# Patient Record
Sex: MALE | Race: WHITE | NOT HISPANIC OR LATINO | Employment: OTHER | URBAN - METROPOLITAN AREA
[De-identification: names, ages, dates, MRNs, and addresses within clinical notes are randomized per-mention and may not be internally consistent; named-entity substitution may affect disease eponyms.]

---

## 2017-07-19 ENCOUNTER — OFFICE VISIT (OUTPATIENT)
Dept: ORTHOPEDIC SURGERY | Facility: CLINIC | Age: 67
End: 2017-07-19

## 2017-07-19 VITALS — HEIGHT: 74 IN | WEIGHT: 287.2 LBS | BODY MASS INDEX: 36.86 KG/M2 | TEMPERATURE: 98.1 F

## 2017-07-19 DIAGNOSIS — M25.562 CHRONIC PAIN OF BOTH KNEES: Primary | ICD-10-CM

## 2017-07-19 DIAGNOSIS — M17.0 BILATERAL PRIMARY OSTEOARTHRITIS OF KNEE: ICD-10-CM

## 2017-07-19 DIAGNOSIS — M25.561 CHRONIC PAIN OF BOTH KNEES: Primary | ICD-10-CM

## 2017-07-19 DIAGNOSIS — G89.29 CHRONIC PAIN OF BOTH KNEES: Primary | ICD-10-CM

## 2017-07-19 PROCEDURE — 99204 OFFICE O/P NEW MOD 45 MIN: CPT | Performed by: ORTHOPAEDIC SURGERY

## 2017-07-19 PROCEDURE — 73562 X-RAY EXAM OF KNEE 3: CPT | Performed by: ORTHOPAEDIC SURGERY

## 2017-07-19 RX ORDER — GLIMEPIRIDE 2 MG/1
2 TABLET ORAL
COMMUNITY
End: 2019-08-08 | Stop reason: SDUPTHER

## 2017-07-19 RX ORDER — NEBIVOLOL 5 MG/1
5 TABLET ORAL DAILY
COMMUNITY
End: 2019-08-08

## 2017-07-19 RX ORDER — EZETIMIBE 10 MG/1
TABLET ORAL
COMMUNITY
Start: 2017-07-02 | End: 2018-06-01

## 2017-07-19 RX ORDER — HYDROCHLOROTHIAZIDE 25 MG/1
25 TABLET ORAL DAILY
COMMUNITY
End: 2019-11-11 | Stop reason: SDUPTHER

## 2017-07-19 RX ORDER — ESCITALOPRAM OXALATE 10 MG/1
10 TABLET ORAL DAILY
COMMUNITY
End: 2019-10-10 | Stop reason: SDUPTHER

## 2017-07-19 RX ORDER — AMLODIPINE BESYLATE 10 MG/1
10 TABLET ORAL DAILY
COMMUNITY
End: 2019-10-01 | Stop reason: SDUPTHER

## 2017-07-19 RX ORDER — SITAGLIPTIN AND METFORMIN HYDROCHLORIDE 1000; 50 MG/1; MG/1
TABLET, FILM COATED, EXTENDED RELEASE ORAL
COMMUNITY
Start: 2017-07-07 | End: 2019-11-13 | Stop reason: SDUPTHER

## 2017-07-19 RX ORDER — ASPIRIN 81 MG/1
81 TABLET ORAL DAILY
Status: ON HOLD | COMMUNITY
End: 2020-03-12

## 2017-07-19 RX ORDER — DICLOFENAC SODIUM 75 MG/1
TABLET, DELAYED RELEASE ORAL
COMMUNITY
Start: 2017-07-07 | End: 2019-08-08

## 2017-07-19 RX ORDER — BENAZEPRIL HYDROCHLORIDE 40 MG/1
40 TABLET, FILM COATED ORAL DAILY
COMMUNITY
End: 2019-10-10 | Stop reason: SDUPTHER

## 2017-07-19 RX ORDER — DOXAZOSIN MESYLATE 1 MG/1
1 TABLET ORAL NIGHTLY
COMMUNITY
End: 2019-10-31 | Stop reason: SDUPTHER

## 2017-07-19 NOTE — PROGRESS NOTES
"Patient: Jeb Olson    YOB: 1950    Medical Record Number: [id number]    Chief Complaints:  Left knee pain    History of Present Illness:     67 y.o. male patient who presents for evaluation of his left knee.  He reports a long history of problems dating back over 6 years.  He has previously been followed by another physician for this.  He had one previous arthroscopic \"cleanout\" as he describes it.  He describes his pain as moderate to severe, constant, and both aching and stabbing.  The pain is associated with swelling, clicking, and popping.  The pain is worse with walking and standing.  The pain is better with rest and anti-inflammatories.  Most of his pain is medial.  He gets occasional discomfort down along the \"shin\" as he describes it.  He does have some similar symptoms on the right although they are much more mild.    Allergies: No Known Allergies    Medications:   Home Medications    Current Outpatient Prescriptions:   •  amLODIPine (NORVASC) 10 MG tablet, Take 10 mg by mouth Daily., Disp: , Rfl:   •  aspirin 81 MG EC tablet, Take 81 mg by mouth Daily., Disp: , Rfl:   •  benazepril (LOTENSIN) 40 MG tablet, Take 40 mg by mouth Daily., Disp: , Rfl:   •  doxazosin (CARDURA) 1 MG tablet, Take 1 mg by mouth Every Night., Disp: , Rfl:   •  escitalopram (LEXAPRO) 10 MG tablet, Take 10 mg by mouth Daily., Disp: , Rfl:   •  glimepiride (AMARYL) 2 MG tablet, Take 2 mg by mouth Every Morning Before Breakfast., Disp: , Rfl:   •  hydrochlorothiazide (HYDRODIURIL) 25 MG tablet, Take 25 mg by mouth Daily., Disp: , Rfl:   •  nebivolol (BYSTOLIC) 5 MG tablet, Take 5 mg by mouth Daily., Disp: , Rfl:   •  diclofenac (VOLTAREN) 75 MG EC tablet, , Disp: , Rfl:   •  ezetimibe (ZETIA) 10 MG tablet, , Disp: , Rfl:   •  JANUMET XR  MG tablet sustained-release 24 hour, , Disp: , Rfl:     History reviewed. No pertinent past medical history.    No past surgical history on file.    Social History " "    Occupational History   • Not on file.     Social History Main Topics   • Smoking status: Never Smoker   • Smokeless tobacco: Not on file   • Alcohol use Yes   • Drug use: No   • Sexual activity: Defer      Social History     Social History Narrative   • No narrative on file       History reviewed. No pertinent family history.    Review of Systems:      Constitutional: Denies fever, shaking or chills   Eyes: Denies change in visual acuity   HEENT: Denies nasal congestion or sore throat   Respiratory: Denies cough or shortness of breath   Cardiovascular: Denies chest pain or edema  Endocrine: Denies tremors, palpitations, intolerance of heat or cold, polyuria, polydipsia.  GI: Denies abdominal pain, nausea, vomiting, bloody stools or diarrhea  : Denies frequency, urgency, incontinence, retention, or nocturia.  Musculoskeletal: Denies numbness tingling or loss of motor function except as above  Integument: Denies rash, lesion or ulceration   Neurologic: Denies headache or focal weakness, deficits  Heme: Denies epistaxis, spontaneous or excessive bleeding, epistaxis, hematuria, melena, fatigue, enlarged or tender lymph nodes.      All other pertinent positives and negatives as noted above in HPI.    Physical Exam: 67 y.o. male  Vitals:    07/19/17 1321   Temp: 98.1 °F (36.7 °C)   TempSrc: Temporal Artery    Weight: 287 lb 3.2 oz (130 kg)   Height: 74\" (188 cm)       General:  Patient is awake and alert.  Appears in no acute distress or discomfort.    Psych:  Affect and demeanor are appropriate.    Eyes:  Conjunctiva and sclera appear grossly normal.  Eyes track well and EOM seem to be intact.    Ears:  No gross abnormalities.  Hearing adequate for the exam.    Cardiovascular:  Regular rate and rhythm.    Lungs:  Good chest expansion.  Breathing unlabored.    Spine:  Back appears grossly normal.  No palpable masses or adenopathy.  Good motion.  Straight leg raise and crossed straight leg raise manuever are both " negative for lower leg and/or knee pain.    Extremities:  Skin is benign.  No obvious gross abnormalities about left knee.  No palpable masses or adenopathy.  Moderate tenderness noted over medial joint line.  Motion is to 125° of flexion, full extension.  No instability.  Strength is well preserved including hip flexion, knee extension, ankle and toe plantarflexion, ankle inversion and eversion.  Good sensory function throughout the leg and foot.  Palpable pulses.  Brisk cap refill.  Good skin turgor.         Radiology:   Bilateral standing AP views, bilateral merchants views and bilateral lateral views of the knees are ordered by myself and reviewed to evaluate the patient's complaint.  No comparison films are immediately available.  The x-rays show significant degenerative arthritis including joint space narrowing, osteophyte formation, and subchondral sclerosis.  The majority of the degenerative changes appear to involve the medial and patellofemoral compartments bilaterally.  The left is worse than the right.    Assessment/Plan:  Bilateral knee osteoarthritis, left currently much more symptomatic than the right    We discussed treatment options in detail including the risks, benefits, and alternatives of conservative treatment versus surgical options.  Regarding conservative treatment, we discussed appropriate activity modifications, anti-inflammatories, injections (including both corticosteroids and viscosupplementation), and physical therapy.  We also discussed the option of an arthroplasty and all that would entail.  I have recommended that we start with a conservative approach and the patient agrees.    The patient has acknowledged understanding of the information and elected for an injection of the left knee.  The risks, benefits, and alternatives were discussed.  He consented to proceed.  Going forward, he will follow-up as needed.    Procedures    Chepe Alicea MD    07/19/2017    CC to Martine Walsh  MD

## 2017-11-17 ENCOUNTER — CLINICAL SUPPORT (OUTPATIENT)
Dept: ORTHOPEDIC SURGERY | Facility: CLINIC | Age: 67
End: 2017-11-17

## 2017-11-17 VITALS — TEMPERATURE: 98.6 F | WEIGHT: 285 LBS | BODY MASS INDEX: 36.57 KG/M2 | HEIGHT: 74 IN

## 2017-11-17 DIAGNOSIS — M17.0 BILATERAL PRIMARY OSTEOARTHRITIS OF KNEE: Primary | ICD-10-CM

## 2017-11-17 PROCEDURE — 20610 DRAIN/INJ JOINT/BURSA W/O US: CPT | Performed by: ORTHOPAEDIC SURGERY

## 2017-11-17 RX ORDER — FLUTICASONE PROPIONATE 50 MCG
1 SPRAY, SUSPENSION (ML) NASAL 2 TIMES DAILY
COMMUNITY
Start: 2017-11-08

## 2017-11-17 RX ADMIN — METHYLPREDNISOLONE ACETATE 160 MG: 80 INJECTION, SUSPENSION INTRA-ARTICULAR; INTRALESIONAL; INTRAMUSCULAR; SOFT TISSUE at 22:17

## 2017-11-17 RX ADMIN — BUPIVACAINE HYDROCHLORIDE 2 ML: 5 INJECTION, SOLUTION PERINEURAL at 22:17

## 2017-11-17 RX ADMIN — LIDOCAINE HYDROCHLORIDE 2 ML: 10 INJECTION, SOLUTION INFILTRATION; PERINEURAL at 22:17

## 2017-11-19 RX ORDER — LIDOCAINE HYDROCHLORIDE 10 MG/ML
2 INJECTION, SOLUTION INFILTRATION; PERINEURAL
Status: COMPLETED | OUTPATIENT
Start: 2017-11-17 | End: 2017-11-17

## 2017-11-19 RX ORDER — METHYLPREDNISOLONE ACETATE 80 MG/ML
160 INJECTION, SUSPENSION INTRA-ARTICULAR; INTRALESIONAL; INTRAMUSCULAR; SOFT TISSUE
Status: COMPLETED | OUTPATIENT
Start: 2017-11-17 | End: 2017-11-17

## 2017-11-19 RX ORDER — BUPIVACAINE HYDROCHLORIDE 5 MG/ML
2 INJECTION, SOLUTION PERINEURAL
Status: COMPLETED | OUTPATIENT
Start: 2017-11-17 | End: 2017-11-17

## 2017-11-20 NOTE — PROGRESS NOTES
Mr. Olson comes in today with his wife.  The last injection worked well for him and he wants to get that repeated.  No new complaints or issues.    The risks, benefits, and alternatives to the repeat injection were discussed.  He consented to proceed.  Going forward, he will follow-up as needed.    Large Joint Arthrocentesis  Date/Time: 11/17/2017 10:17 PM  Consent given by: patient  Site marked: site marked  Timeout: Immediately prior to procedure a time out was called to verify the correct patient, procedure, equipment, support staff and site/side marked as required   Supporting Documentation  Indications: pain and joint swelling   Procedure Details  Location: knee - L knee  Preparation: Patient was prepped and draped in the usual sterile fashion  Needle size: 25 G  Approach: anterolateral  Medications administered: 2 mL lidocaine 1 %; 160 mg methylPREDNISolone acetate 80 MG/ML; 2 mL bupivacaine 0.5 %  Patient tolerance: patient tolerated the procedure well with no immediate complications

## 2018-02-23 ENCOUNTER — CLINICAL SUPPORT (OUTPATIENT)
Dept: ORTHOPEDIC SURGERY | Facility: CLINIC | Age: 68
End: 2018-02-23

## 2018-02-23 VITALS — BODY MASS INDEX: 35.94 KG/M2 | WEIGHT: 280 LBS | TEMPERATURE: 98.2 F | HEIGHT: 74 IN

## 2018-02-23 DIAGNOSIS — M17.0 BILATERAL PRIMARY OSTEOARTHRITIS OF KNEE: Primary | ICD-10-CM

## 2018-02-23 PROCEDURE — 20610 DRAIN/INJ JOINT/BURSA W/O US: CPT | Performed by: ORTHOPAEDIC SURGERY

## 2018-02-23 RX ADMIN — LIDOCAINE HYDROCHLORIDE 2 ML: 10 INJECTION, SOLUTION INFILTRATION; PERINEURAL at 12:50

## 2018-02-23 RX ADMIN — METHYLPREDNISOLONE ACETATE 160 MG: 80 INJECTION, SUSPENSION INTRA-ARTICULAR; INTRALESIONAL; INTRAMUSCULAR; SOFT TISSUE at 12:50

## 2018-02-25 RX ORDER — LIDOCAINE HYDROCHLORIDE 10 MG/ML
2 INJECTION, SOLUTION INFILTRATION; PERINEURAL
Status: COMPLETED | OUTPATIENT
Start: 2018-02-23 | End: 2018-02-23

## 2018-02-25 RX ORDER — METHYLPREDNISOLONE ACETATE 80 MG/ML
160 INJECTION, SUSPENSION INTRA-ARTICULAR; INTRALESIONAL; INTRAMUSCULAR; SOFT TISSUE
Status: COMPLETED | OUTPATIENT
Start: 2018-02-23 | End: 2018-02-23

## 2018-02-25 NOTE — PROGRESS NOTES
Mr. Olson comes in today for follow-up of the left knee.  The last injection worked tremendously well and he wants to get that repeated.    Skin about the left knee is benign.  Mild tenderness medially.    Assessment: Left knee osteoarthritis    Plan: The risks, benefits, and alternatives to repeat injection were discussed.  He consented.  Going forward, he will follow-up as needed.    Large Joint Arthrocentesis  Date/Time: 2/23/2018 12:50 PM  Consent given by: patient  Site marked: site marked  Timeout: Immediately prior to procedure a time out was called to verify the correct patient, procedure, equipment, support staff and site/side marked as required   Supporting Documentation  Indications: pain and joint swelling   Procedure Details  Location: knee - L knee  Preparation: Patient was prepped and draped in the usual sterile fashion  Needle size: 25 G  Approach: anterolateral  Medications administered: 2 mL lidocaine 1 %; 160 mg methylPREDNISolone acetate 80 MG/ML  Patient tolerance: patient tolerated the procedure well with no immediate complications

## 2018-06-01 ENCOUNTER — CLINICAL SUPPORT (OUTPATIENT)
Dept: ORTHOPEDIC SURGERY | Facility: CLINIC | Age: 68
End: 2018-06-01

## 2018-06-01 VITALS — BODY MASS INDEX: 35.94 KG/M2 | HEIGHT: 74 IN | WEIGHT: 280 LBS | TEMPERATURE: 97.8 F

## 2018-06-01 DIAGNOSIS — M17.10 ARTHRITIS OF KNEE: Primary | ICD-10-CM

## 2018-06-01 PROCEDURE — 20610 DRAIN/INJ JOINT/BURSA W/O US: CPT | Performed by: ORTHOPAEDIC SURGERY

## 2018-06-01 RX ADMIN — LIDOCAINE HYDROCHLORIDE 2 ML: 20 INJECTION, SOLUTION EPIDURAL; INFILTRATION; INTRACAUDAL; PERINEURAL at 08:42

## 2018-06-01 RX ADMIN — METHYLPREDNISOLONE ACETATE 80 MG: 80 INJECTION, SUSPENSION INTRA-ARTICULAR; INTRALESIONAL; INTRAMUSCULAR; SOFT TISSUE at 08:42

## 2018-06-03 RX ORDER — METHYLPREDNISOLONE ACETATE 80 MG/ML
80 INJECTION, SUSPENSION INTRA-ARTICULAR; INTRALESIONAL; INTRAMUSCULAR; SOFT TISSUE
Status: COMPLETED | OUTPATIENT
Start: 2018-06-01 | End: 2018-06-01

## 2018-06-03 RX ORDER — LIDOCAINE HYDROCHLORIDE 20 MG/ML
2 INJECTION, SOLUTION EPIDURAL; INFILTRATION; INTRACAUDAL; PERINEURAL
Status: COMPLETED | OUTPATIENT
Start: 2018-06-01 | End: 2018-06-01

## 2018-09-14 ENCOUNTER — CLINICAL SUPPORT (OUTPATIENT)
Dept: ORTHOPEDIC SURGERY | Facility: CLINIC | Age: 68
End: 2018-09-14

## 2018-09-14 VITALS — HEIGHT: 74 IN | BODY MASS INDEX: 35.94 KG/M2 | TEMPERATURE: 98 F | WEIGHT: 280 LBS

## 2018-09-14 DIAGNOSIS — M17.10 ARTHRITIS OF KNEE: Primary | ICD-10-CM

## 2018-09-14 PROCEDURE — 99212 OFFICE O/P EST SF 10 MIN: CPT | Performed by: ORTHOPAEDIC SURGERY

## 2018-09-14 PROCEDURE — 20610 DRAIN/INJ JOINT/BURSA W/O US: CPT | Performed by: ORTHOPAEDIC SURGERY

## 2018-09-14 RX ORDER — EZETIMIBE 10 MG/1
TABLET ORAL
COMMUNITY
Start: 2018-08-20 | End: 2019-10-31 | Stop reason: SDUPTHER

## 2018-09-14 RX ORDER — GLIMEPIRIDE 4 MG/1
TABLET ORAL
COMMUNITY
Start: 2018-07-06 | End: 2019-08-08

## 2018-09-14 RX ORDER — FENOFIBRATE 160 MG/1
TABLET ORAL
COMMUNITY
Start: 2018-06-23 | End: 2019-09-11 | Stop reason: SDUPTHER

## 2018-09-14 RX ORDER — LIDOCAINE HYDROCHLORIDE 20 MG/ML
2 INJECTION, SOLUTION EPIDURAL; INFILTRATION; INTRACAUDAL; PERINEURAL
Status: COMPLETED | OUTPATIENT
Start: 2018-09-14 | End: 2018-09-14

## 2018-09-14 RX ORDER — METHYLPREDNISOLONE ACETATE 80 MG/ML
80 INJECTION, SUSPENSION INTRA-ARTICULAR; INTRALESIONAL; INTRAMUSCULAR; SOFT TISSUE
Status: COMPLETED | OUTPATIENT
Start: 2018-09-14 | End: 2018-09-14

## 2018-09-14 RX ADMIN — LIDOCAINE HYDROCHLORIDE 2 ML: 20 INJECTION, SOLUTION EPIDURAL; INFILTRATION; INTRACAUDAL; PERINEURAL at 08:45

## 2018-09-14 RX ADMIN — METHYLPREDNISOLONE ACETATE 80 MG: 80 INJECTION, SUSPENSION INTRA-ARTICULAR; INTRALESIONAL; INTRAMUSCULAR; SOFT TISSUE at 08:45

## 2018-09-14 NOTE — PROGRESS NOTES
CC:  Worsening left knee pain    HPI:  Mr. Olson comes in today for his left knee.  His symptoms are worse.  He recently had to come off of diclofenac because of kidney problems.  Describes his pain as moderate to severe, constant and aching.    Left knee is examined.  Skin is benign.  Moderate effusion.  Mild to moderate medial joint line tenderness.  Motion is good.  Gait is normal.    Assessment:  Worsening left knee arthritis    Plan:  We discussed options for him.  Should be safe for him to take Tylenol as needed.  He is headed towards an arthroplasty but we are going to try to continue to put that off.  I recommended a repeat injection.  The risks, benefits and alternatives were discussed.  He consented.    Large Joint Arthrocentesis  Date/Time: 9/14/2018 8:45 AM  Consent given by: patient  Site marked: site marked  Timeout: Immediately prior to procedure a time out was called to verify the correct patient, procedure, equipment, support staff and site/side marked as required   Supporting Documentation  Indications: pain and joint swelling   Procedure Details  Location: knee - L knee  Preparation: Patient was prepped and draped in the usual sterile fashion  Needle gauge: 21 G.  Approach: anterolateral  Medications administered: 2 mL lidocaine PF 2% 2 %; 80 mg methylPREDNISolone acetate 80 MG/ML  Patient tolerance: patient tolerated the procedure well with no immediate complications

## 2018-12-14 ENCOUNTER — CLINICAL SUPPORT (OUTPATIENT)
Dept: ORTHOPEDIC SURGERY | Facility: CLINIC | Age: 68
End: 2018-12-14

## 2018-12-14 VITALS — WEIGHT: 273 LBS | BODY MASS INDEX: 35.04 KG/M2 | TEMPERATURE: 98 F | HEIGHT: 74 IN

## 2018-12-14 DIAGNOSIS — M17.12 PRIMARY OSTEOARTHRITIS OF LEFT KNEE: Primary | ICD-10-CM

## 2018-12-14 PROCEDURE — 73562 X-RAY EXAM OF KNEE 3: CPT | Performed by: ORTHOPAEDIC SURGERY

## 2018-12-14 PROCEDURE — 20610 DRAIN/INJ JOINT/BURSA W/O US: CPT | Performed by: ORTHOPAEDIC SURGERY

## 2018-12-14 RX ADMIN — METHYLPREDNISOLONE ACETATE 80 MG: 80 INJECTION, SUSPENSION INTRA-ARTICULAR; INTRALESIONAL; INTRAMUSCULAR; SOFT TISSUE at 08:41

## 2018-12-14 RX ADMIN — LIDOCAINE HYDROCHLORIDE 2 ML: 10 INJECTION, SOLUTION EPIDURAL; INFILTRATION; INTRACAUDAL; PERINEURAL at 08:41

## 2018-12-14 NOTE — PROGRESS NOTES
Mr. Lopez comes in today for follow-up.  Injections have worked well in the past.  The patient would like to get a repeat injection today.  The risks, benefits and alternatives were discussed and the patient consented.  Going forward, the patient will follow-up as needed.    Chepe Alicea MD    12/14/2018    Large Joint Arthrocentesis: L knee  Date/Time: 12/14/2018 8:41 AM  Consent given by: patient  Site marked: site marked  Timeout: Immediately prior to procedure a time out was called to verify the correct patient, procedure, equipment, support staff and site/side marked as required   Supporting Documentation  Indications: pain and joint swelling   Procedure Details  Location: knee - L knee  Preparation: Patient was prepped and draped in the usual sterile fashion  Needle gauge: 21 G.  Approach: anterolateral  Medications administered: 2 mL lidocaine PF 1% 1 %; 80 mg methylPREDNISolone acetate 80 MG/ML  Patient tolerance: patient tolerated the procedure well with no immediate complications

## 2018-12-19 RX ORDER — LIDOCAINE HYDROCHLORIDE 10 MG/ML
2 INJECTION, SOLUTION EPIDURAL; INFILTRATION; INTRACAUDAL; PERINEURAL
Status: COMPLETED | OUTPATIENT
Start: 2018-12-14 | End: 2018-12-14

## 2018-12-19 RX ORDER — METHYLPREDNISOLONE ACETATE 80 MG/ML
80 INJECTION, SUSPENSION INTRA-ARTICULAR; INTRALESIONAL; INTRAMUSCULAR; SOFT TISSUE
Status: COMPLETED | OUTPATIENT
Start: 2018-12-14 | End: 2018-12-14

## 2019-04-05 ENCOUNTER — CLINICAL SUPPORT (OUTPATIENT)
Dept: ORTHOPEDIC SURGERY | Facility: CLINIC | Age: 69
End: 2019-04-05

## 2019-04-05 VITALS — BODY MASS INDEX: 35.05 KG/M2 | HEIGHT: 74 IN | TEMPERATURE: 98.8 F

## 2019-04-05 DIAGNOSIS — M17.10 ARTHRITIS OF KNEE: Primary | ICD-10-CM

## 2019-04-05 PROCEDURE — 20610 DRAIN/INJ JOINT/BURSA W/O US: CPT | Performed by: ORTHOPAEDIC SURGERY

## 2019-04-05 RX ORDER — METHYLPREDNISOLONE ACETATE 80 MG/ML
80 INJECTION, SUSPENSION INTRA-ARTICULAR; INTRALESIONAL; INTRAMUSCULAR; SOFT TISSUE
Status: COMPLETED | OUTPATIENT
Start: 2019-04-05 | End: 2019-04-05

## 2019-04-05 RX ADMIN — METHYLPREDNISOLONE ACETATE 80 MG: 80 INJECTION, SUSPENSION INTRA-ARTICULAR; INTRALESIONAL; INTRAMUSCULAR; SOFT TISSUE at 08:40

## 2019-04-05 NOTE — PROGRESS NOTES
Mr. Olson comes in for a repeat injection.  The risks, benefits and alternatives were discussed.  He consented.  Going forward, he will follow-up as needed.    Large Joint Arthrocentesis: L knee  Date/Time: 4/5/2019 8:40 AM  Consent given by: patient  Site marked: site marked  Timeout: Immediately prior to procedure a time out was called to verify the correct patient, procedure, equipment, support staff and site/side marked as required   Supporting Documentation  Indications: pain and joint swelling   Procedure Details  Location: knee - L knee  Preparation: Patient was prepped and draped in the usual sterile fashion  Needle gauge: 21 G.  Approach: anterolateral  Medications administered: 2 mL lidocaine (cardiac) 20 MG/ML; 80 mg methylPREDNISolone acetate 80 MG/ML  Patient tolerance: patient tolerated the procedure well with no immediate complications

## 2019-06-08 NOTE — PROGRESS NOTES
Mr. Olson comes in today wanting to get the left knee injected again.  The risks, benefits, and alternatives were discussed and he consented.  Going forward, he will follow up as needed.    Large Joint Arthrocentesis  Date/Time: 6/1/2018 8:42 AM  Consent given by: patient  Site marked: site marked  Timeout: Immediately prior to procedure a time out was called to verify the correct patient, procedure, equipment, support staff and site/side marked as required   Supporting Documentation  Indications: pain and joint swelling   Procedure Details  Location: knee - L knee  Preparation: Patient was prepped and draped in the usual sterile fashion  Needle size: 22 G  Approach: anterolateral  Medications administered: 2 mL lidocaine PF 2% 2 %; 80 mg methylPREDNISolone acetate 80 MG/ML  Patient tolerance: patient tolerated the procedure well with no immediate complications          
No significant past surgical history

## 2019-06-24 ENCOUNTER — TELEPHONE (OUTPATIENT)
Dept: ORTHOPEDIC SURGERY | Facility: CLINIC | Age: 69
End: 2019-06-24

## 2019-06-24 DIAGNOSIS — Z84.89 FHX: ALLERGIES: Primary | ICD-10-CM

## 2019-06-24 NOTE — TELEPHONE ENCOUNTER
Regarding: Referral Request  Contact: [phone]  ----- Message from [employer], Generic sent at 6/22/2019  5:33 PM EDT -----  MY CHART MESSAGE:    Dr. Alicea,    Could you please give me referral to an Allergist.  I've been having nasal and bronchiole problems since February and would to see if they are due to allergies.     Thank you,    Jeb

## 2019-07-19 ENCOUNTER — CLINICAL SUPPORT (OUTPATIENT)
Dept: ORTHOPEDIC SURGERY | Facility: CLINIC | Age: 69
End: 2019-07-19

## 2019-07-19 VITALS — BODY MASS INDEX: 35.42 KG/M2 | WEIGHT: 276 LBS | HEIGHT: 74 IN | TEMPERATURE: 98.2 F

## 2019-07-19 DIAGNOSIS — M17.12 ARTHRITIS OF LEFT KNEE: Primary | ICD-10-CM

## 2019-07-19 PROCEDURE — 20610 DRAIN/INJ JOINT/BURSA W/O US: CPT | Performed by: NURSE PRACTITIONER

## 2019-07-19 RX ORDER — METHYLPREDNISOLONE ACETATE 80 MG/ML
80 INJECTION, SUSPENSION INTRA-ARTICULAR; INTRALESIONAL; INTRAMUSCULAR; SOFT TISSUE
Status: COMPLETED | OUTPATIENT
Start: 2019-07-19 | End: 2019-07-19

## 2019-07-19 RX ADMIN — METHYLPREDNISOLONE ACETATE 80 MG: 80 INJECTION, SUSPENSION INTRA-ARTICULAR; INTRALESIONAL; INTRAMUSCULAR; SOFT TISSUE at 13:26

## 2019-07-19 NOTE — PROGRESS NOTES
Mr. Olson comes in today for follow-up.  Injections have worked well in the past.  The patient would like to get a repeat injection today.  The risks, benefits and alternatives were discussed and the patient consented.  Going forward, the patient will follow-up as needed.    TIFFANIE Shine    07/19/2019    Large Joint Arthrocentesis: L knee  Date/Time: 7/19/2019 1:26 PM  Consent given by: patient  Site marked: site marked  Timeout: Immediately prior to procedure a time out was called to verify the correct patient, procedure, equipment, support staff and site/side marked as required   Supporting Documentation  Indications: pain and joint swelling   Procedure Details  Location: knee - L knee  Preparation: Patient was prepped and draped in the usual sterile fashion  Needle gauge: 21 G.  Approach: anterolateral  Medications administered: 2 mL lidocaine (cardiac); 80 mg methylPREDNISolone acetate 80 MG/ML  Patient tolerance: patient tolerated the procedure well with no immediate complications

## 2019-08-02 ENCOUNTER — CONSULT (OUTPATIENT)
Dept: ORTHOPEDIC SURGERY | Facility: CLINIC | Age: 69
End: 2019-08-02

## 2019-08-02 VITALS — TEMPERATURE: 98.7 F | HEIGHT: 74 IN | WEIGHT: 274 LBS | BODY MASS INDEX: 35.16 KG/M2

## 2019-08-02 DIAGNOSIS — M17.10 ARTHRITIS OF KNEE: ICD-10-CM

## 2019-08-02 DIAGNOSIS — M17.11 PRIMARY OSTEOARTHRITIS OF RIGHT KNEE: Primary | ICD-10-CM

## 2019-08-02 PROCEDURE — 99212 OFFICE O/P EST SF 10 MIN: CPT | Performed by: ORTHOPAEDIC SURGERY

## 2019-08-02 PROCEDURE — 73562 X-RAY EXAM OF KNEE 3: CPT | Performed by: ORTHOPAEDIC SURGERY

## 2019-08-04 NOTE — PROGRESS NOTES
Chief complaint: New complaint of right knee pain    Mr. Olson comes in for his right knee.  He reports that he is now having similar symptoms on the right as those that he has experienced on the left in the past.  Pain is moderate, constant and aching.  He gets occasional swelling.  He says that his symptoms are not really bad enough to justify any intervention at this point.    Right knee is examined.  Skin is benign.  No effusion.  Moderate medial tenderness.  Good motion.  No instability.    AP, merchant's and lateral views of the right knee are ordered and reviewed.  These are compared to his previous x-rays of the left knee.  He has moderate medial and patellofemoral compartment osteoarthritis.  No other significant findings noted.    Assessment: Right knee osteoarthritis    Plan: We discussed treatment options.  He had a good understanding of this information based on his previous treatment for the left knee.  He says that his symptoms at this point are really bad enough to justify any intervention.  He will follow-up as needed.

## 2019-08-08 ENCOUNTER — OFFICE VISIT (OUTPATIENT)
Dept: FAMILY MEDICINE CLINIC | Facility: CLINIC | Age: 69
End: 2019-08-08

## 2019-08-08 VITALS
DIASTOLIC BLOOD PRESSURE: 90 MMHG | HEIGHT: 74 IN | OXYGEN SATURATION: 97 % | BODY MASS INDEX: 35.16 KG/M2 | TEMPERATURE: 98.3 F | WEIGHT: 274 LBS | HEART RATE: 42 BPM | SYSTOLIC BLOOD PRESSURE: 130 MMHG

## 2019-08-08 DIAGNOSIS — D64.9 MILD CHRONIC ANEMIA: ICD-10-CM

## 2019-08-08 DIAGNOSIS — I10 ESSENTIAL HYPERTENSION: ICD-10-CM

## 2019-08-08 DIAGNOSIS — E11.41 TYPE 2 DIABETES MELLITUS WITH DIABETIC MONONEUROPATHY, WITHOUT LONG-TERM CURRENT USE OF INSULIN (HCC): Primary | ICD-10-CM

## 2019-08-08 DIAGNOSIS — R00.1 BRADYCARDIA: ICD-10-CM

## 2019-08-08 DIAGNOSIS — E78.5 HYPERLIPIDEMIA, UNSPECIFIED HYPERLIPIDEMIA TYPE: ICD-10-CM

## 2019-08-08 PROBLEM — G47.33 OBSTRUCTIVE SLEEP APNEA: Status: ACTIVE | Noted: 2019-08-08

## 2019-08-08 PROCEDURE — 99203 OFFICE O/P NEW LOW 30 MIN: CPT | Performed by: INTERNAL MEDICINE

## 2019-08-08 RX ORDER — NEBIVOLOL 10 MG/1
10 TABLET ORAL DAILY
COMMUNITY
End: 2020-01-10 | Stop reason: SDUPTHER

## 2019-08-08 RX ORDER — GABAPENTIN 100 MG/1
100 CAPSULE ORAL 2 TIMES DAILY
COMMUNITY
End: 2020-01-29

## 2019-08-08 RX ORDER — CLONIDINE HYDROCHLORIDE 0.1 MG/1
0.1 TABLET ORAL DAILY
COMMUNITY
End: 2019-12-30 | Stop reason: SDUPTHER

## 2019-08-08 RX ORDER — GLIMEPIRIDE 4 MG/1
4 TABLET ORAL 2 TIMES DAILY
COMMUNITY
End: 2019-11-04 | Stop reason: SDUPTHER

## 2019-08-08 NOTE — PROGRESS NOTES
Subjective Chief  complaint is to establish care  Jeb Olson is a 69 y.o. male.     History of Present Illness   Jeb is here today to establish care.  He was a previous patient of  and then Dr. Singh.  He does have long-standing hypertension.  He is on maximum doses of multiple medications.  His blood pressure today is on the borderline high.  He also has non-insulin-dependent diabetes mellitus.  He is on maximum doses of glimepiride as well as Janumet extended release.  Has hyperlipidemia.  He has been statin intolerant.  He is on both Zetia and fenofibrate.  He does report that he had a colonoscopy approximately 6 years ago by Dr. high knee.  I do not have these results.  Past medical history is remarkable for a chronic anemia.  He has not tested positive for occult blood in his stool.  His iron stores and vitamin B12 stores are normal.  There is no family history of anemia or thalassemia.  He also has a history of sleep apnea.  Social history is a lifelong non-smoker.  He previously was drinking a considerable amount of beer.  This seemed because his legs to swell and he has stopped this.  He does drink other alcoholic beverages.    The following portions of the patient's history were reviewed and updated as appropriate: allergies, current medications, past family history, past medical history, past social history, past surgical history and problem list.    Review of Systems   Eyes: Negative for blurred vision and visual disturbance.   Respiratory: Negative for chest tightness and shortness of breath.    Cardiovascular: Negative for chest pain, palpitations and leg swelling.   Gastrointestinal: Positive for indigestion. Negative for blood in stool.   Genitourinary: Negative for dysuria, hematuria and nocturia.   Musculoskeletal: Negative for neck pain.   Neurological: Positive for light-headedness. Negative for dizziness and facial asymmetry.       Objective   Physical Exam   Constitutional: He  appears well-developed and well-nourished.   Neck: Carotid bruit is not present.   Cardiovascular: Regular rhythm, normal heart sounds and intact distal pulses. Exam reveals no gallop and no friction rub.   No murmur heard.  Repeat heart rate remains at 42.   Pulmonary/Chest: Effort normal and breath sounds normal. No respiratory distress. He has no wheezes. He has no rales.   Abdominal: Soft. Bowel sounds are normal. He exhibits no distension and no mass. There is no tenderness. There is no guarding.   Musculoskeletal: He exhibits no edema.     Vascular Status -  His right foot exhibits normal foot vasculature  and no edema. His left foot exhibits normal foot vasculature  and no edema.  Skin Integrity  -  His right foot skin is intact.His left foot skin is intact..  Nursing note and vitals reviewed.        Assessment/Plan   Jeb was seen today for establish care.    Diagnoses and all orders for this visit:    Type 2 diabetes mellitus with diabetic mononeuropathy, without long-term current use of insulin (CMS/[facility])  -     Comprehensive Metabolic Panel  -     Hemoglobin A1c    Essential hypertension    Hyperlipidemia, unspecified hyperlipidemia type  -     Lipid Panel    Mild chronic anemia  -     CBC & Differential    Bradycardia  -     TSH+Free T4  -     T3, Free    Jeb is here today to establish care.  He does have multiple medical problems.  All these are new to me and appear to be stable.  We are going to see him back in 3 months to recheck his bradycardia.  I am going to check some thyroid test today to make sure there is not a problem there.  We will try to obtain the records from his eye doctor regarding his most recent eye exam and from Dr. Miguel regarding his most recent colonoscopy.

## 2019-08-09 LAB
ALBUMIN SERPL-MCNC: 4.7 G/DL (ref 3.5–5.2)
ALBUMIN/GLOB SERPL: 1.8 G/DL
ALP SERPL-CCNC: 30 U/L (ref 39–117)
ALT SERPL-CCNC: 27 U/L (ref 1–41)
AST SERPL-CCNC: 16 U/L (ref 1–40)
BASOPHILS # BLD AUTO: 0.06 10*3/MM3 (ref 0–0.2)
BASOPHILS NFR BLD AUTO: 0.8 % (ref 0–1.5)
BILIRUB SERPL-MCNC: 0.3 MG/DL (ref 0.2–1.2)
BUN SERPL-MCNC: 17 MG/DL (ref 8–23)
BUN/CREAT SERPL: 14 (ref 7–25)
CALCIUM SERPL-MCNC: 9.3 MG/DL (ref 8.6–10.5)
CHLORIDE SERPL-SCNC: 100 MMOL/L (ref 98–107)
CHOLEST SERPL-MCNC: 172 MG/DL (ref 0–200)
CO2 SERPL-SCNC: 25 MMOL/L (ref 22–29)
CREAT SERPL-MCNC: 1.21 MG/DL (ref 0.76–1.27)
EOSINOPHIL # BLD AUTO: 0.13 10*3/MM3 (ref 0–0.4)
EOSINOPHIL NFR BLD AUTO: 1.8 % (ref 0.3–6.2)
ERYTHROCYTE [DISTWIDTH] IN BLOOD BY AUTOMATED COUNT: 13.6 % (ref 12.3–15.4)
GLOBULIN SER CALC-MCNC: 2.6 GM/DL
GLUCOSE SERPL-MCNC: 121 MG/DL (ref 65–99)
HBA1C MFR BLD: 6.8 % (ref 4.8–5.6)
HCT VFR BLD AUTO: 40.2 % (ref 37.5–51)
HDLC SERPL-MCNC: 36 MG/DL (ref 40–60)
HGB BLD-MCNC: 12.6 G/DL (ref 13–17.7)
IMM GRANULOCYTES # BLD AUTO: 0.04 10*3/MM3 (ref 0–0.05)
IMM GRANULOCYTES NFR BLD AUTO: 0.5 % (ref 0–0.5)
LDLC SERPL CALC-MCNC: 98 MG/DL (ref 0–100)
LYMPHOCYTES # BLD AUTO: 1.91 10*3/MM3 (ref 0.7–3.1)
LYMPHOCYTES NFR BLD AUTO: 26.1 % (ref 19.6–45.3)
MCH RBC QN AUTO: 28.6 PG (ref 26.6–33)
MCHC RBC AUTO-ENTMCNC: 31.3 G/DL (ref 31.5–35.7)
MCV RBC AUTO: 91.4 FL (ref 79–97)
MONOCYTES # BLD AUTO: 0.44 10*3/MM3 (ref 0.1–0.9)
MONOCYTES NFR BLD AUTO: 6 % (ref 5–12)
NEUTROPHILS # BLD AUTO: 4.75 10*3/MM3 (ref 1.7–7)
NEUTROPHILS NFR BLD AUTO: 64.8 % (ref 42.7–76)
NRBC BLD AUTO-RTO: 0 /100 WBC (ref 0–0.2)
PLATELET # BLD AUTO: 218 10*3/MM3 (ref 140–450)
POTASSIUM SERPL-SCNC: 4 MMOL/L (ref 3.5–5.2)
PROT SERPL-MCNC: 7.3 G/DL (ref 6–8.5)
RBC # BLD AUTO: 4.4 10*6/MM3 (ref 4.14–5.8)
SODIUM SERPL-SCNC: 143 MMOL/L (ref 136–145)
T3FREE SERPL-MCNC: 2.8 PG/ML (ref 2–4.4)
T4 FREE SERPL-MCNC: 1.11 NG/DL (ref 0.93–1.7)
TRIGL SERPL-MCNC: 191 MG/DL (ref 0–150)
TSH SERPL DL<=0.005 MIU/L-ACNC: 3.16 MIU/ML (ref 0.27–4.2)
VLDLC SERPL CALC-MCNC: 38.2 MG/DL
WBC # BLD AUTO: 7.33 10*3/MM3 (ref 3.4–10.8)

## 2019-09-11 RX ORDER — FENOFIBRATE 160 MG/1
160 TABLET ORAL DAILY
Qty: 30 TABLET | Refills: 5 | Status: SHIPPED | OUTPATIENT
Start: 2019-09-11 | End: 2019-12-08 | Stop reason: SDUPTHER

## 2019-10-01 RX ORDER — AMLODIPINE BESYLATE 10 MG/1
10 TABLET ORAL DAILY
Qty: 90 TABLET | Refills: 0 | Status: SHIPPED | OUTPATIENT
Start: 2019-10-01 | End: 2019-12-29 | Stop reason: SDUPTHER

## 2019-10-10 RX ORDER — BENAZEPRIL HYDROCHLORIDE 40 MG/1
40 TABLET, FILM COATED ORAL DAILY
Qty: 90 TABLET | Refills: 0 | Status: SHIPPED | OUTPATIENT
Start: 2019-10-10 | End: 2020-01-10 | Stop reason: SDUPTHER

## 2019-10-10 RX ORDER — ESCITALOPRAM OXALATE 10 MG/1
10 TABLET ORAL DAILY
Qty: 90 TABLET | Refills: 0 | Status: SHIPPED | OUTPATIENT
Start: 2019-10-10 | End: 2020-01-10 | Stop reason: SDUPTHER

## 2019-10-30 ENCOUNTER — CLINICAL SUPPORT (OUTPATIENT)
Dept: ORTHOPEDIC SURGERY | Facility: CLINIC | Age: 69
End: 2019-10-30

## 2019-10-30 VITALS — BODY MASS INDEX: 35.68 KG/M2 | TEMPERATURE: 98.1 F | WEIGHT: 278 LBS | HEIGHT: 74 IN

## 2019-10-30 DIAGNOSIS — M17.12 ARTHRITIS OF LEFT KNEE: ICD-10-CM

## 2019-10-30 PROCEDURE — 20610 DRAIN/INJ JOINT/BURSA W/O US: CPT | Performed by: NURSE PRACTITIONER

## 2019-10-30 RX ORDER — METHYLPREDNISOLONE ACETATE 80 MG/ML
80 INJECTION, SUSPENSION INTRA-ARTICULAR; INTRALESIONAL; INTRAMUSCULAR; SOFT TISSUE
Status: COMPLETED | OUTPATIENT
Start: 2019-10-30 | End: 2019-10-30

## 2019-10-30 RX ADMIN — METHYLPREDNISOLONE ACETATE 80 MG: 80 INJECTION, SUSPENSION INTRA-ARTICULAR; INTRALESIONAL; INTRAMUSCULAR; SOFT TISSUE at 09:33

## 2019-10-30 NOTE — PROGRESS NOTES
Mr. Olson comes in today for follow-up.  Injections have worked well in the past.  The patient would like to get a repeat injection today.  The risks, benefits and alternatives were discussed and the patient consented.  Going forward, the patient will follow-up as needed.    TIFFANIE Shine    10/30/2019    Large Joint Arthrocentesis: L knee  Date/Time: 10/30/2019 9:33 AM  Consent given by: patient  Site marked: site marked  Timeout: Immediately prior to procedure a time out was called to verify the correct patient, procedure, equipment, support staff and site/side marked as required   Supporting Documentation  Indications: pain and joint swelling   Procedure Details  Location: knee - L knee  Preparation: Patient was prepped and draped in the usual sterile fashion  Needle gauge: 21g.  Approach: anterolateral  Medications administered: 80 mg methylPREDNISolone acetate 80 MG/ML; 2 mL lidocaine (cardiac)  Patient tolerance: patient tolerated the procedure well with no immediate complications

## 2019-10-31 ENCOUNTER — TELEPHONE (OUTPATIENT)
Dept: FAMILY MEDICINE CLINIC | Facility: CLINIC | Age: 69
End: 2019-10-31

## 2019-10-31 RX ORDER — DOXAZOSIN MESYLATE 1 MG/1
1 TABLET ORAL NIGHTLY
Qty: 90 TABLET | Refills: 0 | Status: SHIPPED | OUTPATIENT
Start: 2019-10-31 | End: 2020-01-31 | Stop reason: SDUPTHER

## 2019-10-31 RX ORDER — EZETIMIBE 10 MG/1
10 TABLET ORAL DAILY
Qty: 90 TABLET | Refills: 1 | Status: SHIPPED | OUTPATIENT
Start: 2019-10-31 | End: 2020-01-31 | Stop reason: SDUPTHER

## 2019-10-31 NOTE — TELEPHONE ENCOUNTER
Regarding: Prescription Question  Contact: [phone]  ----- Message from [employer], Generic sent at 10/31/2019 11:06 AM EDT -----    Alfredo Salvador,    Could you please refill the following prescriptions?    Doxazosin Mesylate 1 MG tab    Ezetimibe 10 MG tab    Thank you,   Jeb BLAKELY - 54

## 2019-11-04 RX ORDER — GLIMEPIRIDE 4 MG/1
4 TABLET ORAL 2 TIMES DAILY
Qty: 180 TABLET | Refills: 1 | Status: SHIPPED | OUTPATIENT
Start: 2019-11-04 | End: 2020-06-23

## 2019-11-08 ENCOUNTER — OFFICE VISIT (OUTPATIENT)
Dept: FAMILY MEDICINE CLINIC | Facility: CLINIC | Age: 69
End: 2019-11-08

## 2019-11-08 VITALS
TEMPERATURE: 98.3 F | HEIGHT: 74 IN | HEART RATE: 47 BPM | BODY MASS INDEX: 35.5 KG/M2 | WEIGHT: 276.6 LBS | DIASTOLIC BLOOD PRESSURE: 76 MMHG | SYSTOLIC BLOOD PRESSURE: 150 MMHG | OXYGEN SATURATION: 98 %

## 2019-11-08 DIAGNOSIS — I10 ESSENTIAL HYPERTENSION: Primary | ICD-10-CM

## 2019-11-08 PROCEDURE — G0009 ADMIN PNEUMOCOCCAL VACCINE: HCPCS | Performed by: INTERNAL MEDICINE

## 2019-11-08 PROCEDURE — 99213 OFFICE O/P EST LOW 20 MIN: CPT | Performed by: INTERNAL MEDICINE

## 2019-11-08 PROCEDURE — 90670 PCV13 VACCINE IM: CPT | Performed by: INTERNAL MEDICINE

## 2019-11-08 NOTE — PROGRESS NOTES
Subjective Chief complaint is follow-up on blood pressure  Jeb Olson is a 69 y.o. male.     History of Present Illness   Jeb is here today for follow-up on his blood pressure.  At his last visit his pressure was 130/90.  Initially today it was 150/76 but I retook it after a little bit and it was 138/76.  His repeat heart rate was 50.  He is on some maximal doses of medication out of each of the various classes of medicines.  I am not going to make any changes in his medicines today.  Is not having any headaches, dizziness, chest pain, or shortness of breath.  The following portions of the patient's history were reviewed and updated as appropriate: allergies, current medications, past family history, past medical history, past social history, past surgical history and problem list.    Review of Systems   Respiratory: Negative for chest tightness and shortness of breath.    Cardiovascular: Negative for chest pain.   Neurological: Negative for dizziness, light-headedness and headache.       Objective   Physical Exam   Constitutional: He appears well-developed and well-nourished.   Cardiovascular: Normal rate, regular rhythm, normal heart sounds and intact distal pulses.   Blood pressure is 138/76 in the left arm to my exam   Pulmonary/Chest: Effort normal and breath sounds normal.   Musculoskeletal: He exhibits no edema.   Nursing note and vitals reviewed.        Assessment/Plan   Jeb was seen today for hypertension.    Diagnoses and all orders for this visit:    Essential hypertension    Other orders  -     Pneumococcal Conjugate Vaccine 13-Valent All    Jeb is here today for follow-up on his blood pressure.  It is a little bit on the borderline.  However he is on multiple medications and I do not see anything that I could really add to this other than possibly hydralazine or increasing his clonidine or Cardura.  Neither of those seem like great options.  I am simply going to observe this for now.  We will  see him back in approximately 3 months and coordinate a surgical clearance for his knee surgery.

## 2019-11-11 RX ORDER — HYDROCHLOROTHIAZIDE 25 MG/1
25 TABLET ORAL DAILY
Qty: 90 TABLET | Refills: 1 | Status: SHIPPED | OUTPATIENT
Start: 2019-11-11 | End: 2020-02-10 | Stop reason: SDUPTHER

## 2019-11-12 ENCOUNTER — TELEPHONE (OUTPATIENT)
Dept: FAMILY MEDICINE CLINIC | Facility: CLINIC | Age: 69
End: 2019-11-12

## 2019-11-12 RX ORDER — SITAGLIPTIN AND METFORMIN HYDROCHLORIDE 1000; 50 MG/1; MG/1
TABLET, FILM COATED, EXTENDED RELEASE ORAL
Qty: 30 TABLET | Status: CANCELLED | OUTPATIENT
Start: 2019-11-12

## 2019-11-12 NOTE — TELEPHONE ENCOUNTER
Subject Delivery           Prescription Question  2019 12:07 PM Reply    To: MARISOL BRYANT [facility]      From: Jeb Olson      Created: 2019 12:07 PM        *-*-*This message was handled on 2019 12:13 PM by JIM SMART*-*-*    Alfredo Salvador,    Can you please refill my prescription for Janumet XR 50-1000MG,  I sent a request for this Friday but haven't seen anything for it on my [employer] website.     Thank you,   Jeb Olson    - 54                 Ok to refill his rx???

## 2019-11-12 NOTE — TELEPHONE ENCOUNTER
Regarding: Prescription Question  Contact: [phone]  ----- Message from [employer], Generic sent at 2019 12:07 PM EST -----    Alfredo Salvador,    Can you please refill my prescription for Janumet XR 50-1000MG,  I sent a request for this Friday but haven't seen anything for it on my [employer] website.     Thank you,   Jeb BLAKELY - 54

## 2019-11-13 RX ORDER — SITAGLIPTIN AND METFORMIN HYDROCHLORIDE 1000; 50 MG/1; MG/1
2 TABLET, FILM COATED, EXTENDED RELEASE ORAL DAILY
Qty: 180 TABLET | Refills: 1 | Status: SHIPPED | OUTPATIENT
Start: 2019-11-13 | End: 2019-12-19 | Stop reason: SDUPTHER

## 2019-12-09 RX ORDER — FENOFIBRATE 160 MG/1
160 TABLET ORAL DAILY
Qty: 30 TABLET | Refills: 5 | Status: SHIPPED | OUTPATIENT
Start: 2019-12-09 | End: 2020-08-07 | Stop reason: SDUPTHER

## 2019-12-20 RX ORDER — SITAGLIPTIN AND METFORMIN HYDROCHLORIDE 1000; 50 MG/1; MG/1
2 TABLET, FILM COATED, EXTENDED RELEASE ORAL DAILY
Qty: 180 TABLET | Refills: 1 | Status: SHIPPED | OUTPATIENT
Start: 2019-12-20 | End: 2020-08-07 | Stop reason: SDUPTHER

## 2019-12-30 RX ORDER — AMLODIPINE BESYLATE 10 MG/1
10 TABLET ORAL DAILY
Qty: 90 TABLET | Refills: 0 | Status: SHIPPED | OUTPATIENT
Start: 2019-12-30 | End: 2020-03-30

## 2019-12-30 RX ORDER — CLONIDINE HYDROCHLORIDE 0.1 MG/1
0.1 TABLET ORAL DAILY
Qty: 30 TABLET | Refills: 2 | Status: SHIPPED | OUTPATIENT
Start: 2019-12-30 | End: 2020-04-30 | Stop reason: SDUPTHER

## 2020-01-10 RX ORDER — ESCITALOPRAM OXALATE 10 MG/1
10 TABLET ORAL DAILY
Qty: 90 TABLET | Refills: 0 | Status: SHIPPED | OUTPATIENT
Start: 2020-01-10 | End: 2020-07-12 | Stop reason: SDUPTHER

## 2020-01-10 RX ORDER — NEBIVOLOL 10 MG/1
10 TABLET ORAL DAILY
Qty: 90 TABLET | Refills: 0 | Status: SHIPPED | OUTPATIENT
Start: 2020-01-10 | End: 2020-02-10 | Stop reason: SDUPTHER

## 2020-01-10 RX ORDER — BENAZEPRIL HYDROCHLORIDE 40 MG/1
40 TABLET, FILM COATED ORAL DAILY
Qty: 90 TABLET | Refills: 0 | Status: SHIPPED | OUTPATIENT
Start: 2020-01-10 | End: 2020-02-07 | Stop reason: SDUPTHER

## 2020-01-29 ENCOUNTER — CLINICAL SUPPORT (OUTPATIENT)
Dept: ORTHOPEDIC SURGERY | Facility: CLINIC | Age: 70
End: 2020-01-29

## 2020-01-29 ENCOUNTER — PREP FOR SURGERY (OUTPATIENT)
Dept: OTHER | Facility: HOSPITAL | Age: 70
End: 2020-01-29

## 2020-01-29 VITALS — BODY MASS INDEX: 36.58 KG/M2 | TEMPERATURE: 98.2 F | HEIGHT: 73 IN | WEIGHT: 276 LBS

## 2020-01-29 DIAGNOSIS — M17.12 ARTHRITIS OF LEFT KNEE: Primary | ICD-10-CM

## 2020-01-29 PROCEDURE — 73562 X-RAY EXAM OF KNEE 3: CPT | Performed by: NURSE PRACTITIONER

## 2020-01-29 PROCEDURE — 99212 OFFICE O/P EST SF 10 MIN: CPT | Performed by: NURSE PRACTITIONER

## 2020-01-29 RX ORDER — CEFAZOLIN SODIUM IN 0.9 % NACL 3 G/100 ML
3 INTRAVENOUS SOLUTION, PIGGYBACK (ML) INTRAVENOUS ONCE
Status: CANCELLED | OUTPATIENT
Start: 2020-03-12 | End: 2020-01-29

## 2020-01-29 RX ORDER — PREGABALIN 75 MG/1
150 CAPSULE ORAL ONCE
Status: CANCELLED | OUTPATIENT
Start: 2020-03-12 | End: 2020-01-29

## 2020-01-29 RX ORDER — MELOXICAM 15 MG/1
15 TABLET ORAL ONCE
Status: CANCELLED | OUTPATIENT
Start: 2020-03-12 | End: 2020-01-29

## 2020-01-29 RX ORDER — ACETAMINOPHEN 500 MG
1000 TABLET ORAL ONCE
Status: CANCELLED | OUTPATIENT
Start: 2020-03-12 | End: 2020-01-29

## 2020-01-29 RX ORDER — GABAPENTIN 300 MG/1
300 CAPSULE ORAL NIGHTLY
COMMUNITY
Start: 2020-01-09 | End: 2020-08-07

## 2020-01-29 NOTE — PROGRESS NOTES
"Chief complaint: Follow-up left knee pain    Mr. Olson comes in today for follow-up left knee.  He originally scheduled this appointment for a repeat injection, but instead wants to discuss a total knee replacement.  Reports his left knee pain as moderate to severe aching most of the time.  He says he feels pretty good today, however, he has not been doing anything to aggravate the knee.  He is ready for a knee replacement.    Vitals:    01/29/20 0942   Temp: 98.2 °F (36.8 °C)   Weight: 125 kg (276 lb)   Height: 185.4 cm (73\")     Exam:  Left knee is examined.  Skin is benign.  No atrophy, swellings, or masses.  Focal tenderness noted over the medial joint line.  There is a moderate effusion.  Knee motion is from 125° of flexion, full extension.  No instability.  Good strength with hip flexion, knee extension, ankle and great toe plantar flexion and dorsiflexion.  Sensation is intact distally.  Brisk capillary refill in the toes.  Palpable pedal pulses.  Good skin turgor.    Radiology:  Bilateral standing AP views, bilateral merchants views and a lateral view of the left knee are ordered by myself and reviewed to evaluate the patient's complaint.  These were compared to previous films.  The x-rays show significant degenerative arthritis including bone on bone degeneration, malalignment, osteophyte and subchondral sclerosis.  The majority of the degenerative changes appear to involve the medial and patellofemoral compartment.    Assessment:  Left knee osteoarthritis    Plan: We discussed treatment options in detail including the risks, benefits, and alternatives of conservative treatment versus surgical options.  He has tried and failed conservative treatments and would like to proceed with a total knee arthroplasty.  I will enter the case request today.  Our  will contact him to make the necessary arrangements and appointments.  I demonstrated some exercises to help strengthen the quadriceps.  He will need " to see Dr. Alicea for a preop visit.  Patient and his wife acknowledges understanding of all that we discussed.    Pricila Barnes, APRN    01/29/2020

## 2020-01-31 RX ORDER — EZETIMIBE 10 MG/1
10 TABLET ORAL DAILY
Qty: 90 TABLET | Refills: 1 | Status: SHIPPED | OUTPATIENT
Start: 2020-01-31 | End: 2020-04-30 | Stop reason: SDUPTHER

## 2020-01-31 RX ORDER — DOXAZOSIN MESYLATE 1 MG/1
1 TABLET ORAL NIGHTLY
Qty: 90 TABLET | Refills: 0 | Status: SHIPPED | OUTPATIENT
Start: 2020-01-31 | End: 2020-04-30 | Stop reason: SDUPTHER

## 2020-02-04 PROBLEM — M17.12 ARTHRITIS OF LEFT KNEE: Status: ACTIVE | Noted: 2020-02-04

## 2020-02-07 ENCOUNTER — OFFICE VISIT (OUTPATIENT)
Dept: FAMILY MEDICINE CLINIC | Facility: CLINIC | Age: 70
End: 2020-02-07

## 2020-02-07 VITALS
SYSTOLIC BLOOD PRESSURE: 150 MMHG | DIASTOLIC BLOOD PRESSURE: 90 MMHG | TEMPERATURE: 97.7 F | HEART RATE: 50 BPM | OXYGEN SATURATION: 97 % | WEIGHT: 280 LBS | BODY MASS INDEX: 37.11 KG/M2 | HEIGHT: 73 IN

## 2020-02-07 DIAGNOSIS — E11.41 TYPE 2 DIABETES MELLITUS WITH DIABETIC MONONEUROPATHY, WITHOUT LONG-TERM CURRENT USE OF INSULIN (HCC): ICD-10-CM

## 2020-02-07 DIAGNOSIS — I10 ESSENTIAL HYPERTENSION: Primary | ICD-10-CM

## 2020-02-07 DIAGNOSIS — G47.33 OBSTRUCTIVE SLEEP APNEA: ICD-10-CM

## 2020-02-07 DIAGNOSIS — E78.5 HYPERLIPIDEMIA, UNSPECIFIED HYPERLIPIDEMIA TYPE: ICD-10-CM

## 2020-02-07 DIAGNOSIS — Z01.818 PREOPERATIVE CLEARANCE: ICD-10-CM

## 2020-02-07 LAB
ALBUMIN SERPL-MCNC: 4.7 G/DL (ref 3.5–5.2)
ALBUMIN/GLOB SERPL: 2.5 G/DL
ALP SERPL-CCNC: 28 U/L (ref 39–117)
ALT SERPL-CCNC: 27 U/L (ref 1–41)
AST SERPL-CCNC: 18 U/L (ref 1–40)
BILIRUB SERPL-MCNC: 0.3 MG/DL (ref 0.2–1.2)
BUN SERPL-MCNC: 17 MG/DL (ref 8–23)
BUN/CREAT SERPL: 15.5 (ref 7–25)
CALCIUM SERPL-MCNC: 9.3 MG/DL (ref 8.6–10.5)
CHLORIDE SERPL-SCNC: 98 MMOL/L (ref 98–107)
CHOLEST SERPL-MCNC: 172 MG/DL (ref 0–200)
CO2 SERPL-SCNC: 27.9 MMOL/L (ref 22–29)
CREAT SERPL-MCNC: 1.1 MG/DL (ref 0.76–1.27)
GLOBULIN SER CALC-MCNC: 1.9 GM/DL
GLUCOSE SERPL-MCNC: 173 MG/DL (ref 65–99)
HBA1C MFR BLD: 7.5 % (ref 4.8–5.6)
HDLC SERPL-MCNC: 28 MG/DL (ref 40–60)
LDLC SERPL CALC-MCNC: 81 MG/DL (ref 0–100)
POTASSIUM SERPL-SCNC: 3.6 MMOL/L (ref 3.5–5.2)
PROT SERPL-MCNC: 6.6 G/DL (ref 6–8.5)
SODIUM SERPL-SCNC: 141 MMOL/L (ref 136–145)
TRIGL SERPL-MCNC: 314 MG/DL (ref 0–150)
VLDLC SERPL CALC-MCNC: 62.8 MG/DL (ref 5–40)

## 2020-02-07 PROCEDURE — 99214 OFFICE O/P EST MOD 30 MIN: CPT | Performed by: INTERNAL MEDICINE

## 2020-02-07 RX ORDER — BENAZEPRIL HYDROCHLORIDE 40 MG/1
40 TABLET, FILM COATED ORAL 2 TIMES DAILY
Qty: 180 TABLET | Refills: 1 | Status: SHIPPED | OUTPATIENT
Start: 2020-02-07 | End: 2020-07-12 | Stop reason: SDUPTHER

## 2020-02-07 NOTE — PROGRESS NOTES
Subjective Chief complaint is checkup on blood pressure but also surgical clearance  Jeb Olson is a 69 y.o. male.     History of Present Illness   Jeb is here today for checkup on his blood pressure.  He is on multiple medications and his blood pressure is still running a little bit high but I think it is at an adequate level where he can tolerate surgery.  He is going to be having a left total knee replacement on March 12.  He does have non-insulin-dependent diabetes.  His last A1c was 6.8.  His sugars basically been under 140 in the mornings.  He has hyperlipidemia.  He is on fenofibrate and Zetia because of some statin intolerance.  He is due to have that checked today as well.  He has had some anxiety.  He is on E citalopram and he is thinking he can maybe try to come off of that.  We did discuss taking a half a tablet a day for approximately 1 month and then half a tablet every other day for another month.  He has obstructive sleep apnea.  I do not think this will be a problem with his surgery.  It has been a while since he seen a sleep specialist and had any type of titration done.  He is not currently having any headaches, dizziness, chest pain, or shortness of breath.  He has had no prior problems with anesthesia and there is no family history of malignant hyperthermia.  The following portions of the patient's history were reviewed and updated as appropriate: allergies, current medications, past family history, past medical history, past social history, past surgical history and problem list.    Review of Systems   Respiratory: Negative for chest tightness and shortness of breath.    Cardiovascular: Negative for chest pain.   Neurological: Negative for dizziness, light-headedness and headache.       Objective   Physical Exam   Constitutional: He appears well-developed and well-nourished.   Neck: Carotid bruit is not present.   Cardiovascular: Normal rate, regular rhythm, normal heart sounds and intact  distal pulses. Exam reveals no gallop and no friction rub.   No murmur heard.  Pulmonary/Chest: Effort normal and breath sounds normal. No respiratory distress. He has no wheezes. He has no rales.   Abdominal: Soft. Bowel sounds are normal. He exhibits no distension and no mass. There is no tenderness. There is no guarding.   Musculoskeletal: He exhibits no edema.   Nursing note and vitals reviewed.        Assessment/Plan   Jeb was seen today for hypertension.    Diagnoses and all orders for this visit:    Essential hypertension  -     Comprehensive Metabolic Panel    Hyperlipidemia, unspecified hyperlipidemia type  -     Lipid Panel    Type 2 diabetes mellitus with diabetic mononeuropathy, without long-term current use of insulin (CMS/[facility])  -     Hemoglobin A1c    Preoperative clearance    Obstructive sleep apnea  -     Ambulatory Referral to Sleep Medicine    Other orders  -     benazepril (LOTENSIN) 40 MG tablet; Take 1 tablet by mouth 2 (Two) Times a Day.      Jeb is here today for follow-up.  His blood pressure is a little bit elevated today but I think this is as good as we can do on the multiple medications he is on.  I am going to clear him for surgery.  We are going to refer him to a sleep medicine specialist but I do not think he needs to have that done before the surgery.  We did advise him that he could taper off his E citalopram as directed above over the next 2 months.

## 2020-02-10 RX ORDER — HYDROCHLOROTHIAZIDE 25 MG/1
25 TABLET ORAL DAILY
Qty: 90 TABLET | Refills: 1 | Status: SHIPPED | OUTPATIENT
Start: 2020-02-10 | End: 2020-08-07 | Stop reason: SDUPTHER

## 2020-02-10 RX ORDER — NEBIVOLOL 10 MG/1
10 TABLET ORAL DAILY
Qty: 90 TABLET | Refills: 0 | Status: SHIPPED | OUTPATIENT
Start: 2020-02-10 | End: 2020-03-03

## 2020-02-24 ENCOUNTER — TELEPHONE (OUTPATIENT)
Dept: ORTHOPEDIC SURGERY | Facility: CLINIC | Age: 70
End: 2020-02-24

## 2020-02-24 NOTE — TELEPHONE ENCOUNTER
----- Message from Jeb Olson sent at 2020  3:39 PM EST -----  Regarding: Prescription Question  Contact: [phone]  MY CHART MESSAGE:    Michael Mcnulty,    I was on My Chart today and saw a support message from you concerning my medication Gabapentin. Am I supposed to stop taking that before surgery?    Jeb Olson   (norma) [phone]   1950

## 2020-02-24 NOTE — TELEPHONE ENCOUNTER
I spoke to  Whitney.  He may continue his gabapentin until the day prior to surgery.  He acknowledged understanding and appreciated the return call.

## 2020-03-03 ENCOUNTER — APPOINTMENT (OUTPATIENT)
Dept: PREADMISSION TESTING | Facility: HOSPITAL | Age: 70
End: 2020-03-03

## 2020-03-03 VITALS
OXYGEN SATURATION: 97 % | HEART RATE: 51 BPM | TEMPERATURE: 97.6 F | RESPIRATION RATE: 20 BRPM | HEIGHT: 74 IN | SYSTOLIC BLOOD PRESSURE: 150 MMHG | BODY MASS INDEX: 35.29 KG/M2 | WEIGHT: 275 LBS | DIASTOLIC BLOOD PRESSURE: 81 MMHG

## 2020-03-03 DIAGNOSIS — M17.12 ARTHRITIS OF LEFT KNEE: ICD-10-CM

## 2020-03-03 LAB
ANION GAP SERPL CALCULATED.3IONS-SCNC: 19.2 MMOL/L (ref 5–15)
BILIRUB UR QL STRIP: NEGATIVE
BUN BLD-MCNC: 18 MG/DL (ref 8–23)
BUN/CREAT SERPL: 14.6 (ref 7–25)
CALCIUM SPEC-SCNC: 9.1 MG/DL (ref 8.6–10.5)
CHLORIDE SERPL-SCNC: 97 MMOL/L (ref 98–107)
CLARITY UR: CLEAR
CO2 SERPL-SCNC: 23.8 MMOL/L (ref 22–29)
COLOR UR: YELLOW
CREAT BLD-MCNC: 1.23 MG/DL (ref 0.76–1.27)
DEPRECATED RDW RBC AUTO: 39.9 FL (ref 37–54)
ERYTHROCYTE [DISTWIDTH] IN BLOOD BY AUTOMATED COUNT: 12.8 % (ref 12.3–15.4)
GFR SERPL CREATININE-BSD FRML MDRD: 59 ML/MIN/1.73
GLUCOSE BLD-MCNC: 205 MG/DL (ref 65–99)
GLUCOSE UR STRIP-MCNC: ABNORMAL MG/DL
HCT VFR BLD AUTO: 38.1 % (ref 37.5–51)
HGB BLD-MCNC: 12.7 G/DL (ref 13–17.7)
HGB UR QL STRIP.AUTO: NEGATIVE
KETONES UR QL STRIP: NEGATIVE
LEUKOCYTE ESTERASE UR QL STRIP.AUTO: NEGATIVE
MCH RBC QN AUTO: 28.5 PG (ref 26.6–33)
MCHC RBC AUTO-ENTMCNC: 33.3 G/DL (ref 31.5–35.7)
MCV RBC AUTO: 85.6 FL (ref 79–97)
NITRITE UR QL STRIP: NEGATIVE
PH UR STRIP.AUTO: <=5 [PH] (ref 5–8)
PLATELET # BLD AUTO: 209 10*3/MM3 (ref 140–450)
PMV BLD AUTO: 10.7 FL (ref 6–12)
POTASSIUM BLD-SCNC: 3.4 MMOL/L (ref 3.5–5.2)
PROT UR QL STRIP: NEGATIVE
RBC # BLD AUTO: 4.45 10*6/MM3 (ref 4.14–5.8)
SODIUM BLD-SCNC: 140 MMOL/L (ref 136–145)
SP GR UR STRIP: 1.02 (ref 1–1.03)
UROBILINOGEN UR QL STRIP: ABNORMAL
WBC NRBC COR # BLD: 6.33 10*3/MM3 (ref 3.4–10.8)

## 2020-03-03 PROCEDURE — 36415 COLL VENOUS BLD VENIPUNCTURE: CPT

## 2020-03-03 PROCEDURE — 85027 COMPLETE CBC AUTOMATED: CPT | Performed by: ORTHOPAEDIC SURGERY

## 2020-03-03 PROCEDURE — 80048 BASIC METABOLIC PNL TOTAL CA: CPT | Performed by: ORTHOPAEDIC SURGERY

## 2020-03-03 PROCEDURE — 63710000001 MUPIROCIN 2 % OINTMENT: Performed by: NURSE PRACTITIONER

## 2020-03-03 PROCEDURE — 93005 ELECTROCARDIOGRAM TRACING: CPT

## 2020-03-03 PROCEDURE — A9270 NON-COVERED ITEM OR SERVICE: HCPCS | Performed by: NURSE PRACTITIONER

## 2020-03-03 PROCEDURE — 81003 URINALYSIS AUTO W/O SCOPE: CPT | Performed by: NURSE PRACTITIONER

## 2020-03-03 PROCEDURE — 93010 ELECTROCARDIOGRAM REPORT: CPT | Performed by: INTERNAL MEDICINE

## 2020-03-03 RX ORDER — NEBIVOLOL 20 MG/1
20 TABLET ORAL NIGHTLY
COMMUNITY
End: 2020-04-15 | Stop reason: SDUPTHER

## 2020-03-03 RX ORDER — HYDROCODONE BITARTRATE AND ACETAMINOPHEN 5; 325 MG/1; MG/1
1 TABLET ORAL EVERY 6 HOURS PRN
Status: ON HOLD | COMMUNITY
Start: 2020-02-18 | End: 2020-03-12

## 2020-03-03 RX ORDER — SENNOSIDES 8.6 MG
1300 CAPSULE ORAL EVERY 8 HOURS PRN
Status: ON HOLD | COMMUNITY
End: 2020-03-12

## 2020-03-03 RX ORDER — CHLORHEXIDINE GLUCONATE 500 MG/1
1 CLOTH TOPICAL
COMMUNITY
End: 2020-03-13 | Stop reason: HOSPADM

## 2020-03-03 ASSESSMENT — KOOS JR
KOOS JR SCORE: 50.012
KOOS JR SCORE: 15

## 2020-03-03 NOTE — DISCHARGE INSTRUCTIONS
Take the following medications the morning of surgery:    Amlodipine   lexapro   flonase inhaler    General Instructions:  • Do not eat solid food after midnight the night before surgery.  • You may drink clear liquids day of surgery but must stop at least one hour before your hospital arrival time.  • It is beneficial for you to have a clear drink that contains carbohydrates the day of surgery.  We suggest a 12 to 20 ounce bottle of Gatorade or Powerade for non-diabetic patients or a 12 to 20 ounce bottle of G2 or Powerade Zero for diabetic patients. (Pediatric patients, are not advised to drink a 12 to 20 ounce carbohydrate drink)    Clear liquids are liquids you can see through.  Nothing red in color.     Plain water                               Sports drinks  Sodas                                   Gelatin (Jell-O)  Fruit juices without pulp such as white grape juice and apple juice  Popsicles that contain no fruit or yogurt  Tea or coffee (no cream or milk added)  Gatorade / Powerade  G2 / Powerade Zero    • Infants may have breast milk up to four hours before surgery.  • Infants drinking formula may drink formula up to six hours before surgery.   • Patients who avoid smoking, chewing tobacco and alcohol for 4 weeks prior to surgery have a reduced risk of post-operative complications.  Quit smoking as many days before surgery as you can.  • Do not smoke, use chewing tobacco or drink alcohol the day of surgery.   • If applicable bring your C-PAP/ BI-PAP machine.  • Bring any papers given to you in the doctor’s office.  • Wear clean comfortable clothes.  • Do not wear contact lenses, false eyelashes or make-up.  Bring a case for your glasses.   • Bring crutches or walker if applicable.  • Remove all piercings.  Leave jewelry and any other valuables at home.  • Hair extensions with metal clips must be removed prior to surgery.  • The Pre-Admission Testing nurse will instruct you to bring medications if unable to  obtain an accurate list in Pre-Admission Testing.        If you were given a blood bank ID arm band remember to bring it with you the day of surgery.    Preventing a Surgical Site Infection:  • For 2 to 3 days before surgery, avoid shaving with a razor because the razor can irritate skin and make it easier to develop an infection.    • Any areas of open skin can increase the risk of a post-operative wound infection by allowing bacteria to enter and travel throughout the body.  Notify your surgeon if you have any skin wounds / rashes even if it is not near the expected surgical site.  The area will need assessed to determine if surgery should be delayed until it is healed.  • The night prior to surgery shower using a fresh bar of anti-bacterial soap (such as Dial) and clean washcloth.  Sleep in a clean bed with clean clothing.  Do not allow pets to sleep with you.  • Shower on the morning of surgery using a fresh bar of anti-bacterial soap (such as Dial) and clean washcloth.  Dry with a clean towel and dress in clean clothing.  • Ask your surgeon if you will be receiving antibiotics prior to surgery.  • Make sure you, your family, and all healthcare providers clean their hands with soap and water or an alcohol based hand  before caring for you or your wound.    Day of surgery:3/12/2020   0530  Your arrival time is approximately two hours before your scheduled surgery time.  Upon arrival, a Pre-op nurse and Anesthesiologist will review your health history, obtain vital signs, and answer questions you may have.  The only belongings needed at this time will be a list of your home medications and if applicable your C-PAP/BI-PAP machine.  If you are staying overnight your family can leave the rest of your belongings in the car and bring them to your room later.  A Pre-op nurse will start an IV and you may receive medication in preparation for surgery, including something to help you relax.  Your family will be  able to see you in the Pre-op area.  Two visitors at a time will be allowed in the Pre-op room.  While you are in surgery your family should notify the waiting room  if they leave the waiting room area and provide a contact phone number.    Please be aware that surgery does come with discomfort.  We want to make every effort to control your discomfort so please discuss any uncontrolled symptoms with your nurse.   Your doctor will most likely have prescribed pain medications.      If you are going home after surgery you will receive individualized written care instructions before being discharged.  A responsible adult must drive you to and from the hospital on the day of your surgery and stay with you for 24 hours.    If you are staying overnight following surgery, you will be transported to your hospital room following the recovery period.  [facility] has all private rooms.    If you have any questions please call Pre-Admission Testing at [phone].  Deductibles and co-payments are collected on the day of service. Please be prepared to pay the required co-pay, deductible or deposit on the day of service as defined by your plan.CHLORHEXIDINE CLOTH INSTRUCTIONS  The morning of surgery follow these instructions using the Chlorhexidine cloths you've been given.  These steps reduce bacteria on the body.  Do not use the cloths near your eyes, ears mouth, genitalia or on open wounds.  Throw the cloths away after use but do not try to flush them down a toilet.      • Open and remove one cloth at a time from the package.    • Leave the cloth unfolded and begin the bathing.  • Massage the skin with the cloths using gentle pressure to remove bacteria.  Do not scrub harshly.   • Follow the steps below with one 2% CHG cloth per area (6 total cloths).  • One cloth for neck, shoulders and chest.  • One cloth for both arms, hands, fingers and underarms (do underarms last).  • One cloth for the abdomen  followed by groin.  • One cloth for right leg and foot including between the toes.  • One cloth for left leg and foot including between the toes.  • The last cloth is to be used for the back of the neck, back and buttocks.    Allow the CHG to air dry 3 minutes on the skin which will give it time to work and decrease the chance of irritation.  The skin may feel sticky until it is dry.  Do not rinse with water or any other liquid or you will lose the beneficial effects of the CHG.  If mild skin irritation occurs, do rinse the skin to remove the CHG.  Report this to the nurse at time of admission.  Do not apply lotions, creams, ointments, deodorants or perfumes after using the clothes. Dress in clean clothes before coming to the hospital.    BACTROBAN NASAL OINTMENT  There are many germs normally in your nose. Bactroban is an ointment that will help reduce these germs. Please follow these instructions for Bactroban use:      __1__The day before surgery in the morning  Date_3/11/2020_______    __2__The day before surgery in the evening              Date 3/11/2020________    __3__The day of surgery in the morning    Date_3/12/2020_______    **Squirt ½ package of Bactroban Ointment onto a cotton applicator and apply to inside of 1st nostril.  Squirt the remaining Bactroban and apply to the inside of the other nostril.    PERIDEX- ORAL:  Use only if your surgeon has ordered  Use the night before and morning of surgery - Swish, gargle, and spit - do not swallow.

## 2020-03-04 ENCOUNTER — TELEPHONE (OUTPATIENT)
Dept: ORTHOPEDIC SURGERY | Facility: CLINIC | Age: 70
End: 2020-03-04

## 2020-03-04 NOTE — TELEPHONE ENCOUNTER
----- Message from Tera Salvador MD sent at 3/4/2020 10:47 AM EST -----  I think he can proceed   ----- Message -----  From: Leandra Frye RN  Sent: 3/4/2020  10:04 AM EST  To: Tera Salvador MD    Patient scheduled for surgery 3/12.  I know you have cleared him to proceed, but please review EKG.  Is he ok to proceed, or need further evaluation.

## 2020-03-09 ENCOUNTER — TELEPHONE (OUTPATIENT)
Dept: ORTHOPEDIC SURGERY | Facility: CLINIC | Age: 70
End: 2020-03-09

## 2020-03-09 ENCOUNTER — OFFICE VISIT (OUTPATIENT)
Dept: ORTHOPEDIC SURGERY | Facility: CLINIC | Age: 70
End: 2020-03-09

## 2020-03-09 VITALS — TEMPERATURE: 97.9 F | WEIGHT: 280 LBS | BODY MASS INDEX: 35.94 KG/M2 | HEIGHT: 74 IN

## 2020-03-09 DIAGNOSIS — Z01.818 PREOP EXAMINATION: Primary | ICD-10-CM

## 2020-03-09 PROCEDURE — S0260 H&P FOR SURGERY: HCPCS | Performed by: NURSE PRACTITIONER

## 2020-03-09 NOTE — PROGRESS NOTES
History & Physical       Patient: Jeb Olson    YOB: 1950    Medical Record Number: [id number]    Chief Complaints: Left knee endstage osteoarthritis    History of Present Illness: 69 y.o. male presents today in anticipation of upcoming knee replacement surgery.  Patient has a long history of worsening symptoms.  Describes the pain as severe, constant, and typically dull and achy.  Patient has tried and failed prolonged conservative treatment.  Patient is struggling with routine daily activities and this has become a significant issue for overall quality of life.  Patient cannot walk any prolonged distances without having to rest.     Allergies: No Known Allergies    Medications:   Home Medications:    Current Outpatient Medications:   •  acetaminophen (TYLENOL) 650 MG 8 hr tablet, Take 1,300 mg by mouth Every 8 (Eight) Hours As Needed for Mild Pain ., Disp: , Rfl:   •  amLODIPine (NORVASC) 10 MG tablet, Take 1 tablet by mouth Daily., Disp: 90 tablet, Rfl: 0  •  benazepril (LOTENSIN) 40 MG tablet, Take 1 tablet by mouth 2 (Two) Times a Day., Disp: 180 tablet, Rfl: 1  •  Chlorhexidine Gluconate Cloth 2 % pads, Apply 1 application topically. USE AS DIRECTED PREOP, Disp: , Rfl:   •  cloNIDine (CATAPRES) 0.1 MG tablet, Take 1 tablet by mouth Daily. (Patient taking differently: Take 0.1 mg by mouth Every Night.), Disp: 30 tablet, Rfl: 2  •  doxazosin (CARDURA) 1 MG tablet, Take 1 tablet by mouth Every Night., Disp: 90 tablet, Rfl: 0  •  escitalopram (LEXAPRO) 10 MG tablet, Take 1 tablet by mouth Daily., Disp: 90 tablet, Rfl: 0  •  ezetimibe (ZETIA) 10 MG tablet, Take 1 tablet by mouth Daily. (Patient taking differently: Take 10 mg by mouth Every Night.), Disp: 90 tablet, Rfl: 1  •  fenofibrate 160 MG tablet, Take 1 tablet by mouth Daily. (Patient taking differently: Take 160 mg by mouth Every Night.), Disp: 30 tablet, Rfl: 5  •  fluticasone (FLONASE) 50 MCG/ACT nasal spray, 1 spray into the  nostril(s) as directed by provider 2 (Two) Times a Day., Disp: , Rfl:   •  gabapentin (NEURONTIN) 300 MG capsule, Take 300 mg by mouth Every Night., Disp: , Rfl:   •  glimepiride (AMARYL) 4 MG tablet, Take 1 tablet by mouth 2 (Two) Times a Day., Disp: 180 tablet, Rfl: 1  •  hydroCHLOROthiazide (HYDRODIURIL) 25 MG tablet, Take 1 tablet by mouth Daily., Disp: 90 tablet, Rfl: 1  •  HYDROcodone-acetaminophen (NORCO) 5-325 MG per tablet, Take 1 tablet by mouth Every 6 (Six) Hours As Needed for Moderate Pain ., Disp: , Rfl:   •  JANUMET XR  MG tablet, Take 2 tablets by mouth Daily. (Patient taking differently: Take 1 tablet by mouth 2 (Two) Times a Day.), Disp: 180 tablet, Rfl: 1  •  nebivolol (BYSTOLIC) 20 MG tablet, Take 20 mg by mouth Every Night., Disp: , Rfl:   •  aspirin 81 MG EC tablet, Take 81 mg by mouth Daily. To stop 1 week before surgery, Disp: , Rfl:   •  mupirocin (BACTROBAN) 2 % nasal ointment, 1 application into the nostril(s) as directed by provider. USE AS DIRECTED PREOP, Disp: , Rfl:   No current facility-administered medications for this visit.     Facility-Administered Medications Ordered in Other Visits:   •  Chlorhexidine Gluconate 2 % pads 3 each, 3 pad, Apply externally, BID, [place], Pricila L, APRN    Past Medical History:   Diagnosis Date   • Allergic Have had for several years    Eyes and nasal issues   • Anxiety Since about 2014    Since I was taking of my Dad, who also passed away 3yrs ago.   • Arthritis 2002    Both knees, hips, and shoulders   • Cataract May 2019   • Colon polyp 2017   • Diabetes mellitus (CMS/[facility])    • Essential hypertension    • HL (hearing loss) 1980   • Hyperlipemia    • Knee swelling     Right and Left knee   • Sleep apnea     cpap        Past Surgical History:   Procedure Laterality Date   • ACHILLES TENDON REPAIR Left    • CARDIAC CATHETERIZATION     • COLONOSCOPY      Dr Reyes 6 years ago   • ENDOSCOPY     • TONSILLECTOMY      As a child        Social History  "    Occupational History   • Not on file   Tobacco Use   • Smoking status: Never Smoker   • Smokeless tobacco: Never Used   Substance and Sexual Activity   • Alcohol use: Yes     Alcohol/week: 22.0 - 24.0 standard drinks     Types: 8 - 10 Standard drinks or equivalent, 14 Shots of liquor per week   • Drug use: No   • Sexual activity: Never         Family History   Problem Relation Age of Onset   • Alcohol abuse Maternal Uncle         Passed away 2013   • Alcohol abuse Father         Passed away in 2016   • Hyperlipidemia Father         Started about 10 years before his death   • Arthritis Mother         Passed away 2006, from Alzheimers   • Diabetes Mother    • Hyperlipidemia Mother         Started probably at middle age   • Malig Hyperthermia Neg Hx      Review of Systems:  A 14 point review of systems is reviewed with the patient.  Pertinent positives are listed above.  All others are negative.    Physical Exam: 69 y.o. male    Vitals:    03/09/20 0927   Temp: 97.9 °F (36.6 °C)   Weight: 127 kg (280 lb)   Height: 188 cm (74\")     General:  Patient is awake and alert.  Appears in no acute distress or discomfort.    Psych:  Affect and demeanor are appropriate.    Eyes:  Conjunctiva and sclera appear grossly normal.  Eyes track well and EOM seem to be intact.    Ears:  No gross abnormalities.  Hearing adequate for the exam.    Cardiovascular:  Regular rate and rhythm.    Lungs:  Good chest expansion.  Breathing unlabored.    Lymph:  No palpable adenopathy about neck or axilla.    Left lower extremity:  Skin benign and intact without evidence for swelling, masses or atrophy.  No palpable masses. Focal tenderness noted over medial joint line.  ROM is from 120° of flexion to full extension.  Knee is stable on exam.  Good strength throughout the lower leg and foot.  Intact sensation throughout.  Palpable pedal pulses with brisk cap refill.    Diagnostic Tests:  Lab Results   Component Value Date    GLUCOSE 205 (H) " 03/03/2020    CALCIUM 9.1 03/03/2020     03/03/2020    K 3.4 (L) 03/03/2020    CO2 23.8 03/03/2020    CL 97 (L) 03/03/2020    BUN 18 03/03/2020    CREATININE 1.23 03/03/2020    EGFRIFAFRI 81 02/07/2020    EGFRIFNONA 59 (L) 03/03/2020    BCR 14.6 03/03/2020    ANIONGAP 19.2 (H) 03/03/2020     Lab Results   Component Value Date    WBC 6.33 03/03/2020    HGB 12.7 (L) 03/03/2020    HCT 38.1 03/03/2020    MCV 85.6 03/03/2020     03/03/2020     No results found for: INR, PROTIME     Imaging:  Previous x-rays of the knee are reviewed.  The x-rays show significant degenerative arthritis including bone on bone degeneration, malalignment, osteophyte and subchondral sclerosis.  The majority of the degenerative changes appear to involve the medial and patellofemoral compartment.    Assessment:  Left knee endstage osteoarthritis    Plan: We will plan on proceeding with a left total knee arthroplasty at the patient's request.  I reviewed details of procedure with patient today and discussed all the risks, benefits, alternatives, and limitations of the procedure in laymen's terms with the risks including but not limited to:  neurovascular damage resulting in permanent dysfunction or footdrop and potential need for further surgery, bleeding, infection, hematoma, chronic pain, worsening of pain, persistent symptoms potentially necessitating revision, prosthesis related problems including loosening or allergy, swelling, loss of motion and arthrofibrosis, weakness, stiffness, instability, DVT, pulmonary embolus, death, stroke, complex regional pain syndrome, and need for additional procedures.  Patient verbalized understanding, and was given the opportunity to ask and have all questions answered today.  No guarantees were given regarding results of surgery.      Date: 3/9/2020    TIFFANIE Shine

## 2020-03-09 NOTE — H&P (VIEW-ONLY)
History & Physical       Patient: Jeb Olson    YOB: 1950    Medical Record Number: [id number]    Chief Complaints: Left knee endstage osteoarthritis    History of Present Illness: 69 y.o. male presents today in anticipation of upcoming knee replacement surgery.  Patient has a long history of worsening symptoms.  Describes the pain as severe, constant, and typically dull and achy.  Patient has tried and failed prolonged conservative treatment.  Patient is struggling with routine daily activities and this has become a significant issue for overall quality of life.  Patient cannot walk any prolonged distances without having to rest.     Allergies: No Known Allergies    Medications:   Home Medications:    Current Outpatient Medications:   •  acetaminophen (TYLENOL) 650 MG 8 hr tablet, Take 1,300 mg by mouth Every 8 (Eight) Hours As Needed for Mild Pain ., Disp: , Rfl:   •  amLODIPine (NORVASC) 10 MG tablet, Take 1 tablet by mouth Daily., Disp: 90 tablet, Rfl: 0  •  benazepril (LOTENSIN) 40 MG tablet, Take 1 tablet by mouth 2 (Two) Times a Day., Disp: 180 tablet, Rfl: 1  •  Chlorhexidine Gluconate Cloth 2 % pads, Apply 1 application topically. USE AS DIRECTED PREOP, Disp: , Rfl:   •  cloNIDine (CATAPRES) 0.1 MG tablet, Take 1 tablet by mouth Daily. (Patient taking differently: Take 0.1 mg by mouth Every Night.), Disp: 30 tablet, Rfl: 2  •  doxazosin (CARDURA) 1 MG tablet, Take 1 tablet by mouth Every Night., Disp: 90 tablet, Rfl: 0  •  escitalopram (LEXAPRO) 10 MG tablet, Take 1 tablet by mouth Daily., Disp: 90 tablet, Rfl: 0  •  ezetimibe (ZETIA) 10 MG tablet, Take 1 tablet by mouth Daily. (Patient taking differently: Take 10 mg by mouth Every Night.), Disp: 90 tablet, Rfl: 1  •  fenofibrate 160 MG tablet, Take 1 tablet by mouth Daily. (Patient taking differently: Take 160 mg by mouth Every Night.), Disp: 30 tablet, Rfl: 5  •  fluticasone (FLONASE) 50 MCG/ACT nasal spray, 1 spray into the  nostril(s) as directed by provider 2 (Two) Times a Day., Disp: , Rfl:   •  gabapentin (NEURONTIN) 300 MG capsule, Take 300 mg by mouth Every Night., Disp: , Rfl:   •  glimepiride (AMARYL) 4 MG tablet, Take 1 tablet by mouth 2 (Two) Times a Day., Disp: 180 tablet, Rfl: 1  •  hydroCHLOROthiazide (HYDRODIURIL) 25 MG tablet, Take 1 tablet by mouth Daily., Disp: 90 tablet, Rfl: 1  •  HYDROcodone-acetaminophen (NORCO) 5-325 MG per tablet, Take 1 tablet by mouth Every 6 (Six) Hours As Needed for Moderate Pain ., Disp: , Rfl:   •  JANUMET XR  MG tablet, Take 2 tablets by mouth Daily. (Patient taking differently: Take 1 tablet by mouth 2 (Two) Times a Day.), Disp: 180 tablet, Rfl: 1  •  nebivolol (BYSTOLIC) 20 MG tablet, Take 20 mg by mouth Every Night., Disp: , Rfl:   •  aspirin 81 MG EC tablet, Take 81 mg by mouth Daily. To stop 1 week before surgery, Disp: , Rfl:   •  mupirocin (BACTROBAN) 2 % nasal ointment, 1 application into the nostril(s) as directed by provider. USE AS DIRECTED PREOP, Disp: , Rfl:   No current facility-administered medications for this visit.     Facility-Administered Medications Ordered in Other Visits:   •  Chlorhexidine Gluconate 2 % pads 3 each, 3 pad, Apply externally, BID, Hueston, Pricila L, APRN    Past Medical History:   Diagnosis Date   • Allergic Have had for several years    Eyes and nasal issues   • Anxiety Since about 2014    Since I was taking of my Dad, who also passed away 3yrs ago.   • Arthritis 2002    Both knees, hips, and shoulders   • Cataract May 2019   • Colon polyp 2017   • Diabetes mellitus (CMS/[facility])    • Essential hypertension    • HL (hearing loss) 1980   • Hyperlipemia    • Knee swelling     Right and Left knee   • Sleep apnea     cpap        Past Surgical History:   Procedure Laterality Date   • ACHILLES TENDON REPAIR Left    • CARDIAC CATHETERIZATION     • COLONOSCOPY      Dr Reyes 6 years ago   • ENDOSCOPY     • TONSILLECTOMY      As a child        Social History  "    Occupational History   • Not on file   Tobacco Use   • Smoking status: Never Smoker   • Smokeless tobacco: Never Used   Substance and Sexual Activity   • Alcohol use: Yes     Alcohol/week: 22.0 - 24.0 standard drinks     Types: 8 - 10 Standard drinks or equivalent, 14 Shots of liquor per week   • Drug use: No   • Sexual activity: Never         Family History   Problem Relation Age of Onset   • Alcohol abuse Maternal Uncle         Passed away 2013   • Alcohol abuse Father         Passed away in 2016   • Hyperlipidemia Father         Started about 10 years before his death   • Arthritis Mother         Passed away 2006, from Alzheimers   • Diabetes Mother    • Hyperlipidemia Mother         Started probably at middle age   • Malig Hyperthermia Neg Hx      Review of Systems:  A 14 point review of systems is reviewed with the patient.  Pertinent positives are listed above.  All others are negative.    Physical Exam: 69 y.o. male    Vitals:    03/09/20 0927   Temp: 97.9 °F (36.6 °C)   Weight: 127 kg (280 lb)   Height: 188 cm (74\")     General:  Patient is awake and alert.  Appears in no acute distress or discomfort.    Psych:  Affect and demeanor are appropriate.    Eyes:  Conjunctiva and sclera appear grossly normal.  Eyes track well and EOM seem to be intact.    Ears:  No gross abnormalities.  Hearing adequate for the exam.    Cardiovascular:  Regular rate and rhythm.    Lungs:  Good chest expansion.  Breathing unlabored.    Lymph:  No palpable adenopathy about neck or axilla.    Left lower extremity:  Skin benign and intact without evidence for swelling, masses or atrophy.  No palpable masses. Focal tenderness noted over medial joint line.  ROM is from 120° of flexion to full extension.  Knee is stable on exam.  Good strength throughout the lower leg and foot.  Intact sensation throughout.  Palpable pedal pulses with brisk cap refill.    Diagnostic Tests:  Lab Results   Component Value Date    GLUCOSE 205 (H) " 03/03/2020    CALCIUM 9.1 03/03/2020     03/03/2020    K 3.4 (L) 03/03/2020    CO2 23.8 03/03/2020    CL 97 (L) 03/03/2020    BUN 18 03/03/2020    CREATININE 1.23 03/03/2020    EGFRIFAFRI 81 02/07/2020    EGFRIFNONA 59 (L) 03/03/2020    BCR 14.6 03/03/2020    ANIONGAP 19.2 (H) 03/03/2020     Lab Results   Component Value Date    WBC 6.33 03/03/2020    HGB 12.7 (L) 03/03/2020    HCT 38.1 03/03/2020    MCV 85.6 03/03/2020     03/03/2020     No results found for: INR, PROTIME     Imaging:  Previous x-rays of the knee are reviewed.  The x-rays show significant degenerative arthritis including bone on bone degeneration, malalignment, osteophyte and subchondral sclerosis.  The majority of the degenerative changes appear to involve the medial and patellofemoral compartment.    Assessment:  Left knee endstage osteoarthritis    Plan: We will plan on proceeding with a left total knee arthroplasty at the patient's request.  I reviewed details of procedure with patient today and discussed all the risks, benefits, alternatives, and limitations of the procedure in laymen's terms with the risks including but not limited to:  neurovascular damage resulting in permanent dysfunction or footdrop and potential need for further surgery, bleeding, infection, hematoma, chronic pain, worsening of pain, persistent symptoms potentially necessitating revision, prosthesis related problems including loosening or allergy, swelling, loss of motion and arthrofibrosis, weakness, stiffness, instability, DVT, pulmonary embolus, death, stroke, complex regional pain syndrome, and need for additional procedures.  Patient verbalized understanding, and was given the opportunity to ask and have all questions answered today.  No guarantees were given regarding results of surgery.      Date: 3/9/2020    TIFFANIE Shine

## 2020-03-11 ENCOUNTER — PREP FOR SURGERY (OUTPATIENT)
Dept: OTHER | Facility: HOSPITAL | Age: 70
End: 2020-03-11

## 2020-03-11 ENCOUNTER — TELEPHONE (OUTPATIENT)
Dept: ORTHOPEDIC SURGERY | Facility: CLINIC | Age: 70
End: 2020-03-11

## 2020-03-11 NOTE — TELEPHONE ENCOUNTER
----- Message from TIFFANIE Shine sent at 3/10/2020 11:41 AM EDT -----  Okay.  Thanks!    ----- Message -----  From: Leandra Frye RN  Sent: 3/10/2020   9:59 AM EDT  To: TIFFANIE Shine    I have already sent the EKG to PCP and he has cleared him.  His Potassium is low due to HCTZ.  PCP is aware of his blood sugar.  HGA1C up from last time.  PAT's are not fasting which accounts for elevated BS.  [facility] needs to decide if he is OK with proceeding with HG A1c and can check fasting BS the morning of surgery.  We can give him a few doses of potassium or I can check with PCP, but I am not sure there is anything else that can be done before surgery.  Let me know what you want to do  ----- Message -----  From: Pricila Barnes APRN  Sent: 3/9/2020   4:53 PM EDT  To: Leandra Frye RN    Please reach out to Dr. Salvador for preadmission lab review and update surgical clearance.  He has several abnormal labs.  Also, patient has an abnormal EKG.  He does not have a cardiologist.  Please advise.  Thanks

## 2020-03-12 ENCOUNTER — APPOINTMENT (OUTPATIENT)
Dept: GENERAL RADIOLOGY | Facility: HOSPITAL | Age: 70
End: 2020-03-12

## 2020-03-12 ENCOUNTER — ANESTHESIA EVENT (OUTPATIENT)
Dept: PERIOP | Facility: HOSPITAL | Age: 70
End: 2020-03-12

## 2020-03-12 ENCOUNTER — ANESTHESIA (OUTPATIENT)
Dept: PERIOP | Facility: HOSPITAL | Age: 70
End: 2020-03-12

## 2020-03-12 ENCOUNTER — HOSPITAL ENCOUNTER (OUTPATIENT)
Facility: HOSPITAL | Age: 70
Discharge: HOME | End: 2020-03-13
Attending: ORTHOPAEDIC SURGERY | Admitting: ORTHOPAEDIC SURGERY

## 2020-03-12 DIAGNOSIS — Z96.652 STATUS POST TOTAL LEFT KNEE REPLACEMENT: Primary | ICD-10-CM

## 2020-03-12 DIAGNOSIS — M17.12 ARTHRITIS OF LEFT KNEE: ICD-10-CM

## 2020-03-12 LAB
GLUCOSE BLDC GLUCOMTR-MCNC: 132 MG/DL (ref 70–130)
GLUCOSE BLDC GLUCOMTR-MCNC: 157 MG/DL (ref 70–130)
GLUCOSE BLDC GLUCOMTR-MCNC: 232 MG/DL (ref 70–130)
GLUCOSE BLDC GLUCOMTR-MCNC: 234 MG/DL (ref 70–130)
GLUCOSE BLDC GLUCOMTR-MCNC: 267 MG/DL (ref 70–130)

## 2020-03-12 PROCEDURE — C1713 ANCHOR/SCREW BN/BN,TIS/BN: HCPCS | Performed by: ORTHOPAEDIC SURGERY

## 2020-03-12 PROCEDURE — 25010000002 ONDANSETRON PER 1 MG: Performed by: NURSE ANESTHETIST, CERTIFIED REGISTERED

## 2020-03-12 PROCEDURE — 63710000001 NEBIVOLOL 10 MG TABLET: Performed by: ORTHOPAEDIC SURGERY

## 2020-03-12 PROCEDURE — 25010000002 FENTANYL CITRATE (PF) 100 MCG/2ML SOLUTION: Performed by: NURSE ANESTHETIST, CERTIFIED REGISTERED

## 2020-03-12 PROCEDURE — A9270 NON-COVERED ITEM OR SERVICE: HCPCS | Performed by: ORTHOPAEDIC SURGERY

## 2020-03-12 PROCEDURE — 25010000003 MEPIVACAINE PER 10 ML: Performed by: ANESTHESIOLOGY

## 2020-03-12 PROCEDURE — C9290 INJ, BUPIVACAINE LIPOSOME: HCPCS | Performed by: ORTHOPAEDIC SURGERY

## 2020-03-12 PROCEDURE — 25010000002 ROPIVACAINE PER 1 MG: Performed by: ANESTHESIOLOGY

## 2020-03-12 PROCEDURE — 63710000001 HYDROCODONE-ACETAMINOPHEN 7.5-325 MG TABLET: Performed by: ORTHOPAEDIC SURGERY

## 2020-03-12 PROCEDURE — 25010000002 CEFAZOLIN PER 500 MG: Performed by: ORTHOPAEDIC SURGERY

## 2020-03-12 PROCEDURE — 25010000002 NEOSTIGMINE PER 0.5 MG: Performed by: NURSE ANESTHETIST, CERTIFIED REGISTERED

## 2020-03-12 PROCEDURE — 25010000002 VANCOMYCIN 10 G RECONSTITUTED SOLUTION: Performed by: ORTHOPAEDIC SURGERY

## 2020-03-12 PROCEDURE — 25010000003 BUPIVACAINE LIPOSOME 1.3 % SUSPENSION 20 ML VIAL: Performed by: ORTHOPAEDIC SURGERY

## 2020-03-12 PROCEDURE — 97162 PT EVAL MOD COMPLEX 30 MIN: CPT

## 2020-03-12 PROCEDURE — 25010000002 DEXAMETHASONE PER 1 MG: Performed by: NURSE ANESTHETIST, CERTIFIED REGISTERED

## 2020-03-12 PROCEDURE — 63710000001 GABAPENTIN 300 MG CAPSULE: Performed by: ORTHOPAEDIC SURGERY

## 2020-03-12 PROCEDURE — 73560 X-RAY EXAM OF KNEE 1 OR 2: CPT

## 2020-03-12 PROCEDURE — 63710000001 CLONIDINE 0.1 MG TABLET: Performed by: ORTHOPAEDIC SURGERY

## 2020-03-12 PROCEDURE — 25010000002 HYDROMORPHONE PER 4 MG: Performed by: NURSE ANESTHETIST, CERTIFIED REGISTERED

## 2020-03-12 PROCEDURE — 63710000001 ESCITALOPRAM 10 MG TABLET: Performed by: ORTHOPAEDIC SURGERY

## 2020-03-12 PROCEDURE — 82962 GLUCOSE BLOOD TEST: CPT

## 2020-03-12 PROCEDURE — C1776 JOINT DEVICE (IMPLANTABLE): HCPCS | Performed by: ORTHOPAEDIC SURGERY

## 2020-03-12 PROCEDURE — 27447 TOTAL KNEE ARTHROPLASTY: CPT | Performed by: ORTHOPAEDIC SURGERY

## 2020-03-12 PROCEDURE — 63710000001 ASPIRIN 81 MG TABLET DELAYED-RELEASE: Performed by: ORTHOPAEDIC SURGERY

## 2020-03-12 PROCEDURE — 97110 THERAPEUTIC EXERCISES: CPT

## 2020-03-12 PROCEDURE — 63710000001 INSULIN LISPRO (HUMAN) PER 5 UNITS: Performed by: ORTHOPAEDIC SURGERY

## 2020-03-12 PROCEDURE — 25010000002 FENTANYL CITRATE (PF) 100 MCG/2ML SOLUTION: Performed by: ANESTHESIOLOGY

## 2020-03-12 PROCEDURE — 63710000001 MUPIROCIN 2 % OINTMENT: Performed by: ORTHOPAEDIC SURGERY

## 2020-03-12 PROCEDURE — 25010000002 MIDAZOLAM PER 1 MG: Performed by: ANESTHESIOLOGY

## 2020-03-12 PROCEDURE — 63710000001 DOCUSATE SODIUM 100 MG CAPSULE: Performed by: ORTHOPAEDIC SURGERY

## 2020-03-12 PROCEDURE — 25010000002 VANCOMYCIN 10 G RECONSTITUTED SOLUTION: Performed by: NURSE PRACTITIONER

## 2020-03-12 PROCEDURE — 25010000002 PROPOFOL 10 MG/ML EMULSION: Performed by: NURSE ANESTHETIST, CERTIFIED REGISTERED

## 2020-03-12 PROCEDURE — 63710000001 LISINOPRIL 40 MG TABLET: Performed by: ORTHOPAEDIC SURGERY

## 2020-03-12 PROCEDURE — 63710000001 HYDROCHLOROTHIAZIDE 25 MG TABLET: Performed by: ORTHOPAEDIC SURGERY

## 2020-03-12 DEVICE — GENESIS II NON-POROUS TIBIAL                                    BASEPLATE SIZE 6 LT
Type: IMPLANTABLE DEVICE | Site: KNEE | Status: FUNCTIONAL
Brand: GENESIS II

## 2020-03-12 DEVICE — LEGION POSTERIOR STABILIZED                                    OXINIUM FEMORAL SIZE 5 LEFT
Type: IMPLANTABLE DEVICE | Site: KNEE | Status: FUNCTIONAL
Brand: LEGION

## 2020-03-12 DEVICE — CMT BONE PALACOS R HI/VISC 1X40: Type: IMPLANTABLE DEVICE | Status: FUNCTIONAL

## 2020-03-12 DEVICE — GENESIS II POSTERIOR STABILIZED                                    HIGH FLEXION INSERT SIZE 5-6 15MM
Type: IMPLANTABLE DEVICE | Status: FUNCTIONAL
Brand: GENESIS II

## 2020-03-12 DEVICE — GEN II RESURFACING PATELLA 38MM
Type: IMPLANTABLE DEVICE | Site: KNEE | Status: FUNCTIONAL
Brand: GENESIS II

## 2020-03-12 DEVICE — IMPLANTABLE DEVICE: Type: IMPLANTABLE DEVICE | Status: FUNCTIONAL

## 2020-03-12 RX ORDER — FLUMAZENIL 0.1 MG/ML
0.2 INJECTION INTRAVENOUS AS NEEDED
Status: DISCONTINUED | OUTPATIENT
Start: 2020-03-12 | End: 2020-03-12 | Stop reason: HOSPADM

## 2020-03-12 RX ORDER — SODIUM CHLORIDE 0.9 % (FLUSH) 0.9 %
3 SYRINGE (ML) INJECTION EVERY 12 HOURS SCHEDULED
Status: DISCONTINUED | OUTPATIENT
Start: 2020-03-12 | End: 2020-03-13 | Stop reason: HOSPADM

## 2020-03-12 RX ORDER — HYDROCODONE BITARTRATE AND ACETAMINOPHEN 7.5; 325 MG/1; MG/1
1 TABLET ORAL EVERY 4 HOURS PRN
Status: DISCONTINUED | OUTPATIENT
Start: 2020-03-12 | End: 2020-03-13 | Stop reason: HOSPADM

## 2020-03-12 RX ORDER — NICOTINE POLACRILEX 4 MG
15 LOZENGE BUCCAL
Status: DISCONTINUED | OUTPATIENT
Start: 2020-03-12 | End: 2020-03-13 | Stop reason: HOSPADM

## 2020-03-12 RX ORDER — DIPHENHYDRAMINE HCL 25 MG
25 CAPSULE ORAL
Status: DISCONTINUED | OUTPATIENT
Start: 2020-03-12 | End: 2020-03-12 | Stop reason: HOSPADM

## 2020-03-12 RX ORDER — NALOXONE HCL 0.4 MG/ML
0.2 VIAL (ML) INJECTION AS NEEDED
Status: DISCONTINUED | OUTPATIENT
Start: 2020-03-12 | End: 2020-03-12 | Stop reason: HOSPADM

## 2020-03-12 RX ORDER — DEXTROSE MONOHYDRATE 25 G/50ML
25 INJECTION, SOLUTION INTRAVENOUS
Status: DISCONTINUED | OUTPATIENT
Start: 2020-03-12 | End: 2020-03-13 | Stop reason: HOSPADM

## 2020-03-12 RX ORDER — SODIUM CHLORIDE 0.9 % (FLUSH) 0.9 %
3-10 SYRINGE (ML) INJECTION AS NEEDED
Status: DISCONTINUED | OUTPATIENT
Start: 2020-03-12 | End: 2020-03-12 | Stop reason: HOSPADM

## 2020-03-12 RX ORDER — HYDROMORPHONE HYDROCHLORIDE 1 MG/ML
0.5 INJECTION, SOLUTION INTRAMUSCULAR; INTRAVENOUS; SUBCUTANEOUS
Status: DISCONTINUED | OUTPATIENT
Start: 2020-03-12 | End: 2020-03-12 | Stop reason: HOSPADM

## 2020-03-12 RX ORDER — MIDAZOLAM HYDROCHLORIDE 1 MG/ML
2 INJECTION INTRAMUSCULAR; INTRAVENOUS
Status: DISCONTINUED | OUTPATIENT
Start: 2020-03-12 | End: 2020-03-12 | Stop reason: HOSPADM

## 2020-03-12 RX ORDER — PROMETHAZINE HYDROCHLORIDE 25 MG/ML
12.5 INJECTION, SOLUTION INTRAMUSCULAR; INTRAVENOUS ONCE AS NEEDED
Status: DISCONTINUED | OUTPATIENT
Start: 2020-03-12 | End: 2020-03-12 | Stop reason: HOSPADM

## 2020-03-12 RX ORDER — MELOXICAM 7.5 MG/1
7.5 TABLET ORAL DAILY
Status: DISCONTINUED | OUTPATIENT
Start: 2020-03-13 | End: 2020-03-13 | Stop reason: HOSPADM

## 2020-03-12 RX ORDER — SODIUM CHLORIDE 0.9 % (FLUSH) 0.9 %
3 SYRINGE (ML) INJECTION EVERY 12 HOURS SCHEDULED
Status: DISCONTINUED | OUTPATIENT
Start: 2020-03-12 | End: 2020-03-12 | Stop reason: HOSPADM

## 2020-03-12 RX ORDER — MELOXICAM 15 MG/1
15 TABLET ORAL ONCE
Status: COMPLETED | OUTPATIENT
Start: 2020-03-12 | End: 2020-03-12

## 2020-03-12 RX ORDER — OXYCODONE AND ACETAMINOPHEN 7.5; 325 MG/1; MG/1
1 TABLET ORAL ONCE AS NEEDED
Status: DISCONTINUED | OUTPATIENT
Start: 2020-03-12 | End: 2020-03-12 | Stop reason: HOSPADM

## 2020-03-12 RX ORDER — PREGABALIN 75 MG/1
150 CAPSULE ORAL ONCE
Status: COMPLETED | OUTPATIENT
Start: 2020-03-12 | End: 2020-03-12

## 2020-03-12 RX ORDER — PROPOFOL 10 MG/ML
VIAL (ML) INTRAVENOUS AS NEEDED
Status: DISCONTINUED | OUTPATIENT
Start: 2020-03-12 | End: 2020-03-12 | Stop reason: SURG

## 2020-03-12 RX ORDER — NEBIVOLOL 10 MG/1
20 TABLET ORAL NIGHTLY
Status: DISCONTINUED | OUTPATIENT
Start: 2020-03-12 | End: 2020-03-13 | Stop reason: HOSPADM

## 2020-03-12 RX ORDER — SODIUM CHLORIDE 0.9 % (FLUSH) 0.9 %
10 SYRINGE (ML) INJECTION AS NEEDED
Status: DISCONTINUED | OUTPATIENT
Start: 2020-03-12 | End: 2020-03-13 | Stop reason: HOSPADM

## 2020-03-12 RX ORDER — LIDOCAINE HYDROCHLORIDE 20 MG/ML
INJECTION, SOLUTION INFILTRATION; PERINEURAL AS NEEDED
Status: DISCONTINUED | OUTPATIENT
Start: 2020-03-12 | End: 2020-03-12 | Stop reason: SURG

## 2020-03-12 RX ORDER — SODIUM CHLORIDE, SODIUM LACTATE, POTASSIUM CHLORIDE, CALCIUM CHLORIDE 600; 310; 30; 20 MG/100ML; MG/100ML; MG/100ML; MG/100ML
9 INJECTION, SOLUTION INTRAVENOUS CONTINUOUS
Status: DISCONTINUED | OUTPATIENT
Start: 2020-03-12 | End: 2020-03-12 | Stop reason: HOSPADM

## 2020-03-12 RX ORDER — CEFAZOLIN SODIUM IN 0.9 % NACL 3 G/100 ML
3 INTRAVENOUS SOLUTION, PIGGYBACK (ML) INTRAVENOUS EVERY 8 HOURS
Status: COMPLETED | OUTPATIENT
Start: 2020-03-12 | End: 2020-03-12

## 2020-03-12 RX ORDER — LIDOCAINE HYDROCHLORIDE 10 MG/ML
0.5 INJECTION, SOLUTION EPIDURAL; INFILTRATION; INTRACAUDAL; PERINEURAL ONCE AS NEEDED
Status: DISCONTINUED | OUTPATIENT
Start: 2020-03-12 | End: 2020-03-12 | Stop reason: HOSPADM

## 2020-03-12 RX ORDER — ACETAMINOPHEN 500 MG
1000 TABLET ORAL ONCE
Status: COMPLETED | OUTPATIENT
Start: 2020-03-12 | End: 2020-03-12

## 2020-03-12 RX ORDER — ROPIVACAINE HYDROCHLORIDE 5 MG/ML
INJECTION, SOLUTION EPIDURAL; INFILTRATION; PERINEURAL
Status: COMPLETED | OUTPATIENT
Start: 2020-03-12 | End: 2020-03-12

## 2020-03-12 RX ORDER — DOCUSATE SODIUM 100 MG/1
100 CAPSULE, LIQUID FILLED ORAL 2 TIMES DAILY
Status: DISCONTINUED | OUTPATIENT
Start: 2020-03-12 | End: 2020-03-13 | Stop reason: HOSPADM

## 2020-03-12 RX ORDER — GABAPENTIN 300 MG/1
300 CAPSULE ORAL NIGHTLY
Status: DISCONTINUED | OUTPATIENT
Start: 2020-03-12 | End: 2020-03-13 | Stop reason: HOSPADM

## 2020-03-12 RX ORDER — HYDROCODONE BITARTRATE AND ACETAMINOPHEN 7.5; 325 MG/1; MG/1
1 TABLET ORAL ONCE AS NEEDED
Status: DISCONTINUED | OUTPATIENT
Start: 2020-03-12 | End: 2020-03-12 | Stop reason: HOSPADM

## 2020-03-12 RX ORDER — DIPHENHYDRAMINE HYDROCHLORIDE 50 MG/ML
12.5 INJECTION INTRAMUSCULAR; INTRAVENOUS
Status: DISCONTINUED | OUTPATIENT
Start: 2020-03-12 | End: 2020-03-12 | Stop reason: HOSPADM

## 2020-03-12 RX ORDER — GLIPIZIDE 5 MG/1
2.5 TABLET ORAL
Status: DISCONTINUED | OUTPATIENT
Start: 2020-03-12 | End: 2020-03-13 | Stop reason: HOSPADM

## 2020-03-12 RX ORDER — FENTANYL CITRATE 50 UG/ML
50 INJECTION, SOLUTION INTRAMUSCULAR; INTRAVENOUS
Status: DISCONTINUED | OUTPATIENT
Start: 2020-03-12 | End: 2020-03-12 | Stop reason: HOSPADM

## 2020-03-12 RX ORDER — ROCURONIUM BROMIDE 10 MG/ML
INJECTION, SOLUTION INTRAVENOUS AS NEEDED
Status: DISCONTINUED | OUTPATIENT
Start: 2020-03-12 | End: 2020-03-12 | Stop reason: SURG

## 2020-03-12 RX ORDER — SODIUM CHLORIDE, SODIUM LACTATE, POTASSIUM CHLORIDE, CALCIUM CHLORIDE 600; 310; 30; 20 MG/100ML; MG/100ML; MG/100ML; MG/100ML
100 INJECTION, SOLUTION INTRAVENOUS CONTINUOUS
Status: DISCONTINUED | OUTPATIENT
Start: 2020-03-12 | End: 2020-03-13 | Stop reason: HOSPADM

## 2020-03-12 RX ORDER — EPHEDRINE SULFATE 50 MG/ML
5 INJECTION, SOLUTION INTRAVENOUS ONCE AS NEEDED
Status: DISCONTINUED | OUTPATIENT
Start: 2020-03-12 | End: 2020-03-12 | Stop reason: HOSPADM

## 2020-03-12 RX ORDER — CLONIDINE HYDROCHLORIDE 0.1 MG/1
0.1 TABLET ORAL NIGHTLY
Status: DISCONTINUED | OUTPATIENT
Start: 2020-03-12 | End: 2020-03-13 | Stop reason: HOSPADM

## 2020-03-12 RX ORDER — ONDANSETRON 2 MG/ML
4 INJECTION INTRAMUSCULAR; INTRAVENOUS ONCE AS NEEDED
Status: DISCONTINUED | OUTPATIENT
Start: 2020-03-12 | End: 2020-03-12 | Stop reason: HOSPADM

## 2020-03-12 RX ORDER — GLYCOPYRROLATE 0.2 MG/ML
INJECTION INTRAMUSCULAR; INTRAVENOUS AS NEEDED
Status: DISCONTINUED | OUTPATIENT
Start: 2020-03-12 | End: 2020-03-12 | Stop reason: SURG

## 2020-03-12 RX ORDER — BISACODYL 10 MG
10 SUPPOSITORY, RECTAL RECTAL DAILY PRN
Status: DISCONTINUED | OUTPATIENT
Start: 2020-03-12 | End: 2020-03-13 | Stop reason: HOSPADM

## 2020-03-12 RX ORDER — CEFAZOLIN SODIUM IN 0.9 % NACL 3 G/100 ML
3 INTRAVENOUS SOLUTION, PIGGYBACK (ML) INTRAVENOUS ONCE
Status: COMPLETED | OUTPATIENT
Start: 2020-03-12 | End: 2020-03-12

## 2020-03-12 RX ORDER — MIDAZOLAM HYDROCHLORIDE 1 MG/ML
1 INJECTION INTRAMUSCULAR; INTRAVENOUS
Status: DISCONTINUED | OUTPATIENT
Start: 2020-03-12 | End: 2020-03-12 | Stop reason: HOSPADM

## 2020-03-12 RX ORDER — PROMETHAZINE HYDROCHLORIDE 25 MG/1
25 TABLET ORAL ONCE AS NEEDED
Status: DISCONTINUED | OUTPATIENT
Start: 2020-03-12 | End: 2020-03-12 | Stop reason: HOSPADM

## 2020-03-12 RX ORDER — HYDRALAZINE HYDROCHLORIDE 20 MG/ML
5 INJECTION INTRAMUSCULAR; INTRAVENOUS
Status: DISCONTINUED | OUTPATIENT
Start: 2020-03-12 | End: 2020-03-12 | Stop reason: HOSPADM

## 2020-03-12 RX ORDER — PROMETHAZINE HYDROCHLORIDE 25 MG/1
25 SUPPOSITORY RECTAL ONCE AS NEEDED
Status: DISCONTINUED | OUTPATIENT
Start: 2020-03-12 | End: 2020-03-12 | Stop reason: HOSPADM

## 2020-03-12 RX ORDER — FENTANYL CITRATE 50 UG/ML
INJECTION, SOLUTION INTRAMUSCULAR; INTRAVENOUS AS NEEDED
Status: DISCONTINUED | OUTPATIENT
Start: 2020-03-12 | End: 2020-03-12 | Stop reason: SURG

## 2020-03-12 RX ORDER — NALOXONE HCL 0.4 MG/ML
0.1 VIAL (ML) INJECTION
Status: DISCONTINUED | OUTPATIENT
Start: 2020-03-12 | End: 2020-03-13 | Stop reason: HOSPADM

## 2020-03-12 RX ORDER — FENOFIBRATE 48 MG/1
48 TABLET, COATED ORAL DAILY
Status: DISCONTINUED | OUTPATIENT
Start: 2020-03-12 | End: 2020-03-13 | Stop reason: HOSPADM

## 2020-03-12 RX ORDER — MAGNESIUM HYDROXIDE 1200 MG/15ML
LIQUID ORAL AS NEEDED
Status: DISCONTINUED | OUTPATIENT
Start: 2020-03-12 | End: 2020-03-12 | Stop reason: HOSPADM

## 2020-03-12 RX ORDER — ACETAMINOPHEN 325 MG/1
650 TABLET ORAL ONCE AS NEEDED
Status: DISCONTINUED | OUTPATIENT
Start: 2020-03-12 | End: 2020-03-12 | Stop reason: HOSPADM

## 2020-03-12 RX ORDER — LABETALOL HYDROCHLORIDE 5 MG/ML
5 INJECTION, SOLUTION INTRAVENOUS
Status: DISCONTINUED | OUTPATIENT
Start: 2020-03-12 | End: 2020-03-12 | Stop reason: HOSPADM

## 2020-03-12 RX ORDER — ONDANSETRON 2 MG/ML
INJECTION INTRAMUSCULAR; INTRAVENOUS AS NEEDED
Status: DISCONTINUED | OUTPATIENT
Start: 2020-03-12 | End: 2020-03-12 | Stop reason: SURG

## 2020-03-12 RX ORDER — FLUTICASONE PROPIONATE 50 MCG
1 SPRAY, SUSPENSION (ML) NASAL 2 TIMES DAILY
Status: DISCONTINUED | OUTPATIENT
Start: 2020-03-12 | End: 2020-03-13 | Stop reason: HOSPADM

## 2020-03-12 RX ORDER — HYDROCHLOROTHIAZIDE 25 MG/1
25 TABLET ORAL DAILY
Status: DISCONTINUED | OUTPATIENT
Start: 2020-03-12 | End: 2020-03-13 | Stop reason: HOSPADM

## 2020-03-12 RX ORDER — ONDANSETRON 4 MG/1
4 TABLET, FILM COATED ORAL EVERY 6 HOURS PRN
Status: DISCONTINUED | OUTPATIENT
Start: 2020-03-12 | End: 2020-03-13 | Stop reason: HOSPADM

## 2020-03-12 RX ORDER — DEXAMETHASONE SODIUM PHOSPHATE 10 MG/ML
INJECTION INTRAMUSCULAR; INTRAVENOUS AS NEEDED
Status: DISCONTINUED | OUTPATIENT
Start: 2020-03-12 | End: 2020-03-12 | Stop reason: SURG

## 2020-03-12 RX ORDER — ESCITALOPRAM OXALATE 10 MG/1
10 TABLET ORAL DAILY
Status: DISCONTINUED | OUTPATIENT
Start: 2020-03-12 | End: 2020-03-13 | Stop reason: HOSPADM

## 2020-03-12 RX ORDER — FAMOTIDINE 10 MG/ML
20 INJECTION, SOLUTION INTRAVENOUS ONCE
Status: COMPLETED | OUTPATIENT
Start: 2020-03-12 | End: 2020-03-12

## 2020-03-12 RX ORDER — PROMETHAZINE HYDROCHLORIDE 25 MG/ML
6.25 INJECTION, SOLUTION INTRAMUSCULAR; INTRAVENOUS
Status: DISCONTINUED | OUTPATIENT
Start: 2020-03-12 | End: 2020-03-12 | Stop reason: HOSPADM

## 2020-03-12 RX ORDER — AMLODIPINE BESYLATE 10 MG/1
10 TABLET ORAL DAILY
Status: DISCONTINUED | OUTPATIENT
Start: 2020-03-13 | End: 2020-03-13 | Stop reason: HOSPADM

## 2020-03-12 RX ORDER — HYDROCODONE BITARTRATE AND ACETAMINOPHEN 7.5; 325 MG/1; MG/1
2 TABLET ORAL EVERY 4 HOURS PRN
Status: DISCONTINUED | OUTPATIENT
Start: 2020-03-12 | End: 2020-03-13 | Stop reason: HOSPADM

## 2020-03-12 RX ORDER — LISINOPRIL 40 MG/1
40 TABLET ORAL
Status: DISCONTINUED | OUTPATIENT
Start: 2020-03-12 | End: 2020-03-13 | Stop reason: HOSPADM

## 2020-03-12 RX ORDER — HYDROMORPHONE HYDROCHLORIDE 1 MG/ML
0.5 INJECTION, SOLUTION INTRAMUSCULAR; INTRAVENOUS; SUBCUTANEOUS
Status: DISCONTINUED | OUTPATIENT
Start: 2020-03-12 | End: 2020-03-13 | Stop reason: HOSPADM

## 2020-03-12 RX ORDER — ONDANSETRON 2 MG/ML
4 INJECTION INTRAMUSCULAR; INTRAVENOUS EVERY 6 HOURS PRN
Status: DISCONTINUED | OUTPATIENT
Start: 2020-03-12 | End: 2020-03-13 | Stop reason: HOSPADM

## 2020-03-12 RX ORDER — ASPIRIN 81 MG/1
81 TABLET ORAL 2 TIMES DAILY
Status: DISCONTINUED | OUTPATIENT
Start: 2020-03-12 | End: 2020-03-13 | Stop reason: HOSPADM

## 2020-03-12 RX ORDER — TRANEXAMIC ACID 100 MG/ML
INJECTION, SOLUTION INTRAVENOUS AS NEEDED
Status: DISCONTINUED | OUTPATIENT
Start: 2020-03-12 | End: 2020-03-12 | Stop reason: SURG

## 2020-03-12 RX ADMIN — GLYCOPYRROLATE 0.6 MG: 0.2 INJECTION INTRAMUSCULAR; INTRAVENOUS at 08:50

## 2020-03-12 RX ADMIN — NEOSTIGMINE METHYLSULFATE 3 MG: 1 INJECTION INTRAMUSCULAR; INTRAVENOUS; SUBCUTANEOUS at 08:50

## 2020-03-12 RX ADMIN — VANCOMYCIN HYDROCHLORIDE 2000 MG: 10 INJECTION, POWDER, LYOPHILIZED, FOR SOLUTION INTRAVENOUS at 06:07

## 2020-03-12 RX ADMIN — SODIUM CHLORIDE, POTASSIUM CHLORIDE, SODIUM LACTATE AND CALCIUM CHLORIDE 500 ML: 600; 310; 30; 20 INJECTION, SOLUTION INTRAVENOUS at 06:04

## 2020-03-12 RX ADMIN — HYDROCHLOROTHIAZIDE 25 MG: 25 TABLET ORAL at 15:09

## 2020-03-12 RX ADMIN — MEPIVACAINE HYDROCHLORIDE 10 ML: 15 INJECTION, SOLUTION EPIDURAL; INFILTRATION at 06:23

## 2020-03-12 RX ADMIN — INSULIN LISPRO 2 UNITS: 100 INJECTION, SOLUTION INTRAVENOUS; SUBCUTANEOUS at 21:50

## 2020-03-12 RX ADMIN — ESCITALOPRAM 10 MG: 10 TABLET, FILM COATED ORAL at 15:08

## 2020-03-12 RX ADMIN — GLYCOPYRROLATE 0.2 MG: 0.2 INJECTION INTRAMUSCULAR; INTRAVENOUS at 07:16

## 2020-03-12 RX ADMIN — DOCUSATE SODIUM 100 MG: 100 CAPSULE, LIQUID FILLED ORAL at 21:49

## 2020-03-12 RX ADMIN — INSULIN LISPRO 6 UNITS: 100 INJECTION, SOLUTION INTRAVENOUS; SUBCUTANEOUS at 17:03

## 2020-03-12 RX ADMIN — ACETAMINOPHEN 1000 MG: 500 TABLET, FILM COATED ORAL at 06:04

## 2020-03-12 RX ADMIN — CEFAZOLIN 3 G: 10 INJECTION, POWDER, FOR SOLUTION INTRAVENOUS at 22:16

## 2020-03-12 RX ADMIN — SODIUM CHLORIDE, POTASSIUM CHLORIDE, SODIUM LACTATE AND CALCIUM CHLORIDE: 600; 310; 30; 20 INJECTION, SOLUTION INTRAVENOUS at 08:39

## 2020-03-12 RX ADMIN — PREGABALIN 150 MG: 75 CAPSULE ORAL at 06:05

## 2020-03-12 RX ADMIN — HYDROCODONE BITARTRATE AND ACETAMINOPHEN 2 TABLET: 7.5; 325 TABLET ORAL at 19:29

## 2020-03-12 RX ADMIN — FENTANYL CITRATE 50 MCG: 50 INJECTION INTRAMUSCULAR; INTRAVENOUS at 06:39

## 2020-03-12 RX ADMIN — LISINOPRIL 40 MG: 40 TABLET ORAL at 15:08

## 2020-03-12 RX ADMIN — CEFAZOLIN 3 G: 1 INJECTION, POWDER, FOR SOLUTION INTRAMUSCULAR; INTRAVENOUS; PARENTERAL at 06:51

## 2020-03-12 RX ADMIN — FENTANYL CITRATE 100 MCG: 50 INJECTION INTRAMUSCULAR; INTRAVENOUS at 06:57

## 2020-03-12 RX ADMIN — ROCURONIUM BROMIDE 50 MG: 10 INJECTION, SOLUTION INTRAVENOUS at 07:00

## 2020-03-12 RX ADMIN — ASPIRIN 81 MG: 81 TABLET, COATED ORAL at 15:08

## 2020-03-12 RX ADMIN — CLONIDINE HYDROCHLORIDE 0.1 MG: 0.1 TABLET ORAL at 21:49

## 2020-03-12 RX ADMIN — LIDOCAINE HYDROCHLORIDE 100 MG: 20 INJECTION, SOLUTION INFILTRATION; PERINEURAL at 07:00

## 2020-03-12 RX ADMIN — DEXAMETHASONE SODIUM PHOSPHATE 4 MG: 10 INJECTION INTRAMUSCULAR; INTRAVENOUS at 07:12

## 2020-03-12 RX ADMIN — FAMOTIDINE 20 MG: 10 INJECTION INTRAVENOUS at 06:31

## 2020-03-12 RX ADMIN — VANCOMYCIN HYDROCHLORIDE 2000 MG: 10 INJECTION, POWDER, LYOPHILIZED, FOR SOLUTION INTRAVENOUS at 18:23

## 2020-03-12 RX ADMIN — GLYCOPYRROLATE 0.2 MG: 0.2 INJECTION INTRAMUSCULAR; INTRAVENOUS at 07:12

## 2020-03-12 RX ADMIN — GABAPENTIN 300 MG: 300 CAPSULE ORAL at 21:50

## 2020-03-12 RX ADMIN — ASPIRIN 81 MG: 81 TABLET, COATED ORAL at 21:49

## 2020-03-12 RX ADMIN — INSULIN LISPRO 4 UNITS: 100 INJECTION, SOLUTION INTRAVENOUS; SUBCUTANEOUS at 12:08

## 2020-03-12 RX ADMIN — HYDROCODONE BITARTRATE AND ACETAMINOPHEN 1 TABLET: 7.5; 325 TABLET ORAL at 15:08

## 2020-03-12 RX ADMIN — CEFAZOLIN 3 G: 10 INJECTION, POWDER, FOR SOLUTION INTRAVENOUS at 15:08

## 2020-03-12 RX ADMIN — MIDAZOLAM 2 MG: 1 INJECTION INTRAMUSCULAR; INTRAVENOUS at 06:39

## 2020-03-12 RX ADMIN — SODIUM CHLORIDE, POTASSIUM CHLORIDE, SODIUM LACTATE AND CALCIUM CHLORIDE: 600; 310; 30; 20 INJECTION, SOLUTION INTRAVENOUS at 06:51

## 2020-03-12 RX ADMIN — PROPOFOL 200 MG: 10 INJECTION, EMULSION INTRAVENOUS at 07:00

## 2020-03-12 RX ADMIN — NEBIVOLOL HYDROCHLORIDE 20 MG: 10 TABLET ORAL at 21:49

## 2020-03-12 RX ADMIN — TRANEXAMIC ACID 1000 MG: 100 INJECTION, SOLUTION INTRAVENOUS at 08:33

## 2020-03-12 RX ADMIN — MELOXICAM 15 MG: 15 TABLET ORAL at 06:05

## 2020-03-12 RX ADMIN — ONDANSETRON HYDROCHLORIDE 4 MG: 2 SOLUTION INTRAMUSCULAR; INTRAVENOUS at 08:50

## 2020-03-12 RX ADMIN — MUPIROCIN 1 APPLICATION: 20 OINTMENT TOPICAL at 21:50

## 2020-03-12 RX ADMIN — FENTANYL CITRATE 50 MCG: 50 INJECTION INTRAMUSCULAR; INTRAVENOUS at 09:53

## 2020-03-12 RX ADMIN — ROPIVACAINE HYDROCHLORIDE 30 ML: 5 INJECTION, SOLUTION EPIDURAL; INFILTRATION; PERINEURAL at 06:23

## 2020-03-12 RX ADMIN — HYDROMORPHONE HYDROCHLORIDE 0.5 MG: 1 INJECTION, SOLUTION INTRAMUSCULAR; INTRAVENOUS; SUBCUTANEOUS at 10:08

## 2020-03-12 NOTE — ANESTHESIA PROCEDURE NOTES
Airway  Urgency: elective    Date/Time: 3/12/2020 7:06 AM  Airway not difficult    General Information and Staff    Patient location during procedure: OR  Anesthesiologist: Presley Moore MD  CRNA: Isai Jimenez CRNA    Indications and Patient Condition  Indications for airway management: airway protection    Preoxygenated: yes  MILS maintained throughout  Mask difficulty assessment: 2 - vent by mask + OA or adjuvant +/- NMBA    Final Airway Details  Final airway type: endotracheal airway      Successful airway: ETT  Cuffed: yes   Successful intubation technique: direct laryngoscopy  Facilitating devices/methods: intubating stylet  Endotracheal tube insertion site: oral  Blade: CMAC  Blade size: D  ETT size (mm): 7.5  Cormack-Lehane Classification: grade IIa - partial view of glottis  Placement verified by: chest auscultation and capnometry   Cuff volume (mL): 6  Measured from: lips  ETT/EBT  to lips (cm): 22  Number of attempts at approach: 2  Assessment: lips, teeth, and gum same as pre-op and atraumatic intubation    Additional Comments  First attempt with MAC 4 blade and grade 3 view. Successful with the CMAC with a grade IIa view.

## 2020-03-12 NOTE — BRIEF OP NOTE
TOTAL KNEE ARTHROPLASTY  Progress Note    Jeb Drummondham  3/12/2020    Pre-op Diagnosis:   Arthritis of left knee [M17.12]       Post-Op Diagnosis Codes:     * Arthritis of left knee [M17.12]    Procedure/CPT® Codes:      Procedure(s):  TOTAL KNEE ARTHROPLASTY    Surgeon(s):  Chepe Alicea MD    Anesthesia: General with Block    Staff:   Circulator: Barbara Villatoro RN; Ernst Mcclure RN  Scrub Person: Panfilo Rodriguez Jasai M  Assistant: Grzegorz Mckinley CSA    Estimated Blood Loss: minimal    Urine Voided: * No values recorded between 3/12/2020  6:51 AM and 3/12/2020  8:43 AM *    Specimens:                None          Drains: * No LDAs found *    Findings: see dictation    Complications: none      Chepe Alicea MD     Date: 3/12/2020  Time: 08:43

## 2020-03-12 NOTE — ANESTHESIA POSTPROCEDURE EVALUATION
Patient: Jeb Olson    Procedure Summary     Date:  03/12/20 Room / Location:  [facility] OR [address] [place] [address] MAIN OR    Anesthesia Start:  0651 Anesthesia Stop:  0913    Procedure:  TOTAL KNEE ARTHROPLASTY (Left Knee) Diagnosis:       Arthritis of left knee      (Arthritis of left knee [M17.12])    Surgeon:  Chepe Alicea MD Provider:  Presley Moore MD    Anesthesia Type:  general with block ASA Status:  3          Anesthesia Type: general with block    Vitals  Vitals Value Taken Time   /89 3/12/2020  9:45 AM   Temp 36.9 °C (98.5 °F) 3/12/2020  9:10 AM   Pulse 48 3/12/2020  9:51 AM   Resp 18 3/12/2020  9:30 AM   SpO2 91 % 3/12/2020  9:51 AM   Vitals shown include unvalidated device data.        Post Anesthesia Care and Evaluation    Patient location during evaluation: PACU  Patient participation: complete - patient participated  Level of consciousness: awake and alert  Pain management: adequate  Airway patency: patent  Anesthetic complications: No anesthetic complications    Cardiovascular status: acceptable  Respiratory status: acceptable  Hydration status: acceptable    Comments: --------------------            03/12/20               0930     --------------------   BP:       148/82     Pulse:      51       Resp:       18       Temp:                SpO2:      90%      --------------------

## 2020-03-12 NOTE — PLAN OF CARE
Problem: Patient Care Overview  Goal: Plan of Care Review  Flowsheets (Taken 3/12/2020 1861)  Progress: improving  Plan of Care Reviewed With: patient;spouse  Outcome Summary: Pt doing well, presents s/p L TKR and now with post op pain, weakness, and decreased functional mobilty. He plans to DC home tomorrow with assist of wife. Pt currently moving well. He was able to ambulate approx 25 ft into hallway with CGA. He also is tolerating all knee exercises. Pt will continue to benefit from skilled PT to maximize safety and independence with mobility.

## 2020-03-12 NOTE — PLAN OF CARE
Admit from PACU s/p LTKA. VSS. NVI. OLIVER dressing CDI. Worked with PT, up in chair, walked to doorway. Up with assist x1 and walker. BRP. Pain controlled. Plans to D/C home with OPPT. Verbalized understanding of all education. Will cont to monitor.

## 2020-03-12 NOTE — OP NOTE
Orthopaedic Operative Note    Facility: [facility]    Patient: Jeb Olson    Medical Record Number: [id number]    YOB: 1950    Dictating Surgeon: Chepe Alicea M.D.*    Primary Care Physician: Tera Salvador MD    Date of Operation: 3/12/2020    Pre-Operative Diagnosis:  Left knee end-stage osteoarthritis    Post-Operative Diagnosis:  Left knee end-stage osteoarthritis    Procedure Performed:   Left total knee arthroplasty    Surgeon: Chepe Alicea MD     Assistant: DAX Watson    Anesthesia: Regional followed by general.  Local administration of Exparel solution.    Complications: None.     Estimated Blood Loss: Less than 50 mL.     Implants:     1.  Smith & Nephew size 5 Legion Oxinium PS femoral component  2.  Smith and nephew size 6 tibial component with size 15 polyethylene liner  3.  Smith & Nephew size 38mm patellar component    Specimens: * No orders in the log *    Brief Operative Indication:  Mr. Olson has a history of worsening left knee osteoarthritis which had been refractory to prolonged conservative treatment.  The risk, benefits and alternatives to a total knee arthroplasty were discussed with the patient in detail.  He acknowledged understanding the information and consented to proceed.    Description of the procedure in detail: The patient and operative site were identified in the preoperative holding area.  The surgical site was marked.  Adequate regional anesthesia of the left lower extremity was administered.  The patient was then taken to the operating room.  Adequate general anesthesia was administered.  The patient was then repositioned on the operating table in the supine position.  A timeout was taken and preoperative antibiotics administered.    The left lower extremity was prepped and draped in the standard, sterile fashion.  I began by cleaning the extremity with an alcohol solution.  A Hibiclens scrub was performed.  The  extremity was then prepped with 2 ChloraPreps.  I allowed those to dry for 3 minutes before the draping procedure was carried out.  The leg was exsanguinated with an Esmarch bandage.  The tourniquet was inflated to 250 mmHg.  The leg was positioned at approximately 60 degrees of flexion across the knee in a DeMayo leg positioner.    I fashioned an approximately 8 cm incision anteriorly for a standard medial parapatellar approach.  Full-thickness medial and lateral skin flaps were developed.  The extensor mechanism was carefully exposed.  I performed a medial parapatellar arthrotomy, careful to maintain a cuff of tendinous tissue for later anatomic repair.  The joint was entered.  The infrapatellar fat pad was carefully removed.    Next, the anteromedial soft tissues were carefully elevated off of the anterior face of the tibia.  The MCL was kept protected at all times.  At this point, the joint was inspected.  There was marked arthrosis throughout the joint.  The periarticular osteophytes were carefully removed with a rongeur.    An opening was created in the distal femur.  The wound was irrigated out and then the distal femur alignment radha was inserted down the canal.  A 5 degree valgus distal femoral cutting guide was pinned into position and then the distal femoral cut carried out in the typical fashion.  I inspected and measured to make sure the cut was appropriate.  The cut portion of bone was removed followed by the guide.    Next, the knee was flexed up further to allow for insertion of the the posterior referencing guide.  I measured the distal femur.  I determined the appropriate size as referenced off of the posterior condyles.  The femur measured a  5.  The guide was positioned, taking care to align this properly and then the anterior, posterior and chamfer cuts were carried out.  The cut portions of bone were then removed.      I then directed my attention to the tibia.  Retractors were positioned to  keep the collateral ligaments, PCL and posterior neurovascular structures protected.  The extra medullary guide was positioned.  I took care to align the radha with the anterior face of the tibia.  I made sure that this was parallel and that the guide was centered at the knee and ankle.  I measured and carefully positioned the guide to allow for correction of the preoperative deformity.  I pinned the guide and then checked the alignment one more time with an elsie wing.  Once we had the guide in good position and secure, an oscillating saw was used to carry out the proximal tibia cut.  The cut portion of bone was removed and the PCL inspected.  The PCL demonstrated laxity and I determined that a posterior stabilized implant was necessary.  The menisci were removed and the posterior capsule was infiltrated with some of the Exparel solution.    Next, I measured the proximal tibia cut.  This measured a 6.  The trial implant was pinned into position, taking care to maintain appropriate rotation.  The proximal tibial preparations were then completed.  I then trialed with a size 5 femur and size 6 tibia.  The knee seemed to be well balanced and demonstrated excellent motion with a size 15 mm trial polyethylene liner.    I then examined the patella.  The patella demonstrated extensive arthrosis.  I determined that resurfacing was indicated.  The patellar preparations were carried out at this time and then I trialed with a size 38 resurfacing patella.  With this implant in place, the patella tracked well.  A lateral release was deemed unnecessary in this case.    The final preparations were then completed.  The distal femur was prepared and then the trial implants were removed.  The appropriate size implants were opened at this point.  My assistant mixed the bone cement on the back table using current generation cement mixing technique and a centrifuge.  Once the cement was prepared, cement was applied to the bony surfaces  and implants.  The implants were carefully impacted into position.  I made sure that these were fully seated.  The excess, extruded cement was carefully removed with a Raysal elevator.  The knee was taken out into full extension and the trial polyethylene liner inserted.  The patella was then clamped into position.  Again, the excess, extruded cement was removed.  The knee was left in extension with the patella clamped until the cement had fully cured.    While the cement was curing, the periarticular soft tissue structures were carefully infiltrated with the Exparel solution.  Once the cement had fully cured, I again checked the balancing of the knee.  Again, the knee demonstrated excellent motion and stability with the 15mm  trial liner.  The trial was removed.  The final implant was impacted into position.  I took care to make sure that the dovetails were fully interdigitated.  Again the knee was carried through range of motion.  The patella tracked well and the knee demonstrated excellent motion and stability.    The wound was irrigated with 500 cc of a Betadine containing saline solution.  This was left in place for 3 minutes.  I then irrigated with 3 L of sterile saline via pulsatile lavage.  The tourniquet was deflated.  A gram of trans-examic acid was administered.  I made sure that we had good hemostasis.  A 10 Macanese Hemovac drain was placed.  The parapatellar arthrotomy was anatomically repaired using a PDS strata fix suture and multiple #1 Vicryl sutures.  The subcutaneous tissues were repaired using 2-0 Vicryl.  A running strata fix Monocryl suture was used to close the skin followed by Dermabond.  Sterile dressings were applied.  The drapes were withdrawn.  The patient was awakened and taken to the recovery room in good condition.    Chepe Alicea MD  03/12/20

## 2020-03-12 NOTE — THERAPY EVALUATION
Patient Name: Jeb Olson  : 1950    MRN: [id number]                              Today's Date: 3/12/2020       Admit Date: 3/12/2020    Visit Dx:     ICD-10-CM ICD-9-CM   1. Arthritis of left knee M17.12 716.96     Patient Active Problem List   Diagnosis   • Allergic rhinitis   • Arthritis of knee, left   • Diabetes mellitus (CMS/HCC)   • Essential hypertension   • Hyperlipidemia   • High risk medication use   • Obesity   • Primary osteoarthritis of knee   • Mild chronic anemia   • Obstructive sleep apnea   • Arthritis of left knee     Past Medical History:   Diagnosis Date   • Allergic Have had for several years    Eyes and nasal issues   • Anxiety Since about     Since I was taking of my Dad, who also passed away 3yrs ago.   • Arthritis 2002    Both knees, hips, and shoulders   • Cataract May 2019   • Colon polyp    • Diabetes mellitus (CMS/HCC)    • Essential hypertension    • HL (hearing loss)    • Hyperlipemia    • Knee swelling     Right and Left knee   • Sleep apnea     cpap     Past Surgical History:   Procedure Laterality Date   • ACHILLES TENDON REPAIR Left    • CARDIAC CATHETERIZATION     • COLONOSCOPY      Dr Reyes 6 years ago   • ENDOSCOPY     • TONSILLECTOMY      As a child     General Information     Row Name 20 1325          PT Evaluation Time/Intention    Document Type  evaluation  -EJ     Mode of Treatment  physical therapy  -EJ     Row Name 20 1325          General Information    Patient Profile Reviewed?  yes  -EJ     Prior Level of Function  independent:;ADL's;all household mobility;community mobility  -EJ     Existing Precautions/Restrictions  no known precautions/restrictions  -EJ     Barriers to Rehab  none identified  -EJ     Row Name 20 1325          Relationship/Environment    Lives With  spouse  -EJ     Row Name 20 1325          Resource/Environmental Concerns    Current Living Arrangements  home/apartment/condo  -EJ     Row Name 20  1325          Home Main Entrance    Number of Stairs, Main Entrance  five  -EJ     Stair Railings, Main Entrance  railings safe and in good condition  -EJ     Row Name 03/12/20 1325          Stairs Within Home, Primary    Number of Stairs, Within Home, Primary  none  -     Row Name 03/12/20 1325          Cognitive Assessment/Intervention- PT/OT    Orientation Status (Cognition)  oriented x 4  -EJ     Row Name 03/12/20 1325          Safety Issues, Functional Mobility    Impairments Affecting Function (Mobility)  pain;range of motion (ROM);strength  -EJ       User Key  (r) = Recorded By, (t) = Taken By, (c) = Cosigned By    Initials Name Provider Type    EJ Delfina Maurice, PT Physical Therapist        Mobility     Row Name 03/12/20 1325          Bed Mobility Assessment/Treatment    Bed Mobility Assessment/Treatment  supine-sit  -EJ     Supine-Sit [place] (Bed Mobility)  verbal cues;supervision  -     Assistive Device (Bed Mobility)  bed rails;head of bed elevated  -     Row Name 03/12/20 1325          Sit-Stand Transfer    Sit-Stand [place] (Transfers)  verbal cues;contact guard  -EJ     Assistive Device (Sit-Stand Transfers)  walker, front-wheeled  -EJ     Row Name 03/12/20 1325          Gait/Stairs Assessment/Training    Gait/Stairs Assessment/Training  gait/ambulation independence  -     [place] Level (Gait)  verbal cues;contact guard  -EJ     Assistive Device (Gait)  walker, front-wheeled  -EJ     Distance in Feet (Gait)  25  -EJ     Deviations/Abnormal Patterns (Gait)  antalgic;noe decreased;stride length decreased  -EJ     Bilateral Gait Deviations  forward flexed posture;heel strike decreased  -     Row Name 03/12/20 1325          Mobility Assessment/Intervention    Extremity Weight-bearing Status  left lower extremity  -EJ     Left Lower Extremity (Weight-bearing Status)  weight-bearing as tolerated (WBAT)  -EJ       User Key  (r) = Recorded By, (t) = Taken By, (c) = Cosigned  By    Initials Name Provider Type    EJ Delfina Maurice, PT Physical Therapist        Obj/Interventions     Row Name 03/12/20 1326          General ROM    GENERAL ROM COMMENTS  WFL, x  L knee  -EJ     Row Name 03/12/20 1326          MMT (Manual Muscle Testing)    General MMT Comments  post op weakness  -EJ     Row Name 03/12/20 1326          Therapeutic Exercise    Comment (Therapeutic Exercise)  L TKR protocol x 10 reps  -EJ       User Key  (r) = Recorded By, (t) = Taken By, (c) = Cosigned By    Initials Name Provider Type    EJ Delfina Maurice, PT Physical Therapist        Goals/Plan     Row Name 03/12/20 1329          Transfer Goal 1 (PT)    Activity/Assistive Device (Transfer Goal 1, PT)  transfers, all;walker, rolling  -EJ     [place] Level/Cues Needed (Transfer Goal 1, PT)  standby assist  -EJ     Time Frame (Transfer Goal 1, PT)  3 days  -EJ     [facility] Name 03/12/20 1329          Gait Training Goal 1 (PT)    Activity/Assistive Device (Gait Training Goal 1, PT)  gait (walking locomotion);walker, rolling  -EJ     [place] Level (Gait Training Goal 1, PT)  standby assist  -EJ     Distance (Gait Goal 1, PT)  100  -EJ     Time Frame (Gait Training Goal 1, PT)  3 days  -EJ     Row Name 03/12/20 1329          ROM Goal 1 (PT)    ROM Goal 1 (PT)  L knee 5-90  -EJ     Time Frame (ROM Goal 1, PT)  3 days  -EJ     [facility] Name 03/12/20 1329          Stairs Goal 1 (PT)    Activity/Assistive Device (Stairs Goal 1, PT)  stairs, all skills  -EJ     [place] Level/Cues Needed (Stairs Goal 1, PT)  contact guard assist  -EJ     Number of Stairs (Stairs Goal 1, PT)  4  -EJ     Time Frame (Stairs Goal 1, PT)  3 days  -EJ       User Key  (r) = Recorded By, (t) = Taken By, (c) = Cosigned By    Initials Name Provider Type    EJ Delfina Maurice, PT Physical Therapist        Clinical Impression     Row Name 03/12/20 1326          Pain Assessment    Additional Documentation  Pain Scale: Numbers Pre/Post-Treatment (Group)   -     Row Name 03/12/20 1326          Pain Scale: Numbers Pre/Post-Treatment    Pain Scale: Numbers, Pretreatment  5/10  -EJ     Pain Scale: Numbers, Post-Treatment  5/10  -EJ     Pain Location - Side  Left  -EJ     Pain Location  knee  -EJ     Pain Intervention(s)  Repositioned;Ambulation/increased activity  -EJ     Row Name 03/12/20 1326          Plan of Care Review    Plan of Care Reviewed With  patient;spouse  -EJ     Progress  improving  -EJ     Outcome Summary  Pt doing well, presents s/p L TKR and now with post op pain, weakness, and decreased functional mobilty. He plans to DC home tomorrow with assist of wife. Pt currently moving well. He was able to ambulate approx 25 ft into hallway with CGA. He also is tolerating all knee exercises. Pt will continue to benefit from skilled PT to maximize safety and independence with mobility.  -     Row Name 03/12/20 1326          Physical Therapy Clinical Impression    Patient/Family Goals Statement (PT Clinical Impression)  plans home tomorrow.  -EJ     Criteria for Skilled Interventions Met (PT Clinical Impression)  yes  -EJ     Rehab Potential (PT Clinical Summary)  good, to achieve stated therapy goals  -     Row Name 03/12/20 1326          Positioning and Restraints    Pre-Treatment Position  in bed  -EJ     Post Treatment Position  chair  -EJ     In Chair  notified nsg;reclined;call light within reach;encouraged to call for assist;exit alarm on;with family/caregiver  -       User Key  (r) = Recorded By, (t) = Taken By, (c) = Cosigned By    Initials Name Provider Type    EJ Delfina Maurice, PT Physical Therapist        Outcome Measures     Row Name 03/12/20 1696          How much help from another person do you currently need...    Turning from your back to your side while in flat bed without using bedrails?  4  -EJ     Moving from lying on back to sitting on the side of a flat bed without bedrails?  3  -EJ     Moving to and from a bed to a chair  (including a wheelchair)?  3  -EJ     Standing up from a chair using your arms (e.g., wheelchair, bedside chair)?  3  -EJ     Climbing 3-5 steps with a railing?  3  -EJ     To walk in hospital room?  3  -EJ     AM-PAC 6 Clicks Score (PT)  19  -     Row Name 03/12/20 1329          Functional Assessment    Outcome Measure Options  AM-PAC 6 Clicks Basic Mobility (PT)  -       User Key  (r) = Recorded By, (t) = Taken By, (c) = Cosigned By    Initials Name Provider Type    Delfina Masters PT Physical Therapist        Physical Therapy Education                 Title: PT OT SLP Therapies (In Progress)     Topic: Physical Therapy (In Progress)     Point: Mobility training (Done)     Description:   Instruct learner(s) on safety and technique for assisting patient out of bed, chair or wheelchair.  Instruct in the proper use of assistive devices, such as walker, crutches, cane or brace.              Patient Friendly Description:   It's important to get you on your feet again, but we need to do so in a way that is safe for you. Falling has serious consequences, and your personal safety is the most important thing of all.        When it's time to get out of bed, one of us or a family member will sit next to you on the bed to give you support.     If your doctor or nurse tells you to use a walker, crutches, a cane, or a brace, be sure you use it every time you get out of bed, even if you think you don't need it.    Learning Progress Summary           Patient Acceptance, E,TB,D, VU,NR by ROYAL at 3/12/2020 1330                   Point: Home exercise program (Done)     Description:   Instruct learner(s) on appropriate technique for monitoring, assisting and/or progressing patient with therapeutic exercises and activities.              Learning Progress Summary           Patient Acceptance, E,TB,D, VU,NR by ROYAL at 3/12/2020 1330                               User Key     Initials Effective Dates Name Provider Type Discipline     ROYAL 04/03/18 -  Delfina Maurice, PT Physical Therapist PT              PT Recommendation and Plan  Planned Therapy Interventions (PT Eval): balance training, bed mobility training, gait training, home exercise program, patient/family education, ROM (range of motion), stair training, strengthening, stretching, transfer training  Outcome Summary/Treatment Plan (PT)  Anticipated Discharge Disposition (PT): home with assist, home with home health  Plan of Care Reviewed With: patient, spouse  Progress: improving  Outcome Summary: Pt doing well, presents s/p L TKR and now with post op pain, weakness, and decreased functional mobilty. He plans to DC home tomorrow with assist of wife. Pt currently moving well. He was able to ambulate approx 25 ft into hallway with CGA. He also is tolerating all knee exercises. Pt will continue to benefit from skilled PT to maximize safety and independence with mobility.     Time Calculation:   PT Charges     Row Name 03/12/20 1331             Time Calculation    Start Time  1300  -EJ      Stop Time  1325  -EJ      Time Calculation (min)  25 min  -EJ      PT Received On  03/12/20  -EJ      PT - Next Appointment  03/13/20  -EJ      PT Goal Re-Cert Due Date  03/15/20  -EJ         Time Calculation- PT    Total Timed Code Minutes- PT  15 minute(s)  -EJ        User Key  (r) = Recorded By, (t) = Taken By, (c) = Cosigned By    Initials Name Provider Type    EJ Delfina Maurice, PT Physical Therapist        Therapy Charges for Today     Code Description Service Date Service Provider Modifiers Qty    95161784076 HC PT EVAL MOD COMPLEXITY 2 3/12/2020 Delfina Maurice, PT GP 1    61289141907 HC PT THER PROC EA 15 MIN 3/12/2020 Delfina Maurice, PT GP 1          PT G-Codes  Outcome Measure Options: AM-PAC 6 Clicks Basic Mobility (PT)  AM-PAC 6 Clicks Score (PT): 19    Delfina Maurice PT  3/12/2020

## 2020-03-12 NOTE — ANESTHESIA PROCEDURE NOTES
Peripheral Block    Pre-sedation assessment completed: 3/12/2020 6:23 AM    Patient reassessed immediately prior to procedure    Patient location during procedure: holding area  Start time: 3/12/2020 6:23 AM  Stop time: 3/12/2020 6:33 AM  Reason for block: at surgeon's request and post-op pain management  Performed by  Anesthesiologist: Presley Moore MD  Preanesthetic Checklist  Completed: patient identified, site marked, surgical consent, pre-op evaluation, timeout performed, IV checked, risks and benefits discussed and monitors and equipment checked  Prep:  Sterile barriers:cap, gloves, gown, mask and sterile barriers  Prep: ChloraPrep  Patient monitoring: blood pressure monitoring, continuous pulse oximetry and EKG  Procedure  Sedation:yes    Guidance:ultrasound guided  ULTRASOUND INTERPRETATION.  Using ultrasound guidance a 21 G gauge needle was placed in close proximity to the femoral nerve, at which point, under ultrasound guidance anesthetic was injected in the area of the nerve and spread of the anesthesia was seen on ultrasound in close proximity thereto.  There were no abnormalities seen on ultrasound; a digital image was taken; and the patient tolerated the procedure with no complications. Images:still images obtained    Laterality:left  Block Type:adductor canal block  Injection Technique:single-shot  Needle Type:echogenic  Needle Gauge:21 G      Medications Used: ropivacaine (NAROPIN) 0.5 % injection, 30 mL  mepivacaine (CARBOCAINE) 1.5 % injection, 10 mL      Post Assessment  Injection Assessment: negative aspiration for heme, no paresthesia on injection and incremental injection  Patient Tolerance:comfortable throughout block  Complications:no

## 2020-03-13 VITALS
DIASTOLIC BLOOD PRESSURE: 84 MMHG | HEART RATE: 55 BPM | TEMPERATURE: 98.9 F | OXYGEN SATURATION: 95 % | SYSTOLIC BLOOD PRESSURE: 186 MMHG | HEIGHT: 74 IN | WEIGHT: 276.9 LBS | RESPIRATION RATE: 16 BRPM | BODY MASS INDEX: 35.54 KG/M2

## 2020-03-13 LAB
GLUCOSE BLDC GLUCOMTR-MCNC: 166 MG/DL (ref 70–130)
GLUCOSE BLDC GLUCOMTR-MCNC: 188 MG/DL (ref 70–130)
HCT VFR BLD AUTO: 31.5 % (ref 37.5–51)
HGB BLD-MCNC: 10.7 G/DL (ref 13–17.7)

## 2020-03-13 PROCEDURE — A9270 NON-COVERED ITEM OR SERVICE: HCPCS | Performed by: ORTHOPAEDIC SURGERY

## 2020-03-13 PROCEDURE — 63710000001 ASPIRIN 81 MG TABLET DELAYED-RELEASE: Performed by: ORTHOPAEDIC SURGERY

## 2020-03-13 PROCEDURE — 85014 HEMATOCRIT: CPT | Performed by: ORTHOPAEDIC SURGERY

## 2020-03-13 PROCEDURE — 63710000001 MELOXICAM 7.5 MG TABLET: Performed by: ORTHOPAEDIC SURGERY

## 2020-03-13 PROCEDURE — 97150 GROUP THERAPEUTIC PROCEDURES: CPT

## 2020-03-13 PROCEDURE — 63710000001 INSULIN LISPRO (HUMAN) PER 5 UNITS: Performed by: ORTHOPAEDIC SURGERY

## 2020-03-13 PROCEDURE — 82962 GLUCOSE BLOOD TEST: CPT

## 2020-03-13 PROCEDURE — 97110 THERAPEUTIC EXERCISES: CPT

## 2020-03-13 PROCEDURE — 63710000001 GLIPIZIDE 5 MG TABLET: Performed by: ORTHOPAEDIC SURGERY

## 2020-03-13 PROCEDURE — 85018 HEMOGLOBIN: CPT | Performed by: ORTHOPAEDIC SURGERY

## 2020-03-13 PROCEDURE — 63710000001 AMLODIPINE 10 MG TABLET: Performed by: ORTHOPAEDIC SURGERY

## 2020-03-13 PROCEDURE — 63710000001 HYDROCODONE-ACETAMINOPHEN 7.5-325 MG TABLET: Performed by: ORTHOPAEDIC SURGERY

## 2020-03-13 PROCEDURE — 63710000001 LISINOPRIL 40 MG TABLET: Performed by: ORTHOPAEDIC SURGERY

## 2020-03-13 PROCEDURE — 63710000001 HYDROCHLOROTHIAZIDE 25 MG TABLET: Performed by: ORTHOPAEDIC SURGERY

## 2020-03-13 PROCEDURE — 63710000001 ESCITALOPRAM 10 MG TABLET: Performed by: ORTHOPAEDIC SURGERY

## 2020-03-13 PROCEDURE — 63710000001 FENOFIBRATE 48 MG TABLET: Performed by: ORTHOPAEDIC SURGERY

## 2020-03-13 RX ORDER — HYDROCODONE BITARTRATE AND ACETAMINOPHEN 7.5; 325 MG/1; MG/1
TABLET ORAL
Qty: 42 TABLET | Refills: 0 | Status: CANCELLED | OUTPATIENT
Start: 2020-03-13

## 2020-03-13 RX ORDER — HYDROCODONE BITARTRATE AND ACETAMINOPHEN 10; 325 MG/1; MG/1
1-2 TABLET ORAL EVERY 6 HOURS PRN
Qty: 42 TABLET | Refills: 0 | Status: SHIPPED | OUTPATIENT
Start: 2020-03-13 | End: 2020-03-18 | Stop reason: SDUPTHER

## 2020-03-13 RX ORDER — PSEUDOEPHEDRINE HCL 30 MG
100 TABLET ORAL 2 TIMES DAILY
Qty: 60 EACH | Refills: 0 | Status: ON HOLD | OUTPATIENT
Start: 2020-03-13 | End: 2022-09-01

## 2020-03-13 RX ORDER — ONDANSETRON 4 MG/1
4 TABLET, FILM COATED ORAL EVERY 6 HOURS PRN
Qty: 12 TABLET | Refills: 0 | Status: SHIPPED | OUTPATIENT
Start: 2020-03-13 | End: 2022-04-18

## 2020-03-13 RX ORDER — ASPIRIN 81 MG/1
81 TABLET ORAL 2 TIMES DAILY
Start: 2020-03-13

## 2020-03-13 RX ADMIN — FENOFIBRATE 48 MG: 48 TABLET ORAL at 08:22

## 2020-03-13 RX ADMIN — INSULIN LISPRO 2 UNITS: 100 INJECTION, SOLUTION INTRAVENOUS; SUBCUTANEOUS at 12:04

## 2020-03-13 RX ADMIN — ASPIRIN 81 MG: 81 TABLET, COATED ORAL at 08:22

## 2020-03-13 RX ADMIN — ESCITALOPRAM 10 MG: 10 TABLET, FILM COATED ORAL at 08:22

## 2020-03-13 RX ADMIN — HYDROCODONE BITARTRATE AND ACETAMINOPHEN 2 TABLET: 7.5; 325 TABLET ORAL at 08:22

## 2020-03-13 RX ADMIN — HYDROCODONE BITARTRATE AND ACETAMINOPHEN 2 TABLET: 7.5; 325 TABLET ORAL at 00:28

## 2020-03-13 RX ADMIN — AMLODIPINE BESYLATE 10 MG: 10 TABLET ORAL at 08:22

## 2020-03-13 RX ADMIN — MELOXICAM 7.5 MG: 7.5 TABLET ORAL at 08:22

## 2020-03-13 RX ADMIN — LISINOPRIL 40 MG: 40 TABLET ORAL at 08:22

## 2020-03-13 RX ADMIN — Medication 3 ML: at 08:27

## 2020-03-13 RX ADMIN — HYDROCHLOROTHIAZIDE 25 MG: 25 TABLET ORAL at 08:22

## 2020-03-13 RX ADMIN — GLIPIZIDE 2.5 MG: 5 TABLET ORAL at 07:18

## 2020-03-13 RX ADMIN — HYDROCODONE BITARTRATE AND ACETAMINOPHEN 1 TABLET: 7.5; 325 TABLET ORAL at 04:37

## 2020-03-13 RX ADMIN — INSULIN LISPRO 2 UNITS: 100 INJECTION, SOLUTION INTRAVENOUS; SUBCUTANEOUS at 08:22

## 2020-03-13 RX ADMIN — HYDROCODONE BITARTRATE AND ACETAMINOPHEN 2 TABLET: 7.5; 325 TABLET ORAL at 12:48

## 2020-03-13 NOTE — DISCHARGE PLACEMENT REQUEST
"Blayne Olson (69 y.o. Male)     Date of Birth Social Security Number Address Home Phone MRN    1950  [id number] [id number] [phone] [id number]    [employer] Marital Status          Taoist        Admission Date Admission Type Admitting Provider Attending Provider Department, Room/Bed    3/12/20 Elective Chepe Alicea MD McClure, Scott B, MD [address], P880/1    Discharge Date Discharge Disposition Discharge Destination                       Attending Provider:  Chepe Alicea MD    Allergies:  No Known Allergies    Isolation:  None   Infection:  None   Code Status:  CPR    Ht:  188 cm (74\")   Wt:  126 kg (276 lb 14.4 oz)    Admission Cmt:  None   Principal Problem:  Arthritis of left knee [M17.12] More...                 Active Insurance as of 3/12/2020     Primary Coverage     Payor Plan Insurance Group Employer/Plan Group    HUMANA MEDICARE REPLACEMENT HU[employer]NA MEDICARE REPLACEMENT [id number]     Payor Plan Address Payor Plan Phone Number Payor Plan Fax Number Effective Dates    [address] [address] [phone]  6/1/2017 - None Entered    [facility] [id number]       Subscriber Name Subscriber Birth Date Member ID       BLAYNE OLSON 1950 [id number]           Secondary Coverage     Payor Plan Insurance Group Employer/Plan Group    [employer]P   [employer]                   Coverage Address Coverage Phone Number Coverage Fax Number Effective Dates    [address] [phone]  2/1/2020 - None Entered    [facility] [id number]       Subscriber Name Subscriber Birth Date Member ID       BLAYNE OLSON 1950 [id number]                 Emergency Contacts      (Rel.) Home Phone Work Phone Mobile Phone    Precious Olson (Spouse) [phone] -- [phone]          "

## 2020-03-13 NOTE — PLAN OF CARE
Problem: Patient Care Overview  Goal: Plan of Care Review  Outcome: Ongoing (interventions implemented as appropriate)  Flowsheets (Taken 3/13/2020 1103)  Progress: improving  Plan of Care Reviewed With: patient  Outcome Summary: Pt tolerated treatment well this date. Ambulated 30ft w/ Rw and CGA, limited d/t pain. Completed L TKR protocol, and declined practicing stairs, stating he knows how to do them. L knee ROM 10-78*. Safe to d/c home w/ HH from PT standpoint.

## 2020-03-13 NOTE — PROGRESS NOTES
Continued Stay Note  [facility]     Patient Name: Jeb Olson  MRN: [id number]  Today's Date: 3/13/2020    Admit Date: 3/12/2020    Discharge Plan     Row Name 03/13/20 1317       Plan    Plan  [facility]    Provided Post Acute Provider List?  Refused    Refused Provider List Comment  Pt chose [facility]    Provided Post Acute Provider Quality & Resource List?  Refused    Patient/Family in Agreement with Plan  yes    Plan Comments  Spoke with pt, verified correct information on facesheet and explained the role of CCP. Pt would like to d/c home with [facility], referral given to Angelica with [facility] who states they are able to accept. Plan will be to d/c home with [facility] and family support. No other needs identified.     Final Discharge Disposition Code  06 - home with home health care    Final Note  Pt to d/c home with [facility] and family support.        Discharge Codes    No documentation.       Expected Discharge Date and Time     Expected Discharge Date Expected Discharge Time    Mar 13, 2020             Yeimi Raya RN

## 2020-03-13 NOTE — THERAPY TREATMENT NOTE
Patient Name: Jeb Olson  : 1950    MRN: [id number]                              Today's Date: 3/13/2020       Admit Date: 3/12/2020    Visit Dx:     ICD-10-CM ICD-9-CM   1. Arthritis of left knee M17.12 716.96     Patient Active Problem List   Diagnosis   • Allergic rhinitis   • Arthritis of knee, left   • Diabetes mellitus (CMS/HCC)   • Essential hypertension   • Hyperlipidemia   • High risk medication use   • Obesity   • Primary osteoarthritis of knee   • Mild chronic anemia   • Obstructive sleep apnea   • Arthritis of left knee     Past Medical History:   Diagnosis Date   • Allergic Have had for several years    Eyes and nasal issues   • Anxiety Since about     Since I was taking of my Dad, who also passed away 3yrs ago.   • Arthritis 2002    Both knees, hips, and shoulders   • Cataract May 2019   • Colon polyp    • Diabetes mellitus (CMS/HCC)    • Essential hypertension    • HL (hearing loss)    • Hyperlipemia    • Knee swelling     Right and Left knee   • Sleep apnea     cpap     Past Surgical History:   Procedure Laterality Date   • ACHILLES TENDON REPAIR Left    • CARDIAC CATHETERIZATION     • COLONOSCOPY      Dr Reyes 6 years ago   • ENDOSCOPY     • TONSILLECTOMY      As a child   • TOTAL KNEE ARTHROPLASTY Left 3/12/2020    Procedure: TOTAL KNEE ARTHROPLASTY;  Surgeon: Chepe Alicea MD;  Location: [facility];  Service: Orthopedics;  Laterality: Left;     General Information     Row Name 20 1059          PT Evaluation Time/Intention    Document Type  therapy note (daily note)  -     Mode of Treatment  physical therapy;group therapy  -     Row Name 20 1059          General Information    Existing Precautions/Restrictions  fall  -     Row Name 20 1059          Cognitive Assessment/Intervention- PT/OT    Orientation Status (Cognition)  oriented x 4  -SM       User Key  (r) = Recorded By, (t) = Taken By, (c) = Cosigned By    Initials Name Provider Type     Judy Beckre PTA Physical Therapy Assistant        Mobility     Row Name 03/13/20 1059          Bed Mobility Assessment/Treatment    Comment (Bed Mobility)  up in chair  -     Row Name 03/13/20 1059          Sit-Stand Transfer    Sit-Stand [place] (Transfers)  contact guard  -     Assistive Device (Sit-Stand Transfers)  walker, front-wheeled  -     Row Name 03/13/20 1059          Gait/Stairs Assessment/Training    [place] Level (Gait)  contact guard  -     Assistive Device (Gait)  walker, front-wheeled  -     Distance in Feet (Gait)  30  -SM     Pattern (Gait)  step-to  -SM     Deviations/Abnormal Patterns (Gait)  noe decreased;stride length decreased;antalgic  -     Bilateral Gait Deviations  forward flexed posture  -     Comment (Gait/Stairs)  pt states he knows how to do stairs, and declined practicing  -       User Key  (r) = Recorded By, (t) = Taken By, (c) = Cosigned By    Initials Name Provider Type    Judy Becker PTA Physical Therapy Assistant        Obj/Interventions     [facility] Name 03/13/20 1101          General ROM    GENERAL ROM COMMENTS  L knee 10-78  -[facility] Name 03/13/20 1101          Therapeutic Exercise    Comment (Therapeutic Exercise)  L TKR protocol x15 reps  -       User Key  (r) = Recorded By, (t) = Taken By, (c) = Cosigned By    Initials Name Provider Type    Judy Becker PTA Physical Therapy Assistant        Goals/Plan    No documentation.       Clinical Impression     [facility] Name 03/13/20 1102          Pain Assessment    Additional Documentation  Pain Scale: Numbers Pre/Post-Treatment (Group)  -SM     Row Name 03/13/20 1102          Pain Scale: Numbers Pre/Post-Treatment    Pain Scale: Numbers, Pretreatment  7/10  -SM     Pain Scale: Numbers, Post-Treatment  8/10  -SM     Pain Location - Side  Left  -     Pain Location  knee  -     Pain Intervention(s)  Repositioned;Ambulation/increased activity;Rest  -[facility] Name  03/13/20 1102          Positioning and Restraints    Pre-Treatment Position  sitting in chair/recliner  -     Post Treatment Position  chair  -SM     In Chair  reclined;call light within reach;encouraged to call for assist;exit alarm on;with family/caregiver  -       User Key  (r) = Recorded By, (t) = Taken By, (c) = Cosigned By    Initials Name Provider Type    Judy Becker PTA Physical Therapy Assistant        Outcome Measures     Row Name 03/13/20 1102          How much help from another person do you currently need...    Turning from your back to your side while in flat bed without using bedrails?  4  -SM     Moving from lying on back to sitting on the side of a flat bed without bedrails?  3  -SM     Moving to and from a bed to a chair (including a wheelchair)?  3  -SM     Standing up from a chair using your arms (e.g., wheelchair, bedside chair)?  4  -SM     Climbing 3-5 steps with a railing?  3  -SM     To walk in hospital room?  3  -SM     AM-PAC 6 Clicks Score (PT)  20  -     Row Name 03/13/20 1102          Functional Assessment    Outcome Measure Options  AM-PAC 6 Clicks Basic Mobility (PT)  -       User Key  (r) = Recorded By, (t) = Taken By, (c) = Cosigned By    Initials Name Provider Type    Judy Becker PTA Physical Therapy Assistant        Physical Therapy Education                 Title: PT OT SLP Therapies (Done)     Topic: Physical Therapy (Done)     Point: Mobility training (Done)     Description:   Instruct learner(s) on safety and technique for assisting patient out of bed, chair or wheelchair.  Instruct in the proper use of assistive devices, such as walker, crutches, cane or brace.              Patient Friendly Description:   It's important to get you on your feet again, but we need to do so in a way that is safe for you. Falling has serious consequences, and your personal safety is the most important thing of all.        When it's time to get out of bed, one of us  or a family member will sit next to you on the bed to give you support.     If your doctor or nurse tells you to use a walker, crutches, a cane, or a brace, be sure you use it every time you get out of bed, even if you think you don't need it.    Learning Progress Summary           Patient Acceptance, E,TB,D, VU by  at 3/13/2020 1103    Acceptance, E,TB,D, VU,NR by  at 3/12/2020 1330                   Point: Home exercise program (Done)     Description:   Instruct learner(s) on appropriate technique for monitoring, assisting and/or progressing patient with therapeutic exercises and activities.              Learning Progress Summary           Patient Acceptance, E,TB,D, VU by  at 3/13/2020 1103    Acceptance, E,TB,D, VU,NR by  at 3/12/2020 1330                   Point: Body mechanics (Done)     Description:   Instruct learner(s) on proper positioning and spine alignment for patient and/or caregiver during mobility tasks and/or exercises.              Learning Progress Summary           Patient Acceptance, E,TB,D, VU by  at 3/13/2020 1103                   Point: Precautions (Done)     Description:   Instruct learner(s) on prescribed precautions during mobility and gait tasks              Learning Progress Summary           Patient Acceptance, E,TB,D, VU by  at 3/13/2020 1103                               User Key     Initials Effective Dates Name Provider Type Discipline     04/03/18 -  Delfina Maurice, PT Physical Therapist PT     03/07/18 -  Judy Larios, THO Physical Therapy Assistant PT              PT Recommendation and Plan     Outcome Summary/Treatment Plan (PT)  Anticipated Discharge Disposition (PT): home with home health  Plan of Care Reviewed With: patient  Progress: improving  Outcome Summary: Pt tolerated treatment well this date. Ambulated 30ft w/ Rw and CGA, limited d/t pain. Completed L TKR protocol, and declined practicing stairs, stating he knows how to do them. L knee ROM  10-78*. Safe to d/c home w/ HH from PT standpoint.     Time Calculation:   PT Charges     Row Name 03/13/20 1105             Time Calculation    Start Time  0935  -      Stop Time  1018  -SM      Time Calculation (min)  43 min  -SM      PT Received On  03/13/20  -        User Key  (r) = Recorded By, (t) = Taken By, (c) = Cosigned By    Initials Name Provider Type     Judy Larios PTA Physical Therapy Assistant        Therapy Charges for Today     Code Description Service Date Service Provider Modifiers Qty    61512577419 HC PT THER PROC EA 15 MIN 3/13/2020 Judy Larios, THO GP 1    55030069954 HC PT THER PROC GROUP 3/13/2020 Judy Larios, THO GP 1          PT G-Codes  Outcome Measure Options: AM-PAC 6 Clicks Basic Mobility (PT)  AM-PAC 6 Clicks Score (PT): 20    Judy Larios PTA  3/13/2020

## 2020-03-13 NOTE — PLAN OF CARE
Problem: Patient Care Overview  Goal: Plan of Care Review  Outcome: Ongoing (interventions implemented as appropriate)  Flowsheets (Taken 3/13/2020 0121)  Progress: improving  Plan of Care Reviewed With: patient  Outcome Summary: hugo bolton dsg c/d/i, neurovascular status wnl, voiding well, reports adequate pain control, ambulating well, educated on monitoring BG due to diabetes hx, possible discharge today, will continue to monitor

## 2020-03-18 DIAGNOSIS — Z96.652 STATUS POST TOTAL LEFT KNEE REPLACEMENT: ICD-10-CM

## 2020-03-18 RX ORDER — HYDROCODONE BITARTRATE AND ACETAMINOPHEN 10; 325 MG/1; MG/1
1-2 TABLET ORAL EVERY 6 HOURS PRN
Qty: 42 TABLET | Refills: 0 | Status: SHIPPED | OUTPATIENT
Start: 2020-03-18 | End: 2020-03-25 | Stop reason: SDUPTHER

## 2020-03-25 DIAGNOSIS — Z96.652 STATUS POST TOTAL LEFT KNEE REPLACEMENT: ICD-10-CM

## 2020-03-25 RX ORDER — HYDROCODONE BITARTRATE AND ACETAMINOPHEN 10; 325 MG/1; MG/1
1-2 TABLET ORAL EVERY 6 HOURS PRN
Qty: 42 TABLET | Refills: 0 | Status: SHIPPED | OUTPATIENT
Start: 2020-03-25 | End: 2020-04-03 | Stop reason: DRUGHIGH

## 2020-03-25 NOTE — TELEPHONE ENCOUNTER
Left message for him.  Explained that we would prefer to try to move his visit as long as he is doing well.  I will have someone else from the office reach out to him.

## 2020-03-26 ENCOUNTER — TELEPHONE (OUTPATIENT)
Dept: ORTHOPEDIC SURGERY | Facility: CLINIC | Age: 70
End: 2020-03-26

## 2020-03-26 NOTE — TELEPHONE ENCOUNTER
----- Message from Jeb Olson sent at 3/26/2020 10:26 AM EDT -----  Regarding: Non-Urgent Medical Question  Contact: [phone]  MY CHART MESSAGE:    Dr. Alicea,    Please find attached the Physical Therapist Update form.  If you have any questions please let me know. Physical therapy is going good, started practicing with a cane yesterday.  Walked about a half block from the house and back yesterday also. Looking forward to getting the bandages removed tomorrow it really inhibits range of motion a lot and is quite painful when bending the knee.     Thank you,    MARY ANN Olson    I HAVE SCANNED THIS IN HIS CHART UNDER MEDIA.

## 2020-03-27 NOTE — TELEPHONE ENCOUNTER
I spoke to Mr. Olson.  He is 2 weeks status post left TKA.  Reports he is doing well and will continue to have home health for therapy for at least 2 more weeks.  The dressing has been removed and he reports the incision looks good.  I reminded him to continue the aspirin regimen 81 mg twice daily.  I encouraged him to continue to progress his range of motion, use ice, and elevate his leg often.  I will have our office call him to reschedule his follow-up appointment for 6 weeks with Dr. Alicea.  I encouraged him to call us with any questions or concerns prior to that appointment.  He acknowledged understanding and appreciated the call.

## 2020-03-30 RX ORDER — AMLODIPINE BESYLATE 10 MG/1
TABLET ORAL
Qty: 90 TABLET | Refills: 0 | Status: CANCELLED | OUTPATIENT
Start: 2020-03-30

## 2020-03-30 RX ORDER — AMLODIPINE BESYLATE 10 MG/1
TABLET ORAL
Qty: 90 TABLET | Refills: 0 | Status: SHIPPED | OUTPATIENT
Start: 2020-03-30 | End: 2020-08-07 | Stop reason: SDUPTHER

## 2020-03-31 ENCOUNTER — PATIENT MESSAGE (OUTPATIENT)
Dept: ORTHOPEDIC SURGERY | Facility: CLINIC | Age: 70
End: 2020-03-31

## 2020-04-02 ENCOUNTER — TELEPHONE (OUTPATIENT)
Dept: ORTHOPEDIC SURGERY | Facility: CLINIC | Age: 70
End: 2020-04-02

## 2020-04-02 DIAGNOSIS — Z96.652 STATUS POST TOTAL LEFT KNEE REPLACEMENT: ICD-10-CM

## 2020-04-02 NOTE — TELEPHONE ENCOUNTER
----- Message from Jeb Olson sent at 3/9/2020  3:38 PM EDT -----  Regarding: Test Results Question  Contact: [phone]  MY CHART MESSAGE:    Michael Mcnulty,    I've been racking my brain about the glucose reading. I probably did eat that morning of pre-admission.  I was surprised that they were doing blood work again after my doctor had performed them 2 weeks before. I've checked my glucose twice so far today. It's always high in the mornings 135-150. My last check was @1:30 was 129.     Hope this helps    MARY ANN Olson   54  (m) [phone]    
Pricila Barnes APRN   to Jeb Olson            3/9/20 5:00 PM   Mr. Olson,     Sounds great.  Thanks for the update.       TIFFANIE Santana      
Leg swelling    No pertinent past medical history

## 2020-04-02 NOTE — TELEPHONE ENCOUNTER
----- Message from Jeb Olson sent at 2020  8:19 AM EDT -----  Regarding: Prescription Question  Contact: [phone]  MY CHART MESSAGE:    Pricila,     Could I please get another prescription of pain medicine. Could you also drop it's strength down. I would like this to be my last request for pain medicine. I want to try a combination pain meds for a few hours and Tylenol for a few hours.     Thank you,  Jeb Olson   1950  (m) [phone]

## 2020-04-03 ENCOUNTER — TELEPHONE (OUTPATIENT)
Dept: ORTHOPEDIC SURGERY | Facility: CLINIC | Age: 70
End: 2020-04-03

## 2020-04-03 DIAGNOSIS — Z96.652 STATUS POST TOTAL LEFT KNEE REPLACEMENT: Primary | ICD-10-CM

## 2020-04-03 RX ORDER — HYDROCODONE BITARTRATE AND ACETAMINOPHEN 10; 325 MG/1; MG/1
1-2 TABLET ORAL EVERY 6 HOURS PRN
Qty: 42 TABLET | Refills: 0 | OUTPATIENT
Start: 2020-04-03

## 2020-04-03 RX ORDER — HYDROCODONE BITARTRATE AND ACETAMINOPHEN 5; 325 MG/1; MG/1
TABLET ORAL
Qty: 42 TABLET | Refills: 0 | Status: SHIPPED | OUTPATIENT
Start: 2020-04-03 | End: 2021-05-12

## 2020-04-03 NOTE — TELEPHONE ENCOUNTER
----- Message from eJb Olson sent at 4/3/2020 10:06 AM EDT -----  Regarding: RE: Pain Medication Refill  Contact: [phone]  Pricila,    Thank you,  we are doing fine. I haven't left the house since returning home from surgery and my wife has had limited time outside the house. We've asked our kids to do most of our running for us.     As for me the in home Physical Therapist Geoff Patterson has sent me, Cynthia Raya.  She is really great, she is aggressive in my therapy and I like it.  She had me walking with a cane 1-1/2 weeks after surgery.  We do my exercises faithfully every day.  My knee is looking good, I'm trying to ween down my use of prescription pain meds, taking Tylenol periodically.     Thank you,  Jeb BLAKELY 1950          ----- Message -----  From: TIFFANIE Shine  Sent: 4/3/20, 8:38 AM  To: Jeb SUMMERS Whitney  Subject: Pain Medication Refill    Mr. Olson,     I have sent a refill request for [place], to your pharmacy, with the dose decreased to 5/325mg.  I hope you and your family are well.  If you have any questions or concerns, please give me a call.      Thanks,    TIFFANIE Santana

## 2020-04-03 NOTE — TELEPHONE ENCOUNTER
MrRylee Olson,     I have sent a refill request for [place], to your pharmacy, with the dose decreased to 5/325mg.  I hope you and your family are well.  If you have any questions or concerns, please give me a call.       Thanks,     TIFFANIE Santana      This [email or web address] message has not been read.

## 2020-04-15 DIAGNOSIS — G47.33 OBSTRUCTIVE SLEEP APNEA: Primary | ICD-10-CM

## 2020-04-15 RX ORDER — NEBIVOLOL 20 MG/1
20 TABLET ORAL NIGHTLY
Qty: 30 TABLET | Refills: 2 | Status: SHIPPED | OUTPATIENT
Start: 2020-04-15 | End: 2020-07-22 | Stop reason: SDUPTHER

## 2020-04-15 NOTE — TELEPHONE ENCOUNTER
----- Message from Jeb Olson sent at 2020  5:16 PM EDT -----  Regarding: Prescription Question  Contact: [phone]  Dr Salvador,     Could you please refill my prescription for Bystolic 20MG  - 1 tablet once a day at.     Thank you,  Jeb Olson     54

## 2020-04-30 RX ORDER — EZETIMIBE 10 MG/1
10 TABLET ORAL DAILY
Qty: 90 TABLET | Refills: 1 | Status: SHIPPED | OUTPATIENT
Start: 2020-04-30 | End: 2021-02-03

## 2020-04-30 RX ORDER — DOXAZOSIN MESYLATE 1 MG/1
1 TABLET ORAL NIGHTLY
Qty: 90 TABLET | Refills: 1 | Status: SHIPPED | OUTPATIENT
Start: 2020-04-30 | End: 2020-11-04

## 2020-04-30 RX ORDER — CLONIDINE HYDROCHLORIDE 0.1 MG/1
0.1 TABLET ORAL DAILY
Qty: 90 TABLET | Refills: 1 | Status: SHIPPED | OUTPATIENT
Start: 2020-04-30 | End: 2020-11-26 | Stop reason: SDUPTHER

## 2020-05-15 ENCOUNTER — OFFICE VISIT (OUTPATIENT)
Dept: ORTHOPEDIC SURGERY | Facility: CLINIC | Age: 70
End: 2020-05-15

## 2020-05-15 VITALS — HEIGHT: 74 IN | BODY MASS INDEX: 34.52 KG/M2 | WEIGHT: 269 LBS | TEMPERATURE: 98.6 F

## 2020-05-15 DIAGNOSIS — Z96.652 STATUS POST TOTAL LEFT KNEE REPLACEMENT: Primary | ICD-10-CM

## 2020-05-15 PROCEDURE — 99024 POSTOP FOLLOW-UP VISIT: CPT | Performed by: ORTHOPAEDIC SURGERY

## 2020-05-15 PROCEDURE — 73562 X-RAY EXAM OF KNEE 3: CPT | Performed by: ORTHOPAEDIC SURGERY

## 2020-05-15 RX ORDER — GABAPENTIN 600 MG/1
600 TABLET ORAL DAILY
COMMUNITY
Start: 2020-04-14

## 2020-05-15 NOTE — PROGRESS NOTES
Jeb Olson : 1950 MRN: [id number] DATE: 5/15/2020    DIAGNOSIS: 6 week follow up left TKA      SUBJECTIVE:  Patient returns today for 6 week follow up of left total knee replacement. Patient reports doing well with no unusual complaints.     Vitals:    05/15/20 1618   Temp: 98.6 °F (37 °C)       OBJECTIVE:     Exam:  The incision is well healed. No sign of infection. Range of motion is measured at 0 to 120. The calf is soft and nontender with a negative Homans sign.  Gait is reciprocal heel-to-toe and only mildly antalgic.    DIAGNOSTIC STUDIES    Xrays: 3 views of the left knee (AP, lateral, and sunrise) were ordered and reviewed for evaluation of recent knee replacement. They demonstrate a well positioned, well aligned knee replacement without complicating factors noted. In comparison with previous films there has been no change.    ASSESSMENT:  6 week status post left knee replacement.    PLAN:   1) Continue with PT exercises as prescribed     2) Follow up in 6 months    Chepe Alicea MD  5/15/2020

## 2020-06-23 RX ORDER — GLIMEPIRIDE 4 MG/1
TABLET ORAL
Qty: 180 TABLET | Refills: 0 | Status: SHIPPED | OUTPATIENT
Start: 2020-06-23 | End: 2020-10-12

## 2020-07-13 RX ORDER — ESCITALOPRAM OXALATE 10 MG/1
10 TABLET ORAL DAILY
Qty: 90 TABLET | Refills: 0 | Status: SHIPPED | OUTPATIENT
Start: 2020-07-13 | End: 2020-10-12

## 2020-07-13 RX ORDER — BENAZEPRIL HYDROCHLORIDE 40 MG/1
40 TABLET, FILM COATED ORAL 2 TIMES DAILY
Qty: 180 TABLET | Refills: 0 | Status: SHIPPED | OUTPATIENT
Start: 2020-07-13 | End: 2021-02-10 | Stop reason: SDUPTHER

## 2020-07-22 DIAGNOSIS — G47.33 OBSTRUCTIVE SLEEP APNEA: ICD-10-CM

## 2020-07-22 RX ORDER — NEBIVOLOL 20 MG/1
20 TABLET ORAL NIGHTLY
Qty: 30 TABLET | Refills: 2 | Status: SHIPPED | OUTPATIENT
Start: 2020-07-22 | End: 2020-10-16 | Stop reason: SDUPTHER

## 2020-08-07 ENCOUNTER — OFFICE VISIT (OUTPATIENT)
Dept: FAMILY MEDICINE CLINIC | Facility: CLINIC | Age: 70
End: 2020-08-07

## 2020-08-07 VITALS
OXYGEN SATURATION: 98 % | SYSTOLIC BLOOD PRESSURE: 130 MMHG | TEMPERATURE: 97.1 F | WEIGHT: 277.2 LBS | HEART RATE: 78 BPM | DIASTOLIC BLOOD PRESSURE: 70 MMHG | BODY MASS INDEX: 35.58 KG/M2 | HEIGHT: 74 IN

## 2020-08-07 DIAGNOSIS — E11.41 TYPE 2 DIABETES MELLITUS WITH DIABETIC MONONEUROPATHY, WITHOUT LONG-TERM CURRENT USE OF INSULIN (HCC): ICD-10-CM

## 2020-08-07 DIAGNOSIS — Z00.00 MEDICARE ANNUAL WELLNESS VISIT, SUBSEQUENT: Primary | ICD-10-CM

## 2020-08-07 DIAGNOSIS — Z11.59 ENCOUNTER FOR HEPATITIS C SCREENING TEST FOR LOW RISK PATIENT: ICD-10-CM

## 2020-08-07 DIAGNOSIS — E78.5 HYPERLIPIDEMIA, UNSPECIFIED HYPERLIPIDEMIA TYPE: ICD-10-CM

## 2020-08-07 PROCEDURE — 90732 PPSV23 VACC 2 YRS+ SUBQ/IM: CPT | Performed by: INTERNAL MEDICINE

## 2020-08-07 PROCEDURE — G0009 ADMIN PNEUMOCOCCAL VACCINE: HCPCS | Performed by: INTERNAL MEDICINE

## 2020-08-07 PROCEDURE — 96160 PT-FOCUSED HLTH RISK ASSMT: CPT | Performed by: INTERNAL MEDICINE

## 2020-08-07 PROCEDURE — G0439 PPPS, SUBSEQ VISIT: HCPCS | Performed by: INTERNAL MEDICINE

## 2020-08-07 RX ORDER — HYDROCHLOROTHIAZIDE 25 MG/1
25 TABLET ORAL DAILY
Qty: 90 TABLET | Refills: 1 | Status: SHIPPED | OUTPATIENT
Start: 2020-08-07 | End: 2021-02-08

## 2020-08-07 RX ORDER — FENOFIBRATE 160 MG/1
160 TABLET ORAL NIGHTLY
Qty: 90 TABLET | Refills: 1 | Status: SHIPPED | OUTPATIENT
Start: 2020-08-07 | End: 2021-02-10 | Stop reason: SDUPTHER

## 2020-08-07 RX ORDER — ERYTHROMYCIN 5 MG/G
OINTMENT OPHTHALMIC NIGHTLY
Status: ON HOLD | COMMUNITY
End: 2022-09-01

## 2020-08-07 RX ORDER — SITAGLIPTIN AND METFORMIN HYDROCHLORIDE 1000; 50 MG/1; MG/1
1 TABLET, FILM COATED, EXTENDED RELEASE ORAL 2 TIMES DAILY
Qty: 180 TABLET | Refills: 1 | Status: SHIPPED | OUTPATIENT
Start: 2020-08-07 | End: 2020-10-01 | Stop reason: SDUPTHER

## 2020-08-07 RX ORDER — AMLODIPINE BESYLATE 10 MG/1
10 TABLET ORAL DAILY
Qty: 90 TABLET | Refills: 1 | Status: SHIPPED | OUTPATIENT
Start: 2020-08-07 | End: 2021-02-10 | Stop reason: SDUPTHER

## 2020-08-07 NOTE — PROGRESS NOTES
The ABCs of the Annual Wellness Visit  Subsequent Medicare Wellness Visit    Chief Complaint   Patient presents with   • Medicare Wellness-Initial Visit       Subjective   History of Present Illness:  Jeb Olson is a 70 y.o. male who presents for a Subsequent Medicare Wellness Visit.  Chief complaint is a Medicare annual wellness visit.  Jeb is here today for his Medicare annual wellness visit.  He basically has been doing fairly well since his knee surgery several months ago.  His blood pressure here today looks okay.  At the time of surgery it was a little bit elevated.  We did look under his health maintenance tab.  We are going to getting caught up to speed on most items.  He did have a recent diabetic eye exam.  He is currently up-to-date on his colonoscopy.    HEALTH RISK ASSESSMENT    Recent Hospitalizations:  Recently treated at the following:  [facility]    Current Medical Providers:  Patient Care Team:  Tera Salvador MD as PCP - General (Internal Medicine)    Smoking Status:  Social History     Tobacco Use   Smoking Status Never Smoker   Smokeless Tobacco Never Used   Tobacco Comment    Naila only every been around people who smoked       Alcohol Consumption:  Social History     Substance and Sexual Activity   Alcohol Use Yes   • Alcohol/week: 22.0 - 24.0 standard drinks   • Types: 14 Shots of liquor, 8 - 10 Standard drinks or equivalent per week    Comment: Maybe 8-10 per week. Stopped drinking beer August 2018       Depression Screen:   PHQ-2/PHQ-9 Depression Screening 8/7/2020   Little interest or pleasure in doing things 0   Feeling down, depressed, or hopeless 0   Total Score 0       Fall Risk Screen:  TERRI Fall Risk Assessment was completed, and patient is at LOW risk for falls.Assessment completed on:8/7/2020    Health Habits and Functional and Cognitive Screening:  Functional & Cognitive Status 8/7/2020   Do you have difficulty preparing food and eating? No   Do you  have difficulty bathing yourself, getting dressed or grooming yourself? No   Do you have difficulty using the toilet? No   Do you have difficulty moving around from place to place? No   Do you have trouble with steps or getting out of a bed or a chair? No   Current Diet Well Balanced Diet   Dental Exam Up to date   Eye Exam Up to date   Exercise (times per week) 0 times per week   Current Exercise Activities Include None   Do you need help using the phone?  No   Are you deaf or do you have serious difficulty hearing?  No   Do you need help with transportation? No   Do you need help shopping? No   Do you need help preparing meals?  No   Do you need help with housework?  No   Do you need help with laundry? No   Do you need help taking your medications? No   Do you need help managing money? No   Do you ever drive or ride in a car without wearing a seat belt? No   Have you felt unusual stress, anger or loneliness in the last month? No   Who do you live with? Spouse   If you need help, do you have trouble finding someone available to you? No   Have you been bothered in the last four weeks by sexual problems? No   Do you have difficulty concentrating, remembering or making decisions? No         Does the patient have evidence of cognitive impairment? No    Asprin use counseling:Taking ASA appropriately as indicated    Age-appropriate Screening Schedule:  Refer to the list below for future screening recommendations based on patient's age, sex and/or medical conditions. Orders for these recommended tests are listed in the plan section. The patient has been provided with a written plan.    Health Maintenance   Topic Date Due   • URINE MICROALBUMIN  1950   • TDAP/TD VACCINES (1 - Tdap) 06/13/1963   • ZOSTER VACCINE (1 of 2) 06/13/2002   • DIABETIC FOOT EXAM  04/11/2020   • HEMOGLOBIN A1C  08/07/2020   • INFLUENZA VACCINE  08/01/2020   • LIPID PANEL  02/07/2021   • DIABETIC EYE EXAM  07/31/2021   • COLONOSCOPY  10/28/2024           The following portions of the patient's history were reviewed and updated as appropriate:   He  has a past medical history of Allergic (Have had for several years), Anxiety (Since about 2014), Arthritis (2002), Arthritis of knee, left, Arthritis of left knee, Cataract (May 2019), Colon polyp (2017), Diabetes mellitus (CMS/[facility]), Essential hypertension, HL (hearing loss) (1980), Hyperlipemia, Knee swelling, Mild chronic anemia, Primary osteoarthritis of knee, and Sleep apnea.  He does not have any pertinent problems on file.  He  has a past surgical history that includes Colonoscopy; Achilles tendon repair (Left); Tonsillectomy; Esophagogastroduodenoscopy; Cardiac catheterization; Total knee arthroplasty (Left, 3/12/2020); Foot surgery (04/16/2016); and Joint replacement (03/12/20).  His family history includes Alcohol abuse in his father and maternal uncle; Arthritis in his mother; Diabetes in his mother; Hyperlipidemia in his father and mother; Osteoporosis in his mother.  He  reports that he has never smoked. He has never used smokeless tobacco. He reports that he drinks about 8.0 - 10.0 standard drinks of alcohol per week. He reports that he does not use drugs.  Current Outpatient Medications   Medication Sig Dispense Refill   • Acetaminophen (TYLENOL PO) Take  by mouth.     • amLODIPine (NORVASC) 10 MG tablet TAKE ONE TABLET BY MOUTH DAILY 90 tablet 0   • aspirin 81 MG EC tablet Take 1 tablet by mouth 2 (Two) Times a Day. Indications: DVT Prophylaxis     • benazepril (LOTENSIN) 40 MG tablet Take 1 tablet by mouth 2 (Two) Times a Day. 180 tablet 0   • cloNIDine (CATAPRES) 0.1 MG tablet Take 1 tablet by mouth Daily. 90 tablet 1   • doxazosin (CARDURA) 1 MG tablet Take 1 tablet by mouth Every Night. 90 tablet 1   • erythromycin (ROMYCIN) 5 MG/GM ophthalmic ointment Administer  to both eyes Every Night.     • escitalopram (LEXAPRO) 10 MG tablet Take 1 tablet by mouth Daily. 90 tablet 0   • ezetimibe (ZETIA)  10 MG tablet Take 1 tablet by mouth Daily. 90 tablet 1   • fenofibrate 160 MG tablet Take 1 tablet by mouth Daily. (Patient taking differently: Take 160 mg by mouth Every Night.) 30 tablet 5   • fluticasone (FLONASE) 50 MCG/ACT nasal spray 1 spray into the nostril(s) as directed by provider 2 (Two) Times a Day.     • gabapentin (NEURONTIN) 600 MG tablet      • glimepiride (AMARYL) 4 MG tablet TAKE ONE TABLET BY MOUTH TWICE A  tablet 0   • hydroCHLOROthiazide (HYDRODIURIL) 25 MG tablet Take 1 tablet by mouth Daily. 90 tablet 1   • JANUMET XR  MG tablet Take 2 tablets by mouth Daily. (Patient taking differently: Take 1 tablet by mouth 2 (Two) Times a Day.) 180 tablet 1   • Lutein-Zeaxanthin-Selenium (VITEYES ESSENTIALS PO) Take  by mouth.     • nebivolol (BYSTOLIC) 20 MG tablet Take 1 tablet by mouth Every Night for 90 days. 30 tablet 2   • Olopatadine HCl (PATADAY OP) Apply  to eye(s) as directed by provider.     • docusate sodium 100 MG capsule Take 1 capsule by mouth 2 (Two) times a day. 60 each 0   • gabapentin (NEURONTIN) 300 MG capsule Take 300 mg by mouth Every Night.     • HYDROcodone-acetaminophen (Norco) 5-325 MG per tablet Take 1-2 tabs po Q 4-6 hours prn pain.  Not to exceed 6 tabs per day. 42 tablet 0   • ondansetron (ZOFRAN) 4 MG tablet Take 1 tablet by mouth every 6 (Six) hours as needed for nausea or vomiting. 12 tablet 0     No current facility-administered medications for this visit.      Facility-Administered Medications Ordered in Other Visits   Medication Dose Route Frequency Provider Last Rate Last Dose   • Chlorhexidine Gluconate 2 % pads 3 each  3 pad Apply externally BID Pricila Barnes APRN         Current Outpatient Medications on File Prior to Visit   Medication Sig   • Acetaminophen (TYLENOL PO) Take  by mouth.   • amLODIPine (NORVASC) 10 MG tablet TAKE ONE TABLET BY MOUTH DAILY   • aspirin 81 MG EC tablet Take 1 tablet by mouth 2 (Two) Times a Day. Indications: DVT  Prophylaxis   • benazepril (LOTENSIN) 40 MG tablet Take 1 tablet by mouth 2 (Two) Times a Day.   • cloNIDine (CATAPRES) 0.1 MG tablet Take 1 tablet by mouth Daily.   • doxazosin (CARDURA) 1 MG tablet Take 1 tablet by mouth Every Night.   • erythromycin (ROMYCIN) 5 MG/GM ophthalmic ointment Administer  to both eyes Every Night.   • escitalopram (LEXAPRO) 10 MG tablet Take 1 tablet by mouth Daily.   • ezetimibe (ZETIA) 10 MG tablet Take 1 tablet by mouth Daily.   • fenofibrate 160 MG tablet Take 1 tablet by mouth Daily. (Patient taking differently: Take 160 mg by mouth Every Night.)   • fluticasone (FLONASE) 50 MCG/ACT nasal spray 1 spray into the nostril(s) as directed by provider 2 (Two) Times a Day.   • gabapentin (NEURONTIN) 600 MG tablet    • glimepiride (AMARYL) 4 MG tablet TAKE ONE TABLET BY MOUTH TWICE A DAY   • hydroCHLOROthiazide (HYDRODIURIL) 25 MG tablet Take 1 tablet by mouth Daily.   • JANUMET XR  MG tablet Take 2 tablets by mouth Daily. (Patient taking differently: Take 1 tablet by mouth 2 (Two) Times a Day.)   • Lutein-Zeaxanthin-Selenium (VITEYES ESSENTIALS PO) Take  by mouth.   • nebivolol (BYSTOLIC) 20 MG tablet Take 1 tablet by mouth Every Night for 90 days.   • Olopatadine HCl (PATADAY OP) Apply  to eye(s) as directed by provider.   • docusate sodium 100 MG capsule Take 1 capsule by mouth 2 (Two) times a day.   • gabapentin (NEURONTIN) 300 MG capsule Take 300 mg by mouth Every Night.   • HYDROcodone-acetaminophen (Norco) 5-325 MG per tablet Take 1-2 tabs po Q 4-6 hours prn pain.  Not to exceed 6 tabs per day.   • ondansetron (ZOFRAN) 4 MG tablet Take 1 tablet by mouth every 6 (Six) hours as needed for nausea or vomiting.     Current Facility-Administered Medications on File Prior to Visit   Medication   • Chlorhexidine Gluconate 2 % pads 3 each     He has No Known Allergies..    Outpatient Medications Prior to Visit   Medication Sig Dispense Refill   • Acetaminophen (TYLENOL PO) Take  by  mouth.     • amLODIPine (NORVASC) 10 MG tablet TAKE ONE TABLET BY MOUTH DAILY 90 tablet 0   • aspirin 81 MG EC tablet Take 1 tablet by mouth 2 (Two) Times a Day. Indications: DVT Prophylaxis     • benazepril (LOTENSIN) 40 MG tablet Take 1 tablet by mouth 2 (Two) Times a Day. 180 tablet 0   • cloNIDine (CATAPRES) 0.1 MG tablet Take 1 tablet by mouth Daily. 90 tablet 1   • doxazosin (CARDURA) 1 MG tablet Take 1 tablet by mouth Every Night. 90 tablet 1   • erythromycin (ROMYCIN) 5 MG/GM ophthalmic ointment Administer  to both eyes Every Night.     • escitalopram (LEXAPRO) 10 MG tablet Take 1 tablet by mouth Daily. 90 tablet 0   • ezetimibe (ZETIA) 10 MG tablet Take 1 tablet by mouth Daily. 90 tablet 1   • fenofibrate 160 MG tablet Take 1 tablet by mouth Daily. (Patient taking differently: Take 160 mg by mouth Every Night.) 30 tablet 5   • fluticasone (FLONASE) 50 MCG/ACT nasal spray 1 spray into the nostril(s) as directed by provider 2 (Two) Times a Day.     • gabapentin (NEURONTIN) 600 MG tablet      • glimepiride (AMARYL) 4 MG tablet TAKE ONE TABLET BY MOUTH TWICE A  tablet 0   • hydroCHLOROthiazide (HYDRODIURIL) 25 MG tablet Take 1 tablet by mouth Daily. 90 tablet 1   • JANUMET XR  MG tablet Take 2 tablets by mouth Daily. (Patient taking differently: Take 1 tablet by mouth 2 (Two) Times a Day.) 180 tablet 1   • Lutein-Zeaxanthin-Selenium (VITEYES ESSENTIALS PO) Take  by mouth.     • nebivolol (BYSTOLIC) 20 MG tablet Take 1 tablet by mouth Every Night for 90 days. 30 tablet 2   • Olopatadine HCl (PATADAY OP) Apply  to eye(s) as directed by provider.     • docusate sodium 100 MG capsule Take 1 capsule by mouth 2 (Two) times a day. 60 each 0   • gabapentin (NEURONTIN) 300 MG capsule Take 300 mg by mouth Every Night.     • HYDROcodone-acetaminophen (Norco) 5-325 MG per tablet Take 1-2 tabs po Q 4-6 hours prn pain.  Not to exceed 6 tabs per day. 42 tablet 0   • ondansetron (ZOFRAN) 4 MG tablet Take 1 tablet  "by mouth every 6 (Six) hours as needed for nausea or vomiting. 12 tablet 0     Facility-Administered Medications Prior to Visit   Medication Dose Route Frequency Provider Last Rate Last Dose   • Chlorhexidine Gluconate 2 % pads 3 each  3 pad Apply externally BID Pricila Barnes APRN           Patient Active Problem List   Diagnosis   • Allergic rhinitis   • Arthritis of knee, left   • Diabetes mellitus (CMS/HCC)   • Essential hypertension   • Hyperlipidemia   • High risk medication use   • Obesity   • Primary osteoarthritis of knee   • Mild chronic anemia   • Obstructive sleep apnea   • Arthritis of left knee       Advanced Care Planning:  ACP discussion was held with the patient during this visit. Patient does not have an advance directive, information provided.    Review of Systems   Constitutional: Negative for chills and fever.   Respiratory: Negative for cough, chest tightness and shortness of breath.        Compared to one year ago, the patient feels his physical health is better.  Compared to one year ago, the patient feels his mental health is the same.    Reviewed chart for potential of high risk medication in the elderly: yes  Reviewed chart for potential of harmful drug interactions in the elderly:yes    Objective         Vitals:    08/07/20 0904   BP: 130/70   Pulse: 78   Temp: 97.1 °F (36.2 °C)   SpO2: 98%   Weight: 126 kg (277 lb 3.2 oz)   Height: 188 cm (74\")       Body mass index is 35.59 kg/m².  Discussed the patient's BMI with him. The BMI is above average; BMI management plan is completed.    Physical Exam   Constitutional: He appears well-developed and well-nourished.   Neck: Carotid bruit is not present. No thyromegaly present.   Cardiovascular: Normal rate, regular rhythm, normal heart sounds and intact distal pulses. Exam reveals no gallop and no friction rub.   No murmur heard.  Pulmonary/Chest: Effort normal and breath sounds normal. No respiratory distress. He has no wheezes. He has no " rales.   Abdominal: Soft. Bowel sounds are normal. He exhibits no distension. There is no tenderness. There is no rebound and no guarding.   Musculoskeletal: He exhibits no edema.    Jeb had a diabetic foot exam performed today.   During the foot exam he had a monofilament test not performed.  Vascular Status -  His right foot exhibits normal foot vasculature  and no edema. His left foot exhibits normal foot vasculature  and no edema.  Skin Integrity  -  His right foot skin is intact.His left foot skin is intact..  Nursing note and vitals reviewed.            Assessment/Plan   Medicare Risks and Personalized Health Plan  CMS Preventative Services Quick Reference  Advance Directive Discussion  Immunizations Discussed/Encouraged (specific immunizations; Influenza, Pneumococcal 23 and Shingrix )  Obesity/Overweight     The above risks/problems have been discussed with the patient.  Pertinent information has been shared with the patient in the After Visit Summary.  Follow up plans and orders are seen below in the Assessment/Plan Section.    Diagnoses and all orders for this visit:    1. Medicare annual wellness visit, subsequent (Primary)    2. Hyperlipidemia, unspecified hyperlipidemia type  -     Lipid Panel    3. Type 2 diabetes mellitus with diabetic mononeuropathy, without long-term current use of insulin (CMS/[facility])  -     Comprehensive Metabolic Panel  -     Hemoglobin A1c  -     Microalbumin / Creatinine Urine Ratio - Urine, Clean Catch    4. Encounter for hepatitis C screening test for low risk patient  -     Hepatitis C Antibody    Other orders  -     amLODIPine (NORVASC) 10 MG tablet; Take 1 tablet by mouth Daily.  Dispense: 90 tablet; Refill: 1  -     fenofibrate 160 MG tablet; Take 1 tablet by mouth Every Night.  Dispense: 90 tablet; Refill: 1  -     JANUMET XR  MG tablet; Take 1 tablet by mouth 2 (Two) Times a Day.  Dispense: 180 tablet; Refill: 1  -     hydroCHLOROthiazide (HYDRODIURIL) 25 MG  tablet; Take 1 tablet by mouth Daily.  Dispense: 90 tablet; Refill: 1  -     Pneumococcal Polysaccharide Vaccine 23-Valent Greater Than or Equal To 1yo Subcutaneous / IM      Follow Up:  No follow-ups on file.     An After Visit Summary and PPPS were given to the patient.     Jeb is here today for his annual wellness visit.  All in all things check out well.  We will try to get him up to speed on most things.  We did discuss getting a shingles vaccine elsewhere.

## 2020-08-07 NOTE — PATIENT INSTRUCTIONS
Advance Care Planning and Advance Directives     You make decisions on a daily basis - decisions about where you want to live, your career, your home, your life. Perhaps one of the most important decisions you face is your choice for future medical care. Take time to talk with your family and your healthcare team and start planning today.  Advance Care Planning is a process that can help you:  · Understand possible future healthcare decisions in light of your own experiences  · Reflect on those decision in light of your goals and values  · Discuss your decisions with those closest to you and the healthcare professionals that care for you  · Make a plan by creating a document that reflects your wishes    Surrogate Decision Maker  In the event of a medical emergency, which has left you unable to communicate or to make your own decisions, you would need someone to make decisions for you.  It is important to discuss your preferences for medical treatment with this person while you are in good health.     Qualities of a surrogate decision maker:  • Willing to take on this role and responsibility  • Knows what you want for future medical care  • Willing to follow your wishes even if they don't agree with them  • Able to make difficult medical decisions under stressful circumstances    Advance Directives  These are legal documents you can create that will guide your healthcare team and decision maker(s) when needed. These documents can be stored in the electronic medical record.    · Living Will - a legal document to guide your care if you have a terminal condition or a serious illness and are unable to communicate. States vary by statute in document names/types, but most forms may include one or more of the following:        -  Directions regarding life-prolonging treatments        -  Directions regarding artificially provided nutrition/hydration        -  Choosing a healthcare decision maker        -  Direction  regarding organ/tissue donation    · Durable Power of  for Healthcare - this document names an -in-fact to make medical decisions for you, but it may also allow this person to make personal and financial decisions for you. Please seek the advice of an  if you need this type of document.    **Advance Directives are not required and no one may discriminate against you if you do not sign one.    Medical Orders  Many states allow specific forms/orders signed by your physician to record your wishes for medical treatment in your current state of health. This form, signed in personal communication with your physician, addresses resuscitation and other medical interventions that you may or may not want.      For more information or to schedule a time with a [facility] Advance Care Planning Facilitator contact: [facility] or call [phone] and someone will contact you directly.    Medicare Wellness  Personal Prevention Plan of Service     Date of Office Visit:  2020  Encounter Provider:  Tera Salvador MD  Place of Service:  [facility]  Patient Name: Jeb Olson  :  1950    As part of the Medicare Wellness portion of your visit today, we are providing you with this personalized preventive plan of services (PPPS). This plan is based upon recommendations of the [place] Preventive Services Task Force (USPSTF) and the Advisory Committee on Immunization Practices (ACIP).    This lists the preventive care services that should be considered, and provides dates of when you are due. Items listed as completed are up-to-date and do not require any further intervention.    Health Maintenance   Topic Date Due   • URINE MICROALBUMIN  1950   • TDAP/TD VACCINES (1 - Tdap) 1963   • ZOSTER VACCINE (1 of 2) 2002   • Pneumococcal Vaccine Once at 65 Years Old  2017   • HEPATITIS C SCREENING  2017   •  MEDICARE ANNUAL WELLNESS  07/19/2017   • DIABETIC FOOT EXAM  04/11/2020   • HEMOGLOBIN A1C  08/07/2020   • INFLUENZA VACCINE  08/01/2020   • LIPID PANEL  02/07/2021   • DIABETIC EYE EXAM  07/31/2021   • COLONOSCOPY  10/28/2024       Orders Placed This Encounter   Procedures   • Pneumococcal Polysaccharide Vaccine 23-Valent Greater Than or Equal To 1yo Subcutaneous / IM   • Comprehensive Metabolic Panel   • Hemoglobin A1c   • Lipid Panel   • Hepatitis C Antibody   • Microalbumin / Creatinine Urine Ratio - Urine, Clean Catch       No follow-ups on file.

## 2020-08-08 LAB
ALBUMIN SERPL-MCNC: 4.8 G/DL (ref 3.5–5.2)
ALBUMIN/CREAT UR: 18 MG/G CREAT (ref 0–29)
ALBUMIN/GLOB SERPL: 2.4 G/DL
ALP SERPL-CCNC: 32 U/L (ref 39–117)
ALT SERPL-CCNC: 23 U/L (ref 1–41)
AST SERPL-CCNC: 17 U/L (ref 1–40)
BILIRUB SERPL-MCNC: 0.3 MG/DL (ref 0–1.2)
BUN SERPL-MCNC: 25 MG/DL (ref 8–23)
BUN/CREAT SERPL: 20.2 (ref 7–25)
CALCIUM SERPL-MCNC: 9.7 MG/DL (ref 8.6–10.5)
CHLORIDE SERPL-SCNC: 100 MMOL/L (ref 98–107)
CHOLEST SERPL-MCNC: 158 MG/DL (ref 0–200)
CO2 SERPL-SCNC: 30.5 MMOL/L (ref 22–29)
CREAT SERPL-MCNC: 1.24 MG/DL (ref 0.76–1.27)
CREAT UR-MCNC: 119.4 MG/DL
GLOBULIN SER CALC-MCNC: 2 GM/DL
GLUCOSE SERPL-MCNC: 112 MG/DL (ref 65–99)
HBA1C MFR BLD: 7.7 % (ref 4.8–5.6)
HCV AB S/CO SERPL IA: 0.1 S/CO RATIO (ref 0–0.9)
HDLC SERPL-MCNC: 32 MG/DL (ref 40–60)
LDLC SERPL CALC-MCNC: 75 MG/DL (ref 0–100)
MICROALBUMIN UR-MCNC: 21.2 UG/ML
POTASSIUM SERPL-SCNC: 3.9 MMOL/L (ref 3.5–5.2)
PROT SERPL-MCNC: 6.8 G/DL (ref 6–8.5)
SODIUM SERPL-SCNC: 141 MMOL/L (ref 136–145)
TRIGL SERPL-MCNC: 254 MG/DL (ref 0–150)
VLDLC SERPL CALC-MCNC: 50.8 MG/DL

## 2020-09-08 DIAGNOSIS — E11.41 TYPE 2 DIABETES MELLITUS WITH DIABETIC MONONEUROPATHY, WITHOUT LONG-TERM CURRENT USE OF INSULIN (HCC): Primary | ICD-10-CM

## 2020-09-26 ENCOUNTER — PREP FOR SURGERY (OUTPATIENT)
Dept: OTHER | Facility: HOSPITAL | Age: 70
End: 2020-09-26

## 2020-10-01 RX ORDER — SITAGLIPTIN AND METFORMIN HYDROCHLORIDE 1000; 50 MG/1; MG/1
1 TABLET, FILM COATED, EXTENDED RELEASE ORAL 2 TIMES DAILY
Qty: 180 TABLET | Refills: 1 | Status: SHIPPED | OUTPATIENT
Start: 2020-10-01 | End: 2021-04-28

## 2020-10-12 RX ORDER — ESCITALOPRAM OXALATE 10 MG/1
TABLET ORAL
Qty: 90 TABLET | Refills: 0 | Status: SHIPPED | OUTPATIENT
Start: 2020-10-12 | End: 2021-01-07

## 2020-10-12 RX ORDER — GLIMEPIRIDE 4 MG/1
TABLET ORAL
Qty: 180 TABLET | Refills: 0 | Status: SHIPPED | OUTPATIENT
Start: 2020-10-12 | End: 2021-01-07

## 2020-10-13 ENCOUNTER — TELEPHONE (OUTPATIENT)
Dept: FAMILY MEDICINE CLINIC | Facility: CLINIC | Age: 70
End: 2020-10-13

## 2020-10-13 NOTE — TELEPHONE ENCOUNTER
Patsy  From [facility] stated a recommendation for Statin therapy is going to be faxed to the office fro the patient since he is a diabetic.      [facility] call back  [phone]

## 2020-10-16 DIAGNOSIS — G47.33 OBSTRUCTIVE SLEEP APNEA: ICD-10-CM

## 2020-10-20 RX ORDER — NEBIVOLOL 20 MG/1
20 TABLET ORAL NIGHTLY
Qty: 30 TABLET | Refills: 3 | Status: SHIPPED | OUTPATIENT
Start: 2020-10-20 | End: 2021-02-10 | Stop reason: SDUPTHER

## 2020-10-21 DIAGNOSIS — E11.41 TYPE 2 DIABETES MELLITUS WITH DIABETIC MONONEUROPATHY, WITHOUT LONG-TERM CURRENT USE OF INSULIN (HCC): Primary | ICD-10-CM

## 2020-11-04 RX ORDER — DOXAZOSIN MESYLATE 1 MG/1
TABLET ORAL
Qty: 90 TABLET | Refills: 1 | Status: SHIPPED | OUTPATIENT
Start: 2020-11-04 | End: 2021-05-03

## 2020-11-05 ENCOUNTER — HOSPITAL ENCOUNTER (OUTPATIENT)
Dept: DIABETES SERVICES | Facility: HOSPITAL | Age: 70
Discharge: HOME | End: 2020-11-05
Admitting: INTERNAL MEDICINE

## 2020-11-05 PROCEDURE — 97802 MEDICAL NUTRITION INDIV IN: CPT | Performed by: DIETITIAN, REGISTERED

## 2020-11-13 ENCOUNTER — OFFICE VISIT (OUTPATIENT)
Dept: ORTHOPEDIC SURGERY | Facility: CLINIC | Age: 70
End: 2020-11-13

## 2020-11-13 VITALS — WEIGHT: 277 LBS | BODY MASS INDEX: 35.55 KG/M2 | HEIGHT: 74 IN | TEMPERATURE: 98 F

## 2020-11-13 DIAGNOSIS — R52 PAIN: Primary | ICD-10-CM

## 2020-11-13 DIAGNOSIS — Z09 SURGERY FOLLOW-UP: ICD-10-CM

## 2020-11-13 PROCEDURE — 73562 X-RAY EXAM OF KNEE 3: CPT | Performed by: ORTHOPAEDIC SURGERY

## 2020-11-13 PROCEDURE — 99212 OFFICE O/P EST SF 10 MIN: CPT | Performed by: ORTHOPAEDIC SURGERY

## 2020-11-13 NOTE — PROGRESS NOTES
Jeb Olson : 1950 MRN: [id number] DATE: 2020     DIAGNOSIS: 6 month follow up left TKA      SUBJECTIVE:  Patient returns today for follow up of left total knee replacement.  Patient reports doing well with no unusual complaints.     Vitals:    20 0918   Temp: 98 °F (36.7 °C)       OBJECTIVE:     Exam:  The incision is well healed. No sign of infection. Range of motion is measured at 0 to 120. The calf is soft and nontender with a negative Homans sign.  Gait is reciprocal heel-to-toe and nonantalgic.    DIAGNOSTIC STUDIES    Xrays: AP, lateral and merchant's views of the left knee are ordered and reviewed for evaluation of recent knee replacement. They demonstrate a well positioned, well aligned knee replacement without complicating factors noted.  In comparison with previous films there has been no change.    ASSESSMENT: 6 months status post left knee replacement.    PLAN: 1) Continue with PT exercises as prescribed   2) Follow up in 6 months   3)  Antibiotic prophylaxis discussed    Chepe Alicea MD    2020

## 2020-11-30 RX ORDER — CLONIDINE HYDROCHLORIDE 0.1 MG/1
0.1 TABLET ORAL DAILY
Qty: 90 TABLET | Refills: 0 | Status: SHIPPED | OUTPATIENT
Start: 2020-11-30 | End: 2021-02-24

## 2021-01-04 ENCOUNTER — OFFICE VISIT (OUTPATIENT)
Dept: ORTHOPEDIC SURGERY | Facility: CLINIC | Age: 71
End: 2021-01-04

## 2021-01-04 VITALS — TEMPERATURE: 98 F | WEIGHT: 267 LBS | HEIGHT: 74 IN | BODY MASS INDEX: 34.27 KG/M2

## 2021-01-04 DIAGNOSIS — M17.11 PRIMARY OSTEOARTHRITIS OF RIGHT KNEE: Primary | ICD-10-CM

## 2021-01-04 DIAGNOSIS — M17.10 ARTHRITIS OF KNEE: ICD-10-CM

## 2021-01-04 PROCEDURE — 20610 DRAIN/INJ JOINT/BURSA W/O US: CPT | Performed by: ORTHOPAEDIC SURGERY

## 2021-01-04 PROCEDURE — 73562 X-RAY EXAM OF KNEE 3: CPT | Performed by: ORTHOPAEDIC SURGERY

## 2021-01-04 PROCEDURE — 99214 OFFICE O/P EST MOD 30 MIN: CPT | Performed by: ORTHOPAEDIC SURGERY

## 2021-01-04 RX ORDER — METHYLPREDNISOLONE ACETATE 80 MG/ML
80 INJECTION, SUSPENSION INTRA-ARTICULAR; INTRALESIONAL; INTRAMUSCULAR; SOFT TISSUE
Status: COMPLETED | OUTPATIENT
Start: 2021-01-04 | End: 2021-01-04

## 2021-01-04 RX ADMIN — METHYLPREDNISOLONE ACETATE 80 MG: 80 INJECTION, SUSPENSION INTRA-ARTICULAR; INTRALESIONAL; INTRAMUSCULAR; SOFT TISSUE at 10:38

## 2021-01-04 NOTE — PROGRESS NOTES
Patient: Jeb Olsno    YOB: 1950    Medical Record Number: [id number]    Chief Complaints:  Right knee pain    History of Present Illness:     70 y.o. male patient who presents for evaluation of right knee pain.  He reports that the symptoms first began just before Thanksgiving.  He was loading a turkey fryer and a lot of cut wood onto a truck when the knee for started to cause him discomfort.  Prior to that, he had experienced occasional discomfort.  Since this incident the pain is more consistent.  Current pain is described as moderate, constant and aching.  The pain is predominantly along the medial side of the knee.  He says the pain is virtually identical to that which he was having on his left knee prior to the replacement.  Of note, he says the left knee is now doing great.  Denies any clicking, popping or catching.  Symptoms are worse with activity.  Symptoms are somewhat better with rest and anti-inflammatories.  Denies any shooting pain down the legs, weakness, numbness or paresthesias.    Allergies: No Known Allergies    Home Medications    Current Outpatient Medications:   •  Acetaminophen (TYLENOL PO), Take  by mouth., Disp: , Rfl:   •  amLODIPine (NORVASC) 10 MG tablet, Take 1 tablet by mouth Daily., Disp: 90 tablet, Rfl: 1  •  aspirin 81 MG EC tablet, Take 1 tablet by mouth 2 (Two) Times a Day. Indications: DVT Prophylaxis, Disp: , Rfl:   •  benazepril (LOTENSIN) 40 MG tablet, Take 1 tablet by mouth 2 (Two) Times a Day., Disp: 180 tablet, Rfl: 0  •  cloNIDine (CATAPRES) 0.1 MG tablet, Take 1 tablet by mouth Daily., Disp: 90 tablet, Rfl: 0  •  docusate sodium 100 MG capsule, Take 1 capsule by mouth 2 (Two) times a day., Disp: 60 each, Rfl: 0  •  doxazosin (CARDURA) 1 MG tablet, TAKE ONE TABLET BY MOUTH ONCE NIGHTLY, Disp: 90 tablet, Rfl: 1  •  erythromycin (ROMYCIN) 5 MG/GM ophthalmic ointment, Administer  to both eyes Every Night., Disp: , Rfl:   •  escitalopram (LEXAPRO) 10 MG  tablet, TAKE ONE TABLET BY MOUTH DAILY, Disp: 90 tablet, Rfl: 0  •  ezetimibe (ZETIA) 10 MG tablet, Take 1 tablet by mouth Daily., Disp: 90 tablet, Rfl: 1  •  fenofibrate 160 MG tablet, Take 1 tablet by mouth Every Night., Disp: 90 tablet, Rfl: 1  •  fluticasone (FLONASE) 50 MCG/ACT nasal spray, 1 spray into the nostril(s) as directed by provider 2 (Two) Times a Day., Disp: , Rfl:   •  gabapentin (NEURONTIN) 600 MG tablet, , Disp: , Rfl:   •  glimepiride (AMARYL) 4 MG tablet, TAKE ONE TABLET BY MOUTH TWICE A DAY, Disp: 180 tablet, Rfl: 0  •  glucose blood (Accu-Chek Anabela Plus) test strip, TEST TWICE DAILY Use as instructed, Disp: 100 each, Rfl: 3  •  hydroCHLOROthiazide (HYDRODIURIL) 25 MG tablet, Take 1 tablet by mouth Daily., Disp: 90 tablet, Rfl: 1  •  HYDROcodone-acetaminophen (Norco) 5-325 MG per tablet, Take 1-2 tabs po Q 4-6 hours prn pain.  Not to exceed 6 tabs per day., Disp: 42 tablet, Rfl: 0  •  Janumet XR  MG tablet, Take 1 tablet by mouth 2 (Two) Times a Day., Disp: 180 tablet, Rfl: 1  •  Lutein-Zeaxanthin-Selenium (VITEYES ESSENTIALS PO), Take  by mouth., Disp: , Rfl:   •  nebivolol (BYSTOLIC) 20 MG tablet, Take 1 tablet by mouth Every Night for 90 days., Disp: 30 tablet, Rfl: 3  •  Olopatadine HCl (PATADAY OP), Apply  to eye(s) as directed by provider., Disp: , Rfl:   •  ondansetron (ZOFRAN) 4 MG tablet, Take 1 tablet by mouth every 6 (Six) hours as needed for nausea or vomiting., Disp: 12 tablet, Rfl: 0  No current facility-administered medications for this visit.     Facility-Administered Medications Ordered in Other Visits:   •  Chlorhexidine Gluconate 2 % pads 3 each, 3 pad, Apply externally, BID, Beatrizon, Pricila L, APRN    Past Medical History:   Diagnosis Date   • Allergic Have had for several years    Eyes and nasal issues   • Anxiety Since about 2014    Since I was taking of my Dad, who also passed away 3yrs ago.   • Arthritis 2002    Both knees, hips, and shoulders   • Arthritis of  knee, left    • Arthritis of left knee    • Cataract May 2019   • Colon polyp 2017   • Diabetes mellitus (CMS/HCC)    • Essential hypertension    • HL (hearing loss) 1980   • Hyperlipemia    • Knee swelling     Right and Left knee   • Mild chronic anemia    • Primary osteoarthritis of knee    • Sleep apnea     cpap       Past Surgical History:   Procedure Laterality Date   • ACHILLES TENDON REPAIR Left    • CARDIAC CATHETERIZATION     • COLONOSCOPY      Dr Reyes 6 years ago   • ENDOSCOPY     • FOOT SURGERY  04/16/2016    Repair torn achilles tendon.   • JOINT REPLACEMENT  03/12/20   • TONSILLECTOMY      As a child   • TOTAL KNEE ARTHROPLASTY Left 3/12/2020    Procedure: TOTAL KNEE ARTHROPLASTY;  Surgeon: Chepe Alicea MD;  Location: [facility];  Service: Orthopedics;  Laterality: Left;       Social History     Occupational History   • Not on file   Tobacco Use   • Smoking status: Never Smoker   • Smokeless tobacco: Never Used   • Tobacco comment: Naila only every been around people who smoked   Substance and Sexual Activity   • Alcohol use: Yes     Alcohol/week: 8.0 - 10.0 standard drinks     Types: 8 - 10 Standard drinks or equivalent per week     Frequency: 4 or more times a week     Drinks per session: 3 or 4     Comment: Maybe 8-10 per week. Stopped drinking beer August 2018   • Drug use: No   • Sexual activity: Never      Social History     Social History Narrative   • Not on file       Family History   Problem Relation Age of Onset   • Alcohol abuse Maternal Uncle         Passed away 2013   • Alcohol abuse Father         Passed away in 2016   • Hyperlipidemia Father         Started about 10 years before his death   • Arthritis Mother         Passed away 2006, from Alzheimers   • Diabetes Mother    • Hyperlipidemia Mother         Started probably at middle age   • Osteoporosis Mother    • Malig Hyperthermia Neg Hx        Review of Systems:      Constitutional: Denies fever, shaking or chills   Eyes:  "Denies change in visual acuity   HEENT: Denies nasal congestion or sore throat   Respiratory: Denies cough or shortness of breath   Cardiovascular: Denies chest pain or edema  Endocrine: Denies tremors, palpitations, intolerance of heat or cold, polyuria, polydipsia.  GI: Denies abdominal pain, nausea, vomiting, bloody stools or diarrhea  : Denies frequency, urgency, incontinence, retention, or nocturia.  Musculoskeletal: Denies numbness, tingling or loss of motor function except as above  Integument: Denies rash, lesion or ulceration   Neurologic: Denies headache or focal weakness, deficits  Heme: Denies spontaneous or excessive bleeding, epistaxis, hematuria, melena, fatigue, enlarged or tender lymph nodes.      All other pertinent positives and negatives as noted above in HPI.    Physical Exam:   70 y.o. male  Vitals:    01/04/21 1014   Temp: 98 °F (36.7 °C)   TempSrc: Temporal   Weight: 121 kg (267 lb)   Height: 188 cm (74\")     General:  Patient is awake and alert.  Appears in no acute distress or discomfort.    Psych:  Affect and demeanor are appropriate.    Eyes:  Conjunctiva and sclera appear grossly normal.  Eyes track well and EOM seem to be intact.    Ears:  No gross abnormalities.  Hearing adequate for the exam.    Cardiovascular:  Regular rate and rhythm.    Lungs:  Good chest expansion.  Breathing unlabored.    Spine:  Back appears grossly normal.  No palpable masses or adenopathy.  Good motion.  Straight leg raise and crossed straight leg raise manuever are both negative for lower leg and/or knee pain.    Extremities:  Right knee is examined.  Skin is benign.  No obvious gross abnormalities.  No palpable masses or adenopathy.  Moderate tenderness noted over medial joint line.  Motion is to 125 degrees of flexion, full extension.  Medial Cady's is uncomfortable but not positive.  Lateral Cady's is negative.  No instability.  Strength is well preserved including hip flexion, knee extension, " ankle and toe plantarflexion, ankle inversion and eversion.  Good sensory function throughout the leg and foot.  Palpable pulses.  Brisk capillary refill.  Good skin turgor.         Radiology:   Bilateral standing AP views, bilateral merchants views and a lateral view of the right knee are ordered by myself and reviewed to evaluate the patient's complaint.  No comparison films are immediately available.  The x-rays show significant medial compartment degenerative arthritis including joint space narrowing, osteophyte formation, and subchondral sclerosis.     Assessment/Plan:  Right knee osteoarthritis    We discussed treatment options in detail including the risks, benefits, and alternatives of conservative treatment versus surgical options.  Regarding conservative treatment, we discussed appropriate activity modifications, anti-inflammatories, injections (including both corticosteroids and viscosupplementation), and physical therapy.  We also discussed the option of an arthroplasty and all that would entail.  I have recommended that we start with a conservative approach and the patient agrees.    The patient has acknowledged understanding of the information and elected for an injection.  The risk, benefits and alternatives were discussed.  The patient consented and the injection was performed as described below.  Going forward, he we will follow-up as needed.    Chepe Alicea MD    01/04/2021    CC to Tera Salvador MD    Large Joint Arthrocentesis: R knee  Date/Time: 1/4/2021 10:38 AM  Consent given by: patient  Site marked: site marked  Timeout: Immediately prior to procedure a time out was called to verify the correct patient, procedure, equipment, support staff and site/side marked as required   Supporting Documentation  Indications: pain and joint swelling   Procedure Details  Location: knee - R knee  Preparation: Patient was prepped and draped in the usual sterile fashion  Needle gauge: 21  G.  Approach: anterolateral  Medications administered: 2 mL lidocaine (cardiac); 80 mg methylPREDNISolone acetate 80 MG/ML  Patient tolerance: patient tolerated the procedure well with no immediate complications          Answers for HPI/ROS submitted by the patient on 1/2/2021   What is the primary reason for your visit?: Other  Please describe your symptoms.: My right knee is becoming very pain full. Especially early in the day.  Have you had these symptoms before?: Yes  How long have you been having these symptoms?: Greater than 2 weeks  Please list any medications you are currently taking for this condition.: Tylenol extra strength.  Please describe any probable cause for these symptoms. : Probably arthritis

## 2021-01-07 RX ORDER — ESCITALOPRAM OXALATE 10 MG/1
TABLET ORAL
Qty: 90 TABLET | Refills: 0 | Status: SHIPPED | OUTPATIENT
Start: 2021-01-07 | End: 2021-04-01

## 2021-01-07 RX ORDER — GLIMEPIRIDE 4 MG/1
TABLET ORAL
Qty: 180 TABLET | Refills: 0 | Status: SHIPPED | OUTPATIENT
Start: 2021-01-07 | End: 2021-01-13

## 2021-01-13 RX ORDER — GLIMEPIRIDE 4 MG/1
TABLET ORAL
Qty: 180 TABLET | Refills: 0 | Status: SHIPPED | OUTPATIENT
Start: 2021-01-13 | End: 2021-08-02

## 2021-02-03 RX ORDER — EZETIMIBE 10 MG/1
TABLET ORAL
Qty: 90 TABLET | Refills: 0 | Status: SHIPPED | OUTPATIENT
Start: 2021-02-03 | End: 2021-05-02 | Stop reason: SDUPTHER

## 2021-02-08 RX ORDER — HYDROCHLOROTHIAZIDE 25 MG/1
TABLET ORAL
Qty: 90 TABLET | Refills: 0 | OUTPATIENT
Start: 2021-02-08

## 2021-02-08 RX ORDER — HYDROCHLOROTHIAZIDE 25 MG/1
TABLET ORAL
Qty: 90 TABLET | Refills: 0 | Status: SHIPPED | OUTPATIENT
Start: 2021-02-08 | End: 2021-04-28

## 2021-02-10 ENCOUNTER — OFFICE VISIT (OUTPATIENT)
Dept: FAMILY MEDICINE CLINIC | Facility: CLINIC | Age: 71
End: 2021-02-10

## 2021-02-10 VITALS
HEART RATE: 40 BPM | SYSTOLIC BLOOD PRESSURE: 120 MMHG | OXYGEN SATURATION: 98 % | HEIGHT: 74 IN | TEMPERATURE: 97.8 F | WEIGHT: 267 LBS | DIASTOLIC BLOOD PRESSURE: 80 MMHG | BODY MASS INDEX: 34.27 KG/M2

## 2021-02-10 DIAGNOSIS — R90.89 ABNORMAL FINDING ON MRI OF BRAIN: ICD-10-CM

## 2021-02-10 DIAGNOSIS — R51.9 NONINTRACTABLE EPISODIC HEADACHE, UNSPECIFIED HEADACHE TYPE: ICD-10-CM

## 2021-02-10 DIAGNOSIS — E11.41 TYPE 2 DIABETES MELLITUS WITH DIABETIC MONONEUROPATHY, WITHOUT LONG-TERM CURRENT USE OF INSULIN (HCC): Primary | ICD-10-CM

## 2021-02-10 DIAGNOSIS — I10 ESSENTIAL HYPERTENSION: ICD-10-CM

## 2021-02-10 DIAGNOSIS — G47.33 OBSTRUCTIVE SLEEP APNEA: ICD-10-CM

## 2021-02-10 LAB
ALBUMIN SERPL-MCNC: 4.6 G/DL (ref 3.5–5.2)
ALBUMIN/GLOB SERPL: 1.9 G/DL
ALP SERPL-CCNC: 28 U/L (ref 39–117)
ALT SERPL-CCNC: 19 U/L (ref 1–41)
AST SERPL-CCNC: 18 U/L (ref 1–40)
BASOPHILS # BLD AUTO: 0.05 10*3/MM3 (ref 0–0.2)
BASOPHILS NFR BLD AUTO: 0.6 % (ref 0–1.5)
BILIRUB SERPL-MCNC: 0.4 MG/DL (ref 0–1.2)
BUN SERPL-MCNC: 20 MG/DL (ref 8–23)
BUN/CREAT SERPL: 18.5 (ref 7–25)
CALCIUM SERPL-MCNC: 9.7 MG/DL (ref 8.6–10.5)
CHLORIDE SERPL-SCNC: 101 MMOL/L (ref 98–107)
CHOLEST SERPL-MCNC: 156 MG/DL (ref 0–200)
CO2 SERPL-SCNC: 31.9 MMOL/L (ref 22–29)
CREAT SERPL-MCNC: 1.08 MG/DL (ref 0.76–1.27)
EOSINOPHIL # BLD AUTO: 0.13 10*3/MM3 (ref 0–0.4)
EOSINOPHIL NFR BLD AUTO: 1.6 % (ref 0.3–6.2)
ERYTHROCYTE [DISTWIDTH] IN BLOOD BY AUTOMATED COUNT: 13.5 % (ref 12.3–15.4)
ERYTHROCYTE [SEDIMENTATION RATE] IN BLOOD BY WESTERGREN METHOD: 6 MM/HR (ref 0–20)
GLOBULIN SER CALC-MCNC: 2.4 GM/DL
GLUCOSE SERPL-MCNC: 77 MG/DL (ref 65–99)
HBA1C MFR BLD: 5.8 % (ref 4.8–5.6)
HCT VFR BLD AUTO: 38.3 % (ref 37.5–51)
HDLC SERPL-MCNC: 41 MG/DL (ref 40–60)
HGB BLD-MCNC: 12.9 G/DL (ref 13–17.7)
IMM GRANULOCYTES # BLD AUTO: 0.03 10*3/MM3 (ref 0–0.05)
IMM GRANULOCYTES NFR BLD AUTO: 0.4 % (ref 0–0.5)
LDLC SERPL CALC-MCNC: 92 MG/DL (ref 0–100)
LYMPHOCYTES # BLD AUTO: 1.73 10*3/MM3 (ref 0.7–3.1)
LYMPHOCYTES NFR BLD AUTO: 21.7 % (ref 19.6–45.3)
MCH RBC QN AUTO: 28.4 PG (ref 26.6–33)
MCHC RBC AUTO-ENTMCNC: 33.7 G/DL (ref 31.5–35.7)
MCV RBC AUTO: 84.4 FL (ref 79–97)
MONOCYTES # BLD AUTO: 0.53 10*3/MM3 (ref 0.1–0.9)
MONOCYTES NFR BLD AUTO: 6.6 % (ref 5–12)
NEUTROPHILS # BLD AUTO: 5.5 10*3/MM3 (ref 1.7–7)
NEUTROPHILS NFR BLD AUTO: 69.1 % (ref 42.7–76)
NRBC BLD AUTO-RTO: 0 /100 WBC (ref 0–0.2)
PLATELET # BLD AUTO: 203 10*3/MM3 (ref 140–450)
POTASSIUM SERPL-SCNC: 4.2 MMOL/L (ref 3.5–5.2)
PROT SERPL-MCNC: 7 G/DL (ref 6–8.5)
RBC # BLD AUTO: 4.54 10*6/MM3 (ref 4.14–5.8)
SODIUM SERPL-SCNC: 142 MMOL/L (ref 136–145)
TRIGL SERPL-MCNC: 128 MG/DL (ref 0–150)
VLDLC SERPL CALC-MCNC: 23 MG/DL (ref 5–40)
WBC # BLD AUTO: 7.97 10*3/MM3 (ref 3.4–10.8)

## 2021-02-10 PROCEDURE — 99214 OFFICE O/P EST MOD 30 MIN: CPT | Performed by: INTERNAL MEDICINE

## 2021-02-10 RX ORDER — AMLODIPINE BESYLATE 10 MG/1
10 TABLET ORAL DAILY
Qty: 90 TABLET | Refills: 1 | Status: SHIPPED | OUTPATIENT
Start: 2021-02-10 | End: 2021-08-26 | Stop reason: SDUPTHER

## 2021-02-10 RX ORDER — NEBIVOLOL 10 MG/1
10 TABLET ORAL NIGHTLY
Qty: 90 TABLET | Refills: 1 | Status: SHIPPED | OUTPATIENT
Start: 2021-02-10 | End: 2021-08-13

## 2021-02-10 RX ORDER — BENAZEPRIL HYDROCHLORIDE 40 MG/1
40 TABLET, FILM COATED ORAL 2 TIMES DAILY
Qty: 180 TABLET | Refills: 1 | Status: SHIPPED | OUTPATIENT
Start: 2021-02-10 | End: 2021-08-26 | Stop reason: SDUPTHER

## 2021-02-10 RX ORDER — FENOFIBRATE 160 MG/1
160 TABLET ORAL NIGHTLY
Qty: 90 TABLET | Refills: 1 | Status: SHIPPED | OUTPATIENT
Start: 2021-02-10 | End: 2021-08-26 | Stop reason: SDUPTHER

## 2021-02-10 NOTE — PROGRESS NOTES
Subjective  Answers for HPI/ROS submitted by the patient on 2/3/2021   Diabetes problem  What is the primary reason for your visit?: Diabetes    Jeb Olson is a 70 y.o. male.  Chief complaint is checkup on diabetes    History of Present Illness Rosas is here today for checkup on his diabetes.  His sugars seem to have been doing okay here lately.  His last A1c however was 7.7.  He does have a new complaint of headache.  This is been occurring for 3 to 4 weeks.  It is an occipital headache.  It comes on at the end of the day.  It will last approximately 20 minutes and go away.  It does however return at times.  The longest it is lasted has been a little over an hour.  He does have a prior history of having some sort of abnormality on an MRI scan of the brain.  I do not have these results in the chart.  Apparently at that time he had a numbness on the left side of his body.    The following portions of the patient's history were reviewed and updated as appropriate: allergies, current medications, past family history, past medical history, past social history, past surgical history and problem list.    Review of Systems   Constitutional: Negative for fatigue and unexpected weight loss.   Eyes: Positive for blurred vision.   Cardiovascular: Negative for chest pain.   Endocrine: Negative for polydipsia, polyphagia and polyuria.   Skin: Negative for pallor.   Neurological: Positive for dizziness. Negative for tremors, seizures, speech difficulty, weakness and confusion.   Psychiatric/Behavioral: The patient is nervous/anxious.        Objective   Physical Exam  Vitals signs and nursing note reviewed.   Constitutional:       Appearance: Normal appearance.   HENT:      Head: Normocephalic and atraumatic.   Eyes:      General: No scleral icterus.     Extraocular Movements: Extraocular movements intact.      Conjunctiva/sclera: Conjunctivae normal.      Pupils: Pupils are equal, round, and reactive to light.    Cardiovascular:      Rate and Rhythm: Bradycardia present.      Pulses: Normal pulses.   Pulmonary:      Effort: Pulmonary effort is normal.      Breath sounds: No wheezing, rhonchi or rales.   Abdominal:      General: Bowel sounds are normal.      Palpations: Abdomen is soft.   Neurological:      General: No focal deficit present.      Mental Status: He is alert.      Cranial Nerves: No cranial nerve deficit.      Coordination: Coordination normal.           Assessment/Plan   Diagnoses and all orders for this visit:    1. Type 2 diabetes mellitus with diabetic mononeuropathy, without long-term current use of insulin (CMS/[facility]) (Primary)  -     Comprehensive Metabolic Panel  -     Hemoglobin A1c  -     Lipid Panel    2. Essential hypertension    3. Obstructive sleep apnea  -     nebivolol (BYSTOLIC) 10 MG tablet; Take 1 tablet by mouth Every Night for 90 days.  Dispense: 90 tablet; Refill: 1    4. Nonintractable episodic headache, unspecified headache type  -     CBC & Differential  -     Sedimentation Rate  -     MRI Brain With & Without Contrast; Future    5. Abnormal finding on MRI of brain  -     MRI Brain With & Without Contrast; Future    Other orders  -     amLODIPine (NORVASC) 10 MG tablet; Take 1 tablet by mouth Daily.  Dispense: 90 tablet; Refill: 1  -     benazepril (LOTENSIN) 40 MG tablet; Take 1 tablet by mouth 2 (Two) Times a Day.  Dispense: 180 tablet; Refill: 1  -     fenofibrate 160 MG tablet; Take 1 tablet by mouth Every Night.  Dispense: 90 tablet; Refill: 1      Rosas is here today for follow-up.  His headache may be coming from his neck but he does have a history of a abnormal MRI of the brain and I am going to follow-up on that.  If that does not show a specific reason we may consider a scan of his neck.  His heart rate is quite slow today.  I did retake it at 44.  I am going to back off on his Bystolic.

## 2021-02-10 NOTE — PROGRESS NOTES
Answers for HPI/ROS submitted by the patient on 2/3/2021   Diabetes problem  What is the primary reason for your visit?: Diabetes  Diabetes type: type 2  MedicAlert ID: No  Disease duration: 1998 years  blurred vision: Yes  chest pain: No  fatigue: No  foot paresthesias: No  foot ulcerations: No  polydipsia: No  polyphagia: No  polyuria: No  visual change: Yes  weakness: No  weight loss: No  Symptom course: worsening  confusion: No  dizziness: Yes  headaches: Yes  hunger: No  mood changes: No  nervous/anxious: Yes  pallor: No  seizures: No  sleepiness: Yes  speech difficulty: No  sweats: No  tremors: No  blackouts: No  hospitalization: No  nocturnal hypoglycemia: No  required assistance: No  required glucagon: No  CVA: No  heart disease: No  impotence: Yes  nephropathy: No  peripheral neuropathy: No  PVD: No  retinopathy: No  CAD risks: hypertension, obesity, sedentary lifestyle, stress  Current treatments: diet, oral agent (dual therapy)  Treatment compliance: all of the time  Home blood tests: 3-4 x per day  Monitoring compliance: good  Blood glucose trend: decreasing steadily  breakfast time: 8-9 am  breakfast glucose level: 130-140  lunch time: 12-1 pm  lunch glucose level: 130-140  dinner time: 7-8 pm  dinner glucose level: 130-140  High score: 140-180  Overall: 110-130  Weight trend: fluctuating minimally  Current diet: generally healthy, low salt  Meal planning: carbohydrate counting  Exercise: rarely  Dietitian visit: Yes  Eye exam current: Yes  Sees podiatrist: Yes

## 2021-02-24 RX ORDER — CLONIDINE HYDROCHLORIDE 0.1 MG/1
TABLET ORAL
Qty: 90 TABLET | Refills: 1 | Status: SHIPPED | OUTPATIENT
Start: 2021-02-24 | End: 2021-08-23

## 2021-03-12 ENCOUNTER — HOSPITAL ENCOUNTER (OUTPATIENT)
Dept: MRI IMAGING | Facility: HOSPITAL | Age: 71
Discharge: HOME | End: 2021-03-12
Admitting: INTERNAL MEDICINE

## 2021-03-12 DIAGNOSIS — R90.89 ABNORMAL FINDING ON MRI OF BRAIN: ICD-10-CM

## 2021-03-12 DIAGNOSIS — R51.9 NONINTRACTABLE EPISODIC HEADACHE, UNSPECIFIED HEADACHE TYPE: ICD-10-CM

## 2021-03-12 PROCEDURE — 70553 MRI BRAIN STEM W/O & W/DYE: CPT

## 2021-03-12 PROCEDURE — 0 GADOBENATE DIMEGLUMINE 529 MG/ML SOLUTION: Performed by: INTERNAL MEDICINE

## 2021-03-12 PROCEDURE — A9577 INJ MULTIHANCE: HCPCS | Performed by: INTERNAL MEDICINE

## 2021-03-12 RX ADMIN — GADOBENATE DIMEGLUMINE 20 ML: 529 INJECTION, SOLUTION INTRAVENOUS at 10:03

## 2021-04-01 RX ORDER — ESCITALOPRAM OXALATE 10 MG/1
TABLET ORAL
Qty: 90 TABLET | Refills: 0 | Status: SHIPPED | OUTPATIENT
Start: 2021-04-01 | End: 2021-06-30

## 2021-04-19 ENCOUNTER — CLINICAL SUPPORT (OUTPATIENT)
Dept: ORTHOPEDIC SURGERY | Facility: CLINIC | Age: 71
End: 2021-04-19

## 2021-04-19 VITALS — WEIGHT: 267 LBS | BODY MASS INDEX: 34.27 KG/M2 | HEIGHT: 74 IN | TEMPERATURE: 98 F

## 2021-04-19 DIAGNOSIS — M17.10 ARTHRITIS OF KNEE: Primary | ICD-10-CM

## 2021-04-19 PROCEDURE — 20610 DRAIN/INJ JOINT/BURSA W/O US: CPT | Performed by: ORTHOPAEDIC SURGERY

## 2021-04-19 RX ORDER — LIDOCAINE HYDROCHLORIDE 20 MG/ML
2 INJECTION, SOLUTION EPIDURAL; INFILTRATION; INTRACAUDAL; PERINEURAL
Status: COMPLETED | OUTPATIENT
Start: 2021-04-19 | End: 2021-04-19

## 2021-04-19 RX ORDER — METHYLPREDNISOLONE ACETATE 80 MG/ML
80 INJECTION, SUSPENSION INTRA-ARTICULAR; INTRALESIONAL; INTRAMUSCULAR; SOFT TISSUE
Status: COMPLETED | OUTPATIENT
Start: 2021-04-19 | End: 2021-04-19

## 2021-04-19 RX ADMIN — METHYLPREDNISOLONE ACETATE 80 MG: 80 INJECTION, SUSPENSION INTRA-ARTICULAR; INTRALESIONAL; INTRAMUSCULAR; SOFT TISSUE at 15:00

## 2021-04-19 RX ADMIN — LIDOCAINE HYDROCHLORIDE 2 ML: 20 INJECTION, SOLUTION EPIDURAL; INFILTRATION; INTRACAUDAL; PERINEURAL at 15:00

## 2021-04-19 NOTE — PROGRESS NOTES
Patient follows up today for his right knee.  No significant changes relative to last visit.  He would like to get the injection repeated.  The risk, benefits and alternatives were discussed but he consented and the injection was performed as described below.  He will follow-up as needed.    Large Joint Arthrocentesis: R knee  Date/Time: 4/19/2021 3:00 PM  Consent given by: patient  Site marked: site marked  Timeout: Immediately prior to procedure a time out was called to verify the correct patient, procedure, equipment, support staff and site/side marked as required   Supporting Documentation  Indications: pain and joint swelling   Procedure Details  Location: knee - R knee  Preparation: Patient was prepped and draped in the usual sterile fashion  Needle gauge: 21g.  Approach: anterolateral  Medications administered: 80 mg methylPREDNISolone acetate 80 MG/ML; 2 mL lidocaine PF 2% 2 %  Patient tolerance: patient tolerated the procedure well with no immediate complications

## 2021-04-28 RX ORDER — HYDROCHLOROTHIAZIDE 25 MG/1
TABLET ORAL
Qty: 90 TABLET | Refills: 0 | Status: SHIPPED | OUTPATIENT
Start: 2021-04-28 | End: 2021-08-02

## 2021-04-28 RX ORDER — SITAGLIPTIN AND METFORMIN HYDROCHLORIDE 1000; 50 MG/1; MG/1
TABLET, FILM COATED, EXTENDED RELEASE ORAL
Qty: 60 TABLET | Refills: 0 | Status: SHIPPED | OUTPATIENT
Start: 2021-04-28 | End: 2021-07-21

## 2021-05-03 RX ORDER — DOXAZOSIN MESYLATE 1 MG/1
TABLET ORAL
Qty: 90 TABLET | Refills: 1 | Status: SHIPPED | OUTPATIENT
Start: 2021-05-03 | End: 2021-05-12 | Stop reason: SDUPTHER

## 2021-05-03 RX ORDER — EZETIMIBE 10 MG/1
10 TABLET ORAL DAILY
Qty: 90 TABLET | Refills: 1 | Status: SHIPPED | OUTPATIENT
Start: 2021-05-03 | End: 2021-10-21

## 2021-05-12 ENCOUNTER — OFFICE VISIT (OUTPATIENT)
Dept: FAMILY MEDICINE CLINIC | Facility: CLINIC | Age: 71
End: 2021-05-12

## 2021-05-12 VITALS
HEART RATE: 40 BPM | DIASTOLIC BLOOD PRESSURE: 86 MMHG | OXYGEN SATURATION: 96 % | HEIGHT: 74 IN | BODY MASS INDEX: 34.73 KG/M2 | SYSTOLIC BLOOD PRESSURE: 148 MMHG | TEMPERATURE: 98.6 F | WEIGHT: 270.6 LBS | RESPIRATION RATE: 20 BRPM

## 2021-05-12 DIAGNOSIS — I10 ESSENTIAL HYPERTENSION: Primary | ICD-10-CM

## 2021-05-12 DIAGNOSIS — E78.5 HYPERLIPIDEMIA, UNSPECIFIED HYPERLIPIDEMIA TYPE: ICD-10-CM

## 2021-05-12 DIAGNOSIS — E11.41 TYPE 2 DIABETES MELLITUS WITH DIABETIC MONONEUROPATHY, WITHOUT LONG-TERM CURRENT USE OF INSULIN (HCC): ICD-10-CM

## 2021-05-12 DIAGNOSIS — D64.9 ANEMIA, UNSPECIFIED TYPE: ICD-10-CM

## 2021-05-12 PROCEDURE — 99214 OFFICE O/P EST MOD 30 MIN: CPT | Performed by: INTERNAL MEDICINE

## 2021-05-12 RX ORDER — DOXAZOSIN 2 MG/1
2 TABLET ORAL NIGHTLY
Qty: 90 TABLET | Refills: 1 | Status: SHIPPED | OUTPATIENT
Start: 2021-05-12 | End: 2021-12-14

## 2021-05-12 NOTE — PROGRESS NOTES
Answers for HPI/ROS submitted by the patient on 5/10/2021  What is the primary reason for your visit?: High Blood Pressure  Chronicity: recurrent  Onset: more than 1 year ago  Progression since onset: waxing and waning  Condition status: uncontrolled  anxiety: No  blurred vision: Yes  chest pain: No  headaches: No  malaise/fatigue: No  neck pain: No  orthopnea: No  palpitations: No  peripheral edema: No  PND: No  shortness of breath: No  sweats: No  Agents associated with hypertension: NSAIDs  CAD risks: dyslipidemia, obesity, sedentary lifestyle  Compliance problems: exercise    Subjective Chief complaint is follow-up on blood pressure  Jeb Olson is a 70 y.o. male.     History of Present Illness Jeb is here today for follow-up.  His last visit his heart rate was in the 40s.  We cut his Bystolic dose in half.  His blood pressure today is 148/86 to the medical assistant's exam and 156/78 to my exam.  His heart rate remains at 40.  He is feeling sluggish and a little bit fatigued.  I am going to try increasing his Cardura.  Hopefully we get his blood pressure controlled with that and then back off on his Bystolic.  His blood sugars have been doing well.  He does have a new corina on his phone.  He occasionally has some in the one  range.  His last A1c was 5.8  His cholesterol looked excellent on his current dose of medications.  The following portions of the patient's history were reviewed and updated as appropriate: allergies, current medications, past family history, past medical history, past social history, past surgical history and problem list.    Review of Systems   Eyes: Positive for blurred vision.   Respiratory: Negative for shortness of breath.    Cardiovascular: Negative for chest pain and palpitations.   Musculoskeletal: Negative for neck pain.       Objective   Physical Exam  Vitals and nursing note reviewed.   Constitutional:       Appearance: Normal appearance.   Cardiovascular:      Rate and  Rhythm: Normal rate and regular rhythm.   Pulmonary:      Effort: Pulmonary effort is normal.      Breath sounds: No wheezing or rales.   Musculoskeletal:      Right lower leg: No edema.      Left lower leg: No edema.   Neurological:      Mental Status: He is alert.           Assessment/Plan   Diagnoses and all orders for this visit:    1. Essential hypertension (Primary)    2. Type 2 diabetes mellitus with diabetic mononeuropathy, without long-term current use of insulin (CMS/[facility])    3. Hyperlipidemia, unspecified hyperlipidemia type    4. Anemia, unspecified type    Other orders  -     doxazosin (CARDURA) 2 MG tablet; Take 1 tablet by mouth Every Night.  Dispense: 90 tablet; Refill: 1    Rosas is here today for follow-up.  His blood pressure still little bit high and his heart rate low.  I am going to increase his Cardura and see him back in 3 months.  We may try to back off on his Bystolic little more at that time.

## 2021-05-14 ENCOUNTER — OFFICE VISIT (OUTPATIENT)
Dept: ORTHOPEDIC SURGERY | Facility: CLINIC | Age: 71
End: 2021-05-14

## 2021-05-14 VITALS — HEIGHT: 74 IN | TEMPERATURE: 96.8 F | BODY MASS INDEX: 34.65 KG/M2 | WEIGHT: 270 LBS

## 2021-05-14 DIAGNOSIS — Z09 SURGERY FOLLOW-UP: ICD-10-CM

## 2021-05-14 DIAGNOSIS — M17.12 ARTHRITIS OF LEFT KNEE: Primary | ICD-10-CM

## 2021-05-14 PROCEDURE — 99212 OFFICE O/P EST SF 10 MIN: CPT | Performed by: ORTHOPAEDIC SURGERY

## 2021-05-14 PROCEDURE — 73562 X-RAY EXAM OF KNEE 3: CPT | Performed by: ORTHOPAEDIC SURGERY

## 2021-05-14 NOTE — PROGRESS NOTES
"Jeb Olson     : 1950     MRN: [id number]    DATE: 2021    DIAGNOSIS:  Annual follow up left total knee arthroplasty    SUBJECTIVE:  Patient returns today for annual follow up of a left total knee replacement. Patient reports doing well with no unusual complaints. Denies any limitations due to the knee.    OBJECTIVE:  Temp 96.8 °F (36 °C)   Ht 188 cm (74\")   Wt 122 kg (270 lb)   BMI 34.67 kg/m²   Family History   Problem Relation Age of Onset   • Alcohol abuse Maternal Uncle         Passed away    • Alcohol abuse Father         Passed away in    • Hyperlipidemia Father         Started about 10 years before his death   • Arthritis Mother         Passed away , from Alzheimers   • Diabetes Mother    • Hyperlipidemia Mother         Started probably at middle age   • Osteoporosis Mother    • Malig Hyperthermia Neg Hx      Past Medical History:   Diagnosis Date   • Allergic Have had for several years    Eyes and nasal issues   • Anxiety Since about     Since I was taking of my Dad, who also passed away 3yrs ago.   • Arthritis 2002    Both knees, hips, and shoulders   • Arthritis of knee, left    • Arthritis of left knee    • Cataract May 2019   • Colon polyp    • Diabetes mellitus (CMS/HCC)    • Essential hypertension    • HL (hearing loss)    • Hyperlipemia    • Knee swelling     Right and Left knee   • Mild chronic anemia    • Primary osteoarthritis of knee    • Sleep apnea     cpap     Past Surgical History:   Procedure Laterality Date   • ACHILLES TENDON REPAIR Left    • CARDIAC CATHETERIZATION     • COLONOSCOPY      Dr Reyes 6 years ago   • ENDOSCOPY     • FOOT SURGERY  2016    Repair torn achilles tendon.   • JOINT REPLACEMENT  20   • TONSILLECTOMY      As a child   • TOTAL KNEE ARTHROPLASTY Left 3/12/2020    Procedure: TOTAL KNEE ARTHROPLASTY;  Surgeon: Chepe Alicea MD;  Location: [facility];  Service: Orthopedics;  Laterality: Left;     Social " History     Socioeconomic History   • Marital status:      Spouse name: Not on file   • Number of children: Not on file   • Years of education: Not on file   • Highest education level: Not on file   Tobacco Use   • Smoking status: Never Smoker   • Smokeless tobacco: Never Used   • Tobacco comment: Naila only every been around people who smoked   Substance and Sexual Activity   • Alcohol use: Yes     Alcohol/week: 8.0 - 10.0 standard drinks     Types: 8 - 10 Standard drinks or equivalent per week     Comment: Maybe 8-10 per week. Stopped drinking beer August 2018   • Drug use: No   • Sexual activity: Never       Review of Systems:   A 14 point review of systems is reviewed with the patient.  Pertinent positives are listed above.  All others negative.    Exam:  The incision is well healed.  Range of motion is measured at 0 to 120.  The calf is soft and nontender with a negative Homans sign.  Alignment is neutral.  Good quad strength. There is no evidence of varus/valgus or flexion instability. No effusion.  Intact sensation to light touch.  Palpable pedal pulses    DIAGNOSTIC STUDIES    Xrays: AP, merchant and lateral views of the left knee were ordered and reviewed for evaluation of recent knee replacement.  The x-rays demonstrate a well positioned, well aligned knee replacement without complicating factors noted.  In comparison with previous films there has been no change.    ASSESSMENT:  Annual follow up left knee replacement.    PLAN: Appropriate activity modifications and restrictions discussed.  Antibiotic prophylaxis recommendations discussed.  Follow-up annually.    Chepe Alicea MD

## 2021-06-30 RX ORDER — ESCITALOPRAM OXALATE 10 MG/1
TABLET ORAL
Qty: 90 TABLET | Refills: 0 | Status: SHIPPED | OUTPATIENT
Start: 2021-06-30 | End: 2021-08-26 | Stop reason: SDUPTHER

## 2021-07-21 RX ORDER — SITAGLIPTIN AND METFORMIN HYDROCHLORIDE 1000; 50 MG/1; MG/1
TABLET, FILM COATED, EXTENDED RELEASE ORAL
Qty: 60 TABLET | Refills: 5 | Status: SHIPPED | OUTPATIENT
Start: 2021-07-21 | End: 2022-01-26

## 2021-07-26 ENCOUNTER — CLINICAL SUPPORT (OUTPATIENT)
Dept: ORTHOPEDIC SURGERY | Facility: CLINIC | Age: 71
End: 2021-07-26

## 2021-07-26 VITALS — TEMPERATURE: 98.3 F | BODY MASS INDEX: 34.65 KG/M2 | HEIGHT: 74 IN | WEIGHT: 270 LBS

## 2021-07-26 DIAGNOSIS — M17.10 ARTHRITIS OF KNEE: Primary | ICD-10-CM

## 2021-07-26 PROCEDURE — 20610 DRAIN/INJ JOINT/BURSA W/O US: CPT | Performed by: ORTHOPAEDIC SURGERY

## 2021-07-26 RX ORDER — LIDOCAINE HYDROCHLORIDE 20 MG/ML
2 INJECTION, SOLUTION EPIDURAL; INFILTRATION; INTRACAUDAL; PERINEURAL
Status: COMPLETED | OUTPATIENT
Start: 2021-07-26 | End: 2021-07-26

## 2021-07-26 RX ORDER — METHYLPREDNISOLONE ACETATE 80 MG/ML
80 INJECTION, SUSPENSION INTRA-ARTICULAR; INTRALESIONAL; INTRAMUSCULAR; SOFT TISSUE
Status: COMPLETED | OUTPATIENT
Start: 2021-07-26 | End: 2021-07-26

## 2021-07-26 RX ADMIN — METHYLPREDNISOLONE ACETATE 80 MG: 80 INJECTION, SUSPENSION INTRA-ARTICULAR; INTRALESIONAL; INTRAMUSCULAR; SOFT TISSUE at 11:26

## 2021-07-26 RX ADMIN — LIDOCAINE HYDROCHLORIDE 2 ML: 20 INJECTION, SOLUTION EPIDURAL; INFILTRATION; INTRACAUDAL; PERINEURAL at 11:26

## 2021-07-26 NOTE — PROGRESS NOTES
Mr. Olson comes in today for follow-up.  Injections have worked well in the past.  The patient would like to get a repeat injection today.  The risks, benefits and alternatives were discussed and the patient consented.  Going forward, the patient will follow-up as needed.    Chepe Alicea MD    07/26/2021      Large Joint Arthrocentesis: R knee  Date/Time: 7/26/2021 11:26 AM  Consent given by: patient  Site marked: site marked  Timeout: Immediately prior to procedure a time out was called to verify the correct patient, procedure, equipment, support staff and site/side marked as required   Supporting Documentation  Indications: pain and joint swelling   Procedure Details  Location: knee - R knee  Preparation: Patient was prepped and draped in the usual sterile fashion  Needle gauge: 21g.  Approach: anterolateral  Medications administered: 80 mg methylPREDNISolone acetate 80 MG/ML; 2 mL lidocaine PF 2% 2 %  Patient tolerance: patient tolerated the procedure well with no immediate complications

## 2021-08-02 RX ORDER — HYDROCHLOROTHIAZIDE 25 MG/1
TABLET ORAL
Qty: 90 TABLET | Refills: 0 | Status: SHIPPED | OUTPATIENT
Start: 2021-08-02 | End: 2021-10-29

## 2021-08-02 RX ORDER — GLIMEPIRIDE 4 MG/1
TABLET ORAL
Qty: 180 TABLET | Refills: 0 | Status: SHIPPED | OUTPATIENT
Start: 2021-08-02 | End: 2021-11-01

## 2021-08-13 DIAGNOSIS — G47.33 OBSTRUCTIVE SLEEP APNEA: ICD-10-CM

## 2021-08-13 RX ORDER — NEBIVOLOL HYDROCHLORIDE 10 MG/1
TABLET ORAL
Qty: 30 TABLET | Refills: 4 | Status: SHIPPED | OUTPATIENT
Start: 2021-08-13 | End: 2021-08-26 | Stop reason: SDUPTHER

## 2021-08-13 NOTE — TELEPHONE ENCOUNTER
Rx Refill Note  Requested Prescriptions     Pending Prescriptions Disp Refills   • Bystolic 10 MG tablet [Pharmacy Med Name: BYSTOLIC 10 MG TABLET] 30 tablet      Sig: TAKE ONE TABLET BY MOUTH ONCE NIGHTLY      Last office visit with prescribing clinician: 5/12/2021      Next office visit with prescribing clinician: 8/26/2021            Semaj Patel MA  08/13/21, 12:04 EDT

## 2021-08-23 RX ORDER — CLONIDINE HYDROCHLORIDE 0.1 MG/1
TABLET ORAL
Qty: 90 TABLET | Refills: 1 | Status: SHIPPED | OUTPATIENT
Start: 2021-08-23 | End: 2022-02-25

## 2021-08-23 NOTE — TELEPHONE ENCOUNTER
Rx Refill Note  Requested Prescriptions     Pending Prescriptions Disp Refills   • cloNIDine (CATAPRES) 0.1 MG tablet [Pharmacy Med Name: cloNIDine HCL 0.1MG TABLET] 90 tablet 1     Sig: TAKE ONE TABLET BY MOUTH DAILY      Last office visit with prescribing clinician: 5/12/2021      Next office visit with prescribing clinician: 8/26/2021            Semaj Patel MA  08/23/21, 07:40 EDT

## 2021-08-26 ENCOUNTER — OFFICE VISIT (OUTPATIENT)
Dept: FAMILY MEDICINE CLINIC | Facility: CLINIC | Age: 71
End: 2021-08-26

## 2021-08-26 VITALS
HEART RATE: 45 BPM | SYSTOLIC BLOOD PRESSURE: 128 MMHG | BODY MASS INDEX: 34.57 KG/M2 | TEMPERATURE: 98 F | DIASTOLIC BLOOD PRESSURE: 70 MMHG | HEIGHT: 74 IN | WEIGHT: 269.4 LBS | OXYGEN SATURATION: 97 %

## 2021-08-26 DIAGNOSIS — E78.5 HYPERLIPIDEMIA, UNSPECIFIED HYPERLIPIDEMIA TYPE: ICD-10-CM

## 2021-08-26 DIAGNOSIS — E11.41 TYPE 2 DIABETES MELLITUS WITH DIABETIC MONONEUROPATHY, WITHOUT LONG-TERM CURRENT USE OF INSULIN (HCC): ICD-10-CM

## 2021-08-26 DIAGNOSIS — R00.1 BRADYCARDIA: ICD-10-CM

## 2021-08-26 DIAGNOSIS — I10 ESSENTIAL HYPERTENSION: Primary | ICD-10-CM

## 2021-08-26 LAB
ALBUMIN SERPL-MCNC: 4.5 G/DL (ref 3.5–5.2)
ALBUMIN/GLOB SERPL: 1.7 G/DL
ALP SERPL-CCNC: 29 U/L (ref 39–117)
ALT SERPL-CCNC: 17 U/L (ref 1–41)
AST SERPL-CCNC: 15 U/L (ref 1–40)
BASOPHILS # BLD AUTO: 0.05 10*3/MM3 (ref 0–0.2)
BASOPHILS NFR BLD AUTO: 0.6 % (ref 0–1.5)
BILIRUB SERPL-MCNC: 0.4 MG/DL (ref 0–1.2)
BUN SERPL-MCNC: 32 MG/DL (ref 8–23)
BUN/CREAT SERPL: 24.8 (ref 7–25)
CALCIUM SERPL-MCNC: 9.9 MG/DL (ref 8.6–10.5)
CHLORIDE SERPL-SCNC: 101 MMOL/L (ref 98–107)
CHOLEST SERPL-MCNC: 164 MG/DL (ref 0–200)
CO2 SERPL-SCNC: 29.6 MMOL/L (ref 22–29)
CREAT SERPL-MCNC: 1.29 MG/DL (ref 0.76–1.27)
EOSINOPHIL # BLD AUTO: 0.16 10*3/MM3 (ref 0–0.4)
EOSINOPHIL NFR BLD AUTO: 2.1 % (ref 0.3–6.2)
ERYTHROCYTE [DISTWIDTH] IN BLOOD BY AUTOMATED COUNT: 13.5 % (ref 12.3–15.4)
GLOBULIN SER CALC-MCNC: 2.6 GM/DL
GLUCOSE SERPL-MCNC: 106 MG/DL (ref 65–99)
HBA1C MFR BLD: 5.9 % (ref 4.8–5.6)
HCT VFR BLD AUTO: 40.4 % (ref 37.5–51)
HDLC SERPL-MCNC: 38 MG/DL (ref 40–60)
HGB BLD-MCNC: 12.9 G/DL (ref 13–17.7)
IMM GRANULOCYTES # BLD AUTO: 0.04 10*3/MM3 (ref 0–0.05)
IMM GRANULOCYTES NFR BLD AUTO: 0.5 % (ref 0–0.5)
LDLC SERPL CALC-MCNC: 96 MG/DL (ref 0–100)
LYMPHOCYTES # BLD AUTO: 1.77 10*3/MM3 (ref 0.7–3.1)
LYMPHOCYTES NFR BLD AUTO: 22.8 % (ref 19.6–45.3)
MCH RBC QN AUTO: 28.6 PG (ref 26.6–33)
MCHC RBC AUTO-ENTMCNC: 31.9 G/DL (ref 31.5–35.7)
MCV RBC AUTO: 89.6 FL (ref 79–97)
MONOCYTES # BLD AUTO: 0.5 10*3/MM3 (ref 0.1–0.9)
MONOCYTES NFR BLD AUTO: 6.5 % (ref 5–12)
NEUTROPHILS # BLD AUTO: 5.23 10*3/MM3 (ref 1.7–7)
NEUTROPHILS NFR BLD AUTO: 67.5 % (ref 42.7–76)
NRBC BLD AUTO-RTO: 0 /100 WBC (ref 0–0.2)
PLATELET # BLD AUTO: 184 10*3/MM3 (ref 140–450)
POTASSIUM SERPL-SCNC: 4.2 MMOL/L (ref 3.5–5.2)
PROT SERPL-MCNC: 7.1 G/DL (ref 6–8.5)
RBC # BLD AUTO: 4.51 10*6/MM3 (ref 4.14–5.8)
SODIUM SERPL-SCNC: 145 MMOL/L (ref 136–145)
TRIGL SERPL-MCNC: 172 MG/DL (ref 0–150)
VLDLC SERPL CALC-MCNC: 30 MG/DL (ref 5–40)
WBC # BLD AUTO: 7.75 10*3/MM3 (ref 3.4–10.8)

## 2021-08-26 PROCEDURE — 99214 OFFICE O/P EST MOD 30 MIN: CPT | Performed by: INTERNAL MEDICINE

## 2021-08-26 RX ORDER — NEBIVOLOL 5 MG/1
5 TABLET ORAL NIGHTLY
Qty: 90 TABLET | Refills: 1 | Status: SHIPPED | OUTPATIENT
Start: 2021-08-26 | End: 2022-01-07 | Stop reason: SDUPTHER

## 2021-08-26 RX ORDER — BENAZEPRIL HYDROCHLORIDE 40 MG/1
40 TABLET, FILM COATED ORAL 2 TIMES DAILY
Qty: 180 TABLET | Refills: 1 | Status: SHIPPED | OUTPATIENT
Start: 2021-08-26 | End: 2022-03-28

## 2021-08-26 RX ORDER — AMLODIPINE BESYLATE 10 MG/1
10 TABLET ORAL DAILY
Qty: 90 TABLET | Refills: 1 | Status: SHIPPED | OUTPATIENT
Start: 2021-08-26 | End: 2022-03-08

## 2021-08-26 RX ORDER — FENOFIBRATE 160 MG/1
160 TABLET ORAL NIGHTLY
Qty: 90 TABLET | Refills: 1 | Status: SHIPPED | OUTPATIENT
Start: 2021-08-26 | End: 2022-03-08

## 2021-08-26 RX ORDER — ESCITALOPRAM OXALATE 10 MG/1
10 TABLET ORAL DAILY
Qty: 90 TABLET | Refills: 1 | Status: SHIPPED | OUTPATIENT
Start: 2021-08-26 | End: 2022-03-14

## 2021-08-26 NOTE — PROGRESS NOTES
Subjective Chief complaint is follow-up on blood pressure  Jeb Olson is a 71 y.o. male.     History of Present Illness Rosas is here today for follow-up.  At his last visit we did increase his doxazosin up because his blood pressure was little bit elevated.  His heart rate was in the 40s and he reports that is where it is most of the time.  He is getting dizzy or lightheaded.  Sometimes this is when he first gets up but sometimes it can just be with walking or exertion.  He is not having chest pain with this.  I suspect that he needs his heart rate to be up a little bit more.  We did discuss last time about possibly cutting back on his Bystolic.  He does have non-insulin-dependent diabetes.  Is due for check on his A1c.  His cholesterol has been controlled with the combination of fenofibrate and Zetia.  He has been statin intolerant.    The following portions of the patient's history were reviewed and updated as appropriate: allergies, current medications, past family history, past medical history, past social history, past surgical history and problem list.    Review of Systems   Constitutional: Negative for chills and fever.   Respiratory: Negative for chest tightness and shortness of breath.    Cardiovascular: Negative for chest pain.   Neurological: Positive for light-headedness.       Objective   Physical Exam  Constitutional:       Appearance: Normal appearance.   Cardiovascular:      Rate and Rhythm: Regular rhythm. Bradycardia present.   Pulmonary:      Breath sounds: No wheezing, rhonchi or rales.   Neurological:      Mental Status: He is alert.           Assessment/Plan   Diagnoses and all orders for this visit:    1. Essential hypertension (Primary)  -     CBC & Differential    2. Bradycardia    3. Type 2 diabetes mellitus with diabetic mononeuropathy, without long-term current use of insulin (CMS/[facility])  -     Hemoglobin A1c  -     Comprehensive Metabolic Panel    4. Hyperlipidemia, unspecified  hyperlipidemia type  -     Lipid Panel    Other orders  -     nebivolol (Bystolic) 5 MG tablet; Take 1 tablet by mouth Every Night.  Dispense: 90 tablet; Refill: 1  -     amLODIPine (NORVASC) 10 MG tablet; Take 1 tablet by mouth Daily.  Dispense: 90 tablet; Refill: 1  -     benazepril (LOTENSIN) 40 MG tablet; Take 1 tablet by mouth 2 (Two) Times a Day.  Dispense: 180 tablet; Refill: 1  -     escitalopram (LEXAPRO) 10 MG tablet; Take 1 tablet by mouth Daily.  Dispense: 90 tablet; Refill: 1  -     fenofibrate 160 MG tablet; Take 1 tablet by mouth Every Night.  Dispense: 90 tablet; Refill: 1      Rosas is here today for follow-up.  I am going to let him back off on his Bystolic.  We have sent a new prescription for 5 mg daily instead of 10.  I am going to see him back in 1 month.

## 2021-10-18 ENCOUNTER — OFFICE VISIT (OUTPATIENT)
Dept: FAMILY MEDICINE CLINIC | Facility: CLINIC | Age: 71
End: 2021-10-18

## 2021-10-18 VITALS
OXYGEN SATURATION: 97 % | HEIGHT: 74 IN | DIASTOLIC BLOOD PRESSURE: 64 MMHG | WEIGHT: 273 LBS | HEART RATE: 47 BPM | SYSTOLIC BLOOD PRESSURE: 140 MMHG | BODY MASS INDEX: 35.04 KG/M2

## 2021-10-18 DIAGNOSIS — R00.1 SINUS BRADYCARDIA: Primary | ICD-10-CM

## 2021-10-18 DIAGNOSIS — I10 ESSENTIAL HYPERTENSION: ICD-10-CM

## 2021-10-18 PROCEDURE — 99213 OFFICE O/P EST LOW 20 MIN: CPT | Performed by: INTERNAL MEDICINE

## 2021-10-18 RX ORDER — DOXAZOSIN MESYLATE 1 MG/1
TABLET ORAL
Status: ON HOLD | COMMUNITY
Start: 2021-10-05 | End: 2022-09-01

## 2021-10-18 RX ORDER — NEBIVOLOL HYDROCHLORIDE 5 MG/1
TABLET ORAL
COMMUNITY
Start: 2021-09-02 | End: 2022-01-07 | Stop reason: SDUPTHER

## 2021-10-18 NOTE — PROGRESS NOTES
Answers for HPI/ROS submitted by the patient on 10/11/2021  What is the primary reason for your visit?: High Blood Pressure  Onset: more than 1 year ago  Progression since onset: gradually improving  Condition status: controlled  blurred vision: Yes  chest pain: No  headaches: No  malaise/fatigue: Yes  neck pain: No  orthopnea: No  palpitations: No  peripheral edema: No  PND: No  shortness of breath: No  sweats: Yes  CAD risks: diabetes mellitus, dyslipidemia, obesity, sedentary lifestyle  Compliance problems: exercise    Subjective Chief complaint is follow-up on blood pressure and heart rate  Jeb Olson is a 71 y.o. male.     History of Present Illness Rosas is here today for follow-up.  At his last visit his heart rate was in the 40s.  He is on a number of different medications for his blood pressure.  He was on 10 mg of Bystolic.  We did backing off to 5 mg daily.  His heart rate still is in the 40s.  He is not dizzy or lightheaded with this.  His blood pressure to my exam today was 140/64 so it is really not much different on the lower dose of beta-blocker.    The following portions of the patient's history were reviewed and updated as appropriate: allergies, current medications, past family history, past medical history, past social history, past surgical history and problem list.    Review of Systems   Eyes: Positive for blurred vision.   Respiratory: Negative for shortness of breath.    Cardiovascular: Negative for chest pain and palpitations.   Musculoskeletal: Negative for neck pain.       Objective   Physical Exam  Vitals and nursing note reviewed.   Cardiovascular:      Rate and Rhythm: Regular rhythm. Bradycardia present.      Heart sounds: No murmur heard.  No friction rub. No gallop.            Assessment/Plan   Diagnoses and all orders for this visit:    1. Sinus bradycardia (Primary)    2. Essential hypertension    Rosas is here today for follow-up.  We did back off on his bisoprolol at his  last visit.  His heart rate is still in the 40s.  His blood pressure really is no different on the lower dose and therefore we are going to keep it at 5 mg.  I am going to see him back in 6 months.  We may consider backing off on the beta-blocker little further at that time

## 2021-10-21 RX ORDER — EZETIMIBE 10 MG/1
TABLET ORAL
Qty: 90 TABLET | Refills: 1 | Status: SHIPPED | OUTPATIENT
Start: 2021-10-21 | End: 2022-04-18 | Stop reason: SDUPTHER

## 2021-10-27 ENCOUNTER — CLINICAL SUPPORT (OUTPATIENT)
Dept: ORTHOPEDIC SURGERY | Facility: CLINIC | Age: 71
End: 2021-10-27

## 2021-10-27 VITALS — TEMPERATURE: 98.2 F | HEIGHT: 74 IN | WEIGHT: 273 LBS | BODY MASS INDEX: 35.04 KG/M2

## 2021-10-27 DIAGNOSIS — M17.10 ARTHRITIS OF KNEE: Primary | ICD-10-CM

## 2021-10-27 PROCEDURE — 20610 DRAIN/INJ JOINT/BURSA W/O US: CPT | Performed by: ORTHOPAEDIC SURGERY

## 2021-10-27 RX ORDER — METHYLPREDNISOLONE ACETATE 80 MG/ML
80 INJECTION, SUSPENSION INTRA-ARTICULAR; INTRALESIONAL; INTRAMUSCULAR; SOFT TISSUE
Status: COMPLETED | OUTPATIENT
Start: 2021-10-27 | End: 2021-10-27

## 2021-10-27 RX ADMIN — METHYLPREDNISOLONE ACETATE 80 MG: 80 INJECTION, SUSPENSION INTRA-ARTICULAR; INTRALESIONAL; INTRAMUSCULAR; SOFT TISSUE at 09:27

## 2021-10-27 NOTE — PROGRESS NOTES
Mr. Olson follows up for his right knee.  No new complaints.  The risk, benefits and alternatives to a repeat injection were discussed.  He consented and the injection was performed as described below.  Okay to follow-up as needed.    Large Joint Arthrocentesis: R knee  Date/Time: 10/27/2021 9:27 AM  Consent given by: patient  Site marked: site marked  Timeout: Immediately prior to procedure a time out was called to verify the correct patient, procedure, equipment, support staff and site/side marked as required   Supporting Documentation  Indications: pain and joint swelling   Procedure Details  Location: knee - R knee  Preparation: Patient was prepped and draped in the usual sterile fashion  Needle gauge: 21g.  Approach: anterolateral  Medications administered: 80 mg methylPREDNISolone acetate 80 MG/ML; 2 mL lidocaine (cardiac)  Patient tolerance: patient tolerated the procedure well with no immediate complications

## 2021-10-29 RX ORDER — HYDROCHLOROTHIAZIDE 25 MG/1
TABLET ORAL
Qty: 90 TABLET | Refills: 0 | Status: SHIPPED | OUTPATIENT
Start: 2021-10-29 | End: 2022-01-26

## 2021-11-01 RX ORDER — GLIMEPIRIDE 4 MG/1
TABLET ORAL
Qty: 180 TABLET | Refills: 0 | Status: SHIPPED | OUTPATIENT
Start: 2021-11-01 | End: 2022-02-21

## 2021-12-14 RX ORDER — DOXAZOSIN 2 MG/1
TABLET ORAL
Qty: 90 TABLET | Refills: 1 | Status: SHIPPED | OUTPATIENT
Start: 2021-12-14 | End: 2022-04-18

## 2021-12-14 NOTE — TELEPHONE ENCOUNTER
Rx Refill Note  Requested Prescriptions     Pending Prescriptions Disp Refills   • doxazosin (CARDURA) 2 MG tablet [Pharmacy Med Name: DOXAZOSIN MESYLATE 2 MG TAB] 90 tablet 1     Sig: TAKE ONE TABLET BY MOUTH ONCE NIGHTLY      Last office visit with prescribing clinician: 10/18/2021      Next office visit with prescribing clinician: 4/18/2022            Semaj Patel MA  12/14/21, 08:46 EST

## 2022-01-07 RX ORDER — NEBIVOLOL 5 MG/1
5 TABLET ORAL DAILY
Qty: 90 TABLET | Refills: 1 | Status: SHIPPED | OUTPATIENT
Start: 2022-01-07 | End: 2022-04-18

## 2022-01-26 RX ORDER — SITAGLIPTIN AND METFORMIN HYDROCHLORIDE 1000; 50 MG/1; MG/1
TABLET, FILM COATED, EXTENDED RELEASE ORAL
Qty: 60 TABLET | Refills: 5 | Status: SHIPPED | OUTPATIENT
Start: 2022-01-26 | End: 2022-07-26

## 2022-01-26 RX ORDER — HYDROCHLOROTHIAZIDE 25 MG/1
TABLET ORAL
Qty: 90 TABLET | Refills: 0 | Status: SHIPPED | OUTPATIENT
Start: 2022-01-26 | End: 2022-04-18 | Stop reason: SDUPTHER

## 2022-02-02 ENCOUNTER — CLINICAL SUPPORT (OUTPATIENT)
Dept: ORTHOPEDIC SURGERY | Facility: CLINIC | Age: 72
End: 2022-02-02

## 2022-02-02 VITALS — HEIGHT: 74 IN | WEIGHT: 273 LBS | BODY MASS INDEX: 35.04 KG/M2 | TEMPERATURE: 98.4 F

## 2022-02-02 DIAGNOSIS — M17.10 ARTHRITIS OF KNEE: Primary | ICD-10-CM

## 2022-02-02 PROCEDURE — 73562 X-RAY EXAM OF KNEE 3: CPT | Performed by: ORTHOPAEDIC SURGERY

## 2022-02-02 PROCEDURE — 20610 DRAIN/INJ JOINT/BURSA W/O US: CPT | Performed by: ORTHOPAEDIC SURGERY

## 2022-02-02 RX ORDER — TRIAMCINOLONE ACETONIDE 40 MG/ML
80 INJECTION, SUSPENSION INTRA-ARTICULAR; INTRAMUSCULAR
Status: COMPLETED | OUTPATIENT
Start: 2022-02-02 | End: 2022-02-02

## 2022-02-02 RX ADMIN — TRIAMCINOLONE ACETONIDE 80 MG: 40 INJECTION, SUSPENSION INTRA-ARTICULAR; INTRAMUSCULAR at 15:29

## 2022-02-02 NOTE — PROGRESS NOTES
Mr. Olson comes in today for follow-up.  Injections have worked well in the past.  Hewould like to get a repeat injection today.  Bilateral standing AP views, bilateral merchants views and a lateral view of the right knee are ordered by myself and reviewed to evaluate the patient's complaint.  These are compared to previous xray.  The x-rays show severe degenerative arthritis including bone on bone degeneration,, osteophyte and subchondral sclerosis.  The majority of the degenerative changes appear to involve the medial and patellofemoral compartment.  The risks, benefits and alternatives were discussed and the patient consented.  Going forward, the patient will follow-up as needed.      Large Joint Arthrocentesis: R knee  Date/Time: 2/2/2022 3:29 PM  Consent given by: patient  Site marked: site marked  Timeout: Immediately prior to procedure a time out was called to verify the correct patient, procedure, equipment, support staff and site/side marked as required   Supporting Documentation  Indications: pain   Procedure Details  Location: knee - R knee  Preparation: Patient was prepped and draped in the usual sterile fashion  Needle gauge: 21.  Approach: anterolateral  Medications administered: 1 mL lidocaine (cardiac); 80 mg triamcinolone acetonide 40 MG/ML  Patient tolerance: patient tolerated the procedure well with no immediate complications        Chepe Alicea MD    02/02/2022

## 2022-02-21 RX ORDER — GLIMEPIRIDE 4 MG/1
TABLET ORAL
Qty: 180 TABLET | Refills: 0 | Status: SHIPPED | OUTPATIENT
Start: 2022-02-21 | End: 2022-04-18 | Stop reason: SDUPTHER

## 2022-02-25 RX ORDER — CLONIDINE HYDROCHLORIDE 0.1 MG/1
TABLET ORAL
Qty: 90 TABLET | Refills: 1 | Status: SHIPPED | OUTPATIENT
Start: 2022-02-25 | End: 2022-08-24

## 2022-02-25 NOTE — TELEPHONE ENCOUNTER
Rx Refill Note  Requested Prescriptions     Pending Prescriptions Disp Refills   • cloNIDine (CATAPRES) 0.1 MG tablet [Pharmacy Med Name: cloNIDine HCL 0.1MG TABLET] 90 tablet 1     Sig: TAKE ONE TABLET BY MOUTH DAILY      Last office visit with prescribing clinician: 10/18/2021      Next office visit with prescribing clinician: 4/18/2022            Semaj Patel MA  02/25/22, 12:35 EST

## 2022-03-05 NOTE — TELEPHONE ENCOUNTER
3/4/2022  55 year old male being seen for his chronic medical problems and also complains of:     generalized abdominal discomfort that comes and goes, frequently after having caffeine or alcohol.  He states he has cut back on the caffeine and alcohol an things are somewhat better.  Denies dysphagia or odynophagia.  No diarrhea or bloody or black stools.    Type 2 diabetes:  Sounds as if he could do a little better with lifestyle changes and checking his sugars.  2 hours after meal he states blood sugar was 173, in the morning they probably are usually in the 140s.  Denies sores on his feet but he feels little numbness and tingling to his feet, which is not new.    Hypertension:  Denies chest pains or shortness of breath.  His blood pressures are not to goal so we will adjust.  Adding amlodipine.    Fast heartbeat:  Seems irregular so EKG was obtained reveals sinus rhythm rate around 100.  He is not sure if it is just because he is nervous.  We will be checking lab work including CBC and TSH.    Thick toenails/onychomycosis:  We discussed treatment he is unsure if he would want to proceed.  Not dramatically symptomatic, but with diabetes it does put him at risk for infections.      Patient Active Problem List    Diagnosis Date Noted   • Type 2 diabetes mellitus with diabetic neuropathy, without long-term current use of insulin (CMS/[facility]) 07/10/2019     Priority: Low   • Gastroesophageal reflux disease 06/24/2019     Priority: Low   • Numbness of toes 06/24/2019     Priority: Low   • Type 2 diabetes mellitus (CMS/[facility]) 06/01/2019     Priority: Low   • Essential hypertension 06/01/2019     Priority: Low   • Pure hypercholesterolemia 11/19/2001     Priority: Low     Past Medical History:   Diagnosis Date   • Essential hypertension 06/2019   • Pure hypercholesterolemia    • Type 2 diabetes mellitus (CMS/[facility]) 06/2019      Past Surgical History:   Procedure Laterality Date   • Excise varicocele       Current Outpatient  Rx Refill Note  Requested Prescriptions     Pending Prescriptions Disp Refills   • ezetimibe (ZETIA) 10 MG tablet [Pharmacy Med Name: EZETIMIBE 10 MG TABLET] 90 tablet 1     Sig: TAKE ONE TABLET BY MOUTH DAILY      Last office visit with prescribing clinician: 10/18/2021      Next office visit with prescribing clinician: 4/18/2022            Semaj Patel MA  10/21/21, 14:40 EDT   Medications   Medication Sig Dispense Refill   • metFORMIN (GLUCOPHAGE-XR) 500 MG 24 hr tablet Take 1 tablet by mouth every evening. 30 tablet 0   • lisinopril-hydroCHLOROthiazide (ZESTORETIC) 20-12.5 MG per tablet Take 2 tablets by mouth daily. 60 tablet 0   • rosuvastatin (CRESTOR) 20 MG tablet Take 1 tablet by mouth daily. 90 tablet 3   • pantoprazole (PROTONIX) 40 MG tablet Take 1 tablet by mouth daily (before breakfast). 90 tablet 0   • amLODIPine (NORVASC) 5 MG tablet Take 1 tablet by mouth every evening. 90 tablet 0     No current facility-administered medications for this visit.     ALLERGIES:  No Known Allergies  Social History     Tobacco Use   • Smoking status: Never Smoker   • Smokeless tobacco: Never Used   Substance Use Topics   • Alcohol use: Yes     Alcohol/week: 15.0 - 20.0 standard drinks     Types: 15 - 20 Cans of beer per week   • Drug use: No     Family History   Problem Relation Age of Onset   • Leukemia Mother    • Hypertension Mother    • Other Father         vertigo   • Depression Brother      Review of Systems:   Weight: fluctuates  ENT: negative, vision is okay and hearing adequate  Respiratory:  negative, no cough, shortness of breath or pleuritic symptoms  Cardiac:  Denies chest pain or symptomatic palpitations.  No lightheadedness.  Gastrointestinal:  Abdominal complaints as above, stools are normal, no bloody or black stools.  Genitourinary: negative  Musculoskeletal:  negative  Neurologic:  Complains of some numb feeling on the bottom of his feet got testing was normal    Physical Exam:  Vitals:    Visit Vitals  BP (!) 130/100 (BP Location: LUE - Left upper extremity, Patient Position: Sitting, Cuff Size: Regular)   Pulse (!) 119   Ht 5' 9\" (1.753 m)   Wt 108.8 kg (239 lb 15.5 oz)   SpO2 95%   BMI 35.44 kg/m²         General: no apparent distress.  Skin:  Try slightly irritated soles of feet, no open sores.  ENT:  Head:  normocephalic/atraumatic  Eyes:  PERRL, EOMI, sclera  white.  Neck: normal, supple and no lymphadenopathy  Lungs: CTA, no wheezing or rales  Cardiovascular:  Rapid, rate around 100.  Regular without significant murmur  Abdomen:  Obese, soft, nontender.  No hepatosplenomegaly.  No inguinal adenopathy.  Musculoskeletal: Symmetric extremities, normal feet, no pedal lesions, no edema and good distal pulses  Neurologic:  alert, oriented, conversant and exam is grossly nonfocal.  Diabetic Foot Exam Documentation   Normal Bilateral Foot Exam:  Skin integrity is normal. Dorsalis pedis and posterior tibial pulses are present.  Pressure sensation using the [place] monofilament is present.    EKG:  Sinus rhythm rate 98, no ischemic changes.  Will await official interpretation.    Assessment and plan:  Type 2 diabetes: Need to check A1c today and make recommendations after these labs are back.  May need to consider metformin dose increase.  Hypertension:  Needs improved control.  Add amlodipine.  Rapid heart rate:  Check CBC and other labs.  Hyperlipidemia:  Continue statin, asymptomatic.  Abdominal discomfort:  Made be excessive acid symptoms.  Differential diagnosis considered so We will check lab work.  In the meantime start PPI and update me if he is not improved.  Onychomycosis:  If he would like treatment he will let us know.  Make sure he tolerates the 2 new medications today.  Follow-up in 6 months with preclinic lab work.

## 2022-03-08 RX ORDER — AMLODIPINE BESYLATE 10 MG/1
TABLET ORAL
Qty: 90 TABLET | Refills: 1 | Status: SHIPPED | OUTPATIENT
Start: 2022-03-08 | End: 2022-09-02

## 2022-03-08 RX ORDER — FENOFIBRATE 160 MG/1
TABLET ORAL
Qty: 90 TABLET | Refills: 1 | Status: SHIPPED | OUTPATIENT
Start: 2022-03-08 | End: 2022-09-02

## 2022-03-14 RX ORDER — ESCITALOPRAM OXALATE 10 MG/1
TABLET ORAL
Qty: 90 TABLET | Refills: 1 | Status: SHIPPED | OUTPATIENT
Start: 2022-03-14 | End: 2022-09-26

## 2022-03-28 RX ORDER — BENAZEPRIL HYDROCHLORIDE 40 MG/1
TABLET, FILM COATED ORAL
Qty: 180 TABLET | Refills: 1 | Status: SHIPPED | OUTPATIENT
Start: 2022-03-28 | End: 2022-10-05

## 2022-04-18 ENCOUNTER — OFFICE VISIT (OUTPATIENT)
Dept: FAMILY MEDICINE CLINIC | Facility: CLINIC | Age: 72
End: 2022-04-18

## 2022-04-18 VITALS
HEIGHT: 74 IN | SYSTOLIC BLOOD PRESSURE: 144 MMHG | DIASTOLIC BLOOD PRESSURE: 72 MMHG | WEIGHT: 277.8 LBS | BODY MASS INDEX: 35.65 KG/M2 | HEART RATE: 44 BPM | OXYGEN SATURATION: 96 %

## 2022-04-18 DIAGNOSIS — R00.1 SINUS BRADYCARDIA: ICD-10-CM

## 2022-04-18 DIAGNOSIS — I10 ESSENTIAL HYPERTENSION: ICD-10-CM

## 2022-04-18 DIAGNOSIS — E11.41 TYPE 2 DIABETES MELLITUS WITH DIABETIC MONONEUROPATHY, WITHOUT LONG-TERM CURRENT USE OF INSULIN: ICD-10-CM

## 2022-04-18 DIAGNOSIS — Z00.00 ENCOUNTER FOR SUBSEQUENT ANNUAL WELLNESS VISIT (AWV) IN MEDICARE PATIENT: Primary | ICD-10-CM

## 2022-04-18 DIAGNOSIS — R14.1 GAS PAIN: ICD-10-CM

## 2022-04-18 PROCEDURE — 1159F MED LIST DOCD IN RCRD: CPT | Performed by: INTERNAL MEDICINE

## 2022-04-18 PROCEDURE — 90715 TDAP VACCINE 7 YRS/> IM: CPT | Performed by: INTERNAL MEDICINE

## 2022-04-18 PROCEDURE — 1170F FXNL STATUS ASSESSED: CPT | Performed by: INTERNAL MEDICINE

## 2022-04-18 PROCEDURE — 96160 PT-FOCUSED HLTH RISK ASSMT: CPT | Performed by: INTERNAL MEDICINE

## 2022-04-18 PROCEDURE — 90471 IMMUNIZATION ADMIN: CPT | Performed by: INTERNAL MEDICINE

## 2022-04-18 PROCEDURE — G0439 PPPS, SUBSEQ VISIT: HCPCS | Performed by: INTERNAL MEDICINE

## 2022-04-18 RX ORDER — DOXAZOSIN 2 MG/1
2 TABLET ORAL NIGHTLY
Qty: 90 TABLET | Refills: 1 | Status: SHIPPED | OUTPATIENT
Start: 2022-04-18 | End: 2022-11-21 | Stop reason: SDUPTHER

## 2022-04-18 RX ORDER — BRIMONIDINE TARTRATE AND TIMOLOL MALEATE 2; 5 MG/ML; MG/ML
1 SOLUTION OPHTHALMIC EVERY 12 HOURS
Status: ON HOLD | COMMUNITY
End: 2022-09-01

## 2022-04-18 RX ORDER — GLIMEPIRIDE 4 MG/1
4 TABLET ORAL 2 TIMES DAILY
Qty: 180 TABLET | Refills: 1 | Status: SHIPPED | OUTPATIENT
Start: 2022-04-18 | End: 2022-12-17 | Stop reason: SDUPTHER

## 2022-04-18 RX ORDER — EZETIMIBE 10 MG/1
10 TABLET ORAL DAILY
Qty: 90 TABLET | Refills: 1 | Status: SHIPPED | OUTPATIENT
Start: 2022-04-18 | End: 2022-10-27 | Stop reason: SDUPTHER

## 2022-04-18 RX ORDER — HYDROCHLOROTHIAZIDE 25 MG/1
25 TABLET ORAL DAILY
Qty: 90 TABLET | Refills: 1 | Status: SHIPPED | OUTPATIENT
Start: 2022-04-18 | End: 2022-11-03 | Stop reason: SDUPTHER

## 2022-04-18 NOTE — PROGRESS NOTES
The ABCs of the Annual Wellness Visit  Subsequent Medicare Wellness Visit    Chief Complaint   Patient presents with   • Medicare Wellness-subsequent      Subjective    History of Present Illness:  Jeb Olson is a 71 y.o. male who presents for a Subsequent Medicare Wellness Visit.  Rosas is here today for his wellness visit.  He has been doing okay for the most part.  His diet has been a little bit worse here lately.  He did recently have some cataract surgery and will have the other eye done here shortly.  He is complaining of some intestinal gas pressure.  He feels this in his back.  He does report that taking antiacids does seem to help.  This is not an exertional issue.  We did discuss living [place].  We did advise of naming a healthcare surrogate and outlining his wishes.  We will have some information provided in the after visit summary.    The following portions of the patient's history were reviewed and   updated as appropriate: allergies, current medications, past family history, past medical history, past social history, past surgical history and problem list.    Compared to one year ago, the patient feels his physical   health is the same.    Compared to one year ago, the patient feels his mental   health is the same.    Recent Hospitalizations:  He was not admitted to the hospital during the last year.       Current Medical Providers:  Patient Care Team:  Tera Salvador MD as PCP - General (Internal Medicine)    Outpatient Medications Prior to Visit   Medication Sig Dispense Refill   • Acetaminophen (TYLENOL PO) Take  by mouth.     • amLODIPine (NORVASC) 10 MG tablet TAKE ONE TABLET BY MOUTH DAILY 90 tablet 1   • aspirin 81 MG EC tablet Take 1 tablet by mouth 2 (Two) Times a Day. Indications: DVT Prophylaxis     • benazepril (LOTENSIN) 40 MG tablet TAKE ONE TABLET BY MOUTH TWICE A  tablet 1   • brimonidine-timolol (COMBIGAN) 0.2-0.5 % ophthalmic solution 1 drop Every 12 (Twelve)  Hours. Both eyes,  twice a day     • cloNIDine (CATAPRES) 0.1 MG tablet TAKE ONE TABLET BY MOUTH DAILY 90 tablet 1   • docusate sodium 100 MG capsule Take 1 capsule by mouth 2 (Two) times a day. 60 each 0   • doxazosin (CARDURA) 2 MG tablet TAKE ONE TABLET BY MOUTH ONCE NIGHTLY 90 tablet 1   • escitalopram (LEXAPRO) 10 MG tablet TAKE ONE TABLET BY MOUTH DAILY 90 tablet 1   • ezetimibe (ZETIA) 10 MG tablet TAKE ONE TABLET BY MOUTH DAILY 90 tablet 1   • fenofibrate 160 MG tablet TAKE ONE TABLET BY MOUTH ONCE NIGHTLY 90 tablet 1   • fluticasone (FLONASE) 50 MCG/ACT nasal spray 1 spray into the nostril(s) as directed by provider 2 (Two) Times a Day.     • gabapentin (NEURONTIN) 600 MG tablet      • glimepiride (AMARYL) 4 MG tablet TAKE ONE TABLET BY MOUTH TWICE A  tablet 0   • glucose blood (Accu-Chek Anabela Plus) test strip TEST TWICE DAILY Use as instructed 100 each 3   • hydroCHLOROthiazide (HYDRODIURIL) 25 MG tablet TAKE ONE TABLET BY MOUTH DAILY 90 tablet 0   • Janumet XR  MG tablet TAKE ONE TABLET BY MOUTH TWICE A DAY 60 tablet 5   • Lutein-Zeaxanthin-Selenium (VITEYES ESSENTIALS PO) Take  by mouth.     • Olopatadine HCl (PATADAY OP) Apply  to eye(s) as directed by provider.     • doxazosin (CARDURA) 1 MG tablet      • erythromycin (ROMYCIN) 5 MG/GM ophthalmic ointment Administer  to both eyes Every Night.     • nebivolol (BYSTOLIC) 5 MG tablet Take 1 tablet by mouth Daily. 90 tablet 1   • ondansetron (ZOFRAN) 4 MG tablet Take 1 tablet by mouth every 6 (Six) hours as needed for nausea or vomiting. 12 tablet 0     Facility-Administered Medications Prior to Visit   Medication Dose Route Frequency Provider Last Rate Last Admin   • Chlorhexidine Gluconate 2 % pads 3 each  3 pad Apply externally BID Pricila Barnes, APRN           No opioid medication identified on active medication list. I have reviewed chart for other potential  high risk medication/s and harmful drug interactions in the  "elderly.          Aspirin is on active medication list. Aspirin use is indicated based on review of current medical condition/s. Pros and cons of this therapy have been discussed today. Benefits of this medication outweigh potential harm.  Patient has been encouraged to continue taking this medication.  .      Patient Active Problem List   Diagnosis   • Allergic rhinitis   • Arthritis of knee, left   • Diabetes mellitus (HCC)   • Essential hypertension   • Hyperlipidemia   • High risk medication use   • Obesity   • Primary osteoarthritis of knee   • Mild chronic anemia   • Obstructive sleep apnea   • Arthritis of left knee   • Sinus bradycardia     Advance Care Planning  Advance Directive is not on file.  ACP discussion was held with the patient during this visit. Patient does not have an advance directive, information provided.    Review of Systems   Constitutional: Negative for chills and fever.   Respiratory: Negative for cough, chest tightness and shortness of breath.    Neurological: Negative for dizziness and light-headedness.        Objective    Vitals:    04/18/22 0820   BP: 144/72   Pulse: (!) 44   SpO2: 96%   Weight: 126 kg (277 lb 12.8 oz)   Height: 188 cm (74.02\")     BMI Readings from Last 1 Encounters:   04/18/22 35.65 kg/m²   BMI is above normal parameters. Recommendations include: educational material    Does the patient have evidence of cognitive impairment? No    Physical Exam  Vitals and nursing note reviewed.   Constitutional:       Appearance: Normal appearance.   Cardiovascular:      Rate and Rhythm: Regular rhythm. Bradycardia present.      Pulses: Normal pulses.   Pulmonary:      Effort: Pulmonary effort is normal.      Breath sounds: No wheezing, rhonchi or rales.   Abdominal:      General: Bowel sounds are normal.      Palpations: Abdomen is soft.      Tenderness: There is no abdominal tenderness. There is no guarding or rebound.   Musculoskeletal:      Right lower leg: No edema.      Left " lower leg: No edema.   Neurological:      Mental Status: He is alert.                 HEALTH RISK ASSESSMENT    Smoking Status:  Social History     Tobacco Use   Smoking Status Never Smoker   Smokeless Tobacco Never Used   Tobacco Comment    Naila only every been around people who smoked     Alcohol Consumption:  Social History     Substance and Sexual Activity   Alcohol Use Yes   • Alcohol/week: 0.0 standard drinks    Comment: Maybe 8-10 per week. Stopped drinking beer August 2018     Fall Risk Screen:    TERRI Fall Risk Assessment has not been completed.    Depression Screening:  PHQ-2/PHQ-9 Depression Screening 4/18/2022   Retired PHQ-9 Total Score -   Retired Total Score -   Little Interest or Pleasure in Doing Things 0-->not at all   Feeling Down, Depressed or Hopeless 0-->not at all   PHQ-9: Brief Depression Severity Measure Score 0       Health Habits and Functional and Cognitive Screening:  Functional & Cognitive Status 4/18/2022   Do you have difficulty preparing food and eating? No   Do you have difficulty bathing yourself, getting dressed or grooming yourself? No   Do you have difficulty using the toilet? No   Do you have difficulty moving around from place to place? No   Do you have trouble with steps or getting out of a bed or a chair? No   Current Diet Unhealthy Diet   Dental Exam Up to date   Eye Exam Up to date   Exercise (times per week) -   Current Exercise Activities Include -   Do you need help using the phone?  No   Are you deaf or do you have serious difficulty hearing?  No   Do you need help with transportation? No   Do you need help shopping? No   Do you need help preparing meals?  No   Do you need help with housework?  No   Do you need help with laundry? No   Do you need help taking your medications? No   Do you need help managing money? No   Do you ever drive or ride in a car without wearing a seat belt? No   Have you felt unusual stress, anger or loneliness in the last month? -   Who do you  live with? -   If you need help, do you have trouble finding someone available to you? -   Have you been bothered in the last four weeks by sexual problems? -   Do you have difficulty concentrating, remembering or making decisions? No       Age-appropriate Screening Schedule:  Refer to the list below for future screening recommendations based on patient's age, sex and/or medical conditions. Orders for these recommended tests are listed in the plan section. The patient has been provided with a written plan.    Health Maintenance   Topic Date Due   • ZOSTER VACCINE (1 of 2) Never done   • TDAP/TD VACCINES (1 - Tdap) 04/19/2014   • DIABETIC FOOT EXAM  08/07/2021   • URINE MICROALBUMIN  08/07/2021   • HEMOGLOBIN A1C  02/26/2022   • INFLUENZA VACCINE  08/01/2022   • LIPID PANEL  08/26/2022   • DIABETIC EYE EXAM  02/21/2023              Assessment/Plan   CMS Preventative Services Quick Reference  Risk Factors Identified During Encounter  Immunizations Discussed/Encouraged (specific Immunizations; Tdap and Shingrix  Obesity/Overweight   The above risks/problems have been discussed with the patient.  Follow up actions/plans if indicated are seen below in the Assessment/Plan Section.  Pertinent information has been shared with the patient in the After Visit Summary.    Diagnoses and all orders for this visit:    1. Encounter for subsequent annual wellness visit (AWV) in Medicare patient (Primary)    2. Essential hypertension  -     Comprehensive Metabolic Panel  -     CBC & Differential    3. Sinus bradycardia    4. Type 2 diabetes mellitus with diabetic mononeuropathy, without long-term current use of insulin (HCC)  -     Comprehensive Metabolic Panel  -     Hemoglobin A1c  -     Microalbumin / Creatinine Urine Ratio - Urine, Clean Catch  -     Lipid Panel    5. Gas pain  -     Amylase  -     Lipase    Other orders  -     Tdap Vaccine Greater Than or Equal To 6yo IM  -     doxazosin (CARDURA) 2 MG tablet; Take 1 tablet by  mouth Every Night.  Dispense: 90 tablet; Refill: 1  -     ezetimibe (ZETIA) 10 MG tablet; Take 1 tablet by mouth Daily.  Dispense: 90 tablet; Refill: 1  -     hydroCHLOROthiazide (HYDRODIURIL) 25 MG tablet; Take 1 tablet by mouth Daily.  Dispense: 90 tablet; Refill: 1  -     glimepiride (AMARYL) 4 MG tablet; Take 1 tablet by mouth 2 (Two) Times a Day.  Dispense: 180 tablet; Refill: 1    Rosas is here today for his wellness visit.  After today will be up to speed on most things other than the Shingrix vaccine.  We did advise him that he can pick that up at his pharmacy.    Follow Up:   No follow-ups on file.     An After Visit Summary and PPPS were made available to the patient.

## 2022-04-18 NOTE — PATIENT INSTRUCTIONS
Medicare Wellness  Personal Prevention Plan of Service     Date of Office Visit:    Encounter Provider:  Tera Salvador MD  Place of Service:  [facility]  Patient Name: Jeb Olson  :  1950    As part of the Medicare Wellness portion of your visit today, we are providing you with this personalized preventive plan of services (PPPS). This plan is based upon recommendations of the [place] Preventive Services Task Force (USPSTF) and the Advisory Committee on Immunization Practices (ACIP).    This lists the preventive care services that should be considered, and provides dates of when you are due. Items listed as completed are up-to-date and do not require any further intervention.    Health Maintenance   Topic Date Due    ZOSTER VACCINE (1 of 2) Never done    TDAP/TD VACCINES (1 - Tdap) 2014    ANNUAL WELLNESS VISIT  2021    DIABETIC FOOT EXAM  2021    URINE MICROALBUMIN  2021    HEMOGLOBIN A1C  2022    INFLUENZA VACCINE  2022    LIPID PANEL  2022    DIABETIC EYE EXAM  2023    COLORECTAL CANCER SCREENING  10/28/2024    HEPATITIS C SCREENING  Completed    COVID-19 Vaccine  Completed    Pneumococcal Vaccine 65+  Completed       No orders of the defined types were placed in this encounter.      No follow-ups on file.          Advance Care Planning and Advance Directives     You make decisions on a daily basis - decisions about where you want to live, your career, your home, your life. Perhaps one of the most important decisions you face is your choice for future medical care. Take time to talk with your family and your healthcare team and start planning today.  Advance Care Planning is a process that can help you:  Understand possible future healthcare decisions in light of your own experiences  Reflect on those decision in light of your goals and values  Discuss your decisions with those closest to you and the  healthcare professionals that care for you  Make a plan by creating a document that reflects your wishes    Surrogate Decision Maker  In the event of a medical emergency, which has left you unable to communicate or to make your own decisions, you would need someone to make decisions for you.  It is important to discuss your preferences for medical treatment with this person while you are in good health.     Qualities of a surrogate decision maker:  Willing to take on this role and responsibility  Knows what you want for future medical care  Willing to follow your wishes even if they don't agree with them  Able to make difficult medical decisions under stressful circumstances    Advance Directives  These are legal documents you can create that will guide your healthcare team and decision maker(s) when needed. These documents can be stored in the electronic medical record.    Living Will - a legal document to guide your care if you have a terminal condition or a serious illness and are unable to communicate. States vary by statute in document names/types, but most forms may include one or more of the following:        -  Directions regarding life-prolonging treatments        -  Directions regarding artificially provided nutrition/hydration        -  Choosing a healthcare decision maker        -  Direction regarding organ/tissue donation    Durable Power of  for Healthcare - this document names an -in-fact to make medical decisions for you, but it may also allow this person to make personal and financial decisions for you. Please seek the advice of an  if you need this type of document.    **Advance Directives are not required and no one may discriminate against you if you do not sign one.    Medical Orders  Many states allow specific forms/orders signed by your physician to record your wishes for medical treatment in your current state of health. This form, signed in personal communication with  your physician, addresses resuscitation and other medical interventions that you may or may not want.      For more information or to schedule a time with a [facility] Advance Care Planning Facilitator contact: [facility]/ACP or call [phone] and someone will contact you directly.

## 2022-04-19 LAB
ALBUMIN SERPL-MCNC: 4.6 G/DL (ref 3.8–4.8)
ALBUMIN/CREAT UR: 36 MG/G CREAT (ref 0–29)
ALBUMIN/GLOB SERPL: 2.1 {RATIO} (ref 1.2–2.2)
ALP SERPL-CCNC: 29 IU/L (ref 44–121)
ALT SERPL-CCNC: 14 IU/L (ref 0–44)
AMYLASE SERPL-CCNC: 24 U/L (ref 31–110)
AST SERPL-CCNC: 15 IU/L (ref 0–40)
BASOPHILS # BLD AUTO: 0.1 X10E3/UL (ref 0–0.2)
BASOPHILS NFR BLD AUTO: 1 %
BILIRUB SERPL-MCNC: 0.4 MG/DL (ref 0–1.2)
BUN SERPL-MCNC: 26 MG/DL (ref 8–27)
BUN/CREAT SERPL: 23 (ref 10–24)
CALCIUM SERPL-MCNC: 9.7 MG/DL (ref 8.6–10.2)
CHLORIDE SERPL-SCNC: 100 MMOL/L (ref 96–106)
CHOLEST SERPL-MCNC: 180 MG/DL (ref 100–199)
CO2 SERPL-SCNC: 27 MMOL/L (ref 20–29)
CREAT SERPL-MCNC: 1.13 MG/DL (ref 0.76–1.27)
CREAT UR-MCNC: 31.8 MG/DL
EGFRCR SERPLBLD CKD-EPI 2021: 70 ML/MIN/1.73
EOSINOPHIL # BLD AUTO: 0.1 X10E3/UL (ref 0–0.4)
EOSINOPHIL NFR BLD AUTO: 2 %
ERYTHROCYTE [DISTWIDTH] IN BLOOD BY AUTOMATED COUNT: 13.7 % (ref 11.6–15.4)
GLOBULIN SER CALC-MCNC: 2.2 G/DL (ref 1.5–4.5)
GLUCOSE SERPL-MCNC: 106 MG/DL (ref 65–99)
HBA1C MFR BLD: 6.7 % (ref 4.8–5.6)
HCT VFR BLD AUTO: 39.6 % (ref 37.5–51)
HDLC SERPL-MCNC: 41 MG/DL
HGB BLD-MCNC: 13.1 G/DL (ref 13–17.7)
IMM GRANULOCYTES # BLD AUTO: 0 X10E3/UL (ref 0–0.1)
IMM GRANULOCYTES NFR BLD AUTO: 1 %
LDLC SERPL CALC-MCNC: 111 MG/DL (ref 0–99)
LIPASE SERPL-CCNC: 24 U/L (ref 13–78)
LYMPHOCYTES # BLD AUTO: 1.7 X10E3/UL (ref 0.7–3.1)
LYMPHOCYTES NFR BLD AUTO: 22 %
MCH RBC QN AUTO: 28.5 PG (ref 26.6–33)
MCHC RBC AUTO-ENTMCNC: 33.1 G/DL (ref 31.5–35.7)
MCV RBC AUTO: 86 FL (ref 79–97)
MICROALBUMIN UR-MCNC: 11.5 UG/ML
MONOCYTES # BLD AUTO: 0.6 X10E3/UL (ref 0.1–0.9)
MONOCYTES NFR BLD AUTO: 7 %
NEUTROPHILS # BLD AUTO: 5.4 X10E3/UL (ref 1.4–7)
NEUTROPHILS NFR BLD AUTO: 67 %
PLATELET # BLD AUTO: 197 X10E3/UL (ref 150–450)
POTASSIUM SERPL-SCNC: 3.9 MMOL/L (ref 3.5–5.2)
PROT SERPL-MCNC: 6.8 G/DL (ref 6–8.5)
RBC # BLD AUTO: 4.59 X10E6/UL (ref 4.14–5.8)
SODIUM SERPL-SCNC: 142 MMOL/L (ref 134–144)
TRIGL SERPL-MCNC: 159 MG/DL (ref 0–149)
VLDLC SERPL CALC-MCNC: 28 MG/DL (ref 5–40)
WBC # BLD AUTO: 7.9 X10E3/UL (ref 3.4–10.8)

## 2022-04-20 RX ORDER — NEBIVOLOL HYDROCHLORIDE 5 MG/1
5 TABLET ORAL DAILY
Qty: 90 TABLET | Refills: 1
Start: 2022-04-20 | End: 2022-09-02

## 2022-04-29 ENCOUNTER — OFFICE VISIT (OUTPATIENT)
Dept: FAMILY MEDICINE CLINIC | Facility: CLINIC | Age: 72
End: 2022-04-29

## 2022-04-29 VITALS
HEIGHT: 74 IN | HEART RATE: 48 BPM | DIASTOLIC BLOOD PRESSURE: 78 MMHG | WEIGHT: 277 LBS | BODY MASS INDEX: 35.55 KG/M2 | OXYGEN SATURATION: 96 % | SYSTOLIC BLOOD PRESSURE: 144 MMHG

## 2022-04-29 DIAGNOSIS — R94.31 ABNORMAL ELECTROCARDIOGRAM (ECG) (EKG): ICD-10-CM

## 2022-04-29 DIAGNOSIS — I49.3 ASYMPTOMATIC PVCS: Primary | ICD-10-CM

## 2022-04-29 PROCEDURE — 99214 OFFICE O/P EST MOD 30 MIN: CPT | Performed by: INTERNAL MEDICINE

## 2022-04-29 RX ORDER — SILICONE ADHESIVE 1.5" X 3"
1 SHEET (EA) TOPICAL 3 TIMES DAILY
Status: ON HOLD | COMMUNITY
End: 2022-09-01

## 2022-05-02 ENCOUNTER — CLINICAL SUPPORT (OUTPATIENT)
Dept: ORTHOPEDIC SURGERY | Facility: CLINIC | Age: 72
End: 2022-05-02

## 2022-05-02 VITALS — HEIGHT: 74 IN | WEIGHT: 271 LBS | BODY MASS INDEX: 34.78 KG/M2 | TEMPERATURE: 97.5 F

## 2022-05-02 DIAGNOSIS — M17.12 ARTHRITIS OF LEFT KNEE: Primary | ICD-10-CM

## 2022-05-02 PROCEDURE — 20610 DRAIN/INJ JOINT/BURSA W/O US: CPT | Performed by: ORTHOPAEDIC SURGERY

## 2022-05-02 RX ORDER — LIDOCAINE HYDROCHLORIDE 20 MG/ML
4 INJECTION, SOLUTION EPIDURAL; INFILTRATION; INTRACAUDAL; PERINEURAL
Status: COMPLETED | OUTPATIENT
Start: 2022-05-02 | End: 2022-05-02

## 2022-05-02 RX ORDER — METHYLPREDNISOLONE ACETATE 80 MG/ML
80 INJECTION, SUSPENSION INTRA-ARTICULAR; INTRALESIONAL; INTRAMUSCULAR; SOFT TISSUE
Status: COMPLETED | OUTPATIENT
Start: 2022-05-02 | End: 2022-05-02

## 2022-05-02 RX ADMIN — METHYLPREDNISOLONE ACETATE 80 MG: 80 INJECTION, SUSPENSION INTRA-ARTICULAR; INTRALESIONAL; INTRAMUSCULAR; SOFT TISSUE at 10:33

## 2022-05-02 RX ADMIN — LIDOCAINE HYDROCHLORIDE 4 ML: 20 INJECTION, SOLUTION EPIDURAL; INFILTRATION; INTRACAUDAL; PERINEURAL at 10:33

## 2022-05-02 NOTE — PROGRESS NOTES
Mr. Olson comes in today for follow-up.  Injections have worked well in the past.  He would like to get a repeat injection today.  The risks, benefits and alternatives were discussed and he consented.  Going forward, he will follow-up as needed.    Chepe Alicea MD    05/02/2022    Large Joint Arthrocentesis: R knee  Date/Time: 5/2/2022 10:33 AM  Consent given by: patient  Site marked: site marked  Timeout: Immediately prior to procedure a time out was called to verify the correct patient, procedure, equipment, support staff and site/side marked as required   Supporting Documentation  Indications: pain, joint swelling and diagnostic evaluation   Procedure Details  Location: knee - R knee  Preparation: Patient was prepped and draped in the usual sterile fashion  Needle gauge: 21G.  Approach: anterolateral  Medications administered: 80 mg methylPREDNISolone acetate 80 MG/ML; 4 mL lidocaine PF 2% 2 %  Patient tolerance: patient tolerated the procedure well with no immediate complications

## 2022-05-06 ENCOUNTER — PRE-PROCEDURE SCREENING (OUTPATIENT)
Dept: GASTROENTEROLOGY | Facility: CLINIC | Age: 72
End: 2022-05-06

## 2022-05-06 NOTE — TELEPHONE ENCOUNTER
Colonoscopy screen has been sent to  for review         Last scope 10/28/14 in epic--Personal history of polyps--Family history of polyps--No family history of polyps--Aspirin--Medications:        Acetaminophen (TYLENOL PO)  amLODIPine (NORVASC) 10 MG tablet  aspirin 81 MG EC tablet  benazepril (LOTENSIN) 40 MG tablet  brimonidine-timolol (COMBIGAN) 0.2-0.5 % ophthalmic solution  Bystolic 5 MG tablet  cloNIDine (CATAPRES) 0.1 MG tablet  docusate sodium 100 MG capsule  doxazosin (CARDURA) 1 MG tablet  doxazosin (CARDURA) 2 MG tablet  erythromycin (ROMYCIN) 5 MG/GM ophthalmic ointment  escitalopram (LEXAPRO) 10 MG tablet  ezetimibe (ZETIA) 10 MG tablet  fenofibrate 160 MG tablet  fluticasone (FLONASE) 50 MCG/ACT nasal spray  gabapentin (NEURONTIN) 600 MG tablet  glimepiride (AMARYL) 4 MG tablet  glucose blood (Accu-Chek Anabela Plus) test strip  hydroCHLOROthiazide (HYDRODIURIL) 25 MG tablet  Janumet XR  MG tablet  Lutein-Zeaxanthin-Selenium (VITEYES ESSENTIALS PO)  Olopatadine HCl (PATADAY OP)  sodium chloride (DOLORES 128) 5 % ophthalmic solution       Pt verbalized and understood that it would be few weeks before been schedule

## 2022-05-11 ENCOUNTER — HOSPITAL ENCOUNTER (OUTPATIENT)
Dept: CARDIOLOGY | Facility: HOSPITAL | Age: 72
Discharge: HOME | End: 2022-05-11
Admitting: INTERNAL MEDICINE

## 2022-05-11 DIAGNOSIS — I49.3 ASYMPTOMATIC PVCS: ICD-10-CM

## 2022-05-11 PROCEDURE — 93225 XTRNL ECG REC<48 HRS REC: CPT

## 2022-05-11 PROCEDURE — 93226 XTRNL ECG REC<48 HR SCAN A/R: CPT

## 2022-05-16 LAB
MAXIMAL PREDICTED HEART RATE: 151 BPM
STRESS TARGET HR: 128 BPM

## 2022-05-16 PROCEDURE — 93227 XTRNL ECG REC<48 HR R&I: CPT | Performed by: INTERNAL MEDICINE

## 2022-06-14 ENCOUNTER — APPOINTMENT (OUTPATIENT)
Dept: CARDIOLOGY | Facility: HOSPITAL | Age: 72
End: 2022-06-14

## 2022-06-15 DIAGNOSIS — I49.3 ASYMPTOMATIC PVCS: ICD-10-CM

## 2022-06-15 DIAGNOSIS — R94.31 ABNORMAL ELECTROCARDIOGRAM (ECG) (EKG): Primary | ICD-10-CM

## 2022-06-16 ENCOUNTER — APPOINTMENT (OUTPATIENT)
Dept: NUCLEAR MEDICINE | Facility: HOSPITAL | Age: 72
End: 2022-06-16

## 2022-06-16 ENCOUNTER — TELEPHONE (OUTPATIENT)
Dept: FAMILY MEDICINE CLINIC | Facility: CLINIC | Age: 72
End: 2022-06-16

## 2022-06-16 NOTE — TELEPHONE ENCOUNTER
PT'S STRESS TEST IS BEING DENIED BECAUSE PT DOES NOT HAVE ANT ANGINA NOR CAD.    THEY WOULD LIKE FOR YOU TO CALL AND DO A PEER TO PEER  PHONE#[phone]

## 2022-06-28 ENCOUNTER — PREP FOR SURGERY (OUTPATIENT)
Dept: OTHER | Facility: HOSPITAL | Age: 72
End: 2022-06-28

## 2022-06-28 DIAGNOSIS — Z83.71 FAMILY HISTORY OF COLONIC POLYPS: ICD-10-CM

## 2022-06-28 DIAGNOSIS — K63.5 COLON POLYP: Primary | ICD-10-CM

## 2022-07-19 ENCOUNTER — HOSPITAL ENCOUNTER (OUTPATIENT)
Dept: CARDIOLOGY | Facility: HOSPITAL | Age: 72
Discharge: HOME | End: 2022-07-19
Admitting: INTERNAL MEDICINE

## 2022-07-19 VITALS
WEIGHT: 271 LBS | HEART RATE: 69 BPM | BODY MASS INDEX: 34.78 KG/M2 | HEIGHT: 74 IN | DIASTOLIC BLOOD PRESSURE: 80 MMHG | SYSTOLIC BLOOD PRESSURE: 154 MMHG

## 2022-07-19 DIAGNOSIS — I49.3 ASYMPTOMATIC PVCS: ICD-10-CM

## 2022-07-19 LAB
AORTIC DIMENSIONLESS INDEX: 0.5 (DI)
BH CV ECHO MEAS - AO MAX PG: 21.5 MMHG
BH CV ECHO MEAS - AO MEAN PG: 10.6 MMHG
BH CV ECHO MEAS - AO ROOT DIAM: 3.9 CM
BH CV ECHO MEAS - AO V2 MAX: 231.7 CM/SEC
BH CV ECHO MEAS - AO V2 VTI: 53.2 CM
BH CV ECHO MEAS - AVA(I,D): 1.68 CM2
BH CV ECHO MEAS - EDV(CUBED): 230.1 ML
BH CV ECHO MEAS - EDV(MOD-SP2): 290 ML
BH CV ECHO MEAS - EDV(MOD-SP4): 306 ML
BH CV ECHO MEAS - EF(MOD-BP): 55.1 %
BH CV ECHO MEAS - EF(MOD-SP2): 54.1 %
BH CV ECHO MEAS - EF(MOD-SP4): 57.5 %
BH CV ECHO MEAS - ESV(CUBED): 84.5 ML
BH CV ECHO MEAS - ESV(MOD-SP2): 133 ML
BH CV ECHO MEAS - ESV(MOD-SP4): 130 ML
BH CV ECHO MEAS - FS: 28.4 %
BH CV ECHO MEAS - IVS/LVPW: 1 CM
BH CV ECHO MEAS - IVSD: 1.51 CM
BH CV ECHO MEAS - LV DIASTOLIC VOL/BSA (35-75): 123.8 CM2
BH CV ECHO MEAS - LV MASS(C)D: 447.7 GRAMS
BH CV ECHO MEAS - LV MAX PG: 5.2 MMHG
BH CV ECHO MEAS - LV MEAN PG: 2.2 MMHG
BH CV ECHO MEAS - LV SYSTOLIC VOL/BSA (12-30): 52.6 CM2
BH CV ECHO MEAS - LV V1 MAX: 113.8 CM/SEC
BH CV ECHO MEAS - LV V1 VTI: 24.7 CM
BH CV ECHO MEAS - LVIDD: 6.1 CM
BH CV ECHO MEAS - LVIDS: 4.4 CM
BH CV ECHO MEAS - LVOT AREA: 3.6 CM2
BH CV ECHO MEAS - LVOT DIAM: 2.15 CM
BH CV ECHO MEAS - LVPWD: 1.51 CM
BH CV ECHO MEAS - MED PEAK E' VEL: 4.6 CM/SEC
BH CV ECHO MEAS - MR MAX PG: 103.5 MMHG
BH CV ECHO MEAS - MR MAX VEL: 508.7 CM/SEC
BH CV ECHO MEAS - MV A DUR: 0.09 SEC
BH CV ECHO MEAS - MV A MAX VEL: 77.1 CM/SEC
BH CV ECHO MEAS - MV DEC SLOPE: 612.5 CM/SEC2
BH CV ECHO MEAS - MV DEC TIME: 0.17 MSEC
BH CV ECHO MEAS - MV E MAX VEL: 129 CM/SEC
BH CV ECHO MEAS - MV E/A: 1.67
BH CV ECHO MEAS - MV MAX PG: 6.9 MMHG
BH CV ECHO MEAS - MV MEAN PG: 2.3 MMHG
BH CV ECHO MEAS - MV P1/2T: 61.6 MSEC
BH CV ECHO MEAS - MV V2 VTI: 34.4 CM
BH CV ECHO MEAS - MVA(P1/2T): 3.6 CM2
BH CV ECHO MEAS - MVA(VTI): 2.6 CM2
BH CV ECHO MEAS - PA ACC TIME: 0.13 SEC
BH CV ECHO MEAS - PA PR(ACCEL): 20.8 MMHG
BH CV ECHO MEAS - PA V2 MAX: 111.5 CM/SEC
BH CV ECHO MEAS - QP/QS: 0.61
BH CV ECHO MEAS - RAP SYSTOLE: 8 MMHG
BH CV ECHO MEAS - RV MAX PG: 1.86 MMHG
BH CV ECHO MEAS - RV V1 MAX: 68.1 CM/SEC
BH CV ECHO MEAS - RV V1 VTI: 16.4 CM
BH CV ECHO MEAS - RVOT DIAM: 2.05 CM
BH CV ECHO MEAS - RVSP: 28.9 MMHG
BH CV ECHO MEAS - SI(MOD-SP2): 63.5 ML/M2
BH CV ECHO MEAS - SI(MOD-SP4): 71.2 ML/M2
BH CV ECHO MEAS - SV(LVOT): 89.4 ML
BH CV ECHO MEAS - SV(MOD-SP2): 157 ML
BH CV ECHO MEAS - SV(MOD-SP4): 176 ML
BH CV ECHO MEAS - SV(RVOT): 54.2 ML
BH CV ECHO MEAS - TAPSE (>1.6): 3.7 CM
BH CV ECHO MEAS - TR MAX PG: 20.9 MMHG
BH CV ECHO MEAS - TR MAX VEL: 228.6 CM/SEC
BH CV XLRA - RV BASE: 3.8 CM
BH CV XLRA - RV LENGTH: 9.5 CM
BH CV XLRA - RV MID: 2.7 CM
LEFT ATRIUM VOLUME INDEX: 42.9 ML/M2
MAXIMAL PREDICTED HEART RATE: 150 BPM
STRESS TARGET HR: 128 BPM

## 2022-07-19 PROCEDURE — 25010000002 PERFLUTREN (DEFINITY) 8.476 MG IN SODIUM CHLORIDE (PF) 0.9 % 10 ML INJECTION: Performed by: INTERNAL MEDICINE

## 2022-07-19 PROCEDURE — 93306 TTE W/DOPPLER COMPLETE: CPT

## 2022-07-19 PROCEDURE — 93306 TTE W/DOPPLER COMPLETE: CPT | Performed by: INTERNAL MEDICINE

## 2022-07-19 RX ADMIN — SODIUM CHLORIDE 3 ML: 9 INJECTION INTRAMUSCULAR; INTRAVENOUS; SUBCUTANEOUS at 10:29

## 2022-07-26 RX ORDER — SITAGLIPTIN AND METFORMIN HYDROCHLORIDE 1000; 50 MG/1; MG/1
TABLET, FILM COATED, EXTENDED RELEASE ORAL
Qty: 60 TABLET | Refills: 5 | Status: SHIPPED | OUTPATIENT
Start: 2022-07-26 | End: 2023-01-27 | Stop reason: SDUPTHER

## 2022-08-03 ENCOUNTER — CLINICAL SUPPORT (OUTPATIENT)
Dept: ORTHOPEDIC SURGERY | Facility: CLINIC | Age: 72
End: 2022-08-03

## 2022-08-03 VITALS — HEIGHT: 74 IN | BODY MASS INDEX: 34.67 KG/M2 | WEIGHT: 270.1 LBS | TEMPERATURE: 97.5 F

## 2022-08-03 DIAGNOSIS — M17.10 ARTHRITIS OF KNEE: Primary | ICD-10-CM

## 2022-08-03 PROCEDURE — 20610 DRAIN/INJ JOINT/BURSA W/O US: CPT | Performed by: ORTHOPAEDIC SURGERY

## 2022-08-03 RX ORDER — METHYLPREDNISOLONE ACETATE 80 MG/ML
80 INJECTION, SUSPENSION INTRA-ARTICULAR; INTRALESIONAL; INTRAMUSCULAR; SOFT TISSUE
Status: COMPLETED | OUTPATIENT
Start: 2022-08-03 | End: 2022-08-03

## 2022-08-03 RX ORDER — LIDOCAINE HYDROCHLORIDE 10 MG/ML
2 INJECTION, SOLUTION EPIDURAL; INFILTRATION; INTRACAUDAL; PERINEURAL
Status: COMPLETED | OUTPATIENT
Start: 2022-08-03 | End: 2022-08-03

## 2022-08-03 RX ADMIN — LIDOCAINE HYDROCHLORIDE 2 ML: 10 INJECTION, SOLUTION EPIDURAL; INFILTRATION; INTRACAUDAL; PERINEURAL at 10:24

## 2022-08-03 RX ADMIN — METHYLPREDNISOLONE ACETATE 80 MG: 80 INJECTION, SUSPENSION INTRA-ARTICULAR; INTRALESIONAL; INTRAMUSCULAR; SOFT TISSUE at 10:24

## 2022-08-03 NOTE — PROGRESS NOTES
Mr. Olson would like to get a repeat injection today.  The risks, benefits and alternatives were discussed and the patient consented.  Going forward, the patient will follow-up as needed.    Chepe Alicea MD    08/03/2022        Large Joint Arthrocentesis: R knee  Date/Time: 8/3/2022 10:24 AM  Consent given by: patient  Site marked: site marked  Timeout: Immediately prior to procedure a time out was called to verify the correct patient, procedure, equipment, support staff and site/side marked as required   Supporting Documentation  Indications: pain, joint swelling and diagnostic evaluation   Procedure Details  Location: knee - R knee  Preparation: Patient was prepped and draped in the usual sterile fashion  Needle gauge: 21G.  Medications administered: 80 mg methylPREDNISolone acetate 80 MG/ML; 2 mL lidocaine PF 1% 1 %  Patient tolerance: patient tolerated the procedure well with no immediate complications

## 2022-08-08 ENCOUNTER — HOSPITAL ENCOUNTER (OUTPATIENT)
Dept: NUCLEAR MEDICINE | Facility: HOSPITAL | Age: 72
Discharge: HOME | End: 2022-08-08

## 2022-08-08 DIAGNOSIS — I49.3 ASYMPTOMATIC PVCS: ICD-10-CM

## 2022-08-08 DIAGNOSIS — R94.31 ABNORMAL ELECTROCARDIOGRAM (ECG) (EKG): Primary | ICD-10-CM

## 2022-08-08 DIAGNOSIS — R94.31 ABNORMAL ELECTROCARDIOGRAM (ECG) (EKG): ICD-10-CM

## 2022-08-08 DIAGNOSIS — R94.39 EQUIVOCAL STRESS TEST: ICD-10-CM

## 2022-08-08 LAB
BH CV REST NUCLEAR ISOTOPE DOSE: 10.2 MCI
BH CV STRESS BP STAGE 1: NORMAL
BH CV STRESS BP STAGE 2: NORMAL
BH CV STRESS DURATION MIN STAGE 1: 3
BH CV STRESS DURATION MIN STAGE 2: 1
BH CV STRESS DURATION SEC STAGE 1: 0
BH CV STRESS DURATION SEC STAGE 2: 46
BH CV STRESS GRADE STAGE 1: 10
BH CV STRESS GRADE STAGE 2: 12
BH CV STRESS HR STAGE 1: 112
BH CV STRESS HR STAGE 2: 130
BH CV STRESS METS STAGE 1: 5
BH CV STRESS METS STAGE 2: 7.5
BH CV STRESS NUCLEAR ISOTOPE DOSE: 28.8 MCI
BH CV STRESS PROTOCOL 1: NORMAL
BH CV STRESS RECOVERY BP: NORMAL MMHG
BH CV STRESS RECOVERY HR: 74 BPM
BH CV STRESS SPEED STAGE 1: 1.7
BH CV STRESS SPEED STAGE 2: 2.5
BH CV STRESS STAGE 1: 1
BH CV STRESS STAGE 2: 2
LV EF NUC BP: 44 %
MAXIMAL PREDICTED HEART RATE: 150 BPM
PERCENT MAX PREDICTED HR: 96.67 %
STRESS BASELINE BP: NORMAL MMHG
STRESS BASELINE HR: 47 BPM
STRESS PERCENT HR: 114 %
STRESS POST ESTIMATED WORKLOAD: 6.4 METS
STRESS POST EXERCISE DUR MIN: 4 MIN
STRESS POST EXERCISE DUR SEC: 46 SEC
STRESS POST PEAK BP: NORMAL MMHG
STRESS POST PEAK HR: 145 BPM
STRESS TARGET HR: 128 BPM

## 2022-08-08 PROCEDURE — A9500 TC99M SESTAMIBI: HCPCS | Performed by: INTERNAL MEDICINE

## 2022-08-08 PROCEDURE — 78452 HT MUSCLE IMAGE SPECT MULT: CPT | Performed by: INTERNAL MEDICINE

## 2022-08-08 PROCEDURE — 78452 HT MUSCLE IMAGE SPECT MULT: CPT

## 2022-08-08 PROCEDURE — 0 TECHNETIUM SESTAMIBI: Performed by: INTERNAL MEDICINE

## 2022-08-08 PROCEDURE — 93018 CV STRESS TEST I&R ONLY: CPT | Performed by: INTERNAL MEDICINE

## 2022-08-08 PROCEDURE — 93016 CV STRESS TEST SUPVJ ONLY: CPT | Performed by: INTERNAL MEDICINE

## 2022-08-08 PROCEDURE — 93017 CV STRESS TEST TRACING ONLY: CPT

## 2022-08-08 RX ADMIN — TECHNETIUM TC 99M SESTAMIBI 1 DOSE: 1 INJECTION INTRAVENOUS at 11:50

## 2022-08-08 RX ADMIN — TECHNETIUM TC 99M SESTAMIBI 1 DOSE: 1 INJECTION INTRAVENOUS at 07:40

## 2022-08-16 ENCOUNTER — TRANSCRIBE ORDERS (OUTPATIENT)
Dept: CARDIOLOGY | Facility: CLINIC | Age: 72
End: 2022-08-16

## 2022-08-16 ENCOUNTER — OFFICE VISIT (OUTPATIENT)
Dept: CARDIOLOGY | Facility: CLINIC | Age: 72
End: 2022-08-16

## 2022-08-16 VITALS
SYSTOLIC BLOOD PRESSURE: 148 MMHG | WEIGHT: 275 LBS | HEIGHT: 74 IN | BODY MASS INDEX: 35.29 KG/M2 | HEART RATE: 49 BPM | DIASTOLIC BLOOD PRESSURE: 84 MMHG

## 2022-08-16 DIAGNOSIS — Z13.6 SCREENING FOR ISCHEMIC HEART DISEASE: ICD-10-CM

## 2022-08-16 DIAGNOSIS — Z01.818 OTHER SPECIFIED PRE-OPERATIVE EXAMINATION: Primary | ICD-10-CM

## 2022-08-16 DIAGNOSIS — R94.39 ABNORMAL STRESS TEST: Primary | ICD-10-CM

## 2022-08-16 DIAGNOSIS — Z01.810 PRE-OPERATIVE CARDIOVASCULAR EXAMINATION: ICD-10-CM

## 2022-08-16 PROCEDURE — 93000 ELECTROCARDIOGRAM COMPLETE: CPT | Performed by: INTERNAL MEDICINE

## 2022-08-16 PROCEDURE — 99204 OFFICE O/P NEW MOD 45 MIN: CPT | Performed by: INTERNAL MEDICINE

## 2022-08-16 NOTE — PROGRESS NOTES
Subjective:     Encounter Date:08/16/2022      Patient ID: Jeb Olson is a 72 y.o. male.    Chief Complaint: Abnormal stress test  HPI:   This is a 72-year-old man who presents for evaluation.  He recently had an eye surgery and was noted to have frequent PVCs.  This prompted further testing by his primary care doctor.  He had a Holter monitor which showed frequent PVCs, which were asymptomatic, the PVC burden was 22% in couplets, triplets bigeminy and trigeminy.  Thus, he therefore had a transthoracic echocardiogram which showed normal systolic ejection fraction with grade 1 diastolic dysfunction appropriate for age and mild MR.  The left atrial size was also moderately increased.  Subsequent to that he had a walking nuclear stress test.  He was able to exercise to 96% of his predicted heart rate.  He had no angina.  He did have a hypertensive response to stress with a small, mild apical ischemic defect.  Overall he feels well.  He has been walking about 40 minutes a day up until few weeks ago.  A few weeks ago when he was walking at the mall he had some back pain and may be some mild dyspnea.  As a result he has not walked since then.  He has no pressure tightness or angina.  No lower extremity edema.  Blood pressures are overall well controlled.    The following portions of the patient's history were reviewed and updated as appropriate: allergies, current medications, past family history, past medical history, past social history, past surgical history and problem list.     REVIEW OF SYSTEMS:   All systems reviewed.  Pertinent positives identified in HPI.  All other systems are negative.    Past Medical History:   Diagnosis Date   • Allergic Have had for several years    Eyes and nasal issues   • Anxiety Since about 2014    Since I was taking of my Dad, who also passed away 3yrs ago.   • Arthritis 2002    Both knees, hips, and shoulders   • Arthritis of knee, left    • Arthritis of left knee    • Cataract  May 2019   • Colon polyp 2017   • Diabetes mellitus (HCC)    • Essential hypertension    • HL (hearing loss) 1980   • Hyperlipemia    • Knee swelling     Right and Left knee   • Mild chronic anemia    • Primary osteoarthritis of knee    • Sleep apnea     cpap       Family History   Problem Relation Age of Onset   • Alcohol abuse Maternal Uncle         Passed away 2013   • Alcohol abuse Father         Passed away in 2016   • Hyperlipidemia Father         Started about 10 years before his death   • Arthritis Mother         Passed away 2006, from Alzheimers   • Diabetes Mother    • Hyperlipidemia Mother         Started probably at middle age   • Osteoporosis Mother    • Malig Hyperthermia Neg Hx        Social History     Socioeconomic History   • Marital status:    Tobacco Use   • Smoking status: Never Smoker   • Smokeless tobacco: Never Used   • Tobacco comment: Naila only every been around people who smoked   Vaping Use   • Vaping Use: Never used   Substance and Sexual Activity   • Alcohol use: Yes     Alcohol/week: 0.0 standard drinks     Comment: Maybe 8-10 per week. Stopped drinking beer August 2018   • Drug use: No   • Sexual activity: Never       No Known Allergies    Past Surgical History:   Procedure Laterality Date   • ACHILLES TENDON REPAIR Left    • CARDIAC CATHETERIZATION     • COLONOSCOPY      Dr Reyes 6 years ago   • ENDOSCOPY     • FOOT SURGERY  04/16/2016    Repair torn achilles tendon.   • JOINT REPLACEMENT  03/12/20   • TONSILLECTOMY      As a child   • TOTAL KNEE ARTHROPLASTY Left 3/12/2020    Procedure: TOTAL KNEE ARTHROPLASTY;  Surgeon: Chepe Alicea MD;  Location: [facility];  Service: Orthopedics;  Laterality: Left;         ECG 12 Lead    Date/Time: 8/16/2022 1:28 PM  Performed by: Bruna Chávez MD  Authorized by: Bruna Chávez MD   Comparison: compared with previous ECG from 3/20/2022  Similar to previous ECG  Rhythm: sinus rhythm  Rate: normal  Conduction: left bundle branch  block  QRS axis: normal  Other findings: non-specific ST-T wave changes    Clinical impression: abnormal EKG               Objective:         PHYSICAL EXAM:  GEN: VSS, no distress,   Eyes: normal sclera, normal lids and lashes  HENT: moist mucus membranes,   Respiratory: CTAB, no rales or wheezes  CV: RRR, blowing 3 out of 6 apical murmur, +2 DP and 2+ carotid pulses b/l  GI: NABS, soft,  Nontender, nondistended  MSK: no edema, no scoliosis or kyphosis  Skin: no rash, warm, dry  Heme/Lymph: no bruising or bleeding  Psych: organized thought, normal behavior and affect  Neuro: Cranial nerves grossly intact, Alert and Oriented x 3.         Assessment:          Diagnosis Plan   1. Abnormal stress test  Case Request Cath Lab: Coronary angiography, Left heart catheterization, Left ventricular angiography          Plan:       1.  This is a 72-year-old man with diabetes, hypertension, hyperlipidemia.  He has an high burden of PVCs, 22% noted on outpatient monitoring.  Nuclear stress test shows a apical defect.  He recently had some shortness of breath and exertional back and neck pain while walking.  Current antianginal therapy includes amlodipine 10 and Bystolic 5 mg daily.  We will plan cardiac catheterization  2.  Mitral regurgitation: On echocardiogram this appears to be mild.  He has a prominent murmur.  This is a new finding.    Dr. Salvador, Thank you very much for referring this kind patient to me. Please call me with any questions or concerns. I will see the patient again in the office in 6 months or sooner based on his cardiac cath.          Bruna Chávez MD  08/16/22  [facility]    Outpatient Encounter Medications as of 8/16/2022   Medication Sig Dispense Refill   • Acetaminophen (TYLENOL PO) Take  by mouth.     • amLODIPine (NORVASC) 10 MG tablet TAKE ONE TABLET BY MOUTH DAILY 90 tablet 1   • aspirin 81 MG EC tablet Take 1 tablet by mouth 2 (Two) Times a Day. Indications: DVT Prophylaxis     •  benazepril (LOTENSIN) 40 MG tablet TAKE ONE TABLET BY MOUTH TWICE A  tablet 1   • brimonidine-timolol (COMBIGAN) 0.2-0.5 % ophthalmic solution 1 drop Every 12 (Twelve) Hours. Both eyes,  twice a day     • Bystolic 5 MG tablet Take 1 tablet by mouth Daily. 90 tablet 1   • cloNIDine (CATAPRES) 0.1 MG tablet TAKE ONE TABLET BY MOUTH DAILY 90 tablet 1   • docusate sodium 100 MG capsule Take 1 capsule by mouth 2 (Two) times a day. 60 each 0   • doxazosin (CARDURA) 1 MG tablet      • doxazosin (CARDURA) 2 MG tablet Take 1 tablet by mouth Every Night. 90 tablet 1   • erythromycin (ROMYCIN) 5 MG/GM ophthalmic ointment Administer  to both eyes Every Night.     • escitalopram (LEXAPRO) 10 MG tablet TAKE ONE TABLET BY MOUTH DAILY 90 tablet 1   • ezetimibe (ZETIA) 10 MG tablet Take 1 tablet by mouth Daily. 90 tablet 1   • fenofibrate 160 MG tablet TAKE ONE TABLET BY MOUTH ONCE NIGHTLY 90 tablet 1   • fluticasone (FLONASE) 50 MCG/ACT nasal spray 1 spray into the nostril(s) as directed by provider 2 (Two) Times a Day.     • gabapentin (NEURONTIN) 600 MG tablet      • glimepiride (AMARYL) 4 MG tablet Take 1 tablet by mouth 2 (Two) Times a Day. 180 tablet 1   • glucose blood (Accu-Chek Anabela Plus) test strip TEST TWICE DAILY Use as instructed 100 each 3   • hydroCHLOROthiazide (HYDRODIURIL) 25 MG tablet Take 1 tablet by mouth Daily. 90 tablet 1   • Janumet XR  MG tablet TAKE ONE TABLET BY MOUTH TWICE A DAY 60 tablet 5   • Lutein-Zeaxanthin-Selenium (VITEYES ESSENTIALS PO) Take  by mouth.     • Olopatadine HCl (PATADAY OP) Apply  to eye(s) as directed by provider.     • sodium chloride (DOLORES 128) 5 % ophthalmic solution 1 drop 3 (Three) Times a Day.       Facility-Administered Encounter Medications as of 8/16/2022   Medication Dose Route Frequency Provider Last Rate Last Admin   • Chlorhexidine Gluconate 2 % pads 3 each  3 pad Apply externally BID Pricila Barnes, TIFFANIE

## 2022-08-24 RX ORDER — CLONIDINE HYDROCHLORIDE 0.1 MG/1
TABLET ORAL
Qty: 90 TABLET | Refills: 1 | Status: SHIPPED | OUTPATIENT
Start: 2022-08-24 | End: 2022-10-27 | Stop reason: SDUPTHER

## 2022-08-29 ENCOUNTER — LAB (OUTPATIENT)
Dept: LAB | Facility: HOSPITAL | Age: 72
End: 2022-08-29

## 2022-08-29 DIAGNOSIS — Z01.818 OTHER SPECIFIED PRE-OPERATIVE EXAMINATION: ICD-10-CM

## 2022-08-29 DIAGNOSIS — Z01.810 PRE-OPERATIVE CARDIOVASCULAR EXAMINATION: ICD-10-CM

## 2022-08-29 DIAGNOSIS — Z13.6 SCREENING FOR ISCHEMIC HEART DISEASE: ICD-10-CM

## 2022-08-29 LAB
ANION GAP SERPL CALCULATED.3IONS-SCNC: 12.8 MMOL/L (ref 5–15)
BASOPHILS # BLD AUTO: 0.05 10*3/MM3 (ref 0–0.2)
BASOPHILS NFR BLD AUTO: 0.6 % (ref 0–1.5)
BUN SERPL-MCNC: 24 MG/DL (ref 8–23)
BUN/CREAT SERPL: 15.6 (ref 7–25)
CALCIUM SPEC-SCNC: 9.3 MG/DL (ref 8.6–10.5)
CHLORIDE SERPL-SCNC: 102 MMOL/L (ref 98–107)
CO2 SERPL-SCNC: 28.2 MMOL/L (ref 22–29)
CREAT SERPL-MCNC: 1.54 MG/DL (ref 0.76–1.27)
DEPRECATED RDW RBC AUTO: 42.8 FL (ref 37–54)
EGFRCR SERPLBLD CKD-EPI 2021: 48.2 ML/MIN/1.73
EOSINOPHIL # BLD AUTO: 0.16 10*3/MM3 (ref 0–0.4)
EOSINOPHIL NFR BLD AUTO: 2.1 % (ref 0.3–6.2)
ERYTHROCYTE [DISTWIDTH] IN BLOOD BY AUTOMATED COUNT: 13.5 % (ref 12.3–15.4)
GLUCOSE SERPL-MCNC: 104 MG/DL (ref 65–99)
HCT VFR BLD AUTO: 38.2 % (ref 37.5–51)
HGB BLD-MCNC: 12.7 G/DL (ref 13–17.7)
IMM GRANULOCYTES # BLD AUTO: 0.05 10*3/MM3 (ref 0–0.05)
IMM GRANULOCYTES NFR BLD AUTO: 0.6 % (ref 0–0.5)
LYMPHOCYTES # BLD AUTO: 1.76 10*3/MM3 (ref 0.7–3.1)
LYMPHOCYTES NFR BLD AUTO: 22.6 % (ref 19.6–45.3)
MCH RBC QN AUTO: 29.1 PG (ref 26.6–33)
MCHC RBC AUTO-ENTMCNC: 33.2 G/DL (ref 31.5–35.7)
MCV RBC AUTO: 87.4 FL (ref 79–97)
MONOCYTES # BLD AUTO: 0.53 10*3/MM3 (ref 0.1–0.9)
MONOCYTES NFR BLD AUTO: 6.8 % (ref 5–12)
NEUTROPHILS NFR BLD AUTO: 5.23 10*3/MM3 (ref 1.7–7)
NEUTROPHILS NFR BLD AUTO: 67.3 % (ref 42.7–76)
NRBC BLD AUTO-RTO: 0 /100 WBC (ref 0–0.2)
PLATELET # BLD AUTO: 220 10*3/MM3 (ref 140–450)
PMV BLD AUTO: 10.6 FL (ref 6–12)
POTASSIUM SERPL-SCNC: 3.7 MMOL/L (ref 3.5–5.2)
RBC # BLD AUTO: 4.37 10*6/MM3 (ref 4.14–5.8)
SODIUM SERPL-SCNC: 143 MMOL/L (ref 136–145)
WBC NRBC COR # BLD: 7.78 10*3/MM3 (ref 3.4–10.8)

## 2022-08-29 PROCEDURE — 36415 COLL VENOUS BLD VENIPUNCTURE: CPT

## 2022-08-29 PROCEDURE — 80048 BASIC METABOLIC PNL TOTAL CA: CPT

## 2022-08-29 PROCEDURE — 85025 COMPLETE CBC W/AUTO DIFF WBC: CPT

## 2022-08-30 ENCOUNTER — APPOINTMENT (OUTPATIENT)
Dept: LAB | Facility: HOSPITAL | Age: 72
End: 2022-08-30

## 2022-09-01 ENCOUNTER — HOSPITAL ENCOUNTER (OUTPATIENT)
Facility: HOSPITAL | Age: 72
Setting detail: HOSPITAL OUTPATIENT SURGERY
Discharge: HOME | End: 2022-09-01
Attending: INTERNAL MEDICINE | Admitting: INTERNAL MEDICINE

## 2022-09-01 VITALS
DIASTOLIC BLOOD PRESSURE: 75 MMHG | SYSTOLIC BLOOD PRESSURE: 147 MMHG | RESPIRATION RATE: 16 BRPM | BODY MASS INDEX: 34.78 KG/M2 | TEMPERATURE: 98.2 F | HEART RATE: 52 BPM | OXYGEN SATURATION: 94 % | HEIGHT: 74 IN | WEIGHT: 271 LBS

## 2022-09-01 DIAGNOSIS — R94.39 ABNORMAL STRESS TEST: ICD-10-CM

## 2022-09-01 DIAGNOSIS — Z95.5 S/P DRUG ELUTING CORONARY STENT PLACEMENT: Primary | ICD-10-CM

## 2022-09-01 LAB
GLUCOSE BLDC GLUCOMTR-MCNC: 118 MG/DL (ref 70–130)
GLUCOSE BLDC GLUCOMTR-MCNC: 121 MG/DL (ref 70–130)
QT INTERVAL: 528 MS

## 2022-09-01 PROCEDURE — 85347 COAGULATION TIME ACTIVATED: CPT

## 2022-09-01 PROCEDURE — 25010000002 MIDAZOLAM PER 1 MG: Performed by: INTERNAL MEDICINE

## 2022-09-01 PROCEDURE — 25010000002 HEPARIN (PORCINE) PER 1000 UNITS: Performed by: INTERNAL MEDICINE

## 2022-09-01 PROCEDURE — C1874 STENT, COATED/COV W/DEL SYS: HCPCS | Performed by: INTERNAL MEDICINE

## 2022-09-01 PROCEDURE — C1760 CLOSURE DEV, VASC: HCPCS | Performed by: INTERNAL MEDICINE

## 2022-09-01 PROCEDURE — 25010000002 DIPHENHYDRAMINE PER 50 MG: Performed by: INTERNAL MEDICINE

## 2022-09-01 PROCEDURE — 93005 ELECTROCARDIOGRAM TRACING: CPT | Performed by: INTERNAL MEDICINE

## 2022-09-01 PROCEDURE — C1769 GUIDE WIRE: HCPCS | Performed by: INTERNAL MEDICINE

## 2022-09-01 PROCEDURE — 25010000002 FENTANYL CITRATE (PF) 50 MCG/ML SOLUTION: Performed by: INTERNAL MEDICINE

## 2022-09-01 PROCEDURE — C1887 CATHETER, GUIDING: HCPCS | Performed by: INTERNAL MEDICINE

## 2022-09-01 PROCEDURE — C1894 INTRO/SHEATH, NON-LASER: HCPCS | Performed by: INTERNAL MEDICINE

## 2022-09-01 PROCEDURE — 93458 L HRT ARTERY/VENTRICLE ANGIO: CPT | Performed by: INTERNAL MEDICINE

## 2022-09-01 PROCEDURE — 92928 PRQ TCAT PLMT NTRAC ST 1 LES: CPT | Performed by: INTERNAL MEDICINE

## 2022-09-01 PROCEDURE — 82962 GLUCOSE BLOOD TEST: CPT

## 2022-09-01 PROCEDURE — C9600 PERC DRUG-EL COR STENT SING: HCPCS | Performed by: INTERNAL MEDICINE

## 2022-09-01 PROCEDURE — C1725 CATH, TRANSLUMIN NON-LASER: HCPCS | Performed by: INTERNAL MEDICINE

## 2022-09-01 PROCEDURE — 0 IOPAMIDOL PER 1 ML: Performed by: INTERNAL MEDICINE

## 2022-09-01 DEVICE — XIENCE SKYPOINT™ EVEROLIMUS ELUTING CORONARY STENT SYSTEM 2.50 MM X 23 MM / RAPID-EXCHANGE
Type: IMPLANTABLE DEVICE | Site: HEART | Status: FUNCTIONAL
Brand: XIENCE SKYPOINT™

## 2022-09-01 RX ORDER — LIDOCAINE HYDROCHLORIDE 20 MG/ML
INJECTION, SOLUTION INFILTRATION; PERINEURAL AS NEEDED
Status: DISCONTINUED | OUTPATIENT
Start: 2022-09-01 | End: 2022-09-01 | Stop reason: HOSPADM

## 2022-09-01 RX ORDER — SODIUM CHLORIDE 0.9 % (FLUSH) 0.9 %
10 SYRINGE (ML) INJECTION AS NEEDED
Status: DISCONTINUED | OUTPATIENT
Start: 2022-09-01 | End: 2022-09-01 | Stop reason: HOSPADM

## 2022-09-01 RX ORDER — HEPARIN SODIUM 1000 [USP'U]/ML
INJECTION, SOLUTION INTRAVENOUS; SUBCUTANEOUS AS NEEDED
Status: DISCONTINUED | OUTPATIENT
Start: 2022-09-01 | End: 2022-09-01 | Stop reason: HOSPADM

## 2022-09-01 RX ORDER — GABAPENTIN 300 MG/1
300 CAPSULE ORAL ONCE
Status: COMPLETED | OUTPATIENT
Start: 2022-09-01 | End: 2022-09-01

## 2022-09-01 RX ORDER — FENTANYL CITRATE 50 UG/ML
INJECTION, SOLUTION INTRAMUSCULAR; INTRAVENOUS AS NEEDED
Status: DISCONTINUED | OUTPATIENT
Start: 2022-09-01 | End: 2022-09-01 | Stop reason: HOSPADM

## 2022-09-01 RX ORDER — SODIUM CHLORIDE 0.9 % (FLUSH) 0.9 %
10 SYRINGE (ML) INJECTION EVERY 12 HOURS SCHEDULED
Status: DISCONTINUED | OUTPATIENT
Start: 2022-09-01 | End: 2022-09-01 | Stop reason: HOSPADM

## 2022-09-01 RX ORDER — DIPHENHYDRAMINE HYDROCHLORIDE 50 MG/ML
INJECTION INTRAMUSCULAR; INTRAVENOUS AS NEEDED
Status: DISCONTINUED | OUTPATIENT
Start: 2022-09-01 | End: 2022-09-01 | Stop reason: HOSPADM

## 2022-09-01 RX ORDER — CLOPIDOGREL BISULFATE 75 MG/1
TABLET ORAL AS NEEDED
Status: DISCONTINUED | OUTPATIENT
Start: 2022-09-01 | End: 2022-09-01 | Stop reason: HOSPADM

## 2022-09-01 RX ORDER — MIDAZOLAM HYDROCHLORIDE 1 MG/ML
INJECTION INTRAMUSCULAR; INTRAVENOUS AS NEEDED
Status: DISCONTINUED | OUTPATIENT
Start: 2022-09-01 | End: 2022-09-01 | Stop reason: HOSPADM

## 2022-09-01 RX ORDER — CLOPIDOGREL BISULFATE 75 MG/1
75 TABLET ORAL DAILY
Qty: 90 TABLET | Refills: 3 | Status: SHIPPED | OUTPATIENT
Start: 2022-09-01

## 2022-09-01 RX ORDER — ACETAMINOPHEN 325 MG/1
650 TABLET ORAL EVERY 4 HOURS PRN
Status: DISCONTINUED | OUTPATIENT
Start: 2022-09-01 | End: 2022-09-01 | Stop reason: HOSPADM

## 2022-09-01 RX ORDER — SODIUM CHLORIDE 9 MG/ML
75 INJECTION, SOLUTION INTRAVENOUS CONTINUOUS
Status: DISCONTINUED | OUTPATIENT
Start: 2022-09-01 | End: 2022-09-01 | Stop reason: HOSPADM

## 2022-09-01 RX ORDER — ASPIRIN 325 MG
TABLET ORAL AS NEEDED
Status: DISCONTINUED | OUTPATIENT
Start: 2022-09-01 | End: 2022-09-01 | Stop reason: HOSPADM

## 2022-09-01 RX ADMIN — GABAPENTIN 300 MG: 300 CAPSULE ORAL at 10:39

## 2022-09-01 RX ADMIN — SODIUM CHLORIDE 75 ML/HR: 9 INJECTION, SOLUTION INTRAVENOUS at 07:40

## 2022-09-01 NOTE — CONSULTS
Provided phase II information along with the contact information for cardiac rehab here at [facility]. Will call patient after discharge to schedule.

## 2022-09-01 NOTE — DISCHARGE INSTRUCTIONS
[facility]  [address] [place] [address]    Coronary Angiogram with Stent (Femoral Approach) After Care    Refer to this sheet in the next few weeks. These instructions provide you with information on caring for yourself after your procedure. Your health care provider may also give you more specific instructions. Your treatment has been planned according to current medical practices, but problems sometimes occur. Call your health care provider if you have any problems or questions after your procedure.    WHAT TO EXPECT AFTER THE PROCEDURE    The insertion site may be tender for a few days after your procedure.  Minor discomfort or tenderness and a small bump at the catheter insertion site.  The bump should decrease in size and tenderness within 1 to 2 weeks.     HOME CARE INSTRUCTIONS      Take medicines only as directed by your health care provider. Blood thinners may be prescribed after your procedure to improve blood flow through the stent.  Metformin or any medications containing Metformin should not be taken for 48 hours after your procedure.    Cardiac Rehab may or may not be ordered.  Please consult with your physician.   Do not apply powder or lotion to the site.    Check your insertion site every day for redness, swelling, or fluid leaking from the insertion site.    Increase your fluid intake for the next 2 days.    Hold direct pressure over the site when you cough, sneeze, laugh or change positions.  Do this for the next 2 days.    Avoid strenuous activity as directed by your physician.  Follow instructions about when you can drive and return to work as directed by your physician.     Do not take baths, swim, or use a hot tub until your health care provider approves.   You may shower 24 hours after the procedure. Remove the bandage (dressing) and gently wash the site with plain soap and water.  Gently pat the site dry.  Do not rub the insertion area with a washcloth or towel.  You  may apply a band aid daily for 2 days if desired.   Limit your activity for the first 48 hours.  Do not bend, squat, or lift anything over 20lb. (9 kg) or as directed by your health care provider.  However, we recommend lifting nothing heavier than a gallon of milk.   Do not operate machinery or power tools for 24 hours.  A responsible adult should be with you for the first 24 hours after you arrive home. Do not make any important legal decisions or sign legal papers for 24 hours.  Do not drink alcohol for 24 hours.    Eat a heart-healthy diet. This should include plenty of fresh fruits and vegetables. Meat should be lean cuts. Avoid the following types of food:    Food that is high in salt.    Canned or highly processed food.    Food that is high in saturated fat or sugar.    Fried food.    Make any other lifestyle changes recommended by your health care provider. This may include:    Not using any tobacco products including cigarettes, chewing tobacco, or electronic cigarettes.   Managing your weight.    Getting regular exercise.    Managing your blood pressure.    Limiting your alcohol intake.    Managing other health problems, such as diabetes.    If you need an MRI after your heart stent was placed, be sure to tell the health care provider who orders the MRI that you have a heart stent.    Keep all follow-up visits as directed by your health care provider.      Call your doctor if:    You have heavy bleeding from the site.  Hold pressure on the site for 20 minutes.  If the bleeding stops, apply a fresh bandage and call your cardiologist.  However,  if you continue to have bleeding, call 911.      You develop chest pain, shortness of breath, feel faint, or pass out.  You have bleeding, swelling larger than a walnut, or drainage from the catheter insertion site.  You develop pain, discoloration, coldness, numbness, tingling, or severe bruising in the leg that held the catheter.  You develop bleeding from any  other place such as from the bowels.   You have a fever > 101 or chills.    Call Your Doctor If:     You have any symptoms of a stroke.  Remember BE FAST  B-balance. Sudden trouble walking or loss of balance.  E-eyes.  Sudden changes in how you see or a sudden onset of a very bad headache.   F-face. Sudden weakness or loss of feeling of the face or facial droop on one side.   A-arms Sudden weakness or numbness in one arm.  One arm drifts down if they are both held out in front of you. This happens suddenly and usually on one side of the body.  S-speech.  Sudden trouble speaking, slurred speech or trouble understanding what people are saying.   T-time  Time to call emergency services.  Write down the symptoms and the time they started.    MAKE SURE YOU:  Understand these instructions.  Will watch your condition.  Will get help right away if you are not doing well or get worse.   [facility]  [address] [place] [address]    Coronary Angioplasty with or without  Stent (Radial Approach) After Care    Refer to this sheet in the next few weeks. These instructions provide you with information on caring for yourself after your procedure. Your health care provider may also give you more specific instructions. Your treatment has been planned according to current medical practices, but problems sometimes occur. Call your health care provider if you have any problems or questions after your procedure.       Home Care Instructions:  You may shower the day after the procedure. Remove the bandage (dressing) and gently wash the site with plain soap and water. Gently pat the site dry. You may apply a band aid daily for 2 days if desired.    Do not apply powder or lotion to the site.  Do not submerge the affected site in water for 3 to 5 days or until the site is completely healed.   Do not flex or bend at the wrist with affected arm for 24 hours.  Do not lift, push or pull anything over 5 pounds for 5 days  after your procedure or as directed by your physician.  For a reference, a gallon of milk weighs 8 pounds.    Do not operate machinery or power tools for 24 hours.  Inspect the site at least twice daily. You may notice some bruising at the site and it may be tender for 1 to 2 weeks.     Increase your fluid intake for the next 2 days.    Keep arm elevated for 24 hours.  For the remainder of the day, keep your arm in the “Pledge of Allegiance” position when up and about.    Limit your activity for the next 48 hours and avoid strenuous activity as directed by your physician.   Cardiac Rehab may or may not be ordered.  Please consult with your physician  You may drive 24 hours after the procedure unless otherwise instructed by your caregiver.  A responsible adult should be with you for the first 24 hours after you arrive home.   Do not make any important legal decisions or sign legal papers for 24 hours. Do not drink alcohol for 24 hours.    Take medicines only as directed by your health care provider.  Blood thinners may be prescribed after your procedure to improve blood flow through the stent.    Metformin or any medications containing Metformin should not be taken for 48 hours after your procedure.    Eat a heart-healthy diet. This should include plenty of fresh fruits and vegetables. Meat should be lean cuts. Avoid the following types of food:    Food that is high in salt.    Canned or highly processed food.    Food that is high in saturated fat or sugar.    Fried food.    Make any other lifestyle changes recommended by your health care provider. This may include:    Not using any tobacco products including cigarettes, chewing tobacco, or electronic cigarettes.   Managing your weight.    Getting regular exercise.    Managing your blood pressure.    Limiting your alcohol intake.    Managing other health problems, such as diabetes.    If you need an MRI after your heart stent was placed, be sure to tell the health  care provider who orders the MRI that you have a heart stent.    Keep all follow-up visits as directed by your health care provider.      Call Your Doctor If:    You have unusual pain at the radial/ulnar (wrist) site.  You have redness, warmth, swelling, or pain at the radial/ulnar (wrist) site.  You have drainage (other than a small amount of blood on the dressing).  `You have chills or a fever > 101.  Your arm becomes pale or dark, cool, tingly, or numb.  You develop chest pain, shortness of breath, feel faint or pass out.    You have heavy bleeding from the site.  If you do, hold pressure on the site for 20 minutes.  If the bleeding stops, apply a fresh bandage and call your cardiologist.  However, if you continue to have bleeding, maintain pressure and call 911.    You have any symptoms of a stroke.  Remember BE FAST  B-balance. Sudden trouble walking or loss of balance.  E-eyes.  Sudden changes in how you see or a sudden onset of a very bad headache.   F-face. Sudden weakness or loss of feeling of the face or facial droop on one side.   A-arms Sudden weakness or numbness in one arm. One arm drifts down if they are both held out in front of you. This happens suddenly and usually on one side of the body.   S-speech.  Sudden trouble speaking, slurred speech or trouble understanding what people are saying.   T-time  Time to call emergency services.  Write down the symptoms and the time they started.

## 2022-09-02 RX ORDER — FENOFIBRATE 160 MG/1
TABLET ORAL
Qty: 90 TABLET | Refills: 1 | Status: SHIPPED | OUTPATIENT
Start: 2022-09-02 | End: 2023-03-13

## 2022-09-02 RX ORDER — NEBIVOLOL 5 MG/1
TABLET ORAL
Qty: 90 TABLET | Refills: 1 | Status: SHIPPED | OUTPATIENT
Start: 2022-09-02 | End: 2023-03-13 | Stop reason: SDUPTHER

## 2022-09-02 RX ORDER — AMLODIPINE BESYLATE 10 MG/1
TABLET ORAL
Qty: 90 TABLET | Refills: 1 | Status: SHIPPED | OUTPATIENT
Start: 2022-09-02 | End: 2023-03-28 | Stop reason: SDUPTHER

## 2022-09-02 NOTE — TELEPHONE ENCOUNTER
Rx Refill Note  Requested Prescriptions     Pending Prescriptions Disp Refills   • fenofibrate 160 MG tablet [Pharmacy Med Name: FENOFIBRATE 160 MG TABLET] 90 tablet 1     Sig: TAKE ONE TABLET BY MOUTH ONCE NIGHTLY   • nebivolol (BYSTOLIC) 5 MG tablet [Pharmacy Med Name: NEBIVOLOL 5 MG TABLET] 90 tablet 1     Sig: TAKE ONE TABLET BY MOUTH DAILY   • amLODIPine (NORVASC) 10 MG tablet [Pharmacy Med Name: amLODIPine BESYLATE 10 MG TAB] 90 tablet 1     Sig: TAKE ONE TABLET BY MOUTH DAILY      Last office visit with prescribing clinician: 4/29/2022      Next office visit with prescribing clinician: 10/17/2022            Semaj Patel MA  09/02/22, 09:50 EDT

## 2022-09-03 LAB
ACT BLD: 225 SECONDS (ref 82–152)
ACT BLD: 231 SECONDS (ref 82–152)

## 2022-09-07 ENCOUNTER — PRE-PROCEDURE SCREENING (OUTPATIENT)
Dept: GASTROENTEROLOGY | Facility: CLINIC | Age: 72
End: 2022-09-07

## 2022-09-07 PROBLEM — K63.5 COLON POLYP: Status: ACTIVE | Noted: 2022-09-07

## 2022-09-07 PROBLEM — Z83.71 FAMILY HISTORY OF COLONIC POLYPS: Status: ACTIVE | Noted: 2022-09-07

## 2022-09-07 PROBLEM — Z83.719 FAMILY HISTORY OF COLONIC POLYPS: Status: ACTIVE | Noted: 2022-09-07

## 2022-09-07 NOTE — TELEPHONE ENCOUNTER
[facility] patient via telephone for. Scheduled 12/9/22  with arrival time of 12/9/22  . Prep paperwork mailed to verified address on file. Patient advised arrival time may change based on [facility] guidelines. [facility] ISSA

## 2022-09-26 RX ORDER — ESCITALOPRAM OXALATE 10 MG/1
TABLET ORAL
Qty: 90 TABLET | Refills: 1 | Status: SHIPPED | OUTPATIENT
Start: 2022-09-26

## 2022-09-27 ENCOUNTER — OFFICE VISIT (OUTPATIENT)
Dept: CARDIAC REHAB | Facility: HOSPITAL | Age: 72
End: 2022-09-27

## 2022-09-27 DIAGNOSIS — Z95.5 STATUS POST INSERTION OF DRUG-ELUTING STENT INTO LEFT ANTERIOR DESCENDING (LAD) ARTERY: Primary | ICD-10-CM

## 2022-09-27 PROCEDURE — 93798 PHYS/QHP OP CAR RHAB W/ECG: CPT

## 2022-09-28 ENCOUNTER — TREATMENT (OUTPATIENT)
Dept: CARDIAC REHAB | Facility: HOSPITAL | Age: 72
End: 2022-09-28

## 2022-09-28 DIAGNOSIS — Z95.5 STATUS POST INSERTION OF DRUG-ELUTING STENT INTO LEFT ANTERIOR DESCENDING (LAD) ARTERY: Primary | ICD-10-CM

## 2022-09-28 PROCEDURE — 93798 PHYS/QHP OP CAR RHAB W/ECG: CPT

## 2022-09-30 ENCOUNTER — TREATMENT (OUTPATIENT)
Dept: CARDIAC REHAB | Facility: HOSPITAL | Age: 72
End: 2022-09-30

## 2022-09-30 DIAGNOSIS — Z95.5 STATUS POST INSERTION OF DRUG-ELUTING STENT INTO LEFT ANTERIOR DESCENDING (LAD) ARTERY: Primary | ICD-10-CM

## 2022-09-30 PROCEDURE — 93798 PHYS/QHP OP CAR RHAB W/ECG: CPT

## 2022-10-03 ENCOUNTER — TREATMENT (OUTPATIENT)
Dept: CARDIAC REHAB | Facility: HOSPITAL | Age: 72
End: 2022-10-03

## 2022-10-03 DIAGNOSIS — Z95.5 STATUS POST INSERTION OF DRUG-ELUTING STENT INTO LEFT ANTERIOR DESCENDING (LAD) ARTERY: Primary | ICD-10-CM

## 2022-10-03 PROCEDURE — 93798 PHYS/QHP OP CAR RHAB W/ECG: CPT

## 2022-10-05 ENCOUNTER — TREATMENT (OUTPATIENT)
Dept: CARDIAC REHAB | Facility: HOSPITAL | Age: 72
End: 2022-10-05

## 2022-10-05 DIAGNOSIS — Z95.5 STATUS POST INSERTION OF DRUG-ELUTING STENT INTO LEFT ANTERIOR DESCENDING (LAD) ARTERY: Primary | ICD-10-CM

## 2022-10-05 PROCEDURE — 93798 PHYS/QHP OP CAR RHAB W/ECG: CPT

## 2022-10-05 RX ORDER — BENAZEPRIL HYDROCHLORIDE 40 MG/1
TABLET, FILM COATED ORAL
Qty: 180 TABLET | Refills: 1 | Status: SHIPPED | OUTPATIENT
Start: 2022-10-05 | End: 2023-01-24

## 2022-10-07 ENCOUNTER — TREATMENT (OUTPATIENT)
Dept: CARDIAC REHAB | Facility: HOSPITAL | Age: 72
End: 2022-10-07

## 2022-10-07 DIAGNOSIS — Z95.5 STATUS POST INSERTION OF DRUG-ELUTING STENT INTO LEFT ANTERIOR DESCENDING (LAD) ARTERY: Primary | ICD-10-CM

## 2022-10-07 PROCEDURE — 93798 PHYS/QHP OP CAR RHAB W/ECG: CPT

## 2022-10-10 ENCOUNTER — TREATMENT (OUTPATIENT)
Dept: CARDIAC REHAB | Facility: HOSPITAL | Age: 72
End: 2022-10-10

## 2022-10-10 DIAGNOSIS — Z95.5 STATUS POST INSERTION OF DRUG-ELUTING STENT INTO LEFT ANTERIOR DESCENDING (LAD) ARTERY: Primary | ICD-10-CM

## 2022-10-10 PROCEDURE — 93798 PHYS/QHP OP CAR RHAB W/ECG: CPT

## 2022-10-12 ENCOUNTER — TREATMENT (OUTPATIENT)
Dept: CARDIAC REHAB | Facility: HOSPITAL | Age: 72
End: 2022-10-12

## 2022-10-12 DIAGNOSIS — Z95.5 STATUS POST INSERTION OF DRUG-ELUTING STENT INTO LEFT ANTERIOR DESCENDING (LAD) ARTERY: Primary | ICD-10-CM

## 2022-10-12 PROCEDURE — 93798 PHYS/QHP OP CAR RHAB W/ECG: CPT

## 2022-10-14 ENCOUNTER — APPOINTMENT (OUTPATIENT)
Dept: CARDIAC REHAB | Facility: HOSPITAL | Age: 72
End: 2022-10-14

## 2022-10-17 ENCOUNTER — OFFICE VISIT (OUTPATIENT)
Dept: FAMILY MEDICINE CLINIC | Facility: CLINIC | Age: 72
End: 2022-10-17

## 2022-10-17 ENCOUNTER — TREATMENT (OUTPATIENT)
Dept: CARDIAC REHAB | Facility: HOSPITAL | Age: 72
End: 2022-10-17

## 2022-10-17 VITALS
BODY MASS INDEX: 35.16 KG/M2 | HEART RATE: 47 BPM | DIASTOLIC BLOOD PRESSURE: 68 MMHG | OXYGEN SATURATION: 98 % | HEIGHT: 74 IN | SYSTOLIC BLOOD PRESSURE: 158 MMHG | WEIGHT: 274 LBS

## 2022-10-17 DIAGNOSIS — E11.41 TYPE 2 DIABETES MELLITUS WITH DIABETIC MONONEUROPATHY, WITHOUT LONG-TERM CURRENT USE OF INSULIN: Primary | ICD-10-CM

## 2022-10-17 DIAGNOSIS — Z95.5 STATUS POST INSERTION OF DRUG-ELUTING STENT INTO LEFT ANTERIOR DESCENDING (LAD) ARTERY: Primary | ICD-10-CM

## 2022-10-17 DIAGNOSIS — I10 ESSENTIAL HYPERTENSION: ICD-10-CM

## 2022-10-17 DIAGNOSIS — F51.01 PRIMARY INSOMNIA: ICD-10-CM

## 2022-10-17 PROCEDURE — 93798 PHYS/QHP OP CAR RHAB W/ECG: CPT

## 2022-10-17 PROCEDURE — 99214 OFFICE O/P EST MOD 30 MIN: CPT | Performed by: INTERNAL MEDICINE

## 2022-10-17 RX ORDER — TRAZODONE HYDROCHLORIDE 50 MG/1
50 TABLET ORAL NIGHTLY
Qty: 30 TABLET | Refills: 1 | Status: SHIPPED | OUTPATIENT
Start: 2022-10-17 | End: 2022-11-15 | Stop reason: SDUPTHER

## 2022-10-17 NOTE — PROGRESS NOTES
Answers for HPI/ROS submitted by the patient on 10/15/2022  What is the primary reason for your visit?: High Blood Pressure  Chronicity: recurrent  Onset: more than 1 year ago  Progression since onset: waxing and waning  Condition status: resistant  anxiety: Yes  sweats: Yes  Agents associated with hypertension: no associated agents  CAD risks: diabetes mellitus, dyslipidemia, obesity, stress  Compliance problems: no compliance problems    Subjective Chief complaint is follow-up on diabetes and blood pressure  Jeb Olson is a 72 y.o. male.     History of Present Illness Rosas is here today for follow-up.  His blood pressure retaken to my exam was 150/64.  His heart rate is still in the 40s.  He is on just 5 mg of bisoprolol.  The cardiologist been trying to wean that off.  He is experiencing some increased stress.  He is not sleeping well 2 or 3 nights a week.  He has trouble both falling asleep and staying asleep.  He reports that he has worries and concerns about things coming up the next day and his thoughts just will not shut down.    The following portions of the patient's history were reviewed and updated as appropriate: allergies, current medications, past family history, past medical history, past social history, past surgical history and problem list.    Review of Systems   Constitutional: Negative for chills and fever.   Respiratory: Negative for choking, chest tightness and shortness of breath.    Psychiatric/Behavioral: Positive for sleep disturbance and stress.       Objective   Physical Exam  Vitals and nursing note reviewed.   Constitutional:       Appearance: Normal appearance.   Cardiovascular:      Rate and Rhythm: Normal rate and regular rhythm.      Pulses: Normal pulses.      Heart sounds: No murmur heard.    No friction rub. No gallop.   Pulmonary:      Effort: Pulmonary effort is normal.      Breath sounds: No wheezing, rhonchi or rales.   Musculoskeletal:      Right lower leg: No edema.       Left lower leg: No edema.   Neurological:      Mental Status: He is alert.           Assessment & Plan   Diagnoses and all orders for this visit:    1. Type 2 diabetes mellitus with diabetic mononeuropathy, without long-term current use of insulin (HCC) (Primary)  -     Comprehensive Metabolic Panel  -     Hemoglobin A1c  -     Lipid Panel  -     TSH+Free T4    2. Essential hypertension  -     TSH+Free T4    3. Primary insomnia  -     TSH+Free T4    Other orders  -     traZODone (DESYREL) 50 MG tablet; Take 1 tablet by mouth Every Night.  Dispense: 30 tablet; Refill: 1    Rosas is here today for follow-up.  We are going to check on the status of his diabetes.  He is experiencing some stress and some insomnia.  Initially I was going to increase his Escitalopram dose up but his QTC corrected was 462 on a recent EKG.  Therefore I am going to try 50 mg of trazodone at night and see if we can get him sleeping a little bit better.  I did warn her about the possibility of priapism with the trazodone.

## 2022-10-18 LAB
ALBUMIN SERPL-MCNC: 4.7 G/DL (ref 3.5–5.2)
ALBUMIN/GLOB SERPL: 2.1 G/DL
ALP SERPL-CCNC: 31 U/L (ref 39–117)
ALT SERPL-CCNC: 17 U/L (ref 1–41)
AST SERPL-CCNC: 16 U/L (ref 1–40)
BILIRUB SERPL-MCNC: 0.5 MG/DL (ref 0–1.2)
BUN SERPL-MCNC: 22 MG/DL (ref 8–23)
BUN/CREAT SERPL: 17.7 (ref 7–25)
CALCIUM SERPL-MCNC: 9.5 MG/DL (ref 8.6–10.5)
CHLORIDE SERPL-SCNC: 101 MMOL/L (ref 98–107)
CHOLEST SERPL-MCNC: 170 MG/DL (ref 0–200)
CO2 SERPL-SCNC: 31.2 MMOL/L (ref 22–29)
CREAT SERPL-MCNC: 1.24 MG/DL (ref 0.76–1.27)
EGFRCR SERPLBLD CKD-EPI 2021: 62.5 ML/MIN/1.73
GLOBULIN SER CALC-MCNC: 2.2 GM/DL
GLUCOSE SERPL-MCNC: 112 MG/DL (ref 65–99)
HBA1C MFR BLD: 6 % (ref 4.8–5.6)
HDLC SERPL-MCNC: 42 MG/DL (ref 40–60)
LDLC SERPL CALC-MCNC: 100 MG/DL (ref 0–100)
POTASSIUM SERPL-SCNC: 3.9 MMOL/L (ref 3.5–5.2)
PROT SERPL-MCNC: 6.9 G/DL (ref 6–8.5)
SODIUM SERPL-SCNC: 141 MMOL/L (ref 136–145)
T4 FREE SERPL-MCNC: 1.12 NG/DL (ref 0.93–1.7)
TRIGL SERPL-MCNC: 159 MG/DL (ref 0–150)
TSH SERPL DL<=0.005 MIU/L-ACNC: 3.08 UIU/ML (ref 0.27–4.2)
VLDLC SERPL CALC-MCNC: 28 MG/DL (ref 5–40)

## 2022-10-19 ENCOUNTER — TREATMENT (OUTPATIENT)
Dept: CARDIAC REHAB | Facility: HOSPITAL | Age: 72
End: 2022-10-19

## 2022-10-19 DIAGNOSIS — Z95.5 STATUS POST INSERTION OF DRUG-ELUTING STENT INTO LEFT ANTERIOR DESCENDING (LAD) ARTERY: Primary | ICD-10-CM

## 2022-10-19 PROCEDURE — 93798 PHYS/QHP OP CAR RHAB W/ECG: CPT

## 2022-10-21 ENCOUNTER — TREATMENT (OUTPATIENT)
Dept: CARDIAC REHAB | Facility: HOSPITAL | Age: 72
End: 2022-10-21

## 2022-10-21 DIAGNOSIS — Z95.5 STATUS POST INSERTION OF DRUG-ELUTING STENT INTO LEFT ANTERIOR DESCENDING (LAD) ARTERY: Primary | ICD-10-CM

## 2022-10-21 PROCEDURE — 93798 PHYS/QHP OP CAR RHAB W/ECG: CPT

## 2022-10-24 ENCOUNTER — TREATMENT (OUTPATIENT)
Dept: CARDIAC REHAB | Facility: HOSPITAL | Age: 72
End: 2022-10-24

## 2022-10-24 DIAGNOSIS — Z95.5 STATUS POST INSERTION OF DRUG-ELUTING STENT INTO LEFT ANTERIOR DESCENDING (LAD) ARTERY: Primary | ICD-10-CM

## 2022-10-24 PROCEDURE — 93798 PHYS/QHP OP CAR RHAB W/ECG: CPT

## 2022-10-26 ENCOUNTER — TREATMENT (OUTPATIENT)
Dept: CARDIAC REHAB | Facility: HOSPITAL | Age: 72
End: 2022-10-26

## 2022-10-26 DIAGNOSIS — Z95.5 STATUS POST INSERTION OF DRUG-ELUTING STENT INTO LEFT ANTERIOR DESCENDING (LAD) ARTERY: Primary | ICD-10-CM

## 2022-10-26 PROCEDURE — 93798 PHYS/QHP OP CAR RHAB W/ECG: CPT

## 2022-10-27 ENCOUNTER — OFFICE VISIT (OUTPATIENT)
Dept: CARDIOLOGY | Facility: CLINIC | Age: 72
End: 2022-10-27

## 2022-10-27 VITALS
DIASTOLIC BLOOD PRESSURE: 80 MMHG | SYSTOLIC BLOOD PRESSURE: 158 MMHG | HEART RATE: 67 BPM | HEIGHT: 74 IN | WEIGHT: 280 LBS | BODY MASS INDEX: 35.94 KG/M2

## 2022-10-27 DIAGNOSIS — I25.10 CORONARY ARTERY DISEASE INVOLVING NATIVE CORONARY ARTERY OF NATIVE HEART WITHOUT ANGINA PECTORIS: Primary | ICD-10-CM

## 2022-10-27 PROCEDURE — 99214 OFFICE O/P EST MOD 30 MIN: CPT | Performed by: INTERNAL MEDICINE

## 2022-10-27 RX ORDER — CLONIDINE HYDROCHLORIDE 0.1 MG/1
0.1 TABLET ORAL 2 TIMES DAILY
Qty: 90 TABLET | Refills: 1 | Status: SHIPPED | OUTPATIENT
Start: 2022-10-27 | End: 2023-02-08 | Stop reason: SDUPTHER

## 2022-10-27 NOTE — PROGRESS NOTES
Subjective:     Encounter Date:08/16/2022      Patient ID: Jeb Olson is a 72 y.o. male.    Chief Complaint: Abnormal stress test  HPI:   This is a 72-year-old man with a history of CAD, PVCs.  In 2022 he an eye surgery and was noted to have frequent PVCs.  This prompted further testing by his primary care doctor.  He had a Holter monitor which showed frequent PVCs, PVC burden was 22% in couplets, triplets bigeminy and trigeminy.  Thus, he therefore had a transthoracic echocardiogram which showed normal systolic ejection fraction with grade 1 diastolic dysfunction appropriate for age and mild MR.   Subsequent to that he had a walking nuclear stress test with a small, mild apical ischemic defect.  Cardiac catheterization September 22 by myself showed a distal LAD lesion, status post PCI with 1 drug-eluting stent.   Today he returns for follow-up.  He has started cardiac rehab.  He is pushing himself.  He has no angina or dyspnea.  Infrequent PACs and PVCs noted on telemetry.  Overall feeling well and tolerating his medications.  He does notice persistently elevated blood pressures, 150s to 160s    The following portions of the patient's history were reviewed and updated as appropriate: allergies, current medications, past family history, past medical history, past social history, past surgical history and problem list.     REVIEW OF SYSTEMS:   All systems reviewed.  Pertinent positives identified in HPI.  All other systems are negative.    Past Medical History:   Diagnosis Date   • Allergic Have had for several years    Eyes and nasal issues   • Anxiety Since about 2014    Since I was taking of my Dad, who also passed away 3yrs ago.   • Arthritis 2002    Both knees, hips, and shoulders   • Arthritis of knee, left    • Cataract 05/2019   • Colon polyp 2017   • Diabetes mellitus (HCC)    • Essential hypertension    • HL (hearing loss) 1980   • Hyperlipemia    • Mild chronic anemia    • Obesity    • Sleep apnea      cpap       Family History   Problem Relation Age of Onset   • Alcohol abuse Maternal Uncle         Passed away 2013   • Alcohol abuse Father         Passed away in 2016   • Hyperlipidemia Father         Started about 10 years before his death   • Arthritis Mother         Passed away 2006, from Alzheimers   • Diabetes Mother    • Hyperlipidemia Mother         Started probably at middle age   • Osteoporosis Mother    • Malig Hyperthermia Neg Hx        Social History     Socioeconomic History   • Marital status:    Tobacco Use   • Smoking status: Never   • Smokeless tobacco: Never   • Tobacco comments:     Naila only every been around people who smoked   Vaping Use   • Vaping Use: Never used   Substance and Sexual Activity   • Alcohol use: Yes     Alcohol/week: 10.0 standard drinks     Types: 10 Drinks containing 0.5 oz of alcohol per week     Comment: Maybe 8-10 per week. Stopped drinking beer August 2018   • Drug use: No   • Sexual activity: Not Currently     Partners: Female     Birth control/protection: None       No Known Allergies    Past Surgical History:   Procedure Laterality Date   • ACHILLES TENDON REPAIR Left    • CARDIAC CATHETERIZATION     • CARDIAC CATHETERIZATION N/A 09/01/2022    Procedure: Coronary angiography, Left heart catheterization,;  Surgeon: Bruna Chávez MD;  Location: [facility] CATH INVASIVE LOCATION;  Service: Cardiology;  Laterality: N/A;   • CARDIAC CATHETERIZATION N/A 09/01/2022    Procedure: Stent PRAVIN coronary;  Surgeon: Bruna Chávez MD;  Location: [facility] CATH INVASIVE LOCATION;  Service: Cardiology;  Laterality: N/A;   • COLONOSCOPY      Dr Reyes 6 years ago   • ENDOSCOPY     • JOINT REPLACEMENT  03/12/20    Left knee   • TONSILLECTOMY      As a child   • TOTAL KNEE ARTHROPLASTY Left 03/12/2020    Procedure: TOTAL KNEE ARTHROPLASTY;  Surgeon: Chepe Alicea MD;  Location: [facility] OR;  Service: Orthopedics;  Laterality: Left;       Procedures       Objective:          PHYSICAL EXAM:  GEN: VSS, no distress,   Eyes: normal sclera, normal lids and lashes  HENT: moist mucus membranes,   Respiratory: CTAB, no rales or wheezes  CV: RRR, blowing 3 out of 6 apical murmur, +2 DP and 2+ carotid pulses b/l  GI: NABS, soft,  Nontender, nondistended  MSK: no edema, no scoliosis or kyphosis  Skin: no rash, warm, dry  Heme/Lymph: no bruising or bleeding  Psych: organized thought, normal behavior and affect  Neuro: Cranial nerves grossly intact, Alert and Oriented x 3.         Assessment:          Diagnosis Plan   1. Coronary artery disease involving native coronary artery of native heart without angina pectoris             Plan:       1.  CAD: Status post distal LAD PCI September 22.  Continue aspirin and Plavix.  No angina.  2.  Mitral regurgitation: On echocardiogram this appears to be mild.  He has a prominent murmur.  This is a new finding.  3.  Hypertension: Increase clonidine 2.1 twice daily.  Continue amlodipine 10, benazepril 40, Cardura 2, bisoprolol 5.  Did not tolerate higher doses of bisoprolol due to bradycardia    Dr. Salvador, Thank you very much for referring this kind patient to me. Please call me with any questions or concerns. I will see the patient again in the office in 6 months or sooner.        Bruna Chávez MD  10/27/22  [facility]    Outpatient Encounter Medications as of 10/27/2022   Medication Sig Dispense Refill   • Acetaminophen (TYLENOL PO) Take  by mouth.     • amLODIPine (NORVASC) 10 MG tablet TAKE ONE TABLET BY MOUTH DAILY 90 tablet 1   • aspirin 81 MG EC tablet Take 1 tablet by mouth 2 (Two) Times a Day. Indications: DVT Prophylaxis     • benazepril (LOTENSIN) 40 MG tablet TAKE ONE TABLET BY MOUTH TWICE A  tablet 1   • cloNIDine (CATAPRES) 0.1 MG tablet Take 1 tablet by mouth 2 (Two) Times a Day. 90 tablet 1   • clopidogrel (PLAVIX) 75 MG tablet Take 1 tablet by mouth Daily. 90 tablet 3   • doxazosin (CARDURA) 2 MG tablet Take 1  tablet by mouth Every Night. 90 tablet 1   • escitalopram (LEXAPRO) 10 MG tablet TAKE ONE TABLET BY MOUTH DAILY 90 tablet 1   • ezetimibe (ZETIA) 10 MG tablet Take 1 tablet by mouth Daily. 90 tablet 1   • fenofibrate 160 MG tablet TAKE ONE TABLET BY MOUTH ONCE NIGHTLY 90 tablet 1   • fluticasone (FLONASE) 50 MCG/ACT nasal spray 1 spray into the nostril(s) as directed by provider 2 (Two) Times a Day.     • gabapentin (NEURONTIN) 600 MG tablet Take 600 mg by mouth Daily.     • glimepiride (AMARYL) 4 MG tablet Take 1 tablet by mouth 2 (Two) Times a Day. 180 tablet 1   • glucose blood (Accu-Chek Anabela Plus) test strip TEST TWICE DAILY Use as instructed 100 each 3   • hydroCHLOROthiazide (HYDRODIURIL) 25 MG tablet Take 1 tablet by mouth Daily. 90 tablet 1   • Janumet XR  MG tablet TAKE ONE TABLET BY MOUTH TWICE A DAY 60 tablet 5   • Multiple Vitamins-Minerals (PRESERVISION AREDS 2 PO) Take 1 tablet by mouth 2 (Two) Times a Day.     • nebivolol (BYSTOLIC) 5 MG tablet TAKE ONE TABLET BY MOUTH DAILY 90 tablet 1   • Olopatadine HCl (PATADAY OP) Apply  to eye(s) as directed by provider Daily.     • traZODone (DESYREL) 50 MG tablet Take 1 tablet by mouth Every Night. 30 tablet 1   • [DISCONTINUED] cloNIDine (CATAPRES) 0.1 MG tablet TAKE ONE TABLET BY MOUTH DAILY 90 tablet 1   • polyethyl glycol-propyl glycol (SYSTANE) 0.4-0.3 % solution ophthalmic solution (artificial tears) Administer 1 drop to both eyes Every 1 (One) Hour As Needed.       Facility-Administered Encounter Medications as of 10/27/2022   Medication Dose Route Frequency Provider Last Rate Last Admin   • Chlorhexidine Gluconate 2 % pads 3 each  3 pad Apply externally BID Pricila Barnes, TIFFANIE

## 2022-10-28 ENCOUNTER — TREATMENT (OUTPATIENT)
Dept: CARDIAC REHAB | Facility: HOSPITAL | Age: 72
End: 2022-10-28

## 2022-10-28 DIAGNOSIS — Z95.5 STATUS POST INSERTION OF DRUG-ELUTING STENT INTO LEFT ANTERIOR DESCENDING (LAD) ARTERY: Primary | ICD-10-CM

## 2022-10-28 PROCEDURE — 93798 PHYS/QHP OP CAR RHAB W/ECG: CPT

## 2022-10-28 RX ORDER — EZETIMIBE 10 MG/1
10 TABLET ORAL DAILY
Qty: 90 TABLET | Refills: 1 | Status: SHIPPED | OUTPATIENT
Start: 2022-10-28 | End: 2022-11-03 | Stop reason: SDUPTHER

## 2022-10-31 ENCOUNTER — TREATMENT (OUTPATIENT)
Dept: CARDIAC REHAB | Facility: HOSPITAL | Age: 72
End: 2022-10-31

## 2022-10-31 DIAGNOSIS — Z95.5 STATUS POST INSERTION OF DRUG-ELUTING STENT INTO LEFT ANTERIOR DESCENDING (LAD) ARTERY: Primary | ICD-10-CM

## 2022-10-31 PROCEDURE — 93798 PHYS/QHP OP CAR RHAB W/ECG: CPT

## 2022-11-02 ENCOUNTER — TREATMENT (OUTPATIENT)
Dept: CARDIAC REHAB | Facility: HOSPITAL | Age: 72
End: 2022-11-02

## 2022-11-02 DIAGNOSIS — Z95.5 STATUS POST INSERTION OF DRUG-ELUTING STENT INTO LEFT ANTERIOR DESCENDING (LAD) ARTERY: Primary | ICD-10-CM

## 2022-11-02 PROCEDURE — 93798 PHYS/QHP OP CAR RHAB W/ECG: CPT

## 2022-11-03 ENCOUNTER — TELEPHONE (OUTPATIENT)
Dept: FAMILY MEDICINE CLINIC | Facility: CLINIC | Age: 72
End: 2022-11-03

## 2022-11-03 RX ORDER — HYDROCHLOROTHIAZIDE 25 MG/1
25 TABLET ORAL DAILY
Qty: 90 TABLET | Refills: 1 | Status: SHIPPED | OUTPATIENT
Start: 2022-11-03

## 2022-11-03 RX ORDER — EZETIMIBE 10 MG/1
10 TABLET ORAL DAILY
Qty: 90 TABLET | Refills: 1 | Status: SHIPPED | OUTPATIENT
Start: 2022-11-03 | End: 2023-03-11 | Stop reason: SDUPTHER

## 2022-11-03 NOTE — TELEPHONE ENCOUNTER
Caller: Jeb Olson    Relationship: Self    Best call back number: [phone]    Requested Prescriptions:   Requested Prescriptions     Pending Prescriptions Disp Refills   • hydroCHLOROthiazide (HYDRODIURIL) 25 MG tablet 90 tablet 1     Sig: Take 1 tablet by mouth Daily.   • ezetimibe (ZETIA) 10 MG tablet 90 tablet 1     Sig: Take 1 tablet by mouth Daily.        Pharmacy where request should be sent: [address] [phone] [facility] [phone] [facility]     Additional details provided by patient:     Does the patient have less than a 3 day supply:  [x] Yes  [] No    Samuel Ellis Rep   11/03/22 14:52 EDT

## 2022-11-04 ENCOUNTER — TREATMENT (OUTPATIENT)
Dept: CARDIAC REHAB | Facility: HOSPITAL | Age: 72
End: 2022-11-04

## 2022-11-04 DIAGNOSIS — Z95.5 STATUS POST INSERTION OF DRUG-ELUTING STENT INTO LEFT ANTERIOR DESCENDING (LAD) ARTERY: Primary | ICD-10-CM

## 2022-11-04 PROCEDURE — 93798 PHYS/QHP OP CAR RHAB W/ECG: CPT

## 2022-11-07 ENCOUNTER — CLINICAL SUPPORT (OUTPATIENT)
Dept: ORTHOPEDIC SURGERY | Facility: CLINIC | Age: 72
End: 2022-11-07

## 2022-11-07 VITALS — HEIGHT: 74 IN | WEIGHT: 274 LBS | BODY MASS INDEX: 35.16 KG/M2 | TEMPERATURE: 98 F

## 2022-11-07 DIAGNOSIS — M17.10 ARTHRITIS OF KNEE: Primary | ICD-10-CM

## 2022-11-07 PROCEDURE — 20610 DRAIN/INJ JOINT/BURSA W/O US: CPT | Performed by: ORTHOPAEDIC SURGERY

## 2022-11-07 RX ORDER — METHYLPREDNISOLONE ACETATE 80 MG/ML
80 INJECTION, SUSPENSION INTRA-ARTICULAR; INTRALESIONAL; INTRAMUSCULAR; SOFT TISSUE
Status: COMPLETED | OUTPATIENT
Start: 2022-11-07 | End: 2022-11-07

## 2022-11-07 RX ADMIN — METHYLPREDNISOLONE ACETATE 80 MG: 80 INJECTION, SUSPENSION INTRA-ARTICULAR; INTRALESIONAL; INTRAMUSCULAR; SOFT TISSUE at 09:12

## 2022-11-07 NOTE — PROGRESS NOTES
Mr. Olson follows up today for his right knee.  No new complaints.  He would like the injection repeated.  The risk, benefits and alternatives were discussed but he consented and injection was performed as described below.      Large Joint Arthrocentesis: R knee  Date/Time: 11/7/2022 9:12 AM  Consent given by: patient  Site marked: site marked  Timeout: Immediately prior to procedure a time out was called to verify the correct patient, procedure, equipment, support staff and site/side marked as required   Supporting Documentation  Indications: pain   Procedure Details  Location: knee - R knee  Preparation: Patient was prepped and draped in the usual sterile fashion  Needle gauge: 21.  Approach: anterolateral  Medications administered: 80 mg methylPREDNISolone acetate 80 MG/ML; 2 mL lidocaine (cardiac)  Patient tolerance: patient tolerated the procedure well with no immediate complications

## 2022-11-09 ENCOUNTER — TREATMENT (OUTPATIENT)
Dept: CARDIAC REHAB | Facility: HOSPITAL | Age: 72
End: 2022-11-09

## 2022-11-09 DIAGNOSIS — Z95.5 STATUS POST INSERTION OF DRUG-ELUTING STENT INTO LEFT ANTERIOR DESCENDING (LAD) ARTERY: Primary | ICD-10-CM

## 2022-11-09 PROCEDURE — 93798 PHYS/QHP OP CAR RHAB W/ECG: CPT

## 2022-11-11 ENCOUNTER — TREATMENT (OUTPATIENT)
Dept: CARDIAC REHAB | Facility: HOSPITAL | Age: 72
End: 2022-11-11

## 2022-11-11 DIAGNOSIS — Z95.5 STATUS POST INSERTION OF DRUG-ELUTING STENT INTO LEFT ANTERIOR DESCENDING (LAD) ARTERY: Primary | ICD-10-CM

## 2022-11-11 PROCEDURE — 93798 PHYS/QHP OP CAR RHAB W/ECG: CPT

## 2022-11-14 ENCOUNTER — TREATMENT (OUTPATIENT)
Dept: CARDIAC REHAB | Facility: HOSPITAL | Age: 72
End: 2022-11-14

## 2022-11-14 DIAGNOSIS — Z95.5 STATUS POST INSERTION OF DRUG-ELUTING STENT INTO LEFT ANTERIOR DESCENDING (LAD) ARTERY: Primary | ICD-10-CM

## 2022-11-14 PROCEDURE — 93798 PHYS/QHP OP CAR RHAB W/ECG: CPT

## 2022-11-15 RX ORDER — TRAZODONE HYDROCHLORIDE 50 MG/1
50 TABLET ORAL NIGHTLY
Qty: 30 TABLET | Refills: 1 | Status: SHIPPED | OUTPATIENT
Start: 2022-11-15 | End: 2023-01-17 | Stop reason: SDUPTHER

## 2022-11-15 NOTE — TELEPHONE ENCOUNTER
Rx Refill Note  Requested Prescriptions     Pending Prescriptions Disp Refills   • traZODone (DESYREL) 50 MG tablet 30 tablet 1     Sig: Take 1 tablet by mouth Every Night.      Last office visit with prescribing clinician: 10/17/2022      Next office visit with prescribing clinician: Visit date not found            Semaj Patel MA  11/15/22, 09:05 EST

## 2022-11-16 ENCOUNTER — TREATMENT (OUTPATIENT)
Dept: CARDIAC REHAB | Facility: HOSPITAL | Age: 72
End: 2022-11-16

## 2022-11-16 DIAGNOSIS — Z95.5 STATUS POST INSERTION OF DRUG-ELUTING STENT INTO LEFT ANTERIOR DESCENDING (LAD) ARTERY: Primary | ICD-10-CM

## 2022-11-16 PROCEDURE — 93798 PHYS/QHP OP CAR RHAB W/ECG: CPT

## 2022-11-18 ENCOUNTER — TREATMENT (OUTPATIENT)
Dept: CARDIAC REHAB | Facility: HOSPITAL | Age: 72
End: 2022-11-18

## 2022-11-18 DIAGNOSIS — Z95.5 STATUS POST INSERTION OF DRUG-ELUTING STENT INTO LEFT ANTERIOR DESCENDING (LAD) ARTERY: Primary | ICD-10-CM

## 2022-11-18 PROCEDURE — 93798 PHYS/QHP OP CAR RHAB W/ECG: CPT

## 2022-11-21 ENCOUNTER — APPOINTMENT (OUTPATIENT)
Dept: CARDIAC REHAB | Facility: HOSPITAL | Age: 72
End: 2022-11-21

## 2022-11-21 RX ORDER — DOXAZOSIN MESYLATE 4 MG/1
4 TABLET ORAL NIGHTLY
Qty: 30 TABLET | Refills: 11 | Status: SHIPPED | OUTPATIENT
Start: 2022-11-21

## 2022-11-23 ENCOUNTER — TREATMENT (OUTPATIENT)
Dept: CARDIAC REHAB | Facility: HOSPITAL | Age: 72
End: 2022-11-23

## 2022-11-23 DIAGNOSIS — Z95.5 STATUS POST INSERTION OF DRUG-ELUTING STENT INTO LEFT ANTERIOR DESCENDING (LAD) ARTERY: Primary | ICD-10-CM

## 2022-11-23 PROCEDURE — 93798 PHYS/QHP OP CAR RHAB W/ECG: CPT

## 2022-11-28 ENCOUNTER — TREATMENT (OUTPATIENT)
Dept: CARDIAC REHAB | Facility: HOSPITAL | Age: 72
End: 2022-11-28

## 2022-11-28 DIAGNOSIS — Z95.5 STATUS POST INSERTION OF DRUG-ELUTING STENT INTO LEFT ANTERIOR DESCENDING (LAD) ARTERY: Primary | ICD-10-CM

## 2022-11-28 PROCEDURE — 93798 PHYS/QHP OP CAR RHAB W/ECG: CPT

## 2022-11-30 ENCOUNTER — TREATMENT (OUTPATIENT)
Dept: CARDIAC REHAB | Facility: HOSPITAL | Age: 72
End: 2022-11-30

## 2022-11-30 DIAGNOSIS — Z95.5 STATUS POST INSERTION OF DRUG-ELUTING STENT INTO LEFT ANTERIOR DESCENDING (LAD) ARTERY: Primary | ICD-10-CM

## 2022-11-30 PROCEDURE — 93798 PHYS/QHP OP CAR RHAB W/ECG: CPT

## 2022-12-02 ENCOUNTER — APPOINTMENT (OUTPATIENT)
Dept: CARDIAC REHAB | Facility: HOSPITAL | Age: 72
End: 2022-12-02
Payer: MEDICARE

## 2022-12-05 ENCOUNTER — TREATMENT (OUTPATIENT)
Dept: CARDIAC REHAB | Facility: HOSPITAL | Age: 72
End: 2022-12-05
Payer: MEDICARE

## 2022-12-05 DIAGNOSIS — Z95.5 STATUS POST INSERTION OF DRUG-ELUTING STENT INTO LEFT ANTERIOR DESCENDING (LAD) ARTERY: Primary | ICD-10-CM

## 2022-12-05 PROCEDURE — 93798 PHYS/QHP OP CAR RHAB W/ECG: CPT

## 2022-12-06 ENCOUNTER — TELEPHONE (OUTPATIENT)
Dept: GASTROENTEROLOGY | Facility: CLINIC | Age: 72
End: 2022-12-06

## 2022-12-06 NOTE — TELEPHONE ENCOUNTER
Caller: Jeb Olson    Relationship: Self    Best call back number: [phone]    What is the best time to reach you: ANYTIME     Who are you requesting to speak with (clinical staff, provider,  specific staff member): CLINICAL STAFF    What was the call regarding: PATIENT HAS NOT RECEIVED PREP INSTRUCTION FOR SCHEDULED PROCEDURE ON 12/9/22. REQUESTING THAT BE SENT THROUGH [facility]. WOULD LIKE TO PROVIDE INFORMATION REGARDING STENT PLACEMENT WOULD LIKE FOR SOMEONE IN OFFICE TO GIVE HIM CALL.    Do you require a callback: YES

## 2022-12-06 NOTE — TELEPHONE ENCOUNTER
Dr Reyes,  Pt had Stent placement with Dr Chávez on 9/1/22.  Usually cant interrupt Plavix for a new stent for 1 year.  I will message Dr Chávez to see when this will be safe for the pt to proceed.  Thanks      Dr Chávez,  This pt is scheduled for a colonoscopy on 12/9 with Dr Reyes.  Since pt had stent placed in September, will he be able to hold for plavix for 7 days prior or should we put this off?  Please advise

## 2022-12-07 ENCOUNTER — TREATMENT (OUTPATIENT)
Dept: CARDIAC REHAB | Facility: HOSPITAL | Age: 72
End: 2022-12-07
Payer: MEDICARE

## 2022-12-07 DIAGNOSIS — Z95.5 STATUS POST INSERTION OF DRUG-ELUTING STENT INTO LEFT ANTERIOR DESCENDING (LAD) ARTERY: Primary | ICD-10-CM

## 2022-12-07 PROCEDURE — 93798 PHYS/QHP OP CAR RHAB W/ECG: CPT

## 2022-12-09 ENCOUNTER — TREATMENT (OUTPATIENT)
Dept: CARDIAC REHAB | Facility: HOSPITAL | Age: 72
End: 2022-12-09
Payer: MEDICARE

## 2022-12-09 DIAGNOSIS — Z95.5 STATUS POST INSERTION OF DRUG-ELUTING STENT INTO LEFT ANTERIOR DESCENDING (LAD) ARTERY: Primary | ICD-10-CM

## 2022-12-09 PROCEDURE — 93798 PHYS/QHP OP CAR RHAB W/ECG: CPT

## 2022-12-12 ENCOUNTER — TREATMENT (OUTPATIENT)
Dept: CARDIAC REHAB | Facility: HOSPITAL | Age: 72
End: 2022-12-12
Payer: MEDICARE

## 2022-12-12 DIAGNOSIS — Z95.5 STATUS POST INSERTION OF DRUG-ELUTING STENT INTO LEFT ANTERIOR DESCENDING (LAD) ARTERY: Primary | ICD-10-CM

## 2022-12-12 PROCEDURE — 93798 PHYS/QHP OP CAR RHAB W/ECG: CPT

## 2022-12-14 ENCOUNTER — TREATMENT (OUTPATIENT)
Dept: CARDIAC REHAB | Facility: HOSPITAL | Age: 72
End: 2022-12-14
Payer: MEDICARE

## 2022-12-14 DIAGNOSIS — Z95.5 STATUS POST INSERTION OF DRUG-ELUTING STENT INTO LEFT ANTERIOR DESCENDING (LAD) ARTERY: Primary | ICD-10-CM

## 2022-12-14 PROCEDURE — 93798 PHYS/QHP OP CAR RHAB W/ECG: CPT

## 2022-12-16 ENCOUNTER — TREATMENT (OUTPATIENT)
Dept: CARDIAC REHAB | Facility: HOSPITAL | Age: 72
End: 2022-12-16
Payer: MEDICARE

## 2022-12-16 DIAGNOSIS — Z95.5 STATUS POST INSERTION OF DRUG-ELUTING STENT INTO LEFT ANTERIOR DESCENDING (LAD) ARTERY: Primary | ICD-10-CM

## 2022-12-16 PROCEDURE — 93798 PHYS/QHP OP CAR RHAB W/ECG: CPT

## 2022-12-19 ENCOUNTER — TREATMENT (OUTPATIENT)
Dept: CARDIAC REHAB | Facility: HOSPITAL | Age: 72
End: 2022-12-19
Payer: MEDICARE

## 2022-12-19 DIAGNOSIS — Z95.5 STATUS POST INSERTION OF DRUG-ELUTING STENT INTO LEFT ANTERIOR DESCENDING (LAD) ARTERY: Primary | ICD-10-CM

## 2022-12-19 PROCEDURE — 93798 PHYS/QHP OP CAR RHAB W/ECG: CPT

## 2022-12-19 RX ORDER — GLIMEPIRIDE 4 MG/1
4 TABLET ORAL 2 TIMES DAILY
Qty: 180 TABLET | Refills: 1 | Status: SHIPPED | OUTPATIENT
Start: 2022-12-19

## 2022-12-21 ENCOUNTER — TREATMENT (OUTPATIENT)
Dept: CARDIAC REHAB | Facility: HOSPITAL | Age: 72
End: 2022-12-21
Payer: MEDICARE

## 2022-12-21 DIAGNOSIS — Z95.5 STATUS POST INSERTION OF DRUG-ELUTING STENT INTO LEFT ANTERIOR DESCENDING (LAD) ARTERY: Primary | ICD-10-CM

## 2022-12-21 PROCEDURE — 93798 PHYS/QHP OP CAR RHAB W/ECG: CPT

## 2022-12-28 ENCOUNTER — TREATMENT (OUTPATIENT)
Dept: CARDIAC REHAB | Facility: HOSPITAL | Age: 72
End: 2022-12-28
Payer: MEDICARE

## 2022-12-28 DIAGNOSIS — Z95.5 STATUS POST INSERTION OF DRUG-ELUTING STENT INTO LEFT ANTERIOR DESCENDING (LAD) ARTERY: Primary | ICD-10-CM

## 2022-12-28 PROCEDURE — 93798 PHYS/QHP OP CAR RHAB W/ECG: CPT

## 2022-12-30 ENCOUNTER — TREATMENT (OUTPATIENT)
Dept: CARDIAC REHAB | Facility: HOSPITAL | Age: 72
End: 2022-12-30
Payer: MEDICARE

## 2022-12-30 DIAGNOSIS — Z95.5 STATUS POST INSERTION OF DRUG-ELUTING STENT INTO LEFT ANTERIOR DESCENDING (LAD) ARTERY: Primary | ICD-10-CM

## 2022-12-30 PROCEDURE — 93798 PHYS/QHP OP CAR RHAB W/ECG: CPT

## 2023-01-18 RX ORDER — TRAZODONE HYDROCHLORIDE 50 MG/1
50 TABLET ORAL NIGHTLY
Qty: 30 TABLET | Refills: 1 | Status: SHIPPED | OUTPATIENT
Start: 2023-01-18

## 2023-01-24 RX ORDER — BENAZEPRIL HYDROCHLORIDE 40 MG/1
TABLET, FILM COATED ORAL
Qty: 180 TABLET | Refills: 0 | Status: SHIPPED | OUTPATIENT
Start: 2023-01-24

## 2023-01-27 RX ORDER — SITAGLIPTIN AND METFORMIN HYDROCHLORIDE 1000; 50 MG/1; MG/1
1 TABLET, FILM COATED, EXTENDED RELEASE ORAL 2 TIMES DAILY
Qty: 60 TABLET | Refills: 5 | Status: SHIPPED | OUTPATIENT
Start: 2023-01-27 | End: 2023-01-30

## 2023-01-30 ENCOUNTER — TELEPHONE (OUTPATIENT)
Dept: ORTHOPEDIC SURGERY | Facility: CLINIC | Age: 73
End: 2023-01-30

## 2023-01-30 RX ORDER — SITAGLIPTIN AND METFORMIN HYDROCHLORIDE 1000; 50 MG/1; MG/1
TABLET, FILM COATED, EXTENDED RELEASE ORAL
Qty: 180 TABLET | Refills: 0 | Status: SHIPPED | OUTPATIENT
Start: 2023-01-30

## 2023-01-30 RX ORDER — SITAGLIPTIN AND METFORMIN HYDROCHLORIDE 1000; 50 MG/1; MG/1
1 TABLET, FILM COATED, EXTENDED RELEASE ORAL 2 TIMES DAILY
Qty: 180 TABLET | Refills: 0 | OUTPATIENT
Start: 2023-01-30

## 2023-01-30 NOTE — TELEPHONE ENCOUNTER
Caller: BLAYNE     Relationship to patient: SELF     Best call back number: [phone]     Type of visit: INJECTION     If rescheduling, when is the original appointment 02/08/23

## 2023-02-08 RX ORDER — CLONIDINE HYDROCHLORIDE 0.1 MG/1
0.1 TABLET ORAL 2 TIMES DAILY
Qty: 180 TABLET | Refills: 1 | Status: SHIPPED | OUTPATIENT
Start: 2023-02-08

## 2023-02-08 RX ORDER — CLONIDINE HYDROCHLORIDE 0.1 MG/1
0.1 TABLET ORAL 2 TIMES DAILY
Qty: 90 TABLET | Refills: 1 | OUTPATIENT
Start: 2023-02-08

## 2023-03-01 ENCOUNTER — CLINICAL SUPPORT (OUTPATIENT)
Dept: ORTHOPEDIC SURGERY | Facility: CLINIC | Age: 73
End: 2023-03-01
Payer: MEDICARE

## 2023-03-01 VITALS — BODY MASS INDEX: 33.66 KG/M2 | TEMPERATURE: 96.9 F | WEIGHT: 262.3 LBS | HEIGHT: 74 IN

## 2023-03-01 DIAGNOSIS — M17.10 ARTHRITIS OF KNEE: Primary | ICD-10-CM

## 2023-03-01 PROCEDURE — 20610 DRAIN/INJ JOINT/BURSA W/O US: CPT | Performed by: ORTHOPAEDIC SURGERY

## 2023-03-01 PROCEDURE — 73562 X-RAY EXAM OF KNEE 3: CPT | Performed by: ORTHOPAEDIC SURGERY

## 2023-03-01 RX ORDER — METHYLPREDNISOLONE ACETATE 80 MG/ML
80 INJECTION, SUSPENSION INTRA-ARTICULAR; INTRALESIONAL; INTRAMUSCULAR; SOFT TISSUE
Status: COMPLETED | OUTPATIENT
Start: 2023-03-01 | End: 2023-03-01

## 2023-03-01 RX ORDER — LIDOCAINE HYDROCHLORIDE 10 MG/ML
2 INJECTION, SOLUTION EPIDURAL; INFILTRATION; INTRACAUDAL; PERINEURAL
Status: COMPLETED | OUTPATIENT
Start: 2023-03-01 | End: 2023-03-01

## 2023-03-01 RX ADMIN — LIDOCAINE HYDROCHLORIDE 2 ML: 10 INJECTION, SOLUTION EPIDURAL; INFILTRATION; INTRACAUDAL; PERINEURAL at 15:59

## 2023-03-01 RX ADMIN — METHYLPREDNISOLONE ACETATE 80 MG: 80 INJECTION, SUSPENSION INTRA-ARTICULAR; INTRALESIONAL; INTRAMUSCULAR; SOFT TISSUE at 15:59

## 2023-03-01 NOTE — PROGRESS NOTES
Mr. Olson follows up today for his right knee.  No new complaints.  I repeated AP, merchant's and lateral views today to evaluate progression of his arthritis.  These are compared to previous x-rays.  He has what appears to be advanced medial and patellofemoral compartment osteoarthritis with near bone-on-bone, osteophyte formation and subchondral sclerosis.    I showed him the x-rays and we discussed the significance of the findings.  The injections continue to work well for him and he would like to continue those.  The risk, benefits and alternatives were discussed.  He consented and the injection was performed as described below.  He will follow-up as needed.    Large Joint Arthrocentesis: R knee  Date/Time: 3/1/2023 3:59 PM  Consent given by: patient  Site marked: site marked  Timeout: Immediately prior to procedure a time out was called to verify the correct patient, procedure, equipment, support staff and site/side marked as required   Supporting Documentation  Indications: pain   Procedure Details  Location: knee - R knee  Preparation: Patient was prepped and draped in the usual sterile fashion  Needle gauge: 21 G.  Medications administered: 80 mg methylPREDNISolone acetate 80 MG/ML; 2 mL lidocaine PF 1% 1 %

## 2023-03-13 RX ORDER — EZETIMIBE 10 MG/1
10 TABLET ORAL DAILY
Qty: 90 TABLET | Refills: 1 | OUTPATIENT
Start: 2023-03-13

## 2023-03-13 RX ORDER — FENOFIBRATE 160 MG/1
TABLET ORAL
Qty: 90 TABLET | Refills: 0 | Status: SHIPPED | OUTPATIENT
Start: 2023-03-13

## 2023-03-13 RX ORDER — EZETIMIBE 10 MG/1
10 TABLET ORAL DAILY
Qty: 90 TABLET | Refills: 1 | Status: SHIPPED | OUTPATIENT
Start: 2023-03-13

## 2023-03-13 RX ORDER — NEBIVOLOL 5 MG/1
5 TABLET ORAL DAILY
Qty: 90 TABLET | Refills: 1 | Status: SHIPPED | OUTPATIENT
Start: 2023-03-13

## 2023-03-29 RX ORDER — AMLODIPINE BESYLATE 10 MG/1
10 TABLET ORAL DAILY
Qty: 90 TABLET | Refills: 1 | Status: SHIPPED | OUTPATIENT
Start: 2023-03-29

## 2023-04-17 ENCOUNTER — OFFICE VISIT (OUTPATIENT)
Dept: FAMILY MEDICINE CLINIC | Facility: CLINIC | Age: 73
End: 2023-04-17
Payer: MEDICARE

## 2023-04-17 VITALS
SYSTOLIC BLOOD PRESSURE: 142 MMHG | HEIGHT: 74 IN | WEIGHT: 276.8 LBS | OXYGEN SATURATION: 95 % | HEART RATE: 45 BPM | BODY MASS INDEX: 35.52 KG/M2 | DIASTOLIC BLOOD PRESSURE: 60 MMHG

## 2023-04-17 DIAGNOSIS — E11.41 TYPE 2 DIABETES MELLITUS WITH DIABETIC MONONEUROPATHY, WITHOUT LONG-TERM CURRENT USE OF INSULIN: Primary | ICD-10-CM

## 2023-04-17 DIAGNOSIS — M54.6 MIDLINE THORACIC BACK PAIN, UNSPECIFIED CHRONICITY: ICD-10-CM

## 2023-04-17 DIAGNOSIS — G47.33 OBSTRUCTIVE SLEEP APNEA SYNDROME: ICD-10-CM

## 2023-04-17 PROCEDURE — 3078F DIAST BP <80 MM HG: CPT | Performed by: INTERNAL MEDICINE

## 2023-04-17 PROCEDURE — 3077F SYST BP >= 140 MM HG: CPT | Performed by: INTERNAL MEDICINE

## 2023-04-17 PROCEDURE — 99214 OFFICE O/P EST MOD 30 MIN: CPT | Performed by: INTERNAL MEDICINE

## 2023-04-17 RX ORDER — GLIMEPIRIDE 4 MG/1
4 TABLET ORAL NIGHTLY
Qty: 180 TABLET | Refills: 1 | Status: SHIPPED
Start: 2023-04-17

## 2023-04-17 NOTE — PROGRESS NOTES
Answers for HPI/ROS submitted by the patient on 4/17/2023  What is the primary reason for your visit?: Other  Please describe your symptoms.: What’s best for nasal allergies , , Each morning i have a painful back ache, once up and moving around i start belching and the pain goes away. I think i would like to see a sleep doctor, my Cpap machine might becausing. , , My Diabetes is really wacko. I can have a glucose reading of 180 by my. First 30 minute exercise it has dropped to the 120s my next 30 minute exercise it has dropped below 80.  Have you had these symptoms before?: Yes  How long have you been having these symptoms?: Greater than 2 weeks  Please list any medications you are currently taking for this condition.: Diabetes- Janumet & Glimepiride, , Over the counter generic allergy medicine  Please describe any probable cause for these symptoms. : Medication for diabetes , , Pollens    Subjective Rosas is here today for follow-up on diabetes  Jeb Olson is a 72 y.o. male.     History of Present Illness Rosas is here today for follow-up.  He is having some issues with his back aching in the morning.  He seems to think this may be from his CPAP.  After he moves a little bit and belches few times the back pain is done.  He has never had any x-rays of his back and I did going to order those.  He is not having chest pain.  He does exercise routinely at [employer].  He is having some problems with his blood sugar dropping.  He is on a fairly maximum dose of glimepiride and we are going to back off on that a little bit.    The following portions of the patient's history were reviewed and updated as appropriate: allergies, current medications, past family history, past medical history, past social history, past surgical history and problem list.    Review of Systems   Respiratory: Negative for chest tightness and shortness of breath.    Cardiovascular: Negative for chest pain.   Gastrointestinal: Negative for  GERD and indigestion.   Musculoskeletal: Positive for back pain.       Objective   Physical Exam  Vitals and nursing note reviewed.   Constitutional:       Appearance: Normal appearance.   Cardiovascular:      Rate and Rhythm: Regular rhythm. Bradycardia present.      Pulses: Normal pulses.      Heart sounds: No murmur heard.    No friction rub. No gallop.   Pulmonary:      Effort: Pulmonary effort is normal.      Breath sounds: No rhonchi or rales.   Abdominal:      General: Bowel sounds are normal.      Palpations: Abdomen is soft.      Tenderness: There is no abdominal tenderness. There is no guarding or rebound.   Musculoskeletal:      Comments: He has some mild forward curvature or kyphosis of the thoracic spine but no tenderness   Neurological:      Mental Status: He is alert.           Assessment & Plan   Diagnoses and all orders for this visit:    1. Type 2 diabetes mellitus with diabetic mononeuropathy, without long-term current use of insulin (Primary)  -     Comprehensive Metabolic Panel  -     Hemoglobin A1c  -     Lipid Panel    2. Midline thoracic back pain, unspecified chronicity  -     XR spine thoracic 3 vw; Future    3. Obstructive sleep apnea syndrome  -     Ambulatory Referral to Sleep Medicine    Other orders  -     glimepiride (AMARYL) 4 MG tablet; Take 1 tablet by mouth Every Night.  Dispense: 180 tablet; Refill: 1    Rosas is here today for follow-up.  I am going to back off on his glimepiride.  We are going to just have him use 4 mg at night and leave the morning dose off.  We will see him for just lab work only in 3 months and see how his A1c does without it.  I will call him with the back x-rays.  We will refer him to sleep medicine for possible adjustment of his CPAP pressures.

## 2023-04-18 ENCOUNTER — HOSPITAL ENCOUNTER (OUTPATIENT)
Dept: GENERAL RADIOLOGY | Facility: HOSPITAL | Age: 73
Discharge: HOME | End: 2023-04-18
Admitting: INTERNAL MEDICINE
Payer: MEDICARE

## 2023-04-18 DIAGNOSIS — M54.6 MIDLINE THORACIC BACK PAIN, UNSPECIFIED CHRONICITY: ICD-10-CM

## 2023-04-18 LAB
ALBUMIN SERPL-MCNC: 4.5 G/DL (ref 3.5–5.2)
ALBUMIN/GLOB SERPL: 2.1 G/DL
ALP SERPL-CCNC: 29 U/L (ref 39–117)
ALT SERPL-CCNC: 11 U/L (ref 1–41)
AST SERPL-CCNC: 13 U/L (ref 1–40)
BILIRUB SERPL-MCNC: 0.4 MG/DL (ref 0–1.2)
BUN SERPL-MCNC: 21 MG/DL (ref 8–23)
BUN/CREAT SERPL: 17.5 (ref 7–25)
CALCIUM SERPL-MCNC: 9.5 MG/DL (ref 8.6–10.5)
CHLORIDE SERPL-SCNC: 101 MMOL/L (ref 98–107)
CHOLEST SERPL-MCNC: 163 MG/DL (ref 0–200)
CO2 SERPL-SCNC: 29.8 MMOL/L (ref 22–29)
CREAT SERPL-MCNC: 1.2 MG/DL (ref 0.76–1.27)
EGFRCR SERPLBLD CKD-EPI 2021: 65.1 ML/MIN/1.73
GLOBULIN SER CALC-MCNC: 2.1 GM/DL
GLUCOSE SERPL-MCNC: 118 MG/DL (ref 65–99)
HBA1C MFR BLD: 6.3 % (ref 4.8–5.6)
HDLC SERPL-MCNC: 35 MG/DL (ref 40–60)
LDLC SERPL CALC-MCNC: 105 MG/DL (ref 0–100)
POTASSIUM SERPL-SCNC: 3.7 MMOL/L (ref 3.5–5.2)
PROT SERPL-MCNC: 6.6 G/DL (ref 6–8.5)
SODIUM SERPL-SCNC: 142 MMOL/L (ref 136–145)
TRIGL SERPL-MCNC: 127 MG/DL (ref 0–150)
VLDLC SERPL CALC-MCNC: 23 MG/DL (ref 5–40)

## 2023-04-18 PROCEDURE — 72072 X-RAY EXAM THORAC SPINE 3VWS: CPT

## 2023-04-21 RX ORDER — TRAZODONE HYDROCHLORIDE 50 MG/1
50 TABLET ORAL NIGHTLY
Qty: 90 TABLET | Refills: 1 | Status: SHIPPED | OUTPATIENT
Start: 2023-04-21

## 2023-04-21 NOTE — TELEPHONE ENCOUNTER
Rx Refill Note  Requested Prescriptions     Pending Prescriptions Disp Refills   • traZODone (DESYREL) 50 MG tablet 30 tablet 1     Sig: Take 1 tablet by mouth Every Night.      Last office visit with prescribing clinician: 4/17/2023   Last telemedicine visit with prescribing clinician: 4/20/2023   Next office visit with prescribing clinician: 4/20/2023                         Would you like a call back once the refill request has been completed: [] Yes [] No    If the office needs to give you a call back, can they leave a voicemail: [] Yes [] No    Semaj Patel MA  04/21/23, 07:55 EDT

## 2023-04-24 RX ORDER — EZETIMIBE 10 MG/1
10 TABLET ORAL DAILY
Qty: 90 TABLET | Refills: 1 | Status: SHIPPED | OUTPATIENT
Start: 2023-04-24

## 2023-04-25 RX ORDER — HYDROCHLOROTHIAZIDE 25 MG/1
TABLET ORAL
Qty: 90 TABLET | Refills: 0 | Status: SHIPPED | OUTPATIENT
Start: 2023-04-25

## 2023-04-27 ENCOUNTER — OFFICE VISIT (OUTPATIENT)
Dept: CARDIOLOGY | Facility: CLINIC | Age: 73
End: 2023-04-27
Payer: MEDICARE

## 2023-04-27 VITALS
DIASTOLIC BLOOD PRESSURE: 84 MMHG | BODY MASS INDEX: 35.49 KG/M2 | HEIGHT: 72 IN | SYSTOLIC BLOOD PRESSURE: 142 MMHG | WEIGHT: 262 LBS | HEART RATE: 54 BPM

## 2023-04-27 DIAGNOSIS — I25.10 CORONARY ARTERY DISEASE INVOLVING NATIVE CORONARY ARTERY OF NATIVE HEART WITHOUT ANGINA PECTORIS: Primary | ICD-10-CM

## 2023-04-27 PROCEDURE — 3079F DIAST BP 80-89 MM HG: CPT | Performed by: INTERNAL MEDICINE

## 2023-04-27 PROCEDURE — 1159F MED LIST DOCD IN RCRD: CPT | Performed by: INTERNAL MEDICINE

## 2023-04-27 PROCEDURE — 99214 OFFICE O/P EST MOD 30 MIN: CPT | Performed by: INTERNAL MEDICINE

## 2023-04-27 PROCEDURE — 3077F SYST BP >= 140 MM HG: CPT | Performed by: INTERNAL MEDICINE

## 2023-04-27 PROCEDURE — 1160F RVW MEDS BY RX/DR IN RCRD: CPT | Performed by: INTERNAL MEDICINE

## 2023-04-27 RX ORDER — CLONIDINE HYDROCHLORIDE 0.2 MG/1
0.2 TABLET ORAL 2 TIMES DAILY
Qty: 180 TABLET | Refills: 3 | Status: SHIPPED | OUTPATIENT
Start: 2023-04-27

## 2023-04-27 NOTE — PROGRESS NOTES
Subjective:     Encounter Date: 04/27/23      Patient ID: Jeb Olson is a 72 y.o. male.    Chief Complaint: Abnormal stress test  HPI:   This is a 72-year-old man with a history of CAD, PVCs.   In 2022 he an eye surgery and was noted to have frequent PVCs.  This prompted further testing by his primary care doctor.  He had a Holter monitor which showed frequent PVCs, PVC burden was 22% in couplets, triplets bigeminy and trigeminy.  Thus, he therefore had a transthoracic echocardiogram which showed normal systolic ejection fraction with grade 1 diastolic dysfunction appropriate for age and mild MR.   Subsequent to that he had a walking nuclear stress test with a small, mild apical ischemic defect.  Cardiac catheterization September 22 by myself showed a distal LAD lesion, status post PCI with 1 drug-eluting stent.   Today he returns for follow-up.  Overall he feels well.  He is exercising by walking on the treadmill and using the stationary bike without any angina or dyspnea.  He is mostly limited by knee pain.  His blood pressure remains elevated with systolics mostly in the 140s to 160s.    The following portions of the patient's history were reviewed and updated as appropriate: allergies, current medications, past family history, past medical history, past social history, past surgical history and problem list.     REVIEW OF SYSTEMS:   All systems reviewed.  Pertinent positives identified in HPI.  All other systems are negative.    Past Medical History:   Diagnosis Date   • Allergic Have had for several years    Eyes and nasal issues   • Anxiety Since about 2014    Since I was taking of my Dad, who also passed away 3yrs ago.   • Arthritis 2002    Both knees, hips, and shoulders   • Arthritis of knee, left    • Cataract 05/2019   • Colon polyp 2017   • Diabetes mellitus    • Essential hypertension    • HL (hearing loss) 1980   • Hyperlipemia    • Mild chronic anemia    • Obesity    • Sleep apnea     cpap        Family History   Problem Relation Age of Onset   • Alcohol abuse Maternal Uncle         Passed away 2013   • Alcohol abuse Father         Passed away in 2016   • Hyperlipidemia Father         Started about 10 years before his death   • Arthritis Mother         Passed away 2006, from Alzheimers   • Diabetes Mother    • Hyperlipidemia Mother         Started probably at middle age   • Osteoporosis Mother    • Malig Hyperthermia Neg Hx        Social History     Socioeconomic History   • Marital status:    Tobacco Use   • Smoking status: Never   • Smokeless tobacco: Never   • Tobacco comments:     Naila only every been around people who smoked   Vaping Use   • Vaping Use: Never used   Substance and Sexual Activity   • Alcohol use: Yes     Alcohol/week: 10.0 standard drinks     Types: 10 Drinks containing 0.5 oz of alcohol per week     Comment: Maybe 8-10 per week. Stopped drinking beer August 2018   • Drug use: No   • Sexual activity: Not Currently     Partners: Female     Birth control/protection: None       No Known Allergies    Past Surgical History:   Procedure Laterality Date   • ACHILLES TENDON REPAIR Left    • CARDIAC CATHETERIZATION     • CARDIAC CATHETERIZATION N/A 09/01/2022    Procedure: Coronary angiography, Left heart catheterization,;  Surgeon: Bruna Chávez MD;  Location: [facility] CATH INVASIVE LOCATION;  Service: Cardiology;  Laterality: N/A;   • CARDIAC CATHETERIZATION N/A 09/01/2022    Procedure: Stent PRAVIN coronary;  Surgeon: Bruna Chávez MD;  Location: [facility] CATH INVASIVE LOCATION;  Service: Cardiology;  Laterality: N/A;   • COLONOSCOPY      Dr Reyes 6 years ago   • ENDOSCOPY     • JOINT REPLACEMENT  03/12/20    Left knee   • TONSILLECTOMY      As a child   • TOTAL KNEE ARTHROPLASTY Left 03/12/2020    Procedure: TOTAL KNEE ARTHROPLASTY;  Surgeon: Chepe Alicea MD;  Location: [facility] OR;  Service: Orthopedics;  Laterality: Left;       Procedures       Objective:         PHYSICAL  EXAM:  GEN: VSS, no distress,   Eyes: normal sclera, normal lids and lashes  HENT: moist mucus membranes,   Respiratory: CTAB, no rales or wheezes  CV: RRR, blowing 3 out of 6 apical murmur, +2 DP and 2+ carotid pulses b/l  GI: NABS, soft,  Nontender, nondistended  MSK: no edema, no scoliosis or kyphosis  Skin: no rash, warm, dry  Heme/Lymph: no bruising or bleeding  Psych: organized thought, normal behavior and affect  Neuro: Cranial nerves grossly intact, Alert and Oriented x 3.         Assessment:          Diagnosis Plan   1. Coronary artery disease involving native coronary artery of native heart without angina pectoris             Plan:       1.  CAD: Status post distal LAD PCI September 22.  Continue aspirin and Plavix for 1 year, then will downgrade to aspirin alone.  No angina.  2.  Mitral regurgitation: On echocardiogram this appears to be mild.    3.  Hypertension: Increase clonidine to 0.2 mg twice daily.  Continue amlodipine 10, benazepril 40, Cardura 2, bisoprolol 5-has not tolerated higher doses of beta-blocker due to bradycardia.    Dr. Salvador, Thank you very much for referring this kind patient to me. Please call me with any questions or concerns. I will see the patient again in the office in 6 months or sooner.      Bruna Chávez MD  04/27/23  [facility]    Outpatient Encounter Medications as of 4/27/2023   Medication Sig Dispense Refill   • Acetaminophen (TYLENOL PO) Take  by mouth.     • amLODIPine (NORVASC) 10 MG tablet Take 1 tablet by mouth Daily. 90 tablet 1   • aspirin 81 MG EC tablet Take 1 tablet by mouth 2 (Two) Times a Day. Indications: DVT Prophylaxis     • azithromycin (ZITHROMAX) 250 MG tablet Take 2 tablets the first day, then 1 tablet daily for 4 days. 6 tablet 0   • benazepril (LOTENSIN) 40 MG tablet Take 1 tablet by mouth twice daily 180 tablet 0   • cloNIDine (CATAPRES) 0.1 MG tablet Take 1 tablet by mouth 2 (Two) Times a Day. 180 tablet 1   • clopidogrel  (PLAVIX) 75 MG tablet Take 1 tablet by mouth Daily. 90 tablet 3   • doxazosin (CARDURA) 4 MG tablet Take 1 tablet by mouth Every Night. 30 tablet 11   • escitalopram (LEXAPRO) 10 MG tablet TAKE ONE TABLET BY MOUTH DAILY 90 tablet 1   • ezetimibe (ZETIA) 10 MG tablet Take 1 tablet by mouth Daily. 90 tablet 1   • fenofibrate 160 MG tablet TAKE 1 TABLET BY MOUTH AT NIGHT 90 tablet 0   • Fexofenadine HCl (ALLERGY 24-HR PO) Take  by mouth.     • fluticasone (FLONASE) 50 MCG/ACT nasal spray 1 spray into the nostril(s) as directed by provider 2 (Two) Times a Day.     • gabapentin (NEURONTIN) 600 MG tablet Take 1 tablet by mouth Daily.     • glimepiride (AMARYL) 4 MG tablet Take 1 tablet by mouth Every Night. 180 tablet 1   • glucose blood (Accu-Chek Anabela Plus) test strip TEST TWICE DAILY Use as instructed 100 each 3   • hydroCHLOROthiazide (HYDRODIURIL) 25 MG tablet Take 1 tablet by mouth once daily 90 tablet 0   • Janumet XR  MG tablet Take 1 tablet by mouth twice daily 180 tablet 0   • Multiple Vitamins-Minerals (PRESERVISION AREDS 2 PO) Take 1 tablet by mouth 2 (Two) Times a Day.     • nebivolol (BYSTOLIC) 5 MG tablet Take 1 tablet by mouth Daily. 90 tablet 1   • Olopatadine HCl (PATADAY OP) Apply  to eye(s) as directed by provider Daily.     • polyethyl glycol-propyl glycol (SYSTANE) 0.4-0.3 % solution ophthalmic solution (artificial tears) Administer 1 drop to both eyes Every 1 (One) Hour As Needed.     • traZODone (DESYREL) 50 MG tablet Take 1 tablet by mouth Every Night. 90 tablet 1     Facility-Administered Encounter Medications as of 4/27/2023   Medication Dose Route Frequency Provider Last Rate Last Admin   • Chlorhexidine Gluconate 2 % pads 3 each  3 pad Apply externally BID Pricila Barnes, TIFFANIE

## 2023-05-22 RX ORDER — SITAGLIPTIN AND METFORMIN HYDROCHLORIDE 1000; 50 MG/1; MG/1
TABLET, FILM COATED, EXTENDED RELEASE ORAL
Qty: 180 TABLET | Refills: 0 | Status: SHIPPED | OUTPATIENT
Start: 2023-05-22

## 2023-06-05 ENCOUNTER — CLINICAL SUPPORT (OUTPATIENT)
Dept: ORTHOPEDIC SURGERY | Facility: CLINIC | Age: 73
End: 2023-06-05
Payer: MEDICARE

## 2023-06-05 VITALS — WEIGHT: 268.3 LBS | BODY MASS INDEX: 34.43 KG/M2 | TEMPERATURE: 97.8 F | HEIGHT: 74 IN

## 2023-06-05 DIAGNOSIS — M17.11 PRIMARY OSTEOARTHRITIS OF RIGHT KNEE: ICD-10-CM

## 2023-06-05 DIAGNOSIS — Z96.652 TOTAL KNEE REPLACEMENT STATUS, LEFT: ICD-10-CM

## 2023-06-05 DIAGNOSIS — E11.65 TYPE 2 DIABETES MELLITUS WITH HYPERGLYCEMIA, UNSPECIFIED WHETHER LONG TERM INSULIN USE: Primary | ICD-10-CM

## 2023-06-05 DIAGNOSIS — M70.52 PES ANSERINUS BURSITIS OF LEFT KNEE: ICD-10-CM

## 2023-06-05 RX ORDER — LIDOCAINE HYDROCHLORIDE 10 MG/ML
1 INJECTION, SOLUTION EPIDURAL; INFILTRATION; INTRACAUDAL; PERINEURAL
Status: COMPLETED | OUTPATIENT
Start: 2023-06-05 | End: 2023-06-05

## 2023-06-05 RX ORDER — METHYLPREDNISOLONE ACETATE 80 MG/ML
80 INJECTION, SUSPENSION INTRA-ARTICULAR; INTRALESIONAL; INTRAMUSCULAR; SOFT TISSUE
Status: COMPLETED | OUTPATIENT
Start: 2023-06-05 | End: 2023-06-05

## 2023-06-05 RX ORDER — LIDOCAINE HYDROCHLORIDE 10 MG/ML
2 INJECTION, SOLUTION EPIDURAL; INFILTRATION; INTRACAUDAL; PERINEURAL
Status: COMPLETED | OUTPATIENT
Start: 2023-06-05 | End: 2023-06-05

## 2023-06-05 RX ADMIN — LIDOCAINE HYDROCHLORIDE 1 ML: 10 INJECTION, SOLUTION EPIDURAL; INFILTRATION; INTRACAUDAL; PERINEURAL at 10:07

## 2023-06-05 RX ADMIN — METHYLPREDNISOLONE ACETATE 80 MG: 80 INJECTION, SUSPENSION INTRA-ARTICULAR; INTRALESIONAL; INTRAMUSCULAR; SOFT TISSUE at 10:07

## 2023-06-05 RX ADMIN — LIDOCAINE HYDROCHLORIDE 2 ML: 10 INJECTION, SOLUTION EPIDURAL; INFILTRATION; INTRACAUDAL; PERINEURAL at 10:37

## 2023-06-05 RX ADMIN — METHYLPREDNISOLONE ACETATE 80 MG: 80 INJECTION, SUSPENSION INTRA-ARTICULAR; INTRALESIONAL; INTRAMUSCULAR; SOFT TISSUE at 10:37

## 2023-06-05 NOTE — PROGRESS NOTES
Chief complaint: Follow-up regarding right knee osteoarthritis, new complaint of left knee pain    Mr. Olson is seen today in follow-up for his right knee.  The injections continue to work well for him.  He would like to try another injection for the right knee today.  He mentions a new issue of left knee pain.  It started bothering him about a week to 10 days ago.  He does not recall any specific inciting event or factor.  Localizes the pain to the medial aspect of the knee.  It is worse when standing from a seated position.  Denies shooting pain down the leg.  Denies weakness, numbness or paresthesias.    Right knee is briefly examined.  Skin is benign.  Moderate medial joint line tenderness with a trace effusion.    Left knee is examined.  Skin is benign.  Surgical incision is healed.  No effusion.  He has focal tenderness over the pes bursa.  Good knee motion.  No instability.    Bilateral AP and merchants views of the knees as well as a left knee lateral x-ray are ordered and reviewed.  These are compared to previous x-rays.  His left knee implants appear well fixed and well-positioned.  No complicating process noted with respect to the implants.      Assessment: 1.  Right knee osteoarthritis 2.  Pes bursitis status post left total knee arthroplasty    Plan: I agreed to repeat the injection for his right knee.  The risk, benefits and alternatives were discussed but he consented and the injection was formed as described below.    I explained the natural history of pes bursitis.  We thoroughly discussed his options.  He wanted to get that injected as well.  I explained the increased risk with dual injections today.  He acknowledged understanding and consented.  The pes bursal injection was performed as described below.  Going forward he will follow-up with me as needed.    Of note, he mentioned that his wife, Abi, is in the hospital currently.  We briefly talked about her and I wished both of them well.    Large  Joint Arthrocentesis: R knee  Date/Time: 6/5/2023 10:07 AM  Consent given by: patient  Site marked: site marked  Timeout: Immediately prior to procedure a time out was called to verify the correct patient, procedure, equipment, support staff and site/side marked as required   Supporting Documentation  Indications: pain   Procedure Details  Location: knee - R knee  Preparation: Patient was prepped and draped in the usual sterile fashion  Needle gauge: 21 G.  Approach: anterolateral  Medications administered: 80 mg methylPREDNISolone acetate 80 MG/ML; 1 mL lidocaine PF 1% 1 %  Patient tolerance: patient tolerated the procedure well with no immediate complications      Large Joint Arthrocentesis: L knee  Date/Time: 6/5/2023 10:37 AM  Consent given by: patient  Site marked: site marked  Timeout: Immediately prior to procedure a time out was called to verify the correct patient, procedure, equipment, support staff and site/side marked as required   Supporting Documentation  Indications: pain and joint swelling   Procedure Details  Location: knee - L knee (Pes Bursa)  Preparation: Patient was prepped and draped in the usual sterile fashion  Needle size: 25 G  Approach: anterolateral  Medications administered: 80 mg methylPREDNISolone acetate 80 MG/ML; 2 mL lidocaine PF 1% 1 %  Patient tolerance: patient tolerated the procedure well with no immediate complications

## 2023-06-06 RX ORDER — FENOFIBRATE 160 MG/1
TABLET ORAL
Qty: 90 TABLET | Refills: 0 | Status: SHIPPED | OUTPATIENT
Start: 2023-06-06

## 2023-06-12 RX ORDER — ESCITALOPRAM OXALATE 10 MG/1
TABLET ORAL
Qty: 90 TABLET | Refills: 0 | Status: SHIPPED | OUTPATIENT
Start: 2023-06-12

## 2023-07-09 NOTE — TELEPHONE ENCOUNTER
Rx Refill Note  Requested Prescriptions     Pending Prescriptions Disp Refills   • benazepril (LOTENSIN) 40 MG tablet [Pharmacy Med Name: BENAZEPRIL HCL 40 MG TABLET] 180 tablet 1     Sig: TAKE ONE TABLET BY MOUTH TWICE A DAY      Last office visit with prescribing clinician: 4/29/2022      Next office visit with prescribing clinician: 10/17/2022            Anne Souza MA  10/04/22, 14:28 EDT  
no

## 2023-07-13 DIAGNOSIS — E11.41 TYPE 2 DIABETES MELLITUS WITH DIABETIC MONONEUROPATHY, WITHOUT LONG-TERM CURRENT USE OF INSULIN: Primary | ICD-10-CM

## 2023-07-13 DIAGNOSIS — I10 ESSENTIAL HYPERTENSION: ICD-10-CM

## 2023-07-13 RX ORDER — NEBIVOLOL 5 MG/1
5 TABLET ORAL DAILY
Qty: 90 TABLET | Refills: 1 | Status: SHIPPED | OUTPATIENT
Start: 2023-07-13 | End: 2023-07-14

## 2023-07-13 RX ORDER — NEBIVOLOL 5 MG/1
5 TABLET ORAL DAILY
Qty: 90 TABLET | Refills: 1 | Status: CANCELLED | OUTPATIENT
Start: 2023-07-13

## 2023-08-16 RX ORDER — SITAGLIPTIN AND METFORMIN HYDROCHLORIDE 1000; 50 MG/1; MG/1
TABLET, FILM COATED, EXTENDED RELEASE ORAL
Qty: 180 TABLET | Refills: 0 | Status: SHIPPED | OUTPATIENT
Start: 2023-08-16

## 2023-08-23 RX ORDER — NEBIVOLOL 10 MG/1
TABLET ORAL
Qty: 20 TABLET | Refills: 0 | OUTPATIENT
Start: 2023-08-23

## 2023-08-24 RX ORDER — CLOPIDOGREL BISULFATE 75 MG/1
75 TABLET ORAL DAILY
Qty: 90 TABLET | Refills: 3 | Status: SHIPPED | OUTPATIENT
Start: 2023-08-24

## 2023-08-24 RX ORDER — NEBIVOLOL 10 MG/1
TABLET ORAL
Qty: 20 TABLET | Refills: 0 | OUTPATIENT
Start: 2023-08-24

## 2023-09-06 ENCOUNTER — CLINICAL SUPPORT (OUTPATIENT)
Dept: ORTHOPEDIC SURGERY | Facility: CLINIC | Age: 73
End: 2023-09-06
Payer: MEDICARE

## 2023-09-06 VITALS — WEIGHT: 262.8 LBS | HEIGHT: 74 IN | BODY MASS INDEX: 33.73 KG/M2 | TEMPERATURE: 98.6 F

## 2023-09-06 DIAGNOSIS — M17.11 PRIMARY OSTEOARTHRITIS OF RIGHT KNEE: Primary | ICD-10-CM

## 2023-09-06 RX ORDER — LIDOCAINE HYDROCHLORIDE 10 MG/ML
2 INJECTION, SOLUTION EPIDURAL; INFILTRATION; INTRACAUDAL; PERINEURAL
Status: COMPLETED | OUTPATIENT
Start: 2023-09-06 | End: 2023-09-06

## 2023-09-06 RX ORDER — METHYLPREDNISOLONE ACETATE 80 MG/ML
1 INJECTION, SUSPENSION INTRA-ARTICULAR; INTRALESIONAL; INTRAMUSCULAR; SOFT TISSUE
Status: COMPLETED | OUTPATIENT
Start: 2023-09-06 | End: 2023-09-06

## 2023-09-06 RX ADMIN — LIDOCAINE HYDROCHLORIDE 2 ML: 10 INJECTION, SOLUTION EPIDURAL; INFILTRATION; INTRACAUDAL; PERINEURAL at 09:32

## 2023-09-06 RX ADMIN — METHYLPREDNISOLONE ACETATE 1 ML: 80 INJECTION, SUSPENSION INTRA-ARTICULAR; INTRALESIONAL; INTRAMUSCULAR; SOFT TISSUE at 09:32

## 2023-09-06 NOTE — PROGRESS NOTES
Mr. Olson comes in today for follow-up.  Injections have worked well in the past.  He would like to get a repeat injection today.  The risks, benefits and alternatives were discussed and the patient consented.  Going forward, the patient will follow-up as needed.    Chepe Alicea MD    09/06/2023     Large Joint Arthrocentesis: R knee  Date/Time: 9/6/2023 9:32 AM  Consent given by: patient  Site marked: site marked  Timeout: Immediately prior to procedure a time out was called to verify the correct patient, procedure, equipment, support staff and site/side marked as required   Supporting Documentation  Indications: pain, joint swelling and diagnostic evaluation   Procedure Details  Location: knee - R knee  Preparation: Patient was prepped and draped in the usual sterile fashion  Needle gauge: 21G.  Medications administered: 1 mL methylPREDNISolone acetate 80 MG/ML; 2 mL lidocaine PF 1% 1 %  Patient tolerance: patient tolerated the procedure well with no immediate complications

## 2023-09-07 DIAGNOSIS — I10 ESSENTIAL HYPERTENSION: ICD-10-CM

## 2023-09-07 RX ORDER — NEBIVOLOL 5 MG/1
TABLET ORAL
Qty: 90 TABLET | Refills: 0 | OUTPATIENT
Start: 2023-09-07

## 2023-09-07 RX ORDER — EZETIMIBE 10 MG/1
TABLET ORAL
Qty: 90 TABLET | Refills: 0 | OUTPATIENT
Start: 2023-09-07

## 2023-09-07 RX ORDER — EZETIMIBE 10 MG/1
10 TABLET ORAL DAILY
Qty: 90 TABLET | Refills: 1 | Status: SHIPPED | OUTPATIENT
Start: 2023-09-07

## 2023-09-11 RX ORDER — FENOFIBRATE 160 MG/1
TABLET ORAL
Qty: 90 TABLET | Refills: 0 | Status: SHIPPED | OUTPATIENT
Start: 2023-09-11

## 2023-09-13 RX ORDER — ESCITALOPRAM OXALATE 10 MG/1
TABLET ORAL
Qty: 90 TABLET | Refills: 0 | Status: SHIPPED | OUTPATIENT
Start: 2023-09-13

## 2023-09-20 RX ORDER — AMLODIPINE BESYLATE 10 MG/1
TABLET ORAL
Qty: 90 TABLET | Refills: 0 | Status: SHIPPED | OUTPATIENT
Start: 2023-09-20

## 2023-09-26 RX ORDER — BENAZEPRIL HYDROCHLORIDE 40 MG/1
TABLET, FILM COATED ORAL
Qty: 180 TABLET | Refills: 0 | Status: SHIPPED | OUTPATIENT
Start: 2023-09-26

## 2023-10-03 RX ORDER — NIFEDIPINE 60 MG/1
60 TABLET, EXTENDED RELEASE ORAL DAILY
Qty: 30 TABLET | Refills: 3 | Status: SHIPPED | OUTPATIENT
Start: 2023-10-03

## 2023-10-11 RX ORDER — TRAZODONE HYDROCHLORIDE 50 MG/1
50 TABLET ORAL NIGHTLY
Qty: 90 TABLET | Refills: 0 | Status: SHIPPED | OUTPATIENT
Start: 2023-10-11

## 2023-10-11 RX ORDER — HYDROCHLOROTHIAZIDE 25 MG/1
TABLET ORAL
Qty: 90 TABLET | Refills: 0 | Status: SHIPPED | OUTPATIENT
Start: 2023-10-11

## 2023-10-18 ENCOUNTER — OFFICE VISIT (OUTPATIENT)
Dept: FAMILY MEDICINE CLINIC | Facility: CLINIC | Age: 73
End: 2023-10-18
Payer: MEDICARE

## 2023-10-18 VITALS
SYSTOLIC BLOOD PRESSURE: 126 MMHG | WEIGHT: 272 LBS | HEART RATE: 44 BPM | HEIGHT: 74 IN | DIASTOLIC BLOOD PRESSURE: 68 MMHG | RESPIRATION RATE: 16 BRPM | BODY MASS INDEX: 34.91 KG/M2 | OXYGEN SATURATION: 95 %

## 2023-10-18 DIAGNOSIS — E78.5 HYPERLIPIDEMIA, UNSPECIFIED HYPERLIPIDEMIA TYPE: ICD-10-CM

## 2023-10-18 DIAGNOSIS — E11.41 TYPE 2 DIABETES MELLITUS WITH DIABETIC MONONEUROPATHY, WITHOUT LONG-TERM CURRENT USE OF INSULIN: ICD-10-CM

## 2023-10-18 DIAGNOSIS — F32.9 REACTIVE DEPRESSION: ICD-10-CM

## 2023-10-18 DIAGNOSIS — I10 ESSENTIAL HYPERTENSION: Primary | ICD-10-CM

## 2023-10-18 PROCEDURE — 3074F SYST BP LT 130 MM HG: CPT | Performed by: INTERNAL MEDICINE

## 2023-10-18 PROCEDURE — 3044F HG A1C LEVEL LT 7.0%: CPT | Performed by: INTERNAL MEDICINE

## 2023-10-18 PROCEDURE — 99213 OFFICE O/P EST LOW 20 MIN: CPT | Performed by: INTERNAL MEDICINE

## 2023-10-18 PROCEDURE — 3078F DIAST BP <80 MM HG: CPT | Performed by: INTERNAL MEDICINE

## 2023-10-18 RX ORDER — NEBIVOLOL 5 MG/1
5 TABLET ORAL DAILY
Status: SHIPPED
Start: 2023-10-18

## 2023-10-18 NOTE — PROGRESS NOTES
Subjective chief complaint is follow-up on diabetes  Jeb Olson is a 73 y.o. male.     History of Present Illness Rosas is here today for follow-up on his diabetes.  We have been trying to back off a little bit on his glimepiride.  His A1c last time was 6.6.  It was a little bit up from 6.3.  He basically has been doing well otherwise.  His blood pressure today to my exam was 128/68.  His heart rate however is in the 40s.  He is on fairly low-dose Bystolic at 5 mg daily from the cardiologist.    The following portions of the patient's history were reviewed and updated as appropriate: allergies, current medications, and problem list.    Review of Systems   Constitutional:  Positive for fatigue. Negative for unexpected weight loss.   Eyes:  Negative for blurred vision.   Cardiovascular:  Negative for chest pain.   Endocrine: Positive for polyphagia. Negative for polydipsia and polyuria.   Skin:  Negative for pallor.   Neurological:  Positive for weakness. Negative for dizziness, tremors, seizures, speech difficulty and confusion.   Psychiatric/Behavioral:  Positive for depressed mood. The patient is nervous/anxious.         We did discuss his depressive symptoms.  They seem to be more of a lack of motivation.  We did discuss possibility of increasing medications.  For now he wants to wait and see how things go.       Objective   Physical Exam  Constitutional:       Appearance: Normal appearance.   Cardiovascular:      Rate and Rhythm: Normal rate and regular rhythm.   Pulmonary:      Effort: Pulmonary effort is normal.      Breath sounds: No wheezing, rhonchi or rales.   Abdominal:      General: Bowel sounds are normal.      Palpations: Abdomen is soft.      Tenderness: There is no abdominal tenderness. There is no guarding or rebound.   Musculoskeletal:      Right lower leg: No edema.      Left lower leg: No edema.   Neurological:      Mental Status: He is alert.   Psychiatric:         Mood and Affect: Mood  normal.         Behavior: Behavior normal.           Assessment & Plan   Diagnoses and all orders for this visit:    1. Essential hypertension (Primary)    2. Hyperlipidemia, unspecified hyperlipidemia type    3. Type 2 diabetes mellitus with diabetic mononeuropathy, without long-term current use of insulin    4. Reactive depression    Other orders  -     nebivolol (Bystolic) 5 MG tablet; Take 1 tablet by mouth Daily.    Rosas is here today for follow-up.  His blood pressure is doing okay.  His heart rate is a little slower than usual.  The cardiologist had reduced his Bystolic dose.  We are going to check on the status of his diabetes today with an A1c test.  His other labs do not need to be checked.  We did discuss his depression.  He is already on 10 mg of Lexapro.  He takes trazodone at night for helping him sleep.  For now we are not going to increase any medications.  I would like to maybe get him off clonidine and see if some of his depressive symptoms got better however his blood pressure seems to rebound considerably at times.             Answers submitted by the patient for this visit:  Primary Reason for Visit (Submitted on 10/17/2023)  What is the primary reason for your visit?: Diabetes

## 2023-10-30 ENCOUNTER — OFFICE VISIT (OUTPATIENT)
Age: 73
End: 2023-10-30
Payer: MEDICARE

## 2023-10-30 VITALS
WEIGHT: 261 LBS | HEART RATE: 44 BPM | DIASTOLIC BLOOD PRESSURE: 80 MMHG | SYSTOLIC BLOOD PRESSURE: 124 MMHG | BODY MASS INDEX: 33.5 KG/M2 | HEIGHT: 74 IN

## 2023-10-30 DIAGNOSIS — I25.10 CORONARY ARTERY DISEASE INVOLVING NATIVE CORONARY ARTERY OF NATIVE HEART WITHOUT ANGINA PECTORIS: Primary | ICD-10-CM

## 2023-10-30 PROCEDURE — 99214 OFFICE O/P EST MOD 30 MIN: CPT | Performed by: INTERNAL MEDICINE

## 2023-10-30 PROCEDURE — 93000 ELECTROCARDIOGRAM COMPLETE: CPT | Performed by: INTERNAL MEDICINE

## 2023-10-30 PROCEDURE — 3079F DIAST BP 80-89 MM HG: CPT | Performed by: INTERNAL MEDICINE

## 2023-10-30 PROCEDURE — 3074F SYST BP LT 130 MM HG: CPT | Performed by: INTERNAL MEDICINE

## 2023-10-30 NOTE — PROGRESS NOTES
Subjective:     Encounter Date: 10/30/23      Patient ID: Jeb Olson is a 73 y.o. male.    Chief Complaint: Abnormal stress test  HPI:   This is a 73-year-old man with a history of CAD, PVCs.     In 2022 he an eye surgery and was noted to have frequent PVCs.  This prompted further testing by his primary care doctor.  He had a Holter monitor which showed frequent PVCs, PVC burden was 22% in couplets, triplets bigeminy and trigeminy.  Thus, he therefore had a transthoracic echocardiogram which showed normal systolic ejection fraction with grade 1 diastolic dysfunction appropriate for age and mild MR.   Subsequent to that he had a walking nuclear stress test with a small, mild apical ischemic defect.  Cardiac catheterization September 22 by myself showed a distal LAD lesion, status post PCI with 1 drug-eluting stent.   Today he returns for follow-up.  He feels well.  Due to ongoing knee pain he has not been able to exercise.  He is put on a little bit of weight and his LDL has as a result risen slowly up to 105.  He believes he needs a knee replacement which has had on the other side before.  He will be seeing his orthopedist soon.  He has no angina or dyspnea during his daily activities.  He is breathing is good.  His blood pressure overall is well controlled on maximal therapy.    The following portions of the patient's history were reviewed and updated as appropriate: allergies, current medications, past family history, past medical history, past social history, past surgical history and problem list.     REVIEW OF SYSTEMS:   All systems reviewed.  Pertinent positives identified in HPI.  All other systems are negative.    Past Medical History:   Diagnosis Date    Allergic Have had for several years    Eyes and nasal issues    Anxiety Since about 2014    Since I was taking of my Dad, who also passed away 3yrs ago.    Arthritis 2002    Both knees, hips, and shoulders    Arthritis of knee, left     Cataract  05/2019    Colon polyp 2017    Diabetes mellitus     Essential hypertension     HL (hearing loss) 1980    Hyperlipemia     Knee swelling 01/2020    It began bothering me a few months before my left knee replacement.    Mild chronic anemia     Obesity     Sleep apnea     cpap       Family History   Problem Relation Age of Onset    Alcohol abuse Maternal Uncle         Passed away 2013    Alcohol abuse Father         Passed away in 2016    Hyperlipidemia Father         Started about 10 years before his death    Arthritis Mother         Passed away 2006, from Alzheimers    Diabetes Mother     Hyperlipidemia Mother         Started probably at middle age    Osteoporosis Mother     Malig Hyperthermia Neg Hx        Social History     Socioeconomic History    Marital status:    Tobacco Use    Smoking status: Never    Smokeless tobacco: Never    Tobacco comments:     Naila only every been around people who smoked   Vaping Use    Vaping Use: Never used   Substance and Sexual Activity    Alcohol use: Yes     Alcohol/week: 10.0 standard drinks of alcohol     Types: 10 Drinks containing 0.5 oz of alcohol per week     Comment: Maybe 8-10 dribks mainly on weekends.    Drug use: No    Sexual activity: Not Currently     Partners: Female     Birth control/protection: None       No Known Allergies    Past Surgical History:   Procedure Laterality Date    ACHILLES TENDON REPAIR Left     CARDIAC CATHETERIZATION      CARDIAC CATHETERIZATION N/A 09/01/2022    Procedure: Coronary angiography, Left heart catheterization,;  Surgeon: Bruna Chávez MD;  Location:  [facility];  Service: Cardiology;  Laterality: N/A;    CARDIAC CATHETERIZATION N/A 09/01/2022    Procedure: Stent PRAVIN coronary;  Surgeon: Bruna Chávez MD;  Location:  [facility];  Service: Cardiology;  Laterality: N/A;    COLONOSCOPY      Dr Reyes 6 years ago    ENDOSCOPY      JOINT REPLACEMENT  03/12/20    Left knee    TONSILLECTOMY      As  a child    TOTAL KNEE ARTHROPLASTY Left 03/12/2020    Procedure: TOTAL KNEE ARTHROPLASTY;  Surgeon: Chepe Alicea MD;  Location: [facility] OR;  Service: Orthopedics;  Laterality: Left;         ECG 12 Lead    Date/Time: 10/30/2023 12:36 PM  Performed by: Bruna Chávez MD    Authorized by: Bruna Chávez MD  Comparison: compared with previous ECG from 9/1/2022  Similar to previous ECG  Rhythm: sinus rhythm  Rate: normal  Conduction: left bundle branch block  ST Segments: ST segments normal  T Waves: T waves normal  QRS axis: normal  Other: no other findings    Clinical impression: abnormal EKG             Objective:         PHYSICAL EXAM:  GEN: VSS, no distress,   Eyes: normal sclera, normal lids and lashes  HENT: moist mucus membranes,   Respiratory: CTAB, no rales or wheezes  CV: RRR, blowing 3 out of 6 apical murmur, +2 DP and 2+ carotid pulses b/l  GI: NABS, soft,  Nontender, nondistended  MSK: no edema, no scoliosis or kyphosis  Skin: no rash, warm, dry  Heme/Lymph: no bruising or bleeding  Psych: organized thought, normal behavior and affect  Neuro: Cranial nerves grossly intact, Alert and Oriented x 3.         Assessment:          Diagnosis Plan   1. Coronary artery disease involving native coronary artery of native heart without angina pectoris               Plan:       1.  CAD: Status post distal LAD PCI September 22.    CCS class I symptoms.  Downgrade to aspirin 81 daily.  2.  Mitral regurgitation: On echocardiogram this appears to be mild.  Murmur is unchanged from last year.  3.  Hypertension: Well-controlled on maximal therapy.  Has not tolerated higher doses of bisoprolol due to bradycardia in the past.  4.  Preoperative evaluation: He has no current angina.  EKG is unchanged from prior.  Does not require any further testing prior to knee replacement surgery.    Dr. Salvador, Thank you very much for referring this kind patient to me. Please call me with any questions or concerns. I will see the  patient again in the office in 12 months or sooner.      Bruna Chávez MD  10/30/23  [facility]    Outpatient Encounter Medications as of 10/30/2023   Medication Sig Dispense Refill    Acetaminophen (TYLENOL PO) Take  by mouth.      aspirin 81 MG EC tablet Take 1 tablet by mouth 2 (Two) Times a Day. Indications: DVT Prophylaxis      benazepril (LOTENSIN) 40 MG tablet Take 1 tablet by mouth twice daily 180 tablet 0    cloNIDine (CATAPRES) 0.2 MG tablet Take 1 tablet by mouth 2 (Two) Times a Day. 180 tablet 3    clopidogrel (PLAVIX) 75 MG tablet Take 1 tablet by mouth Daily. 90 tablet 3    doxazosin (CARDURA) 4 MG tablet Take 1 tablet by mouth Every Night. 30 tablet 11    escitalopram (LEXAPRO) 10 MG tablet Take 1 tablet by mouth once daily 90 tablet 0    ezetimibe (ZETIA) 10 MG tablet Take 1 tablet by mouth Daily. 90 tablet 1    fenofibrate 160 MG tablet TAKE 1 TABLET BY MOUTH AT NIGHT 90 tablet 0    fluticasone (FLONASE) 50 MCG/ACT nasal spray 1 spray into the nostril(s) as directed by provider 2 (Two) Times a Day.      gabapentin (NEURONTIN) 600 MG tablet Take 1 tablet by mouth Daily.      glimepiride (AMARYL) 4 MG tablet Take 1 tablet by mouth Every Night. 180 tablet 1    glucose blood (Accu-Chek Anabela Plus) test strip TEST TWICE DAILY Use as instructed 100 each 3    hydroCHLOROthiazide (HYDRODIURIL) 25 MG tablet Take 1 tablet by mouth once daily 90 tablet 0    Janumet XR  MG tablet Take 1 tablet by mouth twice daily 180 tablet 0    Multiple Vitamins-Minerals (b complex-C-E-zinc) tablet Take 1 tablet by mouth Daily.      Multiple Vitamins-Minerals (PRESERVISION AREDS 2 PO) Take 1 tablet by mouth 2 (Two) Times a Day.      nebivolol (BYSTOLIC) 5 MG tablet Take 1 tablet by mouth once daily 90 tablet 0    nebivolol (Bystolic) 5 MG tablet Take 1 tablet by mouth Daily.      NIFEdipine XL (Procardia XL) 60 MG 24 hr tablet Take 1 tablet by mouth Daily. 30 tablet 3    Olopatadine HCl (PATADAY OP)  Apply  to eye(s) as directed by provider Daily.      polyethyl glycol-propyl glycol (SYSTANE) 0.4-0.3 % solution ophthalmic solution (artificial tears) Administer 1 drop to both eyes Every 1 (One) Hour As Needed.      traZODone (DESYREL) 50 MG tablet TAKE 1 TABLET BY MOUTH ONCE DAILY AT NIGHT 90 tablet 0    vitamin D3 125 MCG (5000 UT) capsule capsule Take 1 capsule by mouth Daily.      [DISCONTINUED] amLODIPine (NORVASC) 10 MG tablet Take 1 tablet by mouth Daily. 90 tablet 1    [DISCONTINUED] benazepril (LOTENSIN) 40 MG tablet Take 1 tablet by mouth twice daily 180 tablet 0    [DISCONTINUED] clopidogrel (PLAVIX) 75 MG tablet Take 1 tablet by mouth Daily. 90 tablet 3    [DISCONTINUED] escitalopram (LEXAPRO) 10 MG tablet Take 1 tablet by mouth once daily 90 tablet 0    [DISCONTINUED] ezetimibe (ZETIA) 10 MG tablet Take 1 tablet by mouth Daily. 90 tablet 1    [DISCONTINUED] fenofibrate 160 MG tablet TAKE 1 TABLET BY MOUTH AT NIGHT 90 tablet 0    [DISCONTINUED] Fexofenadine HCl (ALLERGY 24-HR PO) Take  by mouth.      [DISCONTINUED] hydroCHLOROthiazide (HYDRODIURIL) 25 MG tablet Take 1 tablet by mouth once daily 90 tablet 0    [DISCONTINUED] Janumet XR  MG tablet Take 1 tablet by mouth twice daily 180 tablet 0    [DISCONTINUED] nebivolol (Bystolic) 10 MG tablet Take 1 tablet by mouth Daily. (Patient not taking: Reported on 10/18/2023) 20 tablet 0    [DISCONTINUED] traZODone (DESYREL) 50 MG tablet Take 1 tablet by mouth Every Night. 90 tablet 1     Facility-Administered Encounter Medications as of 10/30/2023   Medication Dose Route Frequency Provider Last Rate Last Admin    Chlorhexidine Gluconate 2 % pads 3 each  3 pad  Apply externally BID Pricila Barnes, APRN

## 2023-11-02 RX ORDER — GLIMEPIRIDE 4 MG/1
4 TABLET ORAL 2 TIMES DAILY
Qty: 180 TABLET | Refills: 0 | Status: SHIPPED | OUTPATIENT
Start: 2023-11-02

## 2023-11-02 RX ORDER — SITAGLIPTIN AND METFORMIN HYDROCHLORIDE 1000; 50 MG/1; MG/1
TABLET, FILM COATED, EXTENDED RELEASE ORAL
Qty: 180 TABLET | Refills: 0 | Status: SHIPPED | OUTPATIENT
Start: 2023-11-02

## 2023-11-17 RX ORDER — DOXAZOSIN MESYLATE 4 MG/1
4 TABLET ORAL NIGHTLY
Qty: 90 TABLET | Refills: 0 | Status: SHIPPED | OUTPATIENT
Start: 2023-11-17

## 2023-11-28 RX ORDER — NEBIVOLOL 5 MG/1
5 TABLET ORAL DAILY
Qty: 30 TABLET
Start: 2023-11-28 | End: 2023-11-30

## 2023-11-30 RX ORDER — NEBIVOLOL 5 MG/1
5 TABLET ORAL DAILY
Qty: 90 TABLET | Refills: 0 | Status: SHIPPED | OUTPATIENT
Start: 2023-11-30

## 2023-12-04 RX ORDER — FENOFIBRATE 160 MG/1
TABLET ORAL
Qty: 90 TABLET | Refills: 0 | Status: SHIPPED | OUTPATIENT
Start: 2023-12-04

## 2023-12-05 RX ORDER — ESCITALOPRAM OXALATE 10 MG/1
TABLET ORAL
Qty: 90 TABLET | Refills: 0 | Status: SHIPPED | OUTPATIENT
Start: 2023-12-05

## 2023-12-06 ENCOUNTER — CLINICAL SUPPORT (OUTPATIENT)
Dept: ORTHOPEDIC SURGERY | Facility: CLINIC | Age: 73
End: 2023-12-06
Payer: MEDICARE

## 2023-12-06 VITALS — BODY MASS INDEX: 33.86 KG/M2 | WEIGHT: 263.8 LBS | HEIGHT: 74 IN | TEMPERATURE: 98.2 F

## 2023-12-06 DIAGNOSIS — M17.10 ARTHRITIS OF KNEE: Primary | ICD-10-CM

## 2023-12-06 NOTE — PROGRESS NOTES
Mr. Olson would like to get repeat right knee injection today.  Unfortunately, he just recently got a COVID and flu shot.  I told him I would recommend waiting at least a week from vaccinations to do the knee injection.  He will reschedule.

## 2023-12-07 RX ORDER — FENOFIBRATE 160 MG/1
TABLET ORAL
Qty: 90 TABLET | Refills: 0 | OUTPATIENT
Start: 2023-12-07

## 2023-12-13 ENCOUNTER — PREP FOR SURGERY (OUTPATIENT)
Dept: OTHER | Facility: HOSPITAL | Age: 73
End: 2023-12-13
Payer: MEDICARE

## 2023-12-13 ENCOUNTER — OFFICE VISIT (OUTPATIENT)
Dept: ORTHOPEDIC SURGERY | Facility: CLINIC | Age: 73
End: 2023-12-13
Payer: MEDICARE

## 2023-12-13 VITALS — WEIGHT: 263 LBS | BODY MASS INDEX: 33.75 KG/M2 | TEMPERATURE: 97.7 F | HEIGHT: 74 IN

## 2023-12-13 DIAGNOSIS — M17.10 ARTHRITIS OF KNEE: Primary | ICD-10-CM

## 2023-12-13 PROBLEM — M17.11 ARTHRITIS OF KNEE, RIGHT: Status: ACTIVE | Noted: 2023-12-13

## 2023-12-13 RX ORDER — METHYLPREDNISOLONE ACETATE 80 MG/ML
1 INJECTION, SUSPENSION INTRA-ARTICULAR; INTRALESIONAL; INTRAMUSCULAR; SOFT TISSUE
Status: COMPLETED | OUTPATIENT
Start: 2023-12-13 | End: 2023-12-13

## 2023-12-13 RX ORDER — PREGABALIN 75 MG/1
150 CAPSULE ORAL ONCE
OUTPATIENT
Start: 2023-12-13 | End: 2023-12-13

## 2023-12-13 RX ORDER — LIDOCAINE HYDROCHLORIDE 10 MG/ML
2 INJECTION, SOLUTION EPIDURAL; INFILTRATION; INTRACAUDAL; PERINEURAL
Status: COMPLETED | OUTPATIENT
Start: 2023-12-13 | End: 2023-12-13

## 2023-12-13 RX ORDER — MELOXICAM 15 MG/1
15 TABLET ORAL ONCE
OUTPATIENT
Start: 2023-12-13 | End: 2023-12-13

## 2023-12-13 RX ORDER — CHLORHEXIDINE GLUCONATE 500 MG/1
CLOTH TOPICAL TAKE AS DIRECTED
OUTPATIENT
Start: 2023-12-13

## 2023-12-13 RX ORDER — CEFAZOLIN SODIUM 2 G/100ML
2 INJECTION, SOLUTION INTRAVENOUS ONCE
OUTPATIENT
Start: 2023-12-13 | End: 2023-12-13

## 2023-12-13 RX ORDER — ACETAMINOPHEN 500 MG
1000 TABLET ORAL ONCE
OUTPATIENT
Start: 2023-12-13 | End: 2023-12-13

## 2023-12-13 RX ADMIN — LIDOCAINE HYDROCHLORIDE 2 ML: 10 INJECTION, SOLUTION EPIDURAL; INFILTRATION; INTRACAUDAL; PERINEURAL at 11:25

## 2023-12-13 RX ADMIN — METHYLPREDNISOLONE ACETATE 1 ML: 80 INJECTION, SUSPENSION INTRA-ARTICULAR; INTRALESIONAL; INTRAMUSCULAR; SOFT TISSUE at 11:25

## 2023-12-13 NOTE — PROGRESS NOTES
Patient: Jeb Olson    YOB: 1950    Medical Record Number: [id number]    Chief Complaints: Follow-up regarding right knee arthritis    History of Present Illness:     73 y.o. male patient who presents for follow-up of the right knee. He reports worsening pain and dysfunction.  Describes his current pain as severe.  He is having to use a cane for ambulation.  He wants to pursue a replacement at this point.  Allergies: No Known Allergies    Home Medications:    Current Outpatient Medications:     Acetaminophen (TYLENOL PO), Take  by mouth., Disp: , Rfl:     aspirin 81 MG EC tablet, Take 1 tablet by mouth 2 (Two) Times a Day. Indications: DVT Prophylaxis, Disp: , Rfl:     benazepril (LOTENSIN) 40 MG tablet, Take 1 tablet by mouth twice daily, Disp: 180 tablet, Rfl: 0    cloNIDine (CATAPRES) 0.2 MG tablet, Take 1 tablet by mouth 2 (Two) Times a Day., Disp: 180 tablet, Rfl: 3    doxazosin (CARDURA) 4 MG tablet, TAKE 1 TABLET BY MOUTH ONCE DAILY AT NIGHT, Disp: 90 tablet, Rfl: 0    escitalopram (LEXAPRO) 10 MG tablet, Take 1 tablet by mouth once daily, Disp: 90 tablet, Rfl: 0    ezetimibe (ZETIA) 10 MG tablet, Take 1 tablet by mouth Daily., Disp: 90 tablet, Rfl: 1    fenofibrate 160 MG tablet, TAKE 1 TABLET BY MOUTH AT NIGHT, Disp: 90 tablet, Rfl: 0    fluticasone (FLONASE) 50 MCG/ACT nasal spray, 1 spray into the nostril(s) as directed by provider 2 (Two) Times a Day., Disp: , Rfl:     gabapentin (NEURONTIN) 600 MG tablet, Take 1 tablet by mouth Daily., Disp: , Rfl:     glimepiride (AMARYL) 4 MG tablet, Take 1 tablet by mouth twice daily, Disp: 180 tablet, Rfl: 0    glucose blood (Accu-Chek Anabela Plus) test strip, TEST TWICE DAILY Use as instructed, Disp: 100 each, Rfl: 3    hydroCHLOROthiazide (HYDRODIURIL) 25 MG tablet, Take 1 tablet by mouth once daily, Disp: 90 tablet, Rfl: 0    Janumet XR  MG tablet, Take 1 tablet by mouth twice daily, Disp: 180 tablet, Rfl: 0    Multiple  Vitamins-Minerals (b complex-C-E-zinc) tablet, Take 1 tablet by mouth Daily., Disp: , Rfl:     Multiple Vitamins-Minerals (PRESERVISION AREDS 2 PO), Take 1 tablet by mouth 2 (Two) Times a Day., Disp: , Rfl:     nebivolol (BYSTOLIC) 5 MG tablet, Take 1 tablet by mouth once daily, Disp: 90 tablet, Rfl: 0    NIFEdipine XL (Procardia XL) 60 MG 24 hr tablet, Take 1 tablet by mouth Daily., Disp: 30 tablet, Rfl: 3    Olopatadine HCl (PATADAY OP), Apply  to eye(s) as directed by provider Daily., Disp: , Rfl:     polyethyl glycol-propyl glycol (SYSTANE) 0.4-0.3 % solution ophthalmic solution (artificial tears), Administer 1 drop to both eyes Every 1 (One) Hour As Needed., Disp: , Rfl:     traZODone (DESYREL) 50 MG tablet, TAKE 1 TABLET BY MOUTH ONCE DAILY AT NIGHT, Disp: 90 tablet, Rfl: 0    vitamin D3 125 MCG (5000 UT) capsule capsule, Take 1 capsule by mouth Daily., Disp: , Rfl:   No current facility-administered medications for this visit.    Facility-Administered Medications Ordered in Other Visits:     Chlorhexidine Gluconate 2 % pads 3 each, 3 pad , Apply externally, BID, Pricila Barnes, APRN    Past Medical History:   Diagnosis Date    Allergic Have had for several years    Eyes and nasal issues    Anxiety Since about 2014    Since I was taking of my Dad, who also passed away 3yrs ago.    Arthritis 2002    Both knees, hips, and shoulders    Arthritis of knee, left     Cataract 05/2019    Colon polyp 2017    Diabetes mellitus     Essential hypertension     HL (hearing loss) 1980    Hyperlipemia     Knee swelling 01/2020    It began bothering me a few months before my left knee replacement.    Mild chronic anemia     Obesity     Sleep apnea     cpap       Past Surgical History:   Procedure Laterality Date    ACHILLES TENDON REPAIR Left     CARDIAC CATHETERIZATION      CARDIAC CATHETERIZATION N/A 09/01/2022    Procedure: Coronary angiography, Left heart catheterization,;  Surgeon: Bruna Chávez MD;  Location: [facility]  CATH INVASIVE LOCATION;  Service: Cardiology;  Laterality: N/A;    CARDIAC CATHETERIZATION N/A 09/01/2022    Procedure: Stent PRAVIN coronary;  Surgeon: Bruna Chávez MD;  Location: [facility] CATH INVASIVE LOCATION;  Service: Cardiology;  Laterality: N/A;    COLONOSCOPY      Dr Reyes 6 years ago    ENDOSCOPY      JOINT REPLACEMENT  03/12/20    Left knee    TONSILLECTOMY      As a child    TOTAL KNEE ARTHROPLASTY Left 03/12/2020    Procedure: TOTAL KNEE ARTHROPLASTY;  Surgeon: Chepe Alicea MD;  Location: [facility] MAIN OR;  Service: Orthopedics;  Laterality: Left;       Social History     Occupational History    Occupation: retired   Tobacco Use    Smoking status: Never    Smokeless tobacco: Never    Tobacco comments:     Naila only every been around people who smoked   Vaping Use    Vaping Use: Never used   Substance and Sexual Activity    Alcohol use: Yes     Alcohol/week: 10.0 standard drinks of alcohol     Types: 10 Drinks containing 0.5 oz of alcohol per week     Comment: Maybe 8-10 drinks mainly on weekends.    Drug use: No    Sexual activity: Not Currently     Partners: Female     Birth control/protection: None      Social History     Social History Narrative    Not on file       Family History   Problem Relation Age of Onset    Alcohol abuse Maternal Uncle         Passed away 2013    Alcohol abuse Father         Passed away in 2016    Hyperlipidemia Father         Started about 10 years before his death    Arthritis Mother         Passed away 2006, from Alzheimers    Diabetes Mother     Hyperlipidemia Mother         Started probably at middle age    Osteoporosis Mother     Malig Hyperthermia Neg Hx        Review of Systems:      Constitutional: Denies fever, shaking or chills   Eyes: Denies change in visual acuity   HEENT: Denies nasal congestion or sore throat   Respiratory: Denies cough or shortness of breath   Cardiovascular: Denies chest pain or edema  Endocrine: Denies tremors, palpitations, intolerance of  "heat or cold, polyuria, polydipsia.  GI: Denies abdominal pain, nausea, vomiting, bloody stools or diarrhea  : Denies frequency, urgency, incontinence, retention, or nocturia.  Musculoskeletal: Denies numbness, tingling or loss of motor function except as above  Integument: Denies rash, lesion or ulceration   Neurologic: Denies headache or focal weakness, deficits  Heme: Denies spontaneous or excessive bleeding, epistaxis, hematuria, melena, fatigue, enlarged or tender lymph nodes.      All other pertinent positives and negatives as noted above in HPI.    Physical Exam:   73 y.o. male  Vitals:    12/13/23 1108   Temp: 97.7 °F (36.5 °C)   TempSrc: Temporal   Weight: 119 kg (263 lb)   Height: 188 cm (74\")     General:  Patient is awake and alert.  Appears in no acute distress or discomfort.    Psych:  Affect and demeanor are appropriate.    Eyes:  Conjunctiva and sclera appear grossly normal.  Eyes track well and EOM seem to be intact.    Ears:  No gross abnormalities.  Hearing adequate for the exam.    Cardiovascular:  Regular rate and rhythm.    Lungs:  Good chest expansion.  Breathing unlabored.    Extremities:  Right knee is examined.  Skin is benign.  Trace effusion.  Moderate medial compartment tenderness.  Motion:  5-120.  Normal motor and sensory function in the lower leg and foot.  Palpable pedal pulses.  Brisk capillary refill.    Imaging: Previous x-rays of the right knee are reviewed and show end-stage medial and patellofemoral compartment osteoarthritis with bone-on-bone, osteophyte formation and subchondral sclerosis.    Assessment/Plan:  Right knee end-stage osteoarthritis    We discussed his options at this point.  He had a good understanding of this information having already been through the surgery on the other side.  He wants to move forward with the replacement but he would like to wait until March.  He would like to get an injection today to get him through the holidays.  The risk, benefits and " alternatives were discussed.  He consented and the injection was performed as described below.  I will have Henrietta contact him about setting up the surgery.  He will follow-up with myself or Pricila for a preoperative visit.    Chepe Alicea MD    12/13/2023     Large Joint Arthrocentesis: R knee  Date/Time: 12/13/2023 11:25 AM  Consent given by: patient  Site marked: site marked  Timeout: Immediately prior to procedure a time out was called to verify the correct patient, procedure, equipment, support staff and site/side marked as required   Supporting Documentation  Indications: pain   Procedure Details  Location: knee - R knee  Preparation: Patient was prepped and draped in the usual sterile fashion  Needle gauge: 21G.  Approach: anterolateral  Medications administered: 1 mL methylPREDNISolone acetate 80 MG/ML; 2 mL lidocaine PF 1% 1 %  Patient tolerance: patient tolerated the procedure well with no immediate complications         Chepe Alicea MD    12/13/2023

## 2023-12-22 PROBLEM — M17.10 ARTHRITIS OF KNEE: Status: ACTIVE | Noted: 2023-12-13

## 2023-12-26 RX ORDER — BENAZEPRIL HYDROCHLORIDE 40 MG/1
TABLET ORAL
Qty: 180 TABLET | Refills: 0 | Status: SHIPPED | OUTPATIENT
Start: 2023-12-26

## 2024-01-05 ENCOUNTER — OFFICE VISIT (OUTPATIENT)
Dept: FAMILY MEDICINE CLINIC | Facility: CLINIC | Age: 74
End: 2024-01-05
Payer: MEDICARE

## 2024-01-05 VITALS
HEART RATE: 53 BPM | OXYGEN SATURATION: 95 % | BODY MASS INDEX: 33.88 KG/M2 | WEIGHT: 264 LBS | RESPIRATION RATE: 18 BRPM | HEIGHT: 74 IN | DIASTOLIC BLOOD PRESSURE: 82 MMHG | SYSTOLIC BLOOD PRESSURE: 136 MMHG

## 2024-01-05 DIAGNOSIS — H91.92 HEARING LOSS OF LEFT EAR, UNSPECIFIED HEARING LOSS TYPE: ICD-10-CM

## 2024-01-05 DIAGNOSIS — H93.8X2 EAR FULLNESS, LEFT: Primary | ICD-10-CM

## 2024-01-05 PROCEDURE — 99213 OFFICE O/P EST LOW 20 MIN: CPT | Performed by: INTERNAL MEDICINE

## 2024-01-05 PROCEDURE — 3075F SYST BP GE 130 - 139MM HG: CPT | Performed by: INTERNAL MEDICINE

## 2024-01-05 PROCEDURE — 3079F DIAST BP 80-89 MM HG: CPT | Performed by: INTERNAL MEDICINE

## 2024-01-05 RX ORDER — HYDROCHLOROTHIAZIDE 25 MG/1
TABLET ORAL
Qty: 90 TABLET | Refills: 0 | Status: SHIPPED | OUTPATIENT
Start: 2024-01-05

## 2024-01-05 NOTE — PROGRESS NOTES
Answers submitted by the patient for this visit:  Primary Reason for Visit (Submitted on 1/3/2024)  What is the primary reason for your visit?: Other  Other (Submitted on 1/3/2024)  Please describe your symptoms.: Ears felt full. Went to [facility], [place], operated by [facility] urgent care center on [address] last week. The person I saw said that my ears had fluid in them. He prescriped, Montelukast 10mg tab and said to follow up with my primary doctor if the didn’t improve, and they haven’t.  Have you had these symptoms before?: No  How long have you been having these symptoms?: 5-7 days  Please list any medications you are currently taking for this condition.: Montelukast 10mg tab  Subjective chief complaint is ear problems  Jeb Olson is a 73 y.o. male.     Ear Fullness   Associated symptoms include hearing loss.  Rosas is here today for some ear problems.  He was in the process of trying to get hearing aids.  Apparently initially the test showed that he had wax in his left ear.  He used Debrox to get the wax out.  Then apparently there was still a problem and he went to the immediate care center.  They said he had fluid behind his ears.  The note says that his tympanic membranes were bulging.  They gave him Singulair and he was continuing to use Flonase but does not seem to have gotten much better.    The following portions of the patient's history were reviewed and updated as appropriate: allergies, current medications, and problem list.    Review of Systems   HENT:  Positive for hearing loss and tinnitus. Negative for ear pain.      Objective   Physical Exam  Vitals and nursing note reviewed.   Constitutional:       Appearance: Normal appearance.   HENT:      Ears:      Comments: The left tympanic membrane is slightly dull compared to the right but certainly neither tympanic membrane is bulging     Nose: Congestion present.   Neurological:      Mental Status: He is alert.       Assessment & Plan   Diagnoses and all orders for  this visit:    1. Ear fullness, left (Primary)  -     Ambulatory Referral to ENT (Otolaryngology)    2. Hearing loss of left ear, unspecified hearing loss type  -     Ambulatory Referral to ENT (Otolaryngology)    Rosas is here today for some ear fullness.  I do not really see any bulging of the tympanic membrane.  The left tympanic membrane is slightly dull compared to the right.  I am going to have him continue his current treatment but also demonstrated how to open his eustachian tubes.  He is going to do that several times a day.  We are going to refer to an ear nose and throat doctor.

## 2024-01-08 RX ORDER — TRAZODONE HYDROCHLORIDE 50 MG/1
50 TABLET ORAL NIGHTLY
Qty: 90 TABLET | Refills: 0 | Status: SHIPPED | OUTPATIENT
Start: 2024-01-08

## 2024-01-10 ENCOUNTER — TELEPHONE (OUTPATIENT)
Dept: FAMILY MEDICINE CLINIC | Facility: CLINIC | Age: 74
End: 2024-01-10

## 2024-01-10 NOTE — TELEPHONE ENCOUNTER
"  Caller: Jeb Olson \"Rosas\"    Relationship: Self    Best call back number: [phone]     What was the call regarding: PATIENT WANTED TO LET DR BRENNAN KNOW THAT HE HAS NOT HEARD FROM ANYONE REGARDING HIS ENT REFERRAL YET. PLEASE CALL TO GIVE HIM A STATUS UPDATE.  "

## 2024-01-11 NOTE — TELEPHONE ENCOUNTER
Hub to relay     Called pt in regards of referral to ENT, the pt was given the referring provider name and contact information to schedule his appointment.

## 2024-01-22 RX ORDER — NIFEDIPINE 60 MG/1
TABLET, EXTENDED RELEASE ORAL DAILY
Qty: 30 TABLET | Refills: 0 | Status: SHIPPED | OUTPATIENT
Start: 2024-01-22

## 2024-02-16 RX ORDER — SITAGLIPTIN AND METFORMIN HYDROCHLORIDE 1000; 50 MG/1; MG/1
TABLET, FILM COATED, EXTENDED RELEASE ORAL
Qty: 180 TABLET | Refills: 0 | Status: SHIPPED | OUTPATIENT
Start: 2024-02-16

## 2024-02-21 RX ORDER — NIFEDIPINE 60 MG/1
60 TABLET, EXTENDED RELEASE ORAL DAILY
Qty: 90 TABLET | Refills: 1 | Status: SHIPPED | OUTPATIENT
Start: 2024-02-21

## 2024-02-21 RX ORDER — NEBIVOLOL 5 MG/1
5 TABLET ORAL DAILY
Qty: 90 TABLET | Refills: 0 | Status: SHIPPED | OUTPATIENT
Start: 2024-02-21

## 2024-02-22 RX ORDER — DOXAZOSIN MESYLATE 4 MG/1
4 TABLET ORAL NIGHTLY
Qty: 90 TABLET | Refills: 0 | Status: SHIPPED | OUTPATIENT
Start: 2024-02-22

## 2024-02-28 RX ORDER — FENOFIBRATE 160 MG/1
TABLET ORAL
Qty: 90 TABLET | Refills: 0 | Status: SHIPPED | OUTPATIENT
Start: 2024-02-28

## 2024-03-04 RX ORDER — ESCITALOPRAM OXALATE 10 MG/1
TABLET ORAL
Qty: 90 TABLET | Refills: 0 | Status: SHIPPED | OUTPATIENT
Start: 2024-03-04

## 2024-03-05 ENCOUNTER — PRE-ADMISSION TESTING (OUTPATIENT)
Dept: PREADMISSION TESTING | Facility: HOSPITAL | Age: 74
End: 2024-03-05
Payer: MEDICARE

## 2024-03-05 VITALS
OXYGEN SATURATION: 97 % | HEART RATE: 49 BPM | BODY MASS INDEX: 34.59 KG/M2 | SYSTOLIC BLOOD PRESSURE: 152 MMHG | RESPIRATION RATE: 20 BRPM | WEIGHT: 261 LBS | TEMPERATURE: 98.3 F | HEIGHT: 73 IN | DIASTOLIC BLOOD PRESSURE: 75 MMHG

## 2024-03-05 LAB
ANION GAP SERPL CALCULATED.3IONS-SCNC: 13.4 MMOL/L (ref 5–15)
BUN SERPL-MCNC: 24 MG/DL (ref 8–23)
BUN/CREAT SERPL: 21.8 (ref 7–25)
CALCIUM SPEC-SCNC: 9.2 MG/DL (ref 8.6–10.5)
CHLORIDE SERPL-SCNC: 102 MMOL/L (ref 98–107)
CO2 SERPL-SCNC: 25.6 MMOL/L (ref 22–29)
CREAT SERPL-MCNC: 1.1 MG/DL (ref 0.76–1.27)
DEPRECATED RDW RBC AUTO: 40.6 FL (ref 37–54)
EGFRCR SERPLBLD CKD-EPI 2021: 71.8 ML/MIN/1.73
ERYTHROCYTE [DISTWIDTH] IN BLOOD BY AUTOMATED COUNT: 13 % (ref 12.3–15.4)
GLUCOSE SERPL-MCNC: 155 MG/DL (ref 65–99)
HCT VFR BLD AUTO: 35 % (ref 37.5–51)
HGB BLD-MCNC: 11.5 G/DL (ref 13–17.7)
MCH RBC QN AUTO: 28.4 PG (ref 26.6–33)
MCHC RBC AUTO-ENTMCNC: 32.9 G/DL (ref 31.5–35.7)
MCV RBC AUTO: 86.4 FL (ref 79–97)
PLATELET # BLD AUTO: 171 10*3/MM3 (ref 140–450)
PMV BLD AUTO: 10.9 FL (ref 6–12)
POTASSIUM SERPL-SCNC: 3.6 MMOL/L (ref 3.5–5.2)
RBC # BLD AUTO: 4.05 10*6/MM3 (ref 4.14–5.8)
SODIUM SERPL-SCNC: 141 MMOL/L (ref 136–145)
WBC NRBC COR # BLD AUTO: 5.96 10*3/MM3 (ref 3.4–10.8)

## 2024-03-05 PROCEDURE — 85027 COMPLETE CBC AUTOMATED: CPT

## 2024-03-05 PROCEDURE — 80048 BASIC METABOLIC PNL TOTAL CA: CPT

## 2024-03-05 PROCEDURE — 36415 COLL VENOUS BLD VENIPUNCTURE: CPT

## 2024-03-05 NOTE — DISCHARGE INSTRUCTIONS
Take the following medications the morning of surgery:    Clonidine   procardia   use nasal spray    If you are on prescription narcotic pain medication to control your pain you may also take that medication the morning of surgery.    General Instructions:  Do not eat solid food after midnight the night before surgery.  You may drink clear liquids day of surgery but must stop at least one hour before your hospital arrival time.  It is beneficial for you to have a clear drink that contains carbohydrates the day of surgery.  We suggest a 12 to 20 ounce bottle of Gatorade or Powerade for non-diabetic patients or a 12 to 20 ounce bottle of G2 or Powerade Zero for diabetic patients. (Pediatric patients, are not advised to drink a 12 to 20 ounce carbohydrate drink)    Clear liquids are liquids you can see through.  Nothing red in color.     Plain water                               Sports drinks  Sodas                                   Gelatin (Jell-O)  Fruit juices without pulp such as white grape juice and apple juice  Popsicles that contain no fruit or yogurt  Tea or coffee (no cream or milk added)  Gatorade / Powerade  G2 / Powerade Zero    Infants may have breast milk up to four hours before surgery.  Infants drinking formula may drink formula up to six hours before surgery.   Patients who avoid smoking, chewing tobacco and alcohol for 4 weeks prior to surgery have a reduced risk of post-operative complications.  Quit smoking as many days before surgery as you can.  Do not smoke, use chewing tobacco or drink alcohol the day of surgery.   If applicable bring your C-PAP/ BI-PAP machine in with you to preop day of surgery.  Bring any papers given to you in the doctor’s office.  Wear clean comfortable clothes.  Do not wear contact lenses, false eyelashes or make-up.  Bring a case for your glasses.   Bring crutches or walker if applicable.  Remove all piercings.  Leave jewelry and any other valuables at home.  Hair  extensions with metal clips must be removed prior to surgery.  The Pre-Admission Testing nurse will instruct you to bring medications if unable to obtain an accurate list in Pre-Admission Testing.        If you were given a blood bank ID arm band remember to bring it with you the day of surgery.    Preventing a Surgical Site Infection:  For 2 to 3 days before surgery, avoid shaving with a razor because the razor can irritate skin and make it easier to develop an infection.    Any areas of open skin can increase the risk of a post-operative wound infection by allowing bacteria to enter and travel throughout the body.  Notify your surgeon if you have any skin wounds / rashes even if it is not near the expected surgical site.  The area will need assessed to determine if surgery should be delayed until it is healed.  The night prior to surgery shower using a fresh bar of anti-bacterial soap (such as Dial) and clean washcloth.  Sleep in a clean bed with clean clothing.  Do not allow pets to sleep with you.  Shower on the morning of surgery using a fresh bar of anti-bacterial soap (such as Dial) and clean washcloth.  Dry with a clean towel and dress in clean clothing.  Ask your surgeon if you will be receiving antibiotics prior to surgery.  Make sure you, your family, and all healthcare providers clean their hands with soap and water or an alcohol based hand  before caring for you or your wound.    Day of surgery:3/21/2024   [facility] to call with time of arrival  Your arrival time is approximately two hours before your scheduled surgery time.  Upon arrival, a Pre-op nurse and Anesthesiologist will review your health history, obtain vital signs, and answer questions you may have.  The only belongings needed at this time will be a list of your home medications and if applicable your C-PAP/BI-PAP machine.  A Pre-op nurse will start an IV and you may receive medication in preparation for surgery, including something  to help you relax.     Please be aware that surgery does come with discomfort.  We want to make every effort to control your discomfort so please discuss any uncontrolled symptoms with your nurse.   Your doctor will most likely have prescribed pain medications.      If you are going home after surgery you will receive individualized written care instructions before being discharged.  A responsible adult must drive you to and from the hospital on the day of your surgery and ideally stay with you through the night.   .  Discharge prescriptions can be filled by the hospital pharmacy during regular pharmacy hours.  If you are having surgery late in the day/evening your prescription may be e-prescribed to your pharmacy.  Please verify your pharmacy hours or chose a 24 hour pharmacy to avoid not having access to your prescription because your pharmacy has closed for the day.    If you are staying overnight following surgery, you will be transported to your hospital room following the recovery period.   has all private rooms.    If you have any questions please call Pre-Admission Testing at [phone].  Deductibles and co-payments are collected on the day of service. Please be prepared to pay the required co-pay, deductible or deposit on the day of service as defined by your plan.    Call your surgeon immediately if you experience any of the following symptoms:  Sore Throat  Shortness of Breath or difficulty breathing  Cough  Chills  Body soreness or muscle pain  Headache  Fever  New loss of taste or smell  Do not arrive for your surgery ill.  Your procedure will need to be rescheduled to another time.  You will need to call your physician before the day of surgery to avoid any unnecessary exposure to hospital staff as well as other patients.  CHLORHEXIDINE CLOTH INSTRUCTIONS  The morning of surgery follow these instructions using the Chlorhexidine cloths you've been given.  These steps reduce  bacteria on the body.  Do not use the cloths near your eyes, ears mouth, genitalia or on open wounds.  Throw the cloths away after use but do not try to flush them down a toilet.      Open and remove one cloth at a time from the package.    Leave the cloth unfolded and begin the bathing.  Massage the skin with the cloths using gentle pressure to remove bacteria.  Do not scrub harshly.   Follow the steps below with one 2% CHG cloth per area (6 total cloths).  One cloth for neck, shoulders and chest.  One cloth for both arms, hands, fingers and underarms (do underarms last).  One cloth for the abdomen followed by groin.  One cloth for right leg and foot including between the toes.  One cloth for left leg and foot including between the toes.  The last cloth is to be used for the back of the neck, back and buttocks.    Allow the CHG to air dry 3 minutes on the skin which will give it time to work and decrease the chance of irritation.  The skin may feel sticky until it is dry.  Do not rinse with water or any other liquid or you will lose the beneficial effects of the CHG.  If mild skin irritation occurs, do rinse the skin to remove the CHG.  Report this to the nurse at time of admission.  Do not apply lotions, creams, ointments, deodorants or perfumes after using the clothes. Dress in clean clothes before coming to the hospital.

## 2024-03-07 NOTE — ANESTHESIA PREPROCEDURE EVALUATION
Anesthesia Evaluation     Patient summary reviewed and Nursing notes reviewed                Airway   Mallampati: II  Dental      Pulmonary    (+) sleep apnea on CPAP,   Cardiovascular     ECG reviewed  Rhythm: irregular  Rate: normal    (+) hypertension, dysrhythmias PVC, hyperlipidemia,       Neuro/Psych  (+) psychiatric history Anxiety and Depression,     GI/Hepatic/Renal/Endo    (+) morbid obesity,  diabetes mellitus type 2,     Musculoskeletal     Abdominal    Substance History - negative use     OB/GYN negative ob/gyn ROS         Other   arthritis,                      Anesthesia Plan    ASA 3     general with block   (Left ACBx)  intravenous induction     Anesthetic plan, all risks, benefits, and alternatives have been provided, discussed and informed consent has been obtained with: patient.      
Quality 226: Preventive Care And Screening: Tobacco Use: Screening And Cessation Intervention: Patient screened for tobacco use and is an ex/non-smoker
Detail Level: Detailed
Quality 431: Preventive Care And Screening: Unhealthy Alcohol Use - Screening: Patient not identified as an unhealthy alcohol user when screened for unhealthy alcohol use using a systematic screening method

## 2024-03-11 ENCOUNTER — TELEPHONE (OUTPATIENT)
Dept: FAMILY MEDICINE CLINIC | Facility: CLINIC | Age: 74
End: 2024-03-11
Payer: MEDICARE

## 2024-03-11 NOTE — TELEPHONE ENCOUNTER
Hub to relay and schedule.      Attempted to call pt in regards of appointment that was for 4/4 the provider will be out of office.

## 2024-03-14 ENCOUNTER — TELEPHONE (OUTPATIENT)
Dept: ORTHOPEDIC SURGERY | Facility: HOSPITAL | Age: 74
End: 2024-03-14
Payer: MEDICARE

## 2024-03-14 NOTE — TELEPHONE ENCOUNTER
Called and spoke with Mr. Olson at this time to see if he would be interested in going home after his upcoming procedure 3/21. He does have his H&P appointment with the office tomorrow afternoon, but he said he would feel better staying overnight. We will plan for an overnight stay at this time. Mr. Olson doesn't have any questions for me at this time. He was given my contact information should he need anything.

## 2024-03-15 ENCOUNTER — OFFICE VISIT (OUTPATIENT)
Dept: ORTHOPEDIC SURGERY | Facility: CLINIC | Age: 74
End: 2024-03-15
Payer: MEDICARE

## 2024-03-15 VITALS — HEIGHT: 74 IN | TEMPERATURE: 98 F | WEIGHT: 264 LBS | BODY MASS INDEX: 33.88 KG/M2

## 2024-03-15 DIAGNOSIS — Z01.818 PREOP EXAMINATION: Primary | ICD-10-CM

## 2024-03-15 NOTE — PROGRESS NOTES
History & Physical       Patient: Jeb Olson    YOB: 1950    Medical Record Number: [id number]    Chief Complaints: Right knee endstage osteoarthritis    History of Present Illness: 73 y.o. male presents today in anticipation of upcoming knee replacement surgery.  Patient has a long history of worsening symptoms.  Describes the pain as severe, constant, and typically dull and achy. He has tried and failed prolonged conservative treatment. He is struggling with routine daily activities.  This has become a significant issue for overall quality of life. He cannot walk any prolonged distances without having to rest.     Allergies: No Known Allergies    Medications:   Home Medications:    Current Outpatient Medications:     Acetaminophen (TYLENOL PO), Take 1,000-1,300 mg by mouth As Needed., Disp: , Rfl:     aspirin 81 MG EC tablet, Take 1 tablet by mouth 2 (Two) Times a Day. Indications: DVT Prophylaxis (Patient taking differently: Take 1 tablet by mouth Daily. Indications: DVT Prophylaxis), Disp: , Rfl:     benazepril (LOTENSIN) 40 MG tablet, Take 1 tablet by mouth twice daily (Patient taking differently: Take 1 tablet by mouth 2 (Two) Times a Day.), Disp: 180 tablet, Rfl: 0    cloNIDine (CATAPRES) 0.2 MG tablet, Take 1 tablet by mouth 2 (Two) Times a Day., Disp: 180 tablet, Rfl: 3    doxazosin (CARDURA) 4 MG tablet, TAKE 1 TABLET BY MOUTH ONCE DAILY AT NIGHT, Disp: 90 tablet, Rfl: 0    escitalopram (LEXAPRO) 10 MG tablet, Take 1 tablet by mouth once daily (Patient taking differently: Take 1 tablet by mouth Every Evening.), Disp: 90 tablet, Rfl: 0    ezetimibe (ZETIA) 10 MG tablet, Take 1 tablet by mouth Daily. (Patient taking differently: Take 1 tablet by mouth Every Evening.), Disp: 90 tablet, Rfl: 1    fenofibrate 160 MG tablet, TAKE 1 TABLET BY MOUTH AT NIGHT (Patient taking differently: Take 1 tablet by mouth Every Night.), Disp: 90 tablet, Rfl: 0    fluticasone (FLONASE) 50 MCG/ACT nasal  spray, 2 sprays into the nostril(s) as directed by provider Every Morning. 2 sprays in each nostril, Disp: , Rfl:     gabapentin (NEURONTIN) 600 MG tablet, Take 1 tablet by mouth Every Evening., Disp: , Rfl:     glimepiride (AMARYL) 4 MG tablet, Take 1 tablet by mouth twice daily, Disp: 180 tablet, Rfl: 0    glucose blood (Accu-Chek Anabela Plus) test strip, TEST TWICE DAILY Use as instructed, Disp: 100 each, Rfl: 3    hydroCHLOROthiazide (HYDRODIURIL) 25 MG tablet, Take 1 tablet by mouth once daily (Patient taking differently: Take 1 tablet by mouth Every Morning.), Disp: 90 tablet, Rfl: 0    Janumet XR  MG tablet, Take 1 tablet by mouth twice daily (Patient taking differently: Take 1 tablet by mouth 2 (Two) Times a Day.), Disp: 180 tablet, Rfl: 0    Multiple Vitamins-Minerals (b complex-C-E-zinc) tablet, Take 1 tablet by mouth Every Other Day., Disp: , Rfl:     Multiple Vitamins-Minerals (PRESERVISION AREDS 2 PO), Take 1 tablet by mouth 2 (Two) Times a Day., Disp: , Rfl:     nebivolol (BYSTOLIC) 5 MG tablet, Take 1 tablet by mouth once daily (Patient taking differently: Take 1 tablet by mouth Every Evening.), Disp: 90 tablet, Rfl: 0    NIFEdipine XL (PROCARDIA XL) 60 MG 24 hr tablet, Take 1 tablet by mouth Daily. (Patient taking differently: Take 1 tablet by mouth Every Morning.), Disp: 90 tablet, Rfl: 1    Olopatadine HCl (PATADAY OP), Apply 1 drop to eye(s) as directed by provider 2 (Two) Times a Day As Needed., Disp: , Rfl:     polyethyl glycol-propyl glycol (SYSTANE) 0.4-0.3 % solution ophthalmic solution (artificial tears), Administer 1 drop to both eyes As Needed., Disp: , Rfl:     traZODone (DESYREL) 50 MG tablet, TAKE 1 TABLET BY MOUTH ONCE DAILY AT NIGHT, Disp: 90 tablet, Rfl: 0    vitamin D3 125 MCG (5000 UT) capsule capsule, Take 1 capsule by mouth Daily., Disp: , Rfl:   No current facility-administered medications for this visit.    Facility-Administered Medications Ordered in Other Visits:      Chlorhexidine Gluconate 2 % pads 3 each, 3 pad , Apply externally, BID, Javiereston, Pricila L, APRN    Past Medical History:   Diagnosis Date    Allergic Have had for several years    Eyes and nasal issues    Anemia     Anxiety Since about 2014    Since I was taking of my Dad, who also passed away 3yrs ago.    Arthritis 2002    Both knees, hips, and shoulders    Arthritis of knee, left     Cataract 05/2019    Colon polyp 2017    Coronary artery disease     stent    Diabetes mellitus     Dry eyes     Essential hypertension     HL (hearing loss) 1980    no hearing aides    Hyperlipemia     Mild chronic anemia     Obesity     Sleep apnea     cpap          Past Surgical History:   Procedure Laterality Date    ACHILLES TENDON REPAIR Left     CARDIAC CATHETERIZATION      CARDIAC CATHETERIZATION N/A 09/01/2022    Procedure: Coronary angiography, Left heart catheterization,;  Surgeon: Bruna Chávez MD;  Location:  [facility];  Service: Cardiology;  Laterality: N/A;    CARDIAC CATHETERIZATION N/A 09/01/2022    Procedure: Stent PRAVIN coronary;  Surgeon: Bruna Chávez MD;  Location:  [facility];  Service: Cardiology;  Laterality: N/A;    CATARACT EXTRACTION EXTRACAPSULAR W/ INTRAOCULAR LENS IMPLANTATION Bilateral     COLONOSCOPY      Dr Reyes 6 years ago    ENDOSCOPY      TONSILLECTOMY      As a child    TOTAL KNEE ARTHROPLASTY Left 03/12/2020    Procedure: TOTAL KNEE ARTHROPLASTY;  Surgeon: Chepe Alicea MD;  Location: [facility] MAIN OR;  Service: Orthopedics;  Laterality: Left;          Social History     Occupational History    Occupation: retired   Tobacco Use    Smoking status: Never     Passive exposure: Never    Smokeless tobacco: Never    Tobacco comments:     Naila only every been around people who smoked   Vaping Use    Vaping status: Never Used   Substance and Sexual Activity    Alcohol use: Not Currently     Comment: Maybe 8-10 drinks liquor mainly on weekends. 3/5/2024 pt states no  "alcohol since caring for wife after her surgery in 2/2024    Drug use: Never    Sexual activity: Not Currently     Partners: Female     Birth control/protection: None      Social History     Social History Narrative    Not on file          Family History   Problem Relation Age of Onset    Alcohol abuse Maternal Uncle         Passed away 2013    Alcohol abuse Father         Passed away in 2016    Hyperlipidemia Father         Started about 10 years before his death    Arthritis Mother         Passed away 2006, from Alzheimers    Diabetes Mother     Hyperlipidemia Mother         Started probably at middle age    Osteoporosis Mother     Malig Hyperthermia Neg Hx        Review of Systems:  A 14-point review of systems is reviewed with the patient.  Pertinent positives are listed above.  All others are negative.    Physical Exam: 73 y.o. male    Vitals:    03/15/24 1346   Temp: 98 °F (36.7 °C)   Weight: 120 kg (264 lb)   Height: 188 cm (74\")       General:  Patient is awake and alert.  Appears in no acute distress or discomfort.    Psych:  Affect and demeanor are appropriate.    Eyes:  Conjunctivae and sclerae; appear grossly normal.  Eyes track well and EOM seems to be intact.    Ears:  No gross abnormalities.  Hearing adequate for the exam.    Cardiovascular:  Regular rate and rhythm.    Lungs:  Good chest expansion.  Breathing unlabored.    Lymph:  No palpable adenopathy about neck or axillae.    Right lower extremity:  Skin benign and intact without evidence for swelling, masses or atrophy.  No palpable masses.  Focal tenderness noted over medial joint line.  ROM is from 5-120°.   Knee is stable on exam.  Good strength throughout the lower leg and foot.  Intact sensation throughout.  Palpable pedal pulses with brisk cap refill.    Diagnostic Tests:  Lab Results   Component Value Date    GLUCOSE 155 (H) 03/05/2024    CALCIUM 9.2 03/05/2024     03/05/2024    K 3.6 03/05/2024    CO2 25.6 03/05/2024     " "03/05/2024    BUN 24 (H) 03/05/2024    CREATININE 1.10 03/05/2024    EGFRIFAFRI 67 08/26/2021    EGFRIFNONA 55 (L) 08/26/2021    BCR 21.8 03/05/2024    ANIONGAP 13.4 03/05/2024     Lab Results   Component Value Date    WBC 5.96 03/05/2024    HGB 11.5 (L) 03/05/2024    HCT 35.0 (L) 03/05/2024    MCV 86.4 03/05/2024     03/05/2024     No results found for: \"INR\", \"PROTIME\"    Imaging:  AP, merchant and lateral views of the right knee are ordered and reviewed along with a full length alignment x-ray.  These x-rays were taken to evaluate his complaint and presurgical planning.  These compared to previous x-rays.  The x-rays show end-stage medial and patellofemoral compartment osteoarthritis with bone-on-bone, osteophyte formation, and subchondral sclerosis.  The leg length alignment x-ray shows varus malalignment with the weightbearing axis passing through the medial compartment.     Assessment:  Right knee endstage osteoarthritis    Plan: We will plan on proceeding with a right total knee arthroplasty at the patient's request.  I reviewed details of procedure with patient today and discussed all the risks, benefits, alternatives, and limitations of the procedure in laymen's terms with the risks including but not limited to:  neurovascular damage resulting in permanent dysfunction or footdrop and potential need for further surgery, bleeding, infection, hematoma, chronic pain, worsening of pain, persistent symptoms potentially necessitating revision, prosthesis-related problems including loosening or allergy, swelling, loss of motion and arthrofibrosis, weakness, stiffness, instability, DVT, pulmonary embolus, death, stroke, complex regional pain syndrome, and need for additional procedures.  Patient verbalized understanding, and was given the opportunity to ask and have all questions answered today.  No guarantees were given regarding results of surgery.      Patient is planning for hospital dismissal following " surgery.  Denies history of DVT or metal allergy.      Pricila Barnes, APRN    03/15/24

## 2024-03-15 NOTE — H&P (VIEW-ONLY)
History & Physical       Patient: Jeb Olson    YOB: 1950    Medical Record Number: [id number]    Chief Complaints: Right knee endstage osteoarthritis    History of Present Illness: 73 y.o. male presents today in anticipation of upcoming knee replacement surgery.  Patient has a long history of worsening symptoms.  Describes the pain as severe, constant, and typically dull and achy. He has tried and failed prolonged conservative treatment. He is struggling with routine daily activities.  This has become a significant issue for overall quality of life. He cannot walk any prolonged distances without having to rest.     Allergies: No Known Allergies    Medications:   Home Medications:    Current Outpatient Medications:     Acetaminophen (TYLENOL PO), Take 1,000-1,300 mg by mouth As Needed., Disp: , Rfl:     aspirin 81 MG EC tablet, Take 1 tablet by mouth 2 (Two) Times a Day. Indications: DVT Prophylaxis (Patient taking differently: Take 1 tablet by mouth Daily. Indications: DVT Prophylaxis), Disp: , Rfl:     benazepril (LOTENSIN) 40 MG tablet, Take 1 tablet by mouth twice daily (Patient taking differently: Take 1 tablet by mouth 2 (Two) Times a Day.), Disp: 180 tablet, Rfl: 0    cloNIDine (CATAPRES) 0.2 MG tablet, Take 1 tablet by mouth 2 (Two) Times a Day., Disp: 180 tablet, Rfl: 3    doxazosin (CARDURA) 4 MG tablet, TAKE 1 TABLET BY MOUTH ONCE DAILY AT NIGHT, Disp: 90 tablet, Rfl: 0    escitalopram (LEXAPRO) 10 MG tablet, Take 1 tablet by mouth once daily (Patient taking differently: Take 1 tablet by mouth Every Evening.), Disp: 90 tablet, Rfl: 0    ezetimibe (ZETIA) 10 MG tablet, Take 1 tablet by mouth Daily. (Patient taking differently: Take 1 tablet by mouth Every Evening.), Disp: 90 tablet, Rfl: 1    fenofibrate 160 MG tablet, TAKE 1 TABLET BY MOUTH AT NIGHT (Patient taking differently: Take 1 tablet by mouth Every Night.), Disp: 90 tablet, Rfl: 0    fluticasone (FLONASE) 50 MCG/ACT nasal  spray, 2 sprays into the nostril(s) as directed by provider Every Morning. 2 sprays in each nostril, Disp: , Rfl:     gabapentin (NEURONTIN) 600 MG tablet, Take 1 tablet by mouth Every Evening., Disp: , Rfl:     glimepiride (AMARYL) 4 MG tablet, Take 1 tablet by mouth twice daily, Disp: 180 tablet, Rfl: 0    glucose blood (Accu-Chek Anabela Plus) test strip, TEST TWICE DAILY Use as instructed, Disp: 100 each, Rfl: 3    hydroCHLOROthiazide (HYDRODIURIL) 25 MG tablet, Take 1 tablet by mouth once daily (Patient taking differently: Take 1 tablet by mouth Every Morning.), Disp: 90 tablet, Rfl: 0    Janumet XR  MG tablet, Take 1 tablet by mouth twice daily (Patient taking differently: Take 1 tablet by mouth 2 (Two) Times a Day.), Disp: 180 tablet, Rfl: 0    Multiple Vitamins-Minerals (b complex-C-E-zinc) tablet, Take 1 tablet by mouth Every Other Day., Disp: , Rfl:     Multiple Vitamins-Minerals (PRESERVISION AREDS 2 PO), Take 1 tablet by mouth 2 (Two) Times a Day., Disp: , Rfl:     nebivolol (BYSTOLIC) 5 MG tablet, Take 1 tablet by mouth once daily (Patient taking differently: Take 1 tablet by mouth Every Evening.), Disp: 90 tablet, Rfl: 0    NIFEdipine XL (PROCARDIA XL) 60 MG 24 hr tablet, Take 1 tablet by mouth Daily. (Patient taking differently: Take 1 tablet by mouth Every Morning.), Disp: 90 tablet, Rfl: 1    Olopatadine HCl (PATADAY OP), Apply 1 drop to eye(s) as directed by provider 2 (Two) Times a Day As Needed., Disp: , Rfl:     polyethyl glycol-propyl glycol (SYSTANE) 0.4-0.3 % solution ophthalmic solution (artificial tears), Administer 1 drop to both eyes As Needed., Disp: , Rfl:     traZODone (DESYREL) 50 MG tablet, TAKE 1 TABLET BY MOUTH ONCE DAILY AT NIGHT, Disp: 90 tablet, Rfl: 0    vitamin D3 125 MCG (5000 UT) capsule capsule, Take 1 capsule by mouth Daily., Disp: , Rfl:   No current facility-administered medications for this visit.    Facility-Administered Medications Ordered in Other Visits:      Chlorhexidine Gluconate 2 % pads 3 each, 3 pad , Apply externally, BID, Javiereston, Pricila L, APRN    Past Medical History:   Diagnosis Date    Allergic Have had for several years    Eyes and nasal issues    Anemia     Anxiety Since about 2014    Since I was taking of my Dad, who also passed away 3yrs ago.    Arthritis 2002    Both knees, hips, and shoulders    Arthritis of knee, left     Cataract 05/2019    Colon polyp 2017    Coronary artery disease     stent    Diabetes mellitus     Dry eyes     Essential hypertension     HL (hearing loss) 1980    no hearing aides    Hyperlipemia     Mild chronic anemia     Obesity     Sleep apnea     cpap          Past Surgical History:   Procedure Laterality Date    ACHILLES TENDON REPAIR Left     CARDIAC CATHETERIZATION      CARDIAC CATHETERIZATION N/A 09/01/2022    Procedure: Coronary angiography, Left heart catheterization,;  Surgeon: Bruna Chávez MD;  Location:  [facility];  Service: Cardiology;  Laterality: N/A;    CARDIAC CATHETERIZATION N/A 09/01/2022    Procedure: Stent PRAVIN coronary;  Surgeon: Bruna Chávez MD;  Location:  [facility];  Service: Cardiology;  Laterality: N/A;    CATARACT EXTRACTION EXTRACAPSULAR W/ INTRAOCULAR LENS IMPLANTATION Bilateral     COLONOSCOPY      Dr Reyes 6 years ago    ENDOSCOPY      TONSILLECTOMY      As a child    TOTAL KNEE ARTHROPLASTY Left 03/12/2020    Procedure: TOTAL KNEE ARTHROPLASTY;  Surgeon: Chepe Alicea MD;  [facility] MA[facility];  Service: Orthopedics;  Laterality: Left;          Social History     Occupational History    Occupation: retired   Tobacco Use    Smoking status: Never     Passive exposure: Never    Smokeless tobacco: Never    Tobacco comments:     Naila only every been around people who smoked   Vaping Use    Vaping status: Never Used   Substance and Sexual Activity    Alcohol use: Not Currently     Comment: Maybe 8-10 drinks liquor mainly on weekends. 3/5/2024 pt states no  "alcohol since caring for wife after her surgery in 2/2024    Drug use: Never    Sexual activity: Not Currently     Partners: Female     Birth control/protection: None      Social History     Social History Narrative    Not on file          Family History   Problem Relation Age of Onset    Alcohol abuse Maternal Uncle         Passed away 2013    Alcohol abuse Father         Passed away in 2016    Hyperlipidemia Father         Started about 10 years before his death    Arthritis Mother         Passed away 2006, from Alzheimers    Diabetes Mother     Hyperlipidemia Mother         Started probably at middle age    Osteoporosis Mother     Malig Hyperthermia Neg Hx        Review of Systems:  A 14-point review of systems is reviewed with the patient.  Pertinent positives are listed above.  All others are negative.    Physical Exam: 73 y.o. male    Vitals:    03/15/24 1346   Temp: 98 °F (36.7 °C)   Weight: 120 kg (264 lb)   Height: 188 cm (74\")       General:  Patient is awake and alert.  Appears in no acute distress or discomfort.    Psych:  Affect and demeanor are appropriate.    Eyes:  Conjunctivae and sclerae; appear grossly normal.  Eyes track well and EOM seems to be intact.    Ears:  No gross abnormalities.  Hearing adequate for the exam.    Cardiovascular:  Regular rate and rhythm.    Lungs:  Good chest expansion.  Breathing unlabored.    Lymph:  No palpable adenopathy about neck or axillae.    Right lower extremity:  Skin benign and intact without evidence for swelling, masses or atrophy.  No palpable masses.  Focal tenderness noted over medial joint line.  ROM is from 5-120°.   Knee is stable on exam.  Good strength throughout the lower leg and foot.  Intact sensation throughout.  Palpable pedal pulses with brisk cap refill.    Diagnostic Tests:  Lab Results   Component Value Date    GLUCOSE 155 (H) 03/05/2024    CALCIUM 9.2 03/05/2024     03/05/2024    K 3.6 03/05/2024    CO2 25.6 03/05/2024     " "03/05/2024    BUN 24 (H) 03/05/2024    CREATININE 1.10 03/05/2024    EGFRIFAFRI 67 08/26/2021    EGFRIFNONA 55 (L) 08/26/2021    BCR 21.8 03/05/2024    ANIONGAP 13.4 03/05/2024     Lab Results   Component Value Date    WBC 5.96 03/05/2024    HGB 11.5 (L) 03/05/2024    HCT 35.0 (L) 03/05/2024    MCV 86.4 03/05/2024     03/05/2024     No results found for: \"INR\", \"PROTIME\"    Imaging:  AP, merchant and lateral views of the right knee are ordered and reviewed along with a full length alignment x-ray.  These x-rays were taken to evaluate his complaint and presurgical planning.  These compared to previous x-rays.  The x-rays show end-stage medial and patellofemoral compartment osteoarthritis with bone-on-bone, osteophyte formation, and subchondral sclerosis.  The leg length alignment x-ray shows varus malalignment with the weightbearing axis passing through the medial compartment.     Assessment:  Right knee endstage osteoarthritis    Plan: We will plan on proceeding with a right total knee arthroplasty at the patient's request.  I reviewed details of procedure with patient today and discussed all the risks, benefits, alternatives, and limitations of the procedure in laymen's terms with the risks including but not limited to:  neurovascular damage resulting in permanent dysfunction or footdrop and potential need for further surgery, bleeding, infection, hematoma, chronic pain, worsening of pain, persistent symptoms potentially necessitating revision, prosthesis-related problems including loosening or allergy, swelling, loss of motion and arthrofibrosis, weakness, stiffness, instability, DVT, pulmonary embolus, death, stroke, complex regional pain syndrome, and need for additional procedures.  Patient verbalized understanding, and was given the opportunity to ask and have all questions answered today.  No guarantees were given regarding results of surgery.      Patient is planning for hospital dismissal following " surgery.  Denies history of DVT or metal allergy.      Pricila Barnes, APRN    03/15/24

## 2024-03-21 ENCOUNTER — APPOINTMENT (OUTPATIENT)
Dept: GENERAL RADIOLOGY | Facility: HOSPITAL | Age: 74
End: 2024-03-21
Payer: MEDICARE

## 2024-03-21 ENCOUNTER — DOCUMENTATION (OUTPATIENT)
Dept: HOME HEALTH SERVICES | Facility: HOME HEALTHCARE | Age: 74
End: 2024-03-21
Payer: COMMERCIAL

## 2024-03-21 ENCOUNTER — HOSPITAL ENCOUNTER (OUTPATIENT)
Facility: HOSPITAL | Age: 74
Discharge: HOME | End: 2024-03-21
Attending: ORTHOPAEDIC SURGERY | Admitting: ORTHOPAEDIC SURGERY
Payer: MEDICARE

## 2024-03-21 ENCOUNTER — HOME HEALTH ADMISSION (OUTPATIENT)
Dept: HOME HEALTH SERVICES | Facility: HOME HEALTHCARE | Age: 74
End: 2024-03-21
Payer: COMMERCIAL

## 2024-03-21 ENCOUNTER — ANESTHESIA EVENT (OUTPATIENT)
Dept: PERIOP | Facility: HOSPITAL | Age: 74
End: 2024-03-21
Payer: MEDICARE

## 2024-03-21 ENCOUNTER — ANESTHESIA (OUTPATIENT)
Dept: PERIOP | Facility: HOSPITAL | Age: 74
End: 2024-03-21
Payer: MEDICARE

## 2024-03-21 VITALS
TEMPERATURE: 98.3 F | SYSTOLIC BLOOD PRESSURE: 135 MMHG | HEART RATE: 63 BPM | DIASTOLIC BLOOD PRESSURE: 78 MMHG | RESPIRATION RATE: 20 BRPM | OXYGEN SATURATION: 96 %

## 2024-03-21 DIAGNOSIS — Z96.651 H/O TOTAL KNEE REPLACEMENT, RIGHT: Primary | ICD-10-CM

## 2024-03-21 DIAGNOSIS — M17.10 ARTHRITIS OF KNEE: ICD-10-CM

## 2024-03-21 LAB
GLUCOSE BLDC GLUCOMTR-MCNC: 141 MG/DL (ref 70–130)
GLUCOSE BLDC GLUCOMTR-MCNC: 166 MG/DL (ref 70–130)

## 2024-03-21 PROCEDURE — 25010000002 KETOROLAC TROMETHAMINE PER 15 MG: Performed by: ORTHOPAEDIC SURGERY

## 2024-03-21 PROCEDURE — 63710000001 ACETAMINOPHEN EXTRA STRENGTH 500 MG TABLET: Performed by: ORTHOPAEDIC SURGERY

## 2024-03-21 PROCEDURE — C1776 JOINT DEVICE (IMPLANTABLE): HCPCS | Performed by: ORTHOPAEDIC SURGERY

## 2024-03-21 PROCEDURE — 25010000002 EPINEPHRINE 1 MG/ML SOLUTION 30 ML VIAL: Performed by: ORTHOPAEDIC SURGERY

## 2024-03-21 PROCEDURE — 97161 PT EVAL LOW COMPLEX 20 MIN: CPT

## 2024-03-21 PROCEDURE — 25010000002 ROPIVACAINE PER 1 MG: Performed by: ORTHOPAEDIC SURGERY

## 2024-03-21 PROCEDURE — 25010000002 HYDROMORPHONE PER 4 MG: Performed by: NURSE ANESTHETIST, CERTIFIED REGISTERED

## 2024-03-21 PROCEDURE — A9270 NON-COVERED ITEM OR SERVICE: HCPCS | Performed by: NURSE ANESTHETIST, CERTIFIED REGISTERED

## 2024-03-21 PROCEDURE — 25010000002 VANCOMYCIN 10 G RECONSTITUTED SOLUTION: Performed by: ORTHOPAEDIC SURGERY

## 2024-03-21 PROCEDURE — 25010000002 PROPOFOL 200 MG/20ML EMULSION: Performed by: NURSE ANESTHETIST, CERTIFIED REGISTERED

## 2024-03-21 PROCEDURE — A9270 NON-COVERED ITEM OR SERVICE: HCPCS | Performed by: ORTHOPAEDIC SURGERY

## 2024-03-21 PROCEDURE — 97110 THERAPEUTIC EXERCISES: CPT

## 2024-03-21 PROCEDURE — 25810000003 LACTATED RINGERS PER 1000 ML: Performed by: ANESTHESIOLOGY

## 2024-03-21 PROCEDURE — 25010000002 FENTANYL CITRATE (PF) 100 MCG/2ML SOLUTION: Performed by: NURSE ANESTHETIST, CERTIFIED REGISTERED

## 2024-03-21 PROCEDURE — 25010000002 ROPIVACAINE PER 1 MG: Performed by: ANESTHESIOLOGY

## 2024-03-21 PROCEDURE — 25010000002 MORPHINE PER 10 MG: Performed by: ORTHOPAEDIC SURGERY

## 2024-03-21 PROCEDURE — 25810000003 SODIUM CHLORIDE 0.9 % SOLUTION: Performed by: ORTHOPAEDIC SURGERY

## 2024-03-21 PROCEDURE — 25010000002 CEFAZOLIN PER 500 MG: Performed by: NURSE ANESTHETIST, CERTIFIED REGISTERED

## 2024-03-21 PROCEDURE — 25810000003 LACTATED RINGERS SOLUTION: Performed by: ORTHOPAEDIC SURGERY

## 2024-03-21 PROCEDURE — 25010000002 DEXAMETHASONE PER 1 MG: Performed by: ANESTHESIOLOGY

## 2024-03-21 PROCEDURE — 63710000001 MELOXICAM 15 MG TABLET: Performed by: ORTHOPAEDIC SURGERY

## 2024-03-21 PROCEDURE — 25010000002 GLYCOPYRROLATE 1 MG/5ML SOLUTION: Performed by: NURSE ANESTHETIST, CERTIFIED REGISTERED

## 2024-03-21 PROCEDURE — 82948 REAGENT STRIP/BLOOD GLUCOSE: CPT

## 2024-03-21 PROCEDURE — 63710000001 HYDROCODONE-ACETAMINOPHEN 5-325 MG TABLET: Performed by: NURSE ANESTHETIST, CERTIFIED REGISTERED

## 2024-03-21 PROCEDURE — 25010000002 SUGAMMADEX 200 MG/2ML SOLUTION: Performed by: NURSE ANESTHETIST, CERTIFIED REGISTERED

## 2024-03-21 PROCEDURE — 25010000002 HYDROMORPHONE 1 MG/ML SOLUTION: Performed by: NURSE ANESTHETIST, CERTIFIED REGISTERED

## 2024-03-21 PROCEDURE — 73560 X-RAY EXAM OF KNEE 1 OR 2: CPT

## 2024-03-21 PROCEDURE — 27447 TOTAL KNEE ARTHROPLASTY: CPT | Performed by: ORTHOPAEDIC SURGERY

## 2024-03-21 PROCEDURE — 25010000002 DEXAMETHASONE SODIUM PHOSPHATE 20 MG/5ML SOLUTION: Performed by: NURSE ANESTHETIST, CERTIFIED REGISTERED

## 2024-03-21 PROCEDURE — 63710000001 PREGABALIN 75 MG CAPSULE: Performed by: ORTHOPAEDIC SURGERY

## 2024-03-21 PROCEDURE — 25010000002 ONDANSETRON PER 1 MG: Performed by: NURSE ANESTHETIST, CERTIFIED REGISTERED

## 2024-03-21 PROCEDURE — 25010000002 CEFAZOLIN PER 500 MG: Performed by: ORTHOPAEDIC SURGERY

## 2024-03-21 PROCEDURE — C1713 ANCHOR/SCREW BN/BN,TIS/BN: HCPCS | Performed by: ORTHOPAEDIC SURGERY

## 2024-03-21 PROCEDURE — 27447 TOTAL KNEE ARTHROPLASTY: CPT | Performed by: TECHNICIAN, OTHER

## 2024-03-21 DEVICE — LEGION CRUCIATE RETAINING OXINIUM                                    FEMORAL SIZE 6 RIGHT
Type: IMPLANTABLE DEVICE | Site: KNEE | Status: FUNCTIONAL
Brand: LEGION

## 2024-03-21 DEVICE — CMT BONE PALACOS R HI/VISC 1X40: Type: IMPLANTABLE DEVICE | Site: KNEE | Status: FUNCTIONAL

## 2024-03-21 DEVICE — IMPLANTABLE DEVICE: Type: IMPLANTABLE DEVICE | Status: FUNCTIONAL

## 2024-03-21 DEVICE — GEN II RESURFACING PATELLA 38MM
Type: IMPLANTABLE DEVICE | Site: KNEE | Status: FUNCTIONAL
Brand: GENESIS II

## 2024-03-21 DEVICE — DEV CONTRL TISS STRATAFIX SPIRAL MNCRYL UD 3/0 PLS 30CM: Type: IMPLANTABLE DEVICE | Site: KNEE | Status: FUNCTIONAL

## 2024-03-21 DEVICE — LEGION HIGHLY CROSS LINKED                                    POLYETHYLENE DISHED INSERT SIZE 5-6 11MM
Type: IMPLANTABLE DEVICE | Site: KNEE | Status: FUNCTIONAL
Brand: LEGION

## 2024-03-21 DEVICE — DEV CONTRL TISS STRATAFIX SYMM PDS PLUS VIL CT-1 60CM: Type: IMPLANTABLE DEVICE | Site: KNEE | Status: FUNCTIONAL

## 2024-03-21 DEVICE — GENESIS II NON-POROUS TIBIAL                                    BASEPLATE SIZE 6 RIGHT
Type: IMPLANTABLE DEVICE | Site: KNEE | Status: FUNCTIONAL
Brand: GENESIS II

## 2024-03-21 RX ORDER — HYDROMORPHONE HYDROCHLORIDE 1 MG/ML
0.5 INJECTION, SOLUTION INTRAMUSCULAR; INTRAVENOUS; SUBCUTANEOUS
Status: DISCONTINUED | OUTPATIENT
Start: 2024-03-21 | End: 2024-03-21 | Stop reason: HOSPADM

## 2024-03-21 RX ORDER — DROPERIDOL 2.5 MG/ML
0.62 INJECTION, SOLUTION INTRAMUSCULAR; INTRAVENOUS
Status: DISCONTINUED | OUTPATIENT
Start: 2024-03-21 | End: 2024-03-21 | Stop reason: HOSPADM

## 2024-03-21 RX ORDER — SODIUM CHLORIDE 0.9 % (FLUSH) 0.9 %
3-10 SYRINGE (ML) INJECTION AS NEEDED
Status: DISCONTINUED | OUTPATIENT
Start: 2024-03-21 | End: 2024-03-21 | Stop reason: HOSPADM

## 2024-03-21 RX ORDER — SODIUM CHLORIDE, SODIUM LACTATE, POTASSIUM CHLORIDE, CALCIUM CHLORIDE 600; 310; 30; 20 MG/100ML; MG/100ML; MG/100ML; MG/100ML
9 INJECTION, SOLUTION INTRAVENOUS CONTINUOUS
Status: DISCONTINUED | OUTPATIENT
Start: 2024-03-21 | End: 2024-03-21 | Stop reason: HOSPADM

## 2024-03-21 RX ORDER — EPHEDRINE SULFATE 50 MG/ML
5 INJECTION, SOLUTION INTRAVENOUS ONCE AS NEEDED
Status: DISCONTINUED | OUTPATIENT
Start: 2024-03-21 | End: 2024-03-21 | Stop reason: HOSPADM

## 2024-03-21 RX ORDER — ONDANSETRON 2 MG/ML
4 INJECTION INTRAMUSCULAR; INTRAVENOUS ONCE AS NEEDED
Status: DISCONTINUED | OUTPATIENT
Start: 2024-03-21 | End: 2024-03-21 | Stop reason: HOSPADM

## 2024-03-21 RX ORDER — BENAZEPRIL HYDROCHLORIDE 40 MG/1
TABLET ORAL
Qty: 180 TABLET | Refills: 0 | Status: SHIPPED | OUTPATIENT
Start: 2024-03-21

## 2024-03-21 RX ORDER — FENTANYL CITRATE 50 UG/ML
50 INJECTION, SOLUTION INTRAMUSCULAR; INTRAVENOUS
Status: DISCONTINUED | OUTPATIENT
Start: 2024-03-21 | End: 2024-03-21 | Stop reason: HOSPADM

## 2024-03-21 RX ORDER — PROMETHAZINE HYDROCHLORIDE 25 MG/1
25 SUPPOSITORY RECTAL ONCE AS NEEDED
Status: DISCONTINUED | OUTPATIENT
Start: 2024-03-21 | End: 2024-03-21 | Stop reason: HOSPADM

## 2024-03-21 RX ORDER — ACETAMINOPHEN 500 MG
1000 TABLET ORAL ONCE
Status: COMPLETED | OUTPATIENT
Start: 2024-03-21 | End: 2024-03-21

## 2024-03-21 RX ORDER — DEXAMETHASONE SODIUM PHOSPHATE 4 MG/ML
INJECTION, SOLUTION INTRA-ARTICULAR; INTRALESIONAL; INTRAMUSCULAR; INTRAVENOUS; SOFT TISSUE
Status: COMPLETED | OUTPATIENT
Start: 2024-03-21 | End: 2024-03-21

## 2024-03-21 RX ORDER — PREGABALIN 75 MG/1
150 CAPSULE ORAL ONCE
Status: COMPLETED | OUTPATIENT
Start: 2024-03-21 | End: 2024-03-21

## 2024-03-21 RX ORDER — DIPHENHYDRAMINE HYDROCHLORIDE 50 MG/ML
12.5 INJECTION INTRAMUSCULAR; INTRAVENOUS
Status: DISCONTINUED | OUTPATIENT
Start: 2024-03-21 | End: 2024-03-21 | Stop reason: HOSPADM

## 2024-03-21 RX ORDER — ONDANSETRON 2 MG/ML
INJECTION INTRAMUSCULAR; INTRAVENOUS AS NEEDED
Status: DISCONTINUED | OUTPATIENT
Start: 2024-03-21 | End: 2024-03-21 | Stop reason: SURG

## 2024-03-21 RX ORDER — LABETALOL HYDROCHLORIDE 5 MG/ML
5 INJECTION, SOLUTION INTRAVENOUS
Status: DISCONTINUED | OUTPATIENT
Start: 2024-03-21 | End: 2024-03-21 | Stop reason: HOSPADM

## 2024-03-21 RX ORDER — ROCURONIUM BROMIDE 10 MG/ML
INJECTION, SOLUTION INTRAVENOUS AS NEEDED
Status: DISCONTINUED | OUTPATIENT
Start: 2024-03-21 | End: 2024-03-21 | Stop reason: SURG

## 2024-03-21 RX ORDER — MELOXICAM 15 MG/1
15 TABLET ORAL ONCE
Status: COMPLETED | OUTPATIENT
Start: 2024-03-21 | End: 2024-03-21

## 2024-03-21 RX ORDER — OXYCODONE AND ACETAMINOPHEN 7.5; 325 MG/1; MG/1
1 TABLET ORAL EVERY 4 HOURS PRN
Status: DISCONTINUED | OUTPATIENT
Start: 2024-03-21 | End: 2024-03-21 | Stop reason: HOSPADM

## 2024-03-21 RX ORDER — ASPIRIN 81 MG/1
81 TABLET ORAL 2 TIMES DAILY
Qty: 60 TABLET | Refills: 0 | Status: SHIPPED | OUTPATIENT
Start: 2024-03-21

## 2024-03-21 RX ORDER — LIDOCAINE HYDROCHLORIDE 20 MG/ML
INJECTION, SOLUTION EPIDURAL; INFILTRATION; INTRACAUDAL; PERINEURAL AS NEEDED
Status: DISCONTINUED | OUTPATIENT
Start: 2024-03-21 | End: 2024-03-21 | Stop reason: SURG

## 2024-03-21 RX ORDER — MAGNESIUM HYDROXIDE 1200 MG/15ML
LIQUID ORAL AS NEEDED
Status: DISCONTINUED | OUTPATIENT
Start: 2024-03-21 | End: 2024-03-21 | Stop reason: HOSPADM

## 2024-03-21 RX ORDER — PROPOFOL 10 MG/ML
INJECTION, EMULSION INTRAVENOUS AS NEEDED
Status: DISCONTINUED | OUTPATIENT
Start: 2024-03-21 | End: 2024-03-21 | Stop reason: SURG

## 2024-03-21 RX ORDER — HYDROCODONE BITARTRATE AND ACETAMINOPHEN 5; 325 MG/1; MG/1
1 TABLET ORAL ONCE AS NEEDED
Status: COMPLETED | OUTPATIENT
Start: 2024-03-21 | End: 2024-03-21

## 2024-03-21 RX ORDER — ASPIRIN 81 MG/1
81 TABLET ORAL 2 TIMES DAILY
Status: CANCELLED | OUTPATIENT
Start: 2024-03-22

## 2024-03-21 RX ORDER — ASPIRIN 81 MG/1
81 TABLET ORAL ONCE
Status: CANCELLED | OUTPATIENT
Start: 2024-03-21 | End: 2024-03-21

## 2024-03-21 RX ORDER — HYDROCODONE BITARTRATE AND ACETAMINOPHEN 10; 325 MG/1; MG/1
1 TABLET ORAL EVERY 4 HOURS PRN
Qty: 42 TABLET | Refills: 0 | Status: SHIPPED | OUTPATIENT
Start: 2024-03-21 | End: 2024-03-25 | Stop reason: SDUPTHER

## 2024-03-21 RX ORDER — CHLORHEXIDINE GLUCONATE 500 MG/1
CLOTH TOPICAL TAKE AS DIRECTED
Status: DISCONTINUED | OUTPATIENT
Start: 2024-03-21 | End: 2024-03-21 | Stop reason: HOSPADM

## 2024-03-21 RX ORDER — IPRATROPIUM BROMIDE AND ALBUTEROL SULFATE 2.5; .5 MG/3ML; MG/3ML
3 SOLUTION RESPIRATORY (INHALATION) ONCE AS NEEDED
Status: DISCONTINUED | OUTPATIENT
Start: 2024-03-21 | End: 2024-03-21 | Stop reason: HOSPADM

## 2024-03-21 RX ORDER — PROMETHAZINE HYDROCHLORIDE 25 MG/1
25 TABLET ORAL ONCE AS NEEDED
Status: DISCONTINUED | OUTPATIENT
Start: 2024-03-21 | End: 2024-03-21 | Stop reason: HOSPADM

## 2024-03-21 RX ORDER — SUCCINYLCHOLINE/SOD CL,ISO/PF 200MG/10ML
SYRINGE (ML) INTRAVENOUS AS NEEDED
Status: DISCONTINUED | OUTPATIENT
Start: 2024-03-21 | End: 2024-03-21 | Stop reason: SURG

## 2024-03-21 RX ORDER — TRANEXAMIC ACID 100 MG/ML
INJECTION, SOLUTION INTRAVENOUS AS NEEDED
Status: DISCONTINUED | OUTPATIENT
Start: 2024-03-21 | End: 2024-03-21 | Stop reason: SURG

## 2024-03-21 RX ORDER — GLYCOPYRROLATE 0.2 MG/ML
INJECTION INTRAMUSCULAR; INTRAVENOUS AS NEEDED
Status: DISCONTINUED | OUTPATIENT
Start: 2024-03-21 | End: 2024-03-21 | Stop reason: SURG

## 2024-03-21 RX ORDER — ONDANSETRON 2 MG/ML
4 INJECTION INTRAMUSCULAR; INTRAVENOUS ONCE AS NEEDED
Status: CANCELLED | OUTPATIENT
Start: 2024-03-21

## 2024-03-21 RX ORDER — ROPIVACAINE HYDROCHLORIDE 5 MG/ML
INJECTION, SOLUTION EPIDURAL; INFILTRATION; PERINEURAL
Status: COMPLETED | OUTPATIENT
Start: 2024-03-21 | End: 2024-03-21

## 2024-03-21 RX ORDER — ONDANSETRON 4 MG/1
4 TABLET, ORALLY DISINTEGRATING ORAL ONCE AS NEEDED
Status: CANCELLED | OUTPATIENT
Start: 2024-03-21

## 2024-03-21 RX ORDER — CEFAZOLIN SODIUM 500 MG/2.2ML
INJECTION, POWDER, FOR SOLUTION INTRAMUSCULAR; INTRAVENOUS AS NEEDED
Status: DISCONTINUED | OUTPATIENT
Start: 2024-03-21 | End: 2024-03-21 | Stop reason: SURG

## 2024-03-21 RX ORDER — ONDANSETRON 4 MG/1
4 TABLET, FILM COATED ORAL EVERY 8 HOURS PRN
Qty: 12 TABLET | Refills: 0 | Status: SHIPPED | OUTPATIENT
Start: 2024-03-21

## 2024-03-21 RX ORDER — ACETAMINOPHEN 325 MG/1
650 TABLET ORAL 2 TIMES DAILY PRN
Qty: 60 TABLET | Refills: 0 | Status: SHIPPED | OUTPATIENT
Start: 2024-03-21

## 2024-03-21 RX ORDER — DEXAMETHASONE SODIUM PHOSPHATE 4 MG/ML
INJECTION, SOLUTION INTRA-ARTICULAR; INTRALESIONAL; INTRAMUSCULAR; INTRAVENOUS; SOFT TISSUE AS NEEDED
Status: DISCONTINUED | OUTPATIENT
Start: 2024-03-21 | End: 2024-03-21 | Stop reason: SURG

## 2024-03-21 RX ORDER — KETOROLAC TROMETHAMINE 30 MG/ML
15 INJECTION, SOLUTION INTRAMUSCULAR; INTRAVENOUS ONCE AS NEEDED
Status: CANCELLED | OUTPATIENT
Start: 2024-03-21 | End: 2024-03-22

## 2024-03-21 RX ORDER — FLUMAZENIL 0.1 MG/ML
0.2 INJECTION INTRAVENOUS AS NEEDED
Status: DISCONTINUED | OUTPATIENT
Start: 2024-03-21 | End: 2024-03-21 | Stop reason: HOSPADM

## 2024-03-21 RX ORDER — DOCUSATE SODIUM 100 MG/1
100 CAPSULE, LIQUID FILLED ORAL 2 TIMES DAILY
Qty: 60 CAPSULE | Refills: 0 | Status: SHIPPED | OUTPATIENT
Start: 2024-03-21

## 2024-03-21 RX ORDER — NALOXONE HCL 0.4 MG/ML
0.2 VIAL (ML) INJECTION AS NEEDED
Status: DISCONTINUED | OUTPATIENT
Start: 2024-03-21 | End: 2024-03-21 | Stop reason: HOSPADM

## 2024-03-21 RX ORDER — FENTANYL CITRATE 50 UG/ML
INJECTION, SOLUTION INTRAMUSCULAR; INTRAVENOUS AS NEEDED
Status: DISCONTINUED | OUTPATIENT
Start: 2024-03-21 | End: 2024-03-21 | Stop reason: SURG

## 2024-03-21 RX ORDER — LIDOCAINE HYDROCHLORIDE 10 MG/ML
0.5 INJECTION, SOLUTION INFILTRATION; PERINEURAL ONCE AS NEEDED
Status: DISCONTINUED | OUTPATIENT
Start: 2024-03-21 | End: 2024-03-21 | Stop reason: HOSPADM

## 2024-03-21 RX ORDER — FAMOTIDINE 10 MG/ML
20 INJECTION, SOLUTION INTRAVENOUS ONCE
Status: COMPLETED | OUTPATIENT
Start: 2024-03-21 | End: 2024-03-21

## 2024-03-21 RX ORDER — SODIUM CHLORIDE 0.9 % (FLUSH) 0.9 %
3 SYRINGE (ML) INJECTION EVERY 12 HOURS SCHEDULED
Status: DISCONTINUED | OUTPATIENT
Start: 2024-03-21 | End: 2024-03-21 | Stop reason: HOSPADM

## 2024-03-21 RX ORDER — HYDRALAZINE HYDROCHLORIDE 20 MG/ML
5 INJECTION INTRAMUSCULAR; INTRAVENOUS
Status: DISCONTINUED | OUTPATIENT
Start: 2024-03-21 | End: 2024-03-21 | Stop reason: HOSPADM

## 2024-03-21 RX ADMIN — DEXAMETHASONE SODIUM PHOSPHATE 4 MG: 4 INJECTION, SOLUTION INTRA-ARTICULAR; INTRALESIONAL; INTRAMUSCULAR; INTRAVENOUS; SOFT TISSUE at 11:20

## 2024-03-21 RX ADMIN — PREGABALIN 150 MG: 75 CAPSULE ORAL at 11:03

## 2024-03-21 RX ADMIN — SODIUM CHLORIDE, POTASSIUM CHLORIDE, SODIUM LACTATE AND CALCIUM CHLORIDE 500 ML: 600; 310; 30; 20 INJECTION, SOLUTION INTRAVENOUS at 11:02

## 2024-03-21 RX ADMIN — HYDROMORPHONE HYDROCHLORIDE 0.25 MG: 1 INJECTION, SOLUTION INTRAMUSCULAR; INTRAVENOUS; SUBCUTANEOUS at 13:51

## 2024-03-21 RX ADMIN — ONDANSETRON 4 MG: 2 INJECTION INTRAMUSCULAR; INTRAVENOUS at 13:27

## 2024-03-21 RX ADMIN — PROPOFOL 30 MG: 10 INJECTION, EMULSION INTRAVENOUS at 12:52

## 2024-03-21 RX ADMIN — SODIUM CHLORIDE, POTASSIUM CHLORIDE, SODIUM LACTATE AND CALCIUM CHLORIDE: 600; 310; 30; 20 INJECTION, SOLUTION INTRAVENOUS at 11:54

## 2024-03-21 RX ADMIN — SODIUM CHLORIDE 2 G: 900 INJECTION INTRAVENOUS at 11:54

## 2024-03-21 RX ADMIN — FENTANYL CITRATE 50 MCG: 50 INJECTION, SOLUTION INTRAMUSCULAR; INTRAVENOUS at 12:00

## 2024-03-21 RX ADMIN — ROCURONIUM BROMIDE 45 MG: 10 INJECTION, SOLUTION INTRAVENOUS at 12:05

## 2024-03-21 RX ADMIN — LIDOCAINE HYDROCHLORIDE 100 MG: 20 INJECTION, SOLUTION EPIDURAL; INFILTRATION; INTRACAUDAL; PERINEURAL at 12:01

## 2024-03-21 RX ADMIN — Medication 200 MG: at 12:01

## 2024-03-21 RX ADMIN — FENTANYL CITRATE 25 MCG: 50 INJECTION, SOLUTION INTRAMUSCULAR; INTRAVENOUS at 12:52

## 2024-03-21 RX ADMIN — DEXAMETHASONE SODIUM PHOSPHATE 8 MG: 4 INJECTION, SOLUTION INTRAMUSCULAR; INTRAVENOUS at 12:01

## 2024-03-21 RX ADMIN — HYDROMORPHONE HYDROCHLORIDE 0.25 MG: 1 INJECTION, SOLUTION INTRAMUSCULAR; INTRAVENOUS; SUBCUTANEOUS at 13:57

## 2024-03-21 RX ADMIN — ROCURONIUM BROMIDE 5 MG: 10 INJECTION, SOLUTION INTRAVENOUS at 12:01

## 2024-03-21 RX ADMIN — MELOXICAM 15 MG: 15 TABLET ORAL at 11:03

## 2024-03-21 RX ADMIN — HYDROMORPHONE HYDROCHLORIDE 0.5 MG: 1 INJECTION, SOLUTION INTRAMUSCULAR; INTRAVENOUS; SUBCUTANEOUS at 14:48

## 2024-03-21 RX ADMIN — PROPOFOL 30 MG: 10 INJECTION, EMULSION INTRAVENOUS at 12:40

## 2024-03-21 RX ADMIN — GLYCOPYRROLATE 0.2 MCG: 0.2 INJECTION, SOLUTION INTRAMUSCULAR; INTRAVENOUS at 12:05

## 2024-03-21 RX ADMIN — SUGAMMADEX 300 MG: 100 INJECTION, SOLUTION INTRAVENOUS at 13:30

## 2024-03-21 RX ADMIN — ROPIVACAINE HYDROCHLORIDE 15 ML: 5 INJECTION EPIDURAL; INFILTRATION; PERINEURAL at 11:20

## 2024-03-21 RX ADMIN — FAMOTIDINE 20 MG: 10 INJECTION INTRAVENOUS at 11:03

## 2024-03-21 RX ADMIN — SODIUM CHLORIDE 1750 MG: 9 INJECTION, SOLUTION INTRAVENOUS at 11:02

## 2024-03-21 RX ADMIN — GLYCOPYRROLATE 0.2 MCG: 0.2 INJECTION, SOLUTION INTRAMUSCULAR; INTRAVENOUS at 12:17

## 2024-03-21 RX ADMIN — ACETAMINOPHEN 1000 MG: 500 TABLET ORAL at 11:03

## 2024-03-21 RX ADMIN — PROPOFOL 170 MG: 10 INJECTION, EMULSION INTRAVENOUS at 12:01

## 2024-03-21 RX ADMIN — TRANEXAMIC ACID 1000 MG: 100 INJECTION INTRAVENOUS at 13:26

## 2024-03-21 RX ADMIN — HYDROCODONE BITARTRATE AND ACETAMINOPHEN 1 TABLET: 5; 325 TABLET ORAL at 14:46

## 2024-03-21 RX ADMIN — FENTANYL CITRATE 25 MCG: 50 INJECTION, SOLUTION INTRAMUSCULAR; INTRAVENOUS at 12:40

## 2024-03-21 RX ADMIN — PROPOFOL 30 MG: 10 INJECTION, EMULSION INTRAVENOUS at 12:32

## 2024-03-21 RX ADMIN — CEFAZOLIN 1 G: 225 INJECTION, POWDER, FOR SOLUTION INTRAMUSCULAR; INTRAVENOUS at 12:07

## 2024-03-21 NOTE — PLAN OF CARE
Goal Outcome Evaluation:  Plan of Care Reviewed With: patient, spouse           Outcome Evaluation: Patient is a 73 y.o male POD0 R TKA. Patient lives at home with his wife with 4 EL. Patient reports need for rwx for home. Rwx delivered to room. Patient educated in and performed TKA post op protocol exercises. Patient performed STS from chair with CGA and VCs for hand placement. Patient ambulated 120ft with rwx and CGA. Gait slow and mildly antalgic but steady with no overt LOB noted. Patient planning to d/c home today with assist and HHPT follow up. Acute PT will sign off.      Anticipated Discharge Disposition (PT): home with assist, home with home health

## 2024-03-21 NOTE — DISCHARGE INSTRUCTIONS
What to expect after a Nerve Block    Nerve blocks administered to block pain affect many types of nerves, including those nerves that control movement, pain, and normal sensation. Following a nerve block, you may notice some bruising at the site where the block was given. You may experience sensations such as: numbness of the affected area or limb, tingling, heaviness (that is the limb feels heavy to you), weakness or inability to move the affected arm or leg, or a feeling as if your arm or leg has “fallen asleep.”     A nerve block can last from 2 to 36 hours depending on the medications used.  Usually the weakness wears off first followed by the tingling and heaviness. As the block wears off, you may begin to notice pain; however, this sequence of events may occur in any order. Typically, you will be able to move your limb before you will feel it. Once a nerve block begins to wear off, the effects are usually completely gone within 60 minutes.  If you experience continued side effects that you believe are block related for longer than 48 hours, please call your healthcare provider. Please see block-specific instructions below.    Instructions for any Block involving the leg/foot:   If you have had a leg /foot block, you should not bear weight on the affected leg until the block has worn off. After the block has worn off, weight bearing should be as directed by your surgeon. You may be sent home with crutches. You are at high risk for falling because of the anesthetic effects on your leg. Please use caution when standing or trying to move or walk. Have someone assist you until your leg and foot function have returned to normal.     Protection of a “blocked” limb  After a nerve block, you cannot feel pain, pressure, or extremes of temperature in the affected limb. And because of this, your blocked limb is at more risk for injury. For example, it is possible to burn your limb on an extremely hot surface without  feeling it.     When resting, it is important to reposition your limb periodically to avoid prolonged pressure on it. This may require the use of pillows and padding.    While sleeping, you should avoid rolling onto the affected limb or putting too much pressure on it.     If you have a cast or tight dressing, check the color of your fingers or toes of the affected limb. Call your surgeon if they look discolored (that is, dusky, dark colored).    Use caution in cold weather. Cover your limb appropriately to protect it from the cold.    Pain Management:  Your surgeon will give you a prescription for pain medication. Begin taking this before the nerve block wears off. Bear in mind that sometimes the block can wear off in the middle of the night.      ****NEXT DOSE OF PAIN MEDICATION CAN BE GIVEN AT 7:00 PM

## 2024-03-21 NOTE — OP NOTE
Orthopaedic Operative Note    Facility: [facility]    Patient: Jeb Olson    Medical Record Number: [id number]    YOB: 1950    Dictating Surgeon: Chepe Alicea M.D.*    Primary Care Physician: Tera Salvador MD    Date of Operation: 3/21/2024    Pre-Operative Diagnosis:  Right knee end-stage osteoarthritis    Post-Operative Diagnosis:  Right knee end-stage osteoarthritis    Procedure Performed:   Right total knee arthroplasty    Surgeon: Chepe Alicea MD     Assistant: Melodie Lutz whose assistance was critical for help with patient positioning, suctioning and irrigation, retraction, manipulation of the extremity for insertion of the implants, wound closure and application of the bandages.  Her assistance was critical to the success of this case.      Anesthesia: Regional followed by general.  Local administration of Ortho cocktail solution.    Complications: None.     Estimated Blood Loss: Less than 50 mL.     Implants:     1.  Smith [employer] Nephew size 6 Legion Oxinium femoral component  2.  Smith and Nephew size 6 tibial component with size 11 deep dish polyethylene liner  3.  Smith [employer] Nephew size 38 mm patellar component    Specimens: * No orders in the log *    Brief Operative Indication:  Mr. Olson has a history of worsening knee right osteoarthritis which had been refractory to prolonged conservative treatment.  The risk, benefits and alternatives to a right total knee arthroplasty were discussed in detail.  He had already been through a left total knee arthroplasty and had a good understanding of this information.  He had no questions and consented to proceed.    Description of the procedure in detail: The patient and operative site were identified in the preoperative holding area.  The surgical site was marked.  Adequate regional anesthesia of the right lower extremity was administered. He was then taken to the operating room.  Adequate general anesthesia  was administered. He was then repositioned on the operating table in the supine position.  A timeout was taken and preoperative antibiotics administered.    The right lower extremity was prepped and draped in the standard, sterile fashion.  I began by cleaning the extremity with an alcohol solution.  A Hibiclens scrub was performed.  The extremity was then prepped with 2 ChloraPreps.  I allowed those to dry for 3 minutes before the draping procedure was carried out.  The leg was exsanguinated with an Esmarch bandage.  The tourniquet was inflated to 250 mmHg.  The leg was positioned at approximately 60 degrees of flexion across the knee in a DeMayo leg positioner.    I fashioned an approximately 8 cm incision anteriorly for a standard medial parapatellar approach.  Full-thickness medial and lateral skin flaps were developed.  The extensor mechanism was carefully exposed.  I performed a medial parapatellar arthrotomy, careful to maintain a cuff of tendinous tissue for later anatomic repair.  The joint was entered.  The infrapatellar fat pad was carefully removed.    Next, the anteromedial soft tissues were carefully elevated off of the anterior face of the tibia.  The MCL was kept protected at all times.  At this point, the joint was inspected.  There was marked arthrosis throughout the joint.  The periarticular osteophytes were carefully removed with a rongeur.    An opening was created in the distal femur.  The wound was irrigated out and then the distal femur alignment radha was inserted down the canal.  A 5 degree valgus distal femoral cutting guide was pinned into position and then the distal femoral cut carried out in the typical fashion.  I inspected and measured to make sure the cut was appropriate.  The cut portion of bone was removed followed by the guide.    Next, the knee was flexed up further to allow for insertion of the posterior referencing guide.  I measured the distal femur.  I determined the  appropriate size as referenced off of the posterior condyles.  The femur measured a size 6.  His other side was a 5 but the 6 fit best on the right.  The guide was positioned, taking care to align this properly and then the anterior, posterior and chamfer cuts were carried out.  The cut portions of bone were then removed.      I then directed my attention to the tibia.  Retractors were positioned to keep the collateral ligaments, PCL and posterior neurovascular structures protected.  The extramedullary guide was positioned.  I took care to align the radha with the anterior face of the tibia.  I made sure that this was parallel and that the guide was centered at the knee and ankle.  I measured and carefully positioned the guide to allow for correction of the preoperative deformity.  I pinned the guide and then checked the alignment one more time with an elsie wing.  Once we had the guide in good position and secure, an oscillating saw was used to carry out the proximal tibia cut.  The cut portion of bone was removed and the PCL inspected.  The PCL was intact and stable.  I did not consider that a cruciate substituting implant was necessary in this case.  The menisci were removed and the posterior capsule was infiltrated with some of the Ortho cocktail solution.    Next, I measured the proximal tibia cut.  This measured a size 6 which matched his other side.  The trial implant was pinned into position, taking care to maintain appropriate rotation.  The proximal tibial preparations were then completed.  I then trialed with a size 6 femur and size 6 tibia.  The knee seemed to be well balanced and demonstrated excellent motion with a size 11 deep dish trial polyethylene liner.    I then examined the patella.  The patella demonstrated extensive arthrosis.  I determined that resurfacing was indicated.  The patellar preparations were carried out at this time and then I trialed with a size 38 patella.  With this implant in  place, the patella tracked well.  A lateral release was deemed unnecessary in this case.    The final preparations were then completed.  The distal femur was prepared and then the trial implants were removed.  The appropriate size implants were opened at this point.  My assistant mixed the bone cement on the back table using current generation cement mixing technique and a centrifuge.  Once the cement was prepared, cement was applied to the bony surfaces and implants.  The implants were carefully impacted into position.  I made sure that these were fully seated.  The excess, extruded cement was carefully removed with a [place] elevator.  The knee was taken out into full extension and the trial polyethylene liner inserted.  The patella was then clamped into position.  Again, the excess, extruded cement was removed.  The knee was left in extension with the patella clamped until the cement had fully cured.    While the cement was curing, the periarticular soft tissue structures were carefully infiltrated with the Ortho cocktail solution.  Once the cement had fully cured, I again checked the balancing of the knee.  Again, the knee demonstrated excellent motion and stability with the 11 deep dish trial liner.  The trial was removed.  The final implant was impacted into position.  I took care to make sure that the dovetails were fully interdigitated.  Again the knee was carried through range of motion.  The patella tracked well and the knee demonstrated excellent motion and stability.    The wound was irrigated with 500 cc of a Betadine containing saline solution.  This was left in place for 3 minutes.  I then irrigated with 3 L of sterile saline via pulsatile lavage.  The tourniquet was deflated.  A gram of transexamic acid was administered.  I made sure that we had good hemostasis.  The parapatellar arthrotomy was anatomically repaired using a PDS Stratafix suture and multiple #1 Vicryl sutures.  The subcutaneous tissues  were repaired using 2-0 Vicryl.  A running Stratafix Monocryl suture was used to close the skin followed by a Zipline adhesive.  Sterile dressings were applied.  The drapes were withdrawn. He was awakened and taken to the recovery room in good condition.    Chepe Alicea MD  03/21/24

## 2024-03-21 NOTE — BRIEF OP NOTE
TOTAL KNEE ARTHROPLASTY  Progress Note    Jeb Olson  3/21/2024    Pre-op Diagnosis:   Arthritis of knee [M17.10]       Post-Op Diagnosis Codes:     * Arthritis of knee [M17.10]    Procedure/CPT® Codes:        Procedure(s):  TOTAL KNEE ARTHROPLASTY    Surgical Approach: Knee Medial Parapatellar            Surgeon(s):  Chepe Alicea MD    Anesthesia: General with Block    Staff:   Circulator: Unique Michelle RN; Viktor Marie RN; Guido Stone RN  Scrub Person: Marika Perez  Vendor Representative: Gary Larios  Assistant: Melodie Lutz CSA  Assistant: Melodie Lutz CSA      Estimated Blood Loss: 100 mL    Urine Voided: * No values recorded between 3/21/2024 11:54 AM and 3/21/2024  1:31 PM *    Specimens:                None          Drains: * No LDAs found *    Findings: see dictation        Complications: none    Assistant: Melodie Lutz CSA  was responsible for performing the following activities: Retraction and their skilled assistance was necessary for the success of this case.    Chepe Alicea MD     Date: 3/21/2024  Time: 13:48 EDT

## 2024-03-21 NOTE — ANESTHESIA PROCEDURE NOTES
Airway  Urgency: elective    Date/Time: 3/21/2024 12:04 PM  Difficult airway (prior known difficult airway)    General Information and Staff    Patient location during procedure: OR  Anesthesiologist: Rosa M Chao MD  CRNA/CAA: Taylor Figueroa CRNA    Indications and Patient Condition  Indications for airway management: airway protection    Preoxygenated: yes  MILS not maintained throughout  Mask difficulty assessment: 0 - not attempted    Final Airway Details  Final airway type: endotracheal airway      Successful airway: ETT  Cuffed: yes   Successful intubation technique: video laryngoscopy  Facilitating devices/methods: intubating stylet  Endotracheal tube insertion site: oral  Blade: CMAC  Blade size: D  ETT size (mm): 8.0  Cormack-Lehane Classification: grade I - full view of glottis (with CMAC)  Placement verified by: chest auscultation and capnometry   Measured from: lips  ETT/EBT  to lips (cm): 23  Number of attempts at approach: 1  Assessment: small pinch on mid lower lip

## 2024-03-21 NOTE — ANESTHESIA PROCEDURE NOTES
Adductor canal block      Patient reassessed immediately prior to procedure    Patient location during procedure: pre-op  Start time: 3/21/2024 11:17 AM  Stop time: 3/21/2024 11:19 AM  Reason for block: at surgeon's request and post-op pain management  Performed by  Anesthesiologist: Rosa M Chao MD  Preanesthetic Checklist  Completed: patient identified, IV checked, site marked, risks and benefits discussed, surgical consent, monitors and equipment checked, pre-op evaluation and timeout performed  Prep:  Pt Position: sitting  Sterile barriers:cap, gloves and mask  Prep: ChloraPrep  Patient monitoring: blood pressure monitoring, continuous pulse oximetry and EKG  Procedure    Guidance:ultrasound guided    ULTRASOUND INTERPRETATION.  Using ultrasound guidance a 21 G gauge needle was placed in close proximity to the nerve, at which point, under ultrasound guidance anesthetic was injected in the area of the nerve and spread of the anesthesia was seen on ultrasound in close proximity thereto.  There were no abnormalities seen on ultrasound; a digital image was taken; and the patient tolerated the procedure with no complications. Images:still images obtained, printed/placed on chart    Laterality:right  Block Type:adductor canal block  Injection Technique:single-shot  Needle Type:short-bevel and echogenic  Needle Gauge:21 G  Resistance on Injection: none    Medications Used: ropivacaine (NAROPIN) 0.5 % injection - Injection   15 mL - 3/21/2024 11:20:00 AM  dexamethasone (DECADRON) injection - Injection   4 mg - 3/21/2024 11:20:00 AM      Medications  Comment:Ultrasound Interpretation:  Using ultrasound guidance the needle was placed in close proximity to the target nerve and anesthetic was injected in the area of the target nerve and/or bundles, and spread of the anesthetic was seen on ultrasound in close proximity thereto.  There were no abnormalities seen on ultrasound; a digital image was taken; and the  patient tolerated the procedure with no complications.    Block placed for postoperative pain control per surgeon request.       Post Assessment  Injection Assessment: negative aspiration for heme, no paresthesia on injection and incremental injection  Patient Tolerance:comfortable throughout block  Complications:no

## 2024-03-21 NOTE — PROGRESS NOTES
[facility] given referral for home PT needs.  Spoke with wife, verified all info.  States had HH in 2020 for the other knee.  Is agreeable for our services and is hopeful for same day D/C which is the tentative plan.  All info correct and best to call patient's mobile listed to schedule visits.  Call wife if any issues reaching patient.

## 2024-03-21 NOTE — ANESTHESIA PREPROCEDURE EVALUATION
Anesthesia Evaluation     Patient summary reviewed and Nursing notes reviewed   history of anesthetic complications:  difficult airway  NPO Solid Status: > 8 hours  NPO Liquid Status: > 2 hours           Airway   Mallampati: III  TM distance: >3 FB  Neck ROM: full  Difficult intubation highly probable  Dental - normal exam     Pulmonary - normal exam   (+) ,sleep apnea on CPAP  Cardiovascular - normal exam    ECG reviewed  Patient on routine beta blocker and Beta blocker given within 24 hours of surgery    (+) hypertension, valvular problems/murmurs (mild x 2) MR and TI, CAD (s/p stent), cardiac stents within the past 12 months , dysrhythmias Bradycardia, hyperlipidemia      Neuro/Psych  (+) psychiatric history Depression and Anxiety  GI/Hepatic/Renal/Endo    (+) obesity, diabetes mellitus type 2    Musculoskeletal     Abdominal    Substance History      OB/GYN          Other   arthritis, blood dyscrasia (11.5) anemia,     ROS/Med Hx Other: First attempt with MAC 4 blade and grade 3 view. Successful with the CMAC with a grade IIa view.     Status post distal LAD PCI September 22.                 Anesthesia Plan    ASA 3     general     (With PNB for POPC PSR    I have reviewed the patient's history and chart with the patient, including all pertinent laboratory results and imaging. I have explained the risks of anesthesia including but not limited to dental damage, corneal abrasion, nerve injury, MI, stroke, aspiration, and death. Patient has agreed to proceed.      Risks of peripheral nerve block were discussed with patient including but not limited to: inadequate block, bleeding, infection, persistent numbness or weakness, nerve damage, painful dysasthesia and need to protect limb while numb.        /67 (BP Location: Left arm, Patient Position: Sitting)   Pulse 87   Temp 36.3 °C (97.3 °F) (Oral)   Resp 18   SpO2 94%   )  intravenous induction     Anesthetic plan, risks, benefits, and alternatives have  been provided, discussed and informed consent has been obtained with: patient.      CODE STATUS:

## 2024-03-21 NOTE — ANESTHESIA POSTPROCEDURE EVALUATION
Patient: Jeb Olson    Procedure Summary       Date: 03/21/24 Room / Location:  [facility]  [facility]  [facility]    Anesthesia Start: 1154 Anesthesia Stop: 1417    Procedure: TOTAL KNEE ARTHROPLASTY (Right: Knee) Diagnosis:       Arthritis of knee      (Arthritis of knee [M17.10])    Surgeons: Chepe Alicea MD Provider: Rosa M Chao MD    Anesthesia Type: general ASA Status: 3            Anesthesia Type: general    Vitals  Vitals Value Taken Time   /68 03/21/24 1502   Temp 36.8 °C (98.3 °F) 03/21/24 1502   Pulse 64 03/21/24 1512   Resp 16 03/21/24 1502   SpO2 99 % 03/21/24 1512   Vitals shown include unfiled device data.        Post Anesthesia Care and Evaluation    Patient location during evaluation: bedside  Patient participation: complete - patient participated  Level of consciousness: awake and alert  Pain management: adequate    Airway patency: patent  Anesthetic complications: No anesthetic complications  PONV Status: controlled  Cardiovascular status: acceptable  Respiratory status: acceptable  Hydration status: acceptable

## 2024-03-21 NOTE — CASE MANAGEMENT/SOCIAL WORK
Continued Stay Note  [facility]     Patient Name: Jeb Olson  MRN: [id number]  Today's Date: 3/21/2024    Admit Date: 3/21/2024    Plan: Home with [facility]    Discharge Plan       Row Name 03/21/24 1215       Plan    Plan Home with [facility]     Patient/Family in Agreement with Plan yes    Provided Post Acute Provider List? Yes    Post Acute Provider List Home Health    Delivered To Patient                   Discharge Codes    No documentation.                       Shannon Epley, RN

## 2024-03-21 NOTE — THERAPY EVALUATION
Patient Name: Jeb Olson  : 1950    MRN: [id number]                              Today's Date: 3/21/2024       Admit Date: 3/21/2024    Visit Dx:     ICD-10-CM ICD-9-CM   1. H/O total knee replacement, right  Z96.651 V43.65   2. Arthritis of knee  M17.10 716.96     Patient Active Problem List   Diagnosis    Allergic rhinitis    Arthritis of knee, left    Diabetes mellitus    Essential hypertension    Hyperlipidemia    High risk medication use    Obesity    Primary osteoarthritis of knee    Mild chronic anemia    Obstructive sleep apnea    Arthritis of left knee    Sinus bradycardia    Abnormal stress test    Colon polyp    Family history of colonic polyps    Reactive depression    Arthritis of knee, right    Arthritis of knee     Past Medical History:   Diagnosis Date    Allergic Have had for several years    Eyes and nasal issues    Anemia     Anxiety Since about     Since I was taking of my Dad, who also passed away 3yrs ago.    Arthritis 2002    Both knees, hips, and shoulders    Arthritis of knee, left     Cataract 2019    Colon polyp 2017    Coronary artery disease     stent    Diabetes mellitus     Dry eyes     Essential hypertension     HL (hearing loss)     no hearing aides    Hyperlipemia     Mild chronic anemia     Obesity     Sleep apnea     cpap     Past Surgical History:   Procedure Laterality Date    ACHILLES TENDON REPAIR Left     CARDIAC CATHETERIZATION      CARDIAC CATHETERIZATION N/A 2022    Procedure: Coronary angiography, Left heart catheterization,;  Surgeon: Bruna Chávez MD;  Location:  [facility];  Service: Cardiology;  Laterality: N/A;    CARDIAC CATHETERIZATION N/A 2022    Procedure: Stent PRAVIN coronary;  Surgeon: Bruna Chávez MD;  Location:  [facility];  Service: Cardiology;  Laterality: N/A;    CATARACT EXTRACTION EXTRACAPSULAR W/ INTRAOCULAR LENS IMPLANTATION Bilateral     COLONOSCOPY      Dr Reyes 6 years ago     Patient reports falling down her steps today because she had to more steps, but thought she was at the bottom. Patient reporting pain in left knee with some swelling starting tonight. Patient reports falling at about 7PM, but the pain and swelling got worse. Patient has no other complaints at this time. Patient updated on plan of care and call bell within reach. ENDOSCOPY      TONSILLECTOMY      As a child    TOTAL KNEE ARTHROPLASTY Left 03/12/2020    Procedure: TOTAL KNEE ARTHROPLASTY;  Surgeon: Chepe Alicea MD;  Location: [facility] MAIN OR;  Service: Orthopedics;  Laterality: Left;      General Information       Row Name 03/21/24 1557          Physical Therapy Time and Intention    Document Type evaluation;discharge evaluation/summary  -     Mode of Treatment individual therapy;physical therapy  -       Row Name 03/21/24 1557          General Information    Patient Profile Reviewed yes  -     Prior Level of Function independent:  -       Row Name 03/21/24 1557          Living Environment    People in Home spouse  -       Row Name 03/21/24 1557          Home Main Entrance    Number of Stairs, Main Entrance four  -       Row Name 03/21/24 1557          Cognition    Orientation Status (Cognition) oriented x 4  -       Row Name 03/21/24 1557          Safety Issues, Functional Mobility    Impairments Affecting Function (Mobility) range of motion (ROM);endurance/activity tolerance;strength;pain  -               User Key  (r) = Recorded By, (t) = Taken By, (c) = Cosigned By      Initials Name Provider Type     Destiney Simpson, PT Physical Therapist                   Mobility       Row Name 03/21/24 1557          Bed Mobility    Comment, (Bed Mobility) Patient UIC  -       Row Name 03/21/24 1557          Sit-Stand Transfer    Sit-Stand Treece (Transfers) contact guard;verbal cues  -     Assistive Device (Sit-Stand Transfers) walker, front-wheeled  -       Row Name 03/21/24 1557          Gait/Stairs (Locomotion)    Treece Level (Gait) contact guard  -     Assistive Device (Gait) walker, front-wheeled  -     Distance in Feet (Gait) 120  -     Pattern (Gait) step-through  -     Deviations/Abnormal Patterns (Gait) antalgic;gait speed decreased;noe decreased  -     Gait Assessment/Intervention Gait slow and mildly antalgic but  steady with no overt LOB noted.  -     Stairs Assessment/Intervention Stair mechanics reviewed  -               User Key  (r) = Recorded By, (t) = Taken By, (c) = Cosigned By      Initials Name Provider Type     Destiney Simpson PT Physical Therapist                   Obj/Interventions       Row Name 03/21/24 1558          Range of Motion Comprehensive    Comment, General Range of Motion R knee flexion 5-90 degrees  -[facility] Name 03/21/24 1558          Strength Comprehensive (MMT)    General Manual Muscle Testing (MMT) Assessment lower extremity strength deficits identified  -     Comment, General Manual Muscle Testing (MMT) Assessment Generalized post op weakness  -       Row Name 03/21/24 1558          Motor Skills    Therapeutic Exercise other (see comments)  TKA post op protocol x10  -[facility] Name 03/21/24 1558          Balance    Balance Assessment sitting static balance;sitting dynamic balance;sit to stand dynamic balance;standing static balance;standing dynamic balance  -     Static Sitting Balance independent  -     Dynamic Sitting Balance modified independence  -     Position, Sitting Balance sitting in chair  -     Sit to Stand Dynamic Balance contact guard;verbal cues  -     Static Standing Balance standby assist  -     Dynamic Standing Balance standby assist;contact guard  -     Position/Device Used, Standing Balance supported;walker, front-wheeled  -     Balance Interventions sitting;standing;sit to stand;supported;static;dynamic  -               User Key  (r) = Recorded By, (t) = Taken By, (c) = Cosigned By      Initials Name Provider Type     Destiney Simpson PT Physical Therapist                   Goals/Plan    No documentation.                  Clinical Impression       [facility] Name 03/21/24 1559          Pain    Pretreatment Pain Rating 3/10  -     Posttreatment Pain Rating 4/10  -     Pain Location - Side/Orientation Right  -     Pain Location - knee   -     Pain Intervention(s) Rest;Repositioned;Ambulation/increased activity  -       Row Name 03/21/24 1556          Plan of Care Review    Plan of Care Reviewed With patient;spouse  -     Outcome Evaluation Patient is a 73 y.o male POD0 R TKA. Patient lives at home with his wife with 4 EL. Patient reports need for rwx for home. Rwx delivered to room. Patient educated in and performed TKA post op protocol exercises. Patient performed STS from chair with CGA and VCs for hand placement. Patient ambulated 120ft with rwx and CGA. Gait slow and mildly antalgic but steady with no overt LOB noted. Patient planning to d/c home today with assist and HHPT follow up. Acute PT will sign off.  -       Row Name 03/21/24 3470          Therapy Assessment/Plan (PT)    Therapy Frequency (PT) evaluation only  -       Row Name 03/21/24 1551          Vital Signs    O2 Delivery Pre Treatment room air  -     O2 Delivery Intra Treatment room air  -     O2 Delivery Post Treatment room air  -     Pre Patient Position Sitting  -     Intra Patient Position Standing  -     Post Patient Position Sitting  -       Row Name 03/21/24 0671          Positioning and Restraints    Pre-Treatment Position sitting in chair/recliner  -     Post Treatment Position chair  -SM     In Chair notified nsg;reclined;call light within reach;encouraged to call for assist;with family/caregiver  -               User Key  (r) = Recorded By, (t) = Taken By, (c) = Cosigned By      Initials Name Provider Type     Destiney Simpson, PT Physical Therapist                   Outcome Measures       Row Name 03/21/24 4466          How much help from another person do you currently need...    Turning from your back to your side while in flat bed without using bedrails? 4  -SM     Moving from lying on back to sitting on the side of a flat bed without bedrails? 4  -SM     Moving to and from a bed to a chair (including a wheelchair)? 4  -SM     Standing  up from a chair using your arms (e.g., wheelchair, bedside chair)? 4  -SM     Climbing 3-5 steps with a railing? 3  -SM     To walk in hospital room? 4  -SM     AM-PAC 6 Clicks Score (PT) 23  -     Highest Level of Mobility Goal 7 --> Walk 25 feet or more  -       Row Name 03/21/24 1601          Functional Assessment    Outcome Measure Options AM-PAC 6 Clicks Basic Mobility (PT)  -               User Key  (r) = Recorded By, (t) = Taken By, (c) = Cosigned By      Initials Name Provider Type     Destiney Simpson PT Physical Therapist                                 Physical Therapy Education       Title: PT OT SLP Therapies (Done)       Topic: Physical Therapy (Done)       Point: Mobility training (Done)       Learning Progress Summary             Patient Acceptance, E, VU by  at 3/21/2024 1602                         Point: Home exercise program (Done)       Learning Progress Summary             Patient Acceptance, E, VU by  at 3/21/2024 1602                         Point: Body mechanics (Done)       Learning Progress Summary             Patient Acceptance, E, VU by  at 3/21/2024 1602                         Point: Precautions (Done)       Learning Progress Summary             Patient Acceptance, E, VU by  at 3/21/2024 1602                                         User Key       Initials Effective Dates Name Provider Type Discipline     05/02/22 -  Destiney Simpson PT Physical Therapist PT                  PT Recommendation and Plan     Plan of Care Reviewed With: patient, spouse  Outcome Evaluation: Patient is a 73 y.o male POD0 R TKA. Patient lives at home with his wife with 4 EL. Patient reports need for rwx for home. Rwx delivered to room. Patient educated in and performed TKA post op protocol exercises. Patient performed STS from chair with CGA and VCs for hand placement. Patient ambulated 120ft with rwx and CGA. Gait slow and mildly antalgic but steady with no overt LOB noted. Patient  planning to d/c home today with assist and HHPT follow up. Acute PT will sign off.     Time Calculation:         PT Charges       Row Name 03/21/24 1602             Time Calculation    Start Time 1544  -SM      Stop Time 1555  -SM      Time Calculation (min) 11 min  -SM      PT Received On 03/21/24  -         Time Calculation- PT    Total Timed Code Minutes- PT 8 minute(s)  -SM         Timed Charges    56612 - PT Therapeutic Exercise Minutes 8  -SM         Total Minutes    Timed Charges Total Minutes 8  -SM       Total Minutes 8  -SM                User Key  (r) = Recorded By, (t) = Taken By, (c) = Cosigned By      Initials Name Provider Type     Destiney Simpson, PATTIE Physical Therapist                  Therapy Charges for Today       Code Description Service Date Service Provider Modifiers Qty    00231459583 HC PT THER PROC EA 15 MIN 3/21/2024 Destiney Simpson, PT GP 1    19813288697 HC PT EVAL LOW COMPLEXITY 2 3/21/2024 Destiney Simpson, PT GP 1            PT G-Codes  Outcome Measure Options: AM-PAC 6 Clicks Basic Mobility (PT)  AM-PAC 6 Clicks Score (PT): 23  PT Discharge Summary  Anticipated Discharge Disposition (PT): home with assist, home with home health    Destiney Simpson PT  3/21/2024

## 2024-03-21 NOTE — DISCHARGE PLACEMENT REQUEST
"Blayne Olson \"Rosas\" (73 y.o. Male)       Date of Birth   1950    Social Security Number       Address   [address] [id number]    Home Phone       MRN   [id number]       [employer]    Marital Status                               Admission Date   3/21/24    Admission Type   Elective    Admitting Provider   Chepe Alicea MD    Attending Provider   Chepe Alicea MD    Department, Room/Bed   [facility], [facility]       Discharge Date       Discharge Disposition       Discharge Destination                                 Attending Provider: Chepe Alicea MD    Allergies: No Known Allergies    Isolation: None   Infection: None   Code Status: Prior    Ht: 188 cm (74\")   Wt: 120 kg (264 lb)    Admission Cmt: None   Principal Problem: Arthritis of knee [M17.10]                   Active Insurance as of 3/21/2024       Primary Coverage       Payor Plan Insurance Group Employer/Plan Group    ANTHEM MEDICARE REPLACEMENT ANTHEM MEDICARE ADVANTAGE [id number]       Payor Plan Address Payor Plan Phone Number Payor Plan Fax Number Effective Dates     [address] [phone]  1/1/2024 - None Entered    [facility] [id number]         Subscriber Name Subscriber Birth Date Member ID       BLAYNE OLSON 1950 [id number]                     Emergency Contacts        (Rel.) Home Phone Work Phone Mobile Phone    Precious Olson (Spouse) [phone] -- [phone]                "

## 2024-03-22 ENCOUNTER — HOME CARE VISIT (OUTPATIENT)
Dept: HOME HEALTH SERVICES | Facility: HOME HEALTHCARE | Age: 74
End: 2024-03-22
Payer: COMMERCIAL

## 2024-03-22 VITALS
HEART RATE: 61 BPM | OXYGEN SATURATION: 94 % | SYSTOLIC BLOOD PRESSURE: 150 MMHG | DIASTOLIC BLOOD PRESSURE: 72 MMHG | RESPIRATION RATE: 16 BRPM | TEMPERATURE: 98.6 F

## 2024-03-22 PROCEDURE — G0151 HHCP-SERV OF PT,EA 15 MIN: HCPCS

## 2024-03-22 NOTE — HOME HEALTH
REASON FOR REFERRAL:  decreased ability to ambulate in and out of the home following recent hospital stay for R TKA by Dr. Alicea on 3/21/24 resulting in functional decline and LE weakness. FOC: RTKA    MEDICAL HISTORY: Anemia, Anxiety, Arthritis, Coronary artery disease, Diabetes mellitus, HTN, Hyperlipemia, Mild chronic anemia, Obesity, Sleep apnea.    SKILLED PHYSICAL THERAPY IS MEDICALLY NECESSARY FOR: Instruction/education in lower extremity strengthening HEP, bed mobility/transfers training, gait training, balance training, fall prevention, and activity tolerance training to enable patient to safely exit home for medical appointments due to R TKA.    WBAT R LE    OLIVER dressing over knee surgical incision to remain on and intact until follow up visit with surgeon, but if dressing becomes soiled or dislodged it should be changed with non-adherent dressing.    Patient lives at home with spouse, there are 4 steps with 2 rails to enter home from back door.  Patient demonstrated the ability to ambulate for 60 feet with rolling walker and SBA with antalgic gait pattern due to R knee pain.  Patient demonstrated the ability to perform bed mobility with supervision and transfers with SBA.  Sitting AROM R knee extension lag of 17 degrees and knee flexion to 74 degrees.  Patient was strated on sitting and supine HEP with handout provided.  Frequency: 1w1, 3w2.    Plan for next visit:  -transfer training  -gait training  -education on HEP with progressions as appropriate  -standing balance exercises

## 2024-03-22 NOTE — Clinical Note
PT eval/SOC completed on 3/22/24, skilled physical therapy services 1w1, 3w2 for R TKA.    Thanks,  Dennis Jj PT

## 2024-03-22 NOTE — Clinical Note
According to the medication interactions in our system it shows that there is a major medication interaction between escitalopram and traZODone; and wanted to make sure you are aware.    Thanks,  Dennis Jj PT

## 2024-03-25 ENCOUNTER — TELEPHONE (OUTPATIENT)
Dept: ORTHOPEDIC SURGERY | Facility: HOSPITAL | Age: 74
End: 2024-03-25
Payer: MEDICARE

## 2024-03-25 ENCOUNTER — HOME CARE VISIT (OUTPATIENT)
Dept: HOME HEALTH SERVICES | Facility: HOME HEALTHCARE | Age: 74
End: 2024-03-25
Payer: COMMERCIAL

## 2024-03-25 VITALS
DIASTOLIC BLOOD PRESSURE: 80 MMHG | SYSTOLIC BLOOD PRESSURE: 140 MMHG | OXYGEN SATURATION: 92 % | HEART RATE: 69 BPM | TEMPERATURE: 98.1 F

## 2024-03-25 DIAGNOSIS — Z96.651 H/O TOTAL KNEE REPLACEMENT, RIGHT: ICD-10-CM

## 2024-03-25 PROCEDURE — G0157 HHC PT ASSISTANT EA 15: HCPCS

## 2024-03-25 RX ORDER — HYDROCODONE BITARTRATE AND ACETAMINOPHEN 10; 325 MG/1; MG/1
1 TABLET ORAL EVERY 4 HOURS PRN
Qty: 42 TABLET | Refills: 0 | Status: SHIPPED | OUTPATIENT
Start: 2024-03-25

## 2024-03-25 NOTE — TELEPHONE ENCOUNTER
Post op day 3  Discharge Instructions:  Ask patient about his or her discharge instructions  ?  Patient confirmed understanding   []  Further instruction needed   What, if any, recommendations, teaching, or interventions did you provide? Click or tap here to enter text.  Health status:  Pain controlled Yes   Does have some increased pain, taking the medication and it does help.   Recommended interventions:  Yes  incision/dressing status   ?  Clean without redness, drainage, odor  []  Redness    []  Drainage - color Click or tap here to enter text.  []  Odor  OLIVER - Green light blinking Choose an item.  Difficulties urination No  Last BM 3/22/2024 (if no BM by day 3-recommend OTC suppository or fleets enema)  No BM at this time. Taking medications other options discussed.   Medications:  ?Medications reviewed with patient/family/caregiver  Patient taking medications as prescribed?   Yes  If not taking medications as prescribed, note specific medicine(s) and reason for each:  Click or tap here to enter text.  Hospital Follow Up Plan:  Follow up Appointment with Orthopedic surgeon:  ?Has f/u appointment                []Scheduled f/u appointment  Home Care ordered at discharge?    Yes        Home Care started, or contact made?    Yes   If no, action taken:   DME obtained/used in home?         Yes   Using IS  Choose an item.   Other information: Mr. Olson said he is doing fine. PT has been out to see him and it went well. He is doing the exercises and getting up to walk. He is using the ice and elevating. He was able to get in the shower without issue. He does have some increased pain but he is taking the medication and it seems to help. Dressing remains in place, CDI. He said it's more of a hinderance than anything else, it's always pulling when trying to do exercises. It remains in place. He hasn't had a BM. Taking the stool softeners. We discussed other options at this time. Mr. Olson doesn't have any questions for  me at this time. He has my contact information should he need anything.

## 2024-03-25 NOTE — HOME HEALTH
Subjective: Patient is in bathroom upon arrival, he had a bowel movement    Wound-Right knee covered with OLIVER dressing- has small scab on distal tibia from hospital stay. Pictures loaded in EPIC  Edema-Right knee moderate amount  Dr Alicea 4-3-24  Outpatient- TBD  Falls- none  Medication Changes- none    Assessment:Patient is manuevering safely around the home with walker. He was able to get self in and out of the recliner, kitchen chair, elevated toilet and bed with proper hand placement. He was able to review and  progress HEP and ROM. Patient re-education on medication regimen, hydration and proper nutrition and used teach back for carryover and accuracy. Patient will benefit with continued PT for progression and reduce risk of decline.    Plan for next visit:  Gait training  Review and progress HEP  Increase ROM  Balance/transfers/safety

## 2024-03-27 ENCOUNTER — HOME CARE VISIT (OUTPATIENT)
Dept: HOME HEALTH SERVICES | Facility: HOME HEALTHCARE | Age: 74
End: 2024-03-27
Payer: COMMERCIAL

## 2024-03-27 VITALS
TEMPERATURE: 98.2 F | HEART RATE: 72 BPM | OXYGEN SATURATION: 95 % | SYSTOLIC BLOOD PRESSURE: 158 MMHG | DIASTOLIC BLOOD PRESSURE: 72 MMHG

## 2024-03-27 PROCEDURE — G0157 HHC PT ASSISTANT EA 15: HCPCS

## 2024-03-27 NOTE — HOME HEALTH
Subjective: They said I need an order for Outpatient    Wound-Right knee covered with OLIVER dressing- has small scab on distal tibia from hospital stay. Increased bruising and small dry spot on distal posteriorly tibia. Pictures loaded in EPIC last visit  Edema-Right knee moderate /max amount down to ankle  Dr Alicea 4-3-24   Outpatient- Geoff Martell 4-8-24  Falls- none   Medication Changes- none     Assessment:Patient is manuevering safely around the home with walker and improved on noe. He was able to get self in and out of the recliner, kitchen chair, elevated toilet and bed with proper hand placement. He was able to review and progress HEP and ROM slight decrease secondary to edema Patient re-education on medication regimen, hydration and proper nutrition and used teach back for carryover and accuracy. Patient will benefit with continued PT for progression and reduce risk of decline.     Plan for next visit:   Gait training   Review and progress HEP   Increase ROM   Balance/transfers/safety

## 2024-03-29 ENCOUNTER — HOME CARE VISIT (OUTPATIENT)
Dept: HOME HEALTH SERVICES | Facility: HOME HEALTHCARE | Age: 74
End: 2024-03-29
Payer: COMMERCIAL

## 2024-03-29 VITALS
DIASTOLIC BLOOD PRESSURE: 78 MMHG | SYSTOLIC BLOOD PRESSURE: 148 MMHG | HEART RATE: 82 BPM | OXYGEN SATURATION: 94 % | TEMPERATURE: 98.3 F

## 2024-03-29 PROCEDURE — G0157 HHC PT ASSISTANT EA 15: HCPCS

## 2024-03-29 NOTE — CASE COMMUNICATION
. New pictures loaded today. He has some slight redness that looks like Cellulitis and the second picture I took was a dry spot on posterior tibia area that has been there for years. He does have a bandaid covering the spot that he says is a skin tear that appeared when he was in recovery after surgery

## 2024-04-01 ENCOUNTER — HOME CARE VISIT (OUTPATIENT)
Dept: HOME HEALTH SERVICES | Facility: HOME HEALTHCARE | Age: 74
End: 2024-04-01
Payer: COMMERCIAL

## 2024-04-01 VITALS
DIASTOLIC BLOOD PRESSURE: 82 MMHG | HEART RATE: 53 BPM | OXYGEN SATURATION: 95 % | TEMPERATURE: 98.4 F | SYSTOLIC BLOOD PRESSURE: 172 MMHG

## 2024-04-01 PROCEDURE — G0157 HHC PT ASSISTANT EA 15: HCPCS

## 2024-04-01 NOTE — HOME HEALTH
Subjective: My knee got worse over the weekend but It looks better today    Wound-Right knee covered with OLIVER dressing- has small scab on distal tibia from hospital stay. Increased bruising and  redness improved on anterior aspect and small dry spot on distal posteriorly tibia which he states has been there for years  Edema-Right knee moderate amount down to ankle   Dr Alicea 4-3-24   Outpatient- Geoff Martell 4-8-24   Falls- none   Medication Changes- none     Assessment:Patient is manuevering with cane and reviewed outside steps and driveway surfaces with S. He was able to review and progress HEP to standing and improved on Flexion after PROM. Patient and spouse re-education on medication regimen, hydration and proper nutrition and used teach back for carryover and accuracy. Patient will benefit with continued PT for progression and reduce risk of decline. update to PT [place] as needed     Plan for next visit:   Gait training   Review and progress HEP   Increase ROM   Balance/transfers/safety

## 2024-04-03 ENCOUNTER — HOME CARE VISIT (OUTPATIENT)
Dept: HOME HEALTH SERVICES | Facility: HOME HEALTHCARE | Age: 74
End: 2024-04-03
Payer: COMMERCIAL

## 2024-04-03 VITALS
HEART RATE: 67 BPM | TEMPERATURE: 98 F | SYSTOLIC BLOOD PRESSURE: 170 MMHG | DIASTOLIC BLOOD PRESSURE: 76 MMHG | OXYGEN SATURATION: 94 %

## 2024-04-03 DIAGNOSIS — Z96.651 H/O TOTAL KNEE REPLACEMENT, RIGHT: ICD-10-CM

## 2024-04-03 PROCEDURE — G0157 HHC PT ASSISTANT EA 15: HCPCS

## 2024-04-03 RX ORDER — GLIMEPIRIDE 4 MG/1
4 TABLET ORAL 2 TIMES DAILY
Qty: 180 TABLET | Refills: 0 | Status: SHIPPED | OUTPATIENT
Start: 2024-04-03

## 2024-04-03 RX ORDER — HYDROCODONE BITARTRATE AND ACETAMINOPHEN 10; 325 MG/1; MG/1
1 TABLET ORAL EVERY 4 HOURS PRN
Qty: 42 TABLET | Refills: 0 | Status: SHIPPED | OUTPATIENT
Start: 2024-04-03

## 2024-04-03 RX ORDER — TRAZODONE HYDROCHLORIDE 50 MG/1
50 TABLET ORAL NIGHTLY
Qty: 90 TABLET | Refills: 0 | Status: SHIPPED | OUTPATIENT
Start: 2024-04-03

## 2024-04-03 NOTE — HOME HEALTH
Subjective: I am doing ok    Wound-Right knee covered with OLIVER dressing- has small scab on distal tibia from hospital stay. Increased bruising and redness improved on anterior aspect and small dry spot on distal posteriorly tibia which he states has been there for years - new picture loaded today in EPIC    Edema-Right knee moderate amount down to ankle   Dr Alicea 4-3-24   Outpatient- Geoff Martell 4-8-24   Falls- none   Medication Changes- none     Assessment:Patient is manuevering with cane and reviewed outside steps and driveway surfaces with S. He takes a few steps in the home with no AD and uses furniture as needed. He was able to review and progress HEP in  standing and improved on Flexion after PROM. Patient and spouse re-education on medication regimen, hydration and proper nutrition and used teach back for carryover and accuracy. Patient will benefit with continued PT for progression and reduce risk of decline and will progress to OP after discharge. update to PT [place] for next visit.    Plan for next visit:   Gait training   Review and  HEP   Increase ROM   Balance

## 2024-04-04 RX ORDER — HYDROCHLOROTHIAZIDE 25 MG/1
TABLET ORAL
Qty: 90 TABLET | Refills: 0 | Status: SHIPPED | OUTPATIENT
Start: 2024-04-04

## 2024-04-05 ENCOUNTER — HOME CARE VISIT (OUTPATIENT)
Dept: HOME HEALTH SERVICES | Facility: HOME HEALTHCARE | Age: 74
End: 2024-04-05
Payer: COMMERCIAL

## 2024-04-05 ENCOUNTER — OFFICE VISIT (OUTPATIENT)
Dept: ORTHOPEDIC SURGERY | Facility: CLINIC | Age: 74
End: 2024-04-05
Payer: MEDICARE

## 2024-04-05 VITALS — DIASTOLIC BLOOD PRESSURE: 92 MMHG | SYSTOLIC BLOOD PRESSURE: 177 MMHG | OXYGEN SATURATION: 98 % | HEART RATE: 67 BPM

## 2024-04-05 VITALS — HEIGHT: 74 IN | BODY MASS INDEX: 33.01 KG/M2 | WEIGHT: 257.2 LBS | TEMPERATURE: 98.7 F

## 2024-04-05 DIAGNOSIS — Z09 SURGERY FOLLOW-UP: Primary | ICD-10-CM

## 2024-04-05 PROCEDURE — G0151 HHCP-SERV OF PT,EA 15 MIN: HCPCS

## 2024-04-05 NOTE — Clinical Note
Patient discharged from physical therapy and the agency on 4/5/2024 to outpatient physical therapy, in good condition, with most goals met.

## 2024-04-05 NOTE — HOME HEALTH
"Subjective: \"My knee is doing okay.\" Patient reports waking up with night sweats last night.    Wound- Right knee covered with OLIVER dressing    Edema- Right knee moderate amount down to ankle     Outpatient- Geoff Martell 4-8-24     Falls- none     Medication Changes- none     Assessment: Patient has progressed well with home health physical therapy. He is independent with ambulation using a cane, independent with transfers and independent with his HEP.    Communication: Report to Dr. Alicea; case communication to Kathy Zimmerman RN clinical manager and Darlene Hung, "

## 2024-04-05 NOTE — Clinical Note
Jeb Olson has progressed well with home health physical therapy. He is independent with ambulation using a cane, independent with transfers and independent with his HEP. His right knee AROM is -4 to 93 degrees, 95 degrees AAROM. His is discharged today on 4/5/2024 and starts outpatient PT on 4/8/2024.

## 2024-04-05 NOTE — PROGRESS NOTES
Jeb Olson     : 1950     MRN: [id number]     DATE: 2024    DIAGNOSIS: Follow-up 2 weeks status post total knee arthroplasty    SUBJECTIVE:  Patient returns today for 2 week follow up of recent knee replacement. Patient reports doing well with no unusual complaints.  Patient reports compliance with the anticoagulation.    OBJECTIVE:     Exam: The incision is healing appropriately.  No sign of infection.  Range of motion is progressing as expected.  The calf is soft and nontender with a negative Homans sign.    DIAGNOSTIC STUDIES     Xrays: AP and lateral views of the right knee were ordered and reviewed for evaluation of recent knee replacement. They demonstrate a well positioned, well aligned knee replacement without complicating factors noted. In comparison with previous films there has been interval implant placement.    ASSESSMENT: 2 week status post right knee replacement    PLAN:   1) Continue PT   2) Continue to ice as needed.   3) Continue anticoagulation as discussed   4) Follow-up in 4 weeks with Dr. Alicea.     Pricila Barnes, TIFFANIE    2024    Answers submitted by the patient for this visit:  Primary Reason for Visit (Submitted on 2024)  What is the primary reason for your visit?: Other  Other (Submitted on 2024)  Please describe your symptoms.: Post-Op with Pricila  Have you had these symptoms before?: Yes  How long have you been having these symptoms?: 5-7 days  Please list any medications you are currently taking for this condition.: Hydrocodone   Please describe any probable cause for these symptoms. : Knee replacement

## 2024-04-08 ENCOUNTER — TREATMENT (OUTPATIENT)
Dept: PHYSICAL THERAPY | Facility: CLINIC | Age: 74
End: 2024-04-08
Payer: MEDICARE

## 2024-04-08 DIAGNOSIS — G89.18 ACUTE POSTOPERATIVE PAIN OF RIGHT KNEE: Primary | ICD-10-CM

## 2024-04-08 DIAGNOSIS — Z96.651 S/P TKR (TOTAL KNEE REPLACEMENT), RIGHT: Primary | ICD-10-CM

## 2024-04-08 DIAGNOSIS — R26.2 DIFFICULTY WALKING: ICD-10-CM

## 2024-04-08 DIAGNOSIS — M25.561 ACUTE POSTOPERATIVE PAIN OF RIGHT KNEE: Primary | ICD-10-CM

## 2024-04-08 DIAGNOSIS — Z96.651 STATUS POST TOTAL RIGHT KNEE REPLACEMENT: ICD-10-CM

## 2024-04-08 PROCEDURE — 97530 THERAPEUTIC ACTIVITIES: CPT | Performed by: PHYSICAL THERAPIST

## 2024-04-08 PROCEDURE — 97110 THERAPEUTIC EXERCISES: CPT | Performed by: PHYSICAL THERAPIST

## 2024-04-08 PROCEDURE — 97161 PT EVAL LOW COMPLEX 20 MIN: CPT | Performed by: PHYSICAL THERAPIST

## 2024-04-08 NOTE — PROGRESS NOTES
Physical Therapy Initial Evaluation and Plan of Care  [address] [address], [address], [place] [id number]    Patient: Jeb Olson   : 1950  Diagnosis/ICD-10 Code:  Acute postoperative pain of right knee [G89.18, M25.561]  Referring practitioner: TIFFANIE Shine    Subjective Evaluation    History of Present Illness  Date of surgery: 3/21/2024  Mechanism of injury: S/p (R) TKA by Dr. Alicea on 2024. Patient received 2 weeks of HHPT.  PMH of (L) TKA 4 years ago with good outcome. Patient reports his (R) knee symptoms have varied between 4-8/10.  Patient had a f/u appt with Pricila MORENO on 2024.  Steristrips were placed at that time.  Patient used RWx x 1 week and then transitioned to STC. He has some intermittent sleep interrupting (R) knee pain, and prolonged sitting becomes uncomfortable.  He takes pain medication on a regular basis and is applying ice after exercise. Patient has goal of returning to working out at [employer]. PMH notable for DM 2.       Patient Occupation: Retired Quality of life: good    Pain  Current pain ratin  At worst pain ratin  Location: (R) anterior knee, medial and lateral aspects  Quality: dull ache  Aggravating factors: movement, stairs, standing, prolonged positioning, sleeping, squatting and ambulation  Progression: improved    Social Support  Lives in: multiple-level home (basement has chair lift, 4 steps to enter home with 2 handrails)  Lives with: spouse    Diagnostic Tests  X-ray: normal    Patient Goals  Patient goals for therapy: decreased pain, increased motion, increased strength and independence with ADLs/IADLs           Subjective Questionnaire: LEFS: 26/80    Objective          Observations     Right Knee   Positive for edema and incision.     Additional Knee Observation Details  (R) knee incision covered by steristrips along length of incision  Mild surrounding redness into anterior proximal tibia area but nothing excessive or  concerning    Active Range of Motion   Left Knee   Flexion: 127 degrees   Extension: 0 degrees   Extensor la degrees     Right Knee   Flexion: 65 degrees   Extension: 8 (heel prop on ground) degrees   Extensor la degrees     Additional Active Range of Motion Details  (R) knee AROM 83 deg after stretching    Passive Range of Motion     Right Knee   Flexion: 77 (AAROM) degrees     Additional Passive Range of Motion Details  (R) knee AAROM flexion 100 deg after stretching    Strength/Myotome Testing     Left Hip   Planes of Motion   Flexion: 4    Right Hip   Planes of Motion   Flexion: 4-    Left Knee   Flexion: 4+  Extension: 4+  Quadriceps contraction: good    Right Knee   Quadriceps contraction: fair    Left Ankle/Foot   Dorsiflexion: 4+    Right Ankle/Foot   Dorsiflexion: 4-    Swelling     Left Knee Girth Measurement (cm)   Joint line: 44.3.    Right Knee Girth Measurement (cm)   Joint line: 48.7.    Ambulation     Observational Gait   Decreased walking speed, stride length, right stance time and left step length.   Base of support: increased    Additional Observational Gait Details  Patient ambulates with (L) STC with decreased (R) stance time and decreased (L) step length; mild lack of (R) TKE and minimal to moderate active (R) knee flexion AROM during swing          Assessment & Plan       Assessment  Impairments: abnormal coordination, abnormal gait, abnormal muscle firing, abnormal or restricted ROM, activity intolerance, impaired balance, impaired physical strength, lacks appropriate home exercise program and pain with function   Functional limitations: sleeping, walking, uncomfortable because of pain, moving in bed, sitting, standing and stooping (Negotiating steps)  Assessment details: Patient is a 73 y.o male s/p (R) TKA by Dr. Alicea on 2024.  He received 2 weeks of home health PT services, has successfully transitioned from RWx to STC, and continues to utilize pain medication and ice  application regularly to manage symptoms.  He reports (R) knee symptoms 4-8/10, worst with prolonged sitting, standing/walking, stair negotiation, ROM activities/exercise, and sleeping.  He demonstrates well-healing (R) TKA incision with steristrips intact, moderate (R) knee edema, decreased (R) knee flexion > extension AROM, decreased (R) LE strength, and compensated gait with dependence on STC.  His LEFS score is 26/80 indicating a significant perceived level of functional limitation.  He will benefit from skilled PT services to address these deficits and assist patient in painfree return to all functional activities and mobility.  Prognosis: good    Goals  Plan Goals: STGs: to be met in 6 weeks  1. Patient will be independent with initial HEP  2. Patient will report improved (R) knee symptoms 0-4/10 for improved activity and positional tolerance  3. Patient will have improved (R) knee joint line girth by >/= 2.5 cm for evidence of appropriate healing  4. Patient will report consistently sleeping without pain interruption for improved restorative healing  5. Patient will demonstrate improved (R) knee AROM 5-110 deg for improved joint mobility    LTGs: to be met in 12 weeks  1. Patient will be independent with progressed HEP  2. Patient will report improved (R) knee symptoms 0-2/10 for improved ADL and activity tolerance  3. Patient will demonstrate improved (R) knee AROM 0-120 deg for improved joint mobility  4. Patient will successfully transition away from use of STC for community distance ambulation on level and unlevel surfaces  5. Patient will negotiate 1 flight of steps reciprocally with single handrail  6. Patient will have improved LEFS score >/= 40/80 to demonstrate readiness to return to gym activities    Plan  Therapy options: will be seen for skilled therapy services  Planned modality interventions: cryotherapy  Planned therapy interventions: ADL retraining, balance/weight-bearing training, fine motor  coordination training, flexibility, functional ROM exercises, gait training, home exercise program, joint mobilization, manual therapy, neuromuscular re-education, soft tissue mobilization, strengthening, stretching and therapeutic activities  Frequency: 2x week  Duration in weeks: 12  Treatment plan discussed with: patient        Manual Therapy:         mins  97977;  Therapeutic Exercise:    19     mins  80363;     Neuromuscular Melyssa:        mins  16033;    Therapeutic Activity:     12     mins  70390;     Gait Training:           mins  86912;     Ultrasound:          mins  25232;    Electrical Stimulation:         mins  30442 ( );  Dry Needling          mins self-pay    Timed Treatment:   31   mins   Total Treatment:     54   mins    PT SIGNATURE: Katie Talavera PT, DPT, OCS  Electronically signed by: Katie Talavera PT, 04/08/24, 10:03 AM EDT  [place] License #[id number]     DATE TREATMENT INITIATED: 4/8/2024    Medicare Initial Certification  Certification Period: 4/8/2024 thru 7/6/2024  I certify that the therapy services are furnished while this patient is under my care.  The services outlined above are required by this patient, and will be reviewed every 90 days.     PHYSICIAN: Pricila Barnes, TIFFANIE  [phone]                                          DATE:     Please sign and return via fax to [phone]. Thank you, [facility].

## 2024-04-10 ENCOUNTER — TREATMENT (OUTPATIENT)
Dept: PHYSICAL THERAPY | Facility: CLINIC | Age: 74
End: 2024-04-10
Payer: MEDICARE

## 2024-04-10 DIAGNOSIS — R26.2 DIFFICULTY WALKING: ICD-10-CM

## 2024-04-10 DIAGNOSIS — Z96.651 H/O TOTAL KNEE REPLACEMENT, RIGHT: ICD-10-CM

## 2024-04-10 DIAGNOSIS — M25.561 ACUTE POSTOPERATIVE PAIN OF RIGHT KNEE: Primary | ICD-10-CM

## 2024-04-10 DIAGNOSIS — Z96.651 STATUS POST TOTAL RIGHT KNEE REPLACEMENT: ICD-10-CM

## 2024-04-10 DIAGNOSIS — G89.18 ACUTE POSTOPERATIVE PAIN OF RIGHT KNEE: Primary | ICD-10-CM

## 2024-04-10 PROCEDURE — 97110 THERAPEUTIC EXERCISES: CPT | Performed by: PHYSICAL THERAPIST

## 2024-04-10 PROCEDURE — 97530 THERAPEUTIC ACTIVITIES: CPT | Performed by: PHYSICAL THERAPIST

## 2024-04-10 NOTE — PROGRESS NOTES
"Physical Therapy Daily Treatment Note               [address] [facility] [address] [facility]                                                                                                                                             [place] [address]    Patient: Jeb Olson   : 1950  Referring practitioner: TIFFANIE Shine  Date of Initial Visit: Type: THERAPY  Noted: 2024  Today's Date: 4/10/2024  Patient seen for 2 sessions       Visit Diagnoses:    ICD-10-CM ICD-9-CM   1. Acute postoperative pain of right knee  G89.18 719.46    M25.561 338.18   2. Status post total right knee replacement  Z96.651 V43.65   3. Difficulty walking  R26.2 719.7       Subjective Evaluation    History of Present Illness    Subjective comment: Pt reports that his (R) knee is feeling \"pretty good\".  Rates pain at 5/10Pain  Current pain ratin           Objective   See Exercise, Manual, and Modality Logs for complete treatment.   Added supine SLR, and Heel slide    Assessment & Plan       Assessment  Assessment details: Pt completed treatment with minimal c/o pain in (R) knee.  Pt was able to progress reps on prior exercises with no issues. He tolerated the addition of SLR and Heel slides.  Pt's HEP was updated and given to him.  Continue to progress per POC.          Timed:         Manual Therapy:    0     mins  57092;     Therapeutic Exercise:    20     mins  18500;     Neuromuscular Melyssa:    0    mins  43709;    Therapeutic Activity:     11     mins  56661;     Gait Trainin     mins  78483;     Ultrasound:     0     mins  61379;    E Stim                            0    mins   58589( [id number])  Work Carrera/Cond      0    mins   59296        Timed Treatment:   31   mins   Total Treatment:     31   mins    Abhilash Roman PTA  [place] License: [id number]  "

## 2024-04-11 RX ORDER — HYDROCODONE BITARTRATE AND ACETAMINOPHEN 10; 325 MG/1; MG/1
1 TABLET ORAL EVERY 4 HOURS PRN
Qty: 42 TABLET | Refills: 0 | Status: SHIPPED | OUTPATIENT
Start: 2024-04-11

## 2024-04-12 ENCOUNTER — TELEPHONE (OUTPATIENT)
Dept: FAMILY MEDICINE CLINIC | Facility: CLINIC | Age: 74
End: 2024-04-12

## 2024-04-12 NOTE — TELEPHONE ENCOUNTER
RELAY    LM--  Notify that patient is statin intolerant. That is why he is on ezetemibe and fenofibrate

## 2024-04-12 NOTE — TELEPHONE ENCOUNTER
Caller: RENITA A PHARMACIST WITH [facility]    Best call back number: [phone]     What was the call regarding: STATIN MEDICATION TO REDUCE RISK OF HEART ATTACK AND STROKE DUE TO THE MEMBER BEING DIABETIC.

## 2024-04-15 ENCOUNTER — TREATMENT (OUTPATIENT)
Dept: PHYSICAL THERAPY | Facility: CLINIC | Age: 74
End: 2024-04-15
Payer: MEDICARE

## 2024-04-15 DIAGNOSIS — G89.18 ACUTE POSTOPERATIVE PAIN OF RIGHT KNEE: Primary | ICD-10-CM

## 2024-04-15 DIAGNOSIS — R26.2 DIFFICULTY WALKING: ICD-10-CM

## 2024-04-15 DIAGNOSIS — Z96.651 STATUS POST TOTAL RIGHT KNEE REPLACEMENT: ICD-10-CM

## 2024-04-15 DIAGNOSIS — M25.561 ACUTE POSTOPERATIVE PAIN OF RIGHT KNEE: Primary | ICD-10-CM

## 2024-04-15 PROCEDURE — 97110 THERAPEUTIC EXERCISES: CPT | Performed by: PHYSICAL THERAPIST

## 2024-04-15 PROCEDURE — 97530 THERAPEUTIC ACTIVITIES: CPT | Performed by: PHYSICAL THERAPIST

## 2024-04-15 NOTE — PROGRESS NOTES
"Physical Therapy Daily Treatment Note               [address] [facility] [address]                                                                                                                                             [place] [address]    Patient: Jeb Olson   : 1950  Referring practitioner: TIFFANIE Shine  Date of Initial Visit: Type: THERAPY  Noted: 2024  Today's Date: 4/15/2024  Patient seen for 3 sessions       Visit Diagnoses:    ICD-10-CM ICD-9-CM   1. Acute postoperative pain of right knee  G89.18 719.46    M25.561 338.18   2. Status post total right knee replacement  Z96.651 V43.65   3. Difficulty walking  R26.2 719.7       Subjective Evaluation    History of Present Illness    Subjective comment: Pt reports that he had a \"sharp pain\" in (R) knee that woke him up this moring.  Current pain level is 4/10       Objective          Passive Range of Motion     Right Knee   Flexion: 100 degrees with pain    Additional Passive Range of Motion Details  AAROM      See Exercise, Manual, and Modality Logs for complete treatment.   Added Hip add iso, and SAQ    Assessment & Plan       Assessment  Assessment details: Pt completed treatment with no c/o increased pain in (R) knee. Pt was able to progress reps on his strengthening exercises with no issues.  Pt tolerated the addition of hip add iso, and SAQ.  Pt's HEP was updated and given to him.  Plan to progress per POC.          Timed:         Manual Therapy:    0     mins  53218;     Therapeutic Exercise:    25     mins  42722;     Neuromuscular Melyssa:    0    mins  86461;    Therapeutic Activity:     13     mins  39955;     Gait Trainin     mins  31551;     Ultrasound:     0     mins  37095;    E Stim                            00    mins   09042( g0283)  Work Carrera/Cond      0    mins   24774        Timed Treatment:   38   mins   Total Treatment:     38   mins    Abhilash Roman PTA  [place] License: [id number]  "

## 2024-04-17 ENCOUNTER — TREATMENT (OUTPATIENT)
Dept: PHYSICAL THERAPY | Facility: CLINIC | Age: 74
End: 2024-04-17
Payer: MEDICARE

## 2024-04-17 DIAGNOSIS — M25.561 ACUTE POSTOPERATIVE PAIN OF RIGHT KNEE: Primary | ICD-10-CM

## 2024-04-17 DIAGNOSIS — G89.18 ACUTE POSTOPERATIVE PAIN OF RIGHT KNEE: Primary | ICD-10-CM

## 2024-04-17 DIAGNOSIS — Z96.651 H/O TOTAL KNEE REPLACEMENT, RIGHT: ICD-10-CM

## 2024-04-17 DIAGNOSIS — Z96.651 STATUS POST TOTAL RIGHT KNEE REPLACEMENT: ICD-10-CM

## 2024-04-17 DIAGNOSIS — R26.2 DIFFICULTY WALKING: ICD-10-CM

## 2024-04-17 PROCEDURE — 97110 THERAPEUTIC EXERCISES: CPT | Performed by: PHYSICAL THERAPIST

## 2024-04-17 PROCEDURE — 97530 THERAPEUTIC ACTIVITIES: CPT | Performed by: PHYSICAL THERAPIST

## 2024-04-17 RX ORDER — HYDROCODONE BITARTRATE AND ACETAMINOPHEN 10; 325 MG/1; MG/1
1 TABLET ORAL EVERY 4 HOURS PRN
Qty: 42 TABLET | Refills: 0 | Status: SHIPPED | OUTPATIENT
Start: 2024-04-17

## 2024-04-17 NOTE — PROGRESS NOTES
Physical Therapy Daily Treatment Note      [address] [facility], [address], [place] [id number]    Patient: Jeb Olson   : 1950  Referring practitioner: TIFFANIE Shine  Date of Initial Visit: Type: THERAPY  Noted: 2024  Today's Date: 2024  Patient seen for 4 sessions         Visit Diagnoses:     ICD-10-CM ICD-9-CM   1. Acute postoperative pain of right knee  G89.18 719.46    M25.561 338.18   2. Status post total right knee replacement  Z96.651 V43.65   3. Difficulty walking  R26.2 719.7         Subjective Evaluation    History of Present Illness    Subjective comment: I just wish I could get this swelling down and stiffness better in my knee.  It's interfering with me doing some of the exercises.       Objective          Active Range of Motion     Right Knee   Flexion: 98 degrees   Extension: 0 (foot propped on floor) degrees     Passive Range of Motion     Right Knee   Flexion: 108 degrees       See Exercise, Manual, and Modality Logs for complete treatment.       Assessment & Plan       Assessment  Assessment details: Encouraged patient to apply ice to posterior knee region to more effectively address swelling. Patient is also instructed in retrograde edema massage for the (R) knee region and demonstrates satisfactory technique with this.  Advised caution on and immediately surrounding incision.  He demonstrates good, consistent improvements in (R) knee AROM both in flexion and extension (full extension achieved today).  Mild extensor lag persists during open chain activities.          Progress per Plan of Care         Timed:  Manual Therapy:         mins  73858;  Therapeutic Exercise:    31     mins  75849;     Neuromuscular Melyssa:        mins  99718;    Therapeutic Activity:     14     mins  56932;     Gait Training:           mins  88017;     Ultrasound:          mins  53833;    Untimed:  Electrical Stimulation:         mins  61863 ( );  Mechanical Traction:         mins   81735;   Dry Needling              ___  mins   13725    Timed Treatment:   45   mins   Total Treatment:     45   mins    Katie Talavera PT, DPT, OCS  Physical Therapist  [place] License #[id number]  Electronically signed by: Katie Talavera PT, 04/17/24, 10:33 AM EDT

## 2024-04-18 ENCOUNTER — TELEPHONE (OUTPATIENT)
Dept: ORTHOPEDIC SURGERY | Facility: HOSPITAL | Age: 74
End: 2024-04-18
Payer: MEDICARE

## 2024-04-18 NOTE — TELEPHONE ENCOUNTER
Called and spoke with Mr. Olson to see how he has been doing since his RTK 3/21. He said he is doing fine. He is working with PT and they said he's ahead of the game. He still has some swelling so he continues to ice and elevate, it's hard to bend and do exercises when your leg is swollen. He is still taking the pain medication but working on weaning it down. Things seem to be going well. Mr. Olson doesn't have any questions for me at this time. He has my contact information should he need anything.

## 2024-04-22 ENCOUNTER — TREATMENT (OUTPATIENT)
Dept: PHYSICAL THERAPY | Facility: CLINIC | Age: 74
End: 2024-04-22
Payer: MEDICARE

## 2024-04-22 DIAGNOSIS — M25.561 ACUTE POSTOPERATIVE PAIN OF RIGHT KNEE: Primary | ICD-10-CM

## 2024-04-22 DIAGNOSIS — G89.18 ACUTE POSTOPERATIVE PAIN OF RIGHT KNEE: Primary | ICD-10-CM

## 2024-04-22 DIAGNOSIS — Z96.651 STATUS POST TOTAL RIGHT KNEE REPLACEMENT: ICD-10-CM

## 2024-04-22 DIAGNOSIS — R26.2 DIFFICULTY WALKING: ICD-10-CM

## 2024-04-22 PROCEDURE — 97530 THERAPEUTIC ACTIVITIES: CPT | Performed by: PHYSICAL THERAPIST

## 2024-04-22 PROCEDURE — 97110 THERAPEUTIC EXERCISES: CPT | Performed by: PHYSICAL THERAPIST

## 2024-04-22 NOTE — PROGRESS NOTES
"Physical Therapy Daily Treatment Note               [address] [facility] [address]                                                                                                                                             [place] [address]    Patient: Jeb Olson   : 1950  Referring practitioner: TIFFANIE Shine  Date of Initial Visit: Type: THERAPY  Noted: 2024  Today's Date: 2024  Patient seen for 5 sessions       Visit Diagnoses:    ICD-10-CM ICD-9-CM   1. Acute postoperative pain of right knee  G89.18 719.46    M25.561 338.18   2. Status post total right knee replacement  Z96.651 V43.65   3. Difficulty walking  R26.2 719.7       Subjective Evaluation    History of Present Illness    Subjective comment: Pt reports still having \" alot of pain\" in his Tibia.       Objective   See Exercise, Manual, and Modality Logs for complete treatment.       Assessment & Plan       Assessment  Assessment details: Pt completed treatment with no c/o increased pain in (R) knee.  Pt tolerated addition of LAQ and resisted seated hamstring curl with no issue.  Pt's HEP was updated and given to him.  Continue to progress per POC.          Timed:         Manual Therapy:    0     mins  47971;     Therapeutic Exercise:    30     mins  93433;     Neuromuscular Melyssa:    0    mins  98632;    Therapeutic Activity:     10     mins  91104;     Gait Trainin     mins  83030;     Ultrasound:     0     mins  93848;    E Stim                            0    mins   17318( g0283)  Work Carrera/Cond      0    mins   31956        Timed Treatment:   40   mins   Total Treatment:     40   mins    Abhilash Roman PTA  [place] License: [id number]  "

## 2024-04-24 ENCOUNTER — TREATMENT (OUTPATIENT)
Dept: PHYSICAL THERAPY | Facility: CLINIC | Age: 74
End: 2024-04-24
Payer: MEDICARE

## 2024-04-24 DIAGNOSIS — G89.18 ACUTE POSTOPERATIVE PAIN OF RIGHT KNEE: Primary | ICD-10-CM

## 2024-04-24 DIAGNOSIS — M25.561 ACUTE POSTOPERATIVE PAIN OF RIGHT KNEE: Primary | ICD-10-CM

## 2024-04-24 DIAGNOSIS — Z96.651 STATUS POST TOTAL RIGHT KNEE REPLACEMENT: ICD-10-CM

## 2024-04-24 DIAGNOSIS — R26.2 DIFFICULTY WALKING: ICD-10-CM

## 2024-04-24 PROCEDURE — 97110 THERAPEUTIC EXERCISES: CPT | Performed by: PHYSICAL THERAPIST

## 2024-04-24 PROCEDURE — 97530 THERAPEUTIC ACTIVITIES: CPT | Performed by: PHYSICAL THERAPIST

## 2024-04-24 NOTE — PROGRESS NOTES
Physical Therapy Daily Treatment Note      [address] [facility], [address], [place] [id number]    Patient: Jeb Olson   : 1950  Referring practitioner: TIFFANIE Shine  Date of Initial Visit: Type: THERAPY  Noted: 2024  Today's Date: 2024  Patient seen for 6 sessions         Visit Diagnoses:     ICD-10-CM ICD-9-CM   1. Acute postoperative pain of right knee  G89.18 719.46    M25.561 338.18   2. Status post total right knee replacement  Z96.651 V43.65   3. Difficulty walking  R26.2 719.7         Subjective Evaluation    History of Present Illness    Subjective comment: My knee is coming along but I still have that pain right below where they did the surgery that feels like it wants to pop out of place. But my pain is getting better and I stopped taking the pain medication last Thursday; now I just take [OTC meds] as needed.       Objective          Active Range of Motion     Right Knee   Flexion: 107 degrees   Extension: 0 degrees     Passive Range of Motion     Right Knee   Flexion: 115 degrees       See Exercise, Manual, and Modality Logs for complete treatment.       Assessment & Plan       Assessment  Assessment details: Patient demonstrates good improvements in (R) knee flexion AROM and AAROM/PROM as measured and documented this date.  His quality of ambulation is improving such that his weightshift, step length, active knee flexion during swing and knee extension during stance are normalizing and he is less dependent on STC. In fact, he notes he is no longer using it within his home. He will benefit from further progression of activities emphasizing restoring end ROM for (R) knee and functional strength of (R) LE.           Progress per Plan of Care         Timed:  Manual Therapy:         mins  38209;  Therapeutic Exercise:    29     mins  85078;     Neuromuscular Melyssa:        mins  05505;    Therapeutic Activity:     24     mins  47806;     Gait Training:           mins  63608;      Ultrasound:          mins  37979;    Untimed:  Electrical Stimulation:         mins  02803 ( );  Mechanical Traction:         mins  87601;   Dry Needling              ___  mins   20561    Timed Treatment:   53   mins   Total Treatment:     53   mins    Katie Talavera PT, DPT, \[facility]\""  Physical Therapist  [place] License #[id number]  Electronically signed by: Katie Talavera PT, 04/24/24, 10:01 AM EDT

## 2024-04-25 RX ORDER — EZETIMIBE 10 MG/1
10 TABLET ORAL DAILY
Qty: 90 TABLET | Refills: 0 | Status: SHIPPED | OUTPATIENT
Start: 2024-04-25

## 2024-04-29 ENCOUNTER — TREATMENT (OUTPATIENT)
Dept: PHYSICAL THERAPY | Facility: CLINIC | Age: 74
End: 2024-04-29
Payer: MEDICARE

## 2024-04-29 DIAGNOSIS — Z96.651 STATUS POST TOTAL RIGHT KNEE REPLACEMENT: ICD-10-CM

## 2024-04-29 DIAGNOSIS — R26.2 DIFFICULTY WALKING: ICD-10-CM

## 2024-04-29 DIAGNOSIS — M25.561 ACUTE POSTOPERATIVE PAIN OF RIGHT KNEE: Primary | ICD-10-CM

## 2024-04-29 DIAGNOSIS — G89.18 ACUTE POSTOPERATIVE PAIN OF RIGHT KNEE: Primary | ICD-10-CM

## 2024-04-29 PROCEDURE — 97530 THERAPEUTIC ACTIVITIES: CPT | Performed by: PHYSICAL THERAPIST

## 2024-04-29 PROCEDURE — 97110 THERAPEUTIC EXERCISES: CPT | Performed by: PHYSICAL THERAPIST

## 2024-04-29 NOTE — PROGRESS NOTES
"Physical Therapy Daily Treatment Note               [address] [facility] [address]                                                                                                                                             [place] [address]    Patient: Jeb Olson   : 1950  Referring practitioner: TIFFANIE Shine  Date of Initial Visit: Type: THERAPY  Noted: 2024  Today's Date: 2024  Patient seen for 7 sessions       Visit Diagnoses:    ICD-10-CM ICD-9-CM   1. Acute postoperative pain of right knee  G89.18 719.46    M25.561 338.18   2. Status post total right knee replacement  Z96.651 V43.65   3. Difficulty walking  R26.2 719.7       Subjective Evaluation    History of Present Illness    Subjective comment: Pt reporst that he is only having \"a little pain\" in (R) knee.       Objective   See Exercise, Manual, and Modality Logs for complete treatment.       Assessment & Plan       Assessment  Assessment details: Pt completed treatment with no c/o increased pain in (R) knee.  He was amador to progress volume of most strengthening exercises with no issues. Initiated total knee extension (TKE) for improved gait.  Pt performed 1st set with a small rd ball, but felt that it was not challenging enough. 2nd set was performed with a larger ball.  Pt's HEP was updated and given to him.  Continue to progress per POC.          Timed:         Manual Therapy:    0     mins  39334;     Therapeutic Exercise:    24     mins  13997;     Neuromuscular Melyssa:    0    mins  49324;    Therapeutic Activity:     20     mins  96487;     Gait Trainin     mins  68391;     Ultrasound:     0     mins  32807;    E Stim                            0    mins   65252( g0283)  Work Carrera/Cond      0    mins   75285        Timed Treatment:   44   mins   Total Treatment:     44   mins    Abhilash Roman PTA  [place] License: [id number]  "

## 2024-05-01 ENCOUNTER — TREATMENT (OUTPATIENT)
Dept: PHYSICAL THERAPY | Facility: CLINIC | Age: 74
End: 2024-05-01
Payer: MEDICARE

## 2024-05-01 DIAGNOSIS — Z96.651 STATUS POST TOTAL RIGHT KNEE REPLACEMENT: ICD-10-CM

## 2024-05-01 DIAGNOSIS — M25.561 ACUTE POSTOPERATIVE PAIN OF RIGHT KNEE: Primary | ICD-10-CM

## 2024-05-01 DIAGNOSIS — R26.2 DIFFICULTY WALKING: ICD-10-CM

## 2024-05-01 DIAGNOSIS — G89.18 ACUTE POSTOPERATIVE PAIN OF RIGHT KNEE: Primary | ICD-10-CM

## 2024-05-01 PROCEDURE — 97530 THERAPEUTIC ACTIVITIES: CPT | Performed by: PHYSICAL THERAPIST

## 2024-05-01 PROCEDURE — 97110 THERAPEUTIC EXERCISES: CPT | Performed by: PHYSICAL THERAPIST

## 2024-05-01 NOTE — PROGRESS NOTES
Physical Therapy Daily Treatment Note               [address] [facility] [address]                                                                                                                                             [place] [address]    Patient: Jeb Olson   : 1950  Referring practitioner: TIFFANIE Shine  Date of Initial Visit: Type: THERAPY  Noted: 2024  Today's Date: 2024  Patient seen for 8 sessions       Visit Diagnoses:    ICD-10-CM ICD-9-CM   1. Acute postoperative pain of right knee  G89.18 719.46    M25.561 338.18   2. Status post total right knee replacement  Z96.651 V43.65   3. Difficulty walking  R26.2 719.7       Subjective Evaluation    History of Present Illness    Subjective comment: Pt reports that the back of his knee is feeling better since adding TKE's to his exercises.       Objective          Active Range of Motion     Right Knee   Flexion: 108 degrees       See Exercise, Manual, and Modality Logs for complete treatment.       Assessment & Plan       Assessment  Assessment details: Pt completed treatment with no c/o increased pain in (R) knee.  Pt has been compliant with with his HEP.  He remains motivated to increase his (R) knee's ROM LE strength for improved ADL's.  Continue to progress functional strength exercises per POC.          Timed:         Manual Therapy:    0     mins  26087;     Therapeutic Exercise:    25     mins  78287;     Neuromuscular Melyssa:    0    mins  00898;    Therapeutic Activity:     18     mins  96517;     Gait Trainin     mins  14527;     Ultrasound:     0     mins  29285;    E Stim                            0    mins   77423( g0283)  Work Carrera/Cond      0    mins   53922        Timed Treatment:   43   mins   Total Treatment:     43   mins    Abhilash Roman PTA  [place] License: [id number]

## 2024-05-03 ENCOUNTER — OFFICE VISIT (OUTPATIENT)
Dept: ORTHOPEDIC SURGERY | Facility: CLINIC | Age: 74
End: 2024-05-03
Payer: MEDICARE

## 2024-05-03 VITALS — TEMPERATURE: 98.2 F | WEIGHT: 256.8 LBS | BODY MASS INDEX: 32.96 KG/M2 | HEIGHT: 74 IN

## 2024-05-03 DIAGNOSIS — Z96.651 S/P TKR (TOTAL KNEE REPLACEMENT), RIGHT: Primary | ICD-10-CM

## 2024-05-03 NOTE — PROGRESS NOTES
Jeb Olson : 1950 MRN: [id number] DATE: 5/3/2024    DIAGNOSIS: 6 week follow up right TKA      SUBJECTIVE:  Patient returns today for 6 week follow up of right total knee replacement. Patient reports doing well with no unusual complaints.     Vitals:    24 1311   Temp: 98.2 °F (36.8 °C)     OBJECTIVE:     Exam:  The incision is well healed. No sign of infection. Range of motion is measured at 0 to 105. The calf is soft and nontender with a negative Homans sign.  Gait is reciprocal heel-to-toe and only mildly antalgic.    DIAGNOSTIC STUDIES    Xrays: 3 views of the right knee (AP, lateral, and sunrise) were ordered and reviewed for evaluation of recent knee replacement. They demonstrate a well positioned, well aligned knee replacement without complicating factors noted. In comparison with previous films there has been no change.    ASSESSMENT:  6 week status post right knee replacement.    PLAN:   1) Continue with PT exercises as prescribed     2) Follow up in 3 months    Chepe Alicea MD  5/3/2024     Answers submitted by the patient for this visit:  Primary Reason for Visit (Submitted on 2024)  What is the primary reason for your visit?: Other  Other (Submitted on 2024)  Please describe your symptoms.: Post-Op after  right knee replacement  Have you had these symptoms before?: Yes  How long have you been having these symptoms?: Greater than 2 weeks  Please list any medications you are currently taking for this condition.: Acetaminophen 325 MG, Hydrocod/Acetam 10-325mg - when needed

## 2024-05-06 ENCOUNTER — TREATMENT (OUTPATIENT)
Dept: PHYSICAL THERAPY | Facility: CLINIC | Age: 74
End: 2024-05-06
Payer: MEDICARE

## 2024-05-06 DIAGNOSIS — Z96.651 STATUS POST TOTAL RIGHT KNEE REPLACEMENT: ICD-10-CM

## 2024-05-06 DIAGNOSIS — R26.2 DIFFICULTY WALKING: ICD-10-CM

## 2024-05-06 DIAGNOSIS — G89.18 ACUTE POSTOPERATIVE PAIN OF RIGHT KNEE: Primary | ICD-10-CM

## 2024-05-06 DIAGNOSIS — M25.561 ACUTE POSTOPERATIVE PAIN OF RIGHT KNEE: Primary | ICD-10-CM

## 2024-05-06 PROCEDURE — 97110 THERAPEUTIC EXERCISES: CPT | Performed by: PHYSICAL THERAPIST

## 2024-05-06 PROCEDURE — 97530 THERAPEUTIC ACTIVITIES: CPT | Performed by: PHYSICAL THERAPIST

## 2024-05-06 RX ORDER — CLONIDINE HYDROCHLORIDE 0.2 MG/1
0.2 TABLET ORAL 2 TIMES DAILY
Qty: 180 TABLET | Refills: 0 | Status: SHIPPED | OUTPATIENT
Start: 2024-05-06

## 2024-05-08 ENCOUNTER — TREATMENT (OUTPATIENT)
Dept: PHYSICAL THERAPY | Facility: CLINIC | Age: 74
End: 2024-05-08
Payer: MEDICARE

## 2024-05-08 DIAGNOSIS — R26.2 DIFFICULTY WALKING: ICD-10-CM

## 2024-05-08 DIAGNOSIS — M25.561 ACUTE POSTOPERATIVE PAIN OF RIGHT KNEE: Primary | ICD-10-CM

## 2024-05-08 DIAGNOSIS — G89.18 ACUTE POSTOPERATIVE PAIN OF RIGHT KNEE: Primary | ICD-10-CM

## 2024-05-08 DIAGNOSIS — Z96.651 STATUS POST TOTAL RIGHT KNEE REPLACEMENT: ICD-10-CM

## 2024-05-08 PROCEDURE — 97530 THERAPEUTIC ACTIVITIES: CPT | Performed by: PHYSICAL THERAPIST

## 2024-05-08 PROCEDURE — 97110 THERAPEUTIC EXERCISES: CPT | Performed by: PHYSICAL THERAPIST

## 2024-05-13 ENCOUNTER — TREATMENT (OUTPATIENT)
Dept: PHYSICAL THERAPY | Facility: CLINIC | Age: 74
End: 2024-05-13
Payer: MEDICARE

## 2024-05-13 DIAGNOSIS — M25.561 ACUTE POSTOPERATIVE PAIN OF RIGHT KNEE: Primary | ICD-10-CM

## 2024-05-13 DIAGNOSIS — R26.2 DIFFICULTY WALKING: ICD-10-CM

## 2024-05-13 DIAGNOSIS — G89.18 ACUTE POSTOPERATIVE PAIN OF RIGHT KNEE: Primary | ICD-10-CM

## 2024-05-13 DIAGNOSIS — Z96.651 STATUS POST TOTAL RIGHT KNEE REPLACEMENT: ICD-10-CM

## 2024-05-13 PROCEDURE — 97530 THERAPEUTIC ACTIVITIES: CPT | Performed by: PHYSICAL THERAPIST

## 2024-05-13 PROCEDURE — 97110 THERAPEUTIC EXERCISES: CPT | Performed by: PHYSICAL THERAPIST

## 2024-05-13 NOTE — PROGRESS NOTES
"Physical Therapy Daily Treatment Note               [address] [facility] [address]                                                                                                                                             [place] [address]    Patient: Jeb Olson   : 1950  Referring practitioner: TIFFANIE Shine  Date of Initial Visit: Type: THERAPY  Noted: 2024  Today's Date: 2024  Patient seen for 11 sessions       Visit Diagnoses:    ICD-10-CM ICD-9-CM   1. Acute postoperative pain of right knee  G89.18 719.46    M25.561 338.18   2. Status post total right knee replacement  Z96.651 V43.65   3. Difficulty walking  R26.2 719.7       Subjective Evaluation    History of Present Illness    Subjective comment: Pt reports that his (R) knee has been aching and waking him up in the middles of the night.       Objective   See Exercise, Manual, and Modality Logs for complete treatment.       Assessment & Plan       Assessment  Assessment details: Pt completed open chain exercise with no c/o pain.  Attempted to progress to 6\" step for step ups, but pt had c/o pain in (L) knee cap.  Pt completed step with 4\" step with no issues.  Discussed pt returning to working out at [employer].    Continue to progress per POC.          Timed:         Manual Therapy:    0     mins  40674;     Therapeutic Exercise:    30     mins  46741;     Neuromuscular Melyssa:    0    mins  94609;    Therapeutic Activity:     25     mins  59290;     Gait Trainin     mins  81358;     Ultrasound:     0     mins  39376;    E Stim                            0    mins   84050( g0283)  Work Carrera/Cond      0    mins   22592        Timed Treatment:   55   mins   Total Treatment:     55   mins    Abhilash Roman PTA  [place] License: [id number]  "

## 2024-05-14 RX ORDER — SITAGLIPTIN AND METFORMIN HYDROCHLORIDE 1000; 50 MG/1; MG/1
TABLET, FILM COATED, EXTENDED RELEASE ORAL
Qty: 180 TABLET | Refills: 0 | Status: SHIPPED | OUTPATIENT
Start: 2024-05-14

## 2024-05-15 ENCOUNTER — TREATMENT (OUTPATIENT)
Dept: PHYSICAL THERAPY | Facility: CLINIC | Age: 74
End: 2024-05-15
Payer: MEDICARE

## 2024-05-15 DIAGNOSIS — G89.18 ACUTE POSTOPERATIVE PAIN OF RIGHT KNEE: Primary | ICD-10-CM

## 2024-05-15 DIAGNOSIS — R26.2 DIFFICULTY WALKING: ICD-10-CM

## 2024-05-15 DIAGNOSIS — Z96.651 STATUS POST TOTAL RIGHT KNEE REPLACEMENT: ICD-10-CM

## 2024-05-15 DIAGNOSIS — M25.561 ACUTE POSTOPERATIVE PAIN OF RIGHT KNEE: Primary | ICD-10-CM

## 2024-05-15 PROCEDURE — 97110 THERAPEUTIC EXERCISES: CPT | Performed by: PHYSICAL THERAPIST

## 2024-05-15 PROCEDURE — 97530 THERAPEUTIC ACTIVITIES: CPT | Performed by: PHYSICAL THERAPIST

## 2024-05-15 NOTE — PROGRESS NOTES
Physical Therapy Daily Treatment Note      [address] [facility], [address], [place] [id number]    Patient: Jeb Olson   : 1950  Referring practitioner: TIFFANIE Shine  Date of Initial Visit: Type: THERAPY  Noted: 2024  Today's Date: 5/15/2024  Patient seen for 12 sessions         Visit Diagnoses:     ICD-10-CM ICD-9-CM   1. Acute postoperative pain of right knee  G89.18 719.46    M25.561 338.18   2. Status post total right knee replacement  Z96.651 V43.65   3. Difficulty walking  R26.2 719.7         Subjective Evaluation    History of Present Illness    Subjective comment: Yesterday I had to do a lot of walking so my knee wasn't feeling too good.       Objective          Active Range of Motion     Right Knee   Flexion: 117 degrees   Extension: 0 degrees   Extensor la degrees       See Exercise, Manual, and Modality Logs for complete treatment.   *Progressed HS curls to green band resistance    Assessment & Plan       Assessment  Assessment details: Patient demonstrates good improvement in (R) knee AROM flexion this date, measured at 117 deg as compared to 109 deg as measured 9 days ago.  There remains a mild knee extensor lag for which patient will benefit from continued knee extension strengthening through full and terminal ranges of motion.  He ambulates short to moderate community distances without AD with minimal compensation including insufficient AROM knee flexion during swing phase of gait.          Progress strengthening /stabilization /functional activity         Timed:  Manual Therapy:         mins  67043;  Therapeutic Exercise:    28     mins  46257;     Neuromuscular Melyssa:        mins  45762;    Therapeutic Activity:     28     mins  25886;     Gait Training:           mins  67250;     Ultrasound:          mins  74820;    Untimed:  Electrical Stimulation:         mins  74379 ( );  Mechanical Traction:         mins  13855;   Dry Needling              ___  mins    40186    Timed Treatment:   56   mins   Total Treatment:     64   mins    Katie Talavera PT, DPT, OCS  Physical Therapist  [place] License #[id number]  Electronically signed by: Katie Talavera PT, 05/15/24, 9:48 AM EDT

## 2024-05-16 RX ORDER — NEBIVOLOL 5 MG/1
5 TABLET ORAL DAILY
Qty: 90 TABLET | Refills: 0 | Status: SHIPPED | OUTPATIENT
Start: 2024-05-16

## 2024-05-20 ENCOUNTER — TREATMENT (OUTPATIENT)
Dept: PHYSICAL THERAPY | Facility: CLINIC | Age: 74
End: 2024-05-20
Payer: MEDICARE

## 2024-05-20 DIAGNOSIS — R26.2 DIFFICULTY WALKING: ICD-10-CM

## 2024-05-20 DIAGNOSIS — Z96.651 STATUS POST TOTAL RIGHT KNEE REPLACEMENT: ICD-10-CM

## 2024-05-20 DIAGNOSIS — G89.18 ACUTE POSTOPERATIVE PAIN OF RIGHT KNEE: Primary | ICD-10-CM

## 2024-05-20 DIAGNOSIS — M25.561 ACUTE POSTOPERATIVE PAIN OF RIGHT KNEE: Primary | ICD-10-CM

## 2024-05-20 PROCEDURE — 97110 THERAPEUTIC EXERCISES: CPT | Performed by: PHYSICAL THERAPIST

## 2024-05-20 PROCEDURE — 97530 THERAPEUTIC ACTIVITIES: CPT | Performed by: PHYSICAL THERAPIST

## 2024-05-20 NOTE — PROGRESS NOTES
Physical Therapy Daily Treatment Note      [address] [facility], [address], [place] [id number]    Patient: Jeb Olson   : 1950  Referring practitioner: TIFFANIE Shine  Date of Initial Visit: Type: THERAPY  Noted: 2024  Today's Date: 2024  Patient seen for 13 sessions         Visit Diagnoses:     ICD-10-CM ICD-9-CM   1. Acute postoperative pain of right knee  G89.18 719.46    M25.561 338.18   2. Status post total right knee replacement  Z96.651 V43.65   3. Difficulty walking  R26.2 719.7         Subjective Evaluation    History of Present Illness    Subjective comment: My knee feels pretty good or [has some discomfort] depending on which stretch I'm doing.  I still have trouble bending my knee enough to ride my stairlift.       Objective   See Exercise, Manual, and Modality Logs for complete treatment.   *Added green band resistance to seated marching    Assessment & Plan       Assessment  Assessment details: Patient reports improving (R) knee symptoms and function overall but persistent difficulty with activities which require him to actively flex (R) knee such as to fit his LE in a predetermined space.  This includes use of his stairlift as he is accustomed to at home.  He demonstrates stead improvements in quality of gait with mild compensation only at this time.  He is nearing independence with progressed HEP as he continues to work toward meeting final PT goals.           Anticipate DC next Visit. Final ROM and strength measurements.          Timed:  Manual Therapy:         mins  97064;  Therapeutic Exercise:    29     mins  90806;     Neuromuscular Melyssa:        mins  35987;    Therapeutic Activity:     26     mins  88655;     Gait Training:           mins  57288;     Ultrasound:          mins  35705;    Untimed:  Electrical Stimulation:         mins  93549 ( );  Mechanical Traction:         mins  09573;   Dry Needling              ___  mins   77664    Timed Treatment:   55    mins   Total Treatment:     64   mins    Katie Talavera PT, DPT, OCS  Physical Therapist  [place] License #[id number]  Electronically signed by: Katie Talavera PT, 05/20/24, 10:01 AM EDT

## 2024-05-22 ENCOUNTER — TREATMENT (OUTPATIENT)
Dept: PHYSICAL THERAPY | Facility: CLINIC | Age: 74
End: 2024-05-22
Payer: MEDICARE

## 2024-05-22 DIAGNOSIS — M25.561 ACUTE POSTOPERATIVE PAIN OF RIGHT KNEE: Primary | ICD-10-CM

## 2024-05-22 DIAGNOSIS — G89.18 ACUTE POSTOPERATIVE PAIN OF RIGHT KNEE: Primary | ICD-10-CM

## 2024-05-22 DIAGNOSIS — Z96.651 STATUS POST TOTAL RIGHT KNEE REPLACEMENT: ICD-10-CM

## 2024-05-22 DIAGNOSIS — R26.2 DIFFICULTY WALKING: ICD-10-CM

## 2024-05-22 PROCEDURE — 97530 THERAPEUTIC ACTIVITIES: CPT | Performed by: PHYSICAL THERAPIST

## 2024-05-22 PROCEDURE — 97110 THERAPEUTIC EXERCISES: CPT | Performed by: PHYSICAL THERAPIST

## 2024-05-22 RX ORDER — DOXAZOSIN MESYLATE 4 MG/1
4 TABLET ORAL NIGHTLY
Qty: 90 TABLET | Refills: 0 | Status: SHIPPED | OUTPATIENT
Start: 2024-05-22

## 2024-05-22 NOTE — PROGRESS NOTES
Physical Therapy Daily Treatment Note               [address] [facility] [address]                                                                                                                                             [place] [address]    Patient: Jeb Olson   : 1950  Referring practitioner: TIFFANIE Shine  Date of Initial Visit: Type: THERAPY  Noted: 2024  Today's Date: 2024  Patient seen for 14 sessions       Visit Diagnoses:    ICD-10-CM ICD-9-CM   1. Acute postoperative pain of right knee  G89.18 719.46    M25.561 338.18   2. Status post total right knee replacement  Z96.651 V43.65   3. Difficulty walking  R26.2 719.7       Subjective Evaluation    History of Present Illness    Subjective comment: Pt denies any current pain in (R) knee today.  C/o pain in (L) knee from sit to stands.       Objective          Active Range of Motion     Right Knee   Flexion: 115 degrees   Extension: 0 degrees     Strength/Myotome Testing     Right Hip   Planes of Motion   Flexion: 4    Right Knee   Flexion: 4+  Extension: 5      See Exercise, Manual, and Modality Logs for complete treatment.       Assessment & Plan       Assessment  Assessment details: Pt completed treatment with mild c/o pain in (R) knee.  Pt had c/o pain in (R) at end range of knee flexion, and when starting step ups.  Pain with the step up resolved after three reps.    Pt has been compliant with his HEP and remains very motivated to improve his ROM for ADL's.  He has met 4/5 STG, and 4/6 LTG.  AROM knee flexion remains at 115-117 degrees.  Pt is aware of the limitations, but wishes to transition to a HEP.  He will continue on his own.    Goals  Plan Goals: STGs: to be met in 2 weeks  1. Patient will be independent with initial HEP - MET  2. Patient will report improved (R) knee symptoms 0-4/10 for improved activity and positional tolerance - MET  3. Patient will have improved (R) knee joint line girth by >/= 2.5 cm for evidence  of appropriate healing - MET  4. Patient will report consistently sleeping without pain interruption for improved restorative healing - PARTIALLY MET  5. Patient will demonstrate improved (R) knee AROM 5-110 deg for improved joint mobility -MET     LTGs: to be met in 6 weeks  1. Patient will be independent with progressed HEP -  MET  2. Patient will report improved (R) knee symptoms 0-2/10 for improved ADL and activity tolerance -  MET  3. Patient will demonstrate improved (R) knee AROM 0-120 deg for improved joint mobility - PARTIALLY MET  4. Patient will successfully transition away from use of STC for community distance ambulation on level and unlevel surfaces - MET  5. Patient will negotiate 1 flight of steps reciprocally with single handrail - NOT MET  6. Patient will have improved LEFS score >/= 40/80 to demonstrate readiness to return to gym activities - MET                  Timed:         Manual Therapy:    0     mins  13956;     Therapeutic Exercise:    30     mins  63747;     Neuromuscular Melyssa:    0    mins  55737;    Therapeutic Activity:     28     mins  78619;     Gait Trainin     mins  77480;     Ultrasound:     0     mins  98090;    E Stim                            0    mins   95472( g0283)  Work Carrera/Cond      0    mins   54252        Timed Treatment:   58   mins   Total Treatment:     58   mins    Abhilash Roman PTA  [place] License: [id number]

## 2024-05-28 RX ORDER — FENOFIBRATE 160 MG/1
TABLET ORAL
Qty: 90 TABLET | Refills: 0 | Status: SHIPPED | OUTPATIENT
Start: 2024-05-28

## 2024-05-29 ENCOUNTER — OFFICE VISIT (OUTPATIENT)
Dept: FAMILY MEDICINE CLINIC | Facility: CLINIC | Age: 74
End: 2024-05-29
Payer: MEDICARE

## 2024-05-29 VITALS
DIASTOLIC BLOOD PRESSURE: 76 MMHG | WEIGHT: 262 LBS | BODY MASS INDEX: 33.62 KG/M2 | HEIGHT: 74 IN | RESPIRATION RATE: 18 BRPM | HEART RATE: 58 BPM | OXYGEN SATURATION: 98 % | SYSTOLIC BLOOD PRESSURE: 128 MMHG

## 2024-05-29 DIAGNOSIS — Z00.00 ENCOUNTER FOR SUBSEQUENT ANNUAL WELLNESS VISIT (AWV) IN MEDICARE PATIENT: Primary | ICD-10-CM

## 2024-05-29 DIAGNOSIS — E11.41 TYPE 2 DIABETES MELLITUS WITH DIABETIC MONONEUROPATHY, WITHOUT LONG-TERM CURRENT USE OF INSULIN: ICD-10-CM

## 2024-05-29 PROCEDURE — 1126F AMNT PAIN NOTED NONE PRSNT: CPT | Performed by: INTERNAL MEDICINE

## 2024-05-29 PROCEDURE — G0439 PPPS, SUBSEQ VISIT: HCPCS | Performed by: INTERNAL MEDICINE

## 2024-05-29 PROCEDURE — 1160F RVW MEDS BY RX/DR IN RCRD: CPT | Performed by: INTERNAL MEDICINE

## 2024-05-29 PROCEDURE — 3074F SYST BP LT 130 MM HG: CPT | Performed by: INTERNAL MEDICINE

## 2024-05-29 PROCEDURE — 3078F DIAST BP <80 MM HG: CPT | Performed by: INTERNAL MEDICINE

## 2024-05-29 PROCEDURE — 1159F MED LIST DOCD IN RCRD: CPT | Performed by: INTERNAL MEDICINE

## 2024-05-29 RX ORDER — ESCITALOPRAM OXALATE 10 MG/1
10 TABLET ORAL EVERY EVENING
Qty: 90 TABLET | Refills: 1 | Status: SHIPPED | OUTPATIENT
Start: 2024-05-29

## 2024-05-29 NOTE — PROGRESS NOTES
The ABCs of the Annual Wellness Visit  Subsequent Medicare Wellness Visit    Subjective      Jeb Olson is a 73 y.o. male who presents for a Subsequent Medicare Wellness Visit.  Rosas is here today for his wellness visit.  He is basically doing okay other than still some knee stiffness from his recent knee surgery.  We did go over his health maintenance issues.  He did have a foot exam at the podiatrist but we did get a look at his feet again here today.  He is going to set up his eye exam.  He will also arrange for his colonoscopy that we reminded him is due in October of this year.  We did discuss other vaccines that may be necessary.    The following portions of the patient's history were reviewed and   updated as appropriate: He  has a past medical history of Allergic (Have had for several years), Anemia, Anxiety (Since about 2014), Arthritis (2002), Arthritis of knee, left, Cataract (05/2019), Colon polyp (2017), Coronary artery disease, Diabetes mellitus, Dry eyes, Essential hypertension, HL (hearing loss) (1980), Hyperlipemia, Knee swelling (7/7/2020), Mild chronic anemia, Obesity, and Sleep apnea.  He does not have any pertinent problems on file.  He  has a past surgical history that includes Colonoscopy; Achilles tendon repair (Left); Tonsillectomy; Esophagogastroduodenoscopy; Cardiac catheterization; Total knee arthroplasty (Left, 03/12/2020); Cardiac catheterization (N/A, 09/01/2022); Cardiac catheterization (N/A, 09/01/2022); Cataract extraction, extracapsular w/ intraocular lens implant (Bilateral); Total knee arthroplasty (Right, 03/21/2024); Joint replacement (03/12/20); and Foot surgery.  His family history includes Alcohol abuse in his father and maternal uncle; Arthritis in his mother; Diabetes in his mother; Hyperlipidemia in his father and mother; Osteoporosis in his mother.  He  reports that he has never smoked. He has never been exposed to tobacco smoke. He has never used smokeless  tobacco. He reports that he does not currently use alcohol after a past usage of about 10.0 standard drinks of alcohol per week. He reports that he does not use drugs.  Current Outpatient Medications   Medication Sig Dispense Refill    acetaminophen (TYLENOL) 325 MG tablet Take 2 tablets by mouth 2 (Two) Times a Day As Needed for Mild Pain. 60 tablet 0    aspirin 81 MG EC tablet Take 1 tablet by mouth 2 (Two) Times a Day. 60 tablet 0    benazepril (LOTENSIN) 40 MG tablet Take 1 tablet by mouth twice daily 180 tablet 0    cloNIDine (CATAPRES) 0.2 MG tablet Take 1 tablet by mouth twice daily 180 tablet 0    doxazosin (CARDURA) 4 MG tablet TAKE 1 TABLET BY MOUTH ONCE DAILY AT NIGHT 90 tablet 0    escitalopram (LEXAPRO) 10 MG tablet Take 1 tablet by mouth once daily (Patient taking differently: Take 1 tablet by mouth Every Evening.) 90 tablet 0    ezetimibe (ZETIA) 10 MG tablet Take 1 tablet by mouth once daily 90 tablet 0    fenofibrate 160 MG tablet TAKE 1 TABLET BY MOUTH AT NIGHT 90 tablet 0    fluticasone (FLONASE) 50 MCG/ACT nasal spray 2 sprays into the nostril(s) as directed by provider Every Morning. 2 sprays in each nostril      gabapentin (NEURONTIN) 600 MG tablet Take 1 tablet by mouth Every Evening.      glimepiride (AMARYL) 4 MG tablet Take 1 tablet by mouth twice daily 180 tablet 0    glucose blood (Accu-Chek Anabela Plus) test strip TEST TWICE DAILY Use as instructed 100 each 3    hydroCHLOROthiazide 25 MG tablet Take 1 tablet by mouth once daily 90 tablet 0    Janumet XR  MG tablet Take 1 tablet by mouth twice daily 180 tablet 0    Multiple Vitamins-Minerals (b complex-C-E-zinc) tablet Take 1 tablet by mouth Every Other Day.      Multiple Vitamins-Minerals (PRESERVISION AREDS 2 PO) Take 1 tablet by mouth 2 (Two) Times a Day.      nebivolol (BYSTOLIC) 5 MG tablet Take 1 tablet by mouth once daily 90 tablet 0    NIFEdipine XL (PROCARDIA XL) 60 MG 24 hr tablet Take 1 tablet by mouth Daily. (Patient  taking differently: Take 1 tablet by mouth Every Morning.) 90 tablet 1    Olopatadine HCl (PATADAY OP) Apply 1 drop to eye(s) as directed by provider 2 (Two) Times a Day As Needed (allergies).      polyethyl glycol-propyl glycol (SYSTANE) 0.4-0.3 % solution ophthalmic solution (artificial tears) Administer 1 drop to both eyes As Needed (dry eyes).      traZODone (DESYREL) 50 MG tablet TAKE 1 TABLET BY MOUTH ONCE DAILY AT NIGHT 90 tablet 0    vitamin D3 125 MCG (5000 UT) capsule capsule Take 1 capsule by mouth Daily.       No current facility-administered medications for this visit.     Facility-Administered Medications Ordered in Other Visits   Medication Dose Route Frequency Provider Last Rate Last Admin    Chlorhexidine Gluconate 2 % pads 3 each  3 pad  Apply externally BID Pricila Barnes APRN         Current Outpatient Medications on File Prior to Visit   Medication Sig    acetaminophen (TYLENOL) 325 MG tablet Take 2 tablets by mouth 2 (Two) Times a Day As Needed for Mild Pain.    aspirin 81 MG EC tablet Take 1 tablet by mouth 2 (Two) Times a Day.    benazepril (LOTENSIN) 40 MG tablet Take 1 tablet by mouth twice daily    cloNIDine (CATAPRES) 0.2 MG tablet Take 1 tablet by mouth twice daily    doxazosin (CARDURA) 4 MG tablet TAKE 1 TABLET BY MOUTH ONCE DAILY AT NIGHT    escitalopram (LEXAPRO) 10 MG tablet Take 1 tablet by mouth once daily (Patient taking differently: Take 1 tablet by mouth Every Evening.)    ezetimibe (ZETIA) 10 MG tablet Take 1 tablet by mouth once daily    fenofibrate 160 MG tablet TAKE 1 TABLET BY MOUTH AT NIGHT    fluticasone (FLONASE) 50 MCG/ACT nasal spray 2 sprays into the nostril(s) as directed by provider Every Morning. 2 sprays in each nostril    gabapentin (NEURONTIN) 600 MG tablet Take 1 tablet by mouth Every Evening.    glimepiride (AMARYL) 4 MG tablet Take 1 tablet by mouth twice daily    glucose blood (Accu-Chek Anabela Plus) test strip TEST TWICE DAILY Use as instructed     hydroCHLOROthiazide 25 MG tablet Take 1 tablet by mouth once daily    Janumet XR  MG tablet Take 1 tablet by mouth twice daily    Multiple Vitamins-Minerals (b complex-C-E-zinc) tablet Take 1 tablet by mouth Every Other Day.    Multiple Vitamins-Minerals (PRESERVISION AREDS 2 PO) Take 1 tablet by mouth 2 (Two) Times a Day.    nebivolol (BYSTOLIC) 5 MG tablet Take 1 tablet by mouth once daily    NIFEdipine XL (PROCARDIA XL) 60 MG 24 hr tablet Take 1 tablet by mouth Daily. (Patient taking differently: Take 1 tablet by mouth Every Morning.)    Olopatadine HCl (PATADAY OP) Apply 1 drop to eye(s) as directed by provider 2 (Two) Times a Day As Needed (allergies).    polyethyl glycol-propyl glycol (SYSTANE) 0.4-0.3 % solution ophthalmic solution (artificial tears) Administer 1 drop to both eyes As Needed (dry eyes).    traZODone (DESYREL) 50 MG tablet TAKE 1 TABLET BY MOUTH ONCE DAILY AT NIGHT    vitamin D3 125 MCG (5000 UT) capsule capsule Take 1 capsule by mouth Daily.    [DISCONTINUED] docusate sodium (COLACE) 100 MG capsule Take 1 capsule by mouth 2 (Two) Times a Day. (Patient not taking: Reported on 5/3/2024)    [DISCONTINUED] HYDROcodone-acetaminophen (NORCO)  MG per tablet Take 1 tablet by mouth Every 4 (Four) Hours As Needed for Moderate Pain. Indications: Pain (Patient not taking: Reported on 5/3/2024)    [DISCONTINUED] ondansetron (Zofran) 4 MG tablet Take 1 tablet by mouth Every 8 (Eight) Hours As Needed for Nausea or Vomiting. (Patient not taking: Reported on 4/5/2024)     Current Facility-Administered Medications on File Prior to Visit   Medication    Chlorhexidine Gluconate 2 % pads 3 each     He has No Known Allergies..    Compared to one year ago, the patient feels his physical   health is the same.    Compared to one year ago, the patient feels his mental   health is worse.    Recent Hospitalizations:  He was not admitted to the hospital during the last year.       Current Medical  Providers:  Patient Care Team:  Tera Salvador MD as PCP - General (Internal Medicine)  Micah Reyes MD as Consulting Physician (Gastroenterology)    Outpatient Medications Prior to Visit   Medication Sig Dispense Refill    acetaminophen (TYLENOL) 325 MG tablet Take 2 tablets by mouth 2 (Two) Times a Day As Needed for Mild Pain. 60 tablet 0    aspirin 81 MG EC tablet Take 1 tablet by mouth 2 (Two) Times a Day. 60 tablet 0    benazepril (LOTENSIN) 40 MG tablet Take 1 tablet by mouth twice daily 180 tablet 0    cloNIDine (CATAPRES) 0.2 MG tablet Take 1 tablet by mouth twice daily 180 tablet 0    doxazosin (CARDURA) 4 MG tablet TAKE 1 TABLET BY MOUTH ONCE DAILY AT NIGHT 90 tablet 0    escitalopram (LEXAPRO) 10 MG tablet Take 1 tablet by mouth once daily (Patient taking differently: Take 1 tablet by mouth Every Evening.) 90 tablet 0    ezetimibe (ZETIA) 10 MG tablet Take 1 tablet by mouth once daily 90 tablet 0    fenofibrate 160 MG tablet TAKE 1 TABLET BY MOUTH AT NIGHT 90 tablet 0    fluticasone (FLONASE) 50 MCG/ACT nasal spray 2 sprays into the nostril(s) as directed by provider Every Morning. 2 sprays in each nostril      gabapentin (NEURONTIN) 600 MG tablet Take 1 tablet by mouth Every Evening.      glimepiride (AMARYL) 4 MG tablet Take 1 tablet by mouth twice daily 180 tablet 0    glucose blood (Accu-Chek Anabela Plus) test strip TEST TWICE DAILY Use as instructed 100 each 3    hydroCHLOROthiazide 25 MG tablet Take 1 tablet by mouth once daily 90 tablet 0    Janumet XR  MG tablet Take 1 tablet by mouth twice daily 180 tablet 0    Multiple Vitamins-Minerals (b complex-C-E-zinc) tablet Take 1 tablet by mouth Every Other Day.      Multiple Vitamins-Minerals (PRESERVISION AREDS 2 PO) Take 1 tablet by mouth 2 (Two) Times a Day.      nebivolol (BYSTOLIC) 5 MG tablet Take 1 tablet by mouth once daily 90 tablet 0    NIFEdipine XL (PROCARDIA XL) 60 MG 24 hr tablet Take 1 tablet by mouth Daily. (Patient  taking differently: Take 1 tablet by mouth Every Morning.) 90 tablet 1    Olopatadine HCl (PATADAY OP) Apply 1 drop to eye(s) as directed by provider 2 (Two) Times a Day As Needed (allergies).      polyethyl glycol-propyl glycol (SYSTANE) 0.4-0.3 % solution ophthalmic solution (artificial tears) Administer 1 drop to both eyes As Needed (dry eyes).      traZODone (DESYREL) 50 MG tablet TAKE 1 TABLET BY MOUTH ONCE DAILY AT NIGHT 90 tablet 0    vitamin D3 125 MCG (5000 UT) capsule capsule Take 1 capsule by mouth Daily.      docusate sodium (COLACE) 100 MG capsule Take 1 capsule by mouth 2 (Two) Times a Day. (Patient not taking: Reported on 5/3/2024) 60 capsule 0    HYDROcodone-acetaminophen (NORCO)  MG per tablet Take 1 tablet by mouth Every 4 (Four) Hours As Needed for Moderate Pain. Indications: Pain (Patient not taking: Reported on 5/3/2024) 42 tablet 0    ondansetron (Zofran) 4 MG tablet Take 1 tablet by mouth Every 8 (Eight) Hours As Needed for Nausea or Vomiting. (Patient not taking: Reported on 4/5/2024) 12 tablet 0     Facility-Administered Medications Prior to Visit   Medication Dose Route Frequency Provider Last Rate Last Admin    Chlorhexidine Gluconate 2 % pads 3 each  3 pad  Apply externally BID Pricila Barnes, APRN           No opioid medication identified on active medication list. I have reviewed chart for other potential  high risk medication/s and harmful drug interactions in the elderly.        Aspirin is on active medication list. Aspirin use is indicated based on review of current medical condition/s. Pros and cons of this therapy have been discussed today. Benefits of this medication outweigh potential harm.  Patient has been encouraged to continue taking this medication.  .      Patient Active Problem List   Diagnosis    Allergic rhinitis    Arthritis of knee, left    Diabetes mellitus    Essential hypertension    Hyperlipidemia    High risk medication use    Obesity    Primary  "osteoarthritis of knee    Mild chronic anemia    Obstructive sleep apnea    Arthritis of left knee    Sinus bradycardia    Abnormal stress test    Colon polyp    Family history of colonic polyps    Reactive depression    Arthritis of knee, right    Arthritis of knee     Advance Care Planning   Advance Care Planning     Advance Directive is not on file.  ACP discussion was held with the patient during this visit. Patient does not have an advance directive, information provided.     Objective    Vitals:    05/29/24 0958   BP: 142/78   Pulse: 58   Resp: 18   SpO2: 98%   Weight: 119 kg (262 lb)   Height: 188 cm (74.02\")     Estimated body mass index is 33.62 kg/m² as calculated from the following:    Height as of this encounter: 188 cm (74.02\").    Weight as of this encounter: 119 kg (262 lb).  Physical Exam  Vitals and nursing note reviewed.   Constitutional:       Appearance: Normal appearance.   Cardiovascular:      Rate and Rhythm: Normal rate and regular rhythm.      Pulses: Normal pulses.      Heart sounds: No murmur heard.     No friction rub. No gallop.   Pulmonary:      Effort: Pulmonary effort is normal.      Breath sounds: No wheezing, rhonchi or rales.   Abdominal:      General: Bowel sounds are normal.      Palpations: Abdomen is soft.      Tenderness: There is no abdominal tenderness. There is no guarding or rebound.   Skin:     Comments: There is a irritated spot on the back of his left leg that is bleeding.  I do not see any evidence of chronic basal cell.   Neurological:      Mental Status: He is alert.            Does the patient have evidence of cognitive impairment?   No            HEALTH RISK ASSESSMENT    Smoking Status:  Social History     Tobacco Use   Smoking Status Never    Passive exposure: Never   Smokeless Tobacco Never   Tobacco Comments    Naila only every been around people who smoked     Alcohol Consumption:  Social History     Substance and Sexual Activity   Alcohol Use Not Currently    " Alcohol/week: 10.0 standard drinks of alcohol    Comment: Maybe 8-10 drinks mainly on weekends.     Fall Risk Screen:    TERRI Fall Risk Assessment was completed, and patient is at LOW risk for falls.Assessment completed on:2024    Depression Screenin/29/2024    10:00 AM   PHQ-2/PHQ-9 Depression Screening   Little Interest or Pleasure in Doing Things 0-->not at all   Feeling Down, Depressed or Hopeless 0-->not at all   PHQ-9: Brief Depression Severity Measure Score 0       Health Habits and Functional and Cognitive Screenin/22/2024     8:09 AM   Functional & Cognitive Status   Do you have difficulty preparing food and eating? No   Do you have difficulty bathing yourself, getting dressed or grooming yourself? No   Do you have difficulty using the toilet? No   Do you have difficulty moving around from place to place? No   Do you have trouble with steps or getting out of a bed or a chair? No   Current Diet Limited Junk Food   Dental Exam Up to date   Eye Exam Up to date   Exercise (times per week) 7 times per week   Current Exercises Include Other   Do you need help using the phone?  No   Are you deaf or do you have serious difficulty hearing?  No   Do you need help to go to places out of walking distance? No   Do you need help shopping? No   Do you need help preparing meals?  No   Do you need help with housework?  No   Do you need help with laundry? No   Do you need help taking your medications? No   Do you need help managing money? No   Do you ever drive or ride in a car without wearing a seat belt? No   Have you felt unusual stress, anger or loneliness in the last month? No   Who do you live with? Spouse   If you need help, do you have trouble finding someone available to you? No   Have you been bothered in the last four weeks by sexual problems? No   Do you have difficulty concentrating, remembering or making decisions? No       Age-appropriate Screening Schedule:  Refer to the list below  for future screening recommendations based on patient's age, sex and/or medical conditions. Orders for these recommended tests are listed in the plan section. The patient has been provided with a written plan.    Health Maintenance   Topic Date Due    ZOSTER VACCINE (1 of 2) Never done    RSV Vaccine - Adults (1 - 1-dose 60+ series) Never done    DIABETIC FOOT EXAM  08/07/2021    ANNUAL WELLNESS VISIT  04/18/2023    URINE MICROALBUMIN  04/18/2023    COVID-19 Vaccine (7 - 2023-24 season) 04/01/2024    LIPID PANEL  04/17/2024    HEMOGLOBIN A1C  04/18/2024    DIABETIC EYE EXAM  05/11/2024    COLORECTAL CANCER SCREENING  10/28/2024    INFLUENZA VACCINE  08/01/2024    BMI FOLLOWUP  10/18/2024    TDAP/TD VACCINES (2 - Td or Tdap) 04/18/2032    HEPATITIS C SCREENING  Completed    Pneumococcal Vaccine 65+  Completed                  CMS Preventative Services Quick Reference  Risk Factors Identified During Encounter:    Immunizations Discussed/Encouraged: Shingrix and RSV (Respiratory Syncytial Virus)    The above risks/problems have been discussed with the patient.  Pertinent information has been shared with the patient in the After Visit Summary.    Diagnoses and all orders for this visit:    1. Encounter for subsequent annual wellness visit (AWV) in Medicare patient (Primary)    2. Type 2 diabetes mellitus with diabetic mononeuropathy, without long-term current use of insulin  -     Comprehensive Metabolic Panel  -     Lipid Panel  -     Microalbumin / Creatinine Urine Ratio - Urine, Clean Catch  -     Hemoglobin A1c    Other orders  -     escitalopram (LEXAPRO) 10 MG tablet; Take 1 tablet by mouth Every Evening.  Dispense: 90 tablet; Refill: 1    Rosas is here today for his wellness visit.  He is due to have some lab work drawn for his diabetes and we will accommodate him on that today.    Follow Up:   Next Medicare Wellness visit to be scheduled in 1 year.      An After Visit Summary and PPPS were made available to the  patient.

## 2024-05-29 NOTE — PATIENT INSTRUCTIONS
Medicare Wellness  Personal Prevention Plan of Service     Date of Office Visit:    Encounter Provider:  Tera Salvador MD  Place of Service:  [facility]  Patient Name: Jeb Olson  :  1950    As part of the Medicare Wellness portion of your visit today, we are providing you with this personalized preventive plan of services (PPPS). This plan is based upon recommendations of the [place] Preventive Services Task Force (USPSTF) and the Advisory Committee on Immunization Practices (ACIP).    This lists the preventive care services that should be considered, and provides dates of when you are due. Items listed as completed are up-to-date and do not require any further intervention.    Health Maintenance   Topic Date Due    ZOSTER VACCINE (1 of 2) Never done    RSV Vaccine - Adults (1 - 1-dose 60+ series) Never done    DIABETIC FOOT EXAM  2021    ANNUAL WELLNESS VISIT  2023    URINE MICROALBUMIN  2023    COVID-19 Vaccine (2023-24 season) 2024    LIPID PANEL  2024    HEMOGLOBIN A1C  2024    DIABETIC EYE EXAM  2024    COLORECTAL CANCER SCREENING  10/28/2024    INFLUENZA VACCINE  2024    BMI FOLLOWUP  10/18/2024    TDAP/TD VACCINES (2 - Td or Tdap) 2032    HEPATITIS C SCREENING  Completed    Pneumococcal Vaccine 65+  Completed       No orders of the defined types were placed in this encounter.      No follow-ups on file.

## 2024-05-30 LAB
ALBUMIN SERPL-MCNC: 4.3 G/DL (ref 3.5–5.2)
ALBUMIN/CREAT UR: 12 MG/G CREAT (ref 0–29)
ALBUMIN/GLOB SERPL: 2 G/DL
ALP SERPL-CCNC: 33 U/L (ref 39–117)
ALT SERPL-CCNC: 13 U/L (ref 1–41)
AST SERPL-CCNC: 18 U/L (ref 1–40)
BILIRUB SERPL-MCNC: 0.2 MG/DL (ref 0–1.2)
BUN SERPL-MCNC: 25 MG/DL (ref 8–23)
BUN/CREAT SERPL: 22.7 (ref 7–25)
CALCIUM SERPL-MCNC: 9.6 MG/DL (ref 8.6–10.5)
CHLORIDE SERPL-SCNC: 101 MMOL/L (ref 98–107)
CHOLEST SERPL-MCNC: 154 MG/DL (ref 0–200)
CO2 SERPL-SCNC: 30.2 MMOL/L (ref 22–29)
CREAT SERPL-MCNC: 1.1 MG/DL (ref 0.76–1.27)
CREAT UR-MCNC: 95.2 MG/DL
EGFRCR SERPLBLD CKD-EPI 2021: 71.8 ML/MIN/1.73
GLOBULIN SER CALC-MCNC: 2.2 GM/DL
GLUCOSE SERPL-MCNC: 161 MG/DL (ref 65–99)
HBA1C MFR BLD: 6.1 % (ref 4.8–5.6)
HDLC SERPL-MCNC: 36 MG/DL (ref 40–60)
LDLC SERPL CALC-MCNC: 85 MG/DL (ref 0–100)
MICROALBUMIN UR-MCNC: 11.2 UG/ML
POTASSIUM SERPL-SCNC: 4.1 MMOL/L (ref 3.5–5.2)
PROT SERPL-MCNC: 6.5 G/DL (ref 6–8.5)
SODIUM SERPL-SCNC: 140 MMOL/L (ref 136–145)
TRIGL SERPL-MCNC: 193 MG/DL (ref 0–150)
VLDLC SERPL CALC-MCNC: 33 MG/DL (ref 5–40)

## 2024-06-17 ENCOUNTER — OFFICE VISIT (OUTPATIENT)
Dept: ORTHOPEDIC SURGERY | Facility: CLINIC | Age: 74
End: 2024-06-17
Payer: MEDICARE

## 2024-06-17 DIAGNOSIS — Z09 SURGERY FOLLOW-UP: ICD-10-CM

## 2024-06-17 DIAGNOSIS — Z96.651 S/P TKR (TOTAL KNEE REPLACEMENT), RIGHT: Primary | ICD-10-CM

## 2024-06-17 PROCEDURE — 1159F MED LIST DOCD IN RCRD: CPT | Performed by: ORTHOPAEDIC SURGERY

## 2024-06-17 PROCEDURE — 1160F RVW MEDS BY RX/DR IN RCRD: CPT | Performed by: ORTHOPAEDIC SURGERY

## 2024-06-17 PROCEDURE — 73562 X-RAY EXAM OF KNEE 3: CPT | Performed by: ORTHOPAEDIC SURGERY

## 2024-06-17 PROCEDURE — 99212 OFFICE O/P EST SF 10 MIN: CPT | Performed by: ORTHOPAEDIC SURGERY

## 2024-06-17 RX ORDER — BENAZEPRIL HYDROCHLORIDE 40 MG/1
TABLET ORAL
Qty: 180 TABLET | Refills: 0 | Status: SHIPPED | OUTPATIENT
Start: 2024-06-17

## 2024-06-17 NOTE — PROGRESS NOTES
Jeb Olson : 1950 MRN: [id number] DATE: 2024     DIAGNOSIS: 3 month follow up right TKA      SUBJECTIVE:  Patient returns today for 3 month follow up of right total knee replacement.  Patient reports doing well with no unusual complaints.     OBJECTIVE:     Exam:  The incision is well healed. No sign of infection. Range of motion: 0 to 105. The calf is soft and nontender with a negative Homans sign.  Both ankles have 1+ pitting edema. Gait is reciprocal heel-to-toe and nonantalgic.    DIAGNOSTIC STUDIES    Xrays: AP, lateral and merchant's views of the right knee are ordered and reviewed for evaluation of recent knee replacement. They demonstrate a well positioned, well aligned knee replacement without complicating factors noted.  In comparison with previous films there has been no change.    ASSESSMENT: 3 months status post right knee replacement    PLAN: Continue with PT exercises as prescribed.  His motion is not quite what I would want but I think he still has plenty of time as long as he is diligent about the exercises.  I recommend he continue a home therapy program.  I offered to refer him back to formal PT but he was not interested.  We discussed appropriate activity modifications and restrictions.  We discussed antibiotic prophylaxis recommendations.  Does appear to me that he is retaining fluid in both legs.  He has 1+ pitting edema.  I asked him if he is ever taken diuretics in the past.  He thinks that he has.  I suggested that he might want to talk to his PCP to see if they think that is a reasonable option.  I would defer to his PCP about that.  I want him to follow-up with me in 3 months for a recheck.    Chepe Alicea MD    2024    Answers submitted by the patient for this visit:  Primary Reason for Visit (Submitted on 2024)  What is the primary reason for your visit?: Other  Other (Submitted on 2024)  Please describe your symptoms.: Follow up after right knee  replacement.  Have you had these symptoms before?: No  How long have you been having these symptoms?: Greater than 2 weeks  Please list any medications you are currently taking for this condition.: Tylenol 500mg when needed.

## 2024-06-28 RX ORDER — HYDROCHLOROTHIAZIDE 25 MG/1
TABLET ORAL
Qty: 90 TABLET | Refills: 0 | Status: SHIPPED | OUTPATIENT
Start: 2024-06-28

## 2024-07-03 ENCOUNTER — TELEPHONE (OUTPATIENT)
Dept: GASTROENTEROLOGY | Facility: CLINIC | Age: 74
End: 2024-07-03
Payer: MEDICARE

## 2024-07-03 NOTE — TELEPHONE ENCOUNTER
"HUB STAFF ATTEMPTED TO FOLLOW WARM TRANSFER PROCESS BUT WAS UNSUCCESSFUL.     Caller: Jeb Olson \"Rosas\"    Relationship to patient: Self    Best call back number: [phone]  BEST TO REACH BEFORE 1PM TODAY. FIRST THING FRIDAY MORNING.     Chief complaint: SCHEDULE COLONOSCOPY     Type of visit: GASTRO PROCEDURE     Additional notes:PATIENT WAS SCHEDULED IN 2022 FOR COLONOSCOPY BUT HAD TO CANCEL AND WOULD LIKE TO SCHEDULE NOW. PLEASE REVIEW AND CONTACT PATIENT TO SCHEDULE.       "

## 2024-07-22 RX ORDER — EZETIMIBE 10 MG/1
10 TABLET ORAL DAILY
Qty: 90 TABLET | Refills: 0 | Status: SHIPPED | OUTPATIENT
Start: 2024-07-22

## 2024-07-22 RX ORDER — TRAZODONE HYDROCHLORIDE 50 MG/1
50 TABLET ORAL NIGHTLY
Qty: 90 TABLET | Refills: 0 | Status: SHIPPED | OUTPATIENT
Start: 2024-07-22

## 2024-08-01 RX ORDER — CLONIDINE HYDROCHLORIDE 0.2 MG/1
0.2 TABLET ORAL 2 TIMES DAILY
Qty: 180 TABLET | Refills: 0 | Status: SHIPPED | OUTPATIENT
Start: 2024-08-01

## 2024-08-12 RX ORDER — SITAGLIPTIN AND METFORMIN HYDROCHLORIDE 1000; 50 MG/1; MG/1
TABLET, FILM COATED, EXTENDED RELEASE ORAL
Qty: 180 TABLET | Refills: 0 | Status: SHIPPED | OUTPATIENT
Start: 2024-08-12

## 2024-08-16 RX ORDER — DOXAZOSIN MESYLATE 4 MG/1
4 TABLET ORAL NIGHTLY
Qty: 90 TABLET | Refills: 1 | Status: SHIPPED | OUTPATIENT
Start: 2024-08-16

## 2024-08-19 RX ORDER — NIFEDIPINE 60 MG/1
60 TABLET, EXTENDED RELEASE ORAL EVERY MORNING
Qty: 90 TABLET | Refills: 0 | Status: SHIPPED | OUTPATIENT
Start: 2024-08-19

## 2024-08-19 NOTE — TELEPHONE ENCOUNTER
Refill Protocol Failed, Requesting Nifedipine 60mg QD    LOV w/ you 10/30/23  FU w/ you 11/1/24  Last Labs- 5/29/24    Please review and sign.    ALVINA Hurtado

## 2024-08-20 RX ORDER — NIFEDIPINE 60 MG/1
TABLET, EXTENDED RELEASE ORAL DAILY
Qty: 90 TABLET | Refills: 0 | OUTPATIENT
Start: 2024-08-20

## 2024-08-21 RX ORDER — NEBIVOLOL 5 MG/1
5 TABLET ORAL DAILY
Qty: 90 TABLET | Refills: 0 | Status: SHIPPED | OUTPATIENT
Start: 2024-08-21

## 2024-08-27 RX ORDER — FENOFIBRATE 160 MG/1
TABLET ORAL
Qty: 90 TABLET | Refills: 0 | Status: SHIPPED | OUTPATIENT
Start: 2024-08-27

## 2024-09-13 RX ORDER — BENAZEPRIL HYDROCHLORIDE 40 MG/1
TABLET ORAL
Qty: 180 TABLET | Refills: 0 | Status: SHIPPED | OUTPATIENT
Start: 2024-09-13

## 2024-09-16 ENCOUNTER — OFFICE VISIT (OUTPATIENT)
Dept: ORTHOPEDIC SURGERY | Facility: CLINIC | Age: 74
End: 2024-09-16
Payer: MEDICARE

## 2024-09-16 VITALS — HEIGHT: 74 IN | BODY MASS INDEX: 33.47 KG/M2 | TEMPERATURE: 98.6 F | WEIGHT: 260.8 LBS

## 2024-09-16 DIAGNOSIS — Z09 SURGERY FOLLOW-UP: ICD-10-CM

## 2024-09-16 DIAGNOSIS — Z96.651 S/P TKR (TOTAL KNEE REPLACEMENT), RIGHT: Primary | ICD-10-CM

## 2024-09-16 PROCEDURE — 99212 OFFICE O/P EST SF 10 MIN: CPT | Performed by: ORTHOPAEDIC SURGERY

## 2024-09-16 PROCEDURE — 73562 X-RAY EXAM OF KNEE 3: CPT | Performed by: ORTHOPAEDIC SURGERY

## 2024-09-16 PROCEDURE — 1160F RVW MEDS BY RX/DR IN RCRD: CPT | Performed by: ORTHOPAEDIC SURGERY

## 2024-09-16 PROCEDURE — 1159F MED LIST DOCD IN RCRD: CPT | Performed by: ORTHOPAEDIC SURGERY

## 2024-09-16 RX ORDER — CEPHALEXIN 500 MG/1
CAPSULE ORAL
Qty: 4 CAPSULE | Refills: 2 | Status: SHIPPED | OUTPATIENT
Start: 2024-09-16

## 2024-09-17 ENCOUNTER — TELEPHONE (OUTPATIENT)
Dept: CARDIOLOGY | Facility: CLINIC | Age: 74
End: 2024-09-17
Payer: MEDICARE

## 2024-09-20 ENCOUNTER — PATIENT MESSAGE (OUTPATIENT)
Age: 74
End: 2024-09-20

## 2024-09-20 ENCOUNTER — OFFICE VISIT (OUTPATIENT)
Age: 74
End: 2024-09-20
Payer: MEDICARE

## 2024-09-20 VITALS
WEIGHT: 267 LBS | BODY MASS INDEX: 34.27 KG/M2 | OXYGEN SATURATION: 98 % | DIASTOLIC BLOOD PRESSURE: 80 MMHG | HEART RATE: 59 BPM | SYSTOLIC BLOOD PRESSURE: 140 MMHG | HEIGHT: 74 IN

## 2024-09-20 DIAGNOSIS — I48.0 AF (PAROXYSMAL ATRIAL FIBRILLATION): Primary | ICD-10-CM

## 2024-09-20 PROCEDURE — 3077F SYST BP >= 140 MM HG: CPT | Performed by: INTERNAL MEDICINE

## 2024-09-20 PROCEDURE — 3079F DIAST BP 80-89 MM HG: CPT | Performed by: INTERNAL MEDICINE

## 2024-09-20 PROCEDURE — 99214 OFFICE O/P EST MOD 30 MIN: CPT | Performed by: INTERNAL MEDICINE

## 2024-09-20 PROCEDURE — 1159F MED LIST DOCD IN RCRD: CPT | Performed by: INTERNAL MEDICINE

## 2024-09-20 PROCEDURE — 1160F RVW MEDS BY RX/DR IN RCRD: CPT | Performed by: INTERNAL MEDICINE

## 2024-09-20 PROCEDURE — 93000 ELECTROCARDIOGRAM COMPLETE: CPT | Performed by: INTERNAL MEDICINE

## 2024-09-23 ENCOUNTER — ANTICOAGULATION VISIT (OUTPATIENT)
Dept: PHARMACY | Facility: HOSPITAL | Age: 74
End: 2024-09-23
Payer: MEDICARE

## 2024-09-23 DIAGNOSIS — I48.0 AF (PAROXYSMAL ATRIAL FIBRILLATION): Primary | ICD-10-CM

## 2024-09-23 RX ORDER — WARFARIN SODIUM 5 MG/1
5 TABLET ORAL
Qty: 180 TABLET | Refills: 3 | Status: SHIPPED | OUTPATIENT
Start: 2024-09-23

## 2024-09-24 RX ORDER — HYDROCHLOROTHIAZIDE 25 MG/1
TABLET ORAL
Qty: 90 TABLET | Refills: 0 | Status: SHIPPED | OUTPATIENT
Start: 2024-09-24

## 2024-09-27 ENCOUNTER — ANTICOAGULATION VISIT (OUTPATIENT)
Dept: PHARMACY | Facility: HOSPITAL | Age: 74
End: 2024-09-27
Payer: MEDICARE

## 2024-09-27 DIAGNOSIS — I48.0 AF (PAROXYSMAL ATRIAL FIBRILLATION): Primary | ICD-10-CM

## 2024-09-27 LAB
INR PPP: 2.1 (ref 0.91–1.09)
PROTHROMBIN TIME: 25 SECONDS (ref 10–13.8)

## 2024-09-27 PROCEDURE — 36416 COLLJ CAPILLARY BLOOD SPEC: CPT

## 2024-09-27 PROCEDURE — 85610 PROTHROMBIN TIME: CPT

## 2024-09-27 PROCEDURE — G0463 HOSPITAL OUTPT CLINIC VISIT: HCPCS

## 2024-09-30 ENCOUNTER — ANTICOAGULATION VISIT (OUTPATIENT)
Dept: PHARMACY | Facility: HOSPITAL | Age: 74
End: 2024-09-30
Payer: MEDICARE

## 2024-09-30 DIAGNOSIS — I48.0 AF (PAROXYSMAL ATRIAL FIBRILLATION): Primary | ICD-10-CM

## 2024-09-30 LAB
INR PPP: 1.4 (ref 0.91–1.09)
PROTHROMBIN TIME: 17 SECONDS (ref 10–13.8)

## 2024-09-30 PROCEDURE — 85610 PROTHROMBIN TIME: CPT

## 2024-09-30 PROCEDURE — G0463 HOSPITAL OUTPT CLINIC VISIT: HCPCS

## 2024-09-30 PROCEDURE — 36416 COLLJ CAPILLARY BLOOD SPEC: CPT

## 2024-09-30 NOTE — PROGRESS NOTES
I have supervised and reviewed the notes, assessments, and/or procedures performed. I personally performed the assessment and implemented the plan. I concur with the documentation of this patient encounter.    Dorota Mckeon, [facility]

## 2024-09-30 NOTE — PROGRESS NOTES
Anticoagulation Clinic Progress Note    Anticoagulation Summary  As of 2024      INR goal:  2.0-3.0   TTR:  --   INR used for dosin.4 (2024)   Warfarin maintenance plan:  5 mg every day   Weekly warfarin total:  35 mg   Plan last modified:  Dorota Mckeon RPH (2024)   Next INR check:  10/4/2024   Target end date:      Indications    AF (paroxysmal atrial fibrillation) [I48.0]                 Anticoagulation Episode Summary       INR check location:      Preferred lab:      Send INR reminders to:  [facility] [place]    Comments:            Anticoagulation Care Providers       Provider Role Specialty Phone number    Bruna Chávez MD Referring Cardiology [phone]            Clinic Interview:  Patient Findings     Negatives:  Signs/symptoms of thrombosis, Signs/symptoms of bleeding,   Laboratory test error suspected, Change in health, Change in alcohol use,   Change in activity, Upcoming invasive procedure, Emergency department   visit, Upcoming dental procedure, Missed doses, Extra doses, Change in   medications, Change in diet/appetite, Hospital admission, Bruising, Other   complaints      Clinical Outcomes     Negatives:  Major bleeding event, Thromboembolic event,   Anticoagulation-related hospital admission, Anticoagulation-related ED   visit, Anticoagulation-related fatality        INR History:      2024     3:04 PM 2024    10:15 AM 2024     1:30 PM   Anticoagulation Monitoring   INR  2.1 1.4   INR Date  2024   INR Goal 2.0-3.0 2.0-3.0 2.0-3.0   Last Week Total  30 mg 35 mg   Next Week Total  5 mg 35 mg   Sun  Hold () -   Mon  - 5 mg   Tue  - 5 mg   Wed  - 5 mg   Thu  - 5 mg   Fri  5 mg () -   Sat  Hold () -       Plan:  1. INR is out of range- see above in Anticoagulation Summary.   Will instruct Jeb Olson to take 5mg daily.- see above in Anticoagulation Summary.  2. Follow up on Friday.   3. Patient declines warfarin  refills.  4. Verbal and written information provided. Patient expresses understanding and has no further questions at this time.    Anju Díaz, Pharmacy Technician

## 2024-10-04 ENCOUNTER — ANTICOAGULATION VISIT (OUTPATIENT)
Dept: PHARMACY | Facility: HOSPITAL | Age: 74
End: 2024-10-04
Payer: MEDICARE

## 2024-10-04 DIAGNOSIS — I48.0 AF (PAROXYSMAL ATRIAL FIBRILLATION): Primary | ICD-10-CM

## 2024-10-04 LAB
INR PPP: 1.6 (ref 0.91–1.09)
PROTHROMBIN TIME: 18.7 SECONDS (ref 10–13.8)

## 2024-10-04 PROCEDURE — 36416 COLLJ CAPILLARY BLOOD SPEC: CPT

## 2024-10-04 PROCEDURE — G0463 HOSPITAL OUTPT CLINIC VISIT: HCPCS

## 2024-10-04 PROCEDURE — 85610 PROTHROMBIN TIME: CPT

## 2024-10-04 NOTE — PROGRESS NOTES
Anticoagulation Clinic Progress Note    Anticoagulation Summary  As of 10/4/2024      INR goal:  2.0-3.0   TTR:  0.0% (1 d)   INR used for dosin.6 (10/4/2024)   Warfarin maintenance plan:  5 mg every day   Weekly warfarin total:  35 mg   No change documented:  Anju Díaz, Pharmacy Technician   Plan last modified:  Dorota Mckeon RPH (2024)   Next INR check:  10/9/2024   Target end date:      Indications    AF (paroxysmal atrial fibrillation) [I48.0]                 Anticoagulation Episode Summary       INR check location:      Preferred lab:      Send INR reminders to:   [facility] [place]    Comments:            Anticoagulation Care Providers       Provider Role Specialty Phone number    Bruna Chávez MD Referring Cardiology [phone]            Clinic Interview:  Patient Findings     Negatives:  Signs/symptoms of thrombosis, Signs/symptoms of bleeding,   Laboratory test error suspected, Change in health, Change in alcohol use,   Change in activity, Upcoming invasive procedure, Emergency department   visit, Upcoming dental procedure, Missed doses, Extra doses, Change in   medications, Change in diet/appetite, Hospital admission, Bruising, Other   complaints      Clinical Outcomes     Negatives:  Major bleeding event, Thromboembolic event,   Anticoagulation-related hospital admission, Anticoagulation-related ED   visit, Anticoagulation-related fatality        INR History:      2024     3:04 PM 2024    10:15 AM 2024     1:30 PM 10/4/2024     1:00 PM   Anticoagulation Monitoring   INR  2.1 1.4 1.6   INR Date  2024 2024 10/4/2024   INR Goal 2.0-3.0 2.0-3.0 2.0-3.0 2.0-3.0   Trend    Same   Last Week Total  30 mg 35 mg 25 mg   Next Week Total  5 mg 35 mg 35 mg   Sun  Hold () - 5 mg   Mon  - 5 mg 5 mg   Tue  - 5 mg 5 mg   Wed  - 5 mg -   Thu  - 5 mg -   Fri  5 mg () - 5 mg   Sat  Hold () - 5 mg       Plan:  1. INR is out of range- see above in  Anticoagulation Summary.   Will instruct Jeb Olson to Continue  their warfarin regimen of 5mg daily- see above in Anticoagulation Summary.  2. Follow up in 5 days.   3. Patient declines warfarin refills.  4. Verbal and written information provided. Patient expresses understanding and has no further questions at this time.    Anju Díaz, Pharmacy Technician

## 2024-10-04 NOTE — PROGRESS NOTES
INR still expected to rise as patient held warfarin last Saturday and Sunday. Recheck in 5 days     I have supervised and reviewed the notes, assessments, and/or procedures performed. I personally performed the assessment and implemented the plan. I concur with the documentation of this patient encounter.    Dorota Mckeon, [facility]

## 2024-10-09 ENCOUNTER — OFFICE VISIT (OUTPATIENT)
Dept: GASTROENTEROLOGY | Facility: CLINIC | Age: 74
End: 2024-10-09
Payer: MEDICARE

## 2024-10-09 ENCOUNTER — ANTICOAGULATION VISIT (OUTPATIENT)
Dept: PHARMACY | Facility: HOSPITAL | Age: 74
End: 2024-10-09
Payer: MEDICARE

## 2024-10-09 VITALS
SYSTOLIC BLOOD PRESSURE: 134 MMHG | WEIGHT: 270.2 LBS | BODY MASS INDEX: 34.68 KG/M2 | TEMPERATURE: 97.7 F | HEIGHT: 74 IN | DIASTOLIC BLOOD PRESSURE: 69 MMHG | HEART RATE: 65 BPM

## 2024-10-09 DIAGNOSIS — I48.0 AF (PAROXYSMAL ATRIAL FIBRILLATION): Primary | ICD-10-CM

## 2024-10-09 DIAGNOSIS — D12.6 ADENOMATOUS POLYP OF COLON, UNSPECIFIED PART OF COLON: Primary | ICD-10-CM

## 2024-10-09 LAB
INR PPP: 1.8 (ref 0.91–1.09)
PROTHROMBIN TIME: 21.8 SECONDS (ref 10–13.8)

## 2024-10-09 PROCEDURE — 36416 COLLJ CAPILLARY BLOOD SPEC: CPT

## 2024-10-09 PROCEDURE — G0463 HOSPITAL OUTPT CLINIC VISIT: HCPCS

## 2024-10-09 PROCEDURE — 85610 PROTHROMBIN TIME: CPT

## 2024-10-09 NOTE — PROGRESS NOTES
Anticoagulation Clinic Progress Note    Anticoagulation Summary  As of 10/9/2024      INR goal:  2.0-3.0   TTR:  0.0% (6 d)   INR used for dosin.8 (10/9/2024)   Warfarin maintenance plan:  7.5 mg every Wed; 5 mg all other days   Weekly warfarin total:  37.5 mg   Plan last modified:  Gurjit Velásquez, PharmD (10/9/2024)   Next INR check:  10/16/2024   Target end date:      Indications    AF (paroxysmal atrial fibrillation) [I48.0]                 Anticoagulation Episode Summary       INR check location:      Preferred lab:      Send INR reminders to:   [facility] [place]    Comments:            Anticoagulation Care Providers       Provider Role Specialty Phone number    Bruna Chávez MD Referring Cardiology [phone]            Clinic Interview:  Patient Findings     Negatives:  Signs/symptoms of thrombosis, Signs/symptoms of bleeding,   Laboratory test error suspected, Change in health, Change in alcohol use,   Change in activity, Upcoming invasive procedure, Emergency department   visit, Upcoming dental procedure, Missed doses, Extra doses, Change in   medications, Change in diet/appetite, Hospital admission, Bruising, Other   complaints      Clinical Outcomes     Negatives:  Major bleeding event, Thromboembolic event,   Anticoagulation-related hospital admission, Anticoagulation-related ED   visit, Anticoagulation-related fatality        INR History:      2024     3:04 PM 2024    10:15 AM 2024     1:30 PM 10/4/2024     1:00 PM 10/9/2024    10:00 AM   Anticoagulation Monitoring   INR  2.1 1.4 1.6 1.8   INR Date  2024 2024 10/4/2024 10/9/2024   INR Goal 2.0-3.0 2.0-3.0 2.0-3.0 2.0-3.0 2.0-3.0   Trend    Same Up   Last Week Total  30 mg 35 mg 25 mg 35 mg   Next Week Total  5 mg 35 mg 35 mg 37.5 mg   Sun  Hold () - 5 mg 5 mg   Mon  - 5 mg 5 mg 5 mg   Tue  - 5 mg 5 mg 5 mg   Wed  - 5 mg - 7.5 mg   Thu  - 5 mg - 5 mg   Fri  5 mg () - 5 mg 5 mg   Sat  Hold () - 5 mg 5  mg   Visit Report     Report       Plan:  1. INR is Subtherapeutic today- see above in Anticoagulation Summary.  Will instruct Jeb Olson to Increase their warfarin regimen to 7.5 mg Wed, 5 mg all other days - see above in Anticoagulation Summary.  2. Follow up in 1 week  3. Patient declines warfarin refills.  4. Verbal and written information provided. Patient expresses understanding and has no further questions at this time.    Gurjit Velásquez, PharmD

## 2024-10-09 NOTE — PROGRESS NOTES
No chief complaint on file.      History of Present Illness:   74 y.o. male who needs a colonoscopy. His last was in 2014. He had one colon polyp and it was recommended that he have another c/s  in 5 yrs. He Is on warfarin (Dr. Chávez) for a fib. No h/o CVA. No recctal bleeding or melena. No diarrhea or constiaption. No abd or chest pain. No nausea or vomiting. His weight is going up.     Past Medical History:   Diagnosis Date    Allergic Have had for several years    Eyes and nasal issues    Anemia     Anxiety Since about 2014    Since I was taking of my Dad, who also passed away 3yrs ago.    Arthritis 2002    Both knees, hips, and shoulders    Arthritis of knee, left     Cataract 05/2019    Colon polyp 2017    Coronary artery disease     stent    Diabetes mellitus     Dry eyes     Essential hypertension     HL (hearing loss) 1980    no hearing aides    Hyperlipemia     Knee swelling 7/7/2020    Shortly after my left knee replacement    Mild chronic anemia     Obesity     Sleep apnea     cpap       Past Surgical History:   Procedure Laterality Date    ACHILLES TENDON REPAIR Left     CARDIAC CATHETERIZATION      CARDIAC CATHETERIZATION N/A 09/01/2022    Procedure: Coronary angiography, Left heart catheterization,;  Surgeon: Bruna Chávez MD;  Location: [facility] CATH INVASIVE LOCATION;  Service: Cardiology;  Laterality: N/A;    CARDIAC CATHETERIZATION N/A 09/01/2022    Procedure: Stent PRAVIN coronary;  Surgeon: Bruna Chávez MD;  Location: [facility] CATH INVASIVE LOCATION;  Service: Cardiology;  Laterality: N/A;    CATARACT EXTRACTION EXTRACAPSULAR W/ INTRAOCULAR LENS IMPLANTATION Bilateral     COLONOSCOPY      Dr Reyes 6 years ago    ENDOSCOPY      FOOT SURGERY      Achilles repair    JOINT REPLACEMENT  03/12/20    Left knee    TONSILLECTOMY      As a child    TOTAL KNEE ARTHROPLASTY Left 03/12/2020    Procedure: TOTAL KNEE ARTHROPLASTY;  Surgeon: Chepe Alicea MD;  Location: [facility] OR;  Service: Orthopedics;   Laterality: Left;    TOTAL KNEE ARTHROPLASTY Right 03/21/2024    Procedure: TOTAL KNEE ARTHROPLASTY;  Surgeon: Chepe Alicea MD;  Location: [facility] OR [facility];  Service: Orthopedics;  Laterality: Right;         Current Outpatient Medications:     acetaminophen (TYLENOL) 325 MG tablet, Take 2 tablets by mouth 2 (Two) Times a Day As Needed for Mild Pain., Disp: 60 tablet, Rfl: 0    aspirin 81 MG EC tablet, Take 1 tablet by mouth 2 (Two) Times a Day., Disp: 60 tablet, Rfl: 0    benazepril (LOTENSIN) 40 MG tablet, Take 1 tablet by mouth twice daily, Disp: 180 tablet, Rfl: 0    cephalexin (KEFLEX) 500 MG capsule, 4 pills one hour prior to procedure, Disp: 4 capsule, Rfl: 2    cloNIDine (CATAPRES) 0.2 MG tablet, Take 1 tablet by mouth twice daily, Disp: 180 tablet, Rfl: 0    doxazosin (CARDURA) 4 MG tablet, TAKE 1 TABLET BY MOUTH ONCE DAILY AT NIGHT, Disp: 90 tablet, Rfl: 1    escitalopram (LEXAPRO) 10 MG tablet, Take 1 tablet by mouth Every Evening., Disp: 90 tablet, Rfl: 1    ezetimibe (ZETIA) 10 MG tablet, Take 1 tablet by mouth once daily, Disp: 90 tablet, Rfl: 0    fenofibrate 160 MG tablet, TAKE 1 TABLET BY MOUTH AT NIGHT, Disp: 90 tablet, Rfl: 0    fluticasone (FLONASE) 50 MCG/ACT nasal spray, Administer 2 sprays into the nostril(s) as directed by provider Every Morning. 2 sprays in each nostril, Disp: , Rfl:     gabapentin (NEURONTIN) 600 MG tablet, Take 1 tablet by mouth Every Evening., Disp: , Rfl:     glimepiride (AMARYL) 4 MG tablet, Take 1 tablet by mouth twice daily, Disp: 180 tablet, Rfl: 0    glucose blood (Accu-Chek Anabela Plus) test strip, TEST TWICE DAILY Use as instructed, Disp: 100 each, Rfl: 3    hydroCHLOROthiazide 25 MG tablet, Take 1 tablet by mouth once daily, Disp: 90 tablet, Rfl: 0    Janumet XR  MG tablet, Take 1 tablet by mouth twice daily, Disp: 180 tablet, Rfl: 0    Multiple Vitamins-Minerals (b complex-C-E-zinc) tablet, Take 1 tablet by mouth Every Other Day., Disp: , Rfl:      Multiple Vitamins-Minerals (PRESERVISION AREDS 2 PO), Take 1 tablet by mouth 2 (Two) Times a Day., Disp: , Rfl:     nebivolol (BYSTOLIC) 5 MG tablet, Take 1 tablet by mouth once daily, Disp: 90 tablet, Rfl: 0    NIFEdipine XL (PROCARDIA XL) 60 MG 24 hr tablet, Take 1 tablet by mouth Every Morning., Disp: 90 tablet, Rfl: 0    Olopatadine HCl (PATADAY OP), Apply 1 drop to eye(s) as directed by provider 2 (Two) Times a Day As Needed (allergies)., Disp: , Rfl:     polyethyl glycol-propyl glycol (SYSTANE) 0.4-0.3 % solution ophthalmic solution (artificial tears), Administer 1 drop to both eyes As Needed (dry eyes)., Disp: , Rfl:     traZODone (DESYREL) 50 MG tablet, TAKE 1 TABLET BY MOUTH ONCE DAILY AT NIGHT, Disp: 90 tablet, Rfl: 0    vitamin D3 125 MCG (5000 UT) capsule capsule, Take 1 capsule by mouth Daily., Disp: , Rfl:     warfarin (COUMADIN) 5 MG tablet, Take 1 tablet by mouth Daily., Disp: 180 tablet, Rfl: 3  No current facility-administered medications for this visit.    Facility-Administered Medications Ordered in Other Visits:     Chlorhexidine Gluconate 2 % pads 3 each, 3 pad , Apply externally, BID, JavierestonPricila L, APRN    No Known Allergies    Family History   Problem Relation Age of Onset    Alcohol abuse Maternal Uncle         Passed away 2013    Alcohol abuse Father         Passed away in 2016    Hyperlipidemia Father         Started about 10 years before his death    Arthritis Mother         Passed away 2006, from Alzheimers    Diabetes Mother     Hyperlipidemia Mother         Started probably at middle age    Osteoporosis Mother     Malig Hyperthermia Neg Hx        Social History     Socioeconomic History    Marital status:    Tobacco Use    Smoking status: Never     Passive exposure: Never    Smokeless tobacco: Never    Tobacco comments:     Naila only every been around people who smoked   Vaping Use    Vaping status: Never Used   Substance and Sexual Activity    Alcohol use: Yes      Alcohol/week: 10.0 standard drinks of alcohol     Types: 10 Drinks containing 0.5 oz of alcohol per week     Comment: Maybe 8-10 drinks mainly on weekends.    Drug use: No    Sexual activity: Not Currently     Partners: Female     Birth control/protection: None       Review of Systems   Gastrointestinal:  Negative for abdominal distention.   All other systems reviewed and are negative.    Pertinent positives and negatives documented in the HPI and all other systems reviewed and were found to be negative.  There were no vitals filed for this visit.    Physical Exam  Vitals reviewed.   Constitutional:       General: He is not in acute distress.     Appearance: Normal appearance. He is well-developed. He is obese. He is not diaphoretic.   HENT:      Head: Normocephalic and atraumatic. Hair is normal.      Right Ear: Hearing, tympanic membrane, ear canal and external ear normal.      Left Ear: Hearing, tympanic membrane, ear canal and external ear normal.      Nose: Nose normal. No nasal deformity.      Mouth/Throat:      Mouth: Mucous membranes are moist. No oral lesions.      Pharynx: Uvula midline. No uvula swelling.   Eyes:      General: Lids are normal. No scleral icterus.        Right eye: No discharge.         Left eye: No discharge.      Extraocular Movements: Extraocular movements intact.      Right eye: Normal extraocular motion and no nystagmus.      Left eye: Normal extraocular motion and no nystagmus.      Conjunctiva/sclera: Conjunctivae normal.      Pupils: Pupils are equal, round, and reactive to light.   Neck:      Thyroid: No thyromegaly.      Vascular: No JVD.   Cardiovascular:      Rate and Rhythm: Normal rate and regular rhythm.      Pulses: Normal pulses.      Heart sounds: Normal heart sounds. No murmur heard.     No gallop.   Pulmonary:      Effort: Pulmonary effort is normal. No respiratory distress.      Breath sounds: Normal breath sounds. No wheezing or rales.   Chest:      Chest wall: No  tenderness.   Abdominal:      General: Bowel sounds are normal. There is no distension.      Palpations: Abdomen is soft. There is no mass.      Tenderness: There is no abdominal tenderness. There is no guarding.      Hernia: No hernia is present.   Musculoskeletal:         General: No tenderness or deformity. Normal range of motion.      Cervical back: Normal range of motion and neck supple.   Lymphadenopathy:      Cervical: No cervical adenopathy.   Skin:     General: Skin is warm and dry.      Findings: No rash.   Neurological:      Mental Status: He is alert and oriented to person, place, and time.      Cranial Nerves: No cranial nerve deficit.      Motor: No abnormal muscle tone.      Coordination: Coordination normal.      Deep Tendon Reflexes: Reflexes are normal and symmetric. Reflexes normal.   Psychiatric:         Mood and Affect: Mood normal.         Behavior: Behavior normal.         Thought Content: Thought content normal.         Judgment: Judgment normal.         There are no diagnoses linked to this encounter.  Assessment:  H/o colon polyps      Recommendations:  He should have a colonoscopy. I will ask Dr. Chávez if he could stop the warfarin for 4 days prior? Dr. FINNEY said that it was OK with stop the warfarin for 4 days prior to doing his colonoscopy.       No follow-ups on file.    Micah Reyes MD  10/9/2024

## 2024-10-09 NOTE — Clinical Note
Please schedule him to have a colonoscopy to be done on 12/19/24 at 1 pm if OK with the patient and with [facility] endoscopy?  Tell him that Dr. Wakefield (his Cardiologist) said that it was okay for him to stop the warfarin for 4 days prior to having the colonoscopy done.  On the day of the colonoscopy have [facility] endoscopy draw a stat PT/INR. Thx.kj

## 2024-10-11 ENCOUNTER — TELEPHONE (OUTPATIENT)
Dept: GASTROENTEROLOGY | Facility: CLINIC | Age: 74
End: 2024-10-11
Payer: MEDICARE

## 2024-10-11 NOTE — TELEPHONE ENCOUNTER
SIERRA VINSON IN SCHEDULING PT SCHEDULED 12/19/2024 ARRIVING AT 12 NOON, PT VERBALIZES UNDERSTANDING HIS CARDIOLOGIST CLEARED HIM TO HOLD WARFARIN MIRALAX PREP INFO UPLOADED TO PT MY CHART,OK FOR HUB TO RELA

## 2024-10-16 ENCOUNTER — ANTICOAGULATION VISIT (OUTPATIENT)
Dept: PHARMACY | Facility: HOSPITAL | Age: 74
End: 2024-10-16
Payer: MEDICARE

## 2024-10-16 DIAGNOSIS — I48.0 AF (PAROXYSMAL ATRIAL FIBRILLATION): Primary | ICD-10-CM

## 2024-10-16 LAB
INR PPP: 2.3 (ref 0.91–1.09)
PROTHROMBIN TIME: 27.2 SECONDS (ref 10–13.8)

## 2024-10-16 PROCEDURE — G0463 HOSPITAL OUTPT CLINIC VISIT: HCPCS

## 2024-10-16 PROCEDURE — 36416 COLLJ CAPILLARY BLOOD SPEC: CPT

## 2024-10-16 PROCEDURE — 85610 PROTHROMBIN TIME: CPT

## 2024-10-16 NOTE — PROGRESS NOTES
I have supervised and reviewed the notes, assessments, and/or procedures performed. I personally performed the assessment and implemented the plan. I concur with the documentation of this patient encounter.    Dorota Mckeon, [facility]

## 2024-10-16 NOTE — PROGRESS NOTES
Anticoagulation Clinic Progress Note    Anticoagulation Summary  As of 10/16/2024      INR goal:  2.0-3.0   TTR:  31.3% (1.9 wk)   INR used for dosin.3 (10/16/2024)   Warfarin maintenance plan:  7.5 mg every Wed; 5 mg all other days   Weekly warfarin total:  37.5 mg   No change documented:  Anju Díaz, Pharmacy Technician   Plan last modified:  Gurjit Velásquez, PharmD (10/9/2024)   Next INR check:  10/30/2024   Target end date:  --    Indications    AF (paroxysmal atrial fibrillation) [I48.0]                 Anticoagulation Episode Summary       INR check location:  --    Preferred lab:  --    Send INR reminders to:   [facility]    Comments:  --          Anticoagulation Care Providers       Provider Role Specialty Phone number    Bruna Chávez MD Referring Cardiology [phone]            Clinic Interview:  Patient Findings     Negatives:  Signs/symptoms of thrombosis, Signs/symptoms of bleeding,   Laboratory test error suspected, Change in health, Change in alcohol use,   Change in activity, Upcoming invasive procedure, Emergency department   visit, Upcoming dental procedure, Missed doses, Extra doses, Change in   medications, Change in diet/appetite, Hospital admission, Bruising, Other   complaints      Clinical Outcomes     Negatives:  Major bleeding event, Thromboembolic event,   Anticoagulation-related hospital admission, Anticoagulation-related ED   visit, Anticoagulation-related fatality        INR History:      2024     3:04 PM 2024    10:15 AM 2024     1:30 PM 10/4/2024     1:00 PM 10/9/2024    10:00 AM 10/16/2024    10:00 AM   Anticoagulation Monitoring   INR  2.1 1.4 1.6 1.8 2.3   INR Date  2024 2024 10/4/2024 10/9/2024 10/16/2024   INR Goal 2.0-3.0 2.0-3.0 2.0-3.0 2.0-3.0 2.0-3.0 2.0-3.0   Trend    Same Up Same   Last Week Total  30 mg 35 mg 25 mg 35 mg 37.5 mg   Next Week Total  5 mg 35 mg 35 mg 37.5 mg 37.5 mg   Sun  Hold () - 5 mg 5 mg 5 mg   Mon   - 5 mg 5 mg 5 mg 5 mg   Tue  - 5 mg 5 mg 5 mg 5 mg   Wed  - 5 mg - 7.5 mg 7.5 mg   Thu  - 5 mg - 5 mg 5 mg   Fri  5 mg (9/27) - 5 mg 5 mg 5 mg   Sat  Hold (9/28) - 5 mg 5 mg 5 mg   Visit Report     Report        Plan:  1. INR is therapeutic today- see above in Anticoagulation Summary.   Will instruct Jeb Olson to continue their warfarin regimen- see above in Anticoagulation Summary.  2. Follow up in 2 weeks.  3. Patient declines warfarin refills.  4. Verbal and written information provided. Patient expresses understanding and has no further questions at this time.    Anju Díaz, Pharmacy Technician

## 2024-10-21 RX ORDER — EZETIMIBE 10 MG/1
10 TABLET ORAL DAILY
Qty: 90 TABLET | Refills: 0 | Status: SHIPPED | OUTPATIENT
Start: 2024-10-21

## 2024-10-21 RX ORDER — TRAZODONE HYDROCHLORIDE 50 MG/1
50 TABLET, FILM COATED ORAL NIGHTLY
Qty: 90 TABLET | Refills: 0 | Status: SHIPPED | OUTPATIENT
Start: 2024-10-21

## 2024-10-25 ENCOUNTER — HOSPITAL ENCOUNTER (OUTPATIENT)
Dept: CARDIOLOGY | Facility: HOSPITAL | Age: 74
Discharge: HOME | End: 2024-10-25
Payer: MEDICARE

## 2024-10-25 VITALS
BODY MASS INDEX: 34.8 KG/M2 | SYSTOLIC BLOOD PRESSURE: 136 MMHG | DIASTOLIC BLOOD PRESSURE: 72 MMHG | HEIGHT: 74 IN | HEART RATE: 60 BPM | WEIGHT: 271.17 LBS

## 2024-10-25 DIAGNOSIS — I48.0 AF (PAROXYSMAL ATRIAL FIBRILLATION): ICD-10-CM

## 2024-10-25 LAB
AORTIC DIMENSIONLESS INDEX: 0.5 (DI)
ASCENDING AORTA: 3.6 CM
BH CV ECHO MEAS - ACS: 1.46 CM
BH CV ECHO MEAS - AO MAX PG: 19.6 MMHG
BH CV ECHO MEAS - AO MEAN PG: 9.7 MMHG
BH CV ECHO MEAS - AO ROOT DIAM: 3.8 CM
BH CV ECHO MEAS - AO V2 MAX: 221.3 CM/SEC
BH CV ECHO MEAS - AO V2 VTI: 50.2 CM
BH CV ECHO MEAS - AVA(I,D): 1.48 CM2
BH CV ECHO MEAS - EDV(CUBED): 321 ML
BH CV ECHO MEAS - EDV(MOD-SP2): 180 ML
BH CV ECHO MEAS - EDV(MOD-SP4): 255 ML
BH CV ECHO MEAS - EF(MOD-BP): 52.7 %
BH CV ECHO MEAS - EF(MOD-SP2): 38.3 %
BH CV ECHO MEAS - EF(MOD-SP4): 60.4 %
BH CV ECHO MEAS - ESV(CUBED): 104.4 ML
BH CV ECHO MEAS - ESV(MOD-SP2): 111 ML
BH CV ECHO MEAS - ESV(MOD-SP4): 101 ML
BH CV ECHO MEAS - FS: 31.2 %
BH CV ECHO MEAS - IVS/LVPW: 1.26 CM
BH CV ECHO MEAS - IVSD: 1.66 CM
BH CV ECHO MEAS - LAT PEAK E' VEL: 13.4 CM/SEC
BH CV ECHO MEAS - LV DIASTOLIC VOL/BSA (35-75): 102.7 CM2
BH CV ECHO MEAS - LV MASS(C)D: 523 GRAMS
BH CV ECHO MEAS - LV MAX PG: 4.6 MMHG
BH CV ECHO MEAS - LV MEAN PG: 2.24 MMHG
BH CV ECHO MEAS - LV SYSTOLIC VOL/BSA (12-30): 40.7 CM2
BH CV ECHO MEAS - LV V1 MAX: 107.2 CM/SEC
BH CV ECHO MEAS - LV V1 VTI: 20.8 CM
BH CV ECHO MEAS - LVIDD: 6.8 CM
BH CV ECHO MEAS - LVIDS: 4.7 CM
BH CV ECHO MEAS - LVOT AREA: 3.6 CM2
BH CV ECHO MEAS - LVOT DIAM: 2.13 CM
BH CV ECHO MEAS - LVPWD: 1.32 CM
BH CV ECHO MEAS - MED PEAK E' VEL: 7.6 CM/SEC
BH CV ECHO MEAS - MR MAX PG: 59 MMHG
BH CV ECHO MEAS - MR MAX VEL: 384.1 CM/SEC
BH CV ECHO MEAS - MV DEC SLOPE: 550.7 CM/SEC2
BH CV ECHO MEAS - MV DEC TIME: 0.24 SEC
BH CV ECHO MEAS - MV E MAX VEL: 130 CM/SEC
BH CV ECHO MEAS - MV MAX PG: 6.5 MMHG
BH CV ECHO MEAS - MV MEAN PG: 1.54 MMHG
BH CV ECHO MEAS - MV P1/2T: 67.9 MSEC
BH CV ECHO MEAS - MV V2 VTI: 41.9 CM
BH CV ECHO MEAS - MVA(P1/2T): 3.2 CM2
BH CV ECHO MEAS - MVA(VTI): 1.77 CM2
BH CV ECHO MEAS - PA ACC TIME: 0.08 SEC
BH CV ECHO MEAS - PA V2 MAX: 92.3 CM/SEC
BH CV ECHO MEAS - PI END-D VEL: 135.7 CM/SEC
BH CV ECHO MEAS - PULM DIAS VEL: 29.1 CM/SEC
BH CV ECHO MEAS - PULM S/D: 0.93
BH CV ECHO MEAS - PULM SYS VEL: 27 CM/SEC
BH CV ECHO MEAS - QP/QS: 0.91
BH CV ECHO MEAS - RAP SYSTOLE: 8 MMHG
BH CV ECHO MEAS - RV MAX PG: 1.75 MMHG
BH CV ECHO MEAS - RV V1 MAX: 66.2 CM/SEC
BH CV ECHO MEAS - RV V1 VTI: 15.2 CM
BH CV ECHO MEAS - RVOT DIAM: 2.37 CM
BH CV ECHO MEAS - RVSP: 37 MMHG
BH CV ECHO MEAS - SV(LVOT): 74.1 ML
BH CV ECHO MEAS - SV(MOD-SP2): 69 ML
BH CV ECHO MEAS - SV(MOD-SP4): 154 ML
BH CV ECHO MEAS - SV(RVOT): 67.1 ML
BH CV ECHO MEAS - SVI(LVOT): 29.8 ML/M2
BH CV ECHO MEAS - SVI(MOD-SP2): 27.8 ML/M2
BH CV ECHO MEAS - SVI(MOD-SP4): 62 ML/M2
BH CV ECHO MEAS - TAPSE (>1.6): 2.44 CM
BH CV ECHO MEAS - TR MAX PG: 28.9 MMHG
BH CV ECHO MEAS - TR MAX VEL: 268.8 CM/SEC
BH CV ECHO MEASUREMENTS AVERAGE E/E' RATIO: 12.38
BH CV XLRA - RV BASE: 4.5 CM
BH CV XLRA - RV LENGTH: 8.8 CM
BH CV XLRA - RV MID: 5.9 CM
BH CV XLRA - TDI S': 17.7 CM/SEC
LEFT ATRIUM VOLUME INDEX: 46.8 ML/M2
SINUS: 3.6 CM
STJ: 2.7 CM

## 2024-10-25 PROCEDURE — 93306 TTE W/DOPPLER COMPLETE: CPT

## 2024-10-25 PROCEDURE — 25510000001 PERFLUTREN 6.52 MG/ML SUSPENSION 2 ML VIAL: Performed by: INTERNAL MEDICINE

## 2024-10-25 RX ADMIN — PERFLUTREN 2 ML: 6.52 INJECTION, SUSPENSION INTRAVENOUS at 09:58

## 2024-10-28 RX ORDER — CLONIDINE HYDROCHLORIDE 0.2 MG/1
0.2 TABLET ORAL 2 TIMES DAILY
Qty: 180 TABLET | Refills: 1 | Status: SHIPPED | OUTPATIENT
Start: 2024-10-28

## 2024-10-29 ENCOUNTER — TELEPHONE (OUTPATIENT)
Dept: CARDIOLOGY | Facility: CLINIC | Age: 74
End: 2024-10-29
Payer: MEDICARE

## 2024-10-30 ENCOUNTER — ANTICOAGULATION VISIT (OUTPATIENT)
Dept: PHARMACY | Facility: HOSPITAL | Age: 74
End: 2024-10-30
Payer: MEDICARE

## 2024-10-30 DIAGNOSIS — I48.0 AF (PAROXYSMAL ATRIAL FIBRILLATION): Primary | ICD-10-CM

## 2024-10-30 LAB
INR PPP: 2 (ref 0.91–1.09)
PROTHROMBIN TIME: 23.8 SECONDS (ref 10–13.8)

## 2024-10-30 PROCEDURE — 85610 PROTHROMBIN TIME: CPT

## 2024-10-30 PROCEDURE — 36416 COLLJ CAPILLARY BLOOD SPEC: CPT

## 2024-10-30 PROCEDURE — G0463 HOSPITAL OUTPT CLINIC VISIT: HCPCS

## 2024-10-30 NOTE — PROGRESS NOTES
Anticoagulation Clinic Progress Note    Anticoagulation Summary  As of 10/30/2024      INR goal:  2.0-3.0   TTR:  66.4% (3.9 wk)   INR used for dosin.0 (10/30/2024)   Warfarin maintenance plan:  7.5 mg every Wed, Sat; 5 mg all other days   Weekly warfarin total:  40 mg   Plan last modified:  Gurjit Velásquez, PharmD (10/30/2024)   Next INR check:  2024   Target end date:  --    Indications    AF (paroxysmal atrial fibrillation) [I48.0]                 Anticoagulation Episode Summary       INR check location:  --    Preferred lab:  --    Send INR reminders to:   [facility] [place]    Comments:  --          Anticoagulation Care Providers       Provider Role Specialty Phone number    Bruna Chávez MD Referring Cardiology [phone]            Clinic Interview:  Patient Findings     Negatives:  Signs/symptoms of thrombosis, Signs/symptoms of bleeding,   Laboratory test error suspected, Change in health, Change in alcohol use,   Change in activity, Upcoming invasive procedure, Emergency department   visit, Upcoming dental procedure, Missed doses, Extra doses, Change in   medications, Change in diet/appetite, Hospital admission, Bruising, Other   complaints      Clinical Outcomes     Negatives:  Major bleeding event, Thromboembolic event,   Anticoagulation-related hospital admission, Anticoagulation-related ED   visit, Anticoagulation-related fatality        INR History:      2024     3:04 PM 2024    10:15 AM 2024     1:30 PM 10/4/2024     1:00 PM 10/9/2024    10:00 AM 10/16/2024    10:00 AM 10/30/2024    10:00 AM   Anticoagulation Monitoring   INR  2.1 1.4 1.6 1.8 2.3 2.0   INR Date  2024 2024 10/4/2024 10/9/2024 10/16/2024 10/30/2024   INR Goal 2.0-3.0 2.0-3.0 2.0-3.0 2.0-3.0 2.0-3.0 2.0-3.0 2.0-3.0   Trend    Same Up Same Up   Last Week Total  30 mg 35 mg 25 mg 35 mg 37.5 mg 37.5 mg   Next Week Total  5 mg 35 mg 35 mg 37.5 mg 37.5 mg 40 mg   Sun  Hold () - 5 mg 5 mg 5 mg  5 mg   Mon  - 5 mg 5 mg 5 mg 5 mg 5 mg   Tue  - 5 mg 5 mg 5 mg 5 mg 5 mg   Wed  - 5 mg - 7.5 mg 7.5 mg 7.5 mg   Thu  - 5 mg - 5 mg 5 mg 5 mg   Fri  5 mg (9/27) - 5 mg 5 mg 5 mg 5 mg   Sat  Hold (9/28) - 5 mg 5 mg 5 mg 7.5 mg   Visit Report     Report         Plan:  1. INR is Therapeutic today- see above in Anticoagulation Summary.  Will instruct Jeb KRISTOPHER DrummondWhitney to increase their warfarin regimen to 7.5 mg Wed/Sat, 5 mg all other days - see above in Anticoagulation Summary.  2. Follow up in 2 weeks.  3. Patient declines warfarin refills.  4. Verbal and written information provided. Patient expresses understanding and has no further questions at this time.    Gurjit Velásquez, PharmD

## 2024-11-01 ENCOUNTER — OFFICE VISIT (OUTPATIENT)
Age: 74
End: 2024-11-01
Payer: MEDICARE

## 2024-11-01 ENCOUNTER — LAB (OUTPATIENT)
Dept: LAB | Facility: HOSPITAL | Age: 74
End: 2024-11-01
Payer: MEDICARE

## 2024-11-01 VITALS
SYSTOLIC BLOOD PRESSURE: 130 MMHG | WEIGHT: 270 LBS | HEIGHT: 74 IN | DIASTOLIC BLOOD PRESSURE: 80 MMHG | HEART RATE: 64 BPM | BODY MASS INDEX: 34.65 KG/M2

## 2024-11-01 DIAGNOSIS — I43 AMYLOID HEART MUSCLE DISEASE: Primary | ICD-10-CM

## 2024-11-01 DIAGNOSIS — I43 AMYLOID HEART MUSCLE DISEASE: ICD-10-CM

## 2024-11-01 DIAGNOSIS — E85.4 AMYLOID HEART MUSCLE DISEASE: ICD-10-CM

## 2024-11-01 DIAGNOSIS — E85.4 AMYLOID HEART MUSCLE DISEASE: Primary | ICD-10-CM

## 2024-11-01 PROCEDURE — 3075F SYST BP GE 130 - 139MM HG: CPT | Performed by: INTERNAL MEDICINE

## 2024-11-01 PROCEDURE — 86334 IMMUNOFIX E-PHORESIS SERUM: CPT

## 2024-11-01 PROCEDURE — 82784 ASSAY IGA/IGD/IGG/IGM EACH: CPT

## 2024-11-01 PROCEDURE — 1159F MED LIST DOCD IN RCRD: CPT | Performed by: INTERNAL MEDICINE

## 2024-11-01 PROCEDURE — 84165 PROTEIN E-PHORESIS SERUM: CPT

## 2024-11-01 PROCEDURE — 84155 ASSAY OF PROTEIN SERUM: CPT

## 2024-11-01 PROCEDURE — 83521 IG LIGHT CHAINS FREE EACH: CPT

## 2024-11-01 PROCEDURE — 1160F RVW MEDS BY RX/DR IN RCRD: CPT | Performed by: INTERNAL MEDICINE

## 2024-11-01 PROCEDURE — 3079F DIAST BP 80-89 MM HG: CPT | Performed by: INTERNAL MEDICINE

## 2024-11-01 PROCEDURE — 36415 COLL VENOUS BLD VENIPUNCTURE: CPT

## 2024-11-01 PROCEDURE — 99214 OFFICE O/P EST MOD 30 MIN: CPT | Performed by: INTERNAL MEDICINE

## 2024-11-01 RX ORDER — FUROSEMIDE 40 MG/1
40 TABLET ORAL DAILY
Qty: 90 TABLET | Refills: 3 | Status: SHIPPED | OUTPATIENT
Start: 2024-11-01

## 2024-11-01 NOTE — PROGRESS NOTES
Subjective:     Encounter Date: 11/01/24      Patient ID: Jeb Olson is a 74 y.o. male.    Chief Complaint: Abnormal stress test  HPI:   This is a 74 year-old man with a history of CAD, PVCs.     In 2022 he an eye surgery and was noted to have frequent PVCs.  This prompted further testing by his primary care doctor.  He had a Holter monitor which showed frequent PVCs, PVC burden was 22% in couplets, triplets bigeminy and trigeminy.  Thus, he therefore had a transthoracic echocardiogram which showed normal systolic ejection fraction with grade 1 diastolic dysfunction appropriate for age and mild MR.   Subsequent to that he had a walking nuclear stress test with a small, mild apical ischemic defect.  Cardiac catheterization September 22 by myself showed a distal LAD lesion, status post PCI with 1 drug-eluting stent.     6 weeks ago he was in the office and found to have asymptomatic atrial fibrillation on EKG.  His exam revealed a prominent MR murmur.  Holter showed 100% A-fib burden with average heart rate of 60 on beta-blockade.  Follow-up echocardiogram was performed last week.  There has been enlargement of both ventricles and atria, thickening of the LV, progression of his mitral disease from mild MR to moderate to severe MR.  He also has mild AS, pulmonary hypertension.    Today he returns with his wife who notes that he has been quite tired.  He has not been as physically active as before.  Continues to deny palpitations angina.  Notes some mild dyspnea and lower extremity edema.  He has a history of remote carpal tunnel as well as diabetic peripheral neuropathy of the feet.      The following portions of the patient's history were reviewed and updated as appropriate: allergies, current medications, past family history, past medical history, past social history, past surgical history and problem list.     REVIEW OF SYSTEMS:   All systems reviewed.  Pertinent positives identified in HPI.  All other  systems are negative.    Past Medical History:   Diagnosis Date    Allergic Have had for several years    Eyes and nasal issues    Anemia     Anxiety Since about 2014    Since I was taking of my Dad, who also passed away 3yrs ago.    Arthritis 2002    Both knees, hips, and shoulders    Arthritis of knee, left     Cataract 05/2019    Colon polyp 2017    Coronary artery disease     stent    Diabetes mellitus     Dry eyes     Essential hypertension     HL (hearing loss) 1980    no hearing aides    Hyperlipemia     Knee swelling 7/7/2020    Shortly after my left knee replacement    Mild chronic anemia     Obesity     Sleep apnea     cpap       Family History   Problem Relation Age of Onset    Alcohol abuse Maternal Uncle         Passed away 2013    Alcohol abuse Father         Passed away in 2016    Hyperlipidemia Father         Started about 10 years before his death    Arthritis Mother         Passed away 2006, from Alzheimers    Diabetes Mother     Hyperlipidemia Mother         Started probably at middle age    Osteoporosis Mother     Malig Hyperthermia Neg Hx        Social History     Socioeconomic History    Marital status:    Tobacco Use    Smoking status: Never     Passive exposure: Never    Smokeless tobacco: Never    Tobacco comments:     Naila only every been around people who smoked   Vaping Use    Vaping status: Never Used   Substance and Sexual Activity    Alcohol use: Yes     Alcohol/week: 10.0 standard drinks of alcohol     Types: 10 Drinks containing 0.5 oz of alcohol per week     Comment: Maybe 8-10 drinks mainly on weekends.    Drug use: Never    Sexual activity: Not Currently     Partners: Female     Birth control/protection: None       No Known Allergies    Past Surgical History:   Procedure Laterality Date    ACHILLES TENDON REPAIR Left     CARDIAC CATHETERIZATION      CARDIAC CATHETERIZATION N/A 09/01/2022    Procedure: Coronary angiography, Left heart catheterization,;  Surgeon: Saira  MD Bruna;  Location:  [facility];  Service: Cardiology;  Laterality: N/A;    CARDIAC CATHETERIZATION N/A 09/01/2022    Procedure: Stent PRAVIN coronary;  Surgeon: Bruna Chávez MD;  Location:  [facility];  Service: Cardiology;  Laterality: N/A;    CATARACT EXTRACTION EXTRACAPSULAR W/ INTRAOCULAR LENS IMPLANTATION Bilateral     COLONOSCOPY      Dr Reyes 6 years ago    ENDOSCOPY      FOOT SURGERY      Achilles repair    JOINT REPLACEMENT  03/12/20    Left knee    TONSILLECTOMY      As a child    TOTAL KNEE ARTHROPLASTY Left 03/12/2020    Procedure: TOTAL KNEE ARTHROPLASTY;  Surgeon: Chepe Alicea MD;  Location: [facility] MA[facility] OR;  Service: Orthopedics;  Laterality: Left;    TOTAL KNEE ARTHROPLASTY Right 03/21/2024    Procedure: TOTAL KNEE ARTHROPLASTY;  Surgeon: Chepe lAicea MD;  Location:  [facility];  Service: Orthopedics;  Laterality: Right;       Procedures       Objective:         PHYSICAL EXAM:  GEN: VSS, no distress,   Eyes: normal sclera, normal lids and lashes  HENT: moist mucus membranes,   Respiratory: CTAB, no rales or wheezes  CV: irregRR, blowing 3 out of 6 apical murmur, +2 DP and 2+ carotid pulses b/l  GI: NABS, soft,  Nontender, nondistended  MSK: no edema, no scoliosis or kyphosis  Skin: no rash, warm, dry  Heme/Lymph: no bruising or bleeding  Psych: organized thought, normal behavior and affect  Neuro: Cranial nerves grossly intact, Alert and Oriented x 3.         Assessment:          Diagnosis Plan   1. Amyloid heart muscle disease  Immunofixation (MURRAY), Protein Electrophoresis (PE), and Quantitative Free Kappa and Lambda Light Chains (FLC), Serum    Protein Electrophoresis, 24 Hr Urine - Urine, Clean Catch           Plan:       1.  CAD: Status post distal LAD PCI September 22.    CCS class I symptoms.  Downgrade to aspirin 81 daily.  2.  Mitral regurgitation: Has now worsened in the last 2 years from mild to moderate to severe.  Start Lasix 40 daily.  3.   AF: Warfarin.  Continue Bystolic 5.  4.  LVH, MR, AAS, dilated atria: The all of this raises disposition for amyloid.  Will start with SPEP/UPEP.  Will get PYP scan as indicated.  If this is unrevealing would plan to further evaluate the mitral regurgitation with YAZMIN.    Dr. Salvador, Thank you very much for referring this kind patient to me. Please call me with any questions or concerns. I will see the patient again in the office in 1 month.      Bruna Chávez MD  11/01/24  [facility]    Outpatient Encounter Medications as of 11/1/2024   Medication Sig Dispense Refill    acetaminophen (TYLENOL) 325 MG tablet Take 2 tablets by mouth 2 (Two) Times a Day As Needed for Mild Pain. 60 tablet 0    aspirin 81 MG EC tablet Take 1 tablet by mouth 2 (Two) Times a Day. 60 tablet 0    benazepril (LOTENSIN) 40 MG tablet Take 1 tablet by mouth twice daily 180 tablet 0    cephalexin (KEFLEX) 500 MG capsule 4 pills one hour prior to procedure 4 capsule 2    cloNIDine (CATAPRES) 0.2 MG tablet Take 1 tablet by mouth twice daily 180 tablet 1    doxazosin (CARDURA) 4 MG tablet TAKE 1 TABLET BY MOUTH ONCE DAILY AT NIGHT 90 tablet 1    escitalopram (LEXAPRO) 10 MG tablet Take 1 tablet by mouth Every Evening. 90 tablet 1    ezetimibe (ZETIA) 10 MG tablet Take 1 tablet by mouth once daily 90 tablet 0    fenofibrate 160 MG tablet TAKE 1 TABLET BY MOUTH AT NIGHT 90 tablet 0    fluticasone (FLONASE) 50 MCG/ACT nasal spray Administer 2 sprays into the nostril(s) as directed by provider Every Morning. 2 sprays in each nostril      gabapentin (NEURONTIN) 600 MG tablet Take 1 tablet by mouth Every Evening.      glimepiride (AMARYL) 4 MG tablet Take 1 tablet by mouth twice daily 180 tablet 0    glucose blood (Accu-Chek Anabela Plus) test strip TEST TWICE DAILY Use as instructed 100 each 3    hydroCHLOROthiazide 25 MG tablet Take 1 tablet by mouth once daily 90 tablet 0    Janumet XR  MG tablet Take 1 tablet by mouth twice  daily 180 tablet 0    Multiple Vitamins-Minerals (b complex-C-E-zinc) tablet Take 1 tablet by mouth Every Other Day.      Multiple Vitamins-Minerals (PRESERVISION AREDS 2 PO) Take 1 tablet by mouth 2 (Two) Times a Day.      nebivolol (BYSTOLIC) 5 MG tablet Take 1 tablet by mouth once daily 90 tablet 0    NIFEdipine XL (PROCARDIA XL) 60 MG 24 hr tablet Take 1 tablet by mouth Every Morning. 90 tablet 0    Olopatadine HCl (PATADAY OP) Apply 1 drop to eye(s) as directed by provider 2 (Two) Times a Day As Needed (allergies).      polyethyl glycol-propyl glycol (SYSTANE) 0.4-0.3 % solution ophthalmic solution (artificial tears) Administer 1 drop to both eyes As Needed (dry eyes).      traZODone (DESYREL) 50 MG tablet TAKE 1 TABLET BY MOUTH ONCE DAILY AT NIGHT 90 tablet 0    vitamin D3 125 MCG (5000 UT) capsule capsule Take 1 capsule by mouth Daily.      warfarin (COUMADIN) 5 MG tablet Take 1 tablet by mouth Daily. 180 tablet 3     Facility-Administered Encounter Medications as of 11/1/2024   Medication Dose Route Frequency Provider Last Rate Last Admin    Chlorhexidine Gluconate 2 % pads 3 each  3 pad  Apply externally BID Pricila Barnes APRN

## 2024-11-04 ENCOUNTER — LAB (OUTPATIENT)
Dept: LAB | Facility: HOSPITAL | Age: 74
End: 2024-11-04
Payer: MEDICARE

## 2024-11-04 PROCEDURE — 84156 ASSAY OF PROTEIN URINE: CPT

## 2024-11-04 PROCEDURE — 84166 PROTEIN E-PHORESIS/URINE/CSF: CPT

## 2024-11-05 LAB
ALBUMIN SERPL ELPH-MCNC: 4 G/DL (ref 2.9–4.4)
ALBUMIN/GLOB SERPL: 1.5 {RATIO} (ref 0.7–1.7)
ALPHA1 GLOB SERPL ELPH-MCNC: 0.2 G/DL (ref 0–0.4)
ALPHA2 GLOB SERPL ELPH-MCNC: 0.6 G/DL (ref 0.4–1)
B-GLOBULIN SERPL ELPH-MCNC: 1.2 G/DL (ref 0.7–1.3)
GAMMA GLOB SERPL ELPH-MCNC: 0.7 G/DL (ref 0.4–1.8)
GLOBULIN SER-MCNC: 2.7 G/DL (ref 2.2–3.9)
IGA SERPL-MCNC: 192 MG/DL (ref 61–437)
IGG SERPL-MCNC: 915 MG/DL (ref 603–1613)
IGM SERPL-MCNC: 60 MG/DL (ref 15–143)
INTERPRETATION SERPL IEP-IMP: ABNORMAL
KAPPA LC FREE SER-MCNC: 34 MG/L (ref 3.3–19.4)
KAPPA LC FREE/LAMBDA FREE SER: 1.95 {RATIO} (ref 0.26–1.65)
LABORATORY COMMENT REPORT: ABNORMAL
LAMBDA LC FREE SERPL-MCNC: 17.4 MG/L (ref 5.7–26.3)
M PROTEIN SERPL ELPH-MCNC: ABNORMAL G/DL
PROT SERPL-MCNC: 6.7 G/DL (ref 6–8.5)

## 2024-11-06 LAB
ALBUMIN 24H MFR UR ELPH: 49.6 %
ALPHA1 GLOB 24H MFR UR ELPH: 4.7 %
ALPHA2 GLOB 24H MFR UR ELPH: 9.9 %
B-GLOBULIN 24H MFR UR ELPH: 21.7 %
GAMMA GLOB 24H MFR UR ELPH: 14.1 %
LABORATORY COMMENT REPORT: ABNORMAL
M PROTEIN 24H MFR UR ELPH: ABNORMAL %
PROT 24H UR-MRATE: 201 MG/24 HR (ref 30–150)
PROT UR-MCNC: 6.7 MG/DL

## 2024-11-07 DIAGNOSIS — E85.9 AMYLOIDOSIS, UNSPECIFIED TYPE: Primary | ICD-10-CM

## 2024-11-11 ENCOUNTER — OFFICE VISIT (OUTPATIENT)
Dept: FAMILY MEDICINE CLINIC | Facility: CLINIC | Age: 74
End: 2024-11-11
Payer: MEDICARE

## 2024-11-11 ENCOUNTER — ANTICOAGULATION VISIT (OUTPATIENT)
Dept: PHARMACY | Facility: HOSPITAL | Age: 74
End: 2024-11-11
Payer: MEDICARE

## 2024-11-11 VITALS
WEIGHT: 270 LBS | HEART RATE: 69 BPM | OXYGEN SATURATION: 96 % | BODY MASS INDEX: 34.65 KG/M2 | SYSTOLIC BLOOD PRESSURE: 124 MMHG | DIASTOLIC BLOOD PRESSURE: 70 MMHG | HEIGHT: 74 IN

## 2024-11-11 DIAGNOSIS — E11.41 TYPE 2 DIABETES MELLITUS WITH DIABETIC MONONEUROPATHY, WITHOUT LONG-TERM CURRENT USE OF INSULIN: ICD-10-CM

## 2024-11-11 DIAGNOSIS — I48.21 PERMANENT ATRIAL FIBRILLATION: Primary | ICD-10-CM

## 2024-11-11 DIAGNOSIS — R45.89 ANXIETY ABOUT HEALTH: ICD-10-CM

## 2024-11-11 DIAGNOSIS — I48.0 AF (PAROXYSMAL ATRIAL FIBRILLATION): Primary | ICD-10-CM

## 2024-11-11 LAB
INR PPP: 2.3 (ref 0.91–1.09)
PROTHROMBIN TIME: 28.1 SECONDS (ref 10–13.8)

## 2024-11-11 PROCEDURE — 3074F SYST BP LT 130 MM HG: CPT | Performed by: INTERNAL MEDICINE

## 2024-11-11 PROCEDURE — 85610 PROTHROMBIN TIME: CPT

## 2024-11-11 PROCEDURE — 3078F DIAST BP <80 MM HG: CPT | Performed by: INTERNAL MEDICINE

## 2024-11-11 PROCEDURE — G0463 HOSPITAL OUTPT CLINIC VISIT: HCPCS

## 2024-11-11 PROCEDURE — 3044F HG A1C LEVEL LT 7.0%: CPT | Performed by: INTERNAL MEDICINE

## 2024-11-11 PROCEDURE — 99214 OFFICE O/P EST MOD 30 MIN: CPT | Performed by: INTERNAL MEDICINE

## 2024-11-11 PROCEDURE — 36416 COLLJ CAPILLARY BLOOD SPEC: CPT

## 2024-11-11 PROCEDURE — 1125F AMNT PAIN NOTED PAIN PRSNT: CPT | Performed by: INTERNAL MEDICINE

## 2024-11-11 RX ORDER — NEBIVOLOL 5 MG/1
5 TABLET ORAL DAILY
Qty: 90 TABLET | Refills: 1 | Status: SHIPPED | OUTPATIENT
Start: 2024-11-11

## 2024-11-11 RX ORDER — NIFEDIPINE 60 MG/1
60 TABLET, EXTENDED RELEASE ORAL EVERY MORNING
Qty: 90 TABLET | Refills: 1 | Status: SHIPPED | OUTPATIENT
Start: 2024-11-11

## 2024-11-11 RX ORDER — SITAGLIPTIN AND METFORMIN HYDROCHLORIDE 1000; 50 MG/1; MG/1
TABLET, FILM COATED, EXTENDED RELEASE ORAL
Qty: 180 TABLET | Refills: 0 | Status: SHIPPED | OUTPATIENT
Start: 2024-11-11

## 2024-11-11 RX ORDER — ESCITALOPRAM OXALATE 10 MG/1
15 TABLET ORAL EVERY EVENING
Qty: 135 TABLET | Refills: 1 | Status: SHIPPED | OUTPATIENT
Start: 2024-11-11

## 2024-11-11 RX ORDER — GLIMEPIRIDE 4 MG/1
4 TABLET ORAL 2 TIMES DAILY
Qty: 180 TABLET | Refills: 1 | Status: SHIPPED | OUTPATIENT
Start: 2024-11-11

## 2024-11-11 NOTE — PROGRESS NOTES
Anticoagulation Clinic Progress Note    Anticoagulation Summary  As of 2024      INR goal:  2.0-3.0   TTR:  76.6% (1.3 mo)   INR used for dosin.3 (2024)   Warfarin maintenance plan:  7.5 mg every Wed, Sat; 5 mg all other days   Weekly warfarin total:  40 mg   No change documented:  Ebony Flor RPH   Plan last modified:  Gurjit Velásquez, PharmD (10/30/2024)   Next INR check:  12/10/2024   Target end date:  --    Indications    AF (paroxysmal atrial fibrillation) [I48.0]                 Anticoagulation Episode Summary       INR check location:  --    Preferred lab:  --    Send INR reminders to:   [facility]    Comments:  --          Anticoagulation Care Providers       Provider Role Specialty Phone number    Bruna Chávez MD Referring Cardiology [phone]            Clinic Interview:  Patient Findings     Positives:  Upcoming invasive procedure, Change in medications    Negatives:  Signs/symptoms of thrombosis, Signs/symptoms of bleeding,   Laboratory test error suspected, Change in health, Change in alcohol use,   Change in activity, Emergency department visit, Upcoming dental procedure,   Missed doses, Extra doses, Change in diet/appetite, Hospital admission,   Bruising, Other complaints    Comments:  Patient was switched to furosemide 40 mg daily from   hydrochlorothiazide on 24. Additionally, he has an upcoming   colonoscopy (24) and will hold warfarin starting 5 days prior to   procedure.       Clinical Outcomes     Negatives:  Major bleeding event, Thromboembolic event,   Anticoagulation-related hospital admission, Anticoagulation-related ED   visit, Anticoagulation-related fatality    Comments:  Patient was switched to furosemide 40 mg daily from   hydrochlorothiazide on 24. Additionally, he has an upcoming   colonoscopy (24) and will hold warfarin starting 5 days prior to   procedure.         INR History:      2024    10:15 AM 2024      1:30 PM 10/4/2024     1:00 PM 10/9/2024    10:00 AM 10/16/2024    10:00 AM 10/30/2024    10:00 AM 11/11/2024     9:15 AM   Anticoagulation Monitoring   INR 2.1 1.4 1.6 1.8 2.3 2.0 2.3   INR Date 9/27/2024 9/30/2024 10/4/2024 10/9/2024 10/16/2024 10/30/2024 11/11/2024   INR Goal 2.0-3.0 2.0-3.0 2.0-3.0 2.0-3.0 2.0-3.0 2.0-3.0 2.0-3.0   Trend   Same Up Same Up Same   Last Week Total 30 mg 35 mg 25 mg 35 mg 37.5 mg 37.5 mg 40 mg   Next Week Total 5 mg 35 mg 35 mg 37.5 mg 37.5 mg 40 mg 40 mg   Sun Hold (9/29) - 5 mg 5 mg 5 mg 5 mg 5 mg   Mon - 5 mg 5 mg 5 mg 5 mg 5 mg 5 mg   Tue - 5 mg 5 mg 5 mg 5 mg 5 mg 5 mg   Wed - 5 mg - 7.5 mg 7.5 mg 7.5 mg 7.5 mg   Thu - 5 mg - 5 mg 5 mg 5 mg 5 mg   Fri 5 mg (9/27) - 5 mg 5 mg 5 mg 5 mg 5 mg   Sat Hold (9/28) - 5 mg 5 mg 5 mg 7.5 mg 7.5 mg   Visit Report    Report          Plan:  1. INR is Therapeutic today- see above in Anticoagulation Summary.  Will instruct Jeb KRISTOPHER Whitney to Continue their warfarin regimen- see above in Anticoagulation Summary.  2. Follow up in 4 weeks  3. Patient declines warfarin refills.  4. Verbal and written information provided. Patient expresses understanding and has no further questions at this time.    Ebony Flor [facility]

## 2024-11-11 NOTE — PROGRESS NOTES
Answers submitted by the patient for this visit:  Primary Reason for Visit (Submitted on 11/6/2024)  What is the primary reason for your visit?: Diabetes  Diabetes Questionnaire (Submitted on 11/6/2024)  Chief Complaint: Diabetes problem  Below 70: occasionally  Subjective chief complaint is follow-up on diabetes  Jeb Olson is a 74 y.o. male.     Atrial Fibrillation  Past medical history includes atrial fibrillation.  Rosas is here today for follow-up on his diabetes.  Unfortunately since his last visit he has been diagnosed with permanent atrial fibrillation.  His echocardiogram showed some wall muscle thickening that was possibly consistent with amyloid.  His serum and urine protein electrophoresis do show kappa light chains.  He has an appointment with a hematologist.  He is a little more anxious because of the atrial fibrillation and this.  He is feeling a little bit more fatigued with the atrial fibrillation.  They did change his hydrochlorothiazide to furosemide.  He does not appear to be having any volume overload.  He does report that his sugars are wildly variable.  The following portions of the patient's history were reviewed and updated as appropriate: allergies, current medications, and problem list.    Review of Systems   Endocrine: Positive for polyuria. Negative for polydipsia.   Neurological:  Negative for tremors, speech difficulty and headaches.   Psychiatric/Behavioral:  Negative for confusion.      Objective   Physical Exam  Vitals and nursing note reviewed.   Constitutional:       Appearance: Normal appearance.   Neck:      Vascular: No carotid bruit.   Cardiovascular:      Rate and Rhythm: Normal rate and regular rhythm.      Pulses: Normal pulses.      Heart sounds: No murmur heard.     No friction rub. No gallop.   Pulmonary:      Effort: Pulmonary effort is normal.      Breath sounds: No wheezing, rhonchi or rales.   Abdominal:      General: Bowel sounds are normal.      Palpations:  Abdomen is soft.      Tenderness: There is no abdominal tenderness. There is no guarding or rebound.   Musculoskeletal:      Right lower leg: No edema.      Left lower leg: No edema.   Neurological:      Mental Status: He is alert.     Assessment & Plan Diagnoses and all orders for this visit:    1. Permanent atrial fibrillation (Primary)    2. Type 2 diabetes mellitus with diabetic mononeuropathy, without long-term current use of insulin  -     Hemoglobin A1c  -     Comprehensive Metabolic Panel  -     Microalbumin / Creatinine Urine Ratio - Urine, Clean Catch    3. Anxiety about health    Other orders  -     escitalopram (LEXAPRO) 10 MG tablet; Take 1.5 tablets by mouth Every Evening.  Dispense: 135 tablet; Refill: 1  -     glimepiride (AMARYL) 4 MG tablet; Take 1 tablet by mouth 2 (Two) Times a Day. Indications: Type 2 Diabetes  Dispense: 180 tablet; Refill: 1  -     nebivolol (BYSTOLIC) 5 MG tablet; Take 1 tablet by mouth Daily.  Dispense: 90 tablet; Refill: 1  -     NIFEdipine XL (PROCARDIA XL) 60 MG 24 hr tablet; Take 1 tablet by mouth Every Morning.  Dispense: 90 tablet; Refill: 1    Rosas is here today for follow-up.  We are going to check on the status of his diabetes.  We did increase his E citalopram from 10 to 15 mg.  I advised giving it approximately 3 weeks and then letting me know how he is doing.

## 2024-11-12 LAB
ALBUMIN SERPL-MCNC: 4.4 G/DL (ref 3.5–5.2)
ALBUMIN/CREAT UR: 22 MG/G CREAT (ref 0–29)
ALBUMIN/GLOB SERPL: 1.8 G/DL
ALP SERPL-CCNC: 31 U/L (ref 39–117)
ALT SERPL-CCNC: 18 U/L (ref 1–41)
AST SERPL-CCNC: 22 U/L (ref 1–40)
BILIRUB SERPL-MCNC: 0.4 MG/DL (ref 0–1.2)
BUN SERPL-MCNC: 23 MG/DL (ref 8–23)
BUN/CREAT SERPL: 17.8 (ref 7–25)
CALCIUM SERPL-MCNC: 9.4 MG/DL (ref 8.6–10.5)
CHLORIDE SERPL-SCNC: 101 MMOL/L (ref 98–107)
CO2 SERPL-SCNC: 32.3 MMOL/L (ref 22–29)
CREAT SERPL-MCNC: 1.29 MG/DL (ref 0.76–1.27)
CREAT UR-MCNC: 57.6 MG/DL
EGFRCR SERPLBLD CKD-EPI 2021: 58.9 ML/MIN/1.73
GLOBULIN SER CALC-MCNC: 2.4 GM/DL
GLUCOSE SERPL-MCNC: 134 MG/DL (ref 65–99)
HBA1C MFR BLD: 7.1 % (ref 4.8–5.6)
MICROALBUMIN UR-MCNC: 12.5 UG/ML
POTASSIUM SERPL-SCNC: 3.8 MMOL/L (ref 3.5–5.2)
PROT SERPL-MCNC: 6.8 G/DL (ref 6–8.5)
SODIUM SERPL-SCNC: 142 MMOL/L (ref 136–145)

## 2024-11-15 DIAGNOSIS — E85.82 WILD-TYPE TRANSTHYRETIN-RELATED (ATTR) AMYLOIDOSIS: Primary | ICD-10-CM

## 2024-11-19 ENCOUNTER — TELEPHONE (OUTPATIENT)
Dept: CARDIOLOGY | Facility: CLINIC | Age: 74
End: 2024-11-19

## 2024-11-19 NOTE — TELEPHONE ENCOUNTER
"  Caller: Jeb Olson \"Rosas\"    Relationship: Self    Best call back number: [phone]    Do you know the name of the person who called: WILBER IBARRA    What was the call regarding: PATIENT MISSED CALL TO SCHEDULE TESTING HUB UNABLE TO WT TO PRACTICE. PLEASE ADVISE. REFERRAL IN SCHEDULING REVIEW      "

## 2024-11-21 RX ORDER — FENOFIBRATE 160 MG/1
TABLET ORAL
Qty: 90 TABLET | Refills: 0 | Status: SHIPPED | OUTPATIENT
Start: 2024-11-21

## 2024-12-02 ENCOUNTER — TELEPHONE (OUTPATIENT)
Dept: CARDIOLOGY | Facility: CLINIC | Age: 74
End: 2024-12-02
Payer: MEDICARE

## 2024-12-02 ENCOUNTER — TELEPHONE (OUTPATIENT)
Dept: FAMILY MEDICINE CLINIC | Facility: CLINIC | Age: 74
End: 2024-12-02
Payer: MEDICARE

## 2024-12-02 NOTE — TELEPHONE ENCOUNTER
Caller: Precious Olson    Relationship to patient: Emergency Contact    Best call back number: [phone]    Patient is needing: PT HAS BEEN BATTLING A COLD THEY WANT TO KNOW WHAT OVER THE COUNTER MEDICATIONS ARE SAFE TO TAKE WITH HIS CURRENT HEART/CARDIO CONDITIONS.

## 2024-12-02 NOTE — TELEPHONE ENCOUNTER
Dayquil/niquil is ok but have to be careful with BP-- Can cause BP to spike due to phenylephrine. Alternatively can use corcedin (spelling?), tylenol, saline nasal washes

## 2024-12-02 NOTE — TELEPHONE ENCOUNTER
Caller: Precious Olson    Relationship: Emergency Contact    Best call back number: [phone]     What is the best time to reach you: ANY    Who are you requesting to speak with (clinical staff, provider,  specific staff member): CLINICAL STAFF    Do you know the name of the person who called: PRECIOSU MOTT    What was the call regarding:  PATIENT WIFE PRECIOUS CALLED STATED THAT PATIENT HAS BAD COUGH, SPITTING UP LITTLE BLOOD WHEN BLOWING HIS NOSE, CHEST CONGESTION.  PATIENT IS ON VACATION IN [place] AND BEEN TAKING DAY QUILL BUT HE HAS HEART CONDITION AND NOT SURE IF SUPPOSE TO TAKE THIS MEDICATION.  SHE NEEDS TO KNOW WHAT THEY NEED TO DO AND WHAT MEDICATION HE CAN TAKE.  PLEASE CALL HER BACK TO LET HER KNOW.       Is it okay if the provider responds through MyChart:      The patient reported that his oxygen level was 88.  I advised either an immediate care center or emergency room.

## 2024-12-02 NOTE — TELEPHONE ENCOUNTER
I called Rylee Whitney and gave her info from Dr. Chávez.   They will try the suggestions:      Dayquil/niquil is ok but have to be careful with BP-- Can cause BP to spike due to phenylephrine. Alternatively can use corcedin (spelling?), tylenol, saline nasal washes          Nisha  12/2/24

## 2024-12-03 ENCOUNTER — HOSPITAL ENCOUNTER (INPATIENT)
Facility: CLINIC | Age: 74
DRG: 193 | End: 2024-12-03
Attending: EMERGENCY MEDICINE | Admitting: INTERNAL MEDICINE
Payer: COMMERCIAL

## 2024-12-03 ENCOUNTER — APPOINTMENT (OUTPATIENT)
Dept: GENERAL RADIOLOGY | Facility: CLINIC | Age: 74
DRG: 193 | End: 2024-12-03
Attending: EMERGENCY MEDICINE
Payer: COMMERCIAL

## 2024-12-03 ENCOUNTER — APPOINTMENT (OUTPATIENT)
Dept: CT IMAGING | Facility: CLINIC | Age: 74
DRG: 193 | End: 2024-12-03
Attending: EMERGENCY MEDICINE
Payer: COMMERCIAL

## 2024-12-03 DIAGNOSIS — J18.9 PNEUMONIA OF BOTH LUNGS DUE TO INFECTIOUS ORGANISM, UNSPECIFIED PART OF LUNG: ICD-10-CM

## 2024-12-03 DIAGNOSIS — R09.02 HYPOXIA: ICD-10-CM

## 2024-12-03 DIAGNOSIS — I34.0 NONRHEUMATIC MITRAL VALVE REGURGITATION: Primary | ICD-10-CM

## 2024-12-03 DIAGNOSIS — I24.89 DEMAND ISCHEMIA (H): ICD-10-CM

## 2024-12-03 DIAGNOSIS — I50.9 ACUTE ON CHRONIC CONGESTIVE HEART FAILURE, UNSPECIFIED HEART FAILURE TYPE (H): ICD-10-CM

## 2024-12-03 LAB
ALBUMIN SERPL BCG-MCNC: 4.1 G/DL (ref 3.5–5.2)
ALP SERPL-CCNC: 36 U/L (ref 40–150)
ALT SERPL W P-5'-P-CCNC: 14 U/L (ref 0–70)
ANION GAP SERPL CALCULATED.3IONS-SCNC: 14 MMOL/L (ref 7–15)
AST SERPL W P-5'-P-CCNC: 15 U/L (ref 0–45)
ATRIAL RATE - MUSE: NORMAL BPM
BASE EXCESS BLDV CALC-SCNC: 6 MMOL/L (ref -3–3)
BASOPHILS # BLD AUTO: 0 10E3/UL (ref 0–0.2)
BASOPHILS NFR BLD AUTO: 0 %
BILIRUB SERPL-MCNC: 1.1 MG/DL
BUN SERPL-MCNC: 17.9 MG/DL (ref 8–23)
CALCIUM SERPL-MCNC: 8.7 MG/DL (ref 8.8–10.4)
CHLORIDE SERPL-SCNC: 101 MMOL/L (ref 98–107)
CREAT SERPL-MCNC: 0.96 MG/DL (ref 0.67–1.17)
DIASTOLIC BLOOD PRESSURE - MUSE: NORMAL MMHG
EGFRCR SERPLBLD CKD-EPI 2021: 84 ML/MIN/1.73M2
EOSINOPHIL # BLD AUTO: 0.1 10E3/UL (ref 0–0.7)
EOSINOPHIL NFR BLD AUTO: 1 %
ERYTHROCYTE [DISTWIDTH] IN BLOOD BY AUTOMATED COUNT: 15.8 % (ref 10–15)
EST. AVERAGE GLUCOSE BLD GHB EST-MCNC: 154 MG/DL
FLUAV RNA SPEC QL NAA+PROBE: NEGATIVE
FLUBV RNA RESP QL NAA+PROBE: NEGATIVE
GLUCOSE BLDC GLUCOMTR-MCNC: 194 MG/DL (ref 70–99)
GLUCOSE BLDC GLUCOMTR-MCNC: 240 MG/DL (ref 70–99)
GLUCOSE BLDC GLUCOMTR-MCNC: 261 MG/DL (ref 70–99)
GLUCOSE SERPL-MCNC: 230 MG/DL (ref 70–99)
HBA1C MFR BLD: 7 %
HCO3 BLDV-SCNC: 31 MMOL/L (ref 21–28)
HCO3 SERPL-SCNC: 26 MMOL/L (ref 22–29)
HCT VFR BLD AUTO: 34.3 % (ref 40–53)
HGB BLD-MCNC: 11.3 G/DL (ref 13.3–17.7)
IMM GRANULOCYTES # BLD: 0.1 10E3/UL
IMM GRANULOCYTES NFR BLD: 1 %
INR PPP: 2.32 (ref 0.85–1.15)
INTERPRETATION ECG - MUSE: NORMAL
LACTATE BLD-SCNC: 1.7 MMOL/L
LYMPHOCYTES # BLD AUTO: 0.8 10E3/UL (ref 0.8–5.3)
LYMPHOCYTES NFR BLD AUTO: 8 %
MCH RBC QN AUTO: 28.8 PG (ref 26.5–33)
MCHC RBC AUTO-ENTMCNC: 32.9 G/DL (ref 31.5–36.5)
MCV RBC AUTO: 87 FL (ref 78–100)
MONOCYTES # BLD AUTO: 0.5 10E3/UL (ref 0–1.3)
MONOCYTES NFR BLD AUTO: 5 %
NEUTROPHILS # BLD AUTO: 8.2 10E3/UL (ref 1.6–8.3)
NEUTROPHILS NFR BLD AUTO: 85 %
NRBC # BLD AUTO: 0 10E3/UL
NRBC BLD AUTO-RTO: 0 /100
NT-PROBNP SERPL-MCNC: 6916 PG/ML (ref 0–900)
P AXIS - MUSE: NORMAL DEGREES
PCO2 BLDV: 45 MM HG (ref 40–50)
PH BLDV: 7.44 [PH] (ref 7.32–7.43)
PLATELET # BLD AUTO: 173 10E3/UL (ref 150–450)
PO2 BLDV: 37 MM HG (ref 25–47)
POTASSIUM SERPL-SCNC: 3.7 MMOL/L (ref 3.4–5.3)
PR INTERVAL - MUSE: NORMAL MS
PROCALCITONIN SERPL IA-MCNC: 0.1 NG/ML
PROT SERPL-MCNC: 7 G/DL (ref 6.4–8.3)
QRS DURATION - MUSE: 112 MS
QT - MUSE: 382 MS
QTC - MUSE: 482 MS
R AXIS - MUSE: 270 DEGREES
RBC # BLD AUTO: 3.93 10E6/UL (ref 4.4–5.9)
RSV RNA SPEC NAA+PROBE: NEGATIVE
SAO2 % BLDV: 72 % (ref 70–75)
SARS-COV-2 RNA RESP QL NAA+PROBE: NEGATIVE
SODIUM SERPL-SCNC: 141 MMOL/L (ref 135–145)
SYSTOLIC BLOOD PRESSURE - MUSE: NORMAL MMHG
T AXIS - MUSE: -20 DEGREES
TROPONIN T SERPL HS-MCNC: 32 NG/L
TROPONIN T SERPL HS-MCNC: 36 NG/L
VENTRICULAR RATE- MUSE: 96 BPM
WBC # BLD AUTO: 9.7 10E3/UL (ref 4–11)

## 2024-12-03 PROCEDURE — 83880 ASSAY OF NATRIURETIC PEPTIDE: CPT | Performed by: EMERGENCY MEDICINE

## 2024-12-03 PROCEDURE — 85025 COMPLETE CBC W/AUTO DIFF WBC: CPT | Performed by: EMERGENCY MEDICINE

## 2024-12-03 PROCEDURE — 250N000013 HC RX MED GY IP 250 OP 250 PS 637: Performed by: INTERNAL MEDICINE

## 2024-12-03 PROCEDURE — 250N000012 HC RX MED GY IP 250 OP 636 PS 637: Performed by: INTERNAL MEDICINE

## 2024-12-03 PROCEDURE — 250N000011 HC RX IP 250 OP 636: Performed by: EMERGENCY MEDICINE

## 2024-12-03 PROCEDURE — 36415 COLL VENOUS BLD VENIPUNCTURE: CPT | Performed by: EMERGENCY MEDICINE

## 2024-12-03 PROCEDURE — 99223 1ST HOSP IP/OBS HIGH 75: CPT | Performed by: INTERNAL MEDICINE

## 2024-12-03 PROCEDURE — 83036 HEMOGLOBIN GLYCOSYLATED A1C: CPT | Performed by: INTERNAL MEDICINE

## 2024-12-03 PROCEDURE — 210N000002 HC R&B HEART CARE

## 2024-12-03 PROCEDURE — 5A09357 ASSISTANCE WITH RESPIRATORY VENTILATION, LESS THAN 24 CONSECUTIVE HOURS, CONTINUOUS POSITIVE AIRWAY PRESSURE: ICD-10-PCS | Performed by: HOSPITALIST

## 2024-12-03 PROCEDURE — 96375 TX/PRO/DX INJ NEW DRUG ADDON: CPT

## 2024-12-03 PROCEDURE — 82565 ASSAY OF CREATININE: CPT | Performed by: EMERGENCY MEDICINE

## 2024-12-03 PROCEDURE — 82803 BLOOD GASES ANY COMBINATION: CPT

## 2024-12-03 PROCEDURE — 87040 BLOOD CULTURE FOR BACTERIA: CPT | Performed by: EMERGENCY MEDICINE

## 2024-12-03 PROCEDURE — 96365 THER/PROPH/DIAG IV INF INIT: CPT | Mod: 59

## 2024-12-03 PROCEDURE — 250N000009 HC RX 250: Performed by: EMERGENCY MEDICINE

## 2024-12-03 PROCEDURE — 84155 ASSAY OF PROTEIN SERUM: CPT | Performed by: EMERGENCY MEDICINE

## 2024-12-03 PROCEDURE — 93005 ELECTROCARDIOGRAM TRACING: CPT

## 2024-12-03 PROCEDURE — 87637 SARSCOV2&INF A&B&RSV AMP PRB: CPT | Performed by: EMERGENCY MEDICINE

## 2024-12-03 PROCEDURE — 83605 ASSAY OF LACTIC ACID: CPT

## 2024-12-03 PROCEDURE — 84484 ASSAY OF TROPONIN QUANT: CPT | Performed by: EMERGENCY MEDICINE

## 2024-12-03 PROCEDURE — 71046 X-RAY EXAM CHEST 2 VIEWS: CPT

## 2024-12-03 PROCEDURE — 82040 ASSAY OF SERUM ALBUMIN: CPT | Performed by: EMERGENCY MEDICINE

## 2024-12-03 PROCEDURE — 99285 EMERGENCY DEPT VISIT HI MDM: CPT | Mod: 25

## 2024-12-03 PROCEDURE — 71260 CT THORAX DX C+: CPT

## 2024-12-03 PROCEDURE — 84145 PROCALCITONIN (PCT): CPT | Performed by: INTERNAL MEDICINE

## 2024-12-03 PROCEDURE — 85610 PROTHROMBIN TIME: CPT | Performed by: EMERGENCY MEDICINE

## 2024-12-03 RX ORDER — CLONIDINE HYDROCHLORIDE 0.1 MG/1
0.2 TABLET ORAL 2 TIMES DAILY
Status: DISCONTINUED | OUTPATIENT
Start: 2024-12-03 | End: 2024-12-08 | Stop reason: HOSPADM

## 2024-12-03 RX ORDER — GLIMEPIRIDE 4 MG/1
4 TABLET ORAL
COMMUNITY

## 2024-12-03 RX ORDER — AZITHROMYCIN 250 MG/1
250 TABLET, FILM COATED ORAL DAILY
Status: DISCONTINUED | OUTPATIENT
Start: 2024-12-04 | End: 2024-12-04

## 2024-12-03 RX ORDER — FLUTICASONE PROPIONATE 50 MCG
2 SPRAY, SUSPENSION (ML) NASAL DAILY
COMMUNITY

## 2024-12-03 RX ORDER — NIFEDIPINE 60 MG/1
60 TABLET, EXTENDED RELEASE ORAL DAILY
Status: DISCONTINUED | OUTPATIENT
Start: 2024-12-04 | End: 2024-12-08 | Stop reason: HOSPADM

## 2024-12-03 RX ORDER — FUROSEMIDE 10 MG/ML
40 INJECTION INTRAMUSCULAR; INTRAVENOUS
Status: COMPLETED | OUTPATIENT
Start: 2024-12-04 | End: 2024-12-04

## 2024-12-03 RX ORDER — CEFTRIAXONE 1 G/1
1 INJECTION, POWDER, FOR SOLUTION INTRAMUSCULAR; INTRAVENOUS EVERY 24 HOURS
Status: DISCONTINUED | OUTPATIENT
Start: 2024-12-04 | End: 2024-12-08

## 2024-12-03 RX ORDER — FENOFIBRATE 160 MG/1
160 TABLET ORAL AT BEDTIME
Status: DISCONTINUED | OUTPATIENT
Start: 2024-12-03 | End: 2024-12-08 | Stop reason: HOSPADM

## 2024-12-03 RX ORDER — GUAIFENESIN 200 MG/10ML
200 LIQUID ORAL EVERY 4 HOURS PRN
Status: DISCONTINUED | OUTPATIENT
Start: 2024-12-03 | End: 2024-12-08 | Stop reason: HOSPADM

## 2024-12-03 RX ORDER — GABAPENTIN 600 MG/1
600 TABLET ORAL DAILY
Status: DISCONTINUED | OUTPATIENT
Start: 2024-12-04 | End: 2024-12-08 | Stop reason: HOSPADM

## 2024-12-03 RX ORDER — ACETAMINOPHEN 650 MG/1
650 SUPPOSITORY RECTAL EVERY 4 HOURS PRN
Status: DISCONTINUED | OUTPATIENT
Start: 2024-12-03 | End: 2024-12-08 | Stop reason: HOSPADM

## 2024-12-03 RX ORDER — ONDANSETRON 2 MG/ML
4 INJECTION INTRAMUSCULAR; INTRAVENOUS EVERY 6 HOURS PRN
Status: DISCONTINUED | OUTPATIENT
Start: 2024-12-03 | End: 2024-12-08 | Stop reason: HOSPADM

## 2024-12-03 RX ORDER — FUROSEMIDE 40 MG/1
40 TABLET ORAL DAILY
COMMUNITY

## 2024-12-03 RX ORDER — DOXAZOSIN 4 MG/1
4 TABLET ORAL AT BEDTIME
COMMUNITY

## 2024-12-03 RX ORDER — AZITHROMYCIN MONOHYDRATE 500 MG/5ML
500 INJECTION, POWDER, LYOPHILIZED, FOR SOLUTION INTRAVENOUS ONCE
Status: COMPLETED | OUTPATIENT
Start: 2024-12-03 | End: 2024-12-03

## 2024-12-03 RX ORDER — CARBOXYMETHYLCELLULOSE SODIUM 5 MG/ML
1 SOLUTION/ DROPS OPHTHALMIC
Status: DISCONTINUED | OUTPATIENT
Start: 2024-12-03 | End: 2024-12-08 | Stop reason: HOSPADM

## 2024-12-03 RX ORDER — DEXTROSE MONOHYDRATE 25 G/50ML
25-50 INJECTION, SOLUTION INTRAVENOUS
Status: DISCONTINUED | OUTPATIENT
Start: 2024-12-03 | End: 2024-12-08 | Stop reason: HOSPADM

## 2024-12-03 RX ORDER — LIDOCAINE 40 MG/G
CREAM TOPICAL
Status: DISCONTINUED | OUTPATIENT
Start: 2024-12-03 | End: 2024-12-08 | Stop reason: HOSPADM

## 2024-12-03 RX ORDER — GABAPENTIN 600 MG/1
600 TABLET ORAL DAILY
COMMUNITY

## 2024-12-03 RX ORDER — NEBIVOLOL 5 MG/1
5 TABLET ORAL AT BEDTIME
Status: DISCONTINUED | OUTPATIENT
Start: 2024-12-03 | End: 2024-12-08 | Stop reason: HOSPADM

## 2024-12-03 RX ORDER — FENOFIBRATE 160 MG/1
160 TABLET ORAL AT BEDTIME
COMMUNITY

## 2024-12-03 RX ORDER — BENAZEPRIL HYDROCHLORIDE 40 MG/1
40 TABLET ORAL 2 TIMES DAILY
COMMUNITY

## 2024-12-03 RX ORDER — TRAZODONE HYDROCHLORIDE 50 MG/1
50 TABLET, FILM COATED ORAL EVERY EVENING
COMMUNITY

## 2024-12-03 RX ORDER — ASPIRIN 81 MG/1
81 TABLET ORAL DAILY
COMMUNITY

## 2024-12-03 RX ORDER — OLOPATADINE HYDROCHLORIDE 1 MG/ML
1 SOLUTION/ DROPS OPHTHALMIC DAILY
Status: DISCONTINUED | OUTPATIENT
Start: 2024-12-04 | End: 2024-12-08 | Stop reason: HOSPADM

## 2024-12-03 RX ORDER — AMOXICILLIN 250 MG
2 CAPSULE ORAL 2 TIMES DAILY PRN
Status: DISCONTINUED | OUTPATIENT
Start: 2024-12-03 | End: 2024-12-08 | Stop reason: HOSPADM

## 2024-12-03 RX ORDER — TRAZODONE HYDROCHLORIDE 50 MG/1
50 TABLET, FILM COATED ORAL EVERY EVENING
Status: DISCONTINUED | OUTPATIENT
Start: 2024-12-03 | End: 2024-12-08 | Stop reason: HOSPADM

## 2024-12-03 RX ORDER — OLOPATADINE HYDROCHLORIDE 2 MG/ML
1 SOLUTION/ DROPS OPHTHALMIC DAILY
COMMUNITY

## 2024-12-03 RX ORDER — ONDANSETRON 4 MG/1
4 TABLET, ORALLY DISINTEGRATING ORAL EVERY 6 HOURS PRN
Status: DISCONTINUED | OUTPATIENT
Start: 2024-12-03 | End: 2024-12-08 | Stop reason: HOSPADM

## 2024-12-03 RX ORDER — AMOXICILLIN 250 MG
1 CAPSULE ORAL 2 TIMES DAILY PRN
Status: DISCONTINUED | OUTPATIENT
Start: 2024-12-03 | End: 2024-12-08 | Stop reason: HOSPADM

## 2024-12-03 RX ORDER — IOPAMIDOL 755 MG/ML
135 INJECTION, SOLUTION INTRAVASCULAR ONCE
Status: COMPLETED | OUTPATIENT
Start: 2024-12-03 | End: 2024-12-03

## 2024-12-03 RX ORDER — ESCITALOPRAM OXALATE 10 MG/1
10 TABLET ORAL DAILY
Status: DISCONTINUED | OUTPATIENT
Start: 2024-12-04 | End: 2024-12-08 | Stop reason: HOSPADM

## 2024-12-03 RX ORDER — FLUTICASONE PROPIONATE 50 MCG
2 SPRAY, SUSPENSION (ML) NASAL DAILY
Status: DISCONTINUED | OUTPATIENT
Start: 2024-12-04 | End: 2024-12-08 | Stop reason: HOSPADM

## 2024-12-03 RX ORDER — WARFARIN SODIUM 5 MG/1
TABLET ORAL
COMMUNITY

## 2024-12-03 RX ORDER — NICOTINE POLACRILEX 4 MG
15-30 LOZENGE BUCCAL
Status: DISCONTINUED | OUTPATIENT
Start: 2024-12-03 | End: 2024-12-08 | Stop reason: HOSPADM

## 2024-12-03 RX ORDER — LISINOPRIL 40 MG/1
40 TABLET ORAL DAILY
Status: DISCONTINUED | OUTPATIENT
Start: 2024-12-04 | End: 2024-12-08 | Stop reason: HOSPADM

## 2024-12-03 RX ORDER — EZETIMIBE 10 MG/1
10 TABLET ORAL EVERY EVENING
COMMUNITY

## 2024-12-03 RX ORDER — FUROSEMIDE 10 MG/ML
60 INJECTION INTRAMUSCULAR; INTRAVENOUS ONCE
Status: COMPLETED | OUTPATIENT
Start: 2024-12-03 | End: 2024-12-03

## 2024-12-03 RX ORDER — DOXAZOSIN 4 MG/1
4 TABLET ORAL AT BEDTIME
Status: DISCONTINUED | OUTPATIENT
Start: 2024-12-03 | End: 2024-12-08 | Stop reason: HOSPADM

## 2024-12-03 RX ORDER — ASPIRIN 81 MG/1
81 TABLET ORAL DAILY
Status: DISCONTINUED | OUTPATIENT
Start: 2024-12-04 | End: 2024-12-08 | Stop reason: HOSPADM

## 2024-12-03 RX ORDER — CALCIUM CARBONATE 500 MG/1
1000 TABLET, CHEWABLE ORAL 4 TIMES DAILY PRN
Status: DISCONTINUED | OUTPATIENT
Start: 2024-12-03 | End: 2024-12-08 | Stop reason: HOSPADM

## 2024-12-03 RX ORDER — NEBIVOLOL 5 MG/1
5 TABLET ORAL AT BEDTIME
COMMUNITY

## 2024-12-03 RX ORDER — CEFTRIAXONE 2 G/1
2 INJECTION, POWDER, FOR SOLUTION INTRAMUSCULAR; INTRAVENOUS ONCE
Status: COMPLETED | OUTPATIENT
Start: 2024-12-03 | End: 2024-12-03

## 2024-12-03 RX ORDER — WARFARIN SODIUM 5 MG/1
5 TABLET ORAL
Status: COMPLETED | OUTPATIENT
Start: 2024-12-03 | End: 2024-12-03

## 2024-12-03 RX ORDER — METOPROLOL TARTRATE 1 MG/ML
2.5 INJECTION, SOLUTION INTRAVENOUS EVERY 4 HOURS PRN
Status: DISCONTINUED | OUTPATIENT
Start: 2024-12-03 | End: 2024-12-08 | Stop reason: HOSPADM

## 2024-12-03 RX ORDER — CLONIDINE HYDROCHLORIDE 0.2 MG/1
0.2 TABLET ORAL 2 TIMES DAILY
COMMUNITY

## 2024-12-03 RX ORDER — ACETAMINOPHEN 325 MG/1
650 TABLET ORAL EVERY 4 HOURS PRN
Status: DISCONTINUED | OUTPATIENT
Start: 2024-12-03 | End: 2024-12-08 | Stop reason: HOSPADM

## 2024-12-03 RX ORDER — ESCITALOPRAM OXALATE 10 MG/1
10 TABLET ORAL DAILY
COMMUNITY

## 2024-12-03 RX ORDER — EZETIMIBE 10 MG/1
10 TABLET ORAL EVERY EVENING
Status: DISCONTINUED | OUTPATIENT
Start: 2024-12-03 | End: 2024-12-08 | Stop reason: HOSPADM

## 2024-12-03 RX ADMIN — WARFARIN SODIUM 5 MG: 5 TABLET ORAL at 20:07

## 2024-12-03 RX ADMIN — SODIUM CHLORIDE 80 ML: 9 INJECTION, SOLUTION INTRAVENOUS at 14:56

## 2024-12-03 RX ADMIN — NEBIVOLOL 5 MG: 5 TABLET ORAL at 21:31

## 2024-12-03 RX ADMIN — FENOFIBRATE 160 MG: 160 TABLET ORAL at 21:30

## 2024-12-03 RX ADMIN — INSULIN ASPART 3 UNITS: 100 INJECTION, SOLUTION INTRAVENOUS; SUBCUTANEOUS at 20:07

## 2024-12-03 RX ADMIN — AZITHROMYCIN MONOHYDRATE 500 MG: 500 INJECTION, POWDER, LYOPHILIZED, FOR SOLUTION INTRAVENOUS at 15:17

## 2024-12-03 RX ADMIN — EZETIMIBE 10 MG: 10 TABLET ORAL at 21:30

## 2024-12-03 RX ADMIN — FUROSEMIDE 60 MG: 10 INJECTION, SOLUTION INTRAMUSCULAR; INTRAVENOUS at 14:46

## 2024-12-03 RX ADMIN — TRAZODONE HYDROCHLORIDE 50 MG: 50 TABLET ORAL at 21:30

## 2024-12-03 RX ADMIN — CEFTRIAXONE SODIUM 2 G: 2 INJECTION, POWDER, FOR SOLUTION INTRAMUSCULAR; INTRAVENOUS at 14:31

## 2024-12-03 RX ADMIN — DOXAZOSIN 4 MG: 4 TABLET ORAL at 21:32

## 2024-12-03 RX ADMIN — CLONIDINE HYDROCHLORIDE 0.2 MG: 0.1 TABLET ORAL at 21:27

## 2024-12-03 RX ADMIN — IOPAMIDOL 135 ML: 755 INJECTION, SOLUTION INTRAVENOUS at 14:56

## 2024-12-03 ASSESSMENT — ACTIVITIES OF DAILY LIVING (ADL)
ADLS_ACUITY_SCORE: 41
ADLS_ACUITY_SCORE: 18
ADLS_ACUITY_SCORE: 41
ADLS_ACUITY_SCORE: 41
ADLS_ACUITY_SCORE: 18
ADLS_ACUITY_SCORE: 41
ADLS_ACUITY_SCORE: 29
ADLS_ACUITY_SCORE: 41
ADLS_ACUITY_SCORE: 42
ADLS_ACUITY_SCORE: 41
ADLS_ACUITY_SCORE: 41
ADLS_ACUITY_SCORE: 29

## 2024-12-03 NOTE — ED TRIAGE NOTES
URI started six days ago, O2 sats have been low, taking cough medicine, wife has been giving him her oxygen, abd pain and chest pain. Hx of heart murmur. Leaky valve, amyloidosis. Pain with cough, none at rest. Negative covid test at home.

## 2024-12-03 NOTE — PHARMACY-ADMISSION MEDICATION HISTORY
Pharmacist Admission Medication History    Admission medication history is complete. The information provided in this note is only as accurate as the sources available at the time of the update.    Information Source(s): Patient and Patient supplied medication list  via in-person    Pertinent Information: INR goal is 2-3, last INR was 2.1    Changes made to PTA medication list:  Added: all  Deleted: None  Changed: None    Allergies reviewed with patient and updates made in EHR: no    Medication History Completed By: Danny Sheets RPH 12/3/2024 12:30 PM    PTA Med List   Medication Sig Last Dose/Taking    aspirin 81 MG EC tablet Take 81 mg by mouth daily. 12/3/2024 Morning    benazepril (LOTENSIN) 40 MG tablet Take 40 mg by mouth 2 times daily. 12/3/2024 Morning    cloNIDine (CATAPRES) 0.2 MG tablet Take 0.2 mg by mouth 2 times daily. 12/3/2024 Morning    doxazosin (CARDURA) 4 MG tablet Take 4 mg by mouth at bedtime. 12/2/2024 Bedtime    escitalopram (LEXAPRO) 10 MG tablet Take 10 mg by mouth daily. 12/3/2024 Morning    ezetimibe (ZETIA) 10 MG tablet Take 10 mg by mouth every evening. 12/2/2024 Evening    fenofibrate (TRIGLIDE/LOFIBRA) 160 MG tablet Take 160 mg by mouth at bedtime. 12/2/2024 Evening    fluticasone (FLONASE) 50 MCG/ACT nasal spray Spray 2 sprays into both nostrils daily. 12/3/2024 Morning    furosemide (LASIX) 40 MG tablet Take 40 mg by mouth daily. 12/3/2024 Morning    gabapentin (NEURONTIN) 600 MG tablet Take 600 mg by mouth daily. 12/3/2024 Morning    glimepiride (AMARYL) 4 MG tablet Take 4 mg by mouth 2 times daily (before meals). 12/3/2024 Morning    Multiple Vitamins-Minerals (PRESERVISION AREDS 2 PO) Take 1 tablet by mouth 2 times daily. 12/3/2024 Morning    nebivolol (BYSTOLIC) 5 MG tablet Take 5 mg by mouth at bedtime. 12/2/2024 Bedtime    NIFEdipine ER (ADALAT CC) 60 MG 24 hr tablet Take 60 mg by mouth daily. 12/3/2024 Morning    olopatadine (PATADAY) 0.2 % ophthalmic solution Place 1  drop into both eyes daily. 12/3/2024 Morning    polyethylene glycol-propylene glycol (SYSTANE ULTRA) 0.4-0.3 % SOLN ophthalmic solution Place 1 drop into both eyes every hour as needed for dry eyes. Taking As Needed    sitagliptin-metFORMIN (JANUMET)  MG tablet Take 1 tablet by mouth 2 times daily (with meals). 12/3/2024 Morning    traZODone (DESYREL) 50 MG tablet Take 50 mg by mouth every evening. One hour before bedtime 12/2/2024 Evening    warfarin ANTICOAGULANT (COUMADIN) 5 MG tablet Take 7.5 mg by mouth daily on Wed/Sat, take 5 mg by mouth daily all other days 12/2/2024 Evening

## 2024-12-03 NOTE — H&P
[facility]    History and Physical - Hospitalist Service       Date of Admission:  12/3/2024    Assessment & Plan      Percy Thornton is a 74 year old male admitted on 12/3/2024.   Percy Thornton is a 74 year old male with a past medical history of hypertension, dyslipidemia, coronary artery disease status post PCI to distal LAD in September 2022, recently diagnosed atrial fibrillation on warfarin, mild to moderate MR, mild aortic stenosis, suspected cardiac amyloidosis, diabetes mellitus type 2 and arthritis who presented to ER for evaluation of shortness of breath and cough.    # Acute hypoxic respiratory failure, multifactorial  -Likely from community-acquired pneumonia, CHF exacerbation, bilateral pleural effusion  -Treat each condition as below  -Supplemental oxygen.  Off oxygen as able    # Community-acquired pneumonia  -Reports productive cough and shortness of breath over the last few days  -No fever  -COVID-19, influenza A and B and RSV swab negative  -No leukocytosis  -Chest x-ray- A 3.5 cm masslike opacity is noted in the left midlung with ill-defined surrounding opacity noted. There is also mild hazy opacity in the right mid lung. Findings could be related to infection or edema, however a neoplastic mass cannot be excluded  -CT chest IV contrast  1.  Multifocal, mixed groundglass and consolidative airspace opacities are seen in the lungs suspicious for pneumonia. Multiple pulmonary nodules are also present with one of the largest measuring 2.6 cm in the right upper lobe. These findings may be due to underlying infectious etiology but neoplastic origin cannot be excluded. Recommend 3 month CT chest follow-up exam.  2.  Small to moderate bilateral pleural effusions.  3.  Multiple enlarged mediastinal and hilar lymph nodes which are nonspecific and may be reactive. Suggest attention on follow-up exam.  4.  Interlobular septal thickening seen in the lungs which may be due to  underlying infection versus pulmonary edema.    -Received ceftriaxone and Zithromax in ER  -Continue ceftriaxone and Zithromax for now  -Check procalcitonin  -Check Legionella antigen and streptococcal pneumonia antigen  -Robitussin as needed  -Incentive spirometry  -Acapella valve  -Supplemental oxygen, wean off oxygen as able  -Recommend repeat CT of the chest in 3 months to ensure resolution of radiological findings and rule out lung mass    # Diastolic CHF exacerbation  # Bilateral small-moderate pleural effusion  # Amyloidosis?  # Moderate to severe mitral regurgitation  *Recently diagnosed with atrial fibrillation and started on warfarin; seen by cardiology as outpatient in [place] and outpatient echocardiogram on 10/25/2024 showed EF 52%, LV cavity moderately dilated with moderate concentric hypertrophy, low ventricular mass index was increased concerning for infiltrative cardiomyopathy.  There is evidence of aortic valve sclerosis, aortic valve area was 1.5 cm . There is evidence of moderate to severe mitral valve regurgitation.  There was concern for cardiac amyloidosis.  SPEP positive for free light kappa chain and free lambda light chains.  He was started on Lasix.  He states he is supposed to have another test on December 17/2024 (PYP?)  -Reports shortness of breath and increased leg swelling  -proBNP elevated at almost 7000  -CT chest with IV contrast showed evidence of pulmonary edema and bilateral small to moderate bilateral pleural effusions  -Lasix 60 mg IV given in ER with good response  -Monitor on telemetry  -Started on Lasix 40 mg IV twice daily for 2 doses  -strict input and output  -Daily weights  -Echocardiogram  -Telemetry  -Cardiology consult  -2 g sodium diet  -Follow-up with primary cardiologist after returning back [place], apparently he is scheduled to have another test to confirm amyloidosis    # Atrial fibrillation  -Recently diagnosed with A-fib, follows with cardiology as  "outpatient; on warfarin  -INR 2.32  -He is in A-fib with heart rate in 100s  -Telemetry  -Warfarin as per pharmacy dosing  -Resume PTA nebivolol 5 mg p.o. nightly; is also on nifedipine ER 60 mg p.o. daily  -Metoprolol IV as needed     # Hypertension  # Dyslipidemia  # Coronary artery disease status post PCI to distal LAD in September 2022  # Elevated troponins, likely demand ischemia in the setting of hypoxia, CHF exacerbation  -Troponin 32--36  -EKG- atrial fibrillation, left anterior fascicular block, Q waves in inferior leads and V1-V4, T wave inversion in lateral leads  -reports some chest pain with coughing, seems pleuritic   -Telemetry  -Resume PTA aspirin 81 mg p.o. daily, clonidine 0.2 mg p.o. twice daily, benazepril 40 mg p.o. twice daily, nebivolol 5 mg p.o. nightly, nifedipine 60 mg p.o. daily  -Resume PTA Zetia and fenofibrate  -Elevated troponins are quite flat, troponin until trending down    # Diabetes mellitus type 2  [PTA on Amaryl 4 mg p.o. twice daily and Janumet]  -Hold PTA oral antidiabetic medications for now  -Check globin A1c  -Mod carb diet  -Accu-Cheks before meals and bedtime  -Insulin sliding scale          Diet:  Mod carb diet, 2 g sodium  DVT Prophylaxis: Warfarin  Frazier Catheter: Not present  Lines: None     Cardiac Monitoring: None  Code Status:  Full Code    Clinically Significant Risk Factors Present on Admission                # Drug Induced Coagulation Defect: home medication list includes an anticoagulant medication  # Drug Induced Platelet Defect: home medication list includes an antiplatelet medication   # Hypertension: Home medication list includes antihypertensive(s)           # Obesity: Estimated body mass index is 34.67 kg/m  as calculated from the following:    Height as of this encounter: 1.88 m (6' 2\").    Weight as of this encounter: 122.5 kg (270 lb).              Disposition Plan     Medically Ready for Discharge: Anticipated in 2-4 Days           Gabbie Fields " MD Bruno  Hospitalist Service  [facility]  Securely message with [employer] (more info)  Text page via [facility] Paging/Directory     ______________________________________________________________________    Chief Complaint   Shortness of breath and cough    History is obtained from the patient who is a good historian.  I discussed with Dr. Lorenzo, ER attending and I reviewed his electronic medical records.    History of Present Illness   Percy Thornton is a 74 year old male with a past medical history of hypertension, dyslipidemia, coronary artery disease status post PCI to distal LAD in September 2022, recently diagnosed atrial fibrillation on warfarin, mild to moderate MR, mild aortic stenosis, suspected cardiac amyloidosis, diabetes mellitus type 2 and arthritis who presented to ER for evaluation of shortness of breath and cough.  The patient lives in [place], currently visiting his son.  He came to [place] on last Tuesday.  He reports that he started having a productive cough since last Wednesday.  He reports cough is productive of greenish sputum, sometimes with flecks of red blood.  Since Saturday he started feeling more short of breath.  He denies fever or chills at home.  He reports having chest pain and abdominal pain when he coughs.  His wife uses a supplemental oxygen at home so he started using her oxygen.  He reports the increased lower extremity edema.  He does wear compression stockings.  Upon further questioning he reports some mild headache.  He denies nausea, no vomiting.  He reports some issues with constipation.  He denies dysuria.  Of note, the patient was recently diagnosed with atrial fibrillation in September 2024 and he was started on warfarin.  Followed with cardiology in [place]; an echocardiogram on 10/25/2024 showed EF 52%, LV cavity moderately dilated with moderate concentric hypertrophy, low ventricular mass index was increased concerning for infiltrative  "cardiomyopathy.  There is evidence of aortic valve sclerosis, aortic valve area was 1.5 cm . There is evidence of moderate to severe mitral valve regurgitation.  There was concern for cardiac amyloidosis.  SPEP positive for free light kappa chain and free lambda light chains.  He states he is supposed to have another test on December 17, 2024.    He was seen by Dr. Lorenzo, he was saturating 80% on room air upon arrival.    Vital signs:  Temp: 96.9  F (36.1  C) Temp src: Temporal BP: (!) 145/87 Pulse: 106   Resp: 28 SpO2: 92 % O2 Device: Nasal cannula Oxygen Delivery: 2 LPM Height: 188 cm (6' 2\") Weight: 122.5 kg (270 lb)  Estimated body mass index is 34.67 kg/m  as calculated from the following:    Height as of this encounter: 1.88 m (6' 2\").    Weight as of this encounter: 122.5 kg (270 lb).    CBC RESULTS:   Recent Labs   Lab Test 12/03/24  1233   WBC 9.7   RBC 3.93*   HGB 11.3*   HCT 34.3*   MCV 87   MCH 28.8   MCHC 32.9   RDW 15.8*         Last Comprehensive Metabolic Panel:  Lab Results   Component Value Date     12/03/2024    POTASSIUM 3.7 12/03/2024    CHLORIDE 101 12/03/2024    CO2 26 12/03/2024    ANIONGAP 14 12/03/2024     (H) 12/03/2024    BUN 17.9 12/03/2024    CR 0.96 12/03/2024    GFRESTIMATED 84 12/03/2024    IGNACIA 8.7 (L) 12/03/2024      Liver Function Studies -   Recent Labs   Lab Test 12/03/24  1233   PROTTOTAL 7.0   ALBUMIN 4.1   BILITOTAL 1.1   ALKPHOS 36*   AST 15   ALT 14      Troponin 32--36; proBNP 6916; calcium 1.7  Tested negative for COVID-19, influenza A and B and RSV.    Chest x-ray- A 3.5 cm masslike opacity is noted in the left midlung with ill-defined surrounding opacity noted. There is also mild hazy opacity in the right mid lung. Findings could be related to infection or edema, however a neoplastic mass cannot be excluded. Chest CT isrecommended for further evaluation. Small bilateral pleural effusions. There is cardiac enlargement and mild bilateral pulmonary venous " congestion.    CT chest with IV contrast:  1.  Multifocal, mixed groundglass and consolidative airspace opacities are seen in the lungs suspicious for pneumonia. Multiple pulmonary nodules are also present with one of the largest measuring 2.6 cm in the right upper lobe. These findings may be due to underlying infectious etiology but neoplastic origin cannot be excluded. Recommend 3 month CT chest follow-up exam.  2.  Small to moderate bilateral pleural effusions.  3.  Multiple enlarged mediastinal and hilar lymph nodes which are nonspecific and may be reactive. Suggest attention on follow-up exam.  4.  Interlobular septal thickening seen in the lungs which may be due to underlying infection versus pulmonary edema.  5.  Age-indeterminate mild compression deformities seen at T5-T6.      EKG-atrial fibrillation, left anterior fascicular block, Q waves in inferior leads and V1-V4, T wave inversion in lateral leads    In ER he was given 1 dose of ceftriaxone and 1 dose of Zithromax.  He was also given 1 dose of Lasix 60 mg IV for suspected CHF exacerbation and hospitalist service was called regarding admission.        Past Medical History    No past medical history on file.    Past Surgical History   No past surgical history on file.    Prior to Admission Medications   Prior to Admission Medications   Prescriptions Last Dose Informant Patient Reported? Taking?   Multiple Vitamins-Minerals (PRESERVISION AREDS 2 PO) 12/3/2024 Morning  Yes Yes   Sig: Take 1 tablet by mouth 2 times daily.   NIFEdipine ER (ADALAT CC) 60 MG 24 hr tablet 12/3/2024 Morning  Yes Yes   Sig: Take 60 mg by mouth daily.   aspirin 81 MG EC tablet 12/3/2024 Morning  Yes Yes   Sig: Take 81 mg by mouth daily.   benazepril (LOTENSIN) 40 MG tablet 12/3/2024 Morning  Yes Yes   Sig: Take 40 mg by mouth 2 times daily.   cloNIDine (CATAPRES) 0.2 MG tablet 12/3/2024 Morning  Yes Yes   Sig: Take 0.2 mg by mouth 2 times daily.   doxazosin (CARDURA) 4 MG tablet  12/2/2024 Bedtime  Yes Yes   Sig: Take 4 mg by mouth at bedtime.   escitalopram (LEXAPRO) 10 MG tablet 12/3/2024 Morning  Yes Yes   Sig: Take 10 mg by mouth daily.   ezetimibe (ZETIA) 10 MG tablet 12/2/2024 Evening  Yes Yes   Sig: Take 10 mg by mouth every evening.   fenofibrate (TRIGLIDE/LOFIBRA) 160 MG tablet 12/2/2024 Evening  Yes Yes   Sig: Take 160 mg by mouth at bedtime.   fluticasone (FLONASE) 50 MCG/ACT nasal spray 12/3/2024 Morning  Yes Yes   Sig: Spray 2 sprays into both nostrils daily.   furosemide (LASIX) 40 MG tablet 12/3/2024 Morning  Yes Yes   Sig: Take 40 mg by mouth daily.   gabapentin (NEURONTIN) 600 MG tablet 12/3/2024 Morning  Yes Yes   Sig: Take 600 mg by mouth daily.   glimepiride (AMARYL) 4 MG tablet 12/3/2024 Morning  Yes Yes   Sig: Take 4 mg by mouth 2 times daily (before meals).   nebivolol (BYSTOLIC) 5 MG tablet 12/2/2024 Bedtime  Yes Yes   Sig: Take 5 mg by mouth at bedtime.   olopatadine (PATADAY) 0.2 % ophthalmic solution 12/3/2024 Morning  Yes Yes   Sig: Place 1 drop into both eyes daily.   polyethylene glycol-propylene glycol (SYSTANE ULTRA) 0.4-0.3 % SOLN ophthalmic solution   Yes Yes   Sig: Place 1 drop into both eyes every hour as needed for dry eyes.   sitagliptin-metFORMIN (JANUMET)  MG tablet 12/3/2024 Morning  Yes Yes   Sig: Take 1 tablet by mouth 2 times daily (with meals).   traZODone (DESYREL) 50 MG tablet 12/2/2024 Evening  Yes Yes   Sig: Take 50 mg by mouth every evening. One hour before bedtime   warfarin ANTICOAGULANT (COUMADIN) 5 MG tablet 12/2/2024 Evening  Yes Yes   Sig: Take 7.5 mg by mouth daily on Wed/Sat, take 5 mg by mouth daily all other days      Facility-Administered Medications: None        Review of Systems    The 10 point Review of Systems is negative other than noted in the HPI or here.     Social History   I have reviewed this patient's social history and updated it with pertinent information if needed.       Allergies   No Known Allergies      Physical Exam   Vital Signs: Temp: 96.9  F (36.1  C) Temp src: Temporal BP: (!) 167/101 Pulse: 107   Resp: 16 SpO2: 93 % O2 Device: Nasal cannula Oxygen Delivery: 2 LPM  Weight: 270 lbs 0 oz    General Appearance: Awake/alert, no acute distress  Respiratory: Managed air entry at the bases, some coarse breath sounds.  Feels, no wheezing  Cardiovascular: Irregularly irregular, mild tachycardia, systolic murmur 2/6 precordial area  GI: Abdomen soft, nontender, bowel sounds are present  Skin: He has some bruises over his left hip and abdominal wall, no rashes  Neuro: AAOx3, no focal neurological deficits  Extremities: 1+ pitting edema bilateral lower extremities, wears compression stockings    Medical Decision Making       75 MINUTES SPENT BY ME on the date of service doing chart review, history, exam, documentation & further activities per the note.  MANAGEMENT DISCUSSED with the following over the past 24 hours: The patient, family, ER attending   NOTE(S)/MEDICAL RECORDS REVIEWED over the past 24 hours: Outpatient cardiology notes  Tests REVIEWED in the past 24 hours:  - BMP  - CBC  - Coags/INR  - Lactic Acid  - LFTs  - Troponin  Tests personally interpreted in the past 24 hours:  - EKG tracing showing as above  - CHEST XRAY showing above  - CHEST CT showing as above  SUPPLEMENTAL HISTORY, in addition to the patient's history, over the past 24 hours obtained from:   - Wife and son at bedside       Data     I have personally reviewed the following data over the past 24 hrs:    9.7  \   11.3 (L)   / 173     141 101 17.9 /  230 (H)   3.7 26 0.96 \     ALT: 14 AST: 15 AP: 36 (L) TBILI: 1.1   ALB: 4.1 TOT PROTEIN: 7.0 LIPASE: N/A     Trop: 36 (H) BNP: 6,916 (H)     Procal: N/A CRP: N/A Lactic Acid: 1.7       INR:  2.32 (H) PTT:  N/A   D-dimer:  N/A Fibrinogen:  N/A       Imaging results reviewed over the past 24 hrs:   Recent Results (from the past 24 hours)   XR Chest 2 Views    Narrative    CHEST TWO VIEWS  12/3/2024  12:46 PM     HISTORY: Cough. Hypoxia.    COMPARISON: None.      Impression    IMPRESSION: A 3.5 cm masslike opacity is noted in the left midlung  with ill-defined surrounding opacity noted. There is also mild hazy  opacity in the right mid lung. Findings could be related to infection  or edema, however a neoplastic mass cannot be excluded. Chest CT is  recommended for further evaluation. Small bilateral pleural effusions.  There is cardiac enlargement and mild bilateral pulmonary venous  congestion.    HAI FLORES MD         SYSTEM ID:  [id number]   CT Chest w Contrast    Narrative    CT CHEST W CONTRAST 12/3/2024 3:08 PM    CLINICAL HISTORY: Evaluate for mass and infiltrate seen on chest  x-ray, hypoxia  TECHNIQUE: CT chest with IV contrast. Multiplanar reformats were  obtained. Dose reduction techniques were used.    CONTRAST: 135mL Isovue-[id number]    COMPARISON: None.    FINDINGS:   LUNGS AND PLEURA: Small to moderate bilateral pleural effusions are  present. Mixed groundglass and consolidative opacities are seen in the  lungs bilaterally with widely largest areas noted along the posterior  left upper lobe measuring up to 9.1 x 5.4 cm (series 4, image 154).  Multiple solid and some solid nodules are also present including a 10  mm solid, subpleural nodule in the right lower lobe (series 4, image  266) and subsolid nodule in the right upper lobe measuring up to 2.6  cm with a 1.3 cm solid component (series 4, image 104). Interlobular  septal thickening is also evident bilaterally.    MEDIASTINUM/AXILLAE: Multiple enlarged hilar and mediastinal lymph  nodes are present, the largest measuring 1.9 cm in the subcarinal  space (series 2, image 35). Bilateral gynecomastia is present. Heart  size is enlarged. Scattered vascular calcifications are seen within  the thoracic aorta.    CORONARY ARTERY CALCIFICATION: Moderate to severe    UPPER ABDOMEN: Diffuse hepatic steatosis is present. Vascular  calcification is present  in the abdominal aorta and its branches.    MUSCULOSKELETAL: Multilevel degenerative changes are seen in the  spine. Mild, age-indeterminate vertebral body height loss is seen at  T5-T6.      Impression    IMPRESSION:   1.  Multifocal, mixed groundglass and consolidative airspace opacities  are seen in the lungs suspicious for pneumonia. Multiple pulmonary  nodules are also present with one of the largest measuring 2.6 cm in  the right upper lobe. These findings may be due to underlying  infectious etiology but neoplastic origin cannot be excluded.  Recommend 3 month CT chest follow-up exam.  2.  Small to moderate bilateral pleural effusions.  3.  Multiple enlarged mediastinal and hilar lymph nodes which are  nonspecific and may be reactive. Suggest attention on follow-up exam.  4.  Interlobular septal thickening seen in the lungs which may be due  to underlying infection versus pulmonary edema.  5.  Age-indeterminate mild compression deformities seen at T5-T6.    REFERENCE:  Guidelines for Management of Incidental Pulmonary Nodules Detected on  CT Images: From the Fleischner Society 2017.   Guidelines apply to incidental nodules in patients who are 35 years or  older.  Guidelines do not apply to lung cancer screening, patients with  immunosuppression, or patients with known primary cancer.    MULTIPLE NODULES  Nodule size <6 mm  Low-risk patients: No follow-up needed.  High-risk patients: Optional follow-up at 12 months.    Nodule size 6 mm or larger  Low-risk patients: Follow-up CT at 3-6 months, then consider CT at  18-24 months.  High-risk patients: Follow-up CT at 3-6 months, then at 18-24 months  if no change.  -Use most suspicious nodule as guide to management.    SUBSOLID NODULES  Ground glass  Nodule size <6 mm  No routine follow-up. If suspicious, consider follow-up at 2 and 4  years.    Nodule size 6 mm or greater  CT at 6-12 months to confirm persistence, then CT every 2 years until  5 years.    Part  solid  Nodule size <6 mm  No routine follow-up.    Nodule size 6 mm or greater  CT at 3-6 months to confirm persistence. If unchanged and solid  component remains <6 mm, annual CT for 5 years.    Multiple  CT at 3-6 months. If stable, consider CT at 2 and 4 years. Management  based on the most suspicious nodule.    Consider referral to lung nodule clinic.    LINDSEY BHAT MD         SYSTEM ID:  [id number]

## 2024-12-03 NOTE — ED NOTES
[facility]  ED Nurse Handoff Report    ED Chief complaint: Shortness of Breath      ED Diagnosis:   Final diagnoses:   None       Code Status: Full Code    Allergies: No Known Allergies    Patient Story:  Pt is 74 year old male  who presenting with shortness of breath. The patient reports having an upper respiratory infection onset 6 days; he has been having cough with reddish sputum and congestion. He is hypoxic and shortness of breath, his wife has been sharing her oxygen with him. He has also been taking cough medication to treat his symptoms. He endorses leg swelling at baseline for which he takes Furosamide. He is visiting from [place] for the holidays. He had a Covid and flu booster about a week ago.     Focused Assessment:  Pt is awake, alert and orientated X 4    Treatments and/or interventions provided: Labs. CT, Xray. IV meds    Patient's response to treatments and/or interventions: Pt tolerated well     To be done/followed up on inpatient unit:      Does this patient have any cognitive concerns?:  None    Activity level - Baseline/Home:  Independent  Activity Level - Current:   Independent    Patient's Preferred language: English   Needed?: No    Isolation: None  Infection: Not Applicable  Patient tested for COVID 19 prior to admission: YES  Bariatric?: No    Vital Signs:   Vitals:    12/03/24 1203 12/03/24 1215 12/03/24 1425 12/03/24 1440   BP:   (!) 148/122    Pulse:   103 106   Resp:   22 12   Temp:       TempSrc:       SpO2: 94% 91% 94% 94%   Weight:       Height:           Cardiac Rhythm:Cardiac Rhythm: Normal sinus rhythm    Was the PSS-3 completed:   Yes  What interventions are required if any?               Family Comments:   OBS brochure/video discussed/provided to patient/family: No              Name of person given brochure if not patient:               Relationship to patient:     For the majority of the shift this patient's behavior was Green.   Behavioral  interventions performed were .    ED NURSE PHONE NUMBER: RN # 1

## 2024-12-03 NOTE — ED PROVIDER NOTES
"  Emergency Department Note      History of Present Illness     Chief Complaint   Shortness of Breath      HPI   Percy Thornton is a 74 year old male on Warfarin with a history of atrial fibrillation and heart murmur presenting with shortness of breath. The patient reports having an upper respiratory infection onset 6 days; he has been having cough with reddish sputum and congestion. He is hypoxic and shortness of breath, his wife has been sharing her oxygen with him. He has also been taking cough medication to treat his symptoms. He endorses leg swelling at baseline for which he takes Furosamide. He is visiting from [place] for the holidays. He had a Covid and flu booster about a week ago. The patient has The patient denies cough, fever, chills or diaphoresis. He does not have a history of blood clots. He is not a smoker. Of note, he has bilateral knee replacements.       Independent Historian   Wife and Son as detailed above.    Review of External Notes       Past Medical History     Medical History and Problem List   Atrial fibrillation    Medications   Furosamide   Warfarin  Amyloid     Surgical History   Bilateral knee arthoplasty     Physical Exam     Patient Vitals for the past 24 hrs:   BP Temp Temp src Pulse Resp SpO2 Height Weight   12/03/24 1147 -- -- -- -- -- (!) 88 % -- --   12/03/24 1146 (!) 145/105 96.9  F (36.1  C) Temporal 102 20 -- 1.88 m (6' 2\") 122.5 kg (270 lb)     Physical Exam  General: Resting comfortably on the gurney  Head:  The scalp, face, and head appear normal  Eyes:  The pupils are equal, round, and reactive to light    There is no nystagmus    Extraocular muscles are intact    Conjunctivae and sclerae are normal  ENT:    The nose is normal    Pinnae are normal    The oropharynx is normal    Uvula is in the midline  Neck:  Normal range of motion    There is no rigidity noted    There is no midline cervical spine pain/tenderness    Trachea is in the midline    No mass is " detected  CV:  Irregular rate and underlying rhythm consistent with atrial fibrillation with controled ventricular response.     Normal S1/S2, no S3/S4    Trace systolic murmur heard at the left sternal border   Resp:  Crackles are heard bilaterally    There is mild tachypnea    Non-labored    No wheezing   Oxygen saturation 78% on room air   GI:  Abdomen is soft, there is no rigidity    No distension    No tympani    No rebound tenderness     Non-surgical without peritoneal features  MS:  Normal muscular tone    Symmetric motor strength    No major joint effusions    No asymmetric leg swelling, no calf tenderness, there is symmetric leg swelling 1-2+ bilaterally compression stockings are in place  Skin:  No rash or acute skin lesions noted  Neuro:  Speech is normal and fluent  Psych: Awake. Alert.      Normal affect.  Appropriate interactions.  Lymph: No anterior cervical lymphadenopathy noted        Diagnostics     Lab Results   Labs Ordered and Resulted from Time of ED Arrival to Time of ED Departure   COMPREHENSIVE METABOLIC PANEL - Abnormal       Result Value    Sodium 141      Potassium 3.7      Carbon Dioxide (CO2) 26      Anion Gap 14      Urea Nitrogen 17.9      Creatinine 0.96      GFR Estimate 84      Calcium 8.7 (*)     Chloride 101      Glucose 230 (*)     Alkaline Phosphatase 36 (*)     AST 15      ALT 14      Protein Total 7.0      Albumin 4.1      Bilirubin Total 1.1     NT PROBNP INPATIENT - Abnormal    N terminal Pro BNP Inpatient 6,916 (*)    INR - Abnormal    INR 2.32 (*)    TROPONIN T, HIGH SENSITIVITY - Abnormal    Troponin T, High Sensitivity 32 (*)    CBC WITH PLATELETS AND DIFFERENTIAL - Abnormal    WBC Count 9.7      RBC Count 3.93 (*)     Hemoglobin 11.3 (*)     Hematocrit 34.3 (*)     MCV 87      MCH 28.8      MCHC 32.9      RDW 15.8 (*)     Platelet Count 173      % Neutrophils 85      % Lymphocytes 8      % Monocytes 5      % Eosinophils 1      % Basophils 0      % Immature Granulocytes  1      NRBCs per 100 WBC 0      Absolute Neutrophils 8.2      Absolute Lymphocytes 0.8      Absolute Monocytes 0.5      Absolute Eosinophils 0.1      Absolute Basophils 0.0      Absolute Immature Granulocytes 0.1      Absolute NRBCs 0.0     ISTAT GASES LACTATE VENOUS POCT - Abnormal    Lactic Acid POCT 1.7      Bicarbonate Venous POCT 31 (*)     O2 Sat, Venous POCT 72      pCO2 Venous POCT 45      pH Venous POCT 7.44 (*)     pO2 Venous POCT 37      Base Excess/Deficit (+/-) POCT 6.0 (*)    INFLUENZA A/B, RSV AND SARS-COV2 PCR - Normal    Influenza A PCR Negative      Influenza B PCR Negative      RSV PCR Negative      SARS CoV2 PCR Negative     TROPONIN T, HIGH SENSITIVITY   BLOOD CULTURE       Imaging   CT Chest w Contrast   Final Result   IMPRESSION:    1.  Multifocal, mixed groundglass and consolidative airspace opacities   are seen in the lungs suspicious for pneumonia. Multiple pulmonary   nodules are also present with one of the largest measuring 2.6 cm in   the right upper lobe. These findings may be due to underlying   infectious etiology but neoplastic origin cannot be excluded.   Recommend 3 month CT chest follow-up exam.   2.  Small to moderate bilateral pleural effusions.   3.  Multiple enlarged mediastinal and hilar lymph nodes which are   nonspecific and may be reactive. Suggest attention on follow-up exam.   4.  Interlobular septal thickening seen in the lungs which may be due   to underlying infection versus pulmonary edema.   5.  Age-indeterminate mild compression deformities seen at T5-T6.      REFERENCE:   Guidelines for Management of Incidental Pulmonary Nodules Detected on   CT Images: From the Fleischner Society 2017.    Guidelines apply to incidental nodules in patients who are 35 years or   older.   Guidelines do not apply to lung cancer screening, patients with   immunosuppression, or patients with known primary cancer.      MULTIPLE NODULES   Nodule size <6 mm   Low-risk patients: No  follow-up needed.   High-risk patients: Optional follow-up at 12 months.      Nodule size 6 mm or larger   Low-risk patients: Follow-up CT at 3-6 months, then consider CT at   18-24 months.   High-risk patients: Follow-up CT at 3-6 months, then at 18-24 months   if no change.   -Use most suspicious nodule as guide to management.      SUBSOLID NODULES   Ground glass   Nodule size <6 mm   No routine follow-up. If suspicious, consider follow-up at 2 and 4   years.      Nodule size 6 mm or greater   CT at 6-12 months to confirm persistence, then CT every 2 years until   5 years.      Part solid   Nodule size <6 mm   No routine follow-up.      Nodule size 6 mm or greater   CT at 3-6 months to confirm persistence. If unchanged and solid   component remains <6 mm, annual CT for 5 years.      Multiple   CT at 3-6 months. If stable, consider CT at 2 and 4 years. Management   based on the most suspicious nodule.      Consider referral to lung nodule clinic.      LINDSEY BHAT MD            SYSTEM ID:  [id number]      XR Chest 2 Views   Final Result   IMPRESSION: A 3.5 cm masslike opacity is noted in the left midlung   with ill-defined surrounding opacity noted. There is also mild hazy   opacity in the right mid lung. Findings could be related to infection   or edema, however a neoplastic mass cannot be excluded. Chest CT is   recommended for further evaluation. Small bilateral pleural effusions.   There is cardiac enlargement and mild bilateral pulmonary venous   congestion.      HAI FLORES MD            SYSTEM ID:  [id number]          EKG   ECG taken at 1151, ECG read at 1200  Atrial fibrillation  Left anterior fascicular block   Possible anterior infarct, age undetermined   ST & T wave abnormality, consider lateral ischemia   Abnormal ECG   Rate 96 bpm. IN interval * ms. QRS duration 112 ms. QT/QTc 382/482 ms. P-R-T axes * * -20.    Independent Interpretation   Chest x-ray reveals bilateral interstitial markings and  bilateral hazy infiltrates likely consistent with pneumonia.  CHF cannot be excluded.  Possible slight pleural effusions.    ED Course      Medications Administered   Medications   azithromycin (ZITHROMAX) 500 mg vial to attach to  mL bag (500 mg Intravenous $New Bag 12/3/24 1517)   furosemide (LASIX) injection 60 mg (60 mg Intravenous $Given 12/3/24 1446)   cefTRIAXone (ROCEPHIN) 2 g vial to attach to  ml bag for ADULTS or NS 50 ml bag for PEDS (0 g Intravenous Stopped 12/3/24 1450)   Saline Flush (80 mLs Intravenous $Given 12/3/24 1456)   iopamidol (ISOVUE-370) solution 135 mL (135 mLs Intravenous $Given 12/3/24 1456)       Procedures   Procedures     Discussion of Management   Admitting Hospitalist, Bruno    ED Course   ED Course as of 12/03/24 1537   Tue Dec 03, 2024   1158 I obtained the history and examined the patient as noted above.     1410 I reevaluated the patient and went over his initial results.  Antibiotics have been ordered.  He will need a chest CT.       Additional Documentation  None    Medical Decision Making / Diagnosis     CMS Diagnoses: None    MIPS       None    MDM   Percy Thornton is a 74 year old male presents to the emergency department with a history of atrial fibrillation, on chronic anticoagulation, with new onset cough for the last week.  He was noted to be significantly hypoxic on arrival.  He has been using his wife's oxygen while they been here visiting from [place] of state.  The patient's workup in the emergency department reveals multifocal pneumonia.  He has significant hypoxia.  He does have some physical exam evidence of fluid overload and CHF.  He is in chronic atrial fibrillation.  The patient was treated for community-acquired pneumonia, he was also given an initial dose of diuretic, which she is on at baseline.  The patient will require admission to the hospital.  His viral markers are negative.  He also likely has some demand ischemia from the several days of  hypoxia.  He has no significant acute chest pain.  He will be admitted to the hospital inpatient status for further management.    Disposition   The patient was admitted to the hospital.     Diagnosis     ICD-10-CM    1. Pneumonia of both lungs due to infectious organism, unspecified part of lung  J18.9       2. Hypoxia  R09.02       3. Acute on chronic congestive heart failure, unspecified heart failure type (H)  I50.9       4. Demand ischemia (H)  I24.89            Discharge Medications   New Prescriptions    No medications on file     Scribe Disclosure:  Mary PATHAK, am serving as a scribe at 12:10 PM on 12/3/2024 to document services personally performed by Kayode Lorenzo MD based on my observations and the provider's statements to me.        Kayode Lorenzo MD  12/03/24 [id number]

## 2024-12-04 ENCOUNTER — APPOINTMENT (OUTPATIENT)
Dept: OCCUPATIONAL THERAPY | Facility: CLINIC | Age: 74
DRG: 193 | End: 2024-12-04
Attending: INTERNAL MEDICINE
Payer: COMMERCIAL

## 2024-12-04 ENCOUNTER — APPOINTMENT (OUTPATIENT)
Dept: CARDIOLOGY | Facility: CLINIC | Age: 74
DRG: 193 | End: 2024-12-04
Attending: INTERNAL MEDICINE
Payer: COMMERCIAL

## 2024-12-04 LAB
ANION GAP SERPL CALCULATED.3IONS-SCNC: 11 MMOL/L (ref 7–15)
BACTERIA SPT CULT: NORMAL
BUN SERPL-MCNC: 21.4 MG/DL (ref 8–23)
CALCIUM SERPL-MCNC: 8.9 MG/DL (ref 8.8–10.4)
CHLORIDE SERPL-SCNC: 100 MMOL/L (ref 98–107)
CREAT SERPL-MCNC: 1.05 MG/DL (ref 0.67–1.17)
EGFRCR SERPLBLD CKD-EPI 2021: 75 ML/MIN/1.73M2
ERYTHROCYTE [DISTWIDTH] IN BLOOD BY AUTOMATED COUNT: 15.8 % (ref 10–15)
GLUCOSE BLDC GLUCOMTR-MCNC: 131 MG/DL (ref 70–99)
GLUCOSE BLDC GLUCOMTR-MCNC: 172 MG/DL (ref 70–99)
GLUCOSE BLDC GLUCOMTR-MCNC: 186 MG/DL (ref 70–99)
GLUCOSE BLDC GLUCOMTR-MCNC: 221 MG/DL (ref 70–99)
GLUCOSE SERPL-MCNC: 187 MG/DL (ref 70–99)
GRAM STAIN RESULT: NORMAL
HCO3 SERPL-SCNC: 30 MMOL/L (ref 22–29)
HCT VFR BLD AUTO: 33.8 % (ref 40–53)
HGB BLD-MCNC: 10.8 G/DL (ref 13.3–17.7)
INR PPP: 2.21 (ref 0.85–1.15)
L PNEUMO1 AG UR QL IA: NEGATIVE
LVEF ECHO: NORMAL
MCH RBC QN AUTO: 27.8 PG (ref 26.5–33)
MCHC RBC AUTO-ENTMCNC: 32 G/DL (ref 31.5–36.5)
MCV RBC AUTO: 87 FL (ref 78–100)
PLATELET # BLD AUTO: 187 10E3/UL (ref 150–450)
POTASSIUM SERPL-SCNC: 3.4 MMOL/L (ref 3.4–5.3)
RBC # BLD AUTO: 3.88 10E6/UL (ref 4.4–5.9)
S PNEUM AG SPEC QL: NEGATIVE
SODIUM SERPL-SCNC: 141 MMOL/L (ref 135–145)
SPECIMEN TYPE: NORMAL
TROPONIN T SERPL HS-MCNC: 37 NG/L
WBC # BLD AUTO: 9.3 10E3/UL (ref 4–11)

## 2024-12-04 PROCEDURE — 99223 1ST HOSP IP/OBS HIGH 75: CPT | Mod: 25 | Performed by: INTERNAL MEDICINE

## 2024-12-04 PROCEDURE — 85014 HEMATOCRIT: CPT | Performed by: INTERNAL MEDICINE

## 2024-12-04 PROCEDURE — 99233 SBSQ HOSP IP/OBS HIGH 50: CPT | Performed by: INTERNAL MEDICINE

## 2024-12-04 PROCEDURE — 87205 SMEAR GRAM STAIN: CPT | Performed by: INTERNAL MEDICINE

## 2024-12-04 PROCEDURE — 80048 BASIC METABOLIC PNL TOTAL CA: CPT | Performed by: INTERNAL MEDICINE

## 2024-12-04 PROCEDURE — 87899 AGENT NOS ASSAY W/OPTIC: CPT | Performed by: INTERNAL MEDICINE

## 2024-12-04 PROCEDURE — 97165 OT EVAL LOW COMPLEX 30 MIN: CPT | Mod: GO | Performed by: OCCUPATIONAL THERAPIST

## 2024-12-04 PROCEDURE — 999N000208 ECHOCARDIOGRAM COMPLETE

## 2024-12-04 PROCEDURE — 210N000002 HC R&B HEART CARE

## 2024-12-04 PROCEDURE — 87070 CULTURE OTHR SPECIMN AEROBIC: CPT | Performed by: INTERNAL MEDICINE

## 2024-12-04 PROCEDURE — 255N000002 HC RX 255 OP 636: Performed by: INTERNAL MEDICINE

## 2024-12-04 PROCEDURE — 250N000013 HC RX MED GY IP 250 OP 250 PS 637: Performed by: INTERNAL MEDICINE

## 2024-12-04 PROCEDURE — 97535 SELF CARE MNGMENT TRAINING: CPT | Mod: GO | Performed by: OCCUPATIONAL THERAPIST

## 2024-12-04 PROCEDURE — 250N000011 HC RX IP 250 OP 636: Performed by: INTERNAL MEDICINE

## 2024-12-04 PROCEDURE — 85610 PROTHROMBIN TIME: CPT | Performed by: INTERNAL MEDICINE

## 2024-12-04 PROCEDURE — 84484 ASSAY OF TROPONIN QUANT: CPT | Performed by: INTERNAL MEDICINE

## 2024-12-04 PROCEDURE — 85041 AUTOMATED RBC COUNT: CPT | Performed by: INTERNAL MEDICINE

## 2024-12-04 PROCEDURE — C8929 TTE W OR WO FOL WCON,DOPPLER: HCPCS

## 2024-12-04 PROCEDURE — 97110 THERAPEUTIC EXERCISES: CPT | Mod: GO | Performed by: OCCUPATIONAL THERAPIST

## 2024-12-04 PROCEDURE — 93306 TTE W/DOPPLER COMPLETE: CPT | Mod: 26 | Performed by: INTERNAL MEDICINE

## 2024-12-04 PROCEDURE — 36415 COLL VENOUS BLD VENIPUNCTURE: CPT | Performed by: INTERNAL MEDICINE

## 2024-12-04 RX ORDER — WARFARIN SODIUM 7.5 MG/1
7.5 TABLET ORAL
Status: COMPLETED | OUTPATIENT
Start: 2024-12-04 | End: 2024-12-04

## 2024-12-04 RX ORDER — DOXYCYCLINE 100 MG/10ML
100 INJECTION, POWDER, LYOPHILIZED, FOR SOLUTION INTRAVENOUS EVERY 12 HOURS
Status: DISCONTINUED | OUTPATIENT
Start: 2024-12-04 | End: 2024-12-08

## 2024-12-04 RX ADMIN — NIFEDIPINE 60 MG: 60 TABLET, FILM COATED, EXTENDED RELEASE ORAL at 08:10

## 2024-12-04 RX ADMIN — CLONIDINE HYDROCHLORIDE 0.2 MG: 0.1 TABLET ORAL at 20:08

## 2024-12-04 RX ADMIN — TRAZODONE HYDROCHLORIDE 50 MG: 50 TABLET ORAL at 22:16

## 2024-12-04 RX ADMIN — CLONIDINE HYDROCHLORIDE 0.2 MG: 0.1 TABLET ORAL at 08:10

## 2024-12-04 RX ADMIN — INSULIN ASPART 2 UNITS: 100 INJECTION, SOLUTION INTRAVENOUS; SUBCUTANEOUS at 12:27

## 2024-12-04 RX ADMIN — WARFARIN SODIUM 7.5 MG: 7.5 TABLET ORAL at 17:26

## 2024-12-04 RX ADMIN — LISINOPRIL 40 MG: 40 TABLET ORAL at 08:10

## 2024-12-04 RX ADMIN — FUROSEMIDE 40 MG: 10 INJECTION, SOLUTION INTRAMUSCULAR; INTRAVENOUS at 08:09

## 2024-12-04 RX ADMIN — PERFLUTREN 10 ML: 6.52 INJECTION, SUSPENSION INTRAVENOUS at 14:29

## 2024-12-04 RX ADMIN — DOXYCYCLINE 100 MG: 100 INJECTION, POWDER, LYOPHILIZED, FOR SOLUTION INTRAVENOUS at 15:10

## 2024-12-04 RX ADMIN — NEBIVOLOL 5 MG: 5 TABLET ORAL at 22:16

## 2024-12-04 RX ADMIN — GABAPENTIN 600 MG: 600 TABLET, FILM COATED ORAL at 08:10

## 2024-12-04 RX ADMIN — EZETIMIBE 10 MG: 10 TABLET ORAL at 20:08

## 2024-12-04 RX ADMIN — FENOFIBRATE 160 MG: 160 TABLET ORAL at 22:16

## 2024-12-04 RX ADMIN — ESCITALOPRAM OXALATE 10 MG: 10 TABLET ORAL at 08:10

## 2024-12-04 RX ADMIN — INSULIN ASPART 2 UNITS: 100 INJECTION, SOLUTION INTRAVENOUS; SUBCUTANEOUS at 08:11

## 2024-12-04 RX ADMIN — CEFTRIAXONE SODIUM 1 G: 1 INJECTION, POWDER, FOR SOLUTION INTRAMUSCULAR; INTRAVENOUS at 15:02

## 2024-12-04 RX ADMIN — ASPIRIN 81 MG: 81 TABLET, COATED ORAL at 08:10

## 2024-12-04 RX ADMIN — DOXAZOSIN 4 MG: 4 TABLET ORAL at 22:16

## 2024-12-04 RX ADMIN — FUROSEMIDE 40 MG: 10 INJECTION, SOLUTION INTRAMUSCULAR; INTRAVENOUS at 17:26

## 2024-12-04 RX ADMIN — FLUTICASONE PROPIONATE 2 SPRAY: 50 SPRAY, METERED NASAL at 08:10

## 2024-12-04 RX ADMIN — OLOPATADINE HYDROCHLORIDE 1 DROP: 1 SOLUTION/ DROPS OPHTHALMIC at 08:11

## 2024-12-04 ASSESSMENT — ACTIVITIES OF DAILY LIVING (ADL)
ADLS_ACUITY_SCORE: 29
ADLS_ACUITY_SCORE: 29
IADL_COMMENTS: RETIRED
ADLS_ACUITY_SCORE: 29
ADLS_ACUITY_SCORE: 31
ADLS_ACUITY_SCORE: 29
ADLS_ACUITY_SCORE: 31
ADLS_ACUITY_SCORE: 29
ADLS_ACUITY_SCORE: 29
ADLS_ACUITY_SCORE: 31
ADLS_ACUITY_SCORE: 29
ADLS_ACUITY_SCORE: 29
ADLS_ACUITY_SCORE: 31
ADLS_ACUITY_SCORE: 29
ADLS_ACUITY_SCORE: 31

## 2024-12-04 NOTE — PLAN OF CARE
RECEIVING UNIT ED HANDOFF REVIEW    ED Nurse Handoff Report was reviewed by: Muna Irving RN on December 3, 2024 at 6:31 PM

## 2024-12-04 NOTE — PLAN OF CARE
Goal Outcome Evaluation:    Heart Failure Care Map  GOALS TO BE MET BEFORE DISCHARGE:    1. Decrease congestion and/or edema with diuretic therapy to achieve near optimal volume status.     Dyspnea improved: No, further care required to meet this goal. Please explain BLANDON   Edema improved: No, +1/+2 LE edema        Last 24 hour I/O:   Intake/Output Summary (Last 24 hours) at 12/4/2024 0644  Last data filed at 12/3/2024 2146  Gross per 24 hour   Intake 302 ml   Output 1300 ml   Net -998 ml           Net I/O and Weights since admission:   11/04 0700 - 12/04 0659  In: 302 [P.O.:302]  Out: 1300 [Urine:1300]  Net: -998     Vitals:    12/03/24 1146 12/03/24 1850 12/04/24 0500   Weight: 122.5 kg (270 lb) 126 kg (277 lb 11.2 oz) 119.3 kg (263 lb)       2.  O2 sats > 90% on room air, or at prior home O2 therapy level.      Able to wean O2 this shift to keep sats above 90%?: No, further care required to meet this goal. Please explain on 2L NC   Does patient use Home O2? Yes-  CPAP          Current oxygenation status:   SpO2: 92 %     O2 Device: BiPAP/CPAP, Oxygen Delivery: 4 LPM    3.  Tolerates ambulation and mobility near baseline.     Ambulation: No, further care required to meet this goal. Please explain pt sleeping   Times patient ambulated with staff this shift: 0    Please review the Heart Failure Care Map for additional HF goal outcomes.    Mauricio Gonzalez RN  12/4/2024        Neuro: AOx4  Cardiac: afib RVR/CVR (80s-100s), BP: 134/86  Resp: uses home CPAP overnight, 97% on 2L NC, has a frequent productive cough, sputum sample collected and sent to lab  GI/: Continent, abdomen rounded, soft, urinal at bedside  Skin: L hip bruising and small bruising on abdomen  Mobility: standby w/ a cane    Plan: Cardiology consult, Echo, start lasix

## 2024-12-04 NOTE — CONSULTS
[facility]    Cardiology Consultation     Date of Admission:  12/3/2024    Assessment & Plan   Percy Thornton is a 74 year old male who was admitted on 12/3/2024.    Acute hypoxic respiratory failure -multifactorial  Suspected multifocal pneumonia, on CT chest although element of congestive heart failure not excluded  Acute on chronic heart failure with preserved ejection fraction  Suspicion of cardiac amyloidosis on previous echo  Moderate mitral deviation and mild aortic stenosis      Plan  Continue IV diuretics.  Switch to p.o. in a day or 2  Echocardiogram  Treat for pneumonia although procalcitonin is normal.  With respect to amyloidosis, the workup can be done as an outpatient  Plan discussed with hospitalist    At today's visit, reviewed care everywhere notes from previous echocardiogram results, the EKG on admission was reviewed by me, CT chest was reviewed, labs were reviewed.  Plan discussed with the hospitalist.  Previous medical decision making was of high complexity    Ankur De La Paz MD  Primary Care Physician   Fletcher Ledesma    Reason for Consult   Reason for consult: I was asked by hospitalist to evaluate this patient for resp failure and amyloidosis.    History of Present Illness   chuck Thornton is a 74 year old male with a past medical history of hypertension, dyslipidemia, coronary artery disease status post PCI to distal LAD in September 2022, recently diagnosed atrial fibrillation on warfarin, moderate MR, mild aortic stenosis, suspected cardiac amyloidosis, diabetes mellitus type 2 and arthritis who presented to ER for evaluation of shortness of breath and cough.    The patient lives in [place], currently visiting his son.  He came to [place] on last Tuesday.  He reports that he started having a productive cough since last Wednesday.  He reports cough is productive of greenish sputum, sometimes with flecks of red blood.  Since Saturday he started  feeling more short of breath.  He denies fever or chills at home.  He reports having chest pain and abdominal pain when he coughs.  His wife uses a supplemental oxygen at home so he started using her oxygen.  He reports the increased lower extremity edema.  He does wear compression stockings.  Upon further questioning he reports some mild headache.  He denies nausea, no vomiting.  He reports some issues with constipation.  He denies dysuria.    Of note, his last echo was in [place] October which revealed EF of 52% with moderate dilated left ventricle with increased left renal mass and concerns of infiltrative cardiomyopathy.  Aortic stenosis was noted with a valve area of 1.5 cm .  Moderate severe mitral regurgitation was noted.    Subsequently had SPEP which was positive for light kappa chain and free lambda light chains.  He is thought for the follow-up for diagnosis of AL amyloidosis.    N-terminal proBNP is increased at 6916.  Troponin was 32 increased to 37.  Creatinine normal.  Total protein was 7 with albumin of 4.1.  Hemoglobin 11.3.  INR is therapeutic.     Chest x-ray revealed 3.5 cm masslike opacity is noted in the left midlung with ill-defined surrounding opacity noted. There is also mild hazy opacity in the right mid lung. Findings could be related to infection or edema, however a neoplastic mass cannot be excluded.     EKG was reviewed by me revealed atrial fibrillation with left anterior fascicular block.  CT chest revealed multifocal groundglass as well as airspace disease suspicious for pneumonia.  Largest nodule was 2.6 cm in the right upper lobe.  Radiologist recommended repeat CT chest in 3 months    In the emergency room, patient was noted to have oxygen saturation of 78% on room air.  He received IV Lasix 60 mg with good relief.  Now has been started also on antibiotics.  Past Medical History   No past medical history on file.  Coronary disease LAD stent  Prior cardiomyopathy  Persistent  atrial fibrillation  Suspicion of cardiac amyloidosis  Abnormal light chains in the blood  Mitral regurgitation  Type 2 diabetes  Aortic stenosis    Past Surgical History   No past surgical history on file.  None    Prior to Admission Medications   Prior to Admission Medications   Prescriptions Last Dose Informant Patient Reported? Taking?   Multiple Vitamins-Minerals (PRESERVISION AREDS 2 PO) 12/3/2024 Morning  Yes Yes   Sig: Take 1 tablet by mouth 2 times daily.   NIFEdipine ER (ADALAT CC) 60 MG 24 hr tablet 12/3/2024 Morning  Yes Yes   Sig: Take 60 mg by mouth daily.   aspirin 81 MG EC tablet 12/3/2024 Morning  Yes Yes   Sig: Take 81 mg by mouth daily.   benazepril (LOTENSIN) 40 MG tablet 12/3/2024 Morning  Yes Yes   Sig: Take 40 mg by mouth 2 times daily.   cloNIDine (CATAPRES) 0.2 MG tablet 12/3/2024 Morning  Yes Yes   Sig: Take 0.2 mg by mouth 2 times daily.   doxazosin (CARDURA) 4 MG tablet 12/2/2024 Bedtime  Yes Yes   Sig: Take 4 mg by mouth at bedtime.   escitalopram (LEXAPRO) 10 MG tablet 12/3/2024 Morning  Yes Yes   Sig: Take 10 mg by mouth daily.   ezetimibe (ZETIA) 10 MG tablet 12/2/2024 Evening  Yes Yes   Sig: Take 10 mg by mouth every evening.   fenofibrate (TRIGLIDE/LOFIBRA) 160 MG tablet 12/2/2024 Evening  Yes Yes   Sig: Take 160 mg by mouth at bedtime.   fluticasone (FLONASE) 50 MCG/ACT nasal spray 12/3/2024 Morning  Yes Yes   Sig: Spray 2 sprays into both nostrils daily.   furosemide (LASIX) 40 MG tablet 12/3/2024 Morning  Yes Yes   Sig: Take 40 mg by mouth daily.   gabapentin (NEURONTIN) 600 MG tablet 12/3/2024 Morning  Yes Yes   Sig: Take 600 mg by mouth daily.   glimepiride (AMARYL) 4 MG tablet 12/3/2024 Morning  Yes Yes   Sig: Take 4 mg by mouth 2 times daily (before meals).   nebivolol (BYSTOLIC) 5 MG tablet 12/2/2024 Bedtime  Yes Yes   Sig: Take 5 mg by mouth at bedtime.   olopatadine (PATADAY) 0.2 % ophthalmic solution 12/3/2024 Morning  Yes Yes   Sig: Place 1 drop into both eyes daily.    polyethylene glycol-propylene glycol (SYSTANE ULTRA) 0.4-0.3 % SOLN ophthalmic solution   Yes Yes   Sig: Place 1 drop into both eyes every hour as needed for dry eyes.   sitagliptin-metFORMIN (JANUMET)  MG tablet 12/3/2024 Morning  Yes Yes   Sig: Take 1 tablet by mouth 2 times daily (with meals).   traZODone (DESYREL) 50 MG tablet 12/2/2024 Evening  Yes Yes   Sig: Take 50 mg by mouth every evening. One hour before bedtime   warfarin ANTICOAGULANT (COUMADIN) 5 MG tablet 12/2/2024 Evening  Yes Yes   Sig: Take 7.5 mg by mouth daily on Wed/Sat, take 5 mg by mouth daily all other days      Facility-Administered Medications: None     Current Facility-Administered Medications   Medication Dose Route Frequency Provider Last Rate Last Admin    - MEDICATION INSTRUCTIONS -   Does not apply DOES NOT GO TO Gabbie Gregory MD        acetaminophen (TYLENOL) tablet 650 mg  650 mg Oral Q4H PRN Gabbie Carr MD        Or    acetaminophen (TYLENOL) Suppository 650 mg  650 mg Rectal Q4H PRN Gabbie Carr MD        aspirin EC tablet 81 mg  81 mg Oral Daily Gabbie Carr MD   81 mg at 12/04/24 0810    calcium carbonate (TUMS) chewable tablet 1,000 mg  1,000 mg Oral 4x Daily PRN Gabbie Carr MD        carboxymethylcellulose PF (REFRESH PLUS) 0.5 % ophthalmic solution 1 drop  1 drop Both Eyes Q1H PRN Gabbie Carr MD        cefTRIAXone (ROCEPHIN) 1 g vial to attach to  mL bag for ADULTS or NS 50 mL bag for PEDS  1 g Intravenous Q24H Gabbie Carr MD        cloNIDine (CATAPRES) tablet 0.2 mg  0.2 mg Oral BID Gabbie Carr MD   0.2 mg at 12/04/24 0810    Continuing ACE inhibitor/ARB/ARNI from home medication list OR ACE inhibitor/ARB/ARNI order already placed during this visit   Does not apply DOES NOT GO TO Gabbie Gregory MD        Continuing beta blocker from home medication list OR beta blocker order already placed during this visit   Does not  apply DOES NOT GO TO Gabbie Gregory MD        glucose gel 15-30 g  15-30 g Oral Q15 Min PRN Gabbie Carr MD        Or    dextrose 50 % injection 25-50 mL  25-50 mL Intravenous Q15 Min PRN Gabbie Carr MD        Or    glucagon injection 1 mg  1 mg Subcutaneous Q15 Min PRN Gabbie Carr MD        doxazosin (CARDURA) tablet 4 mg  4 mg Oral At Bedtime Gabbie Carr MD   4 mg at 12/03/24 2132    escitalopram (LEXAPRO) tablet 10 mg  10 mg Oral Daily Gabbie Carr MD   10 mg at 12/04/24 0810    ezetimibe (ZETIA) tablet 10 mg  10 mg Oral QPM Gabbie Carr MD   10 mg at 12/03/24 2130    fenofibrate (TRIGLIDE/LOFIBRA) tablet 160 mg  160 mg Oral At Bedtime Gabbie Carr MD   160 mg at 12/03/24 2130    fluticasone (FLONASE) 50 MCG/ACT spray 2 spray  2 spray Both Nostrils Daily Gabbie Carr MD   2 spray at 12/04/24 0810    furosemide (LASIX) injection 40 mg  40 mg Intravenous BID Gabbie Carr MD   40 mg at 12/04/24 0809    gabapentin (NEURONTIN) tablet 600 mg  600 mg Oral Daily Gabbie Carr MD   600 mg at 12/04/24 0810    guaiFENesin (ROBITUSSIN) 20 mg/mL solution 200 mg  200 mg Oral Q4H PRN Gabbie Carr MD        insulin aspart (NovoLOG) injection (RAPID ACTING)  1-7 Units Subcutaneous TID AC Gabbie Carr MD   2 Units at 12/04/24 0811    insulin aspart (NovoLOG) injection (RAPID ACTING)  1-5 Units Subcutaneous At Bedtime Gabbie Carr MD        lidocaine (LMX4) cream   Topical Q1H PRN Gabbie Carr MD        lidocaine 1 % 0.1-1 mL  0.1-1 mL Other Q1H PRN Gabbie Carr MD        lisinopril (ZESTRIL) tablet 40 mg  40 mg Oral Daily Gabbie Carr MD   40 mg at 12/04/24 0810    metoprolol (LOPRESSOR) injection 2.5 mg  2.5 mg Intravenous Q4H PRN Gabbie Carr MD        nebivolol (BYSTOLIC) tablet 5 mg  5 mg Oral At Bedtime Gabbie Carr MD   5 mg at 12/03/24 6865     NIFEdipine ER OSMOTIC (PROCARDIA XL) 24 hr tablet 60 mg  60 mg Oral Daily Gabbei Carr MD   60 mg at 12/04/24 0810    olopatadine (PATANOL) 0.1 % ophthalmic solution 1 drop  1 drop Both Eyes Daily Gabbie Carr MD   1 drop at 12/04/24 0811    ondansetron (ZOFRAN ODT) ODT tab 4 mg  4 mg Oral Q6H PRN Gabbie Carr MD        Or    ondansetron (ZOFRAN) injection 4 mg  4 mg Intravenous Q6H PRN Gabbie Carr MD        Patient is already receiving anticoagulation with heparin, enoxaparin (LOVENOX), warfarin (COUMADIN)  or other anticoagulant medication   Does not apply Continuous PRN Gabbie Carr MD        senna-docusate (SENOKOT-S/PERICOLACE) 8.6-50 MG per tablet 1 tablet  1 tablet Oral BID PRN Gabbie Carr MD        Or    senna-docusate (SENOKOT-S/PERICOLACE) 8.6-50 MG per tablet 2 tablet  2 tablet Oral BID PRN Gabbie Carr MD        sodium chloride (PF) 0.9% PF flush 3 mL  3 mL Intracatheter Q8H Gabbie Carr MD   3 mL at 12/03/24 2008    sodium chloride (PF) 0.9% PF flush 3 mL  3 mL Intracatheter q1 min prn Gabbie Carr MD        traZODone (DESYREL) tablet 50 mg  50 mg Oral QPM Gabbie Carr MD   50 mg at 12/03/24 2130    Warfarin Dose Required Daily - Pharmacist Managed  1 each Oral See Admin Instructions Gabbie Carr MD         Current Facility-Administered Medications   Medication Dose Route Frequency Provider Last Rate Last Admin    - MEDICATION INSTRUCTIONS -   Does not apply DOES NOT GO TO Gabbie Gregory MD        acetaminophen (TYLENOL) tablet 650 mg  650 mg Oral Q4H PRN Gabbie Carr MD        Or    acetaminophen (TYLENOL) Suppository 650 mg  650 mg Rectal Q4H PRN Gabbie Carr MD        aspirin EC tablet 81 mg  81 mg Oral Daily Gabbie Carr MD   81 mg at 12/04/24 0810    calcium carbonate (TUMS) chewable tablet 1,000 mg  1,000 mg Oral 4x Daily ADILSONN Gabbie Carr MD         carboxymethylcellulose PF (REFRESH PLUS) 0.5 % ophthalmic solution 1 drop  1 drop Both Eyes Q1H PRN Gabbie Carr MD        cefTRIAXone (ROCEPHIN) 1 g vial to attach to  mL bag for ADULTS or NS 50 mL bag for PEDS  1 g Intravenous Q24H Gabbie Carr MD        cloNIDine (CATAPRES) tablet 0.2 mg  0.2 mg Oral BID Gabbie Carr MD   0.2 mg at 12/04/24 0810    Continuing ACE inhibitor/ARB/ARNI from home medication list OR ACE inhibitor/ARB/ARNI order already placed during this visit   Does not apply DOES NOT GO TO Gabbie Gregory MD        Continuing beta blocker from home medication list OR beta blocker order already placed during this visit   Does not apply DOES NOT GO TO Gabbie Gregory MD        glucose gel 15-30 g  15-30 g Oral Q15 Min PRN Gabbie Carr MD        Or    dextrose 50 % injection 25-50 mL  25-50 mL Intravenous Q15 Min PRN Gabbie Carr MD        Or    glucagon injection 1 mg  1 mg Subcutaneous Q15 Min PRN Gabbie Carr MD        doxazosin (CARDURA) tablet 4 mg  4 mg Oral At Bedtime Gabbie Carr MD   4 mg at 12/03/24 2132    escitalopram (LEXAPRO) tablet 10 mg  10 mg Oral Daily Gabbie Carr MD   10 mg at 12/04/24 0810    ezetimibe (ZETIA) tablet 10 mg  10 mg Oral QPM Gabbie Carr MD   10 mg at 12/03/24 2130    fenofibrate (TRIGLIDE/LOFIBRA) tablet 160 mg  160 mg Oral At Bedtime Gabbie Carr MD   160 mg at 12/03/24 2130    fluticasone (FLONASE) 50 MCG/ACT spray 2 spray  2 spray Both Nostrils Daily Gabbie Carr MD   2 spray at 12/04/24 0810    furosemide (LASIX) injection 40 mg  40 mg Intravenous BID Gabbie Carr MD   40 mg at 12/04/24 0809    gabapentin (NEURONTIN) tablet 600 mg  600 mg Oral Daily Gabbie Carr MD   600 mg at 12/04/24 0810    guaiFENesin (ROBITUSSIN) 20 mg/mL solution 200 mg  200 mg Oral Q4H PRN Gabbie Carr MD        insulin aspart (NovoLOG)  injection (RAPID ACTING)  1-7 Units Subcutaneous TID AC Gabbie Carr MD   2 Units at 12/04/24 0811    insulin aspart (NovoLOG) injection (RAPID ACTING)  1-5 Units Subcutaneous At Bedtime Gabbie Carr MD        lidocaine (LMX4) cream   Topical Q1H PRN Gabbie Carr MD        lidocaine 1 % 0.1-1 mL  0.1-1 mL Other Q1H PRN Gabbie Carr MD        lisinopril (ZESTRIL) tablet 40 mg  40 mg Oral Daily Gabbie Carr MD   40 mg at 12/04/24 0810    metoprolol (LOPRESSOR) injection 2.5 mg  2.5 mg Intravenous Q4H PRN Gabbie Carr MD        nebivolol (BYSTOLIC) tablet 5 mg  5 mg Oral At Bedtime Gabbie Carr MD   5 mg at 12/03/24 2131    NIFEdipine ER OSMOTIC (PROCARDIA XL) 24 hr tablet 60 mg  60 mg Oral Daily Gabbie Carr MD   60 mg at 12/04/24 0810    olopatadine (PATANOL) 0.1 % ophthalmic solution 1 drop  1 drop Both Eyes Daily Gabbie Carr MD   1 drop at 12/04/24 0811    ondansetron (ZOFRAN ODT) ODT tab 4 mg  4 mg Oral Q6H PRN Gabbie Carr MD        Or    ondansetron (ZOFRAN) injection 4 mg  4 mg Intravenous Q6H PRN Gabbie Carr MD        Patient is already receiving anticoagulation with heparin, enoxaparin (LOVENOX), warfarin (COUMADIN)  or other anticoagulant medication   Does not apply Continuous PRN Gabbie Carr MD        senna-docusate (SENOKOT-S/PERICOLACE) 8.6-50 MG per tablet 1 tablet  1 tablet Oral BID PRN Gabbie Carr MD        Or    senna-docusate (SENOKOT-S/PERICOLACE) 8.6-50 MG per tablet 2 tablet  2 tablet Oral BID PRN Gabbie Carr MD        sodium chloride (PF) 0.9% PF flush 3 mL  3 mL Intracatheter Q8H Gabbie Carr MD   3 mL at 12/03/24 2008    sodium chloride (PF) 0.9% PF flush 3 mL  3 mL Intracatheter q1 min prn Gabbie Carr MD        traZODone (DESYREL) tablet 50 mg  50 mg Oral QPM Gabbie Carr MD   50 mg at 12/03/24 2130    Warfarin Dose Required Daily -  "Pharmacist Managed  1 each Oral See Admin Instructions Gabbie Carr MD         Allergies   No Known Allergies    Social History    Does not smoke.      Family History     Noncontributory for premature CAD       Review of Systems   A comprehensive review of system was performed and is negative other than that noted in the HPI or here.     Physical Exam   Vital Signs with Ranges  Temp:  [96.9  F (36.1  C)-98.8  F (37.1  C)] 98.6  F (37  C)  Pulse:  [] 65  Resp:  [12-39] 18  BP: (134-169)/() 138/86  SpO2:  [88 %-98 %] 98 %  Wt Readings from Last 4 Encounters:   12/04/24 119.3 kg (263 lb)     I/O last 3 completed shifts:  In: 302 [P.O.:302]  Out: 1300 [Urine:1300]      Vitals: /86 (BP Location: Left arm)   Pulse 65   Temp 98.6  F (37  C) (Oral)   Resp 18   Ht 1.88 m (6' 2\")   Wt 119.3 kg (263 lb)   SpO2 98%   BMI 33.77 kg/m      Physical Exam:   General - Alert and oriented to time place and person in no acute distress  Eyes - No scleral icterus  HEENT - Neck supple, moist mucous membranes  Cardiovascular -regular S1-S2 with a 2 x 6 ejection stock murmur in the aortic area and mitral area  Extremities - There is trace edema  Respiratory -basal fine rales  Skin - No pallor or cyanosis  Gastrointestinal - Non tender and non distended without rebound or guarding  Psych - Appropriate affect   Neurological - No gross motor neurological focal deficits    No lab results found in last 7 days.    Invalid input(s): \"TROPONINIES\"    Recent Labs   Lab 12/04/24  0806 12/04/24  0542 12/03/24  2339 12/03/24  1859 12/03/24  1233   WBC  --  9.3  --   --  9.7   HGB  --  10.8*  --   --  11.3*   MCV  --  87  --   --  87   PLT  --  187  --   --  173   INR  --  2.21*  --   --  2.32*   NA  --  141  --   --  141   POTASSIUM  --  3.4  --   --  3.7   CHLORIDE  --  100  --   --  101   CO2  --  30*  --   --  26   BUN  --  21.4  --   --  17.9   CR  --  1.05  --   --  0.96   GFRESTIMATED  --  75  --   --  84 " "  ANIONGAP  --  11  --   --  14   IGNACIA  --  8.9  --   --  8.7*   * 187* 194*   < > 230*   ALBUMIN  --   --   --   --  4.1   PROTTOTAL  --   --   --   --  7.0   BILITOTAL  --   --   --   --  1.1   ALKPHOS  --   --   --   --  36*   ALT  --   --   --   --  14   AST  --   --   --   --  15    < > = values in this interval not displayed.     No results for input(s): \"CHOL\", \"HDL\", \"LDL\", \"TRIG\", \"CHOLHDLRATIO\" in the last 46337 hours.  Recent Labs   Lab 12/04/24  0542 12/03/24  1233   WBC 9.3 9.7   HGB 10.8* 11.3*   HCT 33.8* 34.3*   MCV 87 87    173     Recent Labs   Lab 12/03/24  1331   PHV 7.44*   PO2V 37   PCO2V 45   HCO3V 31*     Recent Labs   Lab 12/03/24  1233   NTBNPI 6,916*     No results for input(s): \"DD\" in the last 168 hours.  No results for input(s): \"SED\", \"CRP\" in the last 168 hours.  Recent Labs   Lab 12/04/24  0542 12/03/24  1233    173     No results for input(s): \"TSH\" in the last 168 hours.  No results for input(s): \"COLOR\", \"APPEARANCE\", \"URINEGLC\", \"URINEBILI\", \"URINEKETONE\", \"SG\", \"UBLD\", \"URINEPH\", \"PROTEIN\", \"UROBILINOGEN\", \"NITRITE\", \"LEUKEST\", \"RBCU\", \"WBCU\" in the last 168 hours.    Imaging:  Recent Results (from the past 48 hours)   XR Chest 2 Views    Narrative    CHEST TWO VIEWS  12/3/2024 12:46 PM     HISTORY: Cough. Hypoxia.    COMPARISON: None.      Impression    IMPRESSION: A 3.5 cm masslike opacity is noted in the left midlung  with ill-defined surrounding opacity noted. There is also mild hazy  opacity in the right mid lung. Findings could be related to infection  or edema, however a neoplastic mass cannot be excluded. Chest CT is  recommended for further evaluation. Small bilateral pleural effusions.  There is cardiac enlargement and mild bilateral pulmonary venous  congestion.    HAI FLORES MD         SYSTEM ID:  [id number]   CT Chest w Contrast    Narrative    CT CHEST W CONTRAST 12/3/2024 3:08 PM    CLINICAL HISTORY: Evaluate for mass and infiltrate seen " on chest  x-ray, hypoxia  TECHNIQUE: CT chest with IV contrast. Multiplanar reformats were  obtained. Dose reduction techniques were used.    CONTRAST: 135mL [id number]    COMPARISON: None.    FINDINGS:   LUNGS AND PLEURA: Small to moderate bilateral pleural effusions are  present. Mixed groundglass and consolidative opacities are seen in the  lungs bilaterally with widely largest areas noted along the posterior  left upper lobe measuring up to 9.1 x 5.4 cm (series 4, image 154).  Multiple solid and some solid nodules are also present including a 10  mm solid, subpleural nodule in the right lower lobe (series 4, image  266) and subsolid nodule in the right upper lobe measuring up to 2.6  cm with a 1.3 cm solid component (series 4, image 104). Interlobular  septal thickening is also evident bilaterally.    MEDIASTINUM/AXILLAE: Multiple enlarged hilar and mediastinal lymph  nodes are present, the largest measuring 1.9 cm in the subcarinal  space (series 2, image 35). Bilateral gynecomastia is present. Heart  size is enlarged. Scattered vascular calcifications are seen within  the thoracic aorta.    CORONARY ARTERY CALCIFICATION: Moderate to severe    UPPER ABDOMEN: Diffuse hepatic steatosis is present. Vascular  calcification is present in the abdominal aorta and its branches.    MUSCULOSKELETAL: Multilevel degenerative changes are seen in the  spine. Mild, age-indeterminate vertebral body height loss is seen at  T5-T6.      Impression    IMPRESSION:   1.  Multifocal, mixed groundglass and consolidative airspace opacities  are seen in the lungs suspicious for pneumonia. Multiple pulmonary  nodules are also present with one of the largest measuring 2.6 cm in  the right upper lobe. These findings may be due to underlying  infectious etiology but neoplastic origin cannot be excluded.  Recommend 3 month CT chest follow-up exam.  2.  Small to moderate bilateral pleural effusions.  3.  Multiple enlarged mediastinal and  hilar lymph nodes which are  nonspecific and may be reactive. Suggest attention on follow-up exam.  4.  Interlobular septal thickening seen in the lungs which may be due  to underlying infection versus pulmonary edema.  5.  Age-indeterminate mild compression deformities seen at T5-T6.    REFERENCE:  Guidelines for Management of Incidental Pulmonary Nodules Detected on  CT Images: From the Fleischner Society 2017.   Guidelines apply to incidental nodules in patients who are 35 years or  older.  Guidelines do not apply to lung cancer screening, patients with  immunosuppression, or patients with known primary cancer.    MULTIPLE NODULES  Nodule size <6 mm  Low-risk patients: No follow-up needed.  High-risk patients: Optional follow-up at 12 months.    Nodule size 6 mm or larger  Low-risk patients: Follow-up CT at 3-6 months, then consider CT at  18-24 months.  High-risk patients: Follow-up CT at 3-6 months, then at 18-24 months  if no change.  -Use most suspicious nodule as guide to management.    SUBSOLID NODULES  Ground glass  Nodule size <6 mm  No routine follow-up. If suspicious, consider follow-up at 2 and 4  years.    Nodule size 6 mm or greater  CT at 6-12 months to confirm persistence, then CT every 2 years until  5 years.    Part solid  Nodule size <6 mm  No routine follow-up.    Nodule size 6 mm or greater  CT at 3-6 months to confirm persistence. If unchanged and solid  component remains <6 mm, annual CT for 5 years.    Multiple  CT at 3-6 months. If stable, consider CT at 2 and 4 years. Management  based on the most suspicious nodule.    Consider referral to lung nodule clinic.    LINDSEY BHAT MD         SYSTEM ID:  [id number]       Echo:  No results found for this or any previous visit (from the past 4320 hours).    Clinically Significant Risk Factors Present on Admission                # Drug Induced Coagulation Defect: home medication list includes an anticoagulant medication  # Drug Induced Platelet  "Defect: home medication list includes an antiplatelet medication   # Hypertension: Home medication list includes antihypertensive(s)      # Anemia: based on hgb <11      # DMII: A1C = 7.0 % (Ref range: <5.7 %) within past 6 months    # Obesity: Estimated body mass index is 33.77 kg/m  as calculated from the following:    Height as of this encounter: 1.88 m (6' 2\").    Weight as of this encounter: 119.3 kg (263 lb).             Cardiac Arrhythmia: Atrial fibrillation: Persistent  Non-Rheumatic Valve Disease: Aortic valve stenosis  Mitral valve insufficiency          Pneumonia              "

## 2024-12-04 NOTE — PLAN OF CARE
Goal Outcome Evaluation:      Plan of Care Reviewed With: patientRecd pt from ED Able to transfer to bed with SBA. BLANDON. O2 at 4L sat mid 90's. States he had large urine output in ED. BP elevated but trended down since arrival, no prn order, reported to oncoming RN.

## 2024-12-04 NOTE — PROGRESS NOTES
[facility]    Medicine Progress Note - Hospitalist Service    Date of Admission:  12/3/2024    Assessment & Plan    Percy Thornton is a 74 year old male with a past medical history of hypertension, dyslipidemia, coronary artery disease status post PCI to distal LAD in September 2022, recently diagnosed atrial fibrillation on warfarin, mild to moderate MR, mild aortic stenosis, suspected cardiac amyloidosis, diabetes mellitus type 2 and arthritis who presented to ER for evaluation of shortness of breath and cough.    Acute hypoxic respiratory failure, multifactorial  Likely from community-acquired pneumonia, CHF exacerbation, bilateral pleural effusion  Treat each condition as below  Supplemental oxygen.  Off oxygen as able    Community-acquired pneumonia  -Reports productive cough and shortness of breath over the last few days  -No fever  -COVID-19, influenza A and B and RSV swab negative  -No leukocytosis  -Chest x-ray- A 3.5 cm masslike opacity is noted in the left midlung with ill-defined surrounding opacity noted. There is also mild hazy opacity in the right mid lung. Findings could be related to infection or edema, however a neoplastic mass cannot be excluded  -CT chest IV contrast  1.  Multifocal, mixed groundglass and consolidative airspace opacities are seen in the lungs suspicious for pneumonia. Multiple pulmonary nodules are also present with one of the largest measuring 2.6 cm in the right upper lobe. These findings may be due to underlying infectious etiology but neoplastic origin cannot be excluded. Recommend 3 month CT chest follow-up exam.  2.  Small to moderate bilateral pleural effusions.  3.  Multiple enlarged mediastinal and hilar lymph nodes which are nonspecific and may be reactive. Suggest attention on follow-up exam.  4.  Interlobular septal thickening seen in the lungs which may be due to underlying infection versus pulmonary edema.    Received ceftriaxone and  "Zithromax in ER  Continue ceftriaxone.  Discontinue azithromycin due to significant drug-drug interaction between warfarin and azithromycin.  Given normal procalcitonin value, likely does not need broad-spectrum coverage for infection.  Per PharmD, \"If atypical coverage is desired for CAP consider adding doxycycline.\"  Add Doxycycline 100 mg IV BID  Procalcitonin is normal, 0.10   Legionella antigen and streptococcal pneumonia antigen are negative  Robitussin as needed  Incentive spirometry  Acapella valve  Supplemental oxygen, wean off oxygen as able  Recommend repeat CT of the chest in 3 months to ensure resolution of radiological findings and rule out lung mass    Diastolic CHF exacerbation  Bilateral small-moderate pleural effusion  Amyloidosis?  Moderate to severe mitral regurgitation  *Recently diagnosed with atrial fibrillation and started on warfarin; seen by cardiology as outpatient in [place] and outpatient echocardiogram on 10/25/2024 showed EF 52%, LV cavity moderately dilated with moderate concentric hypertrophy, low ventricular mass index was increased concerning for infiltrative cardiomyopathy.  There is evidence of aortic valve sclerosis, aortic valve area was 1.5 cm . There is evidence of moderate to severe mitral valve regurgitation.  There was concern for cardiac amyloidosis.  SPEP positive for free light kappa chain and free lambda light chains.  He was started on Lasix.  He states he is supposed to have another test on December 17/2024 (PYP?)  *Reports shortness of breath and increased leg swelling  *proBNP elevated at almost 7000  *CT chest with IV contrast showed evidence of pulmonary edema and bilateral small to moderate bilateral pleural effusions  Lasix 60 mg IV given in ER with good response  Monitor on telemetry  Started on Lasix 40 mg IV twice daily for 2 doses  strict input and output  Daily weights  Echocardiogram is pending  Telemetry  Cardiology consult requested, I appreciate  " "Brain's evaluation and recommendations.  Per him, \"continue IV diuretics.  Switch to p.o. in a day or 2.  Echocardiogram.  Treat for pneumonia although procalcitonin is normal.  With respect to amyloidosis, the workup can be done as an outpatient.\"  2 g sodium diet  Follow-up with primary cardiologist after returning back [place], apparently he is scheduled to have another test to confirm amyloidosis    Atrial fibrillation  *Recently diagnosed with A-fib, follows with cardiology as outpatient; on warfarin  *INR 2.32  *He is in A-fib with heart rate in 100s  Telemetry  Warfarin as per pharmacy dosing  Resume PTA nebivolol 5 mg p.o. nightly; is also on nifedipine ER 60 mg p.o. daily  Metoprolol IV as needed     Hypertension  Dyslipidemia  Coronary artery disease status post PCI to distal LAD in September 2022  Elevated troponins, likely demand ischemia in the setting of hypoxia, CHF exacerbation  *Troponin 32--36  *EKG- atrial fibrillation, left anterior fascicular block, Q waves in inferior leads and V1-V4, T wave inversion in lateral leads  *reports some chest pain with coughing, seems pleuritic   Telemetry  Resume PTA aspirin 81 mg p.o. daily, clonidine 0.2 mg p.o. twice daily, benazepril 40 mg p.o. twice daily, nebivolol 5 mg p.o. nightly, nifedipine 60 mg p.o. daily  Resume PTA Zetia and fenofibrate  Elevated troponins are quite flat, troponin until trending down    Diabetes mellitus type 2  [PTA on Amaryl 4 mg p.o. twice daily and Janumet]  Hold PTA oral antidiabetic medications for now  Hemoglobin A1C is 7.0%, suggesting good control  Mod carb diet  Accu-Cheks before meals and bedtime  Medium scale corrective dose insulin          Diet: Combination Diet Moderate Consistent Carb (60 g CHO per Meal) Diet; Low Saturated Fat Na <2400mg Diet    DVT Prophylaxis: Warfarin  Frazier Catheter: Not present  Lines: None     Cardiac Monitoring: ACTIVE order. Indication: AMI (NSTEMI/ STEMI) (48 hours)  Code Status: Full Code  " "    Clinically Significant Risk Factors Present on Admission                # Drug Induced Coagulation Defect: home medication list includes an anticoagulant medication  # Drug Induced Platelet Defect: home medication list includes an antiplatelet medication   # Hypertension: Home medication list includes antihypertensive(s)      # Anemia: based on hgb <11      # DMII: A1C = 7.0 % (Ref range: <5.7 %) within past 6 months    # Obesity: Estimated body mass index is 33.77 kg/m  as calculated from the following:    Height as of this encounter: 1.88 m (6' 2\").    Weight as of this encounter: 119.3 kg (263 lb).              Social Drivers of Health    Housing Stability: Unknown (3/5/2024)    Received from [facility]    Housing Stability     Current Living Arrangements: home   Alcohol Use: Alcohol Misuse (2/10/2021)    Received from [facility]    AUDIT-C     Frequency of Alcohol Consumption: 4 or more times a week     Average Number of Drinks: 3 or 4          Disposition Plan     Medically Ready for Discharge: Anticipated in 2-4 Days    Rayna Torres MD  Hospitalist Service  [facility]  Securely message with [employer] (more info)  Text page via [employer] Paging/Directory   ______________________________________________________________________    Interval History   \"When you think I can go home?\"  Patient asks about timing for discharge to his son's home (here in [place]), after that, they plan to drive back to [place].  He says he feels much better today.  Wife asked how long he will need supplemental oxygen.  He denies chest pain, no GI complaints.    Physical Exam   Vital Signs: Temp: 98.6  F (37  C) Temp src: Oral BP: 138/86 Pulse: 65   Resp: 18 SpO2: 98 % O2 Device: Nasal cannula Oxygen Delivery: 3 LPM  Weight: 263 lbs 0 oz    Constitutional: Awake, alert, cooperative, no apparent distress  Respiratory: He does not have increased work of breathing, is not using " accessory muscles of respiration.  He can talk in full sentences without pausing due to breathlessness.  Lungs are quite clear throughout, with no crackles and no wheezing.  Cardiovascular: Regular rate and rhythm with soft systolic murmur  GI: Normal bowel sounds, soft, non-distended, non-tender  Skin/Integumen: No rash on exposed skin.  No lower extremity edema  Other: Mood is very pleasant      Medical Decision Making       50 MINUTES SPENT BY ME on the date of service doing chart review, history, exam, documentation & further activities per the note.  Including discussion with patient, wife, bedside RN, also Dr. De La Paz    Data     I have personally reviewed the following data over the past 24 hrs:    9.3  \   10.8 (L)   / 187     141 100 21.4 /  187 (H)   3.4 30 (H) 1.05 \     ALT: 14 AST: 15 AP: 36 (L) TBILI: 1.1   ALB: 4.1 TOT PROTEIN: 7.0 LIPASE: N/A     Trop: 37 (H) BNP: 6,916 (H)     TSH: N/A T4: N/A A1C: 7.0 (H)     Procal: 0.10 CRP: N/A Lactic Acid: 1.7       INR:  2.21 (H) PTT:  N/A   D-dimer:  N/A Fibrinogen:  N/A       Imaging results reviewed over the past 24 hrs:   Recent Results (from the past 24 hours)   XR Chest 2 Views    Narrative    CHEST TWO VIEWS  12/3/2024 12:46 PM     HISTORY: Cough. Hypoxia.    COMPARISON: None.      Impression    IMPRESSION: A 3.5 cm masslike opacity is noted in the left midlung  with ill-defined surrounding opacity noted. There is also mild hazy  opacity in the right mid lung. Findings could be related to infection  or edema, however a neoplastic mass cannot be excluded. Chest CT is  recommended for further evaluation. Small bilateral pleural effusions.  There is cardiac enlargement and mild bilateral pulmonary venous  congestion.    HAI FLORES MD         SYSTEM ID:  [id number]   CT Chest w Contrast    Narrative    CT CHEST W CONTRAST 12/3/2024 3:08 PM    CLINICAL HISTORY: Evaluate for mass and infiltrate seen on chest  x-ray, hypoxia  TECHNIQUE: CT chest with IV  contrast. Multiplanar reformats were  obtained. Dose reduction techniques were used.    CONTRAST: 135mL Isovue-370    COMPARISON: None.    FINDINGS:   LUNGS AND PLEURA: Small to moderate bilateral pleural effusions are  present. Mixed groundglass and consolidative opacities are seen in the  lungs bilaterally with widely largest areas noted along the posterior  left upper lobe measuring up to 9.1 x 5.4 cm (series 4, image 154).  Multiple solid and some solid nodules are also present including a 10  mm solid, subpleural nodule in the right lower lobe (series 4, image  266) and subsolid nodule in the right upper lobe measuring up to 2.6  cm with a 1.3 cm solid component (series 4, image 104). Interlobular  septal thickening is also evident bilaterally.    MEDIASTINUM/AXILLAE: Multiple enlarged hilar and mediastinal lymph  nodes are present, the largest measuring 1.9 cm in the subcarinal  space (series 2, image 35). Bilateral gynecomastia is present. Heart  size is enlarged. Scattered vascular calcifications are seen within  the thoracic aorta.    CORONARY ARTERY CALCIFICATION: Moderate to severe    UPPER ABDOMEN: Diffuse hepatic steatosis is present. Vascular  calcification is present in the abdominal aorta and its branches.    MUSCULOSKELETAL: Multilevel degenerative changes are seen in the  spine. Mild, age-indeterminate vertebral body height loss is seen at  T5-T6.      Impression    IMPRESSION:   1.  Multifocal, mixed groundglass and consolidative airspace opacities  are seen in the lungs suspicious for pneumonia. Multiple pulmonary  nodules are also present with one of the largest measuring 2.6 cm in  the right upper lobe. These findings may be due to underlying  infectious etiology but neoplastic origin cannot be excluded.  Recommend 3 month CT chest follow-up exam.  2.  Small to moderate bilateral pleural effusions.  3.  Multiple enlarged mediastinal and hilar lymph nodes which are  nonspecific and may be  reactive. Suggest attention on follow-up exam.  4.  Interlobular septal thickening seen in the lungs which may be due  to underlying infection versus pulmonary edema.  5.  Age-indeterminate mild compression deformities seen at T5-T6.    REFERENCE:  Guidelines for Management of Incidental Pulmonary Nodules Detected on  CT Images: From the Fleischner Society 2017.   Guidelines apply to incidental nodules in patients who are 35 years or  older.  Guidelines do not apply to lung cancer screening, patients with  immunosuppression, or patients with known primary cancer.    MULTIPLE NODULES  Nodule size <6 mm  Low-risk patients: No follow-up needed.  High-risk patients: Optional follow-up at 12 months.    Nodule size 6 mm or larger  Low-risk patients: Follow-up CT at 3-6 months, then consider CT at  18-24 months.  High-risk patients: Follow-up CT at 3-6 months, then at 18-24 months  if no change.  -Use most suspicious nodule as guide to management.    SUBSOLID NODULES  Ground glass  Nodule size <6 mm  No routine follow-up. If suspicious, consider follow-up at 2 and 4  years.    Nodule size 6 mm or greater  CT at 6-12 months to confirm persistence, then CT every 2 years until  5 years.    Part solid  Nodule size <6 mm  No routine follow-up.    Nodule size 6 mm or greater  CT at 3-6 months to confirm persistence. If unchanged and solid  component remains <6 mm, annual CT for 5 years.    Multiple  CT at 3-6 months. If stable, consider CT at 2 and 4 years. Management  based on the most suspicious nodule.    Consider referral to lung nodule clinic.    LINDSEY BHAT MD         SYSTEM ID:  [id number]

## 2024-12-04 NOTE — PHARMACY-ANTICOAGULATION SERVICE
Clinical Pharmacy - Warfarin Dosing Consult     Pharmacy has been consulted to manage this patient s warfarin therapy.  Indication: Atrial Fibrillation  Therapy Goal: INR 2-3  Warfarin Prior to Admission: Yes  Warfarin PTA Regimen: 7.5 mg Wed/Sat, 5 mg all other days  Significant drug interactions: Azithromycin, ceftriaxone  Recent documented change in oral intake/nutrition: No  Dose Comments: Last dose 12/2/24 pm    INR   Date Value Ref Range Status   12/03/2024 2.32 (H) 0.85 - 1.15 Final       Recommend warfarin 5 mg today.  Pharmacy will monitor Percy Thornton daily and order warfarin doses to achieve specified goal.      Please contact pharmacy as soon as possible if the warfarin needs to be held for a procedure or if the warfarin goals change.

## 2024-12-04 NOTE — PROGRESS NOTES
12/04/24 0905   Appointment Info   Signing Clinician's Name / Credentials (OT) Mildred Abdul OTR/L   Rehab Comments (OT) Initial Evaluation   Living Environment   People in Home spouse   Current Living Arrangements house   Home Accessibility stairs to enter home   Number of Stairs, Main Entrance 4   Transportation Anticipated family or friend will provide   Living Environment Comments Pt lives in [place], visiting son. at sons house stays in [facility]   Self-Care   Equipment Currently Used at Home cane, straight   Activity/Exercise/Self-Care Comment toilet safety frame with higher toilet. tub/shower combo wiht grab bar and clamp on tub rail. pts wife has a shower chair but can borrow it   Instrumental Activities of Daily Living (IADL)   Previous Responsibilities medication management;housekeeping;laundry  (split cleaning and laundry, both cook, wife cooks mostly.)   IADL Comments retired   General Information   Onset of Illness/Injury or Date of Surgery 12/03/24   Referring Physician Gabbie Carr MD   Patient/Family Therapy Goal Statement (OT) home   Additional Occupational Profile Info/Pertinent History of Current Problem Percy Thornton is a 74 year old male who was admitted on 12/3/2024.     1. Acute hypoxic respiratory failure -multifactorial  2. Suspected multifocal pneumonia, on CT chest although element of congestive heart failure not excluded  3. Acute on chronic heart failure with preserved ejection fraction  4. Suspicion of cardiac amyloidosis on previous echo  5. Moderate mitral deviation and mild aortic stenosis   Existing Precautions/Restrictions fall   Cognitive Status Examination   Orientation Status orientation to person, place and time   Affect/Mental Status (Cognitive) WFL   Follows Commands WFL   Visual Perception   Visual Impairment/Limitations corrective lenses full-time   Sensory   Sensory Comments denies   Pain Assessment   Patient Currently in Pain   (belly when coughing)    Range of Motion Comprehensive   Comment, General Range of Motion BUE AROMWFL   Bed Mobility   Scooting/Bridging [place] (Bed Mobility) independent   Supine-Sit [place] (Bed Mobility) independent   Transfer Skill: Bed to Chair/Chair to Bed   Bed-Chair [place] (Transfers)   (SBA)   Assistive Device (Bed-Chair Transfers) straight cane   Sit-Stand Transfer   Sit-Stand [place] (Transfers) supervision   Lower Body Dressing Assessment/Training   [place] Level (Lower Body Dressing) supervision   Clinical Impression   Criteria for Skilled Therapeutic Interventions Met (OT) Yes, treatment indicated   OT Diagnosis impaired I with ADL/IADl's and functional mobility.   OT Problem List-Impairments impacting ADL problems related to;activity tolerance impaired;strength;balance   Assessment of Occupational Performance 1-3 Performance Deficits   Identified Performance Deficits impaired I/safety with strenuous ADl/IADL's ie tub/shower transfer, showering, heavier cleaning ie vacuuming, yard work, shopping, laundry, etc   Planned Therapy Interventions (OT) ADL retraining;cognition;home program guidelines;progressive activity/exercise   Clinical Decision Making Complexity (OT) problem focused assessment/low complexity   Risk & Benefits of therapy have been explained evaluation/treatment results reviewed;care plan/treatment goals reviewed;risks/benefits reviewed;current/potential barriers reviewed;participants voiced agreement with care plan;participants included;patient;spouse/significant other   OT Total Evaluation Time   OT Eval, Low Complexity Minutes (45774) 10   OT Goals   Therapy Frequency (OT) 5 times/week   OT Predicted Duration/Target Date for Goal Attainment 12/09/24   OT Goals Cardiac Phase 1;Cognition   OT: Cognitive Patient/caregiver will verbalize understanding of cognitive assessment results/recommendations as needed for safe discharge planning  (as needed)   OT: Understanding of cardiac  education to maximize quality of life, condition management, and health outcomes Patient;Verbalize  (CHF ed)   OT: Perform aerobic activity with stable cardiovascular response continuous;15 minutes;ambulation;treadmill   OT: Functional/aerobic ambulation tolerance with stable cardiovascular response in order to return to home and community environment Modified independent;Straight cane;Greater than 300 feet   OT: Navigation of stairs simulating home set up with stable cardiovascular response in order to return to home and community environment Modified independent;Greater than 10 stairs  (15 stairs.)   Self-Care/Home Management   Self-Care/Home Mgmt/ADL, Compensatory, Meal Prep Minutes (40016) 23   Treatment Detail/Skilled Intervention OT: Pt ed in CHF info with handouts, see below for details. Pts wife present and supportive.   Therapeutic Procedures/Exercise   Therapeutic Procedure: strength, endurance, ROM, flexibillity minutes (70680) 13   Symptoms Noted During/After Treatment fatigue;significant change in vital signs;shortness of breath   Treatment Detail/Skilled Intervention OT: pt ambulates with SEC with SBA/S, no LOB, pt reports feels a little wobbly but not bad, likely due to bedrest. pt encouraged to walk 2-3 more times today with nursing and RN notified regarding rec as well. see below for details   Treatment Time Includes (CR Only) Monitoring of vital signs (see vital signs flowsheet for details);See specific exercise details intervention group(s)   Ambulation   Duration (minutes) 5.5 minutes   Effort Scale 6   Symptoms Fatigue  (breathing heavier)   Cardiovascular Response   (O2 sats dropped to 87-88% 2 L O2 NC, cues for PLB, O2 up to 3 L O2, cues for PLB, O2 sats rebounded to 90-92%, cont'd walking, O2 sats did drop to 88-89% toward of walk with 3 L O2, with rest and PLB did rebound back to 95-96%, turned O2 back down to 2 L)   Vital Signs Details RN notified regarding O2 sats. see vs flow sheet. BP  blunted response, but WFL   Cardiac Education   Education Provided Daily weights;Diagnosis;Diet;Energy conservation;Home exercise program;OMNI Scale;Signs and symptoms;Stop light tool   Education Packet Given to Patient Yes   All Patient Education Handouts Reviewed with Patient and/or Family Yes   OT Discharge Planning   OT Plan OT Plan: cont timed ambulation, monitor O2 sats during walk, teachback CHF, stairs   OT Discharge Recommendation (DC Rec) home with assist   OT Rationale for DC Rec Pt and wife visiting son in [place], pt lives in [place] was supposed to return home yesterday. Pt I with ADL/IADL's and functional mobility with SEC. pt currently limited due to impaired activity tolerance, O2 sats drop with 2-3 L O2 NC during ambulation, unsteadiness. Anticiapte with cont'd medical mgmt and therapy pt will be able to return home. pt and wife reports likely will stay with son for a few days before returning to [place] per MD rec. Recommend A with strenuous IADL's ie vacuuming, shopping, etc. and cont'd home walking program.   OT Brief overview of current status Goals of therapy will be to address safe mobility and make recs for d/c to next level of care. Pt and RN will continue to follow all falls risk precautions as documented by RN staff while hospitalized. Pt ambulated with SEC SBA for approx 5.5 mins wtih 1 rest break due to O2 sats dropping to 87-88% with 2 L O2 NC, cues for PLB, and O2 3 L, rebounded to 90-92%, toward end of walk O2 sats dropping again to 88-89%. with rest and PLB O2 sats rebounded back to 95-96%, RN notified. see vs flow sheet   Total Session Time   Timed Code Treatment Minutes 36   Total Session Time (sum of timed and untimed services) 46

## 2024-12-04 NOTE — PLAN OF CARE
Problem: Adult Inpatient Plan of Care  Goal: Plan of Care Review  Description: The Plan of Care Review/Shift note should be completed every shift.  The Outcome Evaluation is a brief statement about your assessment that the patient is improving, declining, or no change.  This information will be displayed automatically on your shift  note.  Outcome: Progressing  Flowsheets (Taken 12/3/2024 221)  Plan of Care Reviewed With: patient  Overall Patient Progress: no change  Goal: Absence of Hospital-Acquired Illness or Injury  Intervention: Identify and Manage Fall Risk  Recent Flowsheet Documentation  Taken 12/3/2024 2000 by Rosa Stinson RN  Safety Promotion/Fall Prevention:   activity supervised   lighting adjusted   mobility aid in reach   nonskid shoes/slippers when out of bed   supervised activity   safety round/check completed  Intervention: Prevent Skin Injury  Recent Flowsheet Documentation  Taken 12/3/2024 1933 by Rosa Stinson RN  Body Position: position changed independently  Intervention: Prevent and Manage VTE (Venous Thromboembolism) Risk  Recent Flowsheet Documentation  Taken 12/3/2024 2000 by Rosa Stinson RN  VTE Prevention/Management: (warfarin) other (see comments)  Goal: Optimal Comfort and Wellbeing  Intervention: Provide Person-Centered Care  Recent Flowsheet Documentation  Taken 12/3/2024 2000 by Rosa Stinson RN  Trust Relationship/Rapport:   care explained   choices provided   emotional support provided   empathic listening provided   questions answered   thoughts/feelings acknowledged   reassurance provided   questions encouraged     Problem: Dysrhythmia  Goal: Normalized Cardiac Rhythm  Intervention: Monitor and Manage Cardiac Rhythm Effect  Recent Flowsheet Documentation  Taken 12/3/2024 2000 by Rosa Stinson RN  VTE Prevention/Management: (warfarin) other (see comments)     Problem: Heart Failure  Goal: Effective Oxygenation and Ventilation  Intervention: Promote Airway Secretion  Clearance  Recent Flowsheet Documentation  Taken 12/3/2024 2000 by Rosa Stinson, RN  Cough And Deep Breathing: done independently per patient  Goal: Effective Breathing Pattern During Sleep  Intervention: Monitor and Manage Obstructive Sleep Apnea  Recent Flowsheet Documentation  Taken 12/3/2024 2000 by Rosa Stinson, RN  Medication Review/Management: medications reviewed   Goal Outcome Evaluation:      Plan of Care Reviewed With: patient    Overall Patient Progress: no changeOverall Patient Progress: no change       Patient A&Ox4, equal, PERRLA. Generalized weakness. Patient in A. Fib, hx of A. Fib rate of 100-120. PRN metoprolol for HR>120. Patient not sustaining >120 currently, continuing to monitor. BP elevated 140-160 systolic. Night BP meds given. Oxygenating on 4L NC, transitioned patient to home BiPAP with 4L O2 attached. No skin issues. 1 BM tonight. Urinating in urinal.

## 2024-12-05 ENCOUNTER — TELEPHONE (OUTPATIENT)
Dept: CARDIOLOGY | Facility: CLINIC | Age: 74
End: 2024-12-05

## 2024-12-05 VITALS
SYSTOLIC BLOOD PRESSURE: 147 MMHG | BODY MASS INDEX: 33.88 KG/M2 | OXYGEN SATURATION: 98 % | HEIGHT: 74 IN | HEART RATE: 83 BPM | RESPIRATION RATE: 18 BRPM | TEMPERATURE: 98.3 F | DIASTOLIC BLOOD PRESSURE: 108 MMHG | WEIGHT: 264 LBS

## 2024-12-05 LAB
ANION GAP SERPL CALCULATED.3IONS-SCNC: 12 MMOL/L (ref 7–15)
BACTERIA BLD CULT: NORMAL
BACTERIA SPT CULT: ABNORMAL
BUN SERPL-MCNC: 26 MG/DL (ref 8–23)
CALCIUM SERPL-MCNC: 9 MG/DL (ref 8.8–10.4)
CHLORIDE SERPL-SCNC: 98 MMOL/L (ref 98–107)
CREAT SERPL-MCNC: 1.07 MG/DL (ref 0.67–1.17)
CRP SERPL-MCNC: 129.23 MG/L
EGFRCR SERPLBLD CKD-EPI 2021: 74 ML/MIN/1.73M2
GLUCOSE BLDC GLUCOMTR-MCNC: 140 MG/DL (ref 70–99)
GLUCOSE BLDC GLUCOMTR-MCNC: 153 MG/DL (ref 70–99)
GLUCOSE BLDC GLUCOMTR-MCNC: 176 MG/DL (ref 70–99)
GLUCOSE BLDC GLUCOMTR-MCNC: 181 MG/DL (ref 70–99)
GLUCOSE BLDC GLUCOMTR-MCNC: 198 MG/DL (ref 70–99)
GLUCOSE SERPL-MCNC: 174 MG/DL (ref 70–99)
GRAM STAIN RESULT: ABNORMAL
HCO3 SERPL-SCNC: 30 MMOL/L (ref 22–29)
INR PPP: 2.31 (ref 0.85–1.15)
POTASSIUM SERPL-SCNC: 3.4 MMOL/L (ref 3.4–5.3)
SODIUM SERPL-SCNC: 140 MMOL/L (ref 135–145)

## 2024-12-05 PROCEDURE — 250N000011 HC RX IP 250 OP 636: Performed by: NURSE PRACTITIONER

## 2024-12-05 PROCEDURE — 250N000011 HC RX IP 250 OP 636: Performed by: INTERNAL MEDICINE

## 2024-12-05 PROCEDURE — 250N000013 HC RX MED GY IP 250 OP 250 PS 637: Performed by: INTERNAL MEDICINE

## 2024-12-05 PROCEDURE — 99233 SBSQ HOSP IP/OBS HIGH 50: CPT | Performed by: HOSPITALIST

## 2024-12-05 PROCEDURE — 210N000002 HC R&B HEART CARE

## 2024-12-05 PROCEDURE — 85610 PROTHROMBIN TIME: CPT | Performed by: INTERNAL MEDICINE

## 2024-12-05 PROCEDURE — 36415 COLL VENOUS BLD VENIPUNCTURE: CPT | Performed by: INTERNAL MEDICINE

## 2024-12-05 PROCEDURE — 86140 C-REACTIVE PROTEIN: CPT | Performed by: HOSPITALIST

## 2024-12-05 PROCEDURE — 80048 BASIC METABOLIC PNL TOTAL CA: CPT | Performed by: INTERNAL MEDICINE

## 2024-12-05 PROCEDURE — 99232 SBSQ HOSP IP/OBS MODERATE 35: CPT | Mod: FS | Performed by: NURSE PRACTITIONER

## 2024-12-05 RX ORDER — FUROSEMIDE 40 MG/1
40 TABLET ORAL DAILY
Status: DISCONTINUED | OUTPATIENT
Start: 2024-12-06 | End: 2024-12-08 | Stop reason: HOSPADM

## 2024-12-05 RX ORDER — WARFARIN SODIUM 5 MG/1
5 TABLET ORAL
Status: COMPLETED | OUTPATIENT
Start: 2024-12-05 | End: 2024-12-05

## 2024-12-05 RX ORDER — FUROSEMIDE 10 MG/ML
40 INJECTION INTRAMUSCULAR; INTRAVENOUS ONCE
Status: DISCONTINUED | OUTPATIENT
Start: 2024-12-05 | End: 2024-12-05

## 2024-12-05 RX ORDER — FUROSEMIDE 10 MG/ML
40 INJECTION INTRAMUSCULAR; INTRAVENOUS ONCE
Status: COMPLETED | OUTPATIENT
Start: 2024-12-05 | End: 2024-12-05

## 2024-12-05 RX ADMIN — LISINOPRIL 40 MG: 40 TABLET ORAL at 08:10

## 2024-12-05 RX ADMIN — FENOFIBRATE 160 MG: 160 TABLET ORAL at 21:39

## 2024-12-05 RX ADMIN — EZETIMIBE 10 MG: 10 TABLET ORAL at 21:39

## 2024-12-05 RX ADMIN — DOXYCYCLINE 100 MG: 100 INJECTION, POWDER, LYOPHILIZED, FOR SOLUTION INTRAVENOUS at 14:18

## 2024-12-05 RX ADMIN — INSULIN ASPART 1 UNITS: 100 INJECTION, SOLUTION INTRAVENOUS; SUBCUTANEOUS at 08:11

## 2024-12-05 RX ADMIN — ASPIRIN 81 MG: 81 TABLET, COATED ORAL at 08:09

## 2024-12-05 RX ADMIN — ESCITALOPRAM OXALATE 10 MG: 10 TABLET ORAL at 08:10

## 2024-12-05 RX ADMIN — FLUTICASONE PROPIONATE 2 SPRAY: 50 SPRAY, METERED NASAL at 08:11

## 2024-12-05 RX ADMIN — INSULIN ASPART 1 UNITS: 100 INJECTION, SOLUTION INTRAVENOUS; SUBCUTANEOUS at 17:54

## 2024-12-05 RX ADMIN — DOXAZOSIN 4 MG: 4 TABLET ORAL at 21:40

## 2024-12-05 RX ADMIN — WARFARIN SODIUM 5 MG: 5 TABLET ORAL at 18:50

## 2024-12-05 RX ADMIN — CLONIDINE HYDROCHLORIDE 0.2 MG: 0.1 TABLET ORAL at 08:09

## 2024-12-05 RX ADMIN — OLOPATADINE HYDROCHLORIDE 1 DROP: 1 SOLUTION/ DROPS OPHTHALMIC at 08:11

## 2024-12-05 RX ADMIN — CLONIDINE HYDROCHLORIDE 0.2 MG: 0.1 TABLET ORAL at 21:39

## 2024-12-05 RX ADMIN — GABAPENTIN 600 MG: 600 TABLET, FILM COATED ORAL at 08:10

## 2024-12-05 RX ADMIN — FUROSEMIDE 40 MG: 10 INJECTION, SOLUTION INTRAMUSCULAR; INTRAVENOUS at 11:32

## 2024-12-05 RX ADMIN — CEFTRIAXONE SODIUM 1 G: 1 INJECTION, POWDER, FOR SOLUTION INTRAMUSCULAR; INTRAVENOUS at 13:35

## 2024-12-05 RX ADMIN — DOXYCYCLINE 100 MG: 100 INJECTION, POWDER, LYOPHILIZED, FOR SOLUTION INTRAVENOUS at 01:37

## 2024-12-05 RX ADMIN — NIFEDIPINE 60 MG: 60 TABLET, FILM COATED, EXTENDED RELEASE ORAL at 08:10

## 2024-12-05 RX ADMIN — NEBIVOLOL 5 MG: 5 TABLET ORAL at 21:40

## 2024-12-05 RX ADMIN — INSULIN ASPART 2 UNITS: 100 INJECTION, SOLUTION INTRAVENOUS; SUBCUTANEOUS at 12:53

## 2024-12-05 RX ADMIN — TRAZODONE HYDROCHLORIDE 50 MG: 50 TABLET ORAL at 21:39

## 2024-12-05 ASSESSMENT — ACTIVITIES OF DAILY LIVING (ADL)
DEPENDENT_IADLS:: INDEPENDENT
ADLS_ACUITY_SCORE: 31
ADLS_ACUITY_SCORE: 33
ADLS_ACUITY_SCORE: 33
ADLS_ACUITY_SCORE: 31
ADLS_ACUITY_SCORE: 31
ADLS_ACUITY_SCORE: 33
ADLS_ACUITY_SCORE: 31
ADLS_ACUITY_SCORE: 33
ADLS_ACUITY_SCORE: 31
ADLS_ACUITY_SCORE: 33
ADLS_ACUITY_SCORE: 32
ADLS_ACUITY_SCORE: 33
ADLS_ACUITY_SCORE: 31

## 2024-12-05 NOTE — PLAN OF CARE
Heart Failure Care Map  GOALS TO BE MET BEFORE DISCHARGE:    1. Decrease congestion and/or edema with diuretic therapy to achieve near optimal volume status.     Dyspnea improved: Yes, satisfactory for discharge.   Edema improved: Yes, satisfactory for discharge.        Last 24 hour I/O:   Intake/Output Summary (Last 24 hours) at 12/4/2024 1814  Last data filed at 12/4/2024 1800  Gross per 24 hour   Intake 1342 ml   Output 1250 ml   Net 92 ml           Net I/O and Weights since admission:   11/04 2300 - 12/04 2259  In: 1342 [P.O.:1142; I.V.:200]  Out: 2550 [Urine:2550]  Net: -1208     Vitals:    12/03/24 1146 12/03/24 1850 12/04/24 0500   Weight: 122.5 kg (270 lb) 126 kg (277 lb 11.2 oz) 119.3 kg (263 lb)       2.  O2 sats > 90% on room air, or at prior home O2 therapy level.      Able to wean O2 this shift to keep sats above 90%?: No, further care required to meet this goal. Please explain 2L NC, wean as able.    Does patient use Home O2? No          Current oxygenation status:   SpO2: 95 %     O2 Device: Nasal cannula, Oxygen Delivery: 2 LPM    3.  Tolerates ambulation and mobility near baseline.     Ambulation: Yes, satisfactory for discharge.   Times patient ambulated with staff this shift: 2    Please review the Heart Failure Care Map for additional HF goal outcomes.    Solis Wooten RN  12/4/2024

## 2024-12-05 NOTE — PLAN OF CARE
Goal Outcome Evaluation:    Heart Failure Care Map  GOALS TO BE MET BEFORE DISCHARGE:    1. Decrease congestion and/or edema with diuretic therapy to achieve near optimal volume status.     Dyspnea improved: Yes, satisfactory for discharge.   Edema improved: Yes, satisfactory for discharge.        Last 24 hour I/O:   Intake/Output Summary (Last 24 hours) at 12/5/2024 0537  Last data filed at 12/4/2024 2300  Gross per 24 hour   Intake 1040 ml   Output 1750 ml   Net -710 ml           Net I/O and Weights since admission:   11/05 0700 - 12/05 0659  In: 1342 [P.O.:1142; I.V.:200]  Out: 3050 [Urine:3050]  Net: -1708     Vitals:    12/03/24 1146 12/03/24 1850 12/04/24 0500   Weight: 122.5 kg (270 lb) 126 kg (277 lb 11.2 oz) 119.3 kg (263 lb)       2.  O2 sats > 90% on room air, or at prior home O2 therapy level.      Able to wean O2 this shift to keep sats above 90%?: No, further care required to meet this goal. Please explain on 2L NC   Does patient use Home O2? Yes-  CPAP overnight          Current oxygenation status:   SpO2: 95 %     O2 Device: BiPAP/CPAP, Oxygen Delivery: 2 LPM    3.  Tolerates ambulation and mobility near baseline.     Ambulation: Yes, satisfactory for discharge.   Times patient ambulated with staff this shift: 1    Please review the Heart Failure Care Map for additional HF goal outcomes.    Mauricio Gonzalez RN  12/5/2024       Neuro: AOx4  Cardiac: afib CVR (70s-80s), BP: 120/94  Resp: uses home CPAP overnight, 94% on 1L NC, has a frequent productive cough  GI/: Continent, abdomen rounded, soft, urinal at bedside  Skin: L hip bruising and small bruising on abdomen  Mobility: standby w/ a cane    Plan: continue to diurese, Echo

## 2024-12-05 NOTE — TELEPHONE ENCOUNTER
"Caller: Jeb Olson \"Rosas\"    Relationship to patient: Self    Best call back number: [phone]    Chief complaint: HEART FAILURE, SHORTNESS OF BREATH, PNEUMONIA     Type of visit: HOSPITAL FU    Requested date: BEFORE END OF DECMEBER     If rescheduling, when is the original appointment: NA     Additional notes:MELCHOR NURSE PHONE:[phone]    CALLED TO SETUP HOSPITAL FU FOR PATIENT NO NOTES IN DISCHARGE PAPERWORK FOR TIME FRAME AND FIRST AVAILABLE ISN'T UNTIL 01.07.25. PLEASE ADVISE.       "

## 2024-12-05 NOTE — PROGRESS NOTES
[facility]    Cardiology Progress Note    Primary Cardiologist: Follows with Dr. Dwyer at [facility] in [place]    Date of Admission: 12/3/2024  Service Date: 12/05/24    Summary:  Mr. Percy Thornton is a very pleasant 74 year old male with a past medical history of hypertension, dyslipidemia, coronary artery disease, s/p PCI to distal LAD (9/2022), recently diagnosed atrial fibrillation, moderate MR, mild aortic stenosis, suspected cardiac amyloidosis, type 2 diabetes, and arthritis who was admitted on 12/3/2024 for shortness of breath and cough. Cardiology was consulted for respiratory failure and amyloidosis.    Interval History   Echocardiogram completed yesterday showing left ventricular ejection fraction 35 to 40% with more severe hypokinesis of the apex, mild to moderately concentric increase in ventricular wall thickness, mild RV enlargement with normal systolic function, RVSP estimated at 38 mmHg, mildly calcified mitral annulus with thickened leaflets, probable unsupported mitral valve posterior leaflet scallop with an eccentric anteriorly directed mitral regurgitant jet.     Patient diuresed 1.7 L yesterday.  Proximately 30 pounds since admission suspect some element of inaccuracy.     Telemetry: Atrial fibrillation, rate 70-80's    Assessment & Plan   1.  Acute hypoxic respiratory failure, multifactorial-likely secondary to #2 and 3  -12/3/24 Chest CT with small to moderate bilateral pleural effusions concerning for multifocal pneumonia, Interlobular septal thickening seen in the lungs which may be due  to underlying infection versus pulmonary edema  -Symptoms improving following IV furosemide    2.  Suspected multifocal pneumonia  -12/3/24 Chest CT with multifocal mixed groundglass and consolidative airspace opacities, suspicious for pneumonia  -On doxycycline and ceftriaxone    3.  Acute HFrEF  -PTA furosemide 40mg daily, started 1 month ago by primary  cardiology team  -NT proBNP 6916  -Chest x-ray showing cardiac enlargement and mild bilateral pulmonary venous congestion  -Symptomatically improved following IV Lasix  -TTE showing newly reduced EF now down to 35 to 40% with apex most severe, previous echo in October 2024 showed EF 52%  -Admitted at weight 277#->264 today, reports home/dry weight 258#    4.  Suspicion of cardiac amyloidosis  -10/2023 TTE revealed an EF of 52% with moderately dilated of ventricle and concerns of infiltrative cardiomyopathy  -SPEP which was positive for light kappa chain and free lambda light chains. He is thought for the follow-up for diagnosis of AL amyloidosis      5.  Moderate mitral regurgitation and mild aortic stenosis  -10/2023 TTE with aortic valve area 1.5 cm  and moderate to severe mitral regurgitation  -Yesterday's TTE showing mildly calcified mitral annulus with thickened leaflets, probable unsupported mitral valve posterior leaflet scallop with an eccentric anteriorly directed mitral regurgitant jet, representing moderate-severe or possibly severe-MR    6.  Persistent atrial fibrillation, [id number] 5 (age, HTN, CAD, DM, CHF)  -PTA on warfarin, INR therapeutic  -On nebivolol for rate control     7. Coronary artery disease, s/p PCI of distal LAD (9/2022)  -2022 Cor angio: normal left main, mild proximal LAD disease (30%), distal LAD with long and diffuse 70% lesion, normal diagonal branches, normal proximal circumflex, OM1, and OM2, distal circ with 30-40% disease normal RCA  -PTA on aspirin, zetia and fenofibrate  -6/2024 Lipid panel with total cholesterol 154, trig 193, HDL 36, LDL 85    Plan:   1. Continue aspirin, zetia, and fenofibrate  2. Given one additional dose of IV furosemide 40mg today, then resume furosemide oral tomorrow  3. Recommend outpatient MARTY for further evaluation of MR and ischemic evaluation given reduced EF with patient's primary cardiology team in [place]  4. Continue current  anti-hypertensive regimen: nebivolol, lisinopril, and nifedipine  5. Patient would benefit from transition to ARNI and addition of SGLT2 with newly reduced EF as GDMT, however this can be done at patient's primary cardiology group in [place]   6. Patient should keep his appointment for his NM PYP scan for evaluation on 12/17/24 at [facility]   7. Cardiology will sign off and work with care coordination team to arrange outpatient cardiology follow up for him in [place] later this month    Thank you for the opportunity to participate in this pleasant patient's care.     GWEN Suero, CNP   Nurse Practitioner  [facility]  (8am - 5pm, M-F)    Patient Active Problem List   Diagnosis    Hypoxia    Demand ischemia (H)    Pneumonia of both lungs due to infectious organism, unspecified part of lung    Acute on chronic congestive heart failure, unspecified heart failure type (H)       Physical Exam   Temp: 98.4  F (36.9  C) Temp src: Oral BP: (!) 138/94 Pulse: 96   Resp: 18 SpO2: 91 % O2 Device: None (Room air) Oxygen Delivery: 1 LPM  Vitals:    12/03/24 1850 12/04/24 0500 12/05/24 0635   Weight: 126 kg (277 lb 11.2 oz) 119.3 kg (263 lb) 119.7 kg (264 lb)     Vital Signs with Ranges  Temp:  [98  F (36.7  C)-98.5  F (36.9  C)] 98.4  F (36.9  C)  Pulse:  [74-96] 96  Resp:  [18] 18  BP: (119-138)/(73-94) 138/94  SpO2:  [86 %-99 %] 91 %  I/O last 3 completed shifts:  In: 1040 [P.O.:840; I.V.:200]  Out: 2025 [Urine:2025]    Constitutional:  Appears his stated age, well nourished, and in no acute distress.  Eyes: Pupils equal, round. Sclerae anicteric.   HEENT: Normocephalic, atraumatic.   Neck: Supple. No JVD appreciated.  Respiratory: Breathing non-labored. Lungs clear to auscultation bilaterally. No crackles, wheezes, rhonchi, or rales.  Cardiovascular: Irregularly irregular rate and rhythm, normal S1 and S2. II/VI ejection murmur  GI: Soft, non-distended  Skin: Warm, dry. Trace ankle-pretibial edema  bilaterally   Musculoskeletal/Extremities: Moves all extremities well and symmetrically.   Neurologic: No gross focal deficits. Alert, awake, and oriented to person, place and time.  Psychiatric: Affect appropriate. Mentation normal.    Medications   Current Facility-Administered Medications   Medication Dose Route Frequency Provider Last Rate Last Admin    - MEDICATION INSTRUCTIONS -   Does not apply DOES NOT GO TO Gabbie Gregory MD        Continuing ACE inhibitor/ARB/ARNI from home medication list OR ACE inhibitor/ARB/ARNI order already placed during this visit   Does not apply DOES NOT GO TO Gabbie Gregory MD        Continuing beta blocker from home medication list OR beta blocker order already placed during this visit   Does not apply DOES NOT GO TO Gabbie Gregory MD        Patient is already receiving anticoagulation with heparin, enoxaparin (LOVENOX), warfarin (COUMADIN)  or other anticoagulant medication   Does not apply Continuous PRN Gabbie Carr MD         Current Facility-Administered Medications   Medication Dose Route Frequency Provider Last Rate Last Admin    aspirin EC tablet 81 mg  81 mg Oral Daily Gabbie Carr MD   81 mg at 12/05/24 0809    cefTRIAXone (ROCEPHIN) 1 g vial to attach to  mL bag for ADULTS or NS 50 mL bag for PEDS  1 g Intravenous Q24H Gabbie Carr MD   1 g at 12/04/24 1502    cloNIDine (CATAPRES) tablet 0.2 mg  0.2 mg Oral BID Gabbie Carr MD   0.2 mg at 12/05/24 0809    doxazosin (CARDURA) tablet 4 mg  4 mg Oral At Bedtime Gabbie Carr MD   4 mg at 12/04/24 2216    doxycycline (VIBRAMYCIN) 100 mg vial to attach to  mL bag  100 mg Intravenous Q12H Rayna Torres MD   100 mg at 12/05/24 0137    escitalopram (LEXAPRO) tablet 10 mg  10 mg Oral Daily Gabbie Carr MD   10 mg at 12/05/24 0810    ezetimibe (ZETIA) tablet 10 mg  10 mg Oral QPM Gabbie Carr MD   10 mg at 12/04/24      fenofibrate (TRIGLIDE/LOFIBRA) tablet 160 mg  160 mg Oral At Bedtime Gabbie Carr MD   160 mg at 24    fluticasone (FLONASE) 50 MCG/ACT spray 2 spray  2 spray Both Nostrils Daily Gabbie Carr MD   2 spray at 24 0811    gabapentin (NEURONTIN) tablet 600 mg  600 mg Oral Daily Gabbie Carr MD   600 mg at 24 0810    insulin aspart (NovoLOG) injection (RAPID ACTING)  1-7 Units Subcutaneous TID AC Gabbie Carr MD   1 Units at 24 0811    insulin aspart (NovoLOG) injection (RAPID ACTING)  1-5 Units Subcutaneous At Bedtime Gabbie Carr MD        lisinopril (ZESTRIL) tablet 40 mg  40 mg Oral Daily Gabbie Carr MD   40 mg at 24 0810    nebivolol (BYSTOLIC) tablet 5 mg  5 mg Oral At Bedtime Gabbie Carr MD   5 mg at 24    NIFEdipine ER OSMOTIC (PROCARDIA XL) 24 hr tablet 60 mg  60 mg Oral Daily Gabbie Carr MD   60 mg at 24 0810    olopatadine (PATANOL) 0.1 % ophthalmic solution 1 drop  1 drop Both Eyes Daily Gabbie Carr MD   1 drop at 24 0811    sodium chloride (PF) 0.9% PF flush 3 mL  3 mL Intracatheter Q8H Gabbie Carr MD   3 mL at 24 0137    traZODone (DESYREL) tablet 50 mg  50 mg Oral QPM Gabbie Carr MD   50 mg at 24 221    warfarin ANTICOAGULANT (COUMADIN) tablet 5 mg  5 mg Oral ONCE at 18:00 Gabbie Carr MD        Warfarin Dose Required Daily - Pharmacist Managed  1 each Oral See Admin Instructions Gabbie Carr MD           Data   Recent Results (from the past 24 hours)   Echocardiogram Complete   Result Value    LVEF  35-40%    Narrative    [id number]  [id number]  [id number]  [id number]^JONATHON^GABBIE^ELISSA     [facility]  Echocardiography Laboratory  [address] [place] [id number]     Name: JERARDO CHAVEZ  MRN: [id number]  : 1950  Study Date: 2024 01:51 PM  Age: 74 yrs  Gender: Male  Patient  Location: [facility]  Reason For Study: Heart Failure  Ordering Physician: BETTY HOLT  Performed By: Dave Canales     BSA: 2.4 m2  Height: 74 in  Weight: 263 lb  HR: 89  BP: 128/87 mmHg  ______________________________________________________________________________  Procedure  Complete Portable Echo Adult. Definity (NDC #71028-568) given intravenously.  Poor quality two-dimensional was performed and interpreted.  ______________________________________________________________________________  Interpretation Summary     1. Technically difficult echocardiogram.  2. Mild left ventricular enlargement with decreased systolic function.  Estimated ejection fraction is 35-40%.  3. Generalized left ventricular hypokinesis; more severe hypokinesis of the  apex.  4. Mild-moderate concentrically increased ventricular wall thickness.  5. Mild right ventricular enlargement with normal systolic function.  6. Estimated right ventricular systolic pressure 38 mmHg (potentially  underestimated due to incomplete TR Doppler signal).  7. Mildly calcified mitral annulus with thickened leaflets. Probable  unsupported mitral valve posterior leaflet scallop. Eccentric anteriorly  directed mitral regurgitant jet (not fully visualized); may represent  moderate-severe or potentially severe mitral regurgitation.  8. No prior exam images available for review. Compared to outside exam from  10/25/2024, LVEF is lower.     COMMENTS: If clinically warranted, consider transesophageal echocardiogram for  better mechanistic and quantitative assessment of mitral regurgitation.  ______________________________________________________________________________  Left Ventricle  The left ventricle is mildly dilated. Mild-moderate concentrically increased  ventricular wall thickness. Left ventricular diastolic function is  indeterminate. The visual ejection fraction is 35-40%. Generalized left  ventricular hypokinesis; more severe hypokinesis of the apex.      Right Ventricle  Mild right ventricular enlargement with normal systolic function.     Atria  The left atrium is severely dilated. The right atrium is moderately dilated.  Atrial septum not well visualized.     Mitral Valve  There is mild mitral annular calcification. The mitral valve leaflets are  mildly thickened. Probable unsupported mitral posterior leaflet scallop.  Eccentric anteriorly directed mitral regurgitant jet (not fully visualized);  may represent moderate-severe or potentially severe mitral regurgitation.  Mitral valve mean diastolic gradient is 3 mmHg (at a heart rate of 85 bpm).     Aortic Valve  There is moderate trileaflet aortic sclerosis. No aortic stenosis is present.     Pulmonic Valve  The pulmonic valve is not well seen, but is grossly normal. There is mild (1+)  pulmonic valvular regurgitation. There is no pulmonic valvular stenosis.     Vessels  The aortic Sinus(es) of Valsalva are mildly dilated. Mild ascending aorta  dilatation (4.1 cm). Dilation of the inferior vena cava is present with normal  respiratory variation in diameter.  ______________________________________________________________________________  MMode/2D Measurements & Calculations  IVSd: 1.4 cm  LVIDd: 5.8 cm  LVIDs: 3.5 cm  LVPWd: 1.4 cm  FS: 39.1 %  LV mass(C)d: 369.7 grams  LV mass(C)dI: 151.4 grams/m2  Ao root diam: 4.1 cm  LA dimension: 5.6 cm  asc Aorta Diam: 4.1 cm  LA/Ao: 1.4  LVOT diam: 2.3 cm  LVOT area: 4.1 cm2  Ao root diam index Ht(cm/m): 2.2  Ao root diam index BSA (cm/m2): 1.7     Asc Ao diam index BSA (cm/m2): 1.7  Asc Ao diam index Ht(cm/m): 2.2  EF Biplane: 40.7 %  LA Volume (BP): 171.0 ml  LA Volume Index (BP): 70.1 ml/m2  RV Base: 4.9 cm  RWT: 0.49     Doppler Measurements & Calculations  MV E max levi: 118.3 cm/sec     MV max P.1 mmHg  MV mean PG: 3.3 mmHg  MV V2 VTI: 33.1 cm  MVA(VTI): 2.7 cm2  MV dec slope: 745.9 cm/sec2  MV dec time: 0.16 sec  Ao V2 max: 196.5 cm/sec  Ao max P.0 mmHg  Ao  V2 mean: 144.2 cm/sec  Ao mean P.3 mmHg  Ao V2 VTI: 38.5 cm  NATHANAEL(I,D): 2.3 cm2  NATHANAEL(V,D): 2.3 cm2  LV V1 max P.9 mmHg  LV V1 max: 110.8 cm/sec  LV V1 VTI: 21.3 cm  SV(LVOT): 87.7 ml  SI(LVOT): 35.9 ml/m2  PA acc time: 0.12 sec  TR max nilson: 273.0 cm/sec  TR max P.8 mmHg  RVSP(TR): 37.8 mmHg  AV Nilson Ratio (DI): 0.56  NATHANAEL Index (cm2/m2): 0.93  E/E' av.2  Lateral E/e': 14.5  Medial E/e': 25.9  RV S Nilson: 12.4 cm/sec     ______________________________________________________________________________  Report approved by: Renee Osorio MD on 2024 02:59 PM             Recent Labs   Lab 24  0542 24  1233   WBC 9.3 9.7   HGB 10.8* 11.3*   HCT 33.8* 34.3*   MCV 87 87    173     Recent Labs   Lab 24  0733 24  0626 24  0214 24  0806 24  0542 24  1859 24  1233   NA  --  140  --   --  141  --  141   POTASSIUM  --  3.4  --   --  3.4  --  3.7   CHLORIDE  --  98  --   --  100  --  101   CO2  --  30*  --   --  30*  --  26   ANIONGAP  --  12  --   --  11  --  14   * 174* 153*   < > 187*   < > 230*   BUN  --  26.0*  --   --  21.4  --  17.9   CR  --  1.07  --   --  1.05  --  0.96   GFRESTIMATED  --  74  --   --  75  --  84   IGNACIA  --  9.0  --   --  8.9  --  8.7*    < > = values in this interval not displayed.     Recent Labs   Lab 24  1233   NTBNPI 6,991*        This note was completed in part using Dragon voice recognition software. Although reviewed after completion, some word and grammatical errors may occur.

## 2024-12-05 NOTE — PROGRESS NOTES
[facility]  Hospitalist Progress Note   12/05/2024          Assessment and Plan:       Percy Thornton is a 74 year old male with history of hypertension, dyslipidemia, coronary artery disease, atrial fibrillation on warfarin, mild to moderate MR, mild aortic stenosis, suspected cardiac amyloidosis, diabetes mellitus type 2 and arthritis admitted on 12/3/2024 with shortness of breath and cough.     Acute hypoxic respiratory failure, multifactorial  Likely from community-acquired pneumonia, CHF exacerbation, bilateral pleural effusion  Address medical issues as discussed below.  Wean off supplemental nasal oxygen as able to.    Suspect multifocal pneumonia.  Afebrile.  No leukocytosis.  Procalcitonin 0.10.  -COVID-19, influenza A and B and RSV swab negative  -Chest x-ray- A 3.5 cm masslike opacity is noted in the left midlung with ill-defined surrounding opacity noted. There is also mild hazy opacity in the right mid lung.  -CT chest IV contrast  Multifocal, mixed groundglass and consolidative airspace opacities are seen in the lungs suspicious for pneumonia. Multiple pulmonary nodules are also present with one of the largest measuring 2.6 cm in the right upper lobe. These findings may be due to underlying infectious etiology but neoplastic origin cannot be excluded. Small to moderate bilateral pleural effusions.  Multiple enlarged mediastinal and hilar lymph nodes which are nonspecific and may be reactive. Suggest attention on follow-up exam.Interlobular septal thickening seen in the lungs which may be due to underlying infection versus pulmonary edema.  Legionella antigen, Streptococcus pneumonia antigen negative.  --Initiated on IV ceftriaxone, IV azithromycin.  Azithromycin switched to doxycycline given significant drug interactions.  Continue IV ceftriaxone, doxycycline.  Supportive care.  I-S, Acapella.  Follow-up CT chest in 3 months to ensure resolution.      Acute heart failure with reduced  EF.  Elevated troponins, likely demand ischemia in the setting of hypoxia, CHF exacerbation.  Bilateral small-moderate pleural effusion  Suspicion for amyloidosis?  Moderate to severe mitral regurgitation.  Mild aortic stenosis.  Atrial fibrillation on anticoagulation  Coronary artery disease status post PCI of distal LAD, 9/2022.  Hypertension.  Hyperlipidemia.  *Recently diagnosed with atrial fibrillation and started on warfarin; seen by cardiology as outpatient in [place] and outpatient echocardiogram on 10/25/2024 showed EF 52%, LV cavity moderately dilated with moderate concentric hypertrophy, low ventricular mass index was increased concerning for infiltrative cardiomyopathy.  Moderate to severe mitral valve regurgitation, mild aortic stenosis.  There was concern for cardiac amyloidosis.  SPEP positive for free light kappa chain and free lambda light chains.  PTA on Lasix 40 mg oral daily started 1 month back.    -- Presented with shortness of breath, leg swelling.-  proBNP 6916.  Chest x-ray with mild bilateral pulmonary vascular congestion.  Echocardiogram with LVEF of 35 to 40%, moderate to severe MR.  *Troponin 32--36  *EKG- atrial fibrillation, left anterior fascicular block, Q waves in inferior leads and V1-V4, T wave inversion in lateral leads  Telemetry A-fib.  -- Has been receiving diuresis with intravenous Lasix with some improvement in shortness of breath.    -- Continue IV Lasix, switch to 40 mg once daily.  -Continue aspirin.  Continue Zetia and fenofibrate.  Continue PTA nebivolol, lisinopril and nifedipine.  Continue PTA Coumadin.  Pharmacy assisting with dosing.  Cardiology team following, appreciate comanagement.  Telemetry monitoring.  Strict intake output monitoring, daily weights  Follow-up with primary cardiologist after returning back to [place].  Cardiac rehabilitation, recommend home with assistance    Obesity with a BMI of 33.90.  Consider lifestyle modification with diet and  exercise as able to.  Increase all-cause mortality and morbidity.    KATHY on CPAP.  Continue PTA CPAP.    Diabetes mellitus with a hemoglobin A1c of 7.0.  Hold PTA Cynthia Kimball.  Continue insulin sliding scale.  Moderate carbohydrate controlled diet.  Monitor blood sugars, optimize regimen.       Orders Placed This Encounter      Combination Diet Moderate Consistent Carb (60 g CHO per Meal) Diet; Low Saturated Fat Na <2400mg Diet      DVT Prophylaxis: SCDs, ambulate.  Code Status: Full Code  Disposition: Expected discharge in 1 to 2 days pending clinical improvement    Medically Ready for Discharge: Anticipated Tomorrow     Discussed with patient, his wife by the bedside, bedside RN  >51 minutes spent by me on the date of service doing chart review, history, exam, documentation & further activities per the note.      Marychuy Valle MD        Interval History:        Patient lying in bed.  Denies any chest pain or palpitations.  Denies any headache or dizziness.  No nausea vomiting.  Denies any abdominal pain.  Tolerating oral diet.  No blood in the stools or blood in the urine.  Complaining of cough.  On CPAP overnight.  This morning on 1 L nasal cannula, O2 sats in 90s  Telemetry A-fib.  On anticoagulation with Coumadin.  Receiving intravenous diuresis         Physical Exam:        Physical Exam   Temp:  [98  F (36.7  C)-98.5  F (36.9  C)] 98.3  F (36.8  C)  Pulse:  [74-96] 81  Resp:  [18-20] 20  BP: (120-138)/(81-96) 130/96  SpO2:  [86 %-99 %] 92 %    Intake/Output Summary (Last 24 hours) at 12/5/2024 1335  Last data filed at 12/5/2024 1300  Gross per 24 hour   Intake 740 ml   Output 1505 ml   Net -765 ml       Admission Weight: 122.5 kg (270 lb)  Current Weight: 119.7 kg (264 lb)    PHYSICAL EXAM  GENERAL: Patient is in no distress. Alert and oriented.  HEART: Irregular rate and rhythm. S1S2. No murmurs  LUNGS: Bilateral decreased breath sounds, no wheezing or crackles.  Respirations unlabored  ABDOMEN:  Soft, no abdominal tenderness, bowel sounds heard   NEURO: Moving all extremities  EXTREMITIES: Lower extremity pedal edema.   SKIN: Warm, dry. No rash   PSYCHIATRY Cooperative       Medications:        Current Facility-Administered Medications   Medication Dose Route Frequency Provider Last Rate Last Admin    aspirin EC tablet 81 mg  81 mg Oral Daily Gabbie Carr MD   81 mg at 12/05/24 0809    cefTRIAXone (ROCEPHIN) 1 g vial to attach to  mL bag for ADULTS or NS 50 mL bag for PEDS  1 g Intravenous Q24H Gabbie Carr MD   1 g at 12/04/24 1502    cloNIDine (CATAPRES) tablet 0.2 mg  0.2 mg Oral BID Gabbie Carr MD   0.2 mg at 12/05/24 0809    doxazosin (CARDURA) tablet 4 mg  4 mg Oral At Bedtime Gabbie Carr MD   4 mg at 12/04/24 2216    doxycycline (VIBRAMYCIN) 100 mg vial to attach to  mL bag  100 mg Intravenous Q12H Rayna Torres MD   100 mg at 12/05/24 0137    escitalopram (LEXAPRO) tablet 10 mg  10 mg Oral Daily Gabbie Carr MD   10 mg at 12/05/24 0810    ezetimibe (ZETIA) tablet 10 mg  10 mg Oral QPM Gabbie Carr MD   10 mg at 12/04/24 2008    fenofibrate (TRIGLIDE/LOFIBRA) tablet 160 mg  160 mg Oral At Bedtime Gabbie Carr MD   160 mg at 12/04/24 2216    fluticasone (FLONASE) 50 MCG/ACT spray 2 spray  2 spray Both Nostrils Daily Gabbie Carr MD   2 spray at 12/05/24 0811    [START ON 12/6/2024] furosemide (LASIX) tablet 40 mg  40 mg Oral Daily Catalina Macedo, NP        gabapentin (NEURONTIN) tablet 600 mg  600 mg Oral Daily Gabbie Carr MD   600 mg at 12/05/24 0810    insulin aspart (NovoLOG) injection (RAPID ACTING)  1-7 Units Subcutaneous TID AC Gabbie Carr MD   2 Units at 12/05/24 1253    insulin aspart (NovoLOG) injection (RAPID ACTING)  1-5 Units Subcutaneous At Bedtime Gabbie Carr MD        lisinopril (ZESTRIL) tablet 40 mg  40 mg Oral Daily Gabbie Carr MD   40 mg at 12/05/24  0810    nebivolol (BYSTOLIC) tablet 5 mg  5 mg Oral At Bedtime Gabbie Carr MD   5 mg at 12/04/24 2216    NIFEdipine ER OSMOTIC (PROCARDIA XL) 24 hr tablet 60 mg  60 mg Oral Daily Gabbie Carr MD   60 mg at 12/05/24 0810    olopatadine (PATANOL) 0.1 % ophthalmic solution 1 drop  1 drop Both Eyes Daily Gabbie Carr MD   1 drop at 12/05/24 0811    sodium chloride (PF) 0.9% PF flush 3 mL  3 mL Intracatheter Q8H Gabbie Carr MD   3 mL at 12/05/24 0137    traZODone (DESYREL) tablet 50 mg  50 mg Oral QPM Gabbie Carr MD   50 mg at 12/04/24 2216    warfarin ANTICOAGULANT (COUMADIN) tablet 5 mg  5 mg Oral ONCE at 18:00 Gabbie Carr MD        Warfarin Dose Required Daily - Pharmacist Managed  1 each Oral See Admin Instructions Gabbie Carr MD         Current Facility-Administered Medications   Medication Dose Route Frequency Provider Last Rate Last Admin    - MEDICATION INSTRUCTIONS -   Does not apply DOES NOT GO TO Gabbie Gregory MD        acetaminophen (TYLENOL) tablet 650 mg  650 mg Oral Q4H PRN Gabbie Carr MD        Or    acetaminophen (TYLENOL) Suppository 650 mg  650 mg Rectal Q4H PRN Gabbie Carr MD        calcium carbonate (TUMS) chewable tablet 1,000 mg  1,000 mg Oral 4x Daily PRN Gabbie Carr MD        carboxymethylcellulose PF (REFRESH PLUS) 0.5 % ophthalmic solution 1 drop  1 drop Both Eyes Q1H PRN Gabbie Carr MD        Continuing ACE inhibitor/ARB/ARNI from home medication list OR ACE inhibitor/ARB/ARNI order already placed during this visit   Does not apply DOES NOT GO TO Gabbie Gregory MD        Continuing beta blocker from home medication list OR beta blocker order already placed during this visit   Does not apply DOES NOT GO TO Gabbie Gregory MD        glucose gel 15-30 g  15-30 g Oral Q15 Min PRN Gabbie Carr MD        Or    dextrose 50 % injection 25-50 mL  25-50 mL  Intravenous Q15 Min PRGabbie Gong MD        Or    glucagon injection 1 mg  1 mg Subcutaneous Q15 Min Gabbie Ybarra MD        guaiFENesin (ROBITUSSIN) 20 mg/mL solution 200 mg  200 mg Oral Q4H PRN Gabbie Carr MD        lidocaine (LMX4) cream   Topical Q1H PRGabbie Gong MD        lidocaine 1 % 0.1-1 mL  0.1-1 mL Other Q1H PRN Gabbie Carr MD        metoprolol (LOPRESSOR) injection 2.5 mg  2.5 mg Intravenous Q4H PRN Gabbie Carr MD        ondansetron (ZOFRAN ODT) ODT tab 4 mg  4 mg Oral Q6H PRN Gabbie Carr MD        Or    ondansetron (ZOFRAN) injection 4 mg  4 mg Intravenous Q6H PRGabbie Gong MD        Patient is already receiving anticoagulation with heparin, enoxaparin (LOVENOX), warfarin (COUMADIN)  or other anticoagulant medication   Does not apply Continuous PRN Gabbie Carr MD        senna-docusate (SENOKOT-S/PERICOLACE) 8.6-50 MG per tablet 1 tablet  1 tablet Oral BID PRGabbie Gong MD        Or    senna-docusate (SENOKOT-S/PERICOLACE) 8.6-50 MG per tablet 2 tablet  2 tablet Oral BID Gabbie Ybarra MD        sodium chloride (PF) 0.9% PF flush 3 mL  3 mL Intracatheter q1 min Gabbie Ybarra MD                Data:      All new lab and imaging data was reviewed.

## 2024-12-05 NOTE — CONSULTS
Care Management Initial Consult    General Information  Assessment completed with: Patient, Spouse or significant other, patient and wife  Type of CM/SW Visit: Initial Assessment    Primary Care Provider verified and updated as needed: Yes   Readmission within the last 30 days:        Reason for Consult: discharge planning  Advance Care Planning:       General Information Comments: Needs cards follow up in home [facility]    Communication Assessment  Patient's communication style: spoken language (English or Bilingual)    Hearing Difficulty or Deaf: no   Wear Glasses or Blind: no    Cognitive  Cognitive/Neuro/Behavioral: WDL  Level of Consciousness: alert     Orientation: oriented x 4  Mood/Behavior: calm, cooperative  Best Language: 0 - No aphasia  Speech: clear, spontaneous    Living Environment:   People in home:       Current living Arrangements: house      Able to return to prior arrangements: yes       Family/Social Support:  Care provided by: self  Provides care for: no one  Marital Status:   Support system: Wife, Children          Description of Support System: Supportive, Involved         Current Resources:   Patient receiving home care services: No        Community Resources: None  Equipment currently used at home: cane, straight  Supplies currently used at home:      Employment/Financial:  Employment Status: retired        Financial Concerns:             Does the patient's insurance plan have a 3 day qualifying hospital stay waiver?  No    Lifestyle & Psychosocial Needs:  Social Drivers of Health     Food Insecurity: Low Risk  (12/4/2024)    Food Insecurity     Within the past 12 months, did you worry that your food would run out before you got money to buy more?: No     Within the past 12 months, did the food you bought just not last and you didn t have money to get more?: No   Depression: Not at risk (5/29/2024)    Received from [facility]    PHQ-2     Retired PHQ-9: Brief  Depression Severity Measure Score: 0   Housing Stability: Low Risk  (12/4/2024)    Housing Stability     Do you have housing? : Yes     Are you worried about losing your housing?: No   Tobacco Use: Low Risk  (11/11/2024)    Received from [facility]    Patient History     Smoking Tobacco Use: Never     Smokeless Tobacco Use: Never     Passive Exposure: Never   Financial Resource Strain: Low Risk  (12/4/2024)    Financial Resource Strain     Within the past 12 months, have you or your family members you live with been unable to get utilities (heat, electricity) when it was really needed?: No   Alcohol Use: Alcohol Misuse (2/10/2021)    Received from [facility]    AUDIT-C     Frequency of Alcohol Consumption: 4 or more times a week     Average Number of Drinks: 3 or 4     Frequency of Binge Drinking: Not on file   Transportation Needs: Low Risk  (12/4/2024)    Transportation Needs     Within the past 12 months, has lack of transportation kept you from medical appointments, getting your medicines, non-medical meetings or appointments, work, or from getting things that you need?: No   Physical Activity: Not on file   Interpersonal Safety: Low Risk  (12/4/2024)    Interpersonal Safety     Do you feel physically and emotionally safe where you currently live?: Yes     Within the past 12 months, have you been hit, slapped, kicked or otherwise physically hurt by someone?: No     Within the past 12 months, have you been humiliated or emotionally abused in other ways by your partner or ex-partner?: No   Stress: Not on file   Social Connections: Feeling Socially Integrated (4/5/2024)    Received from [facility]    OASIS : Social Isolation     Frequency of experiencing loneliness or isolation: Never   Health Literacy: Adequate Health Literacy (4/5/2024)    Received from [facility]    OASIS : Health Literacy     Frequency of needing help to read materials from  doctor or pharmacy: Never       Functional Status:  Prior to admission patient needed assistance:   Dependent ADLs:: Independent  Dependent IADLs:: Independent       Mental Health Status:  Mental Health Status: No Current Concerns       Chemical Dependency Status:                Values/Beliefs:  Spiritual, Cultural Beliefs, Scientologist Practices, Values that affect care:                 Discussed  Partnership in Safe Discharge Planning  document with patient/family: No    Additional Information:  Per APRN, patient will need follow up in his home state of [place].  He is estableished with Dr. Danuta Dwyer at [facility] and will need to follow up within the month of December.  Writer placed call to Dr. Dwyer's clinic for scheduling, and the  will have a team member call patient back to arrange the appointment within the December time fram.  Writer went to patients rom and met with patient and wife to let them know to expect the call.    On day of discharge, please fax the discharge order, including the cardiac referral to:  Attn; Dr. Danuta Dwyer fax # [phone].  Phone number for Dr Dwyer's clinic is [phone]        Next Steps:     Cheryl Smith RN

## 2024-12-06 ENCOUNTER — APPOINTMENT (OUTPATIENT)
Dept: OCCUPATIONAL THERAPY | Facility: CLINIC | Age: 74
DRG: 193 | End: 2024-12-06
Payer: COMMERCIAL

## 2024-12-06 LAB
BACTERIA SPT CULT: ABNORMAL
GLUCOSE BLDC GLUCOMTR-MCNC: 150 MG/DL (ref 70–99)
GLUCOSE BLDC GLUCOMTR-MCNC: 159 MG/DL (ref 70–99)
GLUCOSE BLDC GLUCOMTR-MCNC: 163 MG/DL (ref 70–99)
GLUCOSE BLDC GLUCOMTR-MCNC: 165 MG/DL (ref 70–99)
GLUCOSE BLDC GLUCOMTR-MCNC: 168 MG/DL (ref 70–99)
GRAM STAIN RESULT: ABNORMAL
INR PPP: 2.35 (ref 0.85–1.15)

## 2024-12-06 PROCEDURE — 97110 THERAPEUTIC EXERCISES: CPT | Mod: GO | Performed by: OCCUPATIONAL THERAPIST

## 2024-12-06 PROCEDURE — 250N000011 HC RX IP 250 OP 636: Performed by: INTERNAL MEDICINE

## 2024-12-06 PROCEDURE — 250N000013 HC RX MED GY IP 250 OP 250 PS 637: Performed by: INTERNAL MEDICINE

## 2024-12-06 PROCEDURE — 36415 COLL VENOUS BLD VENIPUNCTURE: CPT | Performed by: INTERNAL MEDICINE

## 2024-12-06 PROCEDURE — 250N000013 HC RX MED GY IP 250 OP 250 PS 637: Performed by: HOSPITALIST

## 2024-12-06 PROCEDURE — 210N000002 HC R&B HEART CARE

## 2024-12-06 PROCEDURE — 99232 SBSQ HOSP IP/OBS MODERATE 35: CPT | Performed by: HOSPITALIST

## 2024-12-06 PROCEDURE — 250N000013 HC RX MED GY IP 250 OP 250 PS 637: Performed by: NURSE PRACTITIONER

## 2024-12-06 PROCEDURE — 85610 PROTHROMBIN TIME: CPT | Performed by: INTERNAL MEDICINE

## 2024-12-06 RX ORDER — FUROSEMIDE 40 MG/1
40 TABLET ORAL ONCE
Status: COMPLETED | OUTPATIENT
Start: 2024-12-06 | End: 2024-12-06

## 2024-12-06 RX ORDER — WARFARIN SODIUM 5 MG/1
5 TABLET ORAL
Status: COMPLETED | OUTPATIENT
Start: 2024-12-06 | End: 2024-12-06

## 2024-12-06 RX ADMIN — CLONIDINE HYDROCHLORIDE 0.2 MG: 0.1 TABLET ORAL at 08:24

## 2024-12-06 RX ADMIN — ASPIRIN 81 MG: 81 TABLET, COATED ORAL at 08:25

## 2024-12-06 RX ADMIN — INSULIN ASPART 1 UNITS: 100 INJECTION, SOLUTION INTRAVENOUS; SUBCUTANEOUS at 13:10

## 2024-12-06 RX ADMIN — NIFEDIPINE 60 MG: 60 TABLET, FILM COATED, EXTENDED RELEASE ORAL at 08:25

## 2024-12-06 RX ADMIN — FLUTICASONE PROPIONATE 2 SPRAY: 50 SPRAY, METERED NASAL at 08:25

## 2024-12-06 RX ADMIN — FUROSEMIDE 40 MG: 40 TABLET ORAL at 08:25

## 2024-12-06 RX ADMIN — DOXYCYCLINE 100 MG: 100 INJECTION, POWDER, LYOPHILIZED, FOR SOLUTION INTRAVENOUS at 02:40

## 2024-12-06 RX ADMIN — OLOPATADINE HYDROCHLORIDE 1 DROP: 1 SOLUTION/ DROPS OPHTHALMIC at 08:25

## 2024-12-06 RX ADMIN — LISINOPRIL 40 MG: 40 TABLET ORAL at 08:25

## 2024-12-06 RX ADMIN — TRAZODONE HYDROCHLORIDE 50 MG: 50 TABLET ORAL at 22:12

## 2024-12-06 RX ADMIN — INSULIN ASPART 1 UNITS: 100 INJECTION, SOLUTION INTRAVENOUS; SUBCUTANEOUS at 18:05

## 2024-12-06 RX ADMIN — FENOFIBRATE 160 MG: 160 TABLET ORAL at 22:12

## 2024-12-06 RX ADMIN — GABAPENTIN 600 MG: 600 TABLET, FILM COATED ORAL at 08:24

## 2024-12-06 RX ADMIN — CLONIDINE HYDROCHLORIDE 0.2 MG: 0.1 TABLET ORAL at 22:11

## 2024-12-06 RX ADMIN — EZETIMIBE 10 MG: 10 TABLET ORAL at 20:09

## 2024-12-06 RX ADMIN — INSULIN ASPART 1 UNITS: 100 INJECTION, SOLUTION INTRAVENOUS; SUBCUTANEOUS at 08:23

## 2024-12-06 RX ADMIN — ESCITALOPRAM OXALATE 10 MG: 10 TABLET ORAL at 08:25

## 2024-12-06 RX ADMIN — WARFARIN SODIUM 5 MG: 5 TABLET ORAL at 18:05

## 2024-12-06 RX ADMIN — DOXYCYCLINE 100 MG: 100 INJECTION, POWDER, LYOPHILIZED, FOR SOLUTION INTRAVENOUS at 14:41

## 2024-12-06 RX ADMIN — DOXAZOSIN 4 MG: 4 TABLET ORAL at 22:11

## 2024-12-06 RX ADMIN — CEFTRIAXONE SODIUM 1 G: 1 INJECTION, POWDER, FOR SOLUTION INTRAMUSCULAR; INTRAVENOUS at 14:06

## 2024-12-06 RX ADMIN — NEBIVOLOL 5 MG: 5 TABLET ORAL at 22:11

## 2024-12-06 RX ADMIN — FUROSEMIDE 40 MG: 40 TABLET ORAL at 16:20

## 2024-12-06 ASSESSMENT — ACTIVITIES OF DAILY LIVING (ADL)
ADLS_ACUITY_SCORE: 32
ADLS_ACUITY_SCORE: 33
ADLS_ACUITY_SCORE: 32
ADLS_ACUITY_SCORE: 33
ADLS_ACUITY_SCORE: 32

## 2024-12-06 NOTE — PLAN OF CARE
Goal Outcome Evaluation:      Plan of Care Reviewed With: patient  Pt alert, SBA. Amb around segura , tolerating well. Mild BLANDON -improving. Mostly dry cough. LS clear, on RA.IV antibiotics. IV lasix. Possible discharge in 1-2 days

## 2024-12-06 NOTE — PROGRESS NOTES
[facility]  Hospitalist Progress Note   12/06/2024          Assessment and Plan:       Percy Thornton is a 74 year old male with history of hypertension, dyslipidemia, coronary artery disease, atrial fibrillation on warfarin, mild to moderate MR, mild aortic stenosis, suspected cardiac amyloidosis, diabetes mellitus type 2 and arthritis admitted on 12/3/2024 with shortness of breath and cough.     Acute hypoxic respiratory failure, multifactorial.  Likely from community-acquired pneumonia, CHF exacerbation, bilateral pleural effusion.  -- Continues to have O2 sats dropped to 85% on room air with minimal ambulation.  Address medical issues as discussed below.  Wean off supplemental nasal oxygen as able to.    Suspect multifocal pneumonia.  Afebrile.  No leukocytosis.  Procalcitonin 0.10.  -COVID-19, influenza A and B and RSV swab negative  -Chest x-ray- A 3.5 cm masslike opacity is noted in the left midlung with ill-defined surrounding opacity noted. There is also mild hazy opacity in the right mid lung.  -CT chest IV contrast  Multifocal, mixed groundglass and consolidative airspace opacities are seen in the lungs suspicious for pneumonia. Multiple pulmonary nodules are also present with one of the largest measuring 2.6 cm in the right upper lobe. These findings may be due to underlying infectious etiology but neoplastic origin cannot be excluded. Small to moderate bilateral pleural effusions.  Multiple enlarged mediastinal and hilar lymph nodes which are nonspecific and may be reactive. Suggest attention on follow-up exam.Interlobular septal thickening seen in the lungs which may be due to underlying infection versus pulmonary edema.  Legionella antigen, Streptococcus pneumonia antigen negative.  --Initiated on IV ceftriaxone, IV azithromycin.  Azithromycin switched to doxycycline given significant drug interactions.  Continue IV ceftriaxone, doxycycline.  Trend WBC count, fever curve.  Follow  CRP.  Supportive care.  I-S, Acapella.  Follow-up CT chest in 3 months to ensure resolution.    Acute heart failure with reduced EF.  Elevated troponins, likely demand ischemia in the setting of hypoxia, CHF exacerbation.  Bilateral small-moderate pleural effusion  Suspicion for amyloidosis?  Moderate to severe mitral regurgitation.  Mild aortic stenosis.  Atrial fibrillation on anticoagulation  Coronary artery disease status post PCI of distal LAD, 9/2022.  Hypertension.  Hyperlipidemia.  *Recently diagnosed with atrial fibrillation and started on warfarin; seen by cardiology as outpatient in [place] and outpatient echocardiogram on 10/25/2024 showed EF 52%, LV cavity moderately dilated with moderate concentric hypertrophy, low ventricular mass index was increased concerning for infiltrative cardiomyopathy.  Moderate to severe mitral valve regurgitation, mild aortic stenosis.  There was concern for cardiac amyloidosis.  SPEP positive for free light kappa chain and free lambda light chains.  PTA on Lasix 40 mg oral daily started 1 month back.    -- Presented with shortness of breath, leg swelling.-  proBNP 6916.  Chest x-ray with mild bilateral pulmonary vascular congestion.  Echocardiogram with LVEF of 35 to 40%, moderate to severe MR.  *Troponin 32--36  *EKG- atrial fibrillation, left anterior fascicular block, Q waves in inferior leads and V1-V4, T wave inversion in lateral leads  Telemetry A-fib.  -- Has been receiving diuresis with intravenous Lasix with some improvement in shortness of breath.    -- Was on intravenous Lasix, switch to 40 mg oral Lasix twice daily on 12/6.  Monitor volume status IPSS 12/7  -Continue aspirin.  Continue Zetia and fenofibrate.  Continue PTA nebivolol, lisinopril and nifedipine.  Continue PTA Coumadin.  Pharmacy assisting with dosing.  Urology signed off, appreciate comanagement  Telemetry monitoring.  Strict intake output monitoring, daily weights  Follow-up with primary  cardiologist after returning back to [place].  Cardiac rehabilitation, recommend home with assistance.    Diabetes mellitus with a hemoglobin A1c of 7.0.  Hold PTA Cynthia Kimball.  Continue insulin sliding scale.  Moderate carbohydrate controlled diet.  Monitor blood sugars, optimize regimen.    Obesity with a BMI of 33.90.  Consider lifestyle modification with diet and exercise as able to.  Increase all-cause mortality and morbidity.    KATHY on CPAP.  Continue PTA CPAP.    Orders Placed This Encounter      Combination Diet Moderate Consistent Carb (60 g CHO per Meal) Diet; Low Saturated Fat Na <2400mg Diet      DVT Prophylaxis: SCDs, ambulate.  Code Status: Full Code  Disposition: Expected discharge in 1 to 2 days pending clinical improvement    Medically Ready for Discharge: Anticipated Tomorrow     Discussed with patient, his wife by the bedside, bedside RN  >35 minutes spent by me on the date of service doing chart review, history, exam, documentation & further activities per the note.      Marychuy Valle MD        Interval History:        Patient lying in bed.  Some improvement in shortness of breath.    This morning O2 sats dropped to 85% with minimal ambulation.  Denies any chest pain or palpitations.  Denies any headache or dizziness.  No nausea vomiting.  Denies any abdominal pain.  Tolerating oral diet.  No blood in the stools or blood in the urine.   Telemetry A-fib.         Physical Exam:        Physical Exam   Temp:  [98  F (36.7  C)-98.6  F (37  C)] 98.6  F (37  C)  Pulse:  [] 74  Resp:  [16-20] 16  BP: (131-154)/() 141/88  SpO2:  [85 %-98 %] 95 %    Intake/Output Summary (Last 24 hours) at 12/5/2024 1335  Last data filed at 12/5/2024 1300  Gross per 24 hour   Intake 740 ml   Output 1505 ml   Net -765 ml       Admission Weight: 122.5 kg (270 lb)  Current Weight: 119.7 kg (264 lb)    PHYSICAL EXAM  GENERAL: Patient is in no distress. Alert and oriented.  HEART: Irregular rate and rhythm.  S1S2. No murmurs  LUNGS: Bilateral decreased breath sounds, coarse breath sounds  Respirations unlabored  NEURO: Moving all extremities  EXTREMITIES: Lower extremity pedal edema ++   SKIN: Warm, dry. No rash   PSYCHIATRY Cooperative       Medications:        Current Facility-Administered Medications   Medication Dose Route Frequency Provider Last Rate Last Admin    aspirin EC tablet 81 mg  81 mg Oral Daily Gabbie Carr MD   81 mg at 12/06/24 0825    cefTRIAXone (ROCEPHIN) 1 g vial to attach to  mL bag for ADULTS or NS 50 mL bag for PEDS  1 g Intravenous Q24H Gabbie Carr MD   1 g at 12/06/24 1406    cloNIDine (CATAPRES) tablet 0.2 mg  0.2 mg Oral BID Gabbie Carr MD   0.2 mg at 12/06/24 0824    doxazosin (CARDURA) tablet 4 mg  4 mg Oral At Bedtime Gabbie Carr MD   4 mg at 12/05/24 2140    doxycycline (VIBRAMYCIN) 100 mg vial to attach to  mL bag  100 mg Intravenous Q12H Rayna Torres MD 50 mL/hr at 12/05/24 1418 100 mg at 12/06/24 1441    escitalopram (LEXAPRO) tablet 10 mg  10 mg Oral Daily Gabbie Carr MD   10 mg at 12/06/24 0825    ezetimibe (ZETIA) tablet 10 mg  10 mg Oral QPM Gabbie Carr MD   10 mg at 12/05/24 2139    fenofibrate (TRIGLIDE/LOFIBRA) tablet 160 mg  160 mg Oral At Bedtime Gabbie Carr MD   160 mg at 12/05/24 2139    fluticasone (FLONASE) 50 MCG/ACT spray 2 spray  2 spray Both Nostrils Daily Gabbie Carr MD   2 spray at 12/06/24 0825    furosemide (LASIX) tablet 40 mg  40 mg Oral Daily Catalina Macedo NP   40 mg at 12/06/24 0825    gabapentin (NEURONTIN) tablet 600 mg  600 mg Oral Daily Gabbie Carr MD   600 mg at 12/06/24 0824    insulin aspart (NovoLOG) injection (RAPID ACTING)  1-7 Units Subcutaneous TID AC Gabbie Carr MD   1 Units at 12/06/24 1310    insulin aspart (NovoLOG) injection (RAPID ACTING)  1-5 Units Subcutaneous At Bedtime Gabbie Carr MD        lisinopril  (ZESTRIL) tablet 40 mg  40 mg Oral Daily Gabbie Carr MD   40 mg at 12/06/24 0825    nebivolol (BYSTOLIC) tablet 5 mg  5 mg Oral At Bedtime Gabbie Carr MD   5 mg at 12/05/24 2140    NIFEdipine ER OSMOTIC (PROCARDIA XL) 24 hr tablet 60 mg  60 mg Oral Daily Gabbie Carr MD   60 mg at 12/06/24 0825    olopatadine (PATANOL) 0.1 % ophthalmic solution 1 drop  1 drop Both Eyes Daily Gabbie Carr MD   1 drop at 12/06/24 0825    sodium chloride (PF) 0.9% PF flush 3 mL  3 mL Intracatheter Q8H Gabbie Carr MD   3 mL at 12/06/24 1441    traZODone (DESYREL) tablet 50 mg  50 mg Oral QPM Gabbie Carr MD   50 mg at 12/05/24 2139    warfarin ANTICOAGULANT (COUMADIN) tablet 5 mg  5 mg Oral ONCE at 18:00 Marychuy Valle MD        Warfarin Dose Required Daily - Pharmacist Managed  1 each Oral See Admin Instructions Gabbie Carr MD         Current Facility-Administered Medications   Medication Dose Route Frequency Provider Last Rate Last Admin    - MEDICATION INSTRUCTIONS -   Does not apply DOES NOT GO TO Gabbie Gregory MD        acetaminophen (TYLENOL) tablet 650 mg  650 mg Oral Q4H PRN Gabbie Carr MD        Or    acetaminophen (TYLENOL) Suppository 650 mg  650 mg Rectal Q4H PRN Gabbie Carr MD        calcium carbonate (TUMS) chewable tablet 1,000 mg  1,000 mg Oral 4x Daily PRN Gabbie Carr MD        carboxymethylcellulose PF (REFRESH PLUS) 0.5 % ophthalmic solution 1 drop  1 drop Both Eyes Q1H PRN Gabbie Carr MD        Continuing ACE inhibitor/ARB/ARNI from home medication list OR ACE inhibitor/ARB/ARNI order already placed during this visit   Does not apply DOES NOT GO TO Gabbie Gregory MD        Continuing beta blocker from home medication list OR beta blocker order already placed during this visit   Does not apply DOES NOT GO TO Gabbie Gregory MD        glucose gel 15-30 g  15-30 g Oral Q15 Min  Gabbie Ybarra MD        Or    dextrose 50 % injection 25-50 mL  25-50 mL Intravenous Q15 Min Gabbie Ybarra MD        Or    glucagon injection 1 mg  1 mg Subcutaneous Q15 Min Gabbie Ybarra MD        guaiFENesin (ROBITUSSIN) 20 mg/mL solution 200 mg  200 mg Oral Q4H PRN Gabbie Carr MD        lidocaine (LMX4) cream   Topical Q1H PRGabbie Gong MD        lidocaine 1 % 0.1-1 mL  0.1-1 mL Other Q1H PRN Gabbie Carr MD        metoprolol (LOPRESSOR) injection 2.5 mg  2.5 mg Intravenous Q4H PRGabbie Gong MD        ondansetron (ZOFRAN ODT) ODT tab 4 mg  4 mg Oral Q6H PRN Gabbie Carr MD        Or    ondansetron (ZOFRAN) injection 4 mg  4 mg Intravenous Q6H PRGabbie Gong MD        Patient is already receiving anticoagulation with heparin, enoxaparin (LOVENOX), warfarin (COUMADIN)  or other anticoagulant medication   Does not apply Continuous PRN Gabbie Carr MD        senna-docusate (SENOKOT-S/PERICOLACE) 8.6-50 MG per tablet 1 tablet  1 tablet Oral BID PRGabbie Gong MD        Or    senna-docusate (SENOKOT-S/PERICOLACE) 8.6-50 MG per tablet 2 tablet  2 tablet Oral BID Gabbie Ybarra MD        sodium chloride (PF) 0.9% PF flush 3 mL  3 mL Intracatheter q1 min Gabbie Ybarra MD                Data:      All new lab and imaging data was reviewed.

## 2024-12-06 NOTE — PLAN OF CARE
VSS on RA. Tele afib CVR. A&O x4. Denies pain. Lungs clear. Infrequent cough. Ambulated in hallway with SBA. Up independently in room. Patient hoping to discharge today.

## 2024-12-07 ENCOUNTER — APPOINTMENT (OUTPATIENT)
Dept: OCCUPATIONAL THERAPY | Facility: CLINIC | Age: 74
DRG: 193 | End: 2024-12-07
Payer: COMMERCIAL

## 2024-12-07 LAB
ANION GAP SERPL CALCULATED.3IONS-SCNC: 10 MMOL/L (ref 7–15)
BUN SERPL-MCNC: 21.6 MG/DL (ref 8–23)
CALCIUM SERPL-MCNC: 9.1 MG/DL (ref 8.8–10.4)
CHLORIDE SERPL-SCNC: 103 MMOL/L (ref 98–107)
CREAT SERPL-MCNC: 0.96 MG/DL (ref 0.67–1.17)
CRP SERPL-MCNC: 34.18 MG/L
EGFRCR SERPLBLD CKD-EPI 2021: 84 ML/MIN/1.73M2
GLUCOSE BLDC GLUCOMTR-MCNC: 141 MG/DL (ref 70–99)
GLUCOSE BLDC GLUCOMTR-MCNC: 145 MG/DL (ref 70–99)
GLUCOSE BLDC GLUCOMTR-MCNC: 182 MG/DL (ref 70–99)
GLUCOSE BLDC GLUCOMTR-MCNC: 193 MG/DL (ref 70–99)
GLUCOSE BLDC GLUCOMTR-MCNC: 226 MG/DL (ref 70–99)
GLUCOSE SERPL-MCNC: 183 MG/DL (ref 70–99)
HCO3 SERPL-SCNC: 29 MMOL/L (ref 22–29)
HGB BLD-MCNC: 11 G/DL (ref 13.3–17.7)
INR PPP: 2.33 (ref 0.85–1.15)
POTASSIUM SERPL-SCNC: 3.5 MMOL/L (ref 3.4–5.3)
SODIUM SERPL-SCNC: 142 MMOL/L (ref 135–145)

## 2024-12-07 PROCEDURE — 85018 HEMOGLOBIN: CPT | Performed by: HOSPITALIST

## 2024-12-07 PROCEDURE — 250N000013 HC RX MED GY IP 250 OP 250 PS 637: Performed by: NURSE PRACTITIONER

## 2024-12-07 PROCEDURE — 80048 BASIC METABOLIC PNL TOTAL CA: CPT | Performed by: HOSPITALIST

## 2024-12-07 PROCEDURE — 210N000002 HC R&B HEART CARE

## 2024-12-07 PROCEDURE — 86140 C-REACTIVE PROTEIN: CPT | Performed by: HOSPITALIST

## 2024-12-07 PROCEDURE — 250N000013 HC RX MED GY IP 250 OP 250 PS 637: Performed by: HOSPITALIST

## 2024-12-07 PROCEDURE — 99232 SBSQ HOSP IP/OBS MODERATE 35: CPT | Performed by: HOSPITALIST

## 2024-12-07 PROCEDURE — 250N000011 HC RX IP 250 OP 636: Performed by: INTERNAL MEDICINE

## 2024-12-07 PROCEDURE — 97110 THERAPEUTIC EXERCISES: CPT | Mod: GO

## 2024-12-07 PROCEDURE — 94762 N-INVAS EAR/PLS OXIMTRY CONT: CPT

## 2024-12-07 PROCEDURE — 250N000013 HC RX MED GY IP 250 OP 250 PS 637: Performed by: INTERNAL MEDICINE

## 2024-12-07 PROCEDURE — 36415 COLL VENOUS BLD VENIPUNCTURE: CPT | Performed by: HOSPITALIST

## 2024-12-07 PROCEDURE — 85610 PROTHROMBIN TIME: CPT | Performed by: INTERNAL MEDICINE

## 2024-12-07 RX ORDER — FUROSEMIDE 40 MG/1
40 TABLET ORAL ONCE
Status: COMPLETED | OUTPATIENT
Start: 2024-12-07 | End: 2024-12-07

## 2024-12-07 RX ORDER — WARFARIN SODIUM 7.5 MG/1
7.5 TABLET ORAL
Status: COMPLETED | OUTPATIENT
Start: 2024-12-07 | End: 2024-12-07

## 2024-12-07 RX ORDER — POTASSIUM CHLORIDE 1.5 G/1.58G
40 POWDER, FOR SOLUTION ORAL ONCE
Status: COMPLETED | OUTPATIENT
Start: 2024-12-07 | End: 2024-12-07

## 2024-12-07 RX ADMIN — FLUTICASONE PROPIONATE 2 SPRAY: 50 SPRAY, METERED NASAL at 09:26

## 2024-12-07 RX ADMIN — FENOFIBRATE 160 MG: 160 TABLET ORAL at 21:08

## 2024-12-07 RX ADMIN — ESCITALOPRAM OXALATE 10 MG: 10 TABLET ORAL at 09:21

## 2024-12-07 RX ADMIN — CLONIDINE HYDROCHLORIDE 0.2 MG: 0.1 TABLET ORAL at 21:08

## 2024-12-07 RX ADMIN — NIFEDIPINE 60 MG: 60 TABLET, FILM COATED, EXTENDED RELEASE ORAL at 09:25

## 2024-12-07 RX ADMIN — LISINOPRIL 40 MG: 40 TABLET ORAL at 09:20

## 2024-12-07 RX ADMIN — ASPIRIN 81 MG: 81 TABLET, COATED ORAL at 09:20

## 2024-12-07 RX ADMIN — POTASSIUM CHLORIDE 40 MEQ: 1.5 POWDER, FOR SOLUTION ORAL at 15:48

## 2024-12-07 RX ADMIN — DOXYCYCLINE 100 MG: 100 INJECTION, POWDER, LYOPHILIZED, FOR SOLUTION INTRAVENOUS at 14:20

## 2024-12-07 RX ADMIN — CLONIDINE HYDROCHLORIDE 0.2 MG: 0.1 TABLET ORAL at 09:21

## 2024-12-07 RX ADMIN — TRAZODONE HYDROCHLORIDE 50 MG: 50 TABLET ORAL at 21:08

## 2024-12-07 RX ADMIN — EZETIMIBE 10 MG: 10 TABLET ORAL at 21:08

## 2024-12-07 RX ADMIN — INSULIN ASPART 1 UNITS: 100 INJECTION, SOLUTION INTRAVENOUS; SUBCUTANEOUS at 18:02

## 2024-12-07 RX ADMIN — GABAPENTIN 600 MG: 600 TABLET, FILM COATED ORAL at 09:20

## 2024-12-07 RX ADMIN — OLOPATADINE HYDROCHLORIDE 1 DROP: 1 SOLUTION/ DROPS OPHTHALMIC at 09:26

## 2024-12-07 RX ADMIN — FUROSEMIDE 40 MG: 40 TABLET ORAL at 09:21

## 2024-12-07 RX ADMIN — NEBIVOLOL 5 MG: 5 TABLET ORAL at 21:08

## 2024-12-07 RX ADMIN — FUROSEMIDE 40 MG: 40 TABLET ORAL at 15:48

## 2024-12-07 RX ADMIN — WARFARIN SODIUM 7.5 MG: 7.5 TABLET ORAL at 18:05

## 2024-12-07 RX ADMIN — INSULIN ASPART 2 UNITS: 100 INJECTION, SOLUTION INTRAVENOUS; SUBCUTANEOUS at 13:53

## 2024-12-07 RX ADMIN — DOXAZOSIN 4 MG: 4 TABLET ORAL at 21:09

## 2024-12-07 RX ADMIN — CEFTRIAXONE SODIUM 1 G: 1 INJECTION, POWDER, FOR SOLUTION INTRAMUSCULAR; INTRAVENOUS at 13:44

## 2024-12-07 RX ADMIN — DOXYCYCLINE 100 MG: 100 INJECTION, POWDER, LYOPHILIZED, FOR SOLUTION INTRAVENOUS at 03:13

## 2024-12-07 ASSESSMENT — ACTIVITIES OF DAILY LIVING (ADL)
ADLS_ACUITY_SCORE: 34
ADLS_ACUITY_SCORE: 34
ADLS_ACUITY_SCORE: 32
ADLS_ACUITY_SCORE: 32
ADLS_ACUITY_SCORE: 34
ADLS_ACUITY_SCORE: 32
ADLS_ACUITY_SCORE: 34
ADLS_ACUITY_SCORE: 34
ADLS_ACUITY_SCORE: 32
ADLS_ACUITY_SCORE: 32
ADLS_ACUITY_SCORE: 34
ADLS_ACUITY_SCORE: 32
ADLS_ACUITY_SCORE: 34
ADLS_ACUITY_SCORE: 32
ADLS_ACUITY_SCORE: 34
ADLS_ACUITY_SCORE: 32

## 2024-12-07 NOTE — CARE PLAN
12/07/24 1000   Home Oxygen Assessment (RN/RT ONLY)   Does patient have oxygen at home? No   1. SpO2 on room air at rest while awake 95       Oxygen LPM at rest 0   3. SpO2 on room air during Activity/with exercise 85   4. SpO2 with oxygen during activity/with exercise 90       Oxygen LPM during activity/with exercise 1   Does patient qualify for Home O2? Yes

## 2024-12-07 NOTE — PROGRESS NOTES
[facility]  Hospitalist Progress Note   12/07/2024          Assessment and Plan:       Percy Thornton is a 74 year old male with history of hypertension, dyslipidemia, coronary artery disease, atrial fibrillation on warfarin, mild to moderate MR, mild aortic stenosis, suspected cardiac amyloidosis, diabetes mellitus type 2 and arthritis admitted on 12/3/2024 with shortness of breath and cough.     Acute hypoxic respiratory failure, multifactorial.  Likely from community-acquired pneumonia, CHF exacerbation, bilateral pleural effusion.  -- Continues to have O2 sats dropped to 85% on room air with minimal ambulation.  Address medical issues as discussed below.  -Patient from [place], plans to drive back likely Monday or Tuesday.  Will wean off oxygen prior to discharge.    Multifocal pneumonia.  Multiple pulmonary nodules, enlarged mediastinal and hilar lymph nodes.  Patient from [place], drove to [place] to visit family.  Presented with shortness of breath, and productive cough  -Afebrile.  No leukocytosis.  Procalcitonin 0.10.  .23.  -COVID-19, influenza A and B and RSV swab negative  -Chest x-ray- A 3.5 cm masslike opacity is noted in the left midlung with ill-defined surrounding opacity noted. There is also mild hazy opacity in the right mid lung.  -CT chest IV contrast  Multifocal, mixed groundglass and consolidative airspace opacities are seen in the lungs suspicious for pneumonia. Multiple pulmonary nodules are also present with one of the largest measuring 2.6 cm in the right upper lobe.  Small to moderate bilateral pleural effusion, multiple enlarged mediastinal and hilar lymph nodes.  .  --Legionella antigen, Streptococcus pneumonia antigen negative.  --Initiated on IV ceftriaxone, IV azithromycin.  Azithromycin switched to doxycycline given significant drug interactions.  --Given hypoxia will continue IV ceftriaxone, doxycycline.  To oral antibiotics on discharge.  Trend fever  curve.  Supportive care.  I-S, Acapella.  Must follow-up CT chest in 3 months to ensure resolution, follow-up of pulmonary nodules.     Acute heart failure with reduced EF.  Elevated troponins, likely demand ischemia in the setting of hypoxia, CHF exacerbation.  Bilateral small-moderate pleural effusion  Suspicion for amyloidosis?  Moderate to severe mitral regurgitation.  Mild aortic stenosis.  Atrial fibrillation on anticoagulation.  Coronary artery disease status post PCI of distal LAD, 9/2022.  Hypertension.  Hyperlipidemia.  --Follows with cardiology in [place], echocardiogram 10/25/2024 with EF of 52%, LV cavity moderately dilated with moderate concentric hypertrophy.  Then concern for infiltrative cardiomyopathy. SPEP positive for free light kappa chain and free lambda light chains.  Recently initiated on Coumadin for atrial fibrillation, Lasix started 1 month back.  -- Now with shortness of breath, leg swelling, no chest pain.  -Troponin 32--36. -ProBNP 6916.    -Chest x-ray with mild bilateral pulmonary vascular congestion.  -EKG- atrial fibrillation, left anterior fascicular block, Q waves in inferior leads and V1-V4, T wave inversion in lateral leads  -Echo with LVEF of 35 to 40%, moderate to severe MR.  -Telemetry A-fib.  -- Has been receiving diuresis with intravenous Lasix with some improvement in shortness of breath.    -- Was on intravenous Lasix, switch to 40 mg oral Lasix twice daily on 12/6.  Continue twice daily oral Lasix on 12/7.  Monitor volume status 12/8, optimize diuresis.  -Continue aspirin.  Continue Zetia and fenofibrate.  Continue PTA nebivolol, lisinopril and nifedipine.  Continue PTA Coumadin.  Pharmacy assisting with dosing.  Cardiology signed off, appreciate comanagement  Telemetry monitoring.  Strict intake output monitoring, daily weights.  Salt restriction.  Fluid restriction.  Follow-up with primary cardiologist after returning back to [place].  Recommend outpatient MARTY for  further evaluation of MR and ischemic evaluation given reduced EF.  Cardiac rehabilitation, recommend home with assistance.    Diabetes mellitus with a hemoglobin A1c of 7.0.  Hold PTA Cynthia Kimball.  Continue insulin sliding scale.  Moderate carbohydrate controlled diet.  Monitor blood sugars, optimize regimen.    Obesity with a BMI of 33.90.  Consider lifestyle modification with diet and exercise as able to.  Increase all-cause mortality and morbidity.    KATHY on CPAP.  Continue PTA CPAP.    Orders Placed This Encounter      Combination Diet Moderate Consistent Carb (60 g CHO per Meal) Diet; Low Saturated Fat Na <2400mg Diet      DVT Prophylaxis: SCDs, ambulate.  Code Status: Full Code  Disposition: Expected discharge likely 12/8 pending weaning off supplemental nasal oxygen    Medically Ready for Discharge: Anticipated Tomorrow     Discussed with patient, bedside RN.  Patient's wife updated 12/6.  >35 minutes spent by me on the date of service doing chart review, history, exam, documentation & further activities per the note.      Marychuy Valle MD        Interval History:        Patient lying in bed.  Some improvement in shortness of breath  This morning O2 sats dropped to 85% with minimal ambulation and SOB.  Denies any chest pain or palpitations.  Denies any headache or dizziness.  No nausea vomiting.  Denies any abdominal pain.  Tolerating oral diet.  No blood in the stools or blood in the urine.   Telemetry A-fib.         Physical Exam:        Physical Exam   Temp:  [98.2  F (36.8  C)-98.7  F (37.1  C)] 98.2  F (36.8  C)  Pulse:  [73-79] 73  Resp:  [16] 16  BP: (133-150)/(69-91) 133/71  SpO2:  [89 %-97 %] 90 %    Intake/Output Summary (Last 24 hours) at 12/5/2024 1335  Last data filed at 12/5/2024 1300  Gross per 24 hour   Intake 740 ml   Output 1505 ml   Net -765 ml       Admission Weight: 122.5 kg (270 lb)  Current Weight: 119.7 kg (264 lb)    PHYSICAL EXAM  GENERAL: Patient is in no distress. Alert  and oriented.  HEART: Irregular rate and rhythm. S1S2. No murmurs  LUNGS: Bilateral decreased breath sounds, coarse breath sounds  Respirations unlabored  NEURO: Moving all extremities  EXTREMITIES: Lower extremity pedal edema ++   SKIN: Warm, dry. No rash   PSYCHIATRY Cooperative       Medications:        Current Facility-Administered Medications   Medication Dose Route Frequency Provider Last Rate Last Admin    aspirin EC tablet 81 mg  81 mg Oral Daily Gabbie Carr MD   81 mg at 12/07/24 0920    cefTRIAXone (ROCEPHIN) 1 g vial to attach to  mL bag for ADULTS or NS 50 mL bag for PEDS  1 g Intravenous Q24H Gabbie Carr MD   1 g at 12/07/24 1344    cloNIDine (CATAPRES) tablet 0.2 mg  0.2 mg Oral BID Gabbie Carr MD   0.2 mg at 12/07/24 0921    doxazosin (CARDURA) tablet 4 mg  4 mg Oral At Bedtime Gabbie Carr MD   4 mg at 12/06/24 2211    doxycycline (VIBRAMYCIN) 100 mg vial to attach to  mL bag  100 mg Intravenous Q12H Rayna Torres MD 50 mL/hr at 12/07/24 0313 100 mg at 12/07/24 1420    escitalopram (LEXAPRO) tablet 10 mg  10 mg Oral Daily Gabbie Carr MD   10 mg at 12/07/24 0921    ezetimibe (ZETIA) tablet 10 mg  10 mg Oral QPM Gabbie Carr MD   10 mg at 12/06/24 2009    fenofibrate (TRIGLIDE/LOFIBRA) tablet 160 mg  160 mg Oral At Bedtime Gabbie Carr MD   160 mg at 12/06/24 2212    fluticasone (FLONASE) 50 MCG/ACT spray 2 spray  2 spray Both Nostrils Daily Gabbie Carr MD   2 spray at 12/07/24 0926    furosemide (LASIX) tablet 40 mg  40 mg Oral Once Marychuy Valle MD        furosemide (LASIX) tablet 40 mg  40 mg Oral Daily Catalina Macedo NP   40 mg at 12/07/24 0921    gabapentin (NEURONTIN) tablet 600 mg  600 mg Oral Daily Gabbie Carr MD   600 mg at 12/07/24 0920    insulin aspart (NovoLOG) injection (RAPID ACTING)  1-7 Units Subcutaneous TID  Gabbie Carr MD   2 Units at 12/07/24 9021     insulin aspart (NovoLOG) injection (RAPID ACTING)  1-5 Units Subcutaneous At Bedtime Gabbie Carr MD        lisinopril (ZESTRIL) tablet 40 mg  40 mg Oral Daily Gabbie Carr MD   40 mg at 12/07/24 0920    nebivolol (BYSTOLIC) tablet 5 mg  5 mg Oral At Bedtime Gabbie Carr MD   5 mg at 12/06/24 2211    NIFEdipine ER OSMOTIC (PROCARDIA XL) 24 hr tablet 60 mg  60 mg Oral Daily Gabbie Carr MD   60 mg at 12/07/24 0925    olopatadine (PATANOL) 0.1 % ophthalmic solution 1 drop  1 drop Both Eyes Daily Gabbie Carr MD   1 drop at 12/07/24 0926    potassium chloride (KLOR-CON) Packet 40 mEq  40 mEq Oral Once Marychuy Valle MD        sodium chloride (PF) 0.9% PF flush 3 mL  3 mL Intracatheter Q8H Gabbie Carr MD   3 mL at 12/07/24 1344    traZODone (DESYREL) tablet 50 mg  50 mg Oral QPM Gabbie Carr MD   50 mg at 12/06/24 2212    warfarin ANTICOAGULANT (COUMADIN) tablet 7.5 mg  7.5 mg Oral ONCE at 18:00 Marychuy Valle MD        Warfarin Dose Required Daily - Pharmacist Managed  1 each Oral See Admin Instructions Gabbie Carr MD         Current Facility-Administered Medications   Medication Dose Route Frequency Provider Last Rate Last Admin    - MEDICATION INSTRUCTIONS -   Does not apply DOES NOT GO TO Gabbie Gregory MD        acetaminophen (TYLENOL) tablet 650 mg  650 mg Oral Q4H PRN Gabbie Carr MD        Or    acetaminophen (TYLENOL) Suppository 650 mg  650 mg Rectal Q4H PRN Gabbie Carr MD        calcium carbonate (TUMS) chewable tablet 1,000 mg  1,000 mg Oral 4x Daily PRN Gabbie Carr MD        carboxymethylcellulose PF (REFRESH PLUS) 0.5 % ophthalmic solution 1 drop  1 drop Both Eyes Q1H PRN Gabbie Carr MD        Continuing ACE inhibitor/ARB/ARNI from home medication list OR ACE inhibitor/ARB/ARNI order already placed during this visit   Does not apply DOES NOT GO TO Gabbie Gregory MD         Continuing beta blocker from home medication list OR beta blocker order already placed during this visit   Does not apply DOES NOT GO TO Gabbie Gregory MD        glucose gel 15-30 g  15-30 g Oral Q15 Min PRGabbie Gong MD        Or    dextrose 50 % injection 25-50 mL  25-50 mL Intravenous Q15 Min PRGabbie Gong MD        Or    glucagon injection 1 mg  1 mg Subcutaneous Q15 Min PRGabbie Gong MD        guaiFENesin (ROBITUSSIN) 20 mg/mL solution 200 mg  200 mg Oral Q4H PRN Gabbie Carr MD        lidocaine (LMX4) cream   Topical Q1H PRN Gabbie Carr MD        lidocaine 1 % 0.1-1 mL  0.1-1 mL Other Q1H PRN Gabbie Carr MD        metoprolol (LOPRESSOR) injection 2.5 mg  2.5 mg Intravenous Q4H PRN Gabbie Carr MD        ondansetron (ZOFRAN ODT) ODT tab 4 mg  4 mg Oral Q6H PRN Gabbie Carr MD        Or    ondansetron (ZOFRAN) injection 4 mg  4 mg Intravenous Q6H PRN Gabbie Carr MD        Patient is already receiving anticoagulation with heparin, enoxaparin (LOVENOX), warfarin (COUMADIN)  or other anticoagulant medication   Does not apply Continuous PRN Gabbie Carr MD        senna-docusate (SENOKOT-S/PERICOLACE) 8.6-50 MG per tablet 1 tablet  1 tablet Oral BID PRGabbie Gong MD        Or    senna-docusate (SENOKOT-S/PERICOLACE) 8.6-50 MG per tablet 2 tablet  2 tablet Oral BID PRN Gabbie Carr MD        sodium chloride (PF) 0.9% PF flush 3 mL  3 mL Intracatheter q1 min Gabbie Valenzuela MD                Data:      All new lab and imaging data was reviewed.

## 2024-12-07 NOTE — PROGRESS NOTES
Patient has been assessed for Home Oxygen needs. Oxygen readings:    *Pulse oximetry (SpO2) = 95  % on room air at rest while awake.    *SpO2 improved to 95% on 0liters/minute at rest.    *SpO2 = 85% on room air during activity/with exercise.    *SpO2 improved to 90% on 1liters/minute during activity/with exercise.

## 2024-12-07 NOTE — PLAN OF CARE
[id number]     Orientation: A&O x 4  Vitals: VSS on 0.5L NC, sleep study overnight. denied pain  Tele: Afib CVR w/ BBB and frequent PVCs  Lines/Drains: IV intermittent abx  Skin/Wounds: WDL  GI/: BRV, low fat/na and consistent carbs diet  Labs: Abnormal/Trends - INR. Electrolyte replacement - n/a.  Ambulation/Assist: Ind in room  Plan: continue plan of care

## 2024-12-07 NOTE — PROGRESS NOTES
Patient is A/O x 4, Vss, a-febrile, BLANDON, on room air, denies pain, up independently in the room, voiding adequately per urinal, patient on PO lasix, tele  a-fib cvr,pat on 2 iv abx for PNA, possibly discharge to his Son's home tomorrow.

## 2024-12-08 ENCOUNTER — READMISSION MANAGEMENT (OUTPATIENT)
Dept: CALL CENTER | Facility: HOSPITAL | Age: 74
End: 2024-12-08
Payer: MEDICARE

## 2024-12-08 VITALS
TEMPERATURE: 98.3 F | OXYGEN SATURATION: 98 % | WEIGHT: 261.9 LBS | RESPIRATION RATE: 18 BRPM | BODY MASS INDEX: 33.61 KG/M2 | HEART RATE: 67 BPM | HEIGHT: 74 IN | SYSTOLIC BLOOD PRESSURE: 119 MMHG | DIASTOLIC BLOOD PRESSURE: 76 MMHG

## 2024-12-08 LAB
ANION GAP SERPL CALCULATED.3IONS-SCNC: 11 MMOL/L (ref 7–15)
BACTERIA BLD CULT: NO GROWTH
BUN SERPL-MCNC: 20.3 MG/DL (ref 8–23)
CALCIUM SERPL-MCNC: 8.7 MG/DL (ref 8.8–10.4)
CHLORIDE SERPL-SCNC: 102 MMOL/L (ref 98–107)
CREAT SERPL-MCNC: 0.99 MG/DL (ref 0.67–1.17)
EGFRCR SERPLBLD CKD-EPI 2021: 81 ML/MIN/1.73M2
GLUCOSE BLDC GLUCOMTR-MCNC: 145 MG/DL (ref 70–99)
GLUCOSE BLDC GLUCOMTR-MCNC: 180 MG/DL (ref 70–99)
GLUCOSE SERPL-MCNC: 177 MG/DL (ref 70–99)
HCO3 SERPL-SCNC: 26 MMOL/L (ref 22–29)
HGB BLD-MCNC: 10.9 G/DL (ref 13.3–17.7)
INR PPP: 2.22 (ref 0.85–1.15)
POTASSIUM SERPL-SCNC: 3.7 MMOL/L (ref 3.4–5.3)
SODIUM SERPL-SCNC: 139 MMOL/L (ref 135–145)

## 2024-12-08 PROCEDURE — 80048 BASIC METABOLIC PNL TOTAL CA: CPT | Performed by: HOSPITALIST

## 2024-12-08 PROCEDURE — 250N000011 HC RX IP 250 OP 636: Performed by: INTERNAL MEDICINE

## 2024-12-08 PROCEDURE — 82310 ASSAY OF CALCIUM: CPT | Performed by: HOSPITALIST

## 2024-12-08 PROCEDURE — 85018 HEMOGLOBIN: CPT | Performed by: HOSPITALIST

## 2024-12-08 PROCEDURE — 84295 ASSAY OF SERUM SODIUM: CPT | Performed by: HOSPITALIST

## 2024-12-08 PROCEDURE — 36415 COLL VENOUS BLD VENIPUNCTURE: CPT | Performed by: HOSPITALIST

## 2024-12-08 PROCEDURE — 250N000013 HC RX MED GY IP 250 OP 250 PS 637: Performed by: INTERNAL MEDICINE

## 2024-12-08 PROCEDURE — 99239 HOSP IP/OBS DSCHRG MGMT >30: CPT | Performed by: HOSPITALIST

## 2024-12-08 PROCEDURE — 250N000013 HC RX MED GY IP 250 OP 250 PS 637: Performed by: NURSE PRACTITIONER

## 2024-12-08 PROCEDURE — 250N000011 HC RX IP 250 OP 636: Performed by: HOSPITALIST

## 2024-12-08 PROCEDURE — 85610 PROTHROMBIN TIME: CPT | Performed by: INTERNAL MEDICINE

## 2024-12-08 RX ORDER — DOXYCYCLINE 100 MG/10ML
100 INJECTION, POWDER, LYOPHILIZED, FOR SOLUTION INTRAVENOUS ONCE
Status: COMPLETED | OUTPATIENT
Start: 2024-12-08 | End: 2024-12-08

## 2024-12-08 RX ORDER — WARFARIN SODIUM 5 MG/1
5 TABLET ORAL
Status: DISCONTINUED | OUTPATIENT
Start: 2024-12-08 | End: 2024-12-08 | Stop reason: HOSPADM

## 2024-12-08 RX ORDER — CEFTRIAXONE 1 G/1
1 INJECTION, POWDER, FOR SOLUTION INTRAMUSCULAR; INTRAVENOUS ONCE
Status: COMPLETED | OUTPATIENT
Start: 2024-12-08 | End: 2024-12-08

## 2024-12-08 RX ADMIN — ESCITALOPRAM OXALATE 10 MG: 10 TABLET ORAL at 07:40

## 2024-12-08 RX ADMIN — LISINOPRIL 40 MG: 40 TABLET ORAL at 07:40

## 2024-12-08 RX ADMIN — ASPIRIN 81 MG: 81 TABLET, COATED ORAL at 07:40

## 2024-12-08 RX ADMIN — FUROSEMIDE 40 MG: 40 TABLET ORAL at 07:40

## 2024-12-08 RX ADMIN — DOXYCYCLINE 100 MG: 100 INJECTION, POWDER, LYOPHILIZED, FOR SOLUTION INTRAVENOUS at 14:20

## 2024-12-08 RX ADMIN — CLONIDINE HYDROCHLORIDE 0.2 MG: 0.1 TABLET ORAL at 07:41

## 2024-12-08 RX ADMIN — FLUTICASONE PROPIONATE 2 SPRAY: 50 SPRAY, METERED NASAL at 07:43

## 2024-12-08 RX ADMIN — DOXYCYCLINE 100 MG: 100 INJECTION, POWDER, LYOPHILIZED, FOR SOLUTION INTRAVENOUS at 03:39

## 2024-12-08 RX ADMIN — OLOPATADINE HYDROCHLORIDE 1 DROP: 1 SOLUTION/ DROPS OPHTHALMIC at 07:43

## 2024-12-08 RX ADMIN — GABAPENTIN 600 MG: 600 TABLET, FILM COATED ORAL at 07:41

## 2024-12-08 RX ADMIN — CEFTRIAXONE SODIUM 1 G: 1 INJECTION, POWDER, FOR SOLUTION INTRAMUSCULAR; INTRAVENOUS at 13:53

## 2024-12-08 RX ADMIN — INSULIN ASPART 1 UNITS: 100 INJECTION, SOLUTION INTRAVENOUS; SUBCUTANEOUS at 07:38

## 2024-12-08 RX ADMIN — NIFEDIPINE 60 MG: 60 TABLET, FILM COATED, EXTENDED RELEASE ORAL at 07:42

## 2024-12-08 ASSESSMENT — ACTIVITIES OF DAILY LIVING (ADL)
ADLS_ACUITY_SCORE: 34

## 2024-12-08 NOTE — DISCHARGE SUMMARY
Discharge Summary  Hospitalist    Date of Admission:  12/3/2024  Date of Discharge:  12/8/2024  Discharging Provider: Marychuy Valle MD    Primary Care Physician   Fletcher Ledesma  Primary Care Provider Phone Number: [phone]  Primary Care Provider Fax Number: [phone]    PRINCIPAL DIAGNOSIS  Acute hypoxic respiratory failure, multifactorial.   Multifocal pneumonia.  Multiple pulmonary nodules, enlarged mediastinal and hilar lymph nodes.  Acute heart failure with reduced EF.  Elevated troponins, likely demand ischemia in the setting of hypoxia, CHF exacerbation.  Bilateral small-moderate pleural effusion.  Anemia normocytic likely from acute illness, dilutional.    MEDICAL PROBLEMS  Moderate to severe mitral regurgitation.  Mild aortic stenosis.  Atrial fibrillation on anticoagulation.  Coronary artery disease status post PCI of distal LAD, 9/2022.  Hypertension.  Hyperlipidemia.  Diabetes mellitus with a hemoglobin A1c of 7.0.  Obesity with a BMI of 33.90.  KATHY on CPAP.    History of Present Illness   Percy Thornton is an 74 year old male who presented with SOB.     Hospital Course     Percy Thornton is a 74 year old male with history of hypertension, dyslipidemia, coronary artery disease, atrial fibrillation on warfarin, mild to moderate MR, mild aortic stenosis, suspected cardiac amyloidosis, diabetes mellitus type 2 and arthritis admitted on 12/3/2024 with shortness of breath and cough.     S/p Acute hypoxic respiratory failure, multifactorial. Likely from community-acquired pneumonia, CHF exacerbation, bilateral pleural effusion.   Multifocal pneumonia.  Multiple pulmonary nodules, enlarged mediastinal and hilar lymph nodes.  Patient from [place], drove to [place] to visit family.  Presented with shortness of breath, and productive cough  -Afebrile.  No leukocytosis.  Procalcitonin 0.10.  .23.  -COVID-19, influenza A and B and RSV swab negative  -Chest x-ray- A 3.5 cm masslike  opacity is noted in the left midlung with ill-defined surrounding opacity noted. There is also mild hazy opacity in the right mid lung.  -CT chest IV contrast  Multifocal, mixed groundglass and consolidative airspace opacities are seen in the lungs suspicious for pneumonia. Multiple pulmonary nodules are also present with one of the largest measuring 2.6 cm in the right upper lobe.  Small to moderate bilateral pleural effusion, multiple enlarged mediastinal and hilar lymph nodes.  .  --Legionella antigen, Streptococcus pneumonia antigen negative.  --Initiated on IV ceftriaxone, IV azithromycin.  Azithromycin switched to doxycycline given significant drug interactions.  Significant improvement in symptoms, able to wean off oxygen to room air.  CRP trending down.  Plan for discharge with close follow-up  --Was on intravenous antibiotics from 12/3 to 12/8.  Switch to oral Augmentin for 5 days on discharge.  Aggressive incentive spirometry, Acapella.  Follow-up with PCP in [place] in 1 week from discharge or earlier if symptomatic.  CRP in 1 week     Acute heart failure with reduced EF.  Elevated troponins, likely demand ischemia in the setting of hypoxia, CHF exacerbation.  Bilateral small-moderate pleural effusion  Suspicion for amyloidosis?  Moderate to severe mitral regurgitation.  Mild aortic stenosis.  Atrial fibrillation on anticoagulation.  Coronary artery disease status post PCI of distal LAD, 9/2022.  Hypertension.  Hyperlipidemia.  --Follows with cardiology in [place], echocardiogram 10/25/2024 with EF of 52%, LV cavity moderately dilated with moderate concentric hypertrophy.  Then concern for infiltrative cardiomyopathy. SPEP positive for free light kappa chain and free lambda light chains.  Recently initiated on Coumadin for atrial fibrillation, Lasix started 1 month back.  -- Now with shortness of breath, leg swelling, no chest pain.  -Troponin 32--36. -ProBNP 6916.    -Chest x-ray with mild bilateral  pulmonary vascular congestion.  -EKG- atrial fibrillation, left anterior fascicular block, Q waves in inferior leads and V1-V4, T wave inversion in lateral leads  -Echo with LVEF of 35 to 40%, moderate to severe MR.  -Telemetry A-fib.  --Received diuresis with intravenous Lasix, switch to oral Lasix home dose on discharge.  -Continue PTA aspirin 81 mg oral daily  Continue Zetia and fenofibrate.  Continue PTA nebivolol, lisinopril and nifedipine.  Continue PTA Coumadin.  INR in therapeutic range.  Follow-up INR in 3 to 5 days while on antibiotics  Cardiology signed off, appreciate comanagement.  Cardiac rehabilitation, recommend home with assistance.    Follow-up with primary cardiologist after returning back to [place].    Recommend outpatient MARTY for further evaluation of MR and ischemic evaluation given reduced EF.  Monitor BMP within 1 week.  Monitor daily blood pressures, heart rate, weights as able to review on provider visit and optimize therapy.     Diabetes mellitus with a hemoglobin A1c of 7.0.  Continue PTA PTA Amaryl, Janumet.  Moderate carbohydrate controlled diet.    Obesity with a BMI of 33.90.  Consider lifestyle modification with diet and exercise as able to.  Increase all-cause mortality and morbidity.     KATHY on CPAP.  Continue PTA CPAP.    Anemia normocytic likely from acute illness, dilutional.  Care everywhere records reviewed, baseline hemoglobin between 11-12.  Presented with hemoglobin of 11.3 > 10.9  No active bleeding.  INR therapeutic.  On aspirin and Coumadin.  Recommend monitor hemoglobin level in 7 days or earlier if symptomatic.  Age-appropriate health maintenance including anemia workup on PCP visit.    Marychuy Valle MD.    Pending Results   These results will be followed up by hospitalist   Unresulted Labs Ordered in the Past 30 Days of this Admission       Date and Time Order Name Status Description    12/3/2024 12:11 PM Blood Culture Peripheral Blood Preliminary                 Physical Exam   Vitals:    12/05/24 0635 12/07/24 0500 12/08/24 0540   Weight: 119.7 kg (264 lb) 119.2 kg (262 lb 11.2 oz) 118.8 kg (261 lb 14.4 oz)     Vital Signs with Ranges  Temp:  [98.1  F (36.7  C)-98.3  F (36.8  C)] 98.3  F (36.8  C)  Pulse:  [67-79] 67  Resp:  [16-18] 18  BP: (119-144)/(76-97) 119/76  SpO2:  [94 %-98 %] 98 %  I/O last 3 completed shifts:  In: 1885 [P.O.:1885]  Out: 2800 [Urine:2800]  PHYSICAL EXAM  GENERAL: Patient is in no distress. Alert and oriented.  HEART: Irregular rate and rhythm. S1S2. No murmurs  LUNGS: Bilateral decreased breath sounds, decreased breath sounds.  No wheezing or crackles.  Respirations unlabored  NEURO: Moving all extremities  EXTREMITIES: Pedal edema improving  SKIN: Warm, dry. No rash   PSYCHIATRY Cooperative  )Consultations This Hospital Stay   PHARMACY TO DOSE WARFARIN  OCCUPATIONAL THERAPY ADULT IP CONSULT  CARDIOLOGY IP CONSULT  CARE MANAGEMENT / SOCIAL WORK IP CONSULT    Time Spent on this Encounter   I, Marychuy Valle MD, personally saw the patient today and spent greater than 30 minutes discharging this patient. Discussed with patient, his wife over the telephone, bedside RN.    Discharge Disposition   Discharged to home  Condition at discharge: Stable    Discharge Orders      Follow-Up with Cardiology KENISHA      Reason for your hospital stay    You were admitted to the hospital with hypoxic respiratory failure from pneumonia, heart failure exacerbation.  Treated with intravenous antibiotics, intravenous diuresis, symptoms improving plan for discharge with close follow-up.     Activity    Your activity upon discharge: activity as tolerated     Monitor and record    Monitor blood pressures 2 times a day, review on provider visit and optimize therapy.  Monitor weights daily as able to, review on provider visit and optimize diuresis.  Monitor blood sugars once a day, review on provider visit.     Discharge Instructions    Aggressive incentive  spirometry.  Consider lifestyle modification with diet and exercise as able to.  Continue prior to admission CPAP.     Follow-up and recommended labs and tests     Follow up with primary care provider, Fletcher Ledesma, within 7 days for hospital follow- up.  The following labs/tests are recommended:  Monitor INR in 3 to 5 days [closely while on antibiotics)  Follow hemoglobin, BMP, CRP in 7 days.  Recommend CT chest in 4 to 6 weeks for resolution of pneumonia/follow-up of lung nodules.  Follow-up with primary cardiology team after returning home in [place], consider outpatient ischemic workup.  Age-appropriate health maintenance including anemia workup on PCP visit.     Diet    Consider low-salt, low-fat, carbohydrate controlled diet.  2000 mL fluid restriction per day.       Discharge Medications   Current Discharge Medication List        START taking these medications    Details   amoxicillin-clavulanate (AUGMENTIN) 875-125 MG tablet Take 1 tablet by mouth 2 times daily.  Qty: 10 tablet, Refills: 0    Associated Diagnoses: Pneumonia of both lungs due to infectious organism, unspecified part of lung           CONTINUE these medications which have NOT CHANGED    Details   aspirin 81 MG EC tablet Take 81 mg by mouth daily.      benazepril (LOTENSIN) 40 MG tablet Take 40 mg by mouth 2 times daily.      cloNIDine (CATAPRES) 0.2 MG tablet Take 0.2 mg by mouth 2 times daily.      doxazosin (CARDURA) 4 MG tablet Take 4 mg by mouth at bedtime.      escitalopram (LEXAPRO) 10 MG tablet Take 10 mg by mouth daily.      ezetimibe (ZETIA) 10 MG tablet Take 10 mg by mouth every evening.      fenofibrate (TRIGLIDE/LOFIBRA) 160 MG tablet Take 160 mg by mouth at bedtime.      fluticasone (FLONASE) 50 MCG/ACT nasal spray Spray 2 sprays into both nostrils daily.      furosemide (LASIX) 40 MG tablet Take 40 mg by mouth daily.      gabapentin (NEURONTIN) 600 MG tablet Take 600 mg by mouth daily.      glimepiride (AMARYL) 4 MG  tablet Take 4 mg by mouth 2 times daily (before meals).      Multiple Vitamins-Minerals (PRESERVISION AREDS 2 PO) Take 1 tablet by mouth 2 times daily.      nebivolol (BYSTOLIC) 5 MG tablet Take 5 mg by mouth at bedtime.      NIFEdipine ER (ADALAT CC) 60 MG 24 hr tablet Take 60 mg by mouth daily.      olopatadine (PATADAY) 0.2 % ophthalmic solution Place 1 drop into both eyes daily.      polyethylene glycol-propylene glycol (SYSTANE ULTRA) 0.4-0.3 % SOLN ophthalmic solution Place 1 drop into both eyes every hour as needed for dry eyes.      sitagliptin-metFORMIN (JANUMET)  MG tablet Take 1 tablet by mouth 2 times daily (with meals).      traZODone (DESYREL) 50 MG tablet Take 50 mg by mouth every evening. One hour before bedtime      warfarin ANTICOAGULANT (COUMADIN) 5 MG tablet Take 7.5 mg by mouth daily on Wed/Sat, take 5 mg by mouth daily all other days           Allergies   No Known Allergies    DATA  Most Recent 3 CBC's:  Recent Labs   Lab Test 12/08/24  0706 12/07/24  0727 12/04/24  0542 12/03/24  1233   WBC  --   --  9.3 9.7   HGB 10.9* 11.0* 10.8* 11.3*   MCV  --   --  87 87   PLT  --   --  187 173      Most Recent 3 BMP's:  Recent Labs   Lab Test 12/08/24  0737 12/08/24  0706 12/08/24  0137 12/07/24  1213 12/07/24  0727 12/05/24  0733 12/05/24  0626   NA  --  139  --   --  142  --  140   POTASSIUM  --  3.7  --   --  3.5  --  3.4   CHLORIDE  --  102  --   --  103  --  98   CO2  --  26  --   --  29  --  30*   BUN  --  20.3  --   --  21.6  --  26.0*   CR  --  0.99  --   --  0.96  --  1.07   ANIONGAP  --  11  --   --  10  --  12   IGNACIA  --  8.7*  --   --  9.1  --  9.0   * 177* 145*   < > 183*   < > 174*    < > = values in this interval not displayed.     Most Recent 2 LFT's:  Recent Labs   Lab Test 12/03/24  1233   AST 15   ALT 14   ALKPHOS 36*   BILITOTAL 1.1     Most Recent TSH, T4 and A1c Labs:  Recent Labs   Lab Test 12/03/24  1233   A1C 7.0*     Results for orders placed or performed during the  hospital encounter of 12/03/24   XR Chest 2 Views    Narrative    CHEST TWO VIEWS  12/3/2024 12:46 PM     HISTORY: Cough. Hypoxia.    COMPARISON: None.      Impression    IMPRESSION: A 3.5 cm masslike opacity is noted in the left midlung  with ill-defined surrounding opacity noted. There is also mild hazy  opacity in the right mid lung. Findings could be related to infection  or edema, however a neoplastic mass cannot be excluded. Chest CT is  recommended for further evaluation. Small bilateral pleural effusions.  There is cardiac enlargement and mild bilateral pulmonary venous  congestion.    HAI FLORES MD         SYSTEM ID:  [id number]   CT Chest w Contrast    Narrative    CT CHEST W CONTRAST 12/3/2024 3:08 PM    CLINICAL HISTORY: Evaluate for mass and infiltrate seen on chest  x-ray, hypoxia  TECHNIQUE: CT chest with IV contrast. Multiplanar reformats were  obtained. Dose reduction techniques were used.    CONTRAST: 135mL Isovue-[id number]    COMPARISON: None.    FINDINGS:   LUNGS AND PLEURA: Small to moderate bilateral pleural effusions are  present. Mixed groundglass and consolidative opacities are seen in the  lungs bilaterally with widely largest areas noted along the posterior  left upper lobe measuring up to 9.1 x 5.4 cm (series 4, image 154).  Multiple solid and some solid nodules are also present including a 10  mm solid, subpleural nodule in the right lower lobe (series 4, image  266) and subsolid nodule in the right upper lobe measuring up to 2.6  cm with a 1.3 cm solid component (series 4, image 104). Interlobular  septal thickening is also evident bilaterally.    MEDIASTINUM/AXILLAE: Multiple enlarged hilar and mediastinal lymph  nodes are present, the largest measuring 1.9 cm in the subcarinal  space (series 2, image 35). Bilateral gynecomastia is present. Heart  size is enlarged. Scattered vascular calcifications are seen within  the thoracic aorta.    CORONARY ARTERY CALCIFICATION: Moderate to  severe    UPPER ABDOMEN: Diffuse hepatic steatosis is present. Vascular  calcification is present in the abdominal aorta and its branches.    MUSCULOSKELETAL: Multilevel degenerative changes are seen in the  spine. Mild, age-indeterminate vertebral body height loss is seen at  T5-T6.      Impression    IMPRESSION:   1.  Multifocal, mixed groundglass and consolidative airspace opacities  are seen in the lungs suspicious for pneumonia. Multiple pulmonary  nodules are also present with one of the largest measuring 2.6 cm in  the right upper lobe. These findings may be due to underlying  infectious etiology but neoplastic origin cannot be excluded.  Recommend 3 month CT chest follow-up exam.  2.  Small to moderate bilateral pleural effusions.  3.  Multiple enlarged mediastinal and hilar lymph nodes which are  nonspecific and may be reactive. Suggest attention on follow-up exam.  4.  Interlobular septal thickening seen in the lungs which may be due  to underlying infection versus pulmonary edema.  5.  Age-indeterminate mild compression deformities seen at T5-T6.    REFERENCE:  Guidelines for Management of Incidental Pulmonary Nodules Detected on  CT Images: From the Fleischner Society 2017.   Guidelines apply to incidental nodules in patients who are 35 years or  older.  Guidelines do not apply to lung cancer screening, patients with  immunosuppression, or patients with known primary cancer.    MULTIPLE NODULES  Nodule size <6 mm  Low-risk patients: No follow-up needed.  High-risk patients: Optional follow-up at 12 months.    Nodule size 6 mm or larger  Low-risk patients: Follow-up CT at 3-6 months, then consider CT at  18-24 months.  High-risk patients: Follow-up CT at 3-6 months, then at 18-24 months  if no change.  -Use most suspicious nodule as guide to management.    SUBSOLID NODULES  Ground glass  Nodule size <6 mm  No routine follow-up. If suspicious, consider follow-up at 2 and 4  years.    Nodule size 6 mm or  greater  CT at 6-12 months to confirm persistence, then CT every 2 years until  5 years.    Part solid  Nodule size <6 mm  No routine follow-up.    Nodule size 6 mm or greater  CT at 3-6 months to confirm persistence. If unchanged and solid  component remains <6 mm, annual CT for 5 years.    Multiple  CT at 3-6 months. If stable, consider CT at 2 and 4 years. Management  based on the most suspicious nodule.    Consider referral to lung nodule clinic.    LINDSEY BHAT MD         SYSTEM ID:  [id number]   Echocardiogram Complete     Value    LVEF  35-40%    Narrative    [id number]  [id number]  [id number]^JONATHON^BETTY^ELISSA     [facility]  Echocardiography Laboratory  [address] [place] [address]     Name: JERARDO CHAVEZ  MRN: [id number]  : 1950  Study Date: 2024 01:51 PM  Age: 74 yrs  Gender: Male  Patient Location: [facility]  Reason For Study: Heart Failure  Ordering Physician: BETTY HOLT  Performed By: Dave Canales     BSA: 2.4 m2  Height: 74 in  Weight: 263 lb  HR: 89  BP: 128/87 mmHg  ______________________________________________________________________________  Procedure  Complete Portable Echo Adult. Definity (NDC #46350-024) given intravenously.  Poor quality two-dimensional was performed and interpreted.  ______________________________________________________________________________  Interpretation Summary     1. Technically difficult echocardiogram.  2. Mild left ventricular enlargement with decreased systolic function.  Estimated ejection fraction is 35-40%.  3. Generalized left ventricular hypokinesis; more severe hypokinesis of the  apex.  4. Mild-moderate concentrically increased ventricular wall thickness.  5. Mild right ventricular enlargement with normal systolic function.  6. Estimated right ventricular systolic pressure 38 mmHg (potentially  underestimated due to incomplete TR Doppler signal).  7. Mildly calcified mitral annulus with thickened  leaflets. Probable  unsupported mitral valve posterior leaflet scallop. Eccentric anteriorly  directed mitral regurgitant jet (not fully visualized); may represent  moderate-severe or potentially severe mitral regurgitation.  8. No prior exam images available for review. Compared to outside exam from  10/25/2024, LVEF is lower.     COMMENTS: If clinically warranted, consider transesophageal echocardiogram for  better mechanistic and quantitative assessment of mitral regurgitation.  ______________________________________________________________________________  Left Ventricle  The left ventricle is mildly dilated. Mild-moderate concentrically increased  ventricular wall thickness. Left ventricular diastolic function is  indeterminate. The visual ejection fraction is 35-40%. Generalized left  ventricular hypokinesis; more severe hypokinesis of the apex.     Right Ventricle  Mild right ventricular enlargement with normal systolic function.     Atria  The left atrium is severely dilated. The right atrium is moderately dilated.  Atrial septum not well visualized.     Mitral Valve  There is mild mitral annular calcification. The mitral valve leaflets are  mildly thickened. Probable unsupported mitral posterior leaflet scallop.  Eccentric anteriorly directed mitral regurgitant jet (not fully visualized);  may represent moderate-severe or potentially severe mitral regurgitation.  Mitral valve mean diastolic gradient is 3 mmHg (at a heart rate of 85 bpm).     Aortic Valve  There is moderate trileaflet aortic sclerosis. No aortic stenosis is present.     Pulmonic Valve  The pulmonic valve is not well seen, but is grossly normal. There is mild (1+)  pulmonic valvular regurgitation. There is no pulmonic valvular stenosis.     Vessels  The aortic Sinus(es) of Valsalva are mildly dilated. Mild ascending aorta  dilatation (4.1 cm). Dilation of the inferior vena cava is present with normal  respiratory variation in  diameter.  ______________________________________________________________________________  MMode/2D Measurements & Calculations  IVSd: 1.4 cm  LVIDd: 5.8 cm  LVIDs: 3.5 cm  LVPWd: 1.4 cm  FS: 39.1 %  LV mass(C)d: 369.7 grams  LV mass(C)dI: 151.4 grams/m2  Ao root diam: 4.1 cm  LA dimension: 5.6 cm  asc Aorta Diam: 4.1 cm  LA/Ao: 1.4  LVOT diam: 2.3 cm  LVOT area: 4.1 cm2  Ao root diam index Ht(cm/m): 2.2  Ao root diam index BSA (cm/m2): 1.7     Asc Ao diam index BSA (cm/m2): 1.7  Asc Ao diam index Ht(cm/m): 2.2  EF Biplane: 40.7 %  LA Volume (BP): 171.0 ml  LA Volume Index (BP): 70.1 ml/m2  RV Base: 4.9 cm  RWT: 0.49     Doppler Measurements & Calculations  MV E max nilson: 118.3 cm/sec     MV max P.1 mmHg  MV mean PG: 3.3 mmHg  MV V2 VTI: 33.1 cm  MVA(VTI): 2.7 cm2  MV dec slope: 745.9 cm/sec2  MV dec time: 0.16 sec  Ao V2 max: 196.5 cm/sec  Ao max P.0 mmHg  Ao V2 mean: 144.2 cm/sec  Ao mean P.3 mmHg  Ao V2 VTI: 38.5 cm  NATHANAEL(I,D): 2.3 cm2  NATHANAEL(V,D): 2.3 cm2  LV V1 max P.9 mmHg  LV V1 max: 110.8 cm/sec  LV V1 VTI: 21.3 cm  SV(LVOT): 87.7 ml  SI(LVOT): 35.9 ml/m2  PA acc time: 0.12 sec  TR max nilson: 273.0 cm/sec  TR max P.8 mmHg  RVSP(TR): 37.8 mmHg  AV Nilson Ratio (DI): 0.56  NATHANAEL Index (cm2/m2): 0.93  E/E' av.2  Lateral E/e': 14.5  Medial E/e': 25.9  RV S Nilson: 12.4 cm/sec     ______________________________________________________________________________  Report approved by: Renee Osorio MD on 2024 02:59 PM

## 2024-12-08 NOTE — PROGRESS NOTES
Care Management Discharge Note    Discharge Date: 12/08/2024       Discharge Disposition: Home    Discharge Services:      Discharge DME:      Discharge Transportation: family or friend will provide    Private pay costs discussed: Not applicable    Does the patient's insurance plan have a 3 day qualifying hospital stay waiver?  No    PAS Confirmation Code:    Patient/family educated on Medicare website which has current facility and service quality ratings: no    Education Provided on the Discharge Plan: Yes  Persons Notified of Discharge Plans: patient  Patient/Family in Agreement with the Plan: yes    Handoff Referral Completed: Yes, non-[facility] PCP: External handoff communication completed    Additional Information:  Writer faxed discharge summary, orders, pertinent notes and cardiology referral to: [facility], Attn; Dr. Danuta Dwyer fax # [phone].  Phone number for Dr Dwyer's clinic is [phone].  Writer gave patient paper copy of referral as well.    Elizabet Vanegas RN Care Coordinator  [facility]  [phone]

## 2024-12-08 NOTE — OUTREACH NOTE
Prep Survey      Flowsheet Row Responses   [facility] facility patient discharged from? Non-BH   Is LACE score < 7 ? Non-BH Discharge   Eligibility [facility]   Date of Admission 12/03/24   Date of Discharge 12/08/24   Discharge Disposition Home or Self Care   Discharge diagnosis Nonrheumatic mitral valve regurgitation (Primary Dx),  Pneumonia of both lungs due to infectious organism, unspecified part of lung,  Hypoxia,  Acute on chronic congestive heart failure, unspecified heart failure type (H),  Demand ischemia (H)   Does the patient have one of the following disease processes/diagnoses(primary or secondary)? Pneumonia   Prep survey completed? Yes            Dawn NICHOLS - Registered Nurse

## 2024-12-08 NOTE — PLAN OF CARE
Occupational Therapy Discharge Summary    Reason for therapy discharge:    Discharged to home.    Progress towards therapy goal(s). See goals on Care Plan in [facility] electronic health record for goal details.  Goals partially met.  Barriers to achieving goals:   limited tolerance for therapy and discharge from facility.    Therapy recommendation(s):    Discharged home w/family A as needed.

## 2024-12-08 NOTE — PROGRESS NOTES
Patient has been assessed for Home Oxygen needs. Oxygen readings:    *Pulse oximetry (SpO2) = 98% on room air at rest while awake.    *SpO2 improved to 98% on 0 liters/minute at rest.    *SpO2 = 90% on room air during activity/with exercise.    *SpO2 improved to 90% on 0 liters/minute during activity/with exercise.

## 2024-12-08 NOTE — CARE PLAN
12/08/24 1100   Home Oxygen Assessment (RN/RT ONLY)   Does patient have oxygen at home? No   1. SpO2 on room air at rest while awake 98       Oxygen LPM at rest 0   3. SpO2 on room air during Activity/with exercise 90   4. SpO2 with oxygen during activity/with exercise 90       Oxygen LPM during activity/with exercise 0   Does patient qualify for Home O2? No

## 2024-12-08 NOTE — PLAN OF CARE
Goal Outcome Evaluation:         Alert and oriented x 4. Up independently. VSS, on room air. Denies chest pain, no shortness of breath reported.  Tele a fib, CVR, BBB, PVCs.  LS diminished, clear.   Discharge instructions explained to patient and wife. All questions answered.

## 2024-12-08 NOTE — PLAN OF CARE
Orientation: A&Ox4  Vitals/Pain: VSS on RA. Pt denies pain.  Tele: A-fib CVR w/BBB; frequent PVCs  Lines/Drains: 1RPIV  LS: diminished w/ expiratory wheezes  GI/: BS +, x0 BM this shift. Pt having adequate urine output throughout shift.   Labs: BMP, Hgb, and INR check this AM; BG ACHS  Ambulation/Assist: Independent w/cane  Skin/Wounds: Scattered bruising. Abrasion on right inner ankle from pt scratching the area. Mepilex applied.  Plan: Continue plan of care. Pt to discharge when medically ready.

## 2024-12-08 NOTE — PLAN OF CARE
"\"Sathya\" is alert, oriented, pleasant, cooperative. VSS on room air at rest, 1L while working with PT/ambulating and talking. Tele: a-fib CVR with BBB and frequent PVCs.Receiving IV antibiotics. Transitioned to PO Lasix today; received one additional dose this afternoon.    Anticipate discharge soon. Plan is to drive home with his spouse to [place] and follow up with his home cardiology team. Up independently.  "

## 2024-12-09 ENCOUNTER — TELEPHONE (OUTPATIENT)
Dept: PHARMACY | Facility: HOSPITAL | Age: 74
End: 2024-12-09
Payer: MEDICARE

## 2024-12-09 ENCOUNTER — TRANSITIONAL CARE MANAGEMENT TELEPHONE ENCOUNTER (OUTPATIENT)
Dept: CALL CENTER | Facility: HOSPITAL | Age: 74
End: 2024-12-09
Payer: MEDICARE

## 2024-12-09 ENCOUNTER — TELEPHONE (OUTPATIENT)
Dept: GASTROENTEROLOGY | Facility: CLINIC | Age: 74
End: 2024-12-09
Payer: MEDICARE

## 2024-12-09 ENCOUNTER — PATIENT OUTREACH (OUTPATIENT)
Dept: CARE COORDINATION | Facility: CLINIC | Age: 74
End: 2024-12-09
Payer: COMMERCIAL

## 2024-12-09 NOTE — TELEPHONE ENCOUNTER
"Caller: Jeb Olson \"Rosas\"    Relationship to patient: Self    Best call back number: [phone]    Chief complaint:     Type of visit: COLONOSCOPY     Requested date: PLEASE CALL PT TO RESCHEDULE     If rescheduling, when is the original appointment: 12/19/24     Additional notes:PT HAS BEEN IN HOSPITAL IN [place] WITH BACTERIA PNEUMONIA      "

## 2024-12-09 NOTE — OUTREACH NOTE
Call Center TCM Note      Flowsheet Row Responses   [facility] patient discharged from? Non-  [[facility]]   Does the patient have one of the following disease processes/diagnoses(primary or secondary)? Pneumonia   TCM attempt successful? Yes   Call start time 1233   Call end time 1235   Discharge diagnosis Nonrheumatic mitral valve regurgitation (Primary Dx),  Pneumonia of both lungs due to infectious organism, unspecified part of lung,  Hypoxia,  Acute on chronic congestive heart failure, unspecified heart failure type (H),  Demand ischemia (H)   Person spoke with today (if not patient) and relationship patient   Meds reviewed with patient/caregiver? Yes   Is the patient having any side effects they believe may be caused by any medication additions or changes? No   Does the patient have all medications ordered at discharge? Yes   Is the patient taking all medications as directed (includes completed medication regime)? Yes   Comments Scheduled a hospital followup on 12/18/2024 with Dr. Salvador. Patient was hospitalized in [facility] in [place] for Pneumonia   Does the patient have an appointment with their PCP within 7-14 days of discharge? Yes   Psychosocial issues? No   Did the patient receive a copy of their discharge instructions? Yes   Nursing interventions Reviewed instructions with patient   What is the patient's perception of their health status since discharge? Improving   Is the patient/caregiver able to teach back signs and symptoms related to disease process for when to call PCP? Yes   Is the patient/caregiver able to teach back signs and symptoms related to disease process for when to call 911? Yes   Is the patient/caregiver able to teach back the hierarchy of who to call/visit for symptoms/problems? PCP, Specialist, Home health nurse, Urgent Care, ED, 911 Yes   If the patient is a current smoker, are they able to teach back resources for cessation? Not a smoker   TCM call completed? Yes    Call end time 1235   Would this patient benefit from a Referral to [facility] Social Work? No   Is the patient interested in additional calls from an ambulatory ? No            Mars Grey RN    12/9/2024, 12:36 EST

## 2024-12-09 NOTE — TELEPHONE ENCOUNTER
SW PT PT HAS BEEN ILL AND COMMUNICATED HIS BODY CAN'T HOLD UP TO PROCEDURE ,SIERRA VINSON IN SCHEDULING PT PLACED IN THE DEPOT AND ALSO ON CANCELLATION WAIT LIST OK FOR HUB TO RELAY

## 2024-12-09 NOTE — PROGRESS NOTES
"Clinic Care Coordination Contact  Transitions of Care Outreach  No chief complaint on file.      Most Recent Admission Date: 12/3/2024   Most Recent Admission Diagnosis: Hypoxia - R09.02  Demand ischemia (H) - I24.89  Pneumonia of both lungs due to infectious organism, unspecified part of lung - J18.9  Acute on chronic congestive heart failure, unspecified heart failure type (H) - I50.9     Most Recent Discharge Date: 12/8/2024   Most Recent Discharge Diagnosis: Pneumonia of both lungs due to infectious organism, unspecified part of lung - J18.9  Hypoxia - R09.02  Acute on chronic congestive heart failure, unspecified heart failure type (H) - I50.9  Demand ischemia (H) - I24.89  Nonrheumatic mitral valve regurgitation - I34.0     Transitions of Care Assessment    Discharge Assessment  How are you doing now that you are home?: \" Doing well \"  How are your symptoms? (Red Flag symptoms escalate to triage hotline per guidelines): Improved  Do you know how to contact your clinic care team if you have future questions or changes to your health status? : Yes  Does the patient have their discharge instructions? : Yes  Does the patient have questions regarding their discharge instructions? : No  Were you started on any new medications or were there changes to any of your previous medications? : Yes  Does the patient have all of their medications?: Yes  Do you have questions regarding any of your medications? : No  Do you have all of your needed medical supplies or equipment (DME)?  (i.e. oxygen tank, CPAP, cane, etc.): Yes    Post-op ([facility] CTA Only)  If the patient had a surgery or procedure, do they have any questions for a nurse?: No             Follow up Plan     Discharge Follow-Up  Discharge follow up appointment scheduled in alignment with recommended follow up timeframe or Transitions of Risk Category? (Low = within 30 days; Moderate= within 14 days; High= within 7 days): Yes  Discharge Follow Up Appointment Date: " 12/18/24  Discharge Follow Up Appointment Scheduled with?: Primary Care Provider    No future appointments.    Outpatient Plan as outlined on AVS reviewed with patient.    For any urgent concerns, please contact our 24 hour nurse triage line: [phone] ([phone])       Maura Camacho MA

## 2024-12-11 ENCOUNTER — TELEPHONE (OUTPATIENT)
Dept: GASTROENTEROLOGY | Facility: CLINIC | Age: 74
End: 2024-12-11
Payer: MEDICARE

## 2024-12-11 NOTE — TELEPHONE ENCOUNTER
SIERRA VINSON IN SCHEDULING PT R/S 01/02/2025 ARRIVING AT 1245PM [place] PREP INFORMATION UPLOADED TO MY CHART OK FOR HUB TO RELAY

## 2024-12-16 ENCOUNTER — OFFICE VISIT (OUTPATIENT)
Dept: CARDIOLOGY | Facility: CLINIC | Age: 74
End: 2024-12-16
Payer: MEDICARE

## 2024-12-16 ENCOUNTER — TELEPHONE (OUTPATIENT)
Dept: CARDIOLOGY | Facility: CLINIC | Age: 74
End: 2024-12-16
Payer: MEDICARE

## 2024-12-16 VITALS
HEIGHT: 74 IN | HEART RATE: 85 BPM | SYSTOLIC BLOOD PRESSURE: 128 MMHG | WEIGHT: 272.8 LBS | DIASTOLIC BLOOD PRESSURE: 74 MMHG | BODY MASS INDEX: 35.01 KG/M2

## 2024-12-16 DIAGNOSIS — I42.9 CARDIOMYOPATHY, UNSPECIFIED TYPE: Primary | ICD-10-CM

## 2024-12-17 ENCOUNTER — HOSPITAL ENCOUNTER (OUTPATIENT)
Dept: CARDIOLOGY | Facility: HOSPITAL | Age: 74
Discharge: HOME | End: 2024-12-17
Payer: MEDICARE

## 2024-12-17 VITALS — WEIGHT: 270 LBS | BODY MASS INDEX: 34.65 KG/M2 | HEIGHT: 74 IN

## 2024-12-17 DIAGNOSIS — E85.82 WILD-TYPE TRANSTHYRETIN-RELATED (ATTR) AMYLOIDOSIS: ICD-10-CM

## 2024-12-17 PROCEDURE — 34310000005 TECHNETIUM TC99M PYROPHOSPHATE: Performed by: INTERNAL MEDICINE

## 2024-12-17 PROCEDURE — A9538 TC99M PYROPHOSPHATE: HCPCS | Performed by: INTERNAL MEDICINE

## 2024-12-17 PROCEDURE — 78803 RP LOCLZJ TUM SPECT 1 AREA: CPT

## 2024-12-17 RX ADMIN — TECHNETIUM TC99M PYROPHOSPHATE 1 DOSE: 12 INJECTION INTRAVENOUS at 11:15

## 2024-12-17 NOTE — PROGRESS NOTES
Subjective:     Encounter Date: 12/17/24      Patient ID: Jeb Olson is a 74 y.o. male.    Chief Complaint: Abnormal stress test  HPI:   This is a 74 year-old man with a history of CAD, PVCs.     In 2022 he an eye surgery and was noted to have frequent PVCs.  This prompted further testing by his primary care doctor.  He had a Holter monitor which showed frequent PVCs, PVC burden was 22% in couplets, triplets bigeminy and trigeminy.  Thus, he therefore had a transthoracic echocardiogram which showed normal systolic ejection fraction with grade 1 diastolic dysfunction appropriate for age and mild MR.   Subsequent to that he had a walking nuclear stress test with a small, mild apical ischemic defect.  Cardiac catheterization September 22 by myself showed a distal LAD lesion, status post PCI with 1 drug-eluting stent.     Fall 2024 he was in the office and found to have asymptomatic atrial fibrillation on EKG.  His exam revealed a prominent MR murmur.  Holter showed 100% A-fib burden with average heart rate of 60 on beta-blockade.  Follow-up echocardiogram was performed last week.  There has been enlargement of both ventricles and atria, thickening of the LV, progression of his mitral disease from mild MR to moderate to severe MR.  He also has mild AS, pulmonary hypertension. His SPEP/UPEP was abnormal. He is to have his PYP scan tomorrow. In the interim  he was hospitalized with bacterial pneumonia in [place] in Dec 2024. At that time he had reduced EF of about 35% with moderate to severe MR. No angina, stable dyspnea, no palpitations. Edema is stable.     The following portions of the patient's history were reviewed and updated as appropriate: allergies, current medications, past family history, past medical history, past social history, past surgical history and problem list.     REVIEW OF SYSTEMS:   All systems reviewed.  Pertinent positives identified in HPI.  All other systems are negative.    Past  Medical History:   Diagnosis Date    Allergic Have had for several years    Eyes and nasal issues    Anemia     Anxiety Since about 2014    Since I was taking of my Dad, who also passed away 3yrs ago.    Arthritis 2002    Both knees, hips, and shoulders    Arthritis of knee, left     Cataract 05/2019    Colon polyp 2017    Coronary artery disease     stent    Diabetes mellitus     Dry eyes     Essential hypertension     HL (hearing loss) 1980    no hearing aides    Hyperlipemia     Knee swelling 7/7/2020    Shortly after my left knee replacement    Mild chronic anemia     Obesity     Sleep apnea     cpap       Family History   Problem Relation Age of Onset    Alcohol abuse Maternal Uncle         Passed away 2013    Alcohol abuse Father         Passed away in 2016    Hyperlipidemia Father         Started about 10 years before his death    Arthritis Mother         Passed away 2006, from Alzheimers    Diabetes Mother     Hyperlipidemia Mother         Started probably at middle age    Osteoporosis Mother     Malig Hyperthermia Neg Hx        Social History     Socioeconomic History    Marital status:    Tobacco Use    Smoking status: Never     Passive exposure: Never    Smokeless tobacco: Never    Tobacco comments:     Naila only every been around people who smoked   Vaping Use    Vaping status: Never Used   Substance and Sexual Activity    Alcohol use: Yes     Alcohol/week: 10.0 standard drinks of alcohol     Types: 10 Drinks containing 0.5 oz of alcohol per week     Comment: Maybe 8-10 drinks mainly on weekends.    Drug use: Never    Sexual activity: Not Currently     Partners: Female     Birth control/protection: None       No Known Allergies    Past Surgical History:   Procedure Laterality Date    ACHILLES TENDON REPAIR Left     CARDIAC CATHETERIZATION      CARDIAC CATHETERIZATION N/A 09/01/2022    Procedure: Coronary angiography, Left heart catheterization,;  Surgeon: Bruna Chávez MD;  Location: [facility]  INVASIVE LOCATION;  Service: Cardiology;  Laterality: N/A;    CARDIAC CATHETERIZATION N/A 09/01/2022    Procedure: Stent PRAVIN coronary;  Surgeon: Bruna Chávez MD;  Location:  [facility] CATH INVASIVE LOCATION;  Service: Cardiology;  Laterality: N/A;    CATARACT EXTRACTION EXTRACAPSULAR W/ INTRAOCULAR LENS IMPLANTATION Bilateral     COLONOSCOPY      Dr Reyes 6 years ago    ENDOSCOPY      FOOT SURGERY      Achilles repair    JOINT REPLACEMENT  03/12/20    Left knee    TONSILLECTOMY      As a child    TOTAL KNEE ARTHROPLASTY Left 03/12/2020    Procedure: TOTAL KNEE ARTHROPLASTY;  Surgeon: Chepe Alicea MD;  Location: [facility] MAIN OR;  Service: Orthopedics;  Laterality: Left;    TOTAL KNEE ARTHROPLASTY Right 03/21/2024    Procedure: TOTAL KNEE ARTHROPLASTY;  Surgeon: Chepe Alicea MD;  Location:  [facility] OR [facility];  Service: Orthopedics;  Laterality: Right;       Procedures       Objective:         PHYSICAL EXAM:  GEN: VSS, no distress,   Eyes: normal sclera, normal lids and lashes  HENT: moist mucus membranes,   Respiratory: CTAB, no rales or wheezes  CV: irregRR, blowing 3 out of 6 apical murmur, +2 DP and 2+ carotid pulses b/l  GI: NABS, soft,  Nontender, nondistended  MSK: no edema, no scoliosis or kyphosis  Skin: no rash, warm, dry  Heme/Lymph: no bruising or bleeding  Psych: organized thought, normal behavior and affect  Neuro: Cranial nerves grossly intact, Alert and Oriented x 3.         Assessment:          Diagnosis Plan   1. Cardiomyopathy, unspecified type           Plan:       1.  CAD: Status post distal LAD PCI September 22.    CCS class I symptoms.   aspirin 81 daily.  2.  Mitral regurgitation: moderate to severe MR. May need a YAZMIN.   3.  AF: Warfarin.  Continue Bystolic 5.  4.  Cardiomyopathy, concern for amyloid. PYP planned for tomorrow. Newly reduced EF found in [place] while hospitalized unclear if related to severe illness.     Dr. Salvador, Thank you very much for referring this kind patient to me.  Please call me with any questions or concerns. I will see the patient again in the office in 1 month.      Bruna Chávez MD  12/17/24  [facility]    Outpatient Encounter Medications as of 12/16/2024   Medication Sig Dispense Refill    acetaminophen (TYLENOL) 325 MG tablet Take 2 tablets by mouth 2 (Two) Times a Day As Needed for Mild Pain. 60 tablet 0    aspirin 81 MG EC tablet Take 1 tablet by mouth 2 (Two) Times a Day. 60 tablet 0    benazepril (LOTENSIN) 40 MG tablet Take 1 tablet by mouth twice daily 180 tablet 0    cloNIDine (CATAPRES) 0.2 MG tablet Take 1 tablet by mouth twice daily 180 tablet 1    doxazosin (CARDURA) 4 MG tablet TAKE 1 TABLET BY MOUTH ONCE DAILY AT NIGHT 90 tablet 1    escitalopram (LEXAPRO) 10 MG tablet Take 1.5 tablets by mouth Every Evening. 135 tablet 1    ezetimibe (ZETIA) 10 MG tablet Take 1 tablet by mouth once daily 90 tablet 0    fenofibrate 160 MG tablet TAKE 1 TABLET BY MOUTH AT NIGHT 90 tablet 0    fluticasone (FLONASE) 50 MCG/ACT nasal spray Administer 2 sprays into the nostril(s) as directed by provider Every Morning. 2 sprays in each nostril      furosemide (LASIX) 40 MG tablet Take 1 tablet by mouth Daily. 90 tablet 3    gabapentin (NEURONTIN) 600 MG tablet Take 1 tablet by mouth Every Evening.      glimepiride (AMARYL) 4 MG tablet Take 1 tablet by mouth 2 (Two) Times a Day. Indications: Type 2 Diabetes 180 tablet 1    glucose blood (Accu-Chek Anabela Plus) test strip TEST TWICE DAILY Use as instructed 100 each 3    Janumet XR  MG tablet Take 1 tablet by mouth twice daily 180 tablet 0    Multiple Vitamins-Minerals (b complex-C-E-zinc) tablet Take 1 tablet by mouth Every Other Day.      Multiple Vitamins-Minerals (PRESERVISION AREDS 2 PO) Take 1 tablet by mouth 2 (Two) Times a Day.      nebivolol (BYSTOLIC) 5 MG tablet Take 1 tablet by mouth Daily. 90 tablet 1    NIFEdipine XL (PROCARDIA XL) 60 MG 24 hr tablet Take 1 tablet by mouth Every Morning. 90  tablet 1    Olopatadine HCl (PATADAY OP) Apply 1 drop to eye(s) as directed by provider 2 (Two) Times a Day As Needed (allergies).      polyethyl glycol-propyl glycol (SYSTANE) 0.4-0.3 % solution ophthalmic solution (artificial tears) Administer 1 drop to both eyes As Needed (dry eyes).      traZODone (DESYREL) 50 MG tablet TAKE 1 TABLET BY MOUTH ONCE DAILY AT NIGHT 90 tablet 0    vitamin D3 125 MCG (5000 UT) capsule capsule Take 1 capsule by mouth Daily.      warfarin (COUMADIN) 5 MG tablet Take 1 tablet by mouth Daily. (Patient taking differently: Take 1 tablet by mouth Daily. Patient states taking 7.5 MG on Wednesday and Saturday) 180 tablet 3    [DISCONTINUED] cephalexin (KEFLEX) 500 MG capsule 4 pills one hour prior to procedure 4 capsule 2     Facility-Administered Encounter Medications as of 12/16/2024   Medication Dose Route Frequency Provider Last Rate Last Admin    Chlorhexidine Gluconate 2 % pads 3 each  3 pad  Apply externally BID Pricila Barnes, TIFFANIE

## 2024-12-18 ENCOUNTER — OFFICE VISIT (OUTPATIENT)
Dept: FAMILY MEDICINE CLINIC | Facility: CLINIC | Age: 74
End: 2024-12-18
Payer: MEDICARE

## 2024-12-18 VITALS
SYSTOLIC BLOOD PRESSURE: 138 MMHG | WEIGHT: 273 LBS | HEART RATE: 64 BPM | OXYGEN SATURATION: 93 % | DIASTOLIC BLOOD PRESSURE: 68 MMHG | BODY MASS INDEX: 35.04 KG/M2 | HEIGHT: 74 IN

## 2024-12-18 DIAGNOSIS — I48.21 PERMANENT ATRIAL FIBRILLATION: ICD-10-CM

## 2024-12-18 DIAGNOSIS — J18.9 PNEUMONIA OF BOTH LUNGS DUE TO INFECTIOUS ORGANISM, UNSPECIFIED PART OF LUNG: Primary | ICD-10-CM

## 2024-12-18 DIAGNOSIS — R79.82 ELEVATED C-REACTIVE PROTEIN (CRP): ICD-10-CM

## 2024-12-18 DIAGNOSIS — I50.21 ACUTE SYSTOLIC CHF (CONGESTIVE HEART FAILURE): ICD-10-CM

## 2024-12-18 DIAGNOSIS — R91.8 PULMONARY NODULES: ICD-10-CM

## 2024-12-18 DIAGNOSIS — J90 PLEURAL EFFUSION, BILATERAL: ICD-10-CM

## 2024-12-18 PROCEDURE — 99495 TRANSJ CARE MGMT MOD F2F 14D: CPT | Performed by: INTERNAL MEDICINE

## 2024-12-18 PROCEDURE — 3051F HG A1C>EQUAL 7.0%<8.0%: CPT | Performed by: INTERNAL MEDICINE

## 2024-12-18 PROCEDURE — 3078F DIAST BP <80 MM HG: CPT | Performed by: INTERNAL MEDICINE

## 2024-12-18 PROCEDURE — 1125F AMNT PAIN NOTED PAIN PRSNT: CPT | Performed by: INTERNAL MEDICINE

## 2024-12-18 PROCEDURE — 1111F DSCHRG MED/CURRENT MED MERGE: CPT | Performed by: INTERNAL MEDICINE

## 2024-12-18 PROCEDURE — 3075F SYST BP GE 130 - 139MM HG: CPT | Performed by: INTERNAL MEDICINE

## 2024-12-18 NOTE — PROGRESS NOTES
Transitional Care Follow Up Visit  Subjective     Jeb Olson is a 74 y.o. male who presents for a transitional care management visit.    Within 48 business hours after discharge our office contacted him via telephone to coordinate his care and needs.      I reviewed and discussed the details of that call along with the discharge summary, hospital problems, inpatient lab results, inpatient diagnostic studies, and consultation reports with Jeb.     Current outpatient and discharge medications have been reconciled for the patient.  Reviewed by: Tera Salvador MD          12/8/2024     6:45 PM   Date of TCM Phone Call   [facility]   Date of Admission 12/3/2024   Date of Discharge 12/8/2024   Discharge Disposition Home or Self Care     Risk for Readmission (LACE) No data recorded    Pneumonia  He complains of shortness of breath.    Course During Hospital Stay: Rosas is here today for hospital follow-up.  He was admitted to the hospital on 12/3/2024 and discharged on 12/8/2024.  He was admitted with shortness of breath and was found to have a combination of pneumonia and congestive heart failure.  His CT of the chest showed pulmonary nodules as well as some adenopathy in addition to infiltrates , congestive changes, and bilateral pleural effusions.  His echocardiogram showed a diminished ejection fraction compared with previous.  His ejection fraction on this was 30 to 40% were previously was about 52%.  His initial C-reactive protein was quite elevated at greater than 100.  Upon repeat it was down to 32.  They wanted him to have a follow-up CRP done when he saw me.  His BNP was greater than 6000.  He did have an A1c test done while there that was 7.0.  I did review his sugars throughout the hospitalization they seem to do okay.  He is still feeling weak and somewhat short of breath at times.  His weight here today is actually higher than it had been previously.   The following portions of the  "patient's history were reviewed and updated as appropriate: allergies, current medications, and problem list.    Review of Systems   Constitutional:  Positive for fatigue.   Respiratory:  Positive for shortness of breath.      Objective   /68   Pulse 64   Ht 188 cm (74.02\")   Wt 124 kg (273 lb)   SpO2 93%   BMI 35.04 kg/m²   Physical Exam  Vitals and nursing note reviewed.   Constitutional:       Appearance: Normal appearance.   Neck:      Vascular: No carotid bruit.   Cardiovascular:      Rate and Rhythm: Normal rate. Rhythm irregular.      Pulses: Normal pulses.      Heart sounds: No murmur heard.     No friction rub. No gallop.   Pulmonary:      Effort: Pulmonary effort is normal.      Breath sounds: No wheezing, rhonchi or rales.   Neurological:      Mental Status: He is alert.     Assessment & Plan   Diagnoses and all orders for this visit:    1. Pneumonia of both lungs due to infectious organism, unspecified part of lung (Primary)  -     C-reactive Protein  -     CBC & Differential  -     XR Chest PA & Lateral; Future    2. Acute systolic CHF (congestive heart failure)  -     Basic Metabolic Panel  -     BNP ([facility] Only)    3. Pulmonary nodules  -     CT Chest Without Contrast Diagnostic; Future  -     XR Chest PA & Lateral; Future    4. Elevated C-reactive protein (CRP)  -     C-reactive Protein    5. Permanent atrial fibrillation  -     Protime-INR    6. Pleural effusion, bilateral  -     XR Chest PA & Lateral; Future    Rosas is here today for follow-up.  Clinically I do not hear the pneumonia in his lungs.  I am going to have him get a chest x-ray in a few weeks and we will get a follow-up CT scan for the pulmonary nodules.  For his heart failure and going to do a follow-up BNP.  We will also follow-up on the C-reactive protein.  Because of the antibiotics he is received we will check his pro time as well.               "

## 2024-12-19 ENCOUNTER — HOSPITAL ENCOUNTER (INPATIENT)
Facility: HOSPITAL | Age: 74
LOS: 5 days | Discharge: HOME | End: 2024-12-24
Attending: EMERGENCY MEDICINE | Admitting: STUDENT IN AN ORGANIZED HEALTH CARE EDUCATION/TRAINING PROGRAM
Payer: MEDICARE

## 2024-12-19 ENCOUNTER — APPOINTMENT (OUTPATIENT)
Dept: GENERAL RADIOLOGY | Facility: HOSPITAL | Age: 74
End: 2024-12-19
Payer: MEDICARE

## 2024-12-19 ENCOUNTER — APPOINTMENT (OUTPATIENT)
Dept: CT IMAGING | Facility: HOSPITAL | Age: 74
End: 2024-12-19
Payer: MEDICARE

## 2024-12-19 DIAGNOSIS — I50.23 ACUTE ON CHRONIC SYSTOLIC CONGESTIVE HEART FAILURE: ICD-10-CM

## 2024-12-19 DIAGNOSIS — I50.9 ACUTE ON CHRONIC CONGESTIVE HEART FAILURE, UNSPECIFIED HEART FAILURE TYPE: ICD-10-CM

## 2024-12-19 DIAGNOSIS — J96.01 ACUTE HYPOXEMIC RESPIRATORY FAILURE: Primary | ICD-10-CM

## 2024-12-19 DIAGNOSIS — R94.39 ABNORMAL STRESS TEST: ICD-10-CM

## 2024-12-19 LAB
ALBUMIN SERPL-MCNC: 4.4 G/DL (ref 3.5–5.2)
ALBUMIN/GLOB SERPL: 1.6 G/DL
ALP SERPL-CCNC: 38 U/L (ref 39–117)
ALT SERPL W P-5'-P-CCNC: 35 U/L (ref 1–41)
ANION GAP SERPL CALCULATED.3IONS-SCNC: 11.9 MMOL/L (ref 5–15)
ANION GAP SERPL CALCULATED.3IONS-SCNC: 14.8 MMOL/L (ref 5–15)
ARTERIAL PATENCY WRIST A: ABNORMAL
AST SERPL-CCNC: 20 U/L (ref 1–40)
ATMOSPHERIC PRESS: 758.3 MMHG
B PARAPERT DNA SPEC QL NAA+PROBE: NOT DETECTED
B PERT DNA SPEC QL NAA+PROBE: NOT DETECTED
BASE EXCESS BLDA CALC-SCNC: 3.5 MMOL/L (ref 0–2)
BASOPHILS # BLD AUTO: 0 X10E3/UL (ref 0–0.2)
BASOPHILS # BLD AUTO: 0.07 10*3/MM3 (ref 0–0.2)
BASOPHILS NFR BLD AUTO: 1 %
BASOPHILS NFR BLD AUTO: 1 % (ref 0–1.5)
BDY SITE: ABNORMAL
BILIRUB SERPL-MCNC: 0.7 MG/DL (ref 0–1.2)
BNP SERPL-MCNC: 443 PG/ML (ref 0–100)
BUN SERPL-MCNC: 18 MG/DL (ref 8–23)
BUN SERPL-MCNC: 20 MG/DL (ref 8–23)
BUN SERPL-MCNC: 21 MG/DL (ref 8–27)
BUN/CREAT SERPL: 17.2 (ref 7–25)
BUN/CREAT SERPL: 17.8 (ref 7–25)
BUN/CREAT SERPL: 18 (ref 10–24)
C PNEUM DNA NPH QL NAA+NON-PROBE: NOT DETECTED
CALCIUM SERPL-MCNC: 8.9 MG/DL (ref 8.6–10.2)
CALCIUM SPEC-SCNC: 8.9 MG/DL (ref 8.6–10.5)
CALCIUM SPEC-SCNC: 9.1 MG/DL (ref 8.6–10.5)
CHLORIDE SERPL-SCNC: 103 MMOL/L (ref 98–107)
CHLORIDE SERPL-SCNC: 103 MMOL/L (ref 98–107)
CHLORIDE SERPL-SCNC: 106 MMOL/L (ref 96–106)
CO2 BLDA-SCNC: 30.4 MMOL/L (ref 23–27)
CO2 SERPL-SCNC: 24 MMOL/L (ref 20–29)
CO2 SERPL-SCNC: 25.2 MMOL/L (ref 22–29)
CO2 SERPL-SCNC: 28.1 MMOL/L (ref 22–29)
CREAT SERPL-MCNC: 1.01 MG/DL (ref 0.76–1.27)
CREAT SERPL-MCNC: 1.16 MG/DL (ref 0.76–1.27)
CREAT SERPL-MCNC: 1.17 MG/DL (ref 0.76–1.27)
CRP SERPL-MCNC: 6 MG/L (ref 0–10)
D DIMER PPP FEU-MCNC: 1.72 MCGFEU/ML (ref 0–0.72)
DEPRECATED RDW RBC AUTO: 45.6 FL (ref 37–54)
DEVICE COMMENT: ABNORMAL
EGFRCR SERPLBLD CKD-EPI 2021: 66 ML/MIN/1.73
EGFRCR SERPLBLD CKD-EPI 2021: 66.9 ML/MIN/1.73
EGFRCR SERPLBLD CKD-EPI 2021: 79 ML/MIN/1.73
EOSINOPHIL # BLD AUTO: 0.1 X10E3/UL (ref 0–0.4)
EOSINOPHIL # BLD AUTO: 0.15 10*3/MM3 (ref 0–0.4)
EOSINOPHIL NFR BLD AUTO: 2 %
EOSINOPHIL NFR BLD AUTO: 2.2 % (ref 0.3–6.2)
ERYTHROCYTE [DISTWIDTH] IN BLOOD BY AUTOMATED COUNT: 14.2 % (ref 11.6–15.4)
ERYTHROCYTE [DISTWIDTH] IN BLOOD BY AUTOMATED COUNT: 14.2 % (ref 12.3–15.4)
FLUAV SUBTYP SPEC NAA+PROBE: NOT DETECTED
FLUBV RNA ISLT QL NAA+PROBE: NOT DETECTED
GEN 5 1HR TROPONIN T REFLEX: 21 NG/L
GLOBULIN UR ELPH-MCNC: 2.8 GM/DL
GLUCOSE BLDC GLUCOMTR-MCNC: 213 MG/DL (ref 70–130)
GLUCOSE BLDC GLUCOMTR-MCNC: 93 MG/DL (ref 70–130)
GLUCOSE SERPL-MCNC: 140 MG/DL (ref 65–99)
GLUCOSE SERPL-MCNC: 209 MG/DL (ref 65–99)
GLUCOSE SERPL-MCNC: 241 MG/DL (ref 70–99)
HADV DNA SPEC NAA+PROBE: NOT DETECTED
HCO3 BLDA-SCNC: 29 MMOL/L (ref 22–28)
HCOV 229E RNA SPEC QL NAA+PROBE: NOT DETECTED
HCOV HKU1 RNA SPEC QL NAA+PROBE: NOT DETECTED
HCOV NL63 RNA SPEC QL NAA+PROBE: NOT DETECTED
HCOV OC43 RNA SPEC QL NAA+PROBE: NOT DETECTED
HCT VFR BLD AUTO: 34 % (ref 37.5–51)
HCT VFR BLD AUTO: 36.2 % (ref 37.5–51)
HEMODILUTION: NO
HGB BLD-MCNC: 10.6 G/DL (ref 13–17.7)
HGB BLD-MCNC: 11.9 G/DL (ref 13–17.7)
HMPV RNA NPH QL NAA+NON-PROBE: NOT DETECTED
HPIV1 RNA ISLT QL NAA+PROBE: NOT DETECTED
HPIV2 RNA SPEC QL NAA+PROBE: NOT DETECTED
HPIV3 RNA NPH QL NAA+PROBE: NOT DETECTED
HPIV4 P GENE NPH QL NAA+PROBE: NOT DETECTED
IMM GRANULOCYTES # BLD AUTO: 0 X10E3/UL (ref 0–0.1)
IMM GRANULOCYTES # BLD AUTO: 0.03 10*3/MM3 (ref 0–0.05)
IMM GRANULOCYTES NFR BLD AUTO: 0.4 % (ref 0–0.5)
IMM GRANULOCYTES NFR BLD AUTO: 1 %
INHALED O2 CONCENTRATION: 80 %
INR PPP: 1.7 (ref 0.9–1.2)
INR PPP: 1.83 (ref 0.9–1.1)
LYMPHOCYTES # BLD AUTO: 1 X10E3/UL (ref 0.7–3.1)
LYMPHOCYTES # BLD AUTO: 1.27 10*3/MM3 (ref 0.7–3.1)
LYMPHOCYTES NFR BLD AUTO: 18 %
LYMPHOCYTES NFR BLD AUTO: 18.9 % (ref 19.6–45.3)
M PNEUMO IGG SER IA-ACNC: NOT DETECTED
MAGNESIUM SERPL-MCNC: 2.1 MG/DL (ref 1.6–2.4)
MCH RBC QN AUTO: 28.1 PG (ref 26.6–33)
MCH RBC QN AUTO: 29 PG (ref 26.6–33)
MCHC RBC AUTO-ENTMCNC: 31.2 G/DL (ref 31.5–35.7)
MCHC RBC AUTO-ENTMCNC: 32.9 G/DL (ref 31.5–35.7)
MCV RBC AUTO: 88.1 FL (ref 79–97)
MCV RBC AUTO: 90 FL (ref 79–97)
MODALITY: ABNORMAL
MONOCYTES # BLD AUTO: 0.28 10*3/MM3 (ref 0.1–0.9)
MONOCYTES # BLD AUTO: 0.3 X10E3/UL (ref 0.1–0.9)
MONOCYTES NFR BLD AUTO: 4.2 % (ref 5–12)
MONOCYTES NFR BLD AUTO: 5 %
NEUTROPHILS # BLD AUTO: 4.1 X10E3/UL (ref 1.4–7)
NEUTROPHILS NFR BLD AUTO: 4.92 10*3/MM3 (ref 1.7–7)
NEUTROPHILS NFR BLD AUTO: 73 %
NEUTROPHILS NFR BLD AUTO: 73.3 % (ref 42.7–76)
NRBC BLD AUTO-RTO: 0 /100 WBC (ref 0–0.2)
NT-PROBNP SERPL-MCNC: 2600 PG/ML (ref 0–900)
O2 A-A PPRESDIFF RESPIRATORY: 0.1 MMHG
PCO2 BLDA: 46.9 MM HG (ref 35–45)
PH BLDA: 7.4 PH UNITS (ref 7.35–7.45)
PLATELET # BLD AUTO: 225 10*3/MM3 (ref 140–450)
PLATELET # BLD AUTO: 237 X10E3/UL (ref 150–450)
PMV BLD AUTO: 10.6 FL (ref 6–12)
PO2 BLD: 75 MM[HG] (ref 0–500)
PO2 BLDA: 60.2 MM HG (ref 80–100)
POTASSIUM SERPL-SCNC: 3.4 MMOL/L (ref 3.5–5.2)
POTASSIUM SERPL-SCNC: 3.7 MMOL/L (ref 3.5–5.2)
POTASSIUM SERPL-SCNC: 4 MMOL/L (ref 3.5–5.2)
PROT SERPL-MCNC: 7.2 G/DL (ref 6–8.5)
PROTHROMBIN TIME: 17.7 SEC (ref 9.1–12)
PROTHROMBIN TIME: 21.4 SECONDS (ref 11.7–14.2)
QT INTERVAL: 380 MS
QTC INTERVAL: 500 MS
RBC # BLD AUTO: 3.77 X10E6/UL (ref 4.14–5.8)
RBC # BLD AUTO: 4.11 10*6/MM3 (ref 4.14–5.8)
RHINOVIRUS RNA SPEC NAA+PROBE: NOT DETECTED
RSV RNA NPH QL NAA+NON-PROBE: NOT DETECTED
SAO2 % BLDCOA: 90.3 % (ref 92–98.5)
SARS-COV-2 RNA NPH QL NAA+NON-PROBE: NOT DETECTED
SODIUM SERPL-SCNC: 142 MMOL/L (ref 134–144)
SODIUM SERPL-SCNC: 143 MMOL/L (ref 136–145)
SODIUM SERPL-SCNC: 143 MMOL/L (ref 136–145)
TOTAL RATE: 18 BREATHS/MINUTE
TROPONIN T NUMERIC DELTA: 0 NG/L
TROPONIN T SERPL HS-MCNC: 21 NG/L
WBC # BLD AUTO: 5.6 X10E3/UL (ref 3.4–10.8)
WBC NRBC COR # BLD AUTO: 6.72 10*3/MM3 (ref 3.4–10.8)

## 2024-12-19 PROCEDURE — 94761 N-INVAS EAR/PLS OXIMETRY MLT: CPT

## 2024-12-19 PROCEDURE — 94799 UNLISTED PULMONARY SVC/PX: CPT

## 2024-12-19 PROCEDURE — 83880 ASSAY OF NATRIURETIC PEPTIDE: CPT | Performed by: EMERGENCY MEDICINE

## 2024-12-19 PROCEDURE — 93010 ELECTROCARDIOGRAM REPORT: CPT | Performed by: INTERNAL MEDICINE

## 2024-12-19 PROCEDURE — 99222 1ST HOSP IP/OBS MODERATE 55: CPT | Performed by: INTERNAL MEDICINE

## 2024-12-19 PROCEDURE — 36600 WITHDRAWAL OF ARTERIAL BLOOD: CPT | Performed by: EMERGENCY MEDICINE

## 2024-12-19 PROCEDURE — 99291 CRITICAL CARE FIRST HOUR: CPT

## 2024-12-19 PROCEDURE — 63710000001 INSULIN GLARGINE PER 5 UNITS: Performed by: STUDENT IN AN ORGANIZED HEALTH CARE EDUCATION/TRAINING PROGRAM

## 2024-12-19 PROCEDURE — 83735 ASSAY OF MAGNESIUM: CPT | Performed by: STUDENT IN AN ORGANIZED HEALTH CARE EDUCATION/TRAINING PROGRAM

## 2024-12-19 PROCEDURE — 82803 BLOOD GASES ANY COMBINATION: CPT | Performed by: EMERGENCY MEDICINE

## 2024-12-19 PROCEDURE — 85610 PROTHROMBIN TIME: CPT | Performed by: EMERGENCY MEDICINE

## 2024-12-19 PROCEDURE — 84484 ASSAY OF TROPONIN QUANT: CPT | Performed by: EMERGENCY MEDICINE

## 2024-12-19 PROCEDURE — 36415 COLL VENOUS BLD VENIPUNCTURE: CPT

## 2024-12-19 PROCEDURE — 80053 COMPREHEN METABOLIC PANEL: CPT | Performed by: STUDENT IN AN ORGANIZED HEALTH CARE EDUCATION/TRAINING PROGRAM

## 2024-12-19 PROCEDURE — 71275 CT ANGIOGRAPHY CHEST: CPT

## 2024-12-19 PROCEDURE — 25510000001 IOPAMIDOL PER 1 ML: Performed by: EMERGENCY MEDICINE

## 2024-12-19 PROCEDURE — 85025 COMPLETE CBC W/AUTO DIFF WBC: CPT | Performed by: EMERGENCY MEDICINE

## 2024-12-19 PROCEDURE — 93005 ELECTROCARDIOGRAM TRACING: CPT | Performed by: EMERGENCY MEDICINE

## 2024-12-19 PROCEDURE — 0202U NFCT DS 22 TRGT SARS-COV-2: CPT | Performed by: EMERGENCY MEDICINE

## 2024-12-19 PROCEDURE — 63710000001 INSULIN LISPRO (HUMAN) PER 5 UNITS: Performed by: STUDENT IN AN ORGANIZED HEALTH CARE EDUCATION/TRAINING PROGRAM

## 2024-12-19 PROCEDURE — 85379 FIBRIN DEGRADATION QUANT: CPT | Performed by: EMERGENCY MEDICINE

## 2024-12-19 PROCEDURE — 82948 REAGENT STRIP/BLOOD GLUCOSE: CPT

## 2024-12-19 PROCEDURE — 25010000002 FUROSEMIDE PER 20 MG: Performed by: INTERNAL MEDICINE

## 2024-12-19 PROCEDURE — 25010000002 ENOXAPARIN PER 10 MG: Performed by: STUDENT IN AN ORGANIZED HEALTH CARE EDUCATION/TRAINING PROGRAM

## 2024-12-19 PROCEDURE — 25010000002 FUROSEMIDE PER 20 MG: Performed by: EMERGENCY MEDICINE

## 2024-12-19 PROCEDURE — 71045 X-RAY EXAM CHEST 1 VIEW: CPT

## 2024-12-19 RX ORDER — BISACODYL 10 MG
10 SUPPOSITORY, RECTAL RECTAL DAILY PRN
Status: DISCONTINUED | OUTPATIENT
Start: 2024-12-19 | End: 2024-12-24 | Stop reason: HOSPADM

## 2024-12-19 RX ORDER — POLYETHYLENE GLYCOL 3350 17 G/17G
17 POWDER, FOR SOLUTION ORAL DAILY PRN
Status: DISCONTINUED | OUTPATIENT
Start: 2024-12-19 | End: 2024-12-24 | Stop reason: HOSPADM

## 2024-12-19 RX ORDER — NEBIVOLOL 5 MG/1
5 TABLET ORAL DAILY
Status: DISCONTINUED | OUTPATIENT
Start: 2024-12-19 | End: 2024-12-21

## 2024-12-19 RX ORDER — ASPIRIN 81 MG/1
81 TABLET ORAL DAILY
Status: DISCONTINUED | OUTPATIENT
Start: 2024-12-20 | End: 2024-12-24 | Stop reason: HOSPADM

## 2024-12-19 RX ORDER — POTASSIUM CHLORIDE 750 MG/1
40 TABLET, FILM COATED, EXTENDED RELEASE ORAL EVERY 4 HOURS
Status: COMPLETED | OUTPATIENT
Start: 2024-12-19 | End: 2024-12-20

## 2024-12-19 RX ORDER — SODIUM CHLORIDE 0.9 % (FLUSH) 0.9 %
10 SYRINGE (ML) INJECTION AS NEEDED
Status: DISCONTINUED | OUTPATIENT
Start: 2024-12-19 | End: 2024-12-24 | Stop reason: HOSPADM

## 2024-12-19 RX ORDER — ESCITALOPRAM OXALATE 10 MG/1
15 TABLET ORAL EVERY EVENING
Status: DISCONTINUED | OUTPATIENT
Start: 2024-12-19 | End: 2024-12-24 | Stop reason: HOSPADM

## 2024-12-19 RX ORDER — TERAZOSIN 5 MG/1
5 CAPSULE ORAL NIGHTLY
Status: DISCONTINUED | OUTPATIENT
Start: 2024-12-19 | End: 2024-12-24 | Stop reason: HOSPADM

## 2024-12-19 RX ORDER — AMOXICILLIN 250 MG
2 CAPSULE ORAL 2 TIMES DAILY
Status: DISCONTINUED | OUTPATIENT
Start: 2024-12-19 | End: 2024-12-24 | Stop reason: HOSPADM

## 2024-12-19 RX ORDER — FUROSEMIDE 10 MG/ML
80 INJECTION INTRAMUSCULAR; INTRAVENOUS ONCE
Status: COMPLETED | OUTPATIENT
Start: 2024-12-19 | End: 2024-12-19

## 2024-12-19 RX ORDER — POTASSIUM CHLORIDE 750 MG/1
40 TABLET, FILM COATED, EXTENDED RELEASE ORAL ONCE
Status: COMPLETED | OUTPATIENT
Start: 2024-12-19 | End: 2024-12-19

## 2024-12-19 RX ORDER — NITROGLYCERIN 0.4 MG/1
0.4 TABLET SUBLINGUAL
Status: DISCONTINUED | OUTPATIENT
Start: 2024-12-19 | End: 2024-12-19

## 2024-12-19 RX ORDER — IBUPROFEN 600 MG/1
1 TABLET ORAL
Status: DISCONTINUED | OUTPATIENT
Start: 2024-12-19 | End: 2024-12-24 | Stop reason: HOSPADM

## 2024-12-19 RX ORDER — FENOFIBRATE 145 MG/1
145 TABLET, COATED ORAL NIGHTLY
Status: DISCONTINUED | OUTPATIENT
Start: 2024-12-19 | End: 2024-12-24 | Stop reason: HOSPADM

## 2024-12-19 RX ORDER — WARFARIN SODIUM 5 MG/1
5 TABLET ORAL
Status: DISCONTINUED | OUTPATIENT
Start: 2024-12-19 | End: 2024-12-21

## 2024-12-19 RX ORDER — IOPAMIDOL 755 MG/ML
100 INJECTION, SOLUTION INTRAVASCULAR
Status: COMPLETED | OUTPATIENT
Start: 2024-12-19 | End: 2024-12-19

## 2024-12-19 RX ORDER — LISINOPRIL 20 MG/1
40 TABLET ORAL
Status: DISCONTINUED | OUTPATIENT
Start: 2024-12-19 | End: 2024-12-20

## 2024-12-19 RX ORDER — SODIUM CHLORIDE 9 MG/ML
40 INJECTION, SOLUTION INTRAVENOUS AS NEEDED
Status: DISCONTINUED | OUTPATIENT
Start: 2024-12-19 | End: 2024-12-24 | Stop reason: HOSPADM

## 2024-12-19 RX ORDER — SODIUM CHLORIDE 0.9 % (FLUSH) 0.9 %
10 SYRINGE (ML) INJECTION EVERY 12 HOURS SCHEDULED
Status: DISCONTINUED | OUTPATIENT
Start: 2024-12-19 | End: 2024-12-24 | Stop reason: HOSPADM

## 2024-12-19 RX ORDER — ENOXAPARIN SODIUM 150 MG/ML
1 INJECTION SUBCUTANEOUS EVERY 12 HOURS
Status: DISCONTINUED | OUTPATIENT
Start: 2024-12-19 | End: 2024-12-19

## 2024-12-19 RX ORDER — NICOTINE POLACRILEX 4 MG
15 LOZENGE BUCCAL
Status: DISCONTINUED | OUTPATIENT
Start: 2024-12-19 | End: 2024-12-19

## 2024-12-19 RX ORDER — ENOXAPARIN SODIUM 100 MG/ML
40 INJECTION SUBCUTANEOUS EVERY 24 HOURS
Status: DISCONTINUED | OUTPATIENT
Start: 2024-12-19 | End: 2024-12-19

## 2024-12-19 RX ORDER — ENOXAPARIN SODIUM 150 MG/ML
1 INJECTION SUBCUTANEOUS EVERY 12 HOURS
Status: DISCONTINUED | OUTPATIENT
Start: 2024-12-19 | End: 2024-12-20

## 2024-12-19 RX ORDER — DEXTROSE MONOHYDRATE 25 G/50ML
25 INJECTION, SOLUTION INTRAVENOUS
Status: DISCONTINUED | OUTPATIENT
Start: 2024-12-19 | End: 2024-12-24 | Stop reason: HOSPADM

## 2024-12-19 RX ORDER — GABAPENTIN 300 MG/1
600 CAPSULE ORAL NIGHTLY
Status: DISCONTINUED | OUTPATIENT
Start: 2024-12-19 | End: 2024-12-24 | Stop reason: HOSPADM

## 2024-12-19 RX ORDER — BISACODYL 5 MG/1
5 TABLET, DELAYED RELEASE ORAL DAILY PRN
Status: DISCONTINUED | OUTPATIENT
Start: 2024-12-19 | End: 2024-12-24 | Stop reason: HOSPADM

## 2024-12-19 RX ORDER — NITROGLYCERIN 0.4 MG/1
0.4 TABLET SUBLINGUAL
Status: DISCONTINUED | OUTPATIENT
Start: 2024-12-19 | End: 2024-12-24 | Stop reason: HOSPADM

## 2024-12-19 RX ORDER — INSULIN LISPRO 100 [IU]/ML
2-7 INJECTION, SOLUTION INTRAVENOUS; SUBCUTANEOUS
Status: DISCONTINUED | OUTPATIENT
Start: 2024-12-19 | End: 2024-12-24 | Stop reason: HOSPADM

## 2024-12-19 RX ADMIN — FUROSEMIDE 80 MG: 10 INJECTION, SOLUTION INTRAMUSCULAR; INTRAVENOUS at 15:40

## 2024-12-19 RX ADMIN — POTASSIUM CHLORIDE 40 MEQ: 750 TABLET, EXTENDED RELEASE ORAL at 20:44

## 2024-12-19 RX ADMIN — POTASSIUM CHLORIDE 40 MEQ: 750 TABLET, EXTENDED RELEASE ORAL at 15:40

## 2024-12-19 RX ADMIN — ESCITALOPRAM OXALATE 15 MG: 10 TABLET, FILM COATED ORAL at 16:49

## 2024-12-19 RX ADMIN — FENOFIBRATE 145 MG: 145 TABLET ORAL at 20:44

## 2024-12-19 RX ADMIN — TERAZOSIN HYDROCHLORIDE 5 MG: 5 CAPSULE ORAL at 20:44

## 2024-12-19 RX ADMIN — NEBIVOLOL 5 MG: 5 TABLET ORAL at 20:44

## 2024-12-19 RX ADMIN — INSULIN GLARGINE 10 UNITS: 100 INJECTION, SOLUTION SUBCUTANEOUS at 20:44

## 2024-12-19 RX ADMIN — Medication 10 ML: at 15:49

## 2024-12-19 RX ADMIN — INSULIN LISPRO 3 UNITS: 100 INJECTION, SOLUTION INTRAVENOUS; SUBCUTANEOUS at 16:49

## 2024-12-19 RX ADMIN — WARFARIN SODIUM 5 MG: 5 TABLET ORAL at 22:55

## 2024-12-19 RX ADMIN — SENNOSIDES AND DOCUSATE SODIUM 2 TABLET: 50; 8.6 TABLET ORAL at 20:44

## 2024-12-19 RX ADMIN — LISINOPRIL 40 MG: 20 TABLET ORAL at 15:41

## 2024-12-19 RX ADMIN — FUROSEMIDE 80 MG: 10 INJECTION, SOLUTION INTRAMUSCULAR; INTRAVENOUS at 09:00

## 2024-12-19 RX ADMIN — GABAPENTIN 600 MG: 300 CAPSULE ORAL at 20:44

## 2024-12-19 RX ADMIN — Medication 10 ML: at 20:45

## 2024-12-19 RX ADMIN — IOPAMIDOL 95 ML: 755 INJECTION, SOLUTION INTRAVENOUS at 10:27

## 2024-12-19 RX ADMIN — ENOXAPARIN SODIUM 120 MG: 150 INJECTION SUBCUTANEOUS at 20:45

## 2024-12-19 NOTE — PLAN OF CARE
Goal Outcome Evaluation:  Plan of Care Reviewed With: patient        Progress: no change  Outcome Evaluation: Pt resting comfortably at this time, arrived on unit via stretcher approx 1400, aandox4, relax, 4L o2 satting 96%, denies discomfort or sob, answered all questions appropriately, wife and stepson at bsd, denies chest pain, nausea or dizziness. c/o being hungry and thirsty, snack and water offered willow well, afib on tele hr 88, call light and room orientation given, safety orecautions also given, pt understood all, nad. voding well per urinal, lasix given. home meds given. O2 titrated to 3L , will ctm.

## 2024-12-19 NOTE — ED PROVIDER NOTES
EMERGENCY DEPARTMENT ENCOUNTER    Room Number:  12/12  PCP: Tera Salvador MD    HPI:  Chief Complaint: Chest tightness and shortness of breath  A complete HPI/ROS/PMH/PSH/SH/FH are unobtainable due to: None  Context: Jeb Olson is a 74 y.o. male who presents to the ED c/o acute chest tightness and shortness of breath.  Onset about 1 to 2 hours ago.  He woke up with a chest tightness.  He checked his oxygen levels and it was in the 70s at home.  Upon EMS arrival, he was satting in the low 80s.  He was placed on nonrebreather mask with sats of 92 to 94%.  He was recently admitted to the hospital up in [place] for pneumonia.  He had reportedly been doing well and felt fine last night until this morning.  No fever.  No significant cough.        PAST MEDICAL HISTORY  Active Ambulatory Problems     Diagnosis Date Noted    Allergic rhinitis 11/11/2014    Arthritis of knee, left 10/09/2012    Diabetes mellitus 02/18/2013    Essential hypertension 03/07/2014    Hyperlipidemia 02/18/2013    High risk medication use 02/18/2013    Obesity 10/09/2012    Primary osteoarthritis of knee 06/30/2015    Mild chronic anemia 08/08/2019    Obstructive sleep apnea 08/08/2019    Arthritis of left knee 02/04/2020    Sinus bradycardia 10/18/2021    Abnormal stress test 08/16/2022    Colon polyp 09/07/2022    Family history of colonic polyps 09/07/2022    Reactive depression 10/18/2023    Arthritis of knee, right 12/13/2023    Arthritis of knee 12/13/2023    AF (paroxysmal atrial fibrillation) 09/23/2024     Resolved Ambulatory Problems     Diagnosis Date Noted    No Resolved Ambulatory Problems     Past Medical History:   Diagnosis Date    Allergic Have had for several years    Anemia     Anxiety Since about 2014    Arthritis 2002    Cataract 05/2019    Coronary artery disease     Dry eyes     HL (hearing loss) 1980    Hyperlipemia     Knee swelling 7/7/2020    Sleep apnea          PAST SURGICAL HISTORY  Past Surgical  History:   Procedure Laterality Date    ACHILLES TENDON REPAIR Left     CARDIAC CATHETERIZATION      CARDIAC CATHETERIZATION N/A 09/01/2022    Procedure: Coronary angiography, Left heart catheterization,;  Surgeon: Bruna Chávez MD;  Location: [facility] CATH [facility]AS[facility] LOC[facility];  Service: Cardiology;  Laterality: N/A;    CARDIAC CATHETERIZATION N/A 09/01/2022    Procedure: Stent PRAVIN coronary;  Surgeon: Bruna Chávez MD;  Location:  [facility] CATH INVASIVE LOCATION;  Service: Cardiology;  Laterality: N/A;    CATARACT EXTRACTION EXTRACAPSULAR W/ INTRAOCULAR LENS IMPLANTATION Bilateral     COLONOSCOPY      Dr Reyes 6 years ago    ENDOSCOPY      FOOT SURGERY      Achilles repair    JOINT REPLACEMENT  03/12/20    Left knee    TONSILLECTOMY      As a child    TOTAL KNEE ARTHROPLASTY Left 03/12/2020    Procedure: TOTAL KNEE ARTHROPLASTY;  Surgeon: Chepe Alicea MD;  Location: [facility] MAIN OR;  Service: Orthopedics;  Laterality: Left;    TOTAL KNEE ARTHROPLASTY Right 03/21/2024    Procedure: TOTAL KNEE ARTHROPLASTY;  Surgeon: Chepe Alicea MD;  Location: [facility] OR [facility];  Service: Orthopedics;  Laterality: Right;         FAMILY HISTORY  Family History   Problem Relation Age of Onset    Alcohol abuse Maternal Uncle         Passed away 2013    Alcohol abuse Father         Passed away in 2016    Hyperlipidemia Father         Started about 10 years before his death    Arthritis Mother         Passed away 2006, from Alzheimers    Diabetes Mother     Hyperlipidemia Mother         Started probably at middle age    Osteoporosis Mother     Malig Hyperthermia Neg Hx          SOCIAL HISTORY  Social History     Socioeconomic History    Marital status:    Tobacco Use    Smoking status: Never     Passive exposure: Never    Smokeless tobacco: Never    Tobacco comments:     Naila only every been around people who smoked   Vaping Use    Vaping status: Never Used   Substance and Sexual Activity    Alcohol use: Yes     Alcohol/week: 10.0  standard drinks of alcohol     Types: 10 Drinks containing 0.5 oz of alcohol per week     Comment: Maybe 8-10 drinks mainly on weekends.    Drug use: Never    Sexual activity: Not Currently     Partners: Female     Birth control/protection: None         ALLERGIES  Patient has no known allergies.        REVIEW OF SYSTEMS  Review of Systems     Included in HPI  All systems reviewed and negative except for those discussed in HPI.       PHYSICAL EXAM  ED Triage Vitals   Temp Pulse Resp BP SpO2   -- -- -- -- --      Temp src Heart Rate Source Patient Position BP Location FiO2 (%)   -- -- -- -- --       Physical Exam      GENERAL: no acute distress  HENT: nares patent  EYES: no scleral icterus  CV: regular rhythm, normal rate  RESPIRATORY: Satting 89% on 10 L nonrebreather, faint bibasilar wheezing, speaks in phrases  ABDOMEN: soft nontender  MUSCULOSKELETAL: no deformity, no lower extremity edema or tenderness  NEURO: alert, moves all extremities, follows commands  PSYCH:  calm, cooperative  SKIN: warm, dry    Vital signs and nursing notes reviewed.          LAB RESULTS  Recent Results (from the past 24 hours)   C-reactive Protein    Collection Time: 12/18/24  3:32 PM    Specimen: Blood   Result Value Ref Range    C-Reactive Protein 6 0 - 10 mg/L   CBC & Differential    Collection Time: 12/18/24  3:32 PM    Specimen: Blood   Result Value Ref Range    WBC 5.6 3.4 - 10.8 x10E3/uL    RBC 3.77 (L) 4.14 - 5.80 x10E6/uL    Hemoglobin 10.6 (L) 13.0 - 17.7 g/dL    Hematocrit 34.0 (L) 37.5 - 51.0 %    MCV 90 79 - 97 fL    MCH 28.1 26.6 - 33.0 pg    MCHC 31.2 (L) 31.5 - 35.7 g/dL    RDW 14.2 11.6 - 15.4 %    Platelets 237 150 - 450 x10E3/uL    Neutrophil Rel % 73 Not Estab. %    Lymphocyte Rel % 18 Not Estab. %    Monocyte Rel % 5 Not Estab. %    Eosinophil Rel % 2 Not Estab. %    Basophil Rel % 1 Not Estab. %    Neutrophils Absolute 4.1 1.4 - 7.0 x10E3/uL    Lymphocytes Absolute 1.0 0.7 - 3.1 x10E3/uL    Monocytes Absolute 0.3  0.1 - 0.9 x10E3/uL    Eosinophils Absolute 0.1 0.0 - 0.4 x10E3/uL    Basophils Absolute 0.0 0.0 - 0.2 x10E3/uL    Immature Granulocyte Rel % 1 Not Estab. %    Immature Grans Absolute 0.0 0.0 - 0.1 x10E3/uL   Basic Metabolic Panel    Collection Time: 12/18/24  3:32 PM    Specimen: Blood   Result Value Ref Range    Glucose 241 (H) 70 - 99 mg/dL    BUN 21 8 - 27 mg/dL    Creatinine 1.17 0.76 - 1.27 mg/dL    EGFR Result 66 >59 mL/min/1.73    BUN/Creatinine Ratio 18 10 - 24    Sodium 142 134 - 144 mmol/L    Potassium 4.0 3.5 - 5.2 mmol/L    Chloride 106 96 - 106 mmol/L    Total CO2 24 20 - 29 mmol/L    Calcium 8.9 8.6 - 10.2 mg/dL   BNP ([facility] Only)    Collection Time: 12/18/24  3:32 PM    Specimen: Blood   Result Value Ref Range    .0 (H) 0.0 - 100.0 pg/mL   Protime-INR    Collection Time: 12/18/24  3:32 PM   Result Value Ref Range    INR 1.7 (H) 0.9 - 1.2    Protime 17.7 (H) 9.1 - 12.0 sec   Comprehensive Metabolic Panel    Collection Time: 12/19/24  8:00 AM    Specimen: Blood   Result Value Ref Range    Glucose 140 (H) 65 - 99 mg/dL    BUN 20 8 - 23 mg/dL    Creatinine 1.16 0.76 - 1.27 mg/dL    Sodium 143 136 - 145 mmol/L    Potassium 3.7 3.5 - 5.2 mmol/L    Chloride 103 98 - 107 mmol/L    CO2 25.2 22.0 - 29.0 mmol/L    Calcium 8.9 8.6 - 10.5 mg/dL    Total Protein 7.2 6.0 - 8.5 g/dL    Albumin 4.4 3.5 - 5.2 g/dL    ALT (SGPT) 35 1 - 41 U/L    AST (SGOT) 20 1 - 40 U/L    Alkaline Phosphatase 38 (L) 39 - 117 U/L    Total Bilirubin 0.7 0.0 - 1.2 mg/dL    Globulin 2.8 gm/dL    A/G Ratio 1.6 g/dL    BUN/Creatinine Ratio 17.2 7.0 - 25.0    Anion Gap 14.8 5.0 - 15.0 mmol/L    eGFR 66.9 >60.0 mL/min/1.73   BNP    Collection Time: 12/19/24  8:00 AM    Specimen: Blood   Result Value Ref Range    proBNP 2,600.0 (H) 0.0 - 900.0 pg/mL   D-dimer, Quantitative    Collection Time: 12/19/24  8:00 AM    Specimen: Blood   Result Value Ref Range    D-Dimer, Quantitative 1.72 (H) 0.00 - 0.72 MCGFEU/mL   High Sensitivity Troponin  T    Collection Time: 12/19/24  8:00 AM    Specimen: Blood   Result Value Ref Range    HS Troponin T 21 <22 ng/L   Protime-INR    Collection Time: 12/19/24  8:00 AM    Specimen: Blood   Result Value Ref Range    Protime 21.4 (H) 11.7 - 14.2 Seconds    INR 1.83 (H) 0.90 - 1.10   CBC Auto Differential    Collection Time: 12/19/24  8:00 AM    Specimen: Blood   Result Value Ref Range    WBC 6.72 3.40 - 10.80 10*3/mm3    RBC 4.11 (L) 4.14 - 5.80 10*6/mm3    Hemoglobin 11.9 (L) 13.0 - 17.7 g/dL    Hematocrit 36.2 (L) 37.5 - 51.0 %    MCV 88.1 79.0 - 97.0 fL    MCH 29.0 26.6 - 33.0 pg    MCHC 32.9 31.5 - 35.7 g/dL    RDW 14.2 12.3 - 15.4 %    RDW-SD 45.6 37.0 - 54.0 fl    MPV 10.6 6.0 - 12.0 fL    Platelets 225 140 - 450 10*3/mm3    Neutrophil % 73.3 42.7 - 76.0 %    Lymphocyte % 18.9 (L) 19.6 - 45.3 %    Monocyte % 4.2 (L) 5.0 - 12.0 %    Eosinophil % 2.2 0.3 - 6.2 %    Basophil % 1.0 0.0 - 1.5 %    Immature Grans % 0.4 0.0 - 0.5 %    Neutrophils, Absolute 4.92 1.70 - 7.00 10*3/mm3    Lymphocytes, Absolute 1.27 0.70 - 3.10 10*3/mm3    Monocytes, Absolute 0.28 0.10 - 0.90 10*3/mm3    Eosinophils, Absolute 0.15 0.00 - 0.40 10*3/mm3    Basophils, Absolute 0.07 0.00 - 0.20 10*3/mm3    Immature Grans, Absolute 0.03 0.00 - 0.05 10*3/mm3    nRBC 0.0 0.0 - 0.2 /100 WBC   ECG 12 Lead Dyspnea    Collection Time: 12/19/24  8:00 AM   Result Value Ref Range    QT Interval 380 ms    QTC Interval 500 ms   Respiratory Panel PCR w/COVID-19(SARS-CoV-2) [facility] In-House, NP Swab in UTM/VTM, 2 HR TAT - Swab, Nasopharynx    Collection Time: 12/19/24  8:01 AM    Specimen: Nasopharynx; Swab   Result Value Ref Range    ADENOVIRUS, PCR Not Detected Not Detected    Coronavirus 229E Not Detected Not Detected    Coronavirus HKU1 Not Detected Not Detected    Coronavirus NL63 Not Detected Not Detected    Coronavirus OC43 Not Detected Not Detected    COVID19 Not Detected Not Detected - Ref. Range    Human Metapneumovirus Not Detected  Not Detected    Human Rhinovirus/Enterovirus Not Detected Not Detected    Influenza A PCR Not Detected Not Detected    Influenza B PCR Not Detected Not Detected    Parainfluenza Virus 1 Not Detected Not Detected    Parainfluenza Virus 2 Not Detected Not Detected    Parainfluenza Virus 3 Not Detected Not Detected    Parainfluenza Virus 4 Not Detected Not Detected    RSV, PCR Not Detected Not Detected    Bordetella pertussis pcr Not Detected Not Detected    Bordetella parapertussis PCR Not Detected Not Detected    Chlamydophila pneumoniae PCR Not Detected Not Detected    Mycoplasma pneumo by PCR Not Detected Not Detected   Blood Gas, Arterial -    Collection Time: 12/19/24  8:31 AM    Specimen: Arterial Blood   Result Value Ref Range    Site Right Brachial     Fracisco's Test N/A     pH, Arterial 7.399 7.350 - 7.450 pH units    pCO2, Arterial 46.9 (H) 35.0 - 45.0 mm Hg    pO2, Arterial 60.2 (L) 80.0 - 100.0 mm Hg    HCO3, Arterial 29.0 (H) 22.0 - 28.0 mmol/L    Base Excess, Arterial 3.5 (H) 0.0 - 2.0 mmol/L    O2 Saturation, Arterial 90.3 (L) 92.0 - 98.5 %    A-a DO2 0.1 mmHg    CO2 Content 30.4 (H) 23 - 27 mmol/L    Barometric Pressure for Blood Gas 758.3000 mmHg    Modality Optiflow     FIO2 80 %    Rate 18 Breaths/minute    Hemodilution No     Device Comment airvo 60L 80% sat 93%     PO2/FIO2 75 0 - 500   High Sensitivity Troponin T 1Hr    Collection Time: 12/19/24  9:03 AM    Specimen: Blood   Result Value Ref Range    HS Troponin T 21 <22 ng/L    Troponin T Numeric Delta 0 Abnormal if >/=3 ng/L       Ordered the above labs and reviewed the results.        RADIOLOGY  CT Angiogram Chest    Result Date: 12/19/2024  CT ANGIOGRAM CHEST-  INDICATION: Shortness of air, rule out pulmonary embolism  COMPARISON: None  TECHNIQUE: CTA chest with IV contrast. Coronal and sagittal reformats. Three dimensional reconstructions. Radiation dose reduction techniques were utilized, including automated exposure control and exposure  modulation based on body size.  FINDINGS:  Pulmonary arteries: Streak artifact from contrast in the venous system. No pulmonary embolism.  Chest wall: Gynecomastia. No lymphadenopathy.  Mediastinum: Coronary artery atherosclerotic calcifications. Mild cardiomegaly. Reflux of contrast into the IVC and hepatic veins. Aortic valve calcification. No pericardial effusion. Mild mediastinal and hilar adenopathy. For example, subcarinal lymph node, series 4, axial image 69, measures 1.8 cm.  Lung/pleura: Mild to moderate right greater than left pleural effusions. Airways wall thickening. Smooth interlobular septal thickening. Bilateral groundglass lung opacities. More confluent bronchovascular opacities seen in the bilateral lungs, greatest in the lower lobes where there is also volume loss. Some suspected air trapping in the left upper lobe. Calcified pulmonary nodules in the left lower lobe, consistent with prior granulomatous infection. Subpleural solid pulmonary nodule in the superior lingula, series 5, axial mage 78, measures 9 mm.  Upper abdomen: Incidental splenule. Nonobstructing nephrolithiasis in the right mid kidney, measures 1 to 2 mm.  Osseous structures: Diffuse idiopathic skeletal hyperostosis seen in the mid and lower thoracic spine.       1. No pulmonary embolism. 2. Mild to moderate right greater than left pleural effusions. 3. Cardiomegaly with evidence of right heart dysfunction and with pulmonary edema. 4. Multifocal lung opacities, with volume loss. Suspect combination of pulmonary edema and atelectasis. Pneumonia in the appropriate clinical setting. 5. Subpleural solid pulmonary nodule in the lingula measuring 9 mm. 3-month follow-up chest CT can reevaluate.  This report was finalized on 12/19/2024 11:21 AM by Dr. Dennis Back M.D on Workstation: [id number]      XR Chest 1 View    Result Date: 12/19/2024  XR CHEST 1 VW-  HISTORY: Male who is 74 years-old, short of breath  TECHNIQUE: Frontal view  of the chest  COMPARISON: None available  FINDINGS: The heart is enlarged. Pulmonary vasculature is congested. Alveolar interstitial opacities in both lungs suggest edema and/or pneumonia, follow-up recommended. There may be minimal right pleural effusion. No pneumothorax. No acute osseous process.      As described.  This report was finalized on 12/19/2024 8:26 AM by Dr. Mio Cordoba M.D on Workstation: [id number]       Ordered the above noted radiological studies. Reviewed by me in PACS.        MEDICATIONS GIVEN IN ER  Medications   sodium chloride 0.9 % flush 10 mL (has no administration in time range)   potassium chloride (K-DUR,KLOR-CON) ER tablet 40 mEq (has no administration in time range)   furosemide (LASIX) injection 80 mg (80 mg Intravenous Given 12/19/24 0900)   iopamidol (ISOVUE-370) 76 % injection 100 mL (95 mL Intravenous Given by Other 12/19/24 1027)         ORDERS PLACED DURING THIS VISIT:  Orders Placed This Encounter   Procedures    Critical Care    Respiratory Panel PCR w/COVID-19(SARS-CoV-2) [facility], NP Swab in UTM/VTM, 2 HR TAT - Swab, Nasopharynx    XR Chest 1 View    CT Angiogram Chest    Comprehensive Metabolic Panel    BNP    D-dimer, Quantitative    High Sensitivity Troponin T    Blood Gas, Arterial -    Protime-INR    CBC Auto Differential    High Sensitivity Troponin T 1Hr    Magnesium    Monitor Blood Pressure    Continuous Pulse Oximetry    [facility] (on-call MD unless specified) Details    ECG 12 Lead Dyspnea    Insert Peripheral IV    Inpatient Admission    CBC & Differential         OUTPATIENT MEDICATION MANAGEMENT:  Current Facility-Administered Medications Ordered in Epic   Medication Dose Route Frequency Provider Last Rate Last Admin    Chlorhexidine Gluconate 2 % pads 3 each  3 pad  Apply externally BID Pricila Barnes L, APRN        potassium chloride (K-DUR,KLOR-CON) ER tablet 40 mEq  40 mEq Oral Once Charo Love MD        sodium chloride 0.9 %  flush 10 mL  10 mL Intravenous PRN Lang Jennings II, MD         Current Outpatient Medications Ordered in Epic   Medication Sig Dispense Refill    acetaminophen (TYLENOL) 325 MG tablet Take 2 tablets by mouth 2 (Two) Times a Day As Needed for Mild Pain. 60 tablet 0    aspirin 81 MG EC tablet Take 1 tablet by mouth 2 (Two) Times a Day. 60 tablet 0    benazepril (LOTENSIN) 40 MG tablet Take 1 tablet by mouth twice daily 180 tablet 0    cloNIDine (CATAPRES) 0.2 MG tablet Take 1 tablet by mouth twice daily 180 tablet 1    doxazosin (CARDURA) 4 MG tablet TAKE 1 TABLET BY MOUTH ONCE DAILY AT NIGHT 90 tablet 1    escitalopram (LEXAPRO) 10 MG tablet Take 1.5 tablets by mouth Every Evening. 135 tablet 1    ezetimibe (ZETIA) 10 MG tablet Take 1 tablet by mouth once daily 90 tablet 0    fenofibrate 160 MG tablet TAKE 1 TABLET BY MOUTH AT NIGHT 90 tablet 0    fluticasone (FLONASE) 50 MCG/ACT nasal spray Administer 2 sprays into the nostril(s) as directed by provider Every Morning. 2 sprays in each nostril      furosemide (LASIX) 40 MG tablet Take 1 tablet by mouth Daily. 90 tablet 3    gabapentin (NEURONTIN) 600 MG tablet Take 1 tablet by mouth Every Evening.      glimepiride (AMARYL) 4 MG tablet Take 1 tablet by mouth 2 (Two) Times a Day. Indications: Type 2 Diabetes 180 tablet 1    glucose blood (Accu-Chek Anabela Plus) test strip TEST TWICE DAILY Use as instructed 100 each 3    Janumet XR  MG tablet Take 1 tablet by mouth twice daily 180 tablet 0    Multiple Vitamins-Minerals (b complex-C-E-zinc) tablet Take 1 tablet by mouth Every Other Day.      Multiple Vitamins-Minerals (PRESERVISION AREDS 2 PO) Take 1 tablet by mouth 2 (Two) Times a Day.      nebivolol (BYSTOLIC) 5 MG tablet Take 1 tablet by mouth Daily. 90 tablet 1    NIFEdipine XL (PROCARDIA XL) 60 MG 24 hr tablet Take 1 tablet by mouth Every Morning. 90 tablet 1    Olopatadine HCl (PATADAY OP) Apply 1 drop to eye(s) as directed by provider 2 (Two) Times a  Day As Needed (allergies).      polyethyl glycol-propyl glycol (SYSTANE) 0.4-0.3 % solution ophthalmic solution (artificial tears) Administer 1 drop to both eyes As Needed (dry eyes).      traZODone (DESYREL) 50 MG tablet TAKE 1 TABLET BY MOUTH ONCE DAILY AT NIGHT 90 tablet 0    vitamin D3 125 MCG (5000 UT) capsule capsule Take 1 capsule by mouth Daily.      warfarin (COUMADIN) 5 MG tablet Take 1 tablet by mouth Daily. (Patient taking differently: Take 1 tablet by mouth Daily. Patient states taking 7.5 MG on Wednesday and Saturday) 180 tablet 3       PROCEDURES  Critical Care    Performed by: Lang Jennings II, MD  Authorized by: Lang Jennings II, MD    Critical care provider statement:     Critical care time (minutes):  40    Critical care was necessary to treat or prevent imminent or life-threatening deterioration of the following conditions:  Respiratory failure and cardiac failure    Critical care was time spent personally by me on the following activities:  Ordering and performing treatments and interventions, ordering and review of laboratory studies, ordering and review of radiographic studies, pulse oximetry, re-evaluation of patient's condition, review of old charts, discussions with consultants, evaluation of patient's response to treatment, examination of patient and obtaining history from patient or surrogate            MEDICAL DECISION MAKING, PROGRESS, and CONSULTS    Discussion below represents my analysis of pertinent findings related to patient's condition, differential diagnosis, treatment plan and final disposition.            Differential diagnosis:    Differential diagnosis includes but not limited to CHF, acute coronary syndrome, pulmonary embolism, pneumothorax, pneumonia, asthma/COPD, pulmonary hypertension, deconditioning, anemia, other hematologic etiologies such as CO poisoning, anxiety.                  Independent interpretation of labs, radiology studies, and discussions  with consultants:  ED Course as of 12/19/24 1209   Thu Dec 19, 2024   0802 EKG independently interpreted by myself.  Time 8 AM.  Atrial fibrillation.  Heart rate 104.  IVCD.  PVC x 1. [TD]   0814 WBC: 6.72 [TD]   0814 Hemoglobin(!): 11.9 [TD]   0815 Chest x-ray dependently interpreted by myself.  Vascular congestion bilaterally with cardiomegaly and basilar infiltrates [TD]   0846 proBNP(!): 2,600.0 [TD]   0846 pH, Arterial: 7.399 [TD]   0846 pCO2, Arterial(!): 46.9 [TD]   0846 pO2, Arterial(!): 60.2 [TD]   0846 D-Dimer, Quant(!): 1.72  On warfarin, INR is subtherapeutic [TD]   0847 Creatinine: 1.16 [TD]   0902 On medical chart review, patient had echocardiogram on 10/25/2024.  Ejection fraction 52%.  Left ventricular mass index is increased.  Consider infiltrative cardiomyopathy such as amyloidosis.  Normal diastolic function. [TD]   1035 CT angiogram of the chest independently interpreted by myself.  Patient has bilateral pleural effusions greater on the right.  I see no pulmonary embolism. [TD]   1123 Patient is diuresing very well.  He states that he is feeling better. [TD]   1203 Troponin T Numeric Delta: 0 [TD]   1208 Discussed the case Dr. Love, hospitalist.  He will admit. [TD]      ED Course User Index  [TD] Lang Jennings II, MD             DIAGNOSIS  Final diagnoses:   Acute hypoxemic respiratory failure   Acute on chronic congestive heart failure, unspecified heart failure type         DISPOSITION  Admit      Latest Documented Vital Signs:  As of 12:09 EST  BP- 120/93 HR- 78 Temp- 97.8 °F (36.6 °C) (Tympanic) O2 sat- 92%      --    Please note that portions of this were completed with a voice recognition program.       Note Disclaimer: At [facility], we believe that sharing information builds trust and better relationships. You are receiving this note because you are receiving care at [facility] or recently visited. It is possible you will see health information before a provider has  talked with you about it. This kind of information can be easy to misunderstand. To help you fully understand what it means for your health, we urge you to discuss this note with your provider.         Lang Jennings II, MD  12/19/24 [id number]

## 2024-12-19 NOTE — PROGRESS NOTES
"[facility] Clinical Pharmacy Services: Enoxaparin Consult    Jeb Olson has a pharmacy consult to dose prophylactic enoxaparin per Dr Love's request.     Indication: A Fib - requiring full anticoagulation  Home Anticoagulation: warfarin     Relevant clinical data and objective history reviewed:  74 y.o. male 188 cm (74\") 119 kg (262 lb)   Body mass index is 33.64 kg/m².   Results from last 7 days   Lab Units 12/19/24  0800   PLATELETS 10*3/mm3 225     Estimated Creatinine Clearance: 78.9 mL/min (by C-G formula based on SCr of 1.16 mg/dL).    Assessment/Plan    Will start patient on  120 (1mg/kg) subcutaneous every 12 hours, adjusted for renal function. Consult order will be discontinued but pharmacy will continue to follow.     Juan Fall, [facility]  Clinical Pharmacist    "

## 2024-12-19 NOTE — CONSULTS
Patient Name: Jeb Olson  :1950  74 y.o.    Date of Admission: 2024  Date of Consultation:  24  Encounter Provider: Bruna Chávez MD  Place of Service: [facility] CARDIOLOGY  Referring Provider: Charo Love MD  Patient Care Team:  Tera Salvador MD as PCP - General (Internal Medicine)  Micah Reyes MD as Consulting Physician (Gastroenterology)  Bruna Chávez MD as Referring Physician (Cardiology)      Chief complaint: CHF    History of Present Illness:  This is a 74 year-old man with a history of CAD, PVC, AF, CM, moderate of severe MR.      In  he an eye surgery and was noted to have frequent PVCs.  This prompted further testing by his primary care doctor.  He had a Holter monitor which showed frequent PVCs, PVC burden was 22% in couplets, triplets bigeminy and trigeminy.  Thus, he therefore had a transthoracic echocardiogram which showed normal systolic ejection fraction with grade 1 diastolic dysfunction appropriate for age and mild MR.   Subsequent to that he had a walking nuclear stress test with a small, mild apical ischemic defect.  Cardiac catheterization  by myself showed a distal LAD lesion, status post PCI with 1 drug-eluting stent.      2024 he was in the office and found to have asymptomatic atrial fibrillation on EKG.  His exam revealed a prominent MR murmur.  Holter showed 100% A-fib burden with average heart rate of 60 on beta-blockade.  Follow-up echocardiogram 2024.  There has been enlargement of both ventricles and atria, thickening of the LV, progression of his mitral disease from mild MR to moderate to severe MR.  He also has mild AS, pulmonary hypertension. His SPEP/UPEP was abnormal. PYP was normal Dec 2024.     He was hospitalized in [place] in 2024. His EF was newly reduced 35% without WMA. He again had moderate to severe MR    When I saw him in the office a week or so ago he was feeling ok,  no edema with chronic dyspnea. Since then he's had some exertional dyspnea no angina or orthopnea. Overnight at 3AM he had acute orthopnea. His 02 sats were in the 70% range. HE was seen in the ER, CTA showed no PE but edema and effusions. BP have been in the 130s but on arrival was 170/100. Trop 21 x2. BNP 2,000. EKG AF Left axis, IVCDE      Past Medical History:   Diagnosis Date    Allergic Have had for several years    Eyes and nasal issues    Anemia     Anxiety Since about 2014    Since I was taking of my Dad, who also passed away 3yrs ago.    Arthritis 2002    Both knees, hips, and shoulders    Arthritis of knee, left     Cataract 05/2019    Colon polyp 2017    Coronary artery disease     stent    Diabetes mellitus     Dry eyes     Essential hypertension     HL (hearing loss) 1980    no hearing aides    Hyperlipemia     Knee swelling 7/7/2020    Shortly after my left knee replacement    Mild chronic anemia     Obesity     Sleep apnea     cpap       Past Surgical History:   Procedure Laterality Date    ACHILLES TENDON REPAIR Left     CARDIAC CATHETERIZATION      CARDIAC CATHETERIZATION N/A 09/01/2022    Procedure: Coronary angiography, Left heart catheterization,;  Surgeon: Bruna Chávez MD;  Location: [facility] INVASIVE LOCATION;  Service: Cardiology;  Laterality: N/A;    CARDIAC CATHETERIZATION N/A 09/01/2022    Procedure: Stent PRAVIN coronary;  Surgeon: Bruna Chávez MD;  Location: [facility] CATH INVASIVE LOCATION;  Service: Cardiology;  Laterality: N/A;    CATARACT EXTRACTION EXTRACAPSULAR W/ INTRAOCULAR LENS IMPLANTATION Bilateral     COLONOSCOPY      Dr Reyes 6 years ago    ENDOSCOPY      FOOT SURGERY      Achilles repair    JOINT REPLACEMENT  03/12/20    Left knee    TONSILLECTOMY      As a child    TOTAL KNEE ARTHROPLASTY Left 03/12/2020    Procedure: TOTAL KNEE ARTHROPLASTY;  Surgeon: Chepe Alicea MD;  Location: [facility] OR;  Service: Orthopedics;  Laterality: Left;    TOTAL KNEE ARTHROPLASTY  Right 03/21/2024    Procedure: TOTAL KNEE ARTHROPLASTY;  Surgeon: Chepe Alicea MD;  [facility] [place] [facility];  Service: Orthopedics;  Laterality: Right;         Prior to Admission medications    Medication Sig Start Date End Date Taking? Authorizing Provider   acetaminophen (TYLENOL) 325 MG tablet Take 2 tablets by mouth 2 (Two) Times a Day As Needed for Mild Pain. 3/21/24  Yes Chepe Alicea MD   aspirin 81 MG EC tablet Take 1 tablet by mouth 2 (Two) Times a Day. 3/21/24  Yes Chepe Alicea MD   benazepril (LOTENSIN) 40 MG tablet Take 1 tablet by mouth twice daily 9/13/24  Yes Tera Salvador MD   cloNIDine (CATAPRES) 0.2 MG tablet Take 1 tablet by mouth twice daily 10/28/24  Yes Bruna Chávez MD   doxazosin (CARDURA) 4 MG tablet TAKE 1 TABLET BY MOUTH ONCE DAILY AT NIGHT 8/16/24  Yes Asiya Swartz APRN   escitalopram (LEXAPRO) 10 MG tablet Take 1.5 tablets by mouth Every Evening. 11/11/24  Yes Tera Salvador MD   ezetimibe (ZETIA) 10 MG tablet Take 1 tablet by mouth once daily 10/21/24  Yes Tera Salvador MD   fenofibrate 160 MG tablet TAKE 1 TABLET BY MOUTH AT NIGHT 11/21/24  Yes Tera Salvador MD   fluticasone (FLONASE) 50 MCG/ACT nasal spray Administer 2 sprays into the nostril(s) as directed by provider Every Morning. 2 sprays in each nostril 11/8/17  Yes Luz Elena Holland MD   furosemide (LASIX) 40 MG tablet Take 1 tablet by mouth Daily. 11/1/24  Yes Bruna Chávez MD   gabapentin (NEURONTIN) 600 MG tablet Take 1 tablet by mouth Every Evening. 4/14/20  Yes Luz Elena Holland MD   glimepiride (AMARYL) 4 MG tablet Take 1 tablet by mouth 2 (Two) Times a Day. Indications: Type 2 Diabetes 11/11/24  Yes Tera Salvador MD   glucose blood (Accu-Chek Anabela Plus) test strip TEST TWICE DAILY Use as instructed 9/8/20  Yes Tera Salvador MD   Janumet XR  MG tablet Take 1 tablet by mouth twice daily 11/11/24  Yes Tera Salvador,  MD   Multiple Vitamins-Minerals (b complex-C-E-zinc) tablet Take 1 tablet by mouth Every Other Day.   Yes ProviderLuz Elena MD   Multiple Vitamins-Minerals (PRESERVISION AREDS 2 PO) Take 1 tablet by mouth 2 (Two) Times a Day.   Yes Luz Elena Holland MD   nebivolol (BYSTOLIC) 5 MG tablet Take 1 tablet by mouth Daily. 11/11/24  Yes Tera Salvador MD   NIFEdipine XL (PROCARDIA XL) 60 MG 24 hr tablet Take 1 tablet by mouth Every Morning. 11/11/24  Yes Tera Slavador MD   Olopatadine HCl (PATADAY OP) Apply 1 drop to eye(s) as directed by provider 2 (Two) Times a Day As Needed (allergies). 1/1/24  Yes Luz Elena Holland MD   polyethyl glycol-propyl glycol (SYSTANE) 0.4-0.3 % solution ophthalmic solution (artificial tears) Administer 1 drop to both eyes As Needed (dry eyes). 1/1/24  Yes Luz Elena Holland MD   traZODone (DESYREL) 50 MG tablet TAKE 1 TABLET BY MOUTH ONCE DAILY AT NIGHT 10/21/24  Yes Tera Salvador MD   vitamin D3 125 MCG (5000 UT) capsule capsule Take 1 capsule by mouth Daily.   Yes ProviderLuz Elena MD   warfarin (COUMADIN) 5 MG tablet Take 1 tablet by mouth Daily.  Patient taking differently: Take 1 tablet by mouth Daily. Patient states taking 7.5 MG on Wednesday and Saturday 9/23/24  Yes Bruna Chávez MD       No Known Allergies    Social History     Socioeconomic History    Marital status:    Tobacco Use    Smoking status: Never     Passive exposure: Never    Smokeless tobacco: Never    Tobacco comments:     Naila only every been around people who smoked   Vaping Use    Vaping status: Never Used   Substance and Sexual Activity    Alcohol use: Yes     Alcohol/week: 10.0 standard drinks of alcohol     Types: 10 Drinks containing 0.5 oz of alcohol per week     Comment: Maybe 8-10 drinks mainly on weekends.    Drug use: Never    Sexual activity: Not Currently     Partners: Female     Birth control/protection: None       Family History   Problem Relation  "Age of Onset    Alcohol abuse Maternal Uncle         Passed away 2013    Alcohol abuse Father         Passed away in 2016    Hyperlipidemia Father         Started about 10 years before his death    Arthritis Mother         Passed away 2006, from Alzheimers    Diabetes Mother     Hyperlipidemia Mother         Started probably at middle age    Osteoporosis Mother     Malig Hyperthermia Neg Hx        REVIEW OF SYSTEMS:   All systems reviewed.  Pertinent positives identified in HPI.  All other systems are negative.      Objective:     Vitals:    12/19/24 1144 12/19/24 1231 12/19/24 1349 12/19/24 1352   BP:  144/99  147/97   BP Location:    Left arm   Patient Position:    Lying   Pulse: 78 67  87   Resp:       Temp:    98.1 °F (36.7 °C)   TempSrc:    Oral   SpO2: 92% 95%     Weight:   117 kg (257 lb 4.8 oz)    Height:   188 cm (74\")      Body mass index is 33.04 kg/m².    General Appearance:    Alert, cooperative, in no acute distress   Head:    Normocephalic, without obvious abnormality, atraumatic   Eyes:            Lids and lashes normal, conjunctivae and sclerae normal, no icterus, no pallor, corneas clear, PERRLA   Ears:    Ears appear intact with no abnormalities noted   Throat:   No oral lesions, no thrush, oral mucosa moist   Neck:   No adenopathy, supple, trachea midline, no thyromegaly, no carotid bruit, no JVD   Back:     No kyphosis present, no scoliosis present, no skin lesions, erythema or scars, no tenderness to percussion or palpation, range of motion normal   Lungs:    Faint rales    Heart:    Irregular, 3/6 blowing apical murmur   Chest Wall:    No abnormalities observed   Abdomen:     Normal bowel sounds, no masses, no organomegaly, soft, nontender, nondistended, no guarding, no rebound  tenderness   Extremities:   +2 edema   Pulses:   Pulses palpable and equal bilaterally. Normal radial, carotid, femoral, dorsalis pedis and posterior tibial pulses bilaterally. Normal abdominal aorta "   Skin:  Psychiatric:   No bleeding, bruising or rash    Alert and oriented x 3, normal mood and affect   Lab Review:     Results from last 7 days   Lab Units 12/19/24  0800   SODIUM mmol/L 143   POTASSIUM mmol/L 3.7   CHLORIDE mmol/L 103   CO2 mmol/L 25.2   BUN mg/dL 20   CREATININE mg/dL 1.16   CALCIUM mg/dL 8.9   BILIRUBIN mg/dL 0.7   ALK PHOS U/L 38*   ALT (SGPT) U/L 35   AST (SGOT) U/L 20   GLUCOSE mg/dL 140*     Results from last 7 days   Lab Units 12/19/24  0903 12/19/24  0800   HSTROP T ng/L 21 21     Results from last 7 days   Lab Units 12/19/24  0800   WBC 10*3/mm3 6.72   HEMOGLOBIN g/dL 11.9*   HEMATOCRIT % 36.2*   PLATELETS 10*3/mm3 225     Results from last 7 days   Lab Units 12/19/24  0800 12/18/24  1532   INR  1.83* 1.7*     Results from last 7 days   Lab Units 12/19/24  0903   MAGNESIUM mg/dL 2.1             Results from last 7 days   Lab Units 12/18/24  1532   BNP pg/mL 443.0*       I personally viewed and interpreted the patient's EKG/Telemetry data.    Assessment and Plan:       Flash pulmonary edema  CAD remote PCI  Relatively new AF  New cardiomyopathy likely valvular, unknown etiology. EF 35% in [place] last month.     Diuresis  YAZMIN when able to lay flat, can be done as an outpatient   Flash pulmonary edema makes me think the issue is MR. Bruna Chávez MD  12/19/24  16:40 EST

## 2024-12-19 NOTE — H&P
Patient Name:  Jeb Olson  YOB: 1950  MRN:  [id number]  Admit Date:  12/19/2024  Patient Care Team:  Tera Salvador MD as PCP - General (Internal Medicine)  Micah Reyes MD as Consulting Physician (Gastroenterology)  Bruna Chávez MD as Referring Physician (Cardiology)      Subjective   History Present Illness     Chief Complaint   Patient presents with    Chest Pain    Shortness of Breath           History of Present Illness  Patient is a 75-year-old male history of hypertension, CAD, chronic diastolic heart failure, moderate to severe mitral regurg, aortic valve stenosis presents to the ED with complaining of chest pain tightness shortness of breath.  Patient reports he will woke up with a chest tightness, checked his oxygen at home oxygen saturation was down in the 70s.  Not on oxygen at baseline.  Recently was admitted with [facility] in [place] with pneumonia.  Denies any fevers or chills.  No nausea, vomiting, abdominal pain.  Does have cough, primarily nonproductive.  Did not take his medications at home, however was given 324 mg of aspirin as needed meds prior to arrival.    Review of Systems   GENERAL: No fevers, chills, sweats.    RESPIRATORY: + cough, +shortness of breath, hemoptysis or wheezing.   CVS: + Chest tightness, palpitations, orthopnea, dyspnea on exertion   GI: No melena or hematochezia. No abdominal pain. No nausea, vomiting, constipation, diarrhea    Personal History     Past Medical History:   Diagnosis Date    Allergic Have had for several years    Eyes and nasal issues    Anemia     Anxiety Since about 2014    Since I was taking of my Dad, who also passed away 3yrs ago.    Arthritis 2002    Both knees, hips, and shoulders    Arthritis of knee, left     Cataract 05/2019    Colon polyp 2017    Coronary artery disease     stent    Diabetes mellitus     Dry eyes     Essential hypertension     HL (hearing loss) 1980    no hearing aides    Hyperlipemia     Knee  swelling 7/7/2020    Shortly after my left knee replacement    Mild chronic anemia     Obesity     Sleep apnea     cpap     Past Surgical History:   Procedure Laterality Date    ACHILLES TENDON REPAIR Left     CARDIAC CATHETERIZATION      CARDIAC CATHETERIZATION N/A 09/01/2022    Procedure: Coronary angiography, Left heart catheterization,;  Surgeon: Bruna Chávez MD;  Location: [facility] CATH INVASIVE LOCATION;  Service: Cardiology;  Laterality: N/A;    CARDIAC CATHETERIZATION N/A 09/01/2022    Procedure: Stent PRAVIN coronary;  Surgeon: Bruna Chávez MD;  Location[facility]ATH [facility] LOC[facility];  Service: Cardiology;  Laterality: N/A;    CATARACT EXTRACTION EXTRACAPSULAR W/ INTRAOCULAR LENS IMPLANTATION Bilateral     COLONOSCOPY      Dr Reyes 6 years ago    ENDOSCOPY      FOOT SURGERY      Achilles repair    JOINT REPLACEMENT  03/12/20    Left knee    TONSILLECTOMY      As a child    TOTAL KNEE ARTHROPLASTY Left 03/12/2020    Procedure: TOTAL KNEE ARTHROPLASTY;  Surgeon: Chepe Alicea MD;  Location: [facility] MAIN OR;  Service: Orthopedics;  Laterality: Left;    TOTAL KNEE ARTHROPLASTY Right 03/21/2024    Procedure: TOTAL KNEE ARTHROPLASTY;  Surgeon: Chepe Alicea MD;  Location: [facility] OR OSC;  Service: Orthopedics;  Laterality: Right;     Family History   Problem Relation Age of Onset    Alcohol abuse Maternal Uncle         Passed away 2013    Alcohol abuse Father         Passed away in 2016    Hyperlipidemia Father         Started about 10 years before his death    Arthritis Mother         Passed away 2006, from Alzheimers    Diabetes Mother     Hyperlipidemia Mother         Started probably at middle age    Osteoporosis Mother     Malig Hyperthermia Neg Hx      Social History     Tobacco Use    Smoking status: Never     Passive exposure: Never    Smokeless tobacco: Never    Tobacco comments:     Naila only every been around people who smoked   Vaping Use    Vaping status: Never Used   Substance Use Topics     Alcohol use: Yes     Alcohol/week: 10.0 standard drinks of alcohol     Types: 10 Drinks containing 0.5 oz of alcohol per week     Comment: Maybe 8-10 drinks mainly on weekends.    Drug use: Never     Current Facility-Administered Medications on File Prior to Encounter   Medication Dose Route Frequency Provider Last Rate Last Admin    Chlorhexidine Gluconate 2 % pads 3 each  3 pad  Apply externally BID Pricila Barnes, TIFFANIE         Current Outpatient Medications on File Prior to Encounter   Medication Sig Dispense Refill    acetaminophen (TYLENOL) 325 MG tablet Take 2 tablets by mouth 2 (Two) Times a Day As Needed for Mild Pain. 60 tablet 0    aspirin 81 MG EC tablet Take 1 tablet by mouth 2 (Two) Times a Day. 60 tablet 0    benazepril (LOTENSIN) 40 MG tablet Take 1 tablet by mouth twice daily 180 tablet 0    cloNIDine (CATAPRES) 0.2 MG tablet Take 1 tablet by mouth twice daily 180 tablet 1    doxazosin (CARDURA) 4 MG tablet TAKE 1 TABLET BY MOUTH ONCE DAILY AT NIGHT 90 tablet 1    escitalopram (LEXAPRO) 10 MG tablet Take 1.5 tablets by mouth Every Evening. 135 tablet 1    ezetimibe (ZETIA) 10 MG tablet Take 1 tablet by mouth once daily 90 tablet 0    fenofibrate 160 MG tablet TAKE 1 TABLET BY MOUTH AT NIGHT 90 tablet 0    fluticasone (FLONASE) 50 MCG/ACT nasal spray Administer 2 sprays into the nostril(s) as directed by provider Every Morning. 2 sprays in each nostril      furosemide (LASIX) 40 MG tablet Take 1 tablet by mouth Daily. 90 tablet 3    gabapentin (NEURONTIN) 600 MG tablet Take 1 tablet by mouth Every Evening.      glimepiride (AMARYL) 4 MG tablet Take 1 tablet by mouth 2 (Two) Times a Day. Indications: Type 2 Diabetes 180 tablet 1    glucose blood (Accu-Chek Anabela Plus) test strip TEST TWICE DAILY Use as instructed 100 each 3    Janumet XR  MG tablet Take 1 tablet by mouth twice daily 180 tablet 0    Multiple Vitamins-Minerals (b complex-C-E-zinc) tablet Take 1 tablet by mouth Every Other Day.       Multiple Vitamins-Minerals (PRESERVISION AREDS 2 PO) Take 1 tablet by mouth 2 (Two) Times a Day.      nebivolol (BYSTOLIC) 5 MG tablet Take 1 tablet by mouth Daily. 90 tablet 1    NIFEdipine XL (PROCARDIA XL) 60 MG 24 hr tablet Take 1 tablet by mouth Every Morning. 90 tablet 1    Olopatadine HCl (PATADAY OP) Apply 1 drop to eye(s) as directed by provider 2 (Two) Times a Day As Needed (allergies).      polyethyl glycol-propyl glycol (SYSTANE) 0.4-0.3 % solution ophthalmic solution (artificial tears) Administer 1 drop to both eyes As Needed (dry eyes).      traZODone (DESYREL) 50 MG tablet TAKE 1 TABLET BY MOUTH ONCE DAILY AT NIGHT 90 tablet 0    vitamin D3 125 MCG (5000 UT) capsule capsule Take 1 capsule by mouth Daily.      warfarin (COUMADIN) 5 MG tablet Take 1 tablet by mouth Daily. (Patient taking differently: Take 1 tablet by mouth Daily. Patient states taking 7.5 MG on Wednesday and Saturday) 180 tablet 3     No Known Allergies    Objective    Objective     Vital Signs  Temp:  [97.8 °F (36.6 °C)-98.1 °F (36.7 °C)] 98.1 °F (36.7 °C)  Heart Rate:  [] 87  Resp:  [18] 18  BP: (120-175)/() 147/97  SpO2:  [89 %-97 %] 95 %  on  Flow (L/min) (Oxygen Therapy):  [6-60] 6;   Device (Oxygen Therapy): nasal cannula  Body mass index is 33.04 kg/m².    Physical Exam.  General: Alert and oriented x3, no acute distress.  HEENT: Normocephalic, atraumatic  Eyes: PERRL, EOMI, anicteric sclerae  Lungs: Clear to auscultation bilaterally, no crackles or wheezes  CV: + Bilateral crackles, tachypneic, no wheezing,+ systolic murmur  Abdomen: Soft, nontender, nondistended.   Extremities: + 1+ bilateral extremity edema  Skin: Clean/dry/intact, no rashes  Neuro: Cranial nerves II through XII intact, no gross focal neurological deficits appreciated  Psych: Appropriate mood and affect      Results Review:  I reviewed the patient's new clinical results.  I reviewed the patient's new imaging results and agree with the  interpretation.  I reviewed the patient's other test results and agree with the interpretation  I personally viewed and interpreted the patient's EKG/Telemetry data  Discussed with ED provider.    Lab Results (last 24 hours)       Procedure Component Value Units Date/Time    C-reactive Protein [[id number]] Collected: 12/18/24 1532    Specimen: Blood Updated: 12/19/24 0709     C-Reactive Protein 6 mg/L     Narrative:      Performed at:  [address]  [id number]  : Jon Westfall PhD, Phone:  [phone]  Patient Fasting:  N     CBC & Differential [[id number]]  (Abnormal) Collected: 12/18/24 1532    Specimen: Blood Updated: 12/19/24 0709     WBC 5.6 x10E3/uL      RBC 3.77 x10E6/uL      Hemoglobin 10.6 g/dL      Hematocrit 34.0 %      MCV 90 fL      MCH 28.1 pg      MCHC 31.2 g/dL      RDW 14.2 %      Platelets 237 x10E3/uL      Neutrophil Rel % 73 %      Lymphocyte Rel % 18 %      Monocyte Rel % 5 %      Eosinophil Rel % 2 %      Basophil Rel % 1 %      Neutrophils Absolute 4.1 x10E3/uL      Lymphocytes Absolute 1.0 x10E3/uL      Monocytes Absolute 0.3 x10E3/uL      Eosinophils Absolute 0.1 x10E3/uL      Basophils Absolute 0.0 x10E3/uL      Immature Granulocyte Rel % 1 %      Immature Grans Absolute 0.0 x10E3/uL     Narrative:      Performed at:  [address]  [id number]  : Jon Westfall PhD, Phone:  [phone]  Patient Fasting:  N     Basic Metabolic Panel [[id number]]  (Abnormal) Collected: 12/18/24 1532    Specimen: Blood Updated: 12/19/24 0709     Glucose 241 mg/dL      BUN 21 mg/dL      Creatinine 1.17 mg/dL      EGFR Result 66 mL/min/1.73      BUN/Creatinine Ratio 18     Sodium 142 mmol/L      Potassium 4.0 mmol/L      Chloride 106 mmol/L      Total CO2 24 mmol/L      Calcium 8.9 mg/dL     Narrative:      Performed at:  [address]  [id number]  : Jon Westfall  PhD, Phone:  [phone]  Patient Fasting:  N     BNP ([facility] Only) [[id number]]  (Abnormal) Collected: 12/18/24 1532    Specimen: Blood Updated: 12/19/24 0936     .0 pg/mL      Comment: Siemens ADVIA Centaur XP methodology       Narrative:      Performed at:  [address] [place] [address]  [id number]  : Jon Westfall PhD, Phone:  [phone]  Patient Fasting:  N     Protime-INR [[id number]]  (Abnormal) Collected: 12/18/24 1532     Updated: 12/19/24 0813     INR 1.7     Comment: Reference interval is for non-anticoagulated patients.  Suggested INR therapeutic range for Vitamin K  antagonist therapy:     Standard Dose (moderate intensity                    therapeutic range):       2.0 - 3.0     Higher intensity therapeutic range       2.5 - 3.5          Protime 17.7 sec     Narrative:      Performed at:  [address] [place] [address]  [id number]  : Jon Westfall PhD, Phone:  [phone]  Patient Fasting:  N     CBC & Differential [[id number]]  (Abnormal) Collected: 12/19/24 0800    Specimen: Blood Updated: 12/19/24 0814    Narrative:      The following orders were created for panel order CBC & Differential.  Procedure                               Abnormality         Status                     ---------                               -----------         ------                     CBC Auto Differential[[id number]]        Abnormal            Final result                 Please view results for these tests on the individual orders.    Comprehensive Metabolic Panel [[id number]]  (Abnormal) Collected: 12/19/24 0800    Specimen: Blood Updated: 12/19/24 0831     Glucose 140 mg/dL      BUN 20 mg/dL      Creatinine 1.16 mg/dL      Sodium 143 mmol/L      Potassium 3.7 mmol/L      Chloride 103 mmol/L      CO2 25.2 mmol/L      Calcium 8.9 mg/dL      Total Protein 7.2 g/dL      Albumin 4.4 g/dL      ALT (SGPT) 35 U/L      AST (SGOT) 20 U/L      Alkaline  Phosphatase 38 U/L      Total Bilirubin 0.7 mg/dL      Globulin 2.8 gm/dL      A/G Ratio 1.6 g/dL      BUN/Creatinine Ratio 17.2     Anion Gap 14.8 mmol/L      eGFR 66.9 mL/min/1.73     Narrative:      GFR Categories in Chronic Kidney Disease (CKD)      GFR Category          GFR (mL/min/1.73)    Interpretation  G1                     90 or greater         Normal or high (1)  G2                      60-89                Mild decrease (1)  G3a                   45-59                Mild to moderate decrease  G3b                   30-44                Moderate to severe decrease  G4                    15-29                Severe decrease  G5                    14 or less           Kidney failure          (1)In the absence of evidence of kidney disease, neither GFR category G1 or G2 fulfill the criteria for CKD.    eGFR calculation 2021 CKD-EPI creatinine equation, which does not include race as a factor    BNP [[id number]]  (Abnormal) Collected: 12/19/24 0800    Specimen: Blood Updated: 12/19/24 0831     proBNP 2,600.0 pg/mL     Narrative:      This assay is used as an aid in the diagnosis of individuals suspected of having heart failure. It can be used as an aid in the diagnosis of acute decompensated heart failure (ADHF) in patients presenting with signs and symptoms of ADHF to the emergency department (ED). In addition, NT-proBNP of <300 pg/mL indicates ADHF is not likely.    Age Range Result Interpretation  NT-proBNP Concentration (pg/mL:      <50             Positive            >450                   Gray                 300-450                    Negative             <300    50-75           Positive            >900                  Gray                300-900                  Negative            <300      >75             Positive            >1800                  Gray                300-1800                  Negative            <300    D-dimer, Quantitative [[id number]]  (Abnormal) Collected: 12/19/24 0800     "Specimen: Blood Updated: 12/19/24 0825     D-Dimer, Quantitative 1.72 MCGFEU/mL     Narrative:      According to the assay 's published package insert, a normal (<0.50 MCGFEU/mL) D-dimer result in conjunction with a non-high clinical probability assessment, excludes deep vein thrombosis (DVT) and pulmonary embolism (PE) with high sensitivity.    D-dimer values increase with age and this can make VTE exclusion of an older population difficult. To address this, the [employer], based on best available evidence and recent guidelines, recommends that clinicians use age-adjusted D-dimer thresholds in patients greater than 50 years of age with: a) a low probability of PE who do not meet all Pulmonary Embolism Rule Out Criteria, or b) in those with intermediate probability of PE.   The formula for an age-adjusted D-dimer cut-off is \"age/100\".  For example, a 60 year old patient would have an age-adjusted cut-off of 0.60 MCGFEU/mL and an 80 year old 0.80 MCGFEU/mL.    High Sensitivity Troponin T [[id number]]  (Normal) Collected: 12/19/24 0800    Specimen: Blood Updated: 12/19/24 0831     HS Troponin T 21 ng/L     Narrative:      High Sensitive Troponin T Reference Range:  <14.0 ng/L- Negative Female for AMI  <22.0 ng/L- Negative Male for AMI  >=14 - Abnormal Female indicating possible myocardial injury.  >=22 - Abnormal Male indicating possible myocardial injury.   Clinicians would have to utilize clinical acumen, EKG, Troponin, and serial changes to determine if it is an Acute Myocardial Infarction or myocardial injury due to an underlying chronic condition.         Protime-INR [[id number]]  (Abnormal) Collected: 12/19/24 0800    Specimen: Blood Updated: 12/19/24 0825     Protime 21.4 Seconds      INR 1.83    CBC Auto Differential [[id number]]  (Abnormal) Collected: 12/19/24 0800    Specimen: Blood Updated: 12/19/24 0814     WBC 6.72 10*3/mm3      RBC 4.11 10*6/mm3      Hemoglobin 11.9 g/dL      " Hematocrit 36.2 %      MCV 88.1 fL      MCH 29.0 pg      MCHC 32.9 g/dL      RDW 14.2 %      RDW-SD 45.6 fl      MPV 10.6 fL      Platelets 225 10*3/mm3      Neutrophil % 73.3 %      Lymphocyte % 18.9 %      Monocyte % 4.2 %      Eosinophil % 2.2 %      Basophil % 1.0 %      Immature Grans % 0.4 %      Neutrophils, Absolute 4.92 10*3/mm3      Lymphocytes, Absolute 1.27 10*3/mm3      Monocytes, Absolute 0.28 10*3/mm3      Eosinophils, Absolute 0.15 10*3/mm3      Basophils, Absolute 0.07 10*3/mm3      Immature Grans, Absolute 0.03 10*3/mm3      nRBC 0.0 /100 WBC     Respiratory Panel PCR w/COVID-19(SARS-CoV-2) YESSY/MACK/ANGELIQUE/PAD/COR/JESSICA In-House, NP Swab in UTM/VTM, 2 HR TAT - Swab, Nasopharynx [[id number]]  (Normal) Collected: 12/19/24 0801    Specimen: Swab from Nasopharynx Updated: 12/19/24 0859     ADENOVIRUS, PCR Not Detected     Coronavirus 229E Not Detected     Coronavirus HKU1 Not Detected     Coronavirus NL63 Not Detected     Coronavirus OC43 Not Detected     COVID19 Not Detected     Human Metapneumovirus Not Detected     Human Rhinovirus/Enterovirus Not Detected     Influenza A PCR Not Detected     Influenza B PCR Not Detected     Parainfluenza Virus 1 Not Detected     Parainfluenza Virus 2 Not Detected     Parainfluenza Virus 3 Not Detected     Parainfluenza Virus 4 Not Detected     RSV, PCR Not Detected     Bordetella pertussis pcr Not Detected     Bordetella parapertussis PCR Not Detected     Chlamydophila pneumoniae PCR Not Detected     Mycoplasma pneumo by PCR Not Detected    Narrative:      In the setting of a positive respiratory panel with a viral infection PLUS a negative procalcitonin without other underlying concern for bacterial infection, consider observing off antibiotics or discontinuation of antibiotics and continue supportive care. If the respiratory panel is positive for atypical bacterial infection (Bordetella pertussis, Chlamydophila pneumoniae, or Mycoplasma pneumoniae), consider antibiotic  de-escalation to target atypical bacterial infection.    Blood Gas, Arterial - [[id number]]  (Abnormal) Collected: 12/19/24 0831    Specimen: Arterial Blood Updated: 12/19/24 0841     Site Right Brachial     Fracisco's Test N/A     pH, Arterial 7.399 pH units      pCO2, Arterial 46.9 mm Hg      pO2, Arterial 60.2 mm Hg      HCO3, Arterial 29.0 mmol/L      Base Excess, Arterial 3.5 mmol/L      Comment: Serial Number: [id number]:  [id number]        O2 Saturation, Arterial 90.3 %      A-a DO2 0.1 mmHg      CO2 Content 30.4 mmol/L      Barometric Pressure for Blood Gas 758.3000 mmHg      Modality Optiflow     FIO2 80 %      Rate 18 Breaths/minute      Hemodilution No     Device Comment airvo 60L 80% sat 93%     PO2/FIO2 75    High Sensitivity Troponin T 1Hr [[id number]]  (Normal) Collected: 12/19/24 0903    Specimen: Blood Updated: 12/19/24 0935     HS Troponin T 21 ng/L      Troponin T Numeric Delta 0 ng/L     Narrative:      High Sensitive Troponin T Reference Range:  <14.0 ng/L- Negative Female for AMI  <22.0 ng/L- Negative Male for AMI  >=14 - Abnormal Female indicating possible myocardial injury.  >=22 - Abnormal Male indicating possible myocardial injury.   Clinicians would have to utilize clinical acumen, EKG, Troponin, and serial changes to determine if it is an Acute Myocardial Infarction or myocardial injury due to an underlying chronic condition.         Magnesium [[id number]]  (Normal) Collected: 12/19/24 0903    Specimen: Blood Updated: 12/19/24 1218     Magnesium 2.1 mg/dL             Imaging Results (Last 24 Hours)       Procedure Component Value Units Date/Time    CT Angiogram Chest [[id number]] Collected: 12/19/24 1109     Updated: 12/19/24 1124    Narrative:      CT ANGIOGRAM CHEST-     INDICATION: Shortness of air, rule out pulmonary embolism     COMPARISON: None     TECHNIQUE:  CTA chest with IV contrast. Coronal and sagittal reformats. Three  dimensional reconstructions. Radiation dose reduction techniques  were  utilized, including automated exposure control and exposure modulation  based on body size.     FINDINGS:      Pulmonary arteries: Streak artifact from contrast in the venous system.  No pulmonary embolism.     Chest wall: Gynecomastia. No lymphadenopathy.     Mediastinum: Coronary artery atherosclerotic calcifications. Mild  cardiomegaly. Reflux of contrast into the IVC and hepatic veins. Aortic  valve calcification. No pericardial effusion. Mild mediastinal and hilar  adenopathy. For example, subcarinal lymph node, series 4, axial image  69, measures 1.8 cm.     Lung/pleura: Mild to moderate right greater than left pleural effusions.  Airways wall thickening. Smooth interlobular septal thickening.  Bilateral groundglass lung opacities. More confluent bronchovascular  opacities seen in the bilateral lungs, greatest in the lower lobes where  there is also volume loss. Some suspected air trapping in the left upper  lobe. Calcified pulmonary nodules in the left lower lobe, consistent  with prior granulomatous infection. Subpleural solid pulmonary nodule in  the superior lingula, series 5, axial mage 78, measures 9 mm.     Upper abdomen: Incidental splenule. Nonobstructing nephrolithiasis in  the right mid kidney, measures 1 to 2 mm.     Osseous structures: Diffuse idiopathic skeletal hyperostosis seen in the  mid and lower thoracic spine.       Impression:         1. No pulmonary embolism.  2. Mild to moderate right greater than left pleural effusions.  3. Cardiomegaly with evidence of right heart dysfunction and with  pulmonary edema.  4. Multifocal lung opacities, with volume loss. Suspect combination of  pulmonary edema and atelectasis. Pneumonia in the appropriate clinical  setting.   5. Subpleural solid pulmonary nodule in the lingula measuring 9 mm.  3-month follow-up chest CT can reevaluate.     This report was finalized on 12/19/2024 11:21 AM by Dr. Dennis Back M.D on Workstation: [id number]        XR Chest 1 View [[id number]] Collected: 12/19/24 0819     Updated: 12/19/24 0830    Narrative:      XR CHEST 1 VW-     HISTORY: Male who is 74 years-old, short of breath     TECHNIQUE: Frontal view of the chest     COMPARISON: None available     FINDINGS: The heart is enlarged. Pulmonary vasculature is congested.  Alveolar interstitial opacities in both lungs suggest edema and/or  pneumonia, follow-up recommended. There may be minimal right pleural  effusion. No pneumothorax. No acute osseous process.       Impression:      As described.     This report was finalized on 12/19/2024 8:26 AM by Dr. Mio Cordoba M.D on Workstation: [id number]               Results for orders placed during the hospital encounter of 10/25/24    Adult Transthoracic Echo Complete w/ Color, Spectral and Contrast if Necessary Per Protocol    Interpretation Summary    Left ventricular systolic function is low normal. Calculated left ventricular EF = 52.7%    The left ventricular cavity is moderately dilated.    Left ventricular wall thickness is consistent with moderate concentric hypertrophy.    Left ventricular mass index is increased.  Consider infiltrative cardiomyopathy such as amyloidosis in the appropriate clinical setting.    Left ventricular diastolic function was normal.    RV mildly dilated with grossly normal function.    Aortic valve sclerosis with borderline/mild aortic stenosis. Peak velocity of the flow distal to the aortic valve is 221.3 cm/s. Aortic valve mean pressure gradient is 10 mmHg. Aortic valve dimensionless index is 0.5. Aortic valve area is 1.5 cm2.    There is mild, bileaflet mitral valve thickening as well as mitral annular calcification (MAC) present.    Moderate to severe mitral valve regurgitation is present noted, which may be underestimated due to jet eccentricity.    Estimated right ventricular systolic pressure from tricuspid regurgitation is mildly elevated (35-45 mmHg).    The left atrial cavity is  severely dilated.  Borderline IVC size.    Consider YAZMIN for further evaluation of MR and consider PYP for evaluation of infiltrative cardiomyopathy if clinically appropriate.      ECG 12 Lead Dyspnea   Preliminary Result   HEART WEUJ=263  bpm   RR Uattlmgh=005  ms   ID Interval=  ms   P Horizontal Axis=  deg   P Front Axis=  deg   QRSD Yxorewln=839  ms   QT Uyqzbnkw=997  ms   FPbM=512  ms   QRS Axis=-63  deg   T Wave Axis=15  deg   - ABNORMAL ECG -   Atrial fibrillation   Ventricular premature complex   Nonspecific IVCD with LAD   Left ventricular hypertrophy   Anterior Q waves, possibly due to LVH   Baseline wander in lead(s) V1   Date and Time of Study:2024-12-19 08:00:01      Telemetry Scan   Final Result           Assessment/Plan     Active Hospital Problems    Diagnosis  POA    **Acute exacerbation of CHF (congestive heart failure) [I50.9]  Yes    AF (paroxysmal atrial fibrillation) [I48.0]  Yes    Essential hypertension [I10]  Yes    Diabetes mellitus [E11.9]  Yes      Resolved Hospital Problems   No resolved problems to display.     Acute on chronic diastolic heart failure  PAF  Moderate to severe mitral regurg  Mild aortic stenosis  Hypertension  -CTA on admission with no PE, did show mild to moderate right greater than left pleural effusions.3. Cardiomegaly with evidence of right heart dysfunction and withpulmonary edema.  -BNP elevated to 2600  -Echocardiogram 10/25/2024 showed EF of 52.7%, moderate to severe mitral regurg, mild aortic stenosis  -Patient received IV Lasix 80 mg in the ED, had great output with 2.8 L of urine output already, will not diurese failure for now and continue to monitor urine output.  Will check potassium this afternoon in the setting of significant diuresis.    -Resume Bystolic 5 mg daily, warfarin, pharmacy to dose.  INR subtherapeutic at 1.87.  -On benazepril outpatient, nonformulary will initiate on lisinopril inpatient  Cardiology consult    Acute hypoxic  respiratory  failure secondary to heart failure exacerbation as above  -Was placed on 10 L nonrebreather not on oxygen at baseline  -CT scan showed multifocal lung opacities, with volume loss. Suspect combination of  pulmonary edema and atelectasis. Pneumonia in the appropriate clinical setting.   -Patient is afebrile, WBC normal.  Low suspicion for pneumonia currently, without protocol  -IV diuresis as above  -Wean off O2 as able,, unstageable 90% and above  -Pulmonology consult    Type II DM  -Hold p.o. meds, initiate SSI, low-dose Lantus 8 units    Pleural nodule  -CT scan showed subpleural solid pulmonary nodule in the lingula measuring 9 mm.  -Will need 3-month follow-up chest CT can reevaluate.  -Pulmonology consulted          I discussed the patient's findings and my recommendations with patient and ED provider.    VTE Prophylaxis - Lovenox 40 mg SC daily.  Code Status - Full code.       Charo Love MD  [facility]  12/19/24  14:38 EST

## 2024-12-19 NOTE — ED NOTES
Pt arrives by EMS for chest tightness. Pt found to be on 80% room air and requiring 10L non rebreather. Pt alert and reports chest tightness and cough. Pt took 324 aspirin prior to EMS arriving

## 2024-12-20 LAB
ANION GAP SERPL CALCULATED.3IONS-SCNC: 9.4 MMOL/L (ref 5–15)
BUN SERPL-MCNC: 20 MG/DL (ref 8–23)
BUN/CREAT SERPL: 17.5 (ref 7–25)
CALCIUM SPEC-SCNC: 8.8 MG/DL (ref 8.6–10.5)
CHLORIDE SERPL-SCNC: 105 MMOL/L (ref 98–107)
CO2 SERPL-SCNC: 27.6 MMOL/L (ref 22–29)
CREAT SERPL-MCNC: 1.14 MG/DL (ref 0.76–1.27)
DEPRECATED RDW RBC AUTO: 48 FL (ref 37–54)
EGFRCR SERPLBLD CKD-EPI 2021: 68.3 ML/MIN/1.73
ERYTHROCYTE [DISTWIDTH] IN BLOOD BY AUTOMATED COUNT: 14.7 % (ref 12.3–15.4)
GLUCOSE BLDC GLUCOMTR-MCNC: 101 MG/DL (ref 70–130)
GLUCOSE BLDC GLUCOMTR-MCNC: 110 MG/DL (ref 70–130)
GLUCOSE BLDC GLUCOMTR-MCNC: 116 MG/DL (ref 70–130)
GLUCOSE BLDC GLUCOMTR-MCNC: 140 MG/DL (ref 70–130)
GLUCOSE SERPL-MCNC: 106 MG/DL (ref 65–99)
HCT VFR BLD AUTO: 37.2 % (ref 37.5–51)
HGB BLD-MCNC: 11.7 G/DL (ref 13–17.7)
INR PPP: 1.77 (ref 0.9–1.1)
MAGNESIUM SERPL-MCNC: 2.3 MG/DL (ref 1.6–2.4)
MCH RBC QN AUTO: 28.1 PG (ref 26.6–33)
MCHC RBC AUTO-ENTMCNC: 31.5 G/DL (ref 31.5–35.7)
MCV RBC AUTO: 89.2 FL (ref 79–97)
PHOSPHATE SERPL-MCNC: 3.5 MG/DL (ref 2.5–4.5)
PLATELET # BLD AUTO: 248 10*3/MM3 (ref 140–450)
PMV BLD AUTO: 10.9 FL (ref 6–12)
POTASSIUM SERPL-SCNC: 3.8 MMOL/L (ref 3.5–5.2)
POTASSIUM SERPL-SCNC: 3.8 MMOL/L (ref 3.5–5.2)
PROTHROMBIN TIME: 20.8 SECONDS (ref 11.7–14.2)
QT INTERVAL: 509 MS
QTC INTERVAL: 513 MS
RBC # BLD AUTO: 4.17 10*6/MM3 (ref 4.14–5.8)
SODIUM SERPL-SCNC: 142 MMOL/L (ref 136–145)
WBC NRBC COR # BLD AUTO: 5.97 10*3/MM3 (ref 3.4–10.8)

## 2024-12-20 PROCEDURE — 97530 THERAPEUTIC ACTIVITIES: CPT

## 2024-12-20 PROCEDURE — 93010 ELECTROCARDIOGRAM REPORT: CPT | Performed by: INTERNAL MEDICINE

## 2024-12-20 PROCEDURE — 99232 SBSQ HOSP IP/OBS MODERATE 35: CPT | Performed by: INTERNAL MEDICINE

## 2024-12-20 PROCEDURE — 82948 REAGENT STRIP/BLOOD GLUCOSE: CPT

## 2024-12-20 PROCEDURE — 25010000002 FUROSEMIDE PER 20 MG: Performed by: INTERNAL MEDICINE

## 2024-12-20 PROCEDURE — 93005 ELECTROCARDIOGRAM TRACING: CPT | Performed by: STUDENT IN AN ORGANIZED HEALTH CARE EDUCATION/TRAINING PROGRAM

## 2024-12-20 PROCEDURE — 97162 PT EVAL MOD COMPLEX 30 MIN: CPT

## 2024-12-20 PROCEDURE — 25010000002 ENOXAPARIN PER 10 MG: Performed by: STUDENT IN AN ORGANIZED HEALTH CARE EDUCATION/TRAINING PROGRAM

## 2024-12-20 PROCEDURE — 80048 BASIC METABOLIC PNL TOTAL CA: CPT | Performed by: STUDENT IN AN ORGANIZED HEALTH CARE EDUCATION/TRAINING PROGRAM

## 2024-12-20 PROCEDURE — 63710000001 INSULIN GLARGINE PER 5 UNITS: Performed by: STUDENT IN AN ORGANIZED HEALTH CARE EDUCATION/TRAINING PROGRAM

## 2024-12-20 PROCEDURE — 84100 ASSAY OF PHOSPHORUS: CPT | Performed by: STUDENT IN AN ORGANIZED HEALTH CARE EDUCATION/TRAINING PROGRAM

## 2024-12-20 PROCEDURE — 83735 ASSAY OF MAGNESIUM: CPT | Performed by: STUDENT IN AN ORGANIZED HEALTH CARE EDUCATION/TRAINING PROGRAM

## 2024-12-20 PROCEDURE — 85027 COMPLETE CBC AUTOMATED: CPT | Performed by: STUDENT IN AN ORGANIZED HEALTH CARE EDUCATION/TRAINING PROGRAM

## 2024-12-20 PROCEDURE — 84132 ASSAY OF SERUM POTASSIUM: CPT | Performed by: STUDENT IN AN ORGANIZED HEALTH CARE EDUCATION/TRAINING PROGRAM

## 2024-12-20 PROCEDURE — 85610 PROTHROMBIN TIME: CPT | Performed by: STUDENT IN AN ORGANIZED HEALTH CARE EDUCATION/TRAINING PROGRAM

## 2024-12-20 RX ORDER — FUROSEMIDE 10 MG/ML
80 INJECTION INTRAMUSCULAR; INTRAVENOUS EVERY 12 HOURS
Status: DISCONTINUED | OUTPATIENT
Start: 2024-12-20 | End: 2024-12-22

## 2024-12-20 RX ORDER — POTASSIUM CHLORIDE 750 MG/1
20 TABLET, FILM COATED, EXTENDED RELEASE ORAL ONCE
Status: COMPLETED | OUTPATIENT
Start: 2024-12-20 | End: 2024-12-20

## 2024-12-20 RX ORDER — FUROSEMIDE 10 MG/ML
80 INJECTION INTRAMUSCULAR; INTRAVENOUS ONCE
Status: COMPLETED | OUTPATIENT
Start: 2024-12-20 | End: 2024-12-20

## 2024-12-20 RX ORDER — FUROSEMIDE 10 MG/ML
60 INJECTION INTRAMUSCULAR; INTRAVENOUS EVERY 12 HOURS
Status: DISCONTINUED | OUTPATIENT
Start: 2024-12-20 | End: 2024-12-20

## 2024-12-20 RX ORDER — POTASSIUM CHLORIDE 750 MG/1
40 TABLET, FILM COATED, EXTENDED RELEASE ORAL ONCE
Status: DISCONTINUED | OUTPATIENT
Start: 2024-12-20 | End: 2024-12-20

## 2024-12-20 RX ORDER — LISINOPRIL 20 MG/1
20 TABLET ORAL
Status: DISCONTINUED | OUTPATIENT
Start: 2024-12-20 | End: 2024-12-21

## 2024-12-20 RX ORDER — FUROSEMIDE 10 MG/ML
40 INJECTION INTRAMUSCULAR; INTRAVENOUS EVERY 12 HOURS
Status: DISCONTINUED | OUTPATIENT
Start: 2024-12-20 | End: 2024-12-20

## 2024-12-20 RX ORDER — ENOXAPARIN SODIUM 100 MG/ML
40 INJECTION SUBCUTANEOUS EVERY 24 HOURS
Status: DISCONTINUED | OUTPATIENT
Start: 2024-12-20 | End: 2024-12-21

## 2024-12-20 RX ORDER — POTASSIUM CHLORIDE 750 MG/1
20 TABLET, FILM COATED, EXTENDED RELEASE ORAL 2 TIMES DAILY
Status: DISCONTINUED | OUTPATIENT
Start: 2024-12-20 | End: 2024-12-22

## 2024-12-20 RX ADMIN — INSULIN GLARGINE 10 UNITS: 100 INJECTION, SOLUTION SUBCUTANEOUS at 21:00

## 2024-12-20 RX ADMIN — LISINOPRIL 20 MG: 20 TABLET ORAL at 09:06

## 2024-12-20 RX ADMIN — FUROSEMIDE 80 MG: 10 INJECTION, SOLUTION INTRAMUSCULAR; INTRAVENOUS at 21:01

## 2024-12-20 RX ADMIN — FENOFIBRATE 145 MG: 145 TABLET ORAL at 21:00

## 2024-12-20 RX ADMIN — ESCITALOPRAM OXALATE 15 MG: 10 TABLET, FILM COATED ORAL at 17:34

## 2024-12-20 RX ADMIN — FUROSEMIDE 80 MG: 10 INJECTION, SOLUTION INTRAMUSCULAR; INTRAVENOUS at 09:07

## 2024-12-20 RX ADMIN — Medication 10 ML: at 21:00

## 2024-12-20 RX ADMIN — Medication 10 ML: at 09:11

## 2024-12-20 RX ADMIN — GABAPENTIN 600 MG: 300 CAPSULE ORAL at 21:00

## 2024-12-20 RX ADMIN — ASPIRIN 81 MG: 81 TABLET, COATED ORAL at 09:06

## 2024-12-20 RX ADMIN — TERAZOSIN HYDROCHLORIDE 5 MG: 5 CAPSULE ORAL at 21:01

## 2024-12-20 RX ADMIN — NEBIVOLOL 5 MG: 5 TABLET ORAL at 09:06

## 2024-12-20 RX ADMIN — POTASSIUM CHLORIDE 20 MEQ: 750 TABLET, EXTENDED RELEASE ORAL at 17:34

## 2024-12-20 RX ADMIN — POTASSIUM CHLORIDE 40 MEQ: 750 TABLET, EXTENDED RELEASE ORAL at 00:54

## 2024-12-20 RX ADMIN — ENOXAPARIN SODIUM 40 MG: 100 INJECTION SUBCUTANEOUS at 17:34

## 2024-12-20 RX ADMIN — POTASSIUM CHLORIDE 20 MEQ: 750 TABLET, EXTENDED RELEASE ORAL at 21:00

## 2024-12-20 RX ADMIN — POTASSIUM CHLORIDE 20 MEQ: 750 TABLET, EXTENDED RELEASE ORAL at 09:06

## 2024-12-20 NOTE — PROGRESS NOTES
"    Patient Name: Jeb Olson  :1950  74 y.o.      Patient Care Team:  Tera Salvador MD as PCP - General (Internal Medicine)  Micah Reyes MD as Consulting Physician (Gastroenterology)  Bruna Chávez MD as Referring Physician (Cardiology)    Chief Complaint:   CHF AF MR  Interval History:    Looks better, down to 3L NC, diuresed well    Objective   Vital Signs  Temp:  [98 °F (36.7 °C)-98.8 °F (37.1 °C)] 98.1 °F (36.7 °C)  Heart Rate:  [59-87] 64  Resp:  [18] 18  BP: (120-167)/(75-99) 154/80    Intake/Output Summary (Last 24 hours) at 2024 0916  Last data filed at 2024 0706  Gross per 24 hour   Intake 780 ml   Output 4975 ml   Net -4195 ml     Flowsheet Rows      Flowsheet Row First Filed Value   Admission Height 188 cm (74\") Documented at 2024 0758   Admission Weight 119 kg (262 lb) Documented at 2024 0758            Physical Exam:   General Appearance:    Alert, cooperative, in no acute distress   Lungs:     Decr at bases b/l    Heart:    Regular rhythm and normal rate, normal S1 and S2, no murmurs, gallops or rubs.     Chest Wall:    No abnormalities observed   Abdomen:     Soft, nontender, positive bowel sounds.     Extremities:   no cyanosis, clubbing or edema.  No marked joint deformities.  Adequate musculoskeletal strength.       Results Review:    Results from last 7 days   Lab Units 24  0654   SODIUM mmol/L 142   POTASSIUM mmol/L 3.8   CHLORIDE mmol/L 105   CO2 mmol/L 27.6   BUN mg/dL 20   CREATININE mg/dL 1.14   GLUCOSE mg/dL 106*   CALCIUM mg/dL 8.8     Results from last 7 days   Lab Units 24  0903 24  0800   HSTROP T ng/L 21 21     Results from last 7 days   Lab Units 24  0654   WBC 10*3/mm3 5.97   HEMOGLOBIN g/dL 11.7*   HEMATOCRIT % 37.2*   PLATELETS 10*3/mm3 248     Results from last 7 days   Lab Units 24  0654 24  0800 24  1532   INR  1.77* 1.83* 1.7*     Results from last 7 days   Lab Units 24  0654 "   MAGNESIUM mg/dL 2.3         Results from last 7 days   Lab Units 12/18/24  1532   BNP pg/mL 443.0*           Medication Review:   aspirin, 81 mg, Oral, Daily  escitalopram, 15 mg, Oral, Q PM  fenofibrate, 145 mg, Oral, Nightly  furosemide, 80 mg, Intravenous, Q12H  gabapentin, 600 mg, Oral, Nightly  insulin glargine, 10 Units, Subcutaneous, Nightly  insulin lispro, 2-7 Units, Subcutaneous, 4x Daily AC & at Bedtime  lisinopril, 20 mg, Oral, Q24H  nebivolol, 5 mg, Oral, Daily  potassium chloride ER, 20 mEq, Oral, BID  senna-docusate sodium, 2 tablet, Oral, BID  sodium chloride, 10 mL, Intravenous, Q12H  terazosin, 5 mg, Oral, Nightly  [Held by provider] warfarin, 5 mg, Oral, Daily         Pharmacy to dose warfarin,         Assessment & Plan   1 Flash pulmonary edema  2 CAD remote PCI  3 Relatively new AF  4 New cardiomyopathy likely valvular, unknown etiology. EF 35% in [place] last month.    Diuresing well, lasix 80 IV X 2 today.   Will plan for YAZMIN with anesthesia on Monday for MR assessment.   No need to bridge with lovenox for AF. Prophylactic dosing is fine.     Bruna Chávez MD  [facility]  12/20/24  09:16 EST

## 2024-12-20 NOTE — CONSULTS
CONSULT NOTE    Patient Identification:  Jeb Olson  74 y.o.  male  1950  [id number]            Requesting physician: Hospitalist    Reason for Consultation: Acute hypoxemic respiratory failure    CC:     History of Present Illness:  75-year-old gentleman with history of hypertension coronary artery disease chronic diastolic heart failure moderate to severe mitral regurgitation aortic valve stenosis presented to the emergency room with chest pain tightness and shortness of breath.  Patient was noted to have sats in the 70s.  Apparently was recently admitted with pneumonia in [place].  Currently denies any fever or chills.  No nausea vomiting or aspiration was reported.  Does have a cough which is nonproductive.  No known history of chronic lung disease such as COPD or asthma.  Patient initially was requiring heated high flow now transition to nasal cannula oxygen 4 L.  Patient feels better post diuresis.    Review of Systems  As above rest is negative  Past Medical History:  Past Medical History:   Diagnosis Date    Allergic Have had for several years    Eyes and nasal issues    Anemia     Anxiety Since about 2014    Since I was taking of my Dad, who also passed away 3yrs ago.    Arthritis 2002    Both knees, hips, and shoulders    Arthritis of knee, left     Cataract 05/2019    Colon polyp 2017    Coronary artery disease     stent    Diabetes mellitus     Dry eyes     Essential hypertension     HL (hearing loss) 1980    no hearing aides    Hyperlipemia     Knee swelling 7/7/2020    Shortly after my left knee replacement    Mild chronic anemia     Obesity     Sleep apnea     cpap       Past Surgical History:  Past Surgical History:   Procedure Laterality Date    ACHILLES TENDON REPAIR Left     CARDIAC CATHETERIZATION      CARDIAC CATHETERIZATION N/A 09/01/2022    Procedure: Coronary angiography, Left heart catheterization,;  Surgeon: Bruna Chávez MD;  Location: [facility] CATH INVASIVE LOCATION;  Service:  Cardiology;  Laterality: N/A;    CARDIAC CATHETERIZATION N/A 09/01/2022    Procedure: Stent PRAVIN coronary;  Surgeon: Bruna Chávez MD;  Location: [facility] CATH INVASIVE LOCATION;  Service: Cardiology;  Laterality: N/A;    CATARACT EXTRACTION EXTRACAPSULAR W/ INTRAOCULAR LENS IMPLANTATION Bilateral     COLONOSCOPY      Dr Reyes 6 years ago    ENDOSCOPY      FOOT SURGERY      Achilles repair    JOINT REPLACEMENT  03/12/20    Left knee    TONSILLECTOMY      As a child    TOTAL KNEE ARTHROPLASTY Left 03/12/2020    Procedure: TOTAL KNEE ARTHROPLASTY;  Surgeon: Chepe Alicea MD;  Location: [facility] MAIN OR;  Service: Orthopedics;  Laterality: Left;    TOTAL KNEE ARTHROPLASTY Right 03/21/2024    Procedure: TOTAL KNEE ARTHROPLASTY;  Surgeon: Chepe Alicea MD;  [place] [facility] [place] [facility] [place]  Service: [place]opedics;  Laterality: Right;        Home Meds:  Medications Prior to Admission   Medication Sig Dispense Refill Last Dose/Taking    acetaminophen (TYLENOL) 325 MG tablet Take 2 tablets by mouth 2 (Two) Times a Day As Needed for Mild Pain. 60 tablet 0 12/18/2024    aspirin 81 MG EC tablet Take 1 tablet by mouth 2 (Two) Times a Day. 60 tablet 0 12/19/2024    benazepril (LOTENSIN) 40 MG tablet Take 1 tablet by mouth twice daily 180 tablet 0 12/18/2024    cloNIDine (CATAPRES) 0.2 MG tablet Take 1 tablet by mouth twice daily 180 tablet 1 12/18/2024    doxazosin (CARDURA) 4 MG tablet TAKE 1 TABLET BY MOUTH ONCE DAILY AT NIGHT 90 tablet 1 12/18/2024    escitalopram (LEXAPRO) 10 MG tablet Take 1.5 tablets by mouth Every Evening. 135 tablet 1 12/18/2024    ezetimibe (ZETIA) 10 MG tablet Take 1 tablet by mouth once daily 90 tablet 0 12/18/2024    fenofibrate 160 MG tablet TAKE 1 TABLET BY MOUTH AT NIGHT 90 tablet 0 12/18/2024    fluticasone (FLONASE) 50 MCG/ACT nasal spray Administer 2 sprays into the nostril(s) as directed by provider Every Morning. 2 sprays in each nostril   12/18/2024    furosemide (LASIX) 40 MG tablet Take 1 tablet  by mouth Daily. 90 tablet 3 12/18/2024    gabapentin (NEURONTIN) 600 MG tablet Take 1 tablet by mouth Every Evening.   12/18/2024    glimepiride (AMARYL) 4 MG tablet Take 1 tablet by mouth 2 (Two) Times a Day. Indications: Type 2 Diabetes 180 tablet 1 12/18/2024    glucose blood (Accu-Chek Anabela Plus) test strip TEST TWICE DAILY Use as instructed 100 each 3 12/19/2024    Janumet XR  MG tablet Take 1 tablet by mouth twice daily 180 tablet 0 12/18/2024    Multiple Vitamins-Minerals (b complex-C-E-zinc) tablet Take 1 tablet by mouth Every Other Day.   12/18/2024    Multiple Vitamins-Minerals (PRESERVISION AREDS 2 PO) Take 1 tablet by mouth 2 (Two) Times a Day.   12/18/2024    nebivolol (BYSTOLIC) 5 MG tablet Take 1 tablet by mouth Daily. 90 tablet 1 12/18/2024    NIFEdipine XL (PROCARDIA XL) 60 MG 24 hr tablet Take 1 tablet by mouth Every Morning. 90 tablet 1 12/18/2024    Olopatadine HCl (PATADAY OP) Apply 1 drop to eye(s) as directed by provider 2 (Two) Times a Day As Needed (allergies).   12/18/2024    polyethyl glycol-propyl glycol (SYSTANE) 0.4-0.3 % solution ophthalmic solution (artificial tears) Administer 1 drop to both eyes As Needed (dry eyes).   12/18/2024    traZODone (DESYREL) 50 MG tablet TAKE 1 TABLET BY MOUTH ONCE DAILY AT NIGHT 90 tablet 0 12/18/2024    vitamin D3 125 MCG (5000 UT) capsule capsule Take 1 capsule by mouth Daily.   12/18/2024    warfarin (COUMADIN) 5 MG tablet Take 1 tablet by mouth Daily. (Patient taking differently: Take 1 tablet by mouth Daily. Patient states taking 7.5 MG on Wednesday and Saturday) 180 tablet 3 12/18/2024       Allergies:  No Known Allergies    Social History:   Social History     Socioeconomic History    Marital status:    Tobacco Use    Smoking status: Never     Passive exposure: Never    Smokeless tobacco: Never    Tobacco comments:     Naila only every been around people who smoked   Vaping Use    Vaping status: Never Used   Substance and Sexual  "Activity    Alcohol use: Yes     Alcohol/week: 10.0 standard drinks of alcohol     Types: 10 Drinks containing 0.5 oz of alcohol per week     Comment: Maybe 8-10 drinks mainly on weekends.    Drug use: Never    Sexual activity: Not Currently     Partners: Female     Birth control/protection: None       Family History:  Family History   Problem Relation Age of Onset    Alcohol abuse Maternal Uncle         Passed away 2013    Alcohol abuse Father         Passed away in 2016    Hyperlipidemia Father         Started about 10 years before his death    Arthritis Mother         Passed away 2006, from Alzheimers    Diabetes Mother     Hyperlipidemia Mother         Started probably at middle age    Osteoporosis Mother     Malig Hyperthermia Neg Hx        Physical Exam:  /75 (BP Location: Left arm, Patient Position: Lying)   Pulse 79   Temp 98 °F (36.7 °C) (Oral)   Resp 18   Ht 188 cm (74\")   Wt 117 kg (257 lb 4.8 oz)   SpO2 95%   BMI 33.04 kg/m²  Body mass index is 33.04 kg/m². 95% 117 kg (257 lb 4.8 oz)  Physical Exam  Patient is examined using the personal protective equipment as per guidelines from infection control for this particular patient as enacted.  Hand hygiene was performed before and after patient interaction.  Well-developed normal body habitus  Eyes normal conjunctivae and pupils reactive to light  ENT Mallampati between 3 and 4 normal nasal exam  Neck midline trachea no thyromegaly  Chest diminished breath sounds with crackles bilaterally  CVS regular rate and rhythm no lower extremity edema  Abdomen soft nontender no hepatosplenomegaly  CNS intact normal sensory exam  Skin no rashes no nodules  Psych oriented to time place and person normal memory  Musculoskeletal no cyanosis no clubbing normal range of motion    LABS:  Lab Results   Component Value Date    CALCIUM 9.1 12/19/2024     Results from last 7 days   Lab Units 12/19/24  1627 12/19/24  0903 12/19/24  0800 12/19/24  0800 12/18/24  1532 "   MAGNESIUM mg/dL  --  2.1  --   --   --    SODIUM mmol/L 143  --   --  143 142   POTASSIUM mmol/L 3.4*  --   --  3.7 4.0   CHLORIDE mmol/L 103  --   --  103 106   CO2 mmol/L 28.1  --   --  25.2 24   BUN mg/dL 18  --   --  20 21   CREATININE mg/dL 1.01  --   --  1.16 1.17   GLUCOSE mg/dL 209*  --    < > 140* 241*   CALCIUM mg/dL 9.1  --   --  8.9 8.9   WBC 10*3/mm3  --   --   --  6.72 5.6   HEMOGLOBIN g/dL  --   --   --  11.9* 10.6*   PLATELETS 10*3/mm3  --   --   --  225 237   ALT (SGPT) U/L  --   --   --  35  --    AST (SGOT) U/L  --   --   --  20  --    PROBNP pg/mL  --   --   --  2,600.0*  --     < > = values in this interval not displayed.     Lab Results   Component Value Date    TROPONINT 21 12/19/2024     Results from last 7 days   Lab Units 12/19/24  0903 12/19/24  0800   HSTROP T ng/L 21 21             Results from last 7 days   Lab Units 12/19/24  0831   PH, ARTERIAL pH units 7.399   PCO2, ARTERIAL mm Hg 46.9*   PO2 ART mm Hg 60.2*   O2 SATURATION ART % 90.3*   MODALITY  [employer]low     Results from last 7 days   Lab Units 12/19/24  0801   ADENOVIRUS DETECTION BY PCR  Not Detected   CORONAVIRUS 229E  Not Detected   CORONAVIRUS HKU1  Not Detected   CORONAVIRUS NL63  Not Detected   CORONAVIRUS OC43  Not Detected   HUMAN METAPNEUMOVIRUS  Not Detected   HUMAN RHINOVIRUS/ENTEROVIRUS  Not Detected   INFLUENZA B PCR  Not Detected   PARAINFLUENZA 1  Not Detected   PARAINFLUENZA VIRUS 2  Not Detected   PARAINFLUENZA VIRUS 3  Not Detected   PARAINFLUENZA VIRUS 4  Not Detected   BORDETELLA PERTUSSIS PCR  Not Detected   BORDETELLA PARAPERTUSSIS PCR  Not Detected   CHLAMYDOPHILA PNEUMONIAE PCR  Not Detected   MYCOPLAMA PNEUMO PCR  Not Detected   RSV, PCR  Not Detected     Results from last 7 days   Lab Units 12/19/24  0800 12/18/24  1532   INR  1.83* 1.7*         Lab Results   Component Value Date    TSH 3.080 10/17/2022     Estimated Creatinine Clearance: 89.9 mL/min (by C-G formula based on SCr of 1.01 mg/dL).          Imaging: I personally visualized the images of scans/x-rays performed within last 3 days.      Assessment:  Acute exacerbation of CHF (congestive heart failure)  Acute hypoxemic respiratory failure  Flash pulmonary edema  Cardiomyopathy EF 35%  Valvular heart disease  Bilateral pleural effusion  Paroxysmal A-fib  Pulmonary nodule      Recommendations:  At this time we have a elderly gentleman with acute hypoxemic respiratory failure likely due to flash pulmonary edema.  Patient has been initiated on diuretics.  Continue further diuresis per cardiology  Wean down oxygen maintain sats over 90%  Patient is a non-smoker and do not feel he has underlying history of any COPD or asthma as such.  Bilateral pleural effusion likely due to CHF  Plans for YAZMIN per cardiology to evaluate valvular heart disease  Full dose Lovenox with paroxysmal A-fib  Will require outpatient follow-up CT chest in 3 months for pulmonary nodule        Adwoa Lemon MD  12/19/2024  20:54 EST      Much of this encounter note is an electronic transcription/translation of spoken language to printed text using Dragon Software.

## 2024-12-20 NOTE — CASE MANAGEMENT/SOCIAL WORK
Discharge Planning Assessment  [facility]     Patient Name: Jeb Olson  MRN: [id number]  Today's Date: 12/20/2024    Admit Date: 12/19/2024    Plan: Plan home with spouse.  SUKUMAR Chang RN   Discharge Needs Assessment       Row Name 12/20/24 1408       Living Environment    People in Home spouse    Name(s) of People in Home Spouse  ( Precious Olson [phone])    Current Living Arrangements home    Potentially Unsafe Housing Conditions none    Primary Care Provided by self    Provides Primary Care For no one    Family Caregiver if Needed spouse    Family Caregiver Names Spouse ( Precious Olson [phone])    Quality of Family Relationships involved;helpful;supportive    Able to Return to Prior Arrangements yes    Living Arrangement Comments Pt lives with his spouse  Precious Olson [phone]) in a single story house.       Resource/Environmental Concerns    Resource/Environmental Concerns none    Transportation Concerns none       Transition Planning    Patient/Family Anticipates Transition to home with family    Patient/Family Anticipated Services at Transition none    Transportation Anticipated family or friend will provide       Discharge Needs Assessment    Readmission Within the Last 30 Days no previous admission in last 30 days    Equipment Currently Used at Home cane, straight;glucometer;grab bar;lift device;shower chair;walker, rolling    Concerns to be Addressed no discharge needs identified;denies needs/concerns at this time    Do you want help with school or training? For example, starting or completing job training or getting a high school diploma, GED or equivalent No    Anticipated Changes Related to Illness none    Equipment Needed After Discharge cane, straight;grab bar, tub/shower;glucometer;lift device;shower chair;walker, rolling                   Discharge Plan       Row Name 12/20/24 1411       Plan    Plan Plan home with spouse.  SUKUMAR Chang RN    Patient/Family in Agreement with  Plan yes    Plan Comments FACE SHEET VERIFIED/ IM LETTER SIGNED.  Spoke with pt at bedside. Pt's PCP is Dr.Douglas Salvador.  Pt lives with his spouse Precious Olson [phone]) in a single story house.  Pt is independent with ADLs. Pt has a cane, glucometer, grab bar, chair lift to basement, shower chair and rolling walker for home use if needed.  Pt gets his prescriptions [facility]  Pt denies any issues afffording medications. Pt is not current with HH. Pt has not been in SNF.  Pt denies any discharge needs. Pt states his son or daughter will transport pt home. Pt's spouse will assist pt at home if needed.  Plan home.  SUKUMAR Chang RN                  Continued Care and Services - Admitted Since 12/19/2024    No active coordination exists for this encounter.       Expected Discharge Date and Time       Expected Discharge Date Expected Discharge Time    Dec 24, 2024            Demographic Summary       Row Name 12/20/24 1407       General Information    Admission Type inpatient    Arrived From emergency department    Required Notices Provided Important Message from Medicare    Referral Source admission list    Reason for Consult discharge planning    Preferred Language English                   Functional Status       Row Name 12/20/24 1408       Functional Status    Usual Activity Tolerance good    Current Activity Tolerance good       Functional Status, IADL    Medications independent    Meal Preparation assistive person    Housekeeping assistive person    Laundry assistive person    Shopping assistive person       Mental Status    General Appearance WDL WDL                   Psychosocial    No documentation.                  Abuse/Neglect    No documentation.                  Legal    No documentation.                  Substance Abuse    No documentation.                  Patient Forms    No documentation.                     Taylor Chang, RN

## 2024-12-20 NOTE — PROGRESS NOTES
Name: Jeb Olson ADMIT: 2024   : 1950  PCP: Tera Salvador MD    MRN: [id number] LOS: 1 days   AGE/SEX: 74 y.o. male  ROOM: [facility]/     Subjective   Subjective   Patient seen this morning.  Feeling better, oxygen requirement down to 3 L nasal cannula.  Excellent response to IV diuretics, -4.4 liters charted yesterday.  Denies current use of breath, chest pain.    Review of Systems   As above  Objective   Objective   Vital Signs  Temp:  [98 °F (36.7 °C)-98.8 °F (37.1 °C)] 98.1 °F (36.7 °C)  Heart Rate:  [59-87] 64  Resp:  [18] 18  BP: (120-167)/(75-99) 154/80  SpO2:  [92 %-99 %] 98 %  on  Flow (L/min) (Oxygen Therapy):  [3-6] 3;   Device (Oxygen Therapy): humidified;high-flow nasal cannula  Body mass index is 32.62 kg/m².  Physical Exam    General: Alert, sitting up in the bed, not in distress,  HEENT: Normocephalic, atraumatic  CV: Regular rate and rhythm, no murmurs rubs or gallops  Lungs: Bibasilar crackles, no wheezing,+ murmur  Abdomen: Soft, nontender, nondistended  Extremities: Trace lower extremity edema, no cyanosis       Results Review     I reviewed the patient's new clinical results.  Results from last 7 days   Lab Units 24  0654 24  0800 24  1532   WBC 10*3/mm3 5.97 6.72 5.6   HEMOGLOBIN g/dL 11.7* 11.9* 10.6*   PLATELETS 10*3/mm3 248 225 237     Results from last 7 days   Lab Units 24  0654 24  1627 24  0800 24  1532   SODIUM mmol/L 142 143 143 142   POTASSIUM mmol/L 3.8 3.4* 3.7 4.0   CHLORIDE mmol/L 105 103 103 106   CO2 mmol/L 27.6 28.1 25.2 24   BUN mg/dL 20 18 20 21   CREATININE mg/dL 1.14 1.01 1.16 1.17   GLUCOSE mg/dL 106* 209* 140* 241*   Estimated Creatinine Clearance: 79 mL/min (by C-G formula based on SCr of 1.14 mg/dL).  Results from last 7 days   Lab Units 24  0800   ALBUMIN g/dL 4.4   BILIRUBIN mg/dL 0.7   ALK PHOS U/L 38*   AST (SGOT) U/L 20   ALT (SGPT) U/L 35     Results from last 7 days   Lab Units  12/20/24  0654 12/19/24  1627 12/19/24  0903 12/19/24  0800 12/18/24  1532   CALCIUM mg/dL 8.8 9.1  --  8.9 8.9   ALBUMIN g/dL  --   --   --  4.4  --    MAGNESIUM mg/dL 2.3  --  2.1  --   --    PHOSPHORUS mg/dL 3.5  --   --   --   --        COVID19   Date Value Ref Range Status   12/19/2024 Not Detected Not Detected - Ref. Range Final     Glucose   Date/Time Value Ref Range Status   12/20/2024 0637 101 70 - 130 mg/dL Final   12/19/2024 2026 93 70 - 130 mg/dL Final   12/19/2024 1540 213 (H) 70 - 130 mg/dL Final           CT Angiogram Chest  Narrative: CT ANGIOGRAM CHEST-     INDICATION: Shortness of air, rule out pulmonary embolism     COMPARISON: None     TECHNIQUE:  CTA chest with IV contrast. Coronal and sagittal reformats. Three  dimensional reconstructions. Radiation dose reduction techniques were  utilized, including automated exposure control and exposure modulation  based on body size.     FINDINGS:      Pulmonary arteries: Streak artifact from contrast in the venous system.  No pulmonary embolism.     Chest wall: Gynecomastia. No lymphadenopathy.     Mediastinum: Coronary artery atherosclerotic calcifications. Mild  cardiomegaly. Reflux of contrast into the IVC and hepatic veins. Aortic  valve calcification. No pericardial effusion. Mild mediastinal and hilar  adenopathy. For example, subcarinal lymph node, series 4, axial image  69, measures 1.8 cm.     Lung/pleura: Mild to moderate right greater than left pleural effusions.  Airways wall thickening. Smooth interlobular septal thickening.  Bilateral groundglass lung opacities. More confluent bronchovascular  opacities seen in the bilateral lungs, greatest in the lower lobes where  there is also volume loss. Some suspected air trapping in the left upper  lobe. Calcified pulmonary nodules in the left lower lobe, consistent  with prior granulomatous infection. Subpleural solid pulmonary nodule in  the superior lingula, series 5, axial mage 78, measures 9 mm.      Upper abdomen: Incidental splenule. Nonobstructing nephrolithiasis in  the right mid kidney, measures 1 to 2 mm.     Osseous structures: Diffuse idiopathic skeletal hyperostosis seen in the  mid and lower thoracic spine.     Impression:    1. No pulmonary embolism.  2. Mild to moderate right greater than left pleural effusions.  3. Cardiomegaly with evidence of right heart dysfunction and with  pulmonary edema.  4. Multifocal lung opacities, with volume loss. Suspect combination of  pulmonary edema and atelectasis. Pneumonia in the appropriate clinical  setting.   5. Subpleural solid pulmonary nodule in the lingula measuring 9 mm.  3-month follow-up chest CT can reevaluate.     This report was finalized on 12/19/2024 11:21 AM by Dr. Dennis Back M.D on Workstation: [id number]     XR Chest 1 View  Narrative: XR CHEST 1 VW-     HISTORY: Male who is 74 years-old, short of breath     TECHNIQUE: Frontal view of the chest     COMPARISON: None available     FINDINGS: The heart is enlarged. Pulmonary vasculature is congested.  Alveolar interstitial opacities in both lungs suggest edema and/or  pneumonia, follow-up recommended. There may be minimal right pleural  effusion. No pneumothorax. No acute osseous process.     Impression: As described.     This report was finalized on 12/19/2024 8:26 AM by Dr. Mio Cordoba M.D on Workstation: [id number]       Scheduled Medications  aspirin, 81 mg, Oral, Daily  escitalopram, 15 mg, Oral, Q PM  fenofibrate, 145 mg, Oral, Nightly  furosemide, 80 mg, Intravenous, Q12H  gabapentin, 600 mg, Oral, Nightly  insulin glargine, 10 Units, Subcutaneous, Nightly  insulin lispro, 2-7 Units, Subcutaneous, 4x Daily AC & at Bedtime  lisinopril, 20 mg, Oral, Q24H  nebivolol, 5 mg, Oral, Daily  potassium chloride ER, 20 mEq, Oral, BID  senna-docusate sodium, 2 tablet, Oral, BID  sodium chloride, 10 mL, Intravenous, Q12H  terazosin, 5 mg, Oral, Nightly  [Held by provider] warfarin, 5 mg,  Oral, Daily    Infusions  Pharmacy to dose warfarin,     Diet  Diet: Cardiac, Diabetic; Healthy Heart (2-3 Na+); Consistent Carbohydrate; Fluid Consistency: Thin (IDDSI 0)    I have personally reviewed     [x]  Laboratory   [x]  Microbiology   [x]  Radiology   [x]  EKG/Telemetry  [x]  Cardiology/Vascular   []  Pathology    []  Records       Assessment/Plan     Active Hospital Problems    Diagnosis  POA    **Acute exacerbation of CHF (congestive heart failure) [I50.9]  Yes    AF (paroxysmal atrial fibrillation) [I48.0]  Yes    Essential hypertension [I10]  Yes    Diabetes mellitus [E11.9]  Yes      Resolved Hospital Problems   No resolved problems to display.     Acute on chronic diastolic heart failure  PAF  Moderate to severe mitral regurg  Mild aortic stenosis  Hypertension  -CTA on admission with no PE, did show mild to moderate right greater than left pleural effusions.3. Cardiomegaly with evidence of right heart dysfunction and withpulmonary edema.  -BNP elevated to 2600  -Echocardiogram 10/25/2024 showed EF of 52.7%, moderate to severe mitral regurg, mild aortic stenosis  -on  IV lasix 80 bid, cardiology managing  -Cardiology following, plan for YAZMIN with anesthesia on Monday for MR assessment  - hold warfarin for now pending YAZMIN.  No indication for bridging with Lovenox currently per cardiology, okay for prophylactic Lovenox       Acute hypoxic  respiratory failure secondary to heart failure exacerbation as above  -Recently admitted with PNA in [place]  -Was placed on 10 L nonrebreather on admission, not on oxygen at baseline  -CT scan showed multifocal lung opacities, with volume loss. Suspect combination of  pulmonary edema and atelectasis. Pneumonia in the appropriate clinical setting.   -Patient is afebrile, WBC normal.  Low suspicion for pneumonia currently, without protocol  -IV diuresis as above  -Wean off O2 as able,, unstageable 90% and above  -Pulmonology following  -Improving     Type II  DM  -Continue SSI, Lantus 10 units nightly  -Blood glucose stable     Pleural nodule  -CT scan showed subpleural solid pulmonary nodule in the lingula measuring 9 mm.  -Will need 3-month follow-up chest CT can reevaluate.  -Pulmonology consulted         Lovenox 40 mg SC daily for DVT prophylaxis.  Full code.  Discussed with patient.  Expected Discharge Date: 12/24/2024; Expected Discharge Time:        Copied text in this note has been reviewed and is accurate as of 12/20/24.         Dictated utilizing Dragon dictation        Charo Love MD  [facility]  12/20/24  09:54 EST

## 2024-12-20 NOTE — CASE MANAGEMENT/SOCIAL WORK
Continued Stay Note  [facility]     Patient Name: Jeb Olson  MRN: [id number]  Today's Date: 12/20/2024    Admit Date: 12/19/2024    Plan: Plan home with spouse.  SUKUMAR Chang RN   Discharge Plan       Row Name 12/20/24 1411       Plan    Plan Plan home with spouse.  SUKUMAR Chang RN    Patient/Family in Agreement with Plan yes    Plan Comments FACE SHEET VERIFIED/ IM LETTER SIGNED.  Spoke with pt at bedside. Pt's PCP is Dr.Douglas Salvador.  Pt lives with his spouse Precious Olson [phone]) in a single story house.  Pt is independent with ADLs. Pt has a cane, glucometer, grab bar, chair lift to basement, shower chair and rolling walker for home use if needed.  Pt gets his prescriptions at [facility]  Pt denies any issues affording medications. Pt is not current with HH. Pt has not been in SNF.  Pt denies any discharge needs. Pt states his son or daughter will transport pt home. Pt's spouse will assist pt at home if needed.  Plan home.  SUKUMAR Chang RN                   Discharge Codes    No documentation.                 Expected Discharge Date and Time       Expected Discharge Date Expected Discharge Time    Dec 24, 2024               Taylor Chang RN

## 2024-12-20 NOTE — THERAPY EVALUATION
Patient Name: Jeb Olson  : 1950    MRN: [id number]                              Today's Date: 2024       Admit Date: 2024    Visit Dx:     ICD-10-CM ICD-9-CM   1. Acute hypoxemic respiratory failure  J96.01 518.81   2. Acute on chronic congestive heart failure, unspecified heart failure type  I50.9 428.0     Patient Active Problem List   Diagnosis    Allergic rhinitis    Arthritis of knee, left    Diabetes mellitus    Essential hypertension    Hyperlipidemia    High risk medication use    Obesity    Primary osteoarthritis of knee    Mild chronic anemia    Obstructive sleep apnea    Arthritis of left knee    Sinus bradycardia    Abnormal stress test    Colon polyp    Family history of colonic polyps    Reactive depression    Arthritis of knee, right    Arthritis of knee    AF (paroxysmal atrial fibrillation)    Acute exacerbation of CHF (congestive heart failure)     Past Medical History:   Diagnosis Date    Allergic Have had for several years    Eyes and nasal issues    Anemia     Anxiety Since about     Since I was taking of my Dad, who also passed away 3yrs ago.    Arthritis 2002    Both knees, hips, and shoulders    Arthritis of knee, left     Cataract 2019    Colon polyp     Coronary artery disease     stent    Diabetes mellitus     Dry eyes     Essential hypertension     HL (hearing loss) 1980    no hearing aides    Hyperlipemia     Knee swelling 2020    Shortly after my left knee replacement    Mild chronic anemia     Obesity     Sleep apnea     cpap     Past Surgical History:   Procedure Laterality Date    ACHILLES TENDON REPAIR Left     CARDIAC CATHETERIZATION      CARDIAC CATHETERIZATION N/A 2022    Procedure: Coronary angiography, Left heart catheterization,;  Surgeon: Bruna Chávez MD;  Location: [facility] CATH INVASIVE LOCATION;  Service: Cardiology;  Laterality: N/A;    CARDIAC CATHETERIZATION N/A 2022    Procedure: Stent PRAVIN coronary;  Surgeon: Saira  MD Bruna;  Location: [facility] CATH INVASIVE LOCATION;  Service: Cardiology;  Laterality: N/A;    CATARACT EXTRACTION EXTRACAPSULAR W/ INTRAOCULAR LENS IMPLANTATION Bilateral     COLONOSCOPY      Dr Reyes 6 years ago    ENDOSCOPY      FOOT SURGERY      Achilles repair    JOINT REPLACEMENT  03/12/20    Left knee    TONSILLECTOMY      As a child    TOTAL KNEE ARTHROPLASTY Left 03/12/2020    Procedure: TOTAL KNEE ARTHROPLASTY;  Surgeon: Chepe Alicea MD;  Location: [facility] MAIN OR;  Service: Orthopedics;  Laterality: Left;    TOTAL KNEE ARTHROPLASTY Right 03/21/2024    Procedure: TOTAL KNEE ARTHROPLASTY;  Surgeon: Chepe Alicea MD;  Location: [facility] OR OSC;  Service: Orthopedics;  Laterality: Right;      General Information       Row Name 12/20/24 1141          Physical Therapy Time and Intention    Document Type evaluation  -MG     Mode of Treatment individual therapy;physical therapy  -MG       Row Name 12/20/24 1141          General Information    Patient Profile Reviewed yes  -MG     Prior Level of Function independent:  No AD, but does sometimes use a cane outside of the home.  -MG     Existing Precautions/Restrictions oxygen therapy device and L/min  No O2 at home. Currently on 3L O2 via NC.  -MG     Barriers to Rehab none identified  -MG       Row Name 12/20/24 1141          Living Environment    People in Home spouse  -MG       Row Name 12/20/24 1141          Home Main Entrance    Number of Stairs, Main Entrance five  -MG     Stair Railings, Main Entrance railings safe and in good condition;railings on both sides of stairs  -MG       Row Name 12/20/24 1141          Stairs Within Home, Primary    Stairs, Within Home, Primary Basement w/ stair lift.  -MG     Number of Stairs, Within Home, Primary none  -MG       Row Name 12/20/24 1141          Cognition    Orientation Status (Cognition) oriented x 4  -MG       Row Name 12/20/24 1141          Safety Issues/Impairments Affecting Functional Mobility    Safety  Issues Affecting Function (Mobility) insight into deficits/self-awareness  -MG     Impairments Affecting Function (Mobility) shortness of breath;endurance/activity tolerance  -MG     Comment, Safety Issues/Impairments (Mobility) Gait belt and non-skid socks donned.  -MG               User Key  (r) = Recorded By, (t) = Taken By, (c) = Cosigned By      Initials Name Provider Type    MG Keya De La Torre PT Physical Therapist                   Mobility       Row Name 12/20/24 1145          Bed Mobility    Bed Mobility sit-supine;supine-sit  -MG     Supine-Sit [place] (Bed Mobility) modified independence  -MG     Sit-Supine [place] (Bed Mobility) modified independence  -MG     Assistive Device (Bed Mobility) head of bed elevated  -MG       Row Name 12/20/24 1145          Sit-Stand Transfer    Sit-Stand [place] (Transfers) standby assist;independent  -MG     Comment, (Sit-Stand Transfer) No AD. x3 from EOB.  -MG       Row Name 12/20/24 1145          Gait/Stairs (Locomotion)    [place] Level (Gait) standby assist;supervision  -MG     Assistive Device (Gait) other (see comments)  No AD  -MG     Distance in Feet (Gait) 200  -MG     Deviations/Abnormal Patterns (Gait) gait speed decreased  -MG     Comment, (Gait/Stairs) Amb in room and one lap around the unit. Amb on room air satting 81% on return to room with decrease to 78% as he rested. Pt was placed back on 2L w/ recovery to >92% after 1 min. Able to titrate down to 1L at rest and maintains 95-96%; RN aware. No balance or gait deficits, dizziness and no c/o SOB until back inside of his room.  -MG               User Key  (r) = Recorded By, (t) = Taken By, (c) = Cosigned By      Initials Name Provider Type    MG Keya De La Torre PT Physical Therapist                   Obj/Interventions       Row Name 12/20/24 1156          Range of Motion Comprehensive    General Range of Motion no range of motion deficits identified  -MG       Row Name 12/20/24 1159           Strength Comprehensive (MMT)    General Manual Muscle Testing (MMT) Assessment no strength deficits identified  -MG       Row Name 12/20/24 1152          Balance    Comment, Balance No seated or standing balance deficits noted.  -MG       Row Name 12/20/24 1152          Sensory Assessment (Somatosensory)    Sensory Assessment (Somatosensory) sensation intact  -MG               User Key  (r) = Recorded By, (t) = Taken By, (c) = Cosigned By      Initials Name Provider Type    MG Keya De La Torre, PATTIE Physical Therapist                   Goals/Plan    No documentation.                  Clinical Impression       Row Name 12/20/24 1152          Pain    Pretreatment Pain Rating 0/10 - no pain  -MG     Posttreatment Pain Rating 0/10 - no pain  -MG       Row Name 12/20/24 1152          Plan of Care Review    Plan of Care Reviewed With patient  -MG     Progress improving  -MG     Outcome Evaluation Pt is a 74 y.o. male with h/o HTN, CAD, and CHF who was admitted to [facility] on 12/19/2024 with c/o chest pain/tightness and SOB; home O2 sats in the 70s. Patient is (I) at baseline, without use of AD, but sometimes uses a cane, no O2 at baseline (currently on 3L) and lives with his wife in a house w/ 5 EL and BHR. Today, pt performed bed mobility with Mod-I, required SV to Ind for transfers without an AD, and ambulated 200' with SBA/SV and no AD. Pt amb on room air satting 81% on return to room with decrease to 78% as he rested. Pt was placed on 2L O2 via NC w/ recovery to >92% after 1 min of rest and PLB. Able to titrate down to 1L at rest and maintains 95-96%; RN present. No balance or gait deficits noted with mobility. Pt states he has been up ad-albert/ind to the [facility] w/o issue, feels at his mobility baseline and has no mobility/ADL concerns for return home. Pt did no have any c/o dizziness and states he did not feel SOB until he returned to the room after gait. Encouraged pt to cont ambulating in room and around the unit multiple  times per day w/ O2 donned. Pt is likely to need a 6MWT prior to DC. SBAR w/ RN. No further skilled PT services required and will sign off at this time. Thank you. Rec home upon DC.  -MG       Row Name 12/20/24 1152          Therapy Assessment/Plan (PT)    Criteria for Skilled Interventions Met (PT) no;does not meet criteria for skilled intervention  -MG     Therapy Frequency (PT) evaluation only  -MG       Row Name 12/20/24 1152          Vital Signs    Pretreatment Heart Rate (beats/min) 75  -MG     Posttreatment Heart Rate (beats/min) 81  -MG     Pre SpO2 (%) 98  3L  -MG     O2 Delivery Pre Treatment supplemental O2  -MG     Intra SpO2 (%) 78  Placed on 2L, up to >92% after 1 min of PLB and seated rest.  -MG     O2 Delivery Intra Treatment room air  -MG     Post SpO2 (%) 96  Titrated down to 1L and maintaining at rest.  -MG     O2 Delivery Post Treatment supplemental O2  -MG     Pre Patient Position Supine  -MG     Intra Patient Position Standing  -MG     Post Patient Position Sitting  -MG       Row Name 12/20/24 1152          Positioning and Restraints    Pre-Treatment Position in bed  -MG     Post Treatment Position bed  -MG     In Bed sitting EOB;call light within reach;encouraged to call for assist;side rails up x3;with nsg  Bed alarm not active on arrival as pt has been up ad-albert/Ind to the BS; RN aware and approved.  -MG               User Key  (r) = Recorded By, (t) = Taken By, (c) = Cosigned By      Initials Name Provider Type    MG Keya De La Torre, PT Physical Therapist                   Outcome Measures       Row Name 12/20/24 1151          How much help from another person do you currently need...    Turning from your back to your side while in flat bed without using bedrails? 4  -MG     Moving from lying on back to sitting on the side of a flat bed without bedrails? 4  -MG     Moving to and from a bed to a chair (including a wheelchair)? 4  -MG     Standing up from a chair using your arms (e.g.,  wheelchair, bedside chair)? 4  -MG     Climbing 3-5 steps with a railing? 3  -MG     To walk in hospital room? 3  -MG     AM-PAC 6 Clicks Score (PT) 22  -MG               User Key  (r) = Recorded By, (t) = Taken By, (c) = Cosigned By      Initials Name Provider Type    MG Keya De La Torre, PT Physical Therapist                                 Physical Therapy Education       Title: PT OT SLP Therapies (In Progress)       Topic: Physical Therapy (In Progress)       Point: Mobility training (Not Started)       Learner Progress:  Not documented in this visit.              Point: Home exercise program (Not Started)       Learner Progress:  Not documented in this visit.              Point: Body mechanics (Done)       Learning Progress Summary            Patient Acceptance, E,TB,D, VU,DU by  at 12/20/2024 1154                      Point: Precautions (Done)       Learning Progress Summary            Patient Acceptance, E,TB,D, VU,DU by  at 12/20/2024 1154                                      User Key       Initials Effective Dates Name Provider Type Discipline     05/24/22 -  Keya De La Torre PT Physical Therapist PT                  PT Recommendation and Plan     Progress: improving  Outcome Evaluation: Pt is a 74 y.o. male with h/o HTN, CAD, and CHF who was admitted to [facility] on 12/19/2024 with c/o chest pain/tightness and SOB; home O2 sats in the 70s. Patient is (I) at baseline, without use of AD, but sometimes uses a cane, no O2 at baseline (currently on 3L) and lives with his wife in a house w/ 5 EL and BHR. Today, pt performed bed mobility with Mod-I, required SV to Ind for transfers without an AD, and ambulated 200' with SBA/SV and no AD. Pt amb on room air satting 81% on return to room with decrease to 78% as he rested. Pt was placed on 2L O2 via NC w/ recovery to >92% after 1 min of rest and PLB. Able to titrate down to 1L at rest and maintains 95-96%; RN present. No balance or gait deficits noted with mobility.  Pt states he has been up ad-albert/ind to the [facility] w/o issue, feels at his mobility baseline and has no mobility/ADL concerns for return home. Pt did no have any c/o dizziness and states he did not feel SOB until he returned to the room after gait. Encouraged pt to cont ambulating in room and around the unit multiple times per day w/ O2 donned. Pt is likely to need a 6MWT prior to DC. SBAR w/ RN. No further skilled PT services required and will sign off at this time. Thank you. Rec home upon DC.     Time Calculation:         PT Charges       Row Name 12/20/24 1154             Time Calculation    Start Time 0858  -MG      Stop Time 0919  -MG      Time Calculation (min) 21 min  -MG      PT Received On 12/20/24  -MG         Time Calculation- PT    Total Timed Code Minutes- PT 11 minute(s)  -MG                User Key  (r) = Recorded By, (t) = Taken By, (c) = Cosigned By      Initials Name Provider Type    MG Keya De La Torre, PT Physical Therapist                  Therapy Charges for Today       Code Description Service Date Service Provider Modifiers Qty    77018739160 HC PT EVAL MOD COMPLEXITY 3 12/20/2024 Keya De La Torre, PT GP 1    39026689574 HC PT THERAPEUTIC ACT EA 15 MIN 12/20/2024 Keya De La Torre, PT GP 1            PT G-Codes  AM-PAC 6 Clicks Score (PT): 22  PT Discharge Summary  Anticipated Discharge Disposition (PT): home, home with assist    Keya De La Torre PT  12/20/2024

## 2024-12-20 NOTE — PROGRESS NOTES
LOS: 1 day   Patient Care Team:  Tera Salvador MD as PCP - General (Internal Medicine)  Micah Reyes MD as Consulting Physician (Gastroenterology)  Bruna Chávez MD as Referring Physician (Cardiology)    Subjective     Picking up pulmonary coverage from Dr. Lemon I reviewed his another records following patient for acute hypoxic respiratory failure.  This 75-year-old gentleman was admitted yesterday presents to the emergency department with chest tightness and shortness of breath 1 to 2 hours duration, awakening him from sleep, hypoxic EMS reported sats in the low 80s.  History of sleep apnea diabetes hypertension, paroxysmal A-fib never smoker was found to be in flash pulmonary edema due to cardiomyopathy EF about 35% a month ago felt to be likely valvular but uncertain etiology [place] about 2 weeks ago was admitted for pneumonia there  he reports he is feeling dramatically better this morning he has decreased 8 pounds 3 L O2.  When he coughs all he is getting up is a little bit of clear to white mucus.  No chest pain.  Patient did bring his CPAP with him and used it some last night.  Review of Systems:         Objective     Vital Signs  Vital Sign Min/Max for last 24 hours  Temp  Min: 97.8 °F (36.6 °C)  Max: 98.8 °F (37.1 °C)   BP  Min: 120/93  Max: 175/138   Pulse  Min: 59  Max: 111   Resp  Min: 18  Max: 18   SpO2  Min: 89 %  Max: 99 %   Flow (L/min) (Oxygen Therapy)  Min: 3  Max: 60   Weight  Min: 115 kg (254 lb 1.6 oz)  Max: 119 kg (262 lb)        Ventilator/Non-Invasive Ventilation Settings (From admission, onward)      None                         Body mass index is 32.62 kg/m².  I/O last 3 completed shifts:  In: 540 [P.O.:540]  Out: 4975 [Urine:4975]  No intake/output data recorded.        Physical Exam:  General Appearance: Well-developed obese white male he is up walking around in the room on my arrival on liters nasal cannula O2 his oxygen saturations were 93+ percent  throughout  Eyes: Conjunctiva are clear and anicteric  ENT: His membranes are moist no exudates Mallampati type II airway  Neck: No jugular venous distention, trachea midline  Lungs: No wheezes or rales he does have decreased breath sounds with some dullness in both lung bases right slightly greater than left on percussion chest expansion is symmetric and nonlabored  Cardiac: Irregularly irregular with murmur heard loudest over the left upper sternal border  Abdomen: Obese soft nontender no palpable hepatosplenomegaly  : Not examined  Musculoskeletal: Grossly normal  Skin: Warm and dry no jaundice no petechiae  Neuro: He is alert and oriented cooperative following commands again up walking with no difficulty  Extremities/P Vascular: No clubbing no cyanosis no edema palpable radial and dorsalis pedis pulses  MSE: Pleasant gentleman seems to be in pretty good spirits       Labs:  Results from last 7 days   Lab Units 12/20/24  0654 12/19/24  1627 12/19/24  0903 12/19/24  0800 12/18/24  1532   GLUCOSE mg/dL 106* 209*  --  140* 241*   SODIUM mmol/L 142 143  --  143 142   POTASSIUM mmol/L 3.8 3.4*  --  3.7 4.0   MAGNESIUM mg/dL 2.3  --  2.1  --   --    CO2 mmol/L 27.6 28.1  --  25.2 24   CHLORIDE mmol/L 105 103  --  103 106   ANION GAP mmol/L 9.4 11.9  --  14.8  --    CREATININE mg/dL 1.14 1.01  --  1.16 1.17   BUN mg/dL 20 18  --  20 21   BUN / CREAT RATIO  17.5 17.8  --  17.2 18   CALCIUM mg/dL 8.8 9.1  --  8.9 8.9   ALK PHOS U/L  --   --   --  38*  --    TOTAL PROTEIN g/dL  --   --   --  7.2  --    ALT (SGPT) U/L  --   --   --  35  --    AST (SGOT) U/L  --   --   --  20  --    BILIRUBIN mg/dL  --   --   --  0.7  --    ALBUMIN g/dL  --   --   --  4.4  --    GLOBULIN gm/dL  --   --   --  2.8  --      Estimated Creatinine Clearance: 79 mL/min (by C-G formula based on SCr of 1.14 mg/dL).      Results from last 7 days   Lab Units 12/20/24  0654 12/19/24  0800 12/18/24  1532   WBC 10*3/mm3 5.97 6.72 5.6   RBC 10*6/mm3 4.17  4.11* 3.77*   HEMOGLOBIN g/dL 11.7* 11.9* 10.6*   HEMATOCRIT % 37.2* 36.2* 34.0*   MCV fL 89.2 88.1 90   MCH pg 28.1 29.0 28.1   MCHC g/dL 31.5 32.9 31.2*   RDW % 14.7 14.2 14.2   RDW-SD fl 48.0 45.6  --    MPV fL 10.9 10.6  --    PLATELETS 10*3/mm3 248 225 237   NEUTROPHIL % %  --  73.3 73   LYMPHOCYTE % %  --  18.9* 18   MONOCYTES % %  --  4.2* 5   EOSINOPHIL % %  --  2.2 2   BASOPHIL % %  --  1.0 1   IMM GRAN % %  --  0.4  --    NEUTROS ABS 10*3/mm3  --  4.92 4.1   LYMPHS ABS 10*3/mm3  --  1.27 1.0   MONOS ABS 10*3/mm3  --  0.28 0.3   EOS ABS 10*3/mm3  --  0.15 0.1   BASOS ABS 10*3/mm3  --  0.07 0.0   IMMATURE GRANS (ABS) 10*3/mm3  --  0.03  --    NRBC /100 WBC  --  0.0  --      Results from last 7 days   Lab Units 12/19/24  0831   PH, ARTERIAL pH units 7.399   PO2 ART mm Hg 60.2*   PCO2, ARTERIAL mm Hg 46.9*   HCO3 ART mmol/L 29.0*     Results from last 7 days   Lab Units 12/19/24  0903 12/19/24  0800   HSTROP T ng/L 21 21     Results from last 7 days   Lab Units 12/19/24  0800   PROBNP pg/mL 2,600.0*             Results from last 7 days   Lab Units 12/19/24  0800 12/18/24  1532   INR  1.83* 1.7*     Microbiology Results (last 10 days)       Procedure Component Value - Date/Time    Respiratory Panel PCR w/COVID-19(SARS-CoV-2) YESSY/MACK/ANGELIQUE/PAD/COR/JESSICA In-House, NP Swab in UTM/VTM, 2 HR TAT - Swab, Nasopharynx [[id number]]  (Normal) Collected: 12/19/24 0801    Lab Status: Final result Specimen: Swab from Nasopharynx Updated: 12/19/24 0859     ADENOVIRUS, PCR Not Detected     Coronavirus 229E Not Detected     Coronavirus HKU1 Not Detected     Coronavirus NL63 Not Detected     Coronavirus OC43 Not Detected     COVID19 Not Detected     Human Metapneumovirus Not Detected     Human Rhinovirus/Enterovirus Not Detected     Influenza A PCR Not Detected     Influenza B PCR Not Detected     Parainfluenza Virus 1 Not Detected     Parainfluenza Virus 2 Not Detected     Parainfluenza Virus 3 Not Detected     Parainfluenza Virus  4 Not Detected     RSV, PCR Not Detected     Bordetella pertussis pcr Not Detected     Bordetella parapertussis PCR Not Detected     Chlamydophila pneumoniae PCR Not Detected     Mycoplasma pneumo by PCR Not Detected    Narrative:      In the setting of a positive respiratory panel with a viral infection PLUS a negative procalcitonin without other underlying concern for bacterial infection, consider observing off antibiotics or discontinuation of antibiotics and continue supportive care. If the respiratory panel is positive for atypical bacterial infection (Bordetella pertussis, Chlamydophila pneumoniae, or Mycoplasma pneumoniae), consider antibiotic de-escalation to target atypical bacterial infection.                aspirin, 81 mg, Oral, Daily  enoxaparin, 1 mg/kg, Subcutaneous, Q12H  escitalopram, 15 mg, Oral, Q PM  fenofibrate, 145 mg, Oral, Nightly  gabapentin, 600 mg, Oral, Nightly  insulin glargine, 10 Units, Subcutaneous, Nightly  insulin lispro, 2-7 Units, Subcutaneous, 4x Daily AC & at Bedtime  lisinopril, 20 mg, Oral, Q24H  nebivolol, 5 mg, Oral, Daily  senna-docusate sodium, 2 tablet, Oral, BID  sodium chloride, 10 mL, Intravenous, Q12H  terazosin, 5 mg, Oral, Nightly  [Held by provider] warfarin, 5 mg, Oral, Daily      Pharmacy to dose warfarin,         Diagnostics:  CT Angiogram Chest    Result Date: 12/19/2024  CT ANGIOGRAM CHEST-  INDICATION: Shortness of air, rule out pulmonary embolism  COMPARISON: None  TECHNIQUE: CTA chest with IV contrast. Coronal and sagittal reformats. Three dimensional reconstructions. Radiation dose reduction techniques were utilized, including automated exposure control and exposure modulation based on body size.  FINDINGS:  Pulmonary arteries: Streak artifact from contrast in the venous system. No pulmonary embolism.  Chest wall: Gynecomastia. No lymphadenopathy.  Mediastinum: Coronary artery atherosclerotic calcifications. Mild cardiomegaly. Reflux of contrast into the  IVC and hepatic veins. Aortic valve calcification. No pericardial effusion. Mild mediastinal and hilar adenopathy. For example, subcarinal lymph node, series 4, axial image 69, measures 1.8 cm.  Lung/pleura: Mild to moderate right greater than left pleural effusions. Airways wall thickening. Smooth interlobular septal thickening. Bilateral groundglass lung opacities. More confluent bronchovascular opacities seen in the bilateral lungs, greatest in the lower lobes where there is also volume loss. Some suspected air trapping in the left upper lobe. Calcified pulmonary nodules in the left lower lobe, consistent with prior granulomatous infection. Subpleural solid pulmonary nodule in the superior lingula, series 5, axial mage 78, measures 9 mm.  Upper abdomen: Incidental splenule. Nonobstructing nephrolithiasis in the right mid kidney, measures 1 to 2 mm.  Osseous structures: Diffuse idiopathic skeletal hyperostosis seen in the mid and lower thoracic spine.       1. No pulmonary embolism. 2. Mild to moderate right greater than left pleural effusions. 3. Cardiomegaly with evidence of right heart dysfunction and with pulmonary edema. 4. Multifocal lung opacities, with volume loss. Suspect combination of pulmonary edema and atelectasis. Pneumonia in the appropriate clinical setting. 5. Subpleural solid pulmonary nodule in the lingula measuring 9 mm. 3-month follow-up chest CT can reevaluate.  This report was finalized on 12/19/2024 11:21 AM by Dr. Dennis Back M.D on Workstation: [id number]      XR Chest 1 View    Result Date: 12/19/2024  XR CHEST 1 VW-  HISTORY: Male who is 74 years-old, short of breath  TECHNIQUE: Frontal view of the chest  COMPARISON: None available  FINDINGS: The heart is enlarged. Pulmonary vasculature is congested. Alveolar interstitial opacities in both lungs suggest edema and/or pneumonia, follow-up recommended. There may be minimal right pleural effusion. No pneumothorax. No acute osseous  process.      As described.  This report was finalized on 2024 8:26 AM by Dr. Mio Cordoba M.D on Workstation: [employer]      PYP Imaging for Cardiac Amyloidosis    Result Date: 2024    The study is not suggestive of ATTR cardiac amyloidosis.   Semi-quantitative visual grading of myocardial uptake by comparison to rib uptake was grade 0 (no cardiac uptake and normal bone uptake)     Intracardiac echocardiogram    Result Date: 2024  [id number]^JEFF^DAWIT^LAVON [facility] [address] [place] [id number] Name: BLAYNE RETANA MRN: [id number] : 1950 Study Date: 2024 01:51 PM Age: 74 yrs Gender: Male Patient Location: [facility] Reason For Study: Heart Failure Ordering Physician: DAWIT AGUILAR Performed By: Luis Rivera BSA: 2.4 m2 Height: 74 in Weight: 263 lb HR: 89 BP: 128/87 mmHg ______________________________________________________________________________ Procedure Complete Portable Echo Adult. Definity (NDC #08299-209) given intravenously. Poor quality two-dimensional was performed and interpreted. ______________________________________________________________________________ Interpretation Summary 1. Technically difficult echocardiogram. 2. Mild left ventricular enlargement with decreased systolic function. Estimated ejection fraction is 35-40%. 3. Generalized left ventricular hypokinesis; more severe hypokinesis of the apex. 4. Mild-moderate concentrically increased ventricular wall thickness. 5. Mild right ventricular enlargement with normal systolic function. 6. Estimated right ventricular systolic pressure 38 mmHg (potentially underestimated due to incomplete TR Doppler signal). 7. Mildly calcified mitral annulus with thickened leaflets. Probable unsupported mitral valve posterior leaflet scallop. Eccentric anteriorly directed mitral regurgitant jet (not fully visualized); may represent  moderate-severe or potentially severe mitral regurgitation. 8. No prior exam images available for review. Compared to outside exam from 10/25/2024, LVEF is lower. COMMENTS: If clinically warranted, consider transesophageal echocardiogram for better mechanistic and quantitative assessment of mitral regurgitation. ______________________________________________________________________________ Left Ventricle The left ventricle is mildly dilated. Mild-moderate concentrically increased ventricular wall thickness. Left ventricular diastolic function is indeterminate. The visual ejection fraction is 35-40%. Generalized left ventricular hypokinesis; more severe hypokinesis of the apex. Right Ventricle Mild right ventricular enlargement with normal systolic function. Atria The left atrium is severely dilated. The right atrium is moderately dilated. Atrial septum not well visualized. Mitral Valve There is mild mitral annular calcification. The mitral valve leaflets are mildly thickened. Probable unsupported mitral posterior leaflet scallop. Eccentric anteriorly directed mitral regurgitant jet (not fully visualized); may represent moderate-severe or potentially severe mitral regurgitation. Mitral valve mean diastolic gradient is 3 mmHg (at a heart rate of 85 bpm). Aortic Valve There is moderate trileaflet aortic sclerosis. No aortic stenosis is present. Pulmonic Valve The pulmonic valve is not well seen, but is grossly normal. There is mild (1+) pulmonic valvular regurgitation. There is no pulmonic valvular stenosis. Vessels The aortic Sinus(es) of Valsalva are mildly dilated. Mild ascending aorta dilatation (4.1 cm). Dilation of the inferior vena cava is present with normal respiratory variation in diameter. ______________________________________________________________________________ MMode/2D Measurements & Calculations IVSd: 1.4 cm LVIDd: 5.8 cm LVIDs: 3.5 cm LVPWd: 1.4 cm FS: 39.1 % LV mass(C)d: 369.7 grams LV mass(C)dI:  151.4 grams/m2 Ao root diam: 4.1 cm LA dimension: 5.6 cm asc Aorta Diam: 4.1 cm LA/Ao: 1.4 LVOT diam: 2.3 cm LVOT area: 4.1 cm2 Ao root diam index Ht(cm/m): 2.2 Ao root diam index BSA (cm/m2): 1.7 Asc Ao diam index BSA (cm/m2): 1.7 Asc Ao diam index Ht(cm/m): 2.2 EF Biplane: 40.7 % LA Volume (BP): 171.0 ml LA Volume Index (BP): 70.1 ml/m2 RV Base: 4.9 cm RWT: 0.49 Doppler Measurements & Calculations MV E max alverto: 118.3 cm/sec MV max P.1 mmHg MV mean PG: 3.3 mmHg MV V2 VTI: 33.1 cm MVA(VTI): 2.7 cm2 MV dec slope: 745.9 cm/sec2 MV dec time: 0.16 sec Ao V2 max: 196.5 cm/sec Ao max P.0 mmHg Ao V2 mean: 144.2 cm/sec Ao mean P.3 mmHg Ao V2 VTI: 38.5 cm LIDIA(I,D): 2.3 cm2 LIDIA(V,D): 2.3 cm2 LV V1 max P.9 mmHg LV V1 max: 110.8 cm/sec LV V1 VTI: 21.3 cm SV(LVOT): 87.7 ml SI(LVOT): 35.9 ml/m2 PA acc time: 0.12 sec TR max alverto: 273.0 cm/sec TR max P.8 mmHg RVSP(TR): 37.8 mmHg AV Alverto Ratio (DI): 0.56 LIDIA Index (cm2/m2): 0.93 E/E' av.2 Lateral E/e': 14.5 Medial E/e': 25.9 RV S Alverto: 12.4 cm/sec ______________________________________________________________________________ Report approved by: Jeane Wayne MD on 2024 02:59 PM    CT Chest With Contrast Diagnostic    Result Date: 12/3/2024  CT CHEST W CONTRAST 12/3/2024 3:08 PM CLINICAL HISTORY: Evaluate for mass and infiltrate seen on chest x-ray, hypoxia TECHNIQUE: CT chest with IV contrast. Multiplanar reformats were obtained. Dose reduction techniques were used. CONTRAST: 135mL [id number] COMPARISON: None. FINDINGS: LUNGS AND PLEURA: Small to moderate bilateral pleural effusions are present. Mixed groundglass and consolidative opacities are seen in the lungs bilaterally with widely largest areas noted along the posterior left upper lobe measuring up to 9.1 x 5.4 cm (series 4, image 154). Multiple solid and some solid nodules are also present including a 10 mm solid, subpleural nodule in the right lower lobe (series 4, image 266) and subsolid nodule  in the right upper lobe measuring up to 2.6 cm with a 1.3 cm solid component (series 4, image 104). Interlobular septal thickening is also evident bilaterally. MEDIASTINUM/AXILLAE: Multiple enlarged hilar and mediastinal lymph nodes are present, the largest measuring 1.9 cm in the subcarinal space (series 2, image 35). Bilateral gynecomastia is present. Heart size is enlarged. Scattered vascular calcifications are seen within the thoracic aorta. CORONARY ARTERY CALCIFICATION: Moderate to severe UPPER ABDOMEN: Diffuse hepatic steatosis is present. Vascular calcification is present in the abdominal aorta and its branches. MUSCULOSKELETAL: Multilevel degenerative changes are seen in the spine. Mild, age-indeterminate vertebral body height loss is seen at T5-T6.    IMPRESSION: 1.  Multifocal, mixed groundglass and consolidative airspace opacities are seen in the lungs suspicious for pneumonia. Multiple pulmonary nodules are also present with one of the largest measuring 2.6 cm in the right upper lobe. These findings may be due to underlying infectious etiology but neoplastic origin cannot be excluded. Recommend 3 month CT chest follow-up exam. 2.  Small to moderate bilateral pleural effusions. 3.  Multiple enlarged mediastinal and hilar lymph nodes which are nonspecific and may be reactive. Suggest attention on follow-up exam. 4.  Interlobular septal thickening seen in the lungs which may be due to underlying infection versus pulmonary edema. 5.  Age-indeterminate mild compression deformities seen at T5-T6. REFERENCE: Guidelines for Management of Incidental Pulmonary Nodules Detected on CT Images: From the Fleischner Society 2017. Guidelines apply to incidental nodules in patients who are 35 years or older. Guidelines do not apply to lung cancer screening, patients with immunosuppression, or patients with known primary cancer. MULTIPLE NODULES Nodule size <6 mm Low-risk patients: No follow-up needed. High-risk patients:  Optional follow-up at 12 months. Nodule size 6 mm or larger  Low-risk patients: Follow-up CT at 3-6 months, then consider CT at 18-24 months. High-risk patients: Follow-up CT at 3-6 months, then at 18-24 months if no change. -Use most suspicious nodule as guide to management. SUBSOLID NODULES Ground glass Nodule size <6 mm No routine follow-up. If suspicious, consider follow-up at 2 and 4 years. Nodule size 6 mm or greater CT at 6-12 months to confirm persistence, then CT every 2 years until 5 years. Part solid Nodule size <6 mm No routine follow-up. Nodule size 6 mm or greater CT at 3-6 months to confirm persistence. If unchanged and solid component remains <6 mm, annual CT for 5 years. Multiple CT at 3-6 months. If stable, consider CT at 2 and 4 years. Management based on the most suspicious nodule. Consider referral to lung nodule clinic. AURELIO NUÑEZ MD SYSTEM ID:  [id number]    XR Chest 2 View    Result Date: 12/3/2024  CHEST TWO VIEWS  12/3/2024 12:46 PM HISTORY: Cough. Hypoxia. COMPARISON: None.    IMPRESSION: A 3.5 cm masslike opacity is noted in the left midlung with ill-defined surrounding opacity noted. There is also mild hazy opacity in the right mid lung. Findings could be related to infection or edema, however a neoplastic mass cannot be excluded. Chest CT is recommended for further evaluation. Small bilateral pleural effusions. There is cardiac enlargement and mild bilateral pulmonary venous congestion. MOHAMUD COOL MD SYSTEM ID:  [id number]    Results for orders placed during the hospital encounter of 10/25/24    Adult Transthoracic Echo Complete w/ Color, Spectral and Contrast if Necessary Per Protocol    Interpretation Summary    Left ventricular systolic function is low normal. Calculated left ventricular EF = 52.7%    The left ventricular cavity is moderately dilated.    Left ventricular wall thickness is consistent with moderate concentric hypertrophy.    Left ventricular mass index is  increased.  Consider infiltrative cardiomyopathy such as amyloidosis in the appropriate clinical setting.    Left ventricular diastolic function was normal.    RV mildly dilated with grossly normal function.    Aortic valve sclerosis with borderline/mild aortic stenosis. Peak velocity of the flow distal to the aortic valve is 221.3 cm/s. Aortic valve mean pressure gradient is 10 mmHg. Aortic valve dimensionless index is 0.5. Aortic valve area is 1.5 cm2.    There is mild, bileaflet mitral valve thickening as well as mitral annular calcification (MAC) present.    Moderate to severe mitral valve regurgitation is present noted, which may be underestimated due to jet eccentricity.    Estimated right ventricular systolic pressure from tricuspid regurgitation is mildly elevated (35-45 mmHg).    The left atrial cavity is severely dilated.  Borderline IVC size.    Consider YAZMIN for further evaluation of MR and consider PYP for evaluation of infiltrative cardiomyopathy if clinically appropriate.      I personally reviewed CT scan of the chest    Active Hospital Problems    Diagnosis  POA    **Acute exacerbation of CHF (congestive heart failure) [I50.9]  Yes    AF (paroxysmal atrial fibrillation) [I48.0]  Yes    Essential hypertension [I10]  Yes    Diabetes mellitus [E11.9]  Yes      Resolved Hospital Problems   No resolved problems to display.         Assessment & Plan     Acute heart failure reduced ejection fraction based on report of EF of 35% recently in [place] diuresis as blood pressure and renal function will allow.  Still has significant pleural effusions on exam  Flash pulmonary edema question related to MR or ischemia  Cardiomyopathy new per cardiology  Valvular heart disease with moderate to severe mitral regurgitation and mild aortic stenosis  Coronary artery disease  Paroxysmal atrial fibrillation  Pulmonary hypertension by echocardiogram  Bilateral pleural effusions probably secondary to CHF  Pulmonary nodule  9 mm noncalcified lingular nodule follow-up CT scan in 3 months  Subcarinal lymphadenopathy 1.8 cm may all be reactive secondary to edema can follow this up as well with CT scan in a few months anemia mild   pulmonary edema and infiltrates is probably CHF related he has some denser areas of consolidation now the bases have compressive atelectasis from the effusions but particularly in the right there is some areas of more dense consolidation with this is residual from his prior pneumonia is unclear normal white afebrile oxygenation improving quickly with treatment of failure I do not think we cover with antibiotics we just observe at this time    Plan for disposition:    Dominic Sidhu Jr, MD  12/20/24  07:46 EST    Time:

## 2024-12-20 NOTE — PLAN OF CARE
Goal Outcome Evaluation:      Pt is A&Ox4; up ad albert to [facility] yesterday; on 1L of O2; walked with PT in the [place]; medicated per orders; plan of care on going.

## 2024-12-20 NOTE — PROGRESS NOTES
"[facility] Clinical Pharmacy Services: Enoxaparin Consult    Jeb KRISTOPHER Olson has a pharmacy consult to dose prophylactic enoxaparin per Dr. Love's request.     Indication: VTE Prophylaxis  Home Anticoagulation: Warfarin     Relevant clinical data and objective history reviewed:  74 y.o. male 188 cm (74\") 115 kg (254 lb 1.6 oz)   Body mass index is 32.62 kg/m².     Results from last 7 days   Lab Units 12/20/24  0654   PLATELETS 10*3/mm3 248     Estimated Creatinine Clearance: 79 mL/min (by C-G formula based on SCr of 1.14 mg/dL).    Assessment/Plan    Will start patient on 40mg subcutaneous every 24 hours, adjusted for renal function. Patient to stay on enoxaparin until YAZMIN on Monday 12/23. Warfarin will be held. Consult order will be discontinued but pharmacy will continue to follow.     Jacquelyn Ferreira, Pharm.D., [facility]   Clinical Pharmacist   Phone Extension #[phone]  "

## 2024-12-20 NOTE — PROGRESS NOTES
[facility] Clinical Pharmacy Services: Warfarin Dosing/Monitoring Consult    Jeb Olson is a 74 y.o. male, estimated creatinine clearance is 89.9 mL/min (by C-G formula based on SCr of 1.01 mg/dL). weighing 117 kg (257 lb 4.8 oz).    Results from last 7 days   Lab Units 12/19/24  0800 12/18/24  1532   INR  1.83* 1.7*   HEMOGLOBIN g/dL 11.9* 10.6*   HEMATOCRIT % 36.2* 34.0*   PLATELETS 10*3/mm3 225 237     Prior to admission anticoagulation: warfarin 5 mg daily    Hospital Anticoagulation:  Consulting provider: Dr. Love  Start date: 12/19/24  Indication: A Fib - requiring full anticoagulation  Target INR: 2 - 3  Expected duration: indefinite   Bridge Therapy: Yes  with enoxaparin    Potential food or drug interactions: furosemide, will increase INR    Education complete?/Date: N/A; home medication    Assessment/Plan:  INR subtherapeutic, however will not provide boosted dose given acute illness will likely result in increased INR alongside diuresis.  Dose: Continue home warfarin 5 mg  Monitor for any signs or symptoms of bleeding  Follow up daily INRs and dose adjustments    Pharmacy will continue to follow until discharge or discontinuation of warfarin.     Heather Post, PharmD  Clinical Pharmacist

## 2024-12-20 NOTE — PLAN OF CARE
Goal Outcome Evaluation:  Plan of Care Reviewed With: patient        Progress: improving  Outcome Evaluation: Patient  resting at this  time.  Denies  any  pain  at  this  time.   Oxygen n titrated  down  to   3L/nasal  cannula.  Pulmonary  consult  called  and  was  seen  by  Dr Lemon.  Potassium  replaced.  Had  episode  of  VE run,  Stat    EKG  done.   Afib  on  the  monitor.  Nursing  will  continue to monitor.

## 2024-12-20 NOTE — PROGRESS NOTES
"[facility] Clinical Pharmacy Services: Enoxaparin Consult    Jeb Olson has a pharmacy consult to dose full-dose enoxaparin per Dr. Love's request.     Indication: A Fib - requiring full anticoagulation  Home Anticoagulation: warfarin     Relevant clinical data and objective history reviewed:  74 y.o. male 188 cm (74\") 117 kg (257 lb 4.8 oz)   Body mass index is 33.04 kg/m².   Results from last 7 days   Lab Units 12/19/24  0800   PLATELETS 10*3/mm3 225     Estimated Creatinine Clearance: 89.9 mL/min (by C-G formula based on SCr of 1.01 mg/dL).    Assessment/Plan    Will start patient on  120 mg (1mg/kg) subcutaneous every 12 hours, adjusted for renal function. Consult order will be discontinued but pharmacy will continue to follow.     Heather Post, PharmD  Clinical Pharmacist  "

## 2024-12-20 NOTE — PLAN OF CARE
Goal Outcome Evaluation:  Plan of Care Reviewed With: patient        Progress: improving     Pt is a 74 y.o. male with h/o HTN, CAD, and CHF who was admitted to [facility] on 12/19/2024 with c/o chest pain/tightness and SOB; home O2 sats in the 70s. Patient is (I) at baseline, without use of AD, but sometimes uses a cane, no O2 at baseline (currently on 3L) and lives with his wife in a house w/ 5 EL and BHR. Today, pt performed bed mobility with Mod-I, required SV to Ind for transfers without an AD, and ambulated 200' with SBA/SV and no AD. Pt amb on room air satting 81% on return to room with decrease to 78% as he rested. Pt was placed on 2L O2 via NC w/ recovery to >92% after 1 min of rest and PLB. Able to titrate down to 1L at rest and maintains 95-96%; RN present. No balance or gait deficits noted with mobility. Pt states he has been up ad-albert/ind to the [facility] w/o issue, feels at his mobility baseline and has no mobility/ADL concerns for return home. Pt did no have any c/o dizziness and states he did not feel SOB until he returned to the room after gait. Encouraged pt to cont ambulating in room and around the unit multiple times per day w/ O2 donned. Pt is likely to need a 6MWT prior to DC. SBAR w/ RN. No further skilled PT services required and will sign off at this time. Thank you. Rec home upon DC.      Anticipated Discharge Disposition (PT): home, home with assist

## 2024-12-20 NOTE — PROGRESS NOTES
[facility] Clinical Pharmacy Services: Warfarin Dosing/Monitoring Consult    Jeb Olson is a 74 y.o. male, estimated creatinine clearance is 79 mL/min (by C-G formula based on SCr of 1.14 mg/dL). weighing 115 kg (254 lb 1.6 oz).    Results from last 7 days   Lab Units 12/20/24  0654 12/19/24  0800 12/18/24  1532   INR  1.77* 1.83* 1.7*   HEMOGLOBIN g/dL 11.7* 11.9* 10.6*   HEMATOCRIT % 37.2* 36.2* 34.0*   PLATELETS 10*3/mm3 248 225 237     Prior to admission anticoagulation: Per [facility] note on 11/11/24. Most recent warfarin regimen is 7.5 mg on Wednesday/Saturday and 5 mg all other days of the week (40 mg/week).    Hospital Anticoagulation:  Consulting provider: Dr. Charo Love  Start date: 12/19/24  Indication: Atrial fibrillation  Target INR: 2 - 3  Expected duration: lifelong   Bridge Therapy: No, but will be using prophylactic enoxaparin while warfarin is held    Potential new disease state or drug interactions: None    Education complete?/Date: Yes; [facility]    INR Assessment:  Date INR Dose   12/19 1.83 5 mg   12/20 1.77 HELD     Assessment/Plan:  INR is currently subtherapeutic at 1.77. Provider wants to hold warfarin for YAZMIN scheduled on Monday 12/23. Will start prophylactic enoxaparin to cover the patient while INR is subtherapeutic.   Monitor for any signs or symptoms of bleeding.  Follow up daily INRs and dose adjustments.    Pharmacy will continue to follow until discharge or discontinuation of warfarin.     Jacquelyn Ferreira, Pharm.D., [facility]   Clinical Pharmacist  Phone Extension #[phone]

## 2024-12-21 LAB
ANION GAP SERPL CALCULATED.3IONS-SCNC: 12 MMOL/L (ref 5–15)
BUN SERPL-MCNC: 24 MG/DL (ref 8–23)
BUN/CREAT SERPL: 21.6 (ref 7–25)
CALCIUM SPEC-SCNC: 9.1 MG/DL (ref 8.6–10.5)
CHLORIDE SERPL-SCNC: 102 MMOL/L (ref 98–107)
CO2 SERPL-SCNC: 28 MMOL/L (ref 22–29)
CREAT SERPL-MCNC: 1.11 MG/DL (ref 0.76–1.27)
DEPRECATED RDW RBC AUTO: 45.9 FL (ref 37–54)
EGFRCR SERPLBLD CKD-EPI 2021: 70.6 ML/MIN/1.73
ERYTHROCYTE [DISTWIDTH] IN BLOOD BY AUTOMATED COUNT: 14.4 % (ref 12.3–15.4)
GLUCOSE BLDC GLUCOMTR-MCNC: 116 MG/DL (ref 70–130)
GLUCOSE BLDC GLUCOMTR-MCNC: 116 MG/DL (ref 70–130)
GLUCOSE BLDC GLUCOMTR-MCNC: 119 MG/DL (ref 70–130)
GLUCOSE BLDC GLUCOMTR-MCNC: 145 MG/DL (ref 70–130)
GLUCOSE SERPL-MCNC: 122 MG/DL (ref 65–99)
HCT VFR BLD AUTO: 38.2 % (ref 37.5–51)
HGB BLD-MCNC: 12.6 G/DL (ref 13–17.7)
INR PPP: 1.62 (ref 0.9–1.1)
MAGNESIUM SERPL-MCNC: 2.1 MG/DL (ref 1.6–2.4)
MCH RBC QN AUTO: 29 PG (ref 26.6–33)
MCHC RBC AUTO-ENTMCNC: 33 G/DL (ref 31.5–35.7)
MCV RBC AUTO: 87.8 FL (ref 79–97)
PHOSPHATE SERPL-MCNC: 4.3 MG/DL (ref 2.5–4.5)
PLATELET # BLD AUTO: 251 10*3/MM3 (ref 140–450)
PMV BLD AUTO: 10.8 FL (ref 6–12)
POTASSIUM SERPL-SCNC: 3.7 MMOL/L (ref 3.5–5.2)
POTASSIUM SERPL-SCNC: 4.1 MMOL/L (ref 3.5–5.2)
PROTHROMBIN TIME: 19.5 SECONDS (ref 11.7–14.2)
RBC # BLD AUTO: 4.35 10*6/MM3 (ref 4.14–5.8)
SODIUM SERPL-SCNC: 142 MMOL/L (ref 136–145)
WBC NRBC COR # BLD AUTO: 6.5 10*3/MM3 (ref 3.4–10.8)

## 2024-12-21 PROCEDURE — 82948 REAGENT STRIP/BLOOD GLUCOSE: CPT

## 2024-12-21 PROCEDURE — 99232 SBSQ HOSP IP/OBS MODERATE 35: CPT | Performed by: STUDENT IN AN ORGANIZED HEALTH CARE EDUCATION/TRAINING PROGRAM

## 2024-12-21 PROCEDURE — 85027 COMPLETE CBC AUTOMATED: CPT | Performed by: STUDENT IN AN ORGANIZED HEALTH CARE EDUCATION/TRAINING PROGRAM

## 2024-12-21 PROCEDURE — 25010000002 FUROSEMIDE PER 20 MG: Performed by: INTERNAL MEDICINE

## 2024-12-21 PROCEDURE — 80048 BASIC METABOLIC PNL TOTAL CA: CPT | Performed by: STUDENT IN AN ORGANIZED HEALTH CARE EDUCATION/TRAINING PROGRAM

## 2024-12-21 PROCEDURE — 85610 PROTHROMBIN TIME: CPT | Performed by: STUDENT IN AN ORGANIZED HEALTH CARE EDUCATION/TRAINING PROGRAM

## 2024-12-21 PROCEDURE — 83735 ASSAY OF MAGNESIUM: CPT | Performed by: STUDENT IN AN ORGANIZED HEALTH CARE EDUCATION/TRAINING PROGRAM

## 2024-12-21 PROCEDURE — 63710000001 INSULIN GLARGINE PER 5 UNITS: Performed by: STUDENT IN AN ORGANIZED HEALTH CARE EDUCATION/TRAINING PROGRAM

## 2024-12-21 PROCEDURE — 25010000002 ENOXAPARIN PER 10 MG: Performed by: STUDENT IN AN ORGANIZED HEALTH CARE EDUCATION/TRAINING PROGRAM

## 2024-12-21 PROCEDURE — 84100 ASSAY OF PHOSPHORUS: CPT | Performed by: STUDENT IN AN ORGANIZED HEALTH CARE EDUCATION/TRAINING PROGRAM

## 2024-12-21 PROCEDURE — 84132 ASSAY OF SERUM POTASSIUM: CPT | Performed by: STUDENT IN AN ORGANIZED HEALTH CARE EDUCATION/TRAINING PROGRAM

## 2024-12-21 RX ORDER — NEBIVOLOL 5 MG/1
5 TABLET ORAL DAILY
Status: DISCONTINUED | OUTPATIENT
Start: 2024-12-21 | End: 2024-12-24 | Stop reason: HOSPADM

## 2024-12-21 RX ORDER — POTASSIUM CHLORIDE 750 MG/1
20 TABLET, FILM COATED, EXTENDED RELEASE ORAL ONCE
Status: COMPLETED | OUTPATIENT
Start: 2024-12-21 | End: 2024-12-21

## 2024-12-21 RX ORDER — NEBIVOLOL 10 MG/1
10 TABLET ORAL DAILY
Status: DISCONTINUED | OUTPATIENT
Start: 2024-12-21 | End: 2024-12-21

## 2024-12-21 RX ORDER — NIFEDIPINE 60 MG/1
60 TABLET, EXTENDED RELEASE ORAL EVERY MORNING
Status: DISCONTINUED | OUTPATIENT
Start: 2024-12-21 | End: 2024-12-24 | Stop reason: HOSPADM

## 2024-12-21 RX ORDER — VALSARTAN 160 MG/1
160 TABLET ORAL EVERY 12 HOURS SCHEDULED
Status: DISCONTINUED | OUTPATIENT
Start: 2024-12-21 | End: 2024-12-22

## 2024-12-21 RX ORDER — LISINOPRIL 20 MG/1
40 TABLET ORAL
Status: DISCONTINUED | OUTPATIENT
Start: 2024-12-21 | End: 2024-12-21

## 2024-12-21 RX ORDER — WARFARIN SODIUM 7.5 MG/1
7.5 TABLET ORAL
Status: COMPLETED | OUTPATIENT
Start: 2024-12-21 | End: 2024-12-21

## 2024-12-21 RX ADMIN — Medication 10 ML: at 08:40

## 2024-12-21 RX ADMIN — ENOXAPARIN SODIUM 40 MG: 100 INJECTION SUBCUTANEOUS at 16:47

## 2024-12-21 RX ADMIN — INSULIN GLARGINE 10 UNITS: 100 INJECTION, SOLUTION SUBCUTANEOUS at 20:56

## 2024-12-21 RX ADMIN — ASPIRIN 81 MG: 81 TABLET, COATED ORAL at 08:39

## 2024-12-21 RX ADMIN — NIFEDIPINE 60 MG: 60 TABLET, FILM COATED, EXTENDED RELEASE ORAL at 08:39

## 2024-12-21 RX ADMIN — FUROSEMIDE 80 MG: 10 INJECTION, SOLUTION INTRAMUSCULAR; INTRAVENOUS at 08:45

## 2024-12-21 RX ADMIN — ESCITALOPRAM OXALATE 15 MG: 10 TABLET, FILM COATED ORAL at 16:47

## 2024-12-21 RX ADMIN — GABAPENTIN 600 MG: 300 CAPSULE ORAL at 20:55

## 2024-12-21 RX ADMIN — Medication 10 ML: at 20:56

## 2024-12-21 RX ADMIN — POTASSIUM CHLORIDE 20 MEQ: 750 TABLET, EXTENDED RELEASE ORAL at 08:39

## 2024-12-21 RX ADMIN — WARFARIN 7.5 MG: 7.5 TABLET ORAL at 20:56

## 2024-12-21 RX ADMIN — VALSARTAN 160 MG: 160 TABLET, FILM COATED ORAL at 11:22

## 2024-12-21 RX ADMIN — POTASSIUM CHLORIDE 20 MEQ: 750 TABLET, EXTENDED RELEASE ORAL at 20:55

## 2024-12-21 RX ADMIN — VALSARTAN 160 MG: 160 TABLET, FILM COATED ORAL at 20:56

## 2024-12-21 RX ADMIN — FUROSEMIDE 80 MG: 10 INJECTION, SOLUTION INTRAMUSCULAR; INTRAVENOUS at 20:55

## 2024-12-21 RX ADMIN — FENOFIBRATE 145 MG: 145 TABLET ORAL at 20:55

## 2024-12-21 RX ADMIN — TERAZOSIN HYDROCHLORIDE 5 MG: 5 CAPSULE ORAL at 20:55

## 2024-12-21 RX ADMIN — NEBIVOLOL 5 MG: 5 TABLET ORAL at 08:40

## 2024-12-21 NOTE — PLAN OF CARE
Goal Outcome Evaluation:  Plan of Care Reviewed With: patient        Progress: improving  Outcome Evaluation: Patient   resting  at this time.  No  complaints  of  pain   voiced.  Up  ad albert.       CPAP   at  night.   Diuretics  as  ordered.  1L  when  on  oxygen.   Blood  pressure  elevated.  Afib w PVC's  on  the  monitor.         Nursing  will continue  to monitor

## 2024-12-21 NOTE — PLAN OF CARE
Goal Outcome Evaluation:     Patient A&Ox4. Hypertensive, meds given and BP responded well. Ad albert in room. Plan of care ongoing.   Oxybutynin Counseling:  I discussed with the patient the risks of oxybutynin including but not limited to skin rash, drowsiness, dry mouth, difficulty urinating, and blurred vision.

## 2024-12-21 NOTE — PROGRESS NOTES
Name: Jeb Olson ADMIT: 2024   : 1950  PCP: Tera Salvador MD    MRN: [id number] LOS: 2 days   AGE/SEX: 74 y.o. male  ROOM: [facility]/     Subjective   Subjective   Sitting up in the chair, reading book.  Reports feeling better, swelling and shortness of breath improving.  Denies current shortness of breath, chest pain.  On 1 L nasal cannula.  Blood pressure elevated    Review of Systems   As above  Objective   Objective   Vital Signs  Temp:  [97.7 °F (36.5 °C)-99.5 °F (37.5 °C)] 98.1 °F (36.7 °C)  Heart Rate:  [73-87] 73  Resp:  [18] 18  BP: (149-190)/() 160/97  SpO2:  [95 %-99 %] 98 %  on  Flow (L/min) (Oxygen Therapy):  [1] 1;   Device (Oxygen Therapy): nasal cannula  Body mass index is 31.94 kg/m².  Physical Exam    General: Alert, sitting up in a chair, not in distress  HEENT: Normocephalic, atraumatic  CV: Regular rate and rhythm, no murmurs rubs or gallops  Lungs: CTA, no wheezing, on room air  Abdomen: Soft, nontender, nondistended  Extremities: Trace lower extremity edema, no cyanosis       Results Review     I reviewed the patient's new clinical results.  Results from last 7 days   Lab Units 24  0520 24  0654 24  0800 24  1532   WBC 10*3/mm3 6.50 5.97 6.72 5.6   HEMOGLOBIN g/dL 12.6* 11.7* 11.9* 10.6*   PLATELETS 10*3/mm3 251 248 225 237     Results from last 7 days   Lab Units 24  0520 24  1445 24  0654 24  1627 24  0800   SODIUM mmol/L 142  --  142 143 143   POTASSIUM mmol/L 3.7 3.8 3.8 3.4* 3.7   CHLORIDE mmol/L 102  --  105 103 103   CO2 mmol/L 28.0  --  27.6 28.1 25.2   BUN mg/dL 24*  --  20 18 20   CREATININE mg/dL 1.11  --  1.14 1.01 1.16   GLUCOSE mg/dL 122*  --  106* 209* 140*   Estimated Creatinine Clearance: 80.4 mL/min (by C-G formula based on SCr of 1.11 mg/dL).  Results from last 7 days   Lab Units 24  0800   ALBUMIN g/dL 4.4   BILIRUBIN mg/dL 0.7   ALK PHOS U/L 38*   AST (SGOT) U/L 20   ALT (SGPT)  U/L 35     Results from last 7 days   Lab Units 12/21/24  0520 12/20/24  0654 12/19/24  1627 12/19/24  0903 12/19/24  0800   CALCIUM mg/dL 9.1 8.8 9.1  --  8.9   ALBUMIN g/dL  --   --   --   --  4.4   MAGNESIUM mg/dL 2.1 2.3  --  2.1  --    PHOSPHORUS mg/dL 4.3 3.5  --   --   --        COVID19   Date Value Ref Range Status   12/19/2024 Not Detected Not Detected - Ref. Range Final     Glucose   Date/Time Value Ref Range Status   12/21/2024 0615 116 70 - 130 mg/dL Final   12/20/2024 2018 110 70 - 130 mg/dL Final   12/20/2024 1543 116 70 - 130 mg/dL Final   12/20/2024 1040 140 (H) 70 - 130 mg/dL Final   12/20/2024 0637 101 70 - 130 mg/dL Final   12/19/2024 2026 93 70 - 130 mg/dL Final   12/19/2024 1540 213 (H) 70 - 130 mg/dL Final           CT Angiogram Chest  Narrative: CT ANGIOGRAM CHEST-     INDICATION: Shortness of air, rule out pulmonary embolism     COMPARISON: None     TECHNIQUE:  CTA chest with IV contrast. Coronal and sagittal reformats. Three  dimensional reconstructions. Radiation dose reduction techniques were  utilized, including automated exposure control and exposure modulation  based on body size.     FINDINGS:      Pulmonary arteries: Streak artifact from contrast in the venous system.  No pulmonary embolism.     Chest wall: Gynecomastia. No lymphadenopathy.     Mediastinum: Coronary artery atherosclerotic calcifications. Mild  cardiomegaly. Reflux of contrast into the IVC and hepatic veins. Aortic  valve calcification. No pericardial effusion. Mild mediastinal and hilar  adenopathy. For example, subcarinal lymph node, series 4, axial image  69, measures 1.8 cm.     Lung/pleura: Mild to moderate right greater than left pleural effusions.  Airways wall thickening. Smooth interlobular septal thickening.  Bilateral groundglass lung opacities. More confluent bronchovascular  opacities seen in the bilateral lungs, greatest in the lower lobes where  there is also volume loss. Some suspected air trapping in  the left upper  lobe. Calcified pulmonary nodules in the left lower lobe, consistent  with prior granulomatous infection. Subpleural solid pulmonary nodule in  the superior lingula, series 5, axial mage 78, measures 9 mm.     Upper abdomen: Incidental splenule. Nonobstructing nephrolithiasis in  the right mid kidney, measures 1 to 2 mm.     Osseous structures: Diffuse idiopathic skeletal hyperostosis seen in the  mid and lower thoracic spine.     Impression:    1. No pulmonary embolism.  2. Mild to moderate right greater than left pleural effusions.  3. Cardiomegaly with evidence of right heart dysfunction and with  pulmonary edema.  4. Multifocal lung opacities, with volume loss. Suspect combination of  pulmonary edema and atelectasis. Pneumonia in the appropriate clinical  setting.   5. Subpleural solid pulmonary nodule in the lingula measuring 9 mm.  3-month follow-up chest CT can reevaluate.     This report was finalized on 12/19/2024 11:21 AM by Dr. Dennis Back M.D on Workstation: [id number]     XR Chest 1 View  Narrative: XR CHEST 1 VW-     HISTORY: Male who is 74 years-old, short of breath     TECHNIQUE: Frontal view of the chest     COMPARISON: None available     FINDINGS: The heart is enlarged. Pulmonary vasculature is congested.  Alveolar interstitial opacities in both lungs suggest edema and/or  pneumonia, follow-up recommended. There may be minimal right pleural  effusion. No pneumothorax. No acute osseous process.     Impression: As described.     This report was finalized on 12/19/2024 8:26 AM by Dr. Mio Cordoba M.D on Workstation: [id number]       Scheduled Medications  !NOT ORDERED- warfarin (COUMADIN), , Not Applicable, Daily  aspirin, 81 mg, Oral, Daily  enoxaparin, 40 mg, Subcutaneous, Q24H  escitalopram, 15 mg, Oral, Q PM  fenofibrate, 145 mg, Oral, Nightly  furosemide, 80 mg, Intravenous, Q12H  gabapentin, 600 mg, Oral, Nightly  insulin glargine, 10 Units, Subcutaneous,  Nightly  insulin lispro, 2-7 Units, Subcutaneous, 4x Daily AC & at Bedtime  nebivolol, 5 mg, Oral, Daily  NIFEdipine XL, 60 mg, Oral, QAM  potassium chloride ER, 20 mEq, Oral, BID  senna-docusate sodium, 2 tablet, Oral, BID  sodium chloride, 10 mL, Intravenous, Q12H  terazosin, 5 mg, Oral, Nightly  valsartan, 160 mg, Oral, Q12H  [Held by provider] warfarin, 5 mg, Oral, Daily    Infusions  Pharmacy to Dose enoxaparin (LOVENOX),   Pharmacy to dose warfarin,     Diet  Diet: Cardiac, Diabetic; Healthy Heart (2-3 Na+); Consistent Carbohydrate; Fluid Consistency: Thin (IDDSI 0)    I have personally reviewed     [x]  Laboratory   [x]  Microbiology   [x]  Radiology   [x]  EKG/Telemetry  [x]  Cardiology/Vascular   []  Pathology    []  Records       Assessment/Plan     Active Hospital Problems    Diagnosis  POA    **Acute exacerbation of CHF (congestive heart failure) [I50.9]  Yes    AF (paroxysmal atrial fibrillation) [I48.0]  Yes    Essential hypertension [I10]  Yes    Diabetes mellitus [E11.9]  Yes      Resolved Hospital Problems   No resolved problems to display.     Acute on chronic diastolic heart failure  PAF  Moderate to severe mitral regurg  Mild aortic stenosis  Hypertension  -CTA on admission with no PE, did show mild to moderate right greater than left pleural effusions.3. Cardiomegaly with evidence of right heart dysfunction and withpulmonary edema.  -BNP elevated to 2600  -Echocardiogram 10/25/2024 showed EF of 52.7%, moderate to severe mitral regurg, mild aortic stenosis  -Cardiology following, plan for YAZMIN with anesthesia on Monday for MR assessment  - hold warfarin for now pending YAZMIN.  No indication for bridging with Lovenox currently per cardiology, okay for prophylactic Lovenox  -On IV Lasix 80 mg twice daily, diuresis per cardiology  -Blood pressure elevated, outpatient nifedipine resumed.  Bystolic continued, lisinopril discontinued and transitioned to valsartan 160 mg twice daily per cardiology.          Acute hypoxic  respiratory failure secondary to heart failure exacerbation as above  -Recently admitted with PNA in [place]  -Was placed on 10 L nonrebreather on admission, not on oxygen at baseline  -CT scan showed multifocal lung opacities, with volume loss. Suspect combination of  pulmonary edema and atelectasis. Pneumonia in the appropriate clinical setting.   -Patient is afebrile, WBC normal.  Low suspicion for pneumonia currently, without protocol  -IV diuresis as above  -Wean off O2 as able,, unstageable 90% and above  -Pulmonology following  -Improving, down to 1 L nasal cannula     Type II DM  -Continue SSI, Lantus 10 units nightly  -Blood glucose stable     Pulmonary nodule  -CT scan showed subpleural solid pulmonary nodule in the lingula measuring 9 mm.  -Will need 3-month follow-up chest CT can reevaluate.  -Pulmonology following         Lovenox 40 mg SC daily for DVT prophylaxis.  Full code.  Discussed with patient.  Expected Discharge Date: 12/24/2024; Expected Discharge Time:        Copied text in this note has been reviewed and is accurate as of 12/21/24.         Dictated utilizing Dragon dictation        Charo Love MD  [facility]  12/21/24  10:07 EST

## 2024-12-21 NOTE — PROGRESS NOTES
LOS: 2 days   Patient Care Team:  Tera Salvador MD as PCP - General (Internal Medicine)  Micah Reyes MD as Consulting Physician (Gastroenterology)  Bruna Chávez MD as Referring Physician (Cardiology)    Chief Complaint:  Following for CHF    Interval History:   He reports his breathing is better.  He is on room air.  No new complaints    Objective   Vital Signs  Temp:  [97.7 °F (36.5 °C)-99.5 °F (37.5 °C)] 98.1 °F (36.7 °C)  Heart Rate:  [73-87] 73  Resp:  [18] 18  BP: (149-181)/() 174/113    Intake/Output Summary (Last 24 hours) at 12/21/2024 0819  Last data filed at 12/21/2024 0437  Gross per 24 hour   Intake 780 ml   Output 3525 ml   Net -2745 ml       Comfortable NAD  PERRL, conjunctivae clear  Neck supple, JVD noted to just above clavicle.  S1/S2 RRR, systolic murmur best heard at apex  Lungs CTA B, normal effort  Abdomen S/NT/ND (+) BS, no HSM appreciated  Extremities warm, no clubbing, cyanosis, trace lower extremity edema   No visible or palpable skin lesions  A/O x 3, mood and affect appropriate    Results Review:      Results from last 7 days   Lab Units 12/21/24  0520 12/20/24  1445 12/20/24  0654 12/19/24  1627   SODIUM mmol/L 142  --  142 143   POTASSIUM mmol/L 3.7 3.8 3.8 3.4*   CHLORIDE mmol/L 102  --  105 103   CO2 mmol/L 28.0  --  27.6 28.1   BUN mg/dL 24*  --  20 18   CREATININE mg/dL 1.11  --  1.14 1.01   GLUCOSE mg/dL 122*  --  106* 209*   CALCIUM mg/dL 9.1  --  8.8 9.1     Results from last 7 days   Lab Units 12/19/24  0903 12/19/24  0800   HSTROP T ng/L 21 21     Results from last 7 days   Lab Units 12/21/24  0520 12/20/24  0654 12/19/24  0800   WBC 10*3/mm3 6.50 5.97 6.72   HEMOGLOBIN g/dL 12.6* 11.7* 11.9*   HEMATOCRIT % 38.2 37.2* 36.2*   PLATELETS 10*3/mm3 251 248 225     Results from last 7 days   Lab Units 12/21/24  0520 12/20/24  0654 12/19/24  0800   INR  1.62* 1.77* 1.83*         Results from last 7 days   Lab Units 12/21/24  0520   MAGNESIUM mg/dL 2.1            I reviewed the patient's new clinical results.  I personally viewed and interpreted the patient's EKG/Telemetry data        Medication Review:   !NOT ORDERED- warfarin (COUMADIN), , Not Applicable, Daily  aspirin, 81 mg, Oral, Daily  enoxaparin, 40 mg, Subcutaneous, Q24H  escitalopram, 15 mg, Oral, Q PM  fenofibrate, 145 mg, Oral, Nightly  furosemide, 80 mg, Intravenous, Q12H  gabapentin, 600 mg, Oral, Nightly  insulin glargine, 10 Units, Subcutaneous, Nightly  insulin lispro, 2-7 Units, Subcutaneous, 4x Daily AC & at Bedtime  lisinopril, 40 mg, Oral, Q24H  nebivolol, 5 mg, Oral, Daily  NIFEdipine XL, 60 mg, Oral, QAM  potassium chloride ER, 20 mEq, Oral, BID  potassium chloride ER, 20 mEq, Oral, Once  senna-docusate sodium, 2 tablet, Oral, BID  sodium chloride, 10 mL, Intravenous, Q12H  terazosin, 5 mg, Oral, Nightly  [Held by provider] warfarin, 5 mg, Oral, Daily        Pharmacy to Dose enoxaparin (LOVENOX),   Pharmacy to dose warfarin,         Assessment & Plan       Acute exacerbation of CHF (congestive heart failure)    Diabetes mellitus    Essential hypertension    AF (paroxysmal atrial fibrillation)    Acute heart failure with reduced ejection fraction  Hypertension, suboptimally controlled  TTE 35% 12/4/2024  Suspect due to a combination of hypertensive, ischemic (prior PCI to distal LAD) and valvular disease  Hypertension likely driving acute decompensation  Stop lisinopril  Start valsartan max dose  Anticipate transition to Entresto 97/103 after 36-hour washout  Continue nifedipine for now, if BP remains controlled we will favor increasing nebivolol and stopping nifedipine  He reports he takes Hytrin for BPH, reasonable to continue  Although not ideal for systolic dysfunction, nebivolol may be better for controlling hypertension so we will continue for now, reportedly he has not tolerated higher doses of nebivolol due to sinus bradycardia  Continue Lasix 80 mg IV twice daily for now.  He still appears  a bit volume up, although his breathing is much better  I discussed with Dr. Chávez, we feel that the MR severity is underestimated.  Will plan for a transesophageal echo on Monday for further evaluation.  Possible cardioversion the same time  Flash Pulmonary edema, possibly due to suspected mitral regurgitation  Atrial fibrillation, persistent since September  Continue Coumadin for anticoagulation  He could not afford "[employer]  Pharmacy to dose warfarin.  Goal INR 2-3 by the time of the YAZMIN, possibly arrange for cardioversion at time of YAZMIN  We did discuss the need for continuous Coumadin monitoring.  We may need to consider changing back to Eliquis knowing that some of the pricing may change next year, however for now it is best just to stay on the current course.  Valvular heart disease, possible severe mitral regurgitation  PYP scan was unremarkable for amyloidosis    Continue IV diuretic today.  Possible oral tomorrow.  Plan for YAZMIN with possible cardioversion on Monday, as his atrial fibrillation that started in September does seem to have correlated with his downward spiral.    Continue Coumadin for now.  Hopefully we can get him up to the goal INR by Monday    Noe Anthony MD  12/21/24  08:19 EST      Part of this note may be an electronic transcription/translation of spoken language to printed text using the Dragon Dictation System.

## 2024-12-21 NOTE — PROGRESS NOTES
[facility] Clinical Pharmacy Services: Warfarin Dosing/Monitoring Consult    Jeb Olson is a 74 y.o. male, estimated creatinine clearance is 80.4 mL/min (by C-G formula based on SCr of 1.11 mg/dL). weighing 113 kg (248 lb 12.8 oz).    Results from last 7 days   Lab Units 12/21/24  0520 12/20/24  0654 12/19/24  0800 12/18/24  1532   INR  1.62* 1.77* 1.83* 1.7*   HEMOGLOBIN g/dL 12.6* 11.7* 11.9* 10.6*   HEMATOCRIT % 38.2 37.2* 36.2* 34.0*   PLATELETS 10*3/mm3 251 248 225 237     Prior to admission anticoagulation: Per [facility] note on 11/11/24. Most recent warfarin regimen is 7.5 mg on Wednesday/Saturday and 5 mg all other days of the week (40 mg/week).    Hospital Anticoagulation:  Consulting provider: Dr. Charo Love  Start date: 12/19/24  Indication: Atrial fibrillation  Target INR: 2 - 3  Expected duration: lifelong   Bridge Therapy: No    Potential new disease state or drug interactions: None    Education complete?/Date: Yes;  Clinic    INR Assessment:  Date INR Dose   12/19 1.83 5 mg   12/20 1.77 HELD   12/21 1.62 7.5 mg     Assessment/Plan:  Provider now wants to resume warfarin. Will give warfarin 7.5 mg x 1 dose tonight.   Monitor for any signs or symptoms of bleeding.  Follow up daily INRs and dose adjustments.    Pharmacy will continue to follow until discharge or discontinuation of warfarin.     Keya Mcintosh, PharmD  Clinical Pharmacist

## 2024-12-21 NOTE — PROGRESS NOTES
LOS: 2 days   Patient Care Team:  Tera Salvador MD as PCP - General (Internal Medicine)  Micah Reyes MD as Consulting Physician (Gastroenterology)  Bruna Chávez MD as Referring Physician (Cardiology)    Subjective     following patient for acute hypoxic respiratory failure.  This 75-year-old gentleman was admitted with chest tightness and shortness of breath 1 to 2 hours duration, awakening him from sleep, hypoxic EMS reported sats in the low 80s.  History of sleep apnea diabetes hypertension, paroxysmal A-fib never smoker was found to be in flash pulmonary edema due to cardiomyopathy EF about 35% a month ago felt to be likely valvular but uncertain etiology [place] about 2 weeks ago was admitted for pneumonia there  Breathing improved he is on 1 L and saturating between 98 and 100% not short of breath.  Minimal to no cough.  No chest pain.  Review of Systems:         Objective     Vital Signs  Vital Sign Min/Max for last 24 hours  Temp  Min: 97.7 °F (36.5 °C)  Max: 99.5 °F (37.5 °C)   BP  Min: 149/93  Max: 181/96   Pulse  Min: 73  Max: 87   Resp  Min: 18  Max: 18   SpO2  Min: 95 %  Max: 99 %   Flow (L/min) (Oxygen Therapy)  Min: 1  Max: 1   Weight  Min: 113 kg (248 lb 12.8 oz)  Max: 113 kg (248 lb 12.8 oz)        Ventilator/Non-Invasive Ventilation Settings (From admission, onward)      None                         Body mass index is 31.94 kg/m².  I/O last 3 completed shifts:  In: 1200 [P.O.:1200]  Out: 4150 [Urine:4150]  No intake/output data recorded.        Physical Exam:  General Appearance: Well-developed obese white male he is sitting up in bed on 1 L nasal cannula sats 98 to 100% in no distress  Eyes: Conjunctiva are clear and anicteric  ENT: His membranes are moist no exudates Mallampati type II airway  Neck: No jugular venous distention, trachea midline  Lungs: Moving air well no wheezes no rales he has minimal dullness in the right base really do not notice any in the  left.  Cardiac: Irregularly irregular with murmur heard loudest over the left upper sternal border  Abdomen: Obese soft nontender no palpable hepatosplenomegaly  : Not examined  Musculoskeletal: Grossly normal  Skin: Warm and dry no jaundice no petechiae  Neuro: He is alert and oriented cooperative   Extremities/P Vascular: No clubbing no cyanosis no edema palpable radial and dorsalis pedis pulses  MSE: Pleasant gentleman seems to be in pretty good spirits       Labs:  Results from last 7 days   Lab Units 12/21/24  0520 12/20/24  1445 12/20/24  0654 12/19/24  1627 12/19/24  0903 12/19/24  0800 12/18/24  1532   GLUCOSE mg/dL 122*  --  106* 209*  --  140* 241*   SODIUM mmol/L 142  --  142 143  --  143 142   POTASSIUM mmol/L 3.7 3.8 3.8 3.4*  --  3.7 4.0   MAGNESIUM mg/dL 2.1  --  2.3  --  2.1  --   --    CO2 mmol/L 28.0  --  27.6 28.1  --  25.2 24   CHLORIDE mmol/L 102  --  105 103  --  103 106   ANION GAP mmol/L 12.0  --  9.4 11.9  --  14.8  --    CREATININE mg/dL 1.11  --  1.14 1.01  --  1.16 1.17   BUN mg/dL 24*  --  20 18  --  20 21   BUN / CREAT RATIO  21.6  --  17.5 17.8  --  17.2 18   CALCIUM mg/dL 9.1  --  8.8 9.1  --  8.9 8.9   ALK PHOS U/L  --   --   --   --   --  38*  --    TOTAL PROTEIN g/dL  --   --   --   --   --  7.2  --    ALT (SGPT) U/L  --   --   --   --   --  35  --    AST (SGOT) U/L  --   --   --   --   --  20  --    BILIRUBIN mg/dL  --   --   --   --   --  0.7  --    ALBUMIN g/dL  --   --   --   --   --  4.4  --    GLOBULIN gm/dL  --   --   --   --   --  2.8  --      Estimated Creatinine Clearance: 80.4 mL/min (by C-G formula based on SCr of 1.11 mg/dL).      Results from last 7 days   Lab Units 12/21/24  0520 12/20/24  0654 12/19/24  0800 12/18/24  1532   WBC 10*3/mm3 6.50 5.97 6.72 5.6   RBC 10*6/mm3 4.35 4.17 4.11* 3.77*   HEMOGLOBIN g/dL 12.6* 11.7* 11.9* 10.6*   HEMATOCRIT % 38.2 37.2* 36.2* 34.0*   MCV fL 87.8 89.2 88.1 90   MCH pg 29.0 28.1 29.0 28.1   MCHC g/dL 33.0 31.5 32.9 31.2*   RDW  % 14.4 14.7 14.2 14.2   RDW-SD fl 45.9 48.0 45.6  --    MPV fL 10.8 10.9 10.6  --    PLATELETS 10*3/mm3 251 248 225 237   NEUTROPHIL % %  --   --  73.3 73   LYMPHOCYTE % %  --   --  18.9* 18   MONOCYTES % %  --   --  4.2* 5   EOSINOPHIL % %  --   --  2.2 2   BASOPHIL % %  --   --  1.0 1   IMM GRAN % %  --   --  0.4  --    NEUTROS ABS 10*3/mm3  --   --  4.92 4.1   LYMPHS ABS 10*3/mm3  --   --  1.27 1.0   MONOS ABS 10*3/mm3  --   --  0.28 0.3   EOS ABS 10*3/mm3  --   --  0.15 0.1   BASOS ABS 10*3/mm3  --   --  0.07 0.0   IMMATURE GRANS (ABS) 10*3/mm3  --   --  0.03  --    NRBC /100 WBC  --   --  0.0  --      Results from last 7 days   Lab Units 12/19/24  0831   PH, ARTERIAL pH units 7.399   PO2 ART mm Hg 60.2*   PCO2, ARTERIAL mm Hg 46.9*   HCO3 ART mmol/L 29.0*     Results from last 7 days   Lab Units 12/19/24  0903 12/19/24  0800   HSTROP T ng/L 21 21     Results from last 7 days   Lab Units 12/19/24  0800   PROBNP pg/mL 2,600.0*             Results from last 7 days   Lab Units 12/21/24  0520 12/20/24  0654 12/19/24  0800 12/18/24  1532   INR  1.62* 1.77* 1.83* 1.7*     Microbiology Results (last 10 days)       Procedure Component Value - Date/Time    Respiratory Panel PCR w/COVID-19(SARS-CoV-2) YESSY/MACK/ANGELIQUE/PAD/COR/JESSICA In-House, NP Swab in UTM/VTM, 2 HR TAT - Swab, Nasopharynx [[id number]]  (Normal) Collected: 12/19/24 0801    Lab Status: Final result Specimen: Swab from Nasopharynx Updated: 12/19/24 0859     ADENOVIRUS, PCR Not Detected     Coronavirus 229E Not Detected     Coronavirus HKU1 Not Detected     Coronavirus NL63 Not Detected     Coronavirus OC43 Not Detected     COVID19 Not Detected     Human Metapneumovirus Not Detected     Human Rhinovirus/Enterovirus Not Detected     Influenza A PCR Not Detected     Influenza B PCR Not Detected     Parainfluenza Virus 1 Not Detected     Parainfluenza Virus 2 Not Detected     Parainfluenza Virus 3 Not Detected     Parainfluenza Virus 4 Not Detected     RSV, PCR Not  Detected     Bordetella pertussis pcr Not Detected     Bordetella parapertussis PCR Not Detected     Chlamydophila pneumoniae PCR Not Detected     Mycoplasma pneumo by PCR Not Detected    Narrative:      In the setting of a positive respiratory panel with a viral infection PLUS a negative procalcitonin without other underlying concern for bacterial infection, consider observing off antibiotics or discontinuation of antibiotics and continue supportive care. If the respiratory panel is positive for atypical bacterial infection (Bordetella pertussis, Chlamydophila pneumoniae, or Mycoplasma pneumoniae), consider antibiotic de-escalation to target atypical bacterial infection.                !NOT ORDERED- warfarin (COUMADIN), , Not Applicable, Daily  aspirin, 81 mg, Oral, Daily  enoxaparin, 40 mg, Subcutaneous, Q24H  escitalopram, 15 mg, Oral, Q PM  fenofibrate, 145 mg, Oral, Nightly  furosemide, 80 mg, Intravenous, Q12H  gabapentin, 600 mg, Oral, Nightly  insulin glargine, 10 Units, Subcutaneous, Nightly  insulin lispro, 2-7 Units, Subcutaneous, 4x Daily AC & at Bedtime  lisinopril, 40 mg, Oral, Q24H  nebivolol, 5 mg, Oral, Daily  NIFEdipine XL, 60 mg, Oral, QAM  potassium chloride ER, 20 mEq, Oral, BID  potassium chloride ER, 20 mEq, Oral, Once  senna-docusate sodium, 2 tablet, Oral, BID  sodium chloride, 10 mL, Intravenous, Q12H  terazosin, 5 mg, Oral, Nightly  [Held by provider] warfarin, 5 mg, Oral, Daily      Pharmacy to Dose enoxaparin (LOVENOX),   Pharmacy to dose warfarin,         Diagnostics:  CT Angiogram Chest    Result Date: 12/19/2024  CT ANGIOGRAM CHEST-  INDICATION: Shortness of air, rule out pulmonary embolism  COMPARISON: None  TECHNIQUE: CTA chest with IV contrast. Coronal and sagittal reformats. Three dimensional reconstructions. Radiation dose reduction techniques were utilized, including automated exposure control and exposure modulation based on body size.  FINDINGS:  Pulmonary arteries: Streak  artifact from contrast in the venous system. No pulmonary embolism.  Chest wall: Gynecomastia. No lymphadenopathy.  Mediastinum: Coronary artery atherosclerotic calcifications. Mild cardiomegaly. Reflux of contrast into the IVC and hepatic veins. Aortic valve calcification. No pericardial effusion. Mild mediastinal and hilar adenopathy. For example, subcarinal lymph node, series 4, axial image 69, measures 1.8 cm.  Lung/pleura: Mild to moderate right greater than left pleural effusions. Airways wall thickening. Smooth interlobular septal thickening. Bilateral groundglass lung opacities. More confluent bronchovascular opacities seen in the bilateral lungs, greatest in the lower lobes where there is also volume loss. Some suspected air trapping in the left upper lobe. Calcified pulmonary nodules in the left lower lobe, consistent with prior granulomatous infection. Subpleural solid pulmonary nodule in the superior lingula, series 5, axial mage 78, measures 9 mm.  Upper abdomen: Incidental splenule. Nonobstructing nephrolithiasis in the right mid kidney, measures 1 to 2 mm.  Osseous structures: Diffuse idiopathic skeletal hyperostosis seen in the mid and lower thoracic spine.       1. No pulmonary embolism. 2. Mild to moderate right greater than left pleural effusions. 3. Cardiomegaly with evidence of right heart dysfunction and with pulmonary edema. 4. Multifocal lung opacities, with volume loss. Suspect combination of pulmonary edema and atelectasis. Pneumonia in the appropriate clinical setting. 5. Subpleural solid pulmonary nodule in the lingula measuring 9 mm. 3-month follow-up chest CT can reevaluate.  This report was finalized on 12/19/2024 11:21 AM by Dr. eDnnis Back M.D on Workstation: [id number]      XR Chest 1 View    Result Date: 12/19/2024  XR CHEST 1 VW-  HISTORY: Male who is 74 years-old, short of breath  TECHNIQUE: Frontal view of the chest  COMPARISON: None available  FINDINGS: The heart is  enlarged. Pulmonary vasculature is congested. Alveolar interstitial opacities in both lungs suggest edema and/or pneumonia, follow-up recommended. There may be minimal right pleural effusion. No pneumothorax. No acute osseous process.      As described.  This report was finalized on 2024 8:26 AM by Dr. Mio Cordoba M.D on Workstation: [id number]      [facility] Imaging for Cardiac Amyloidosis    Result Date: 2024    The study is not suggestive of ATTR cardiac amyloidosis.   Semi-quantitative visual grading of myocardial uptake by comparison to rib uptake was grade 0 (no cardiac uptake and normal bone uptake)     Intracardiac echocardiogram    Result Date: 2024  [id number]^JEFF^DAWIT^LAVON [facility] [address] [place] [address] Name: BLAYNE RETANA MRN: [id number] : 1950 Study Date: 2024 01:51 PM Age: 74 yrs Gender: Male Patient Location: [facility] Reason For Study: Heart Failure Ordering Physician: DAWIT AGUILAR Performed By: Luis Rivera BSA: 2.4 m2 Height: 74 in Weight: 263 lb HR: 89 BP: 128/87 mmHg ______________________________________________________________________________ Procedure Complete Portable Echo Adult. Definity (NDC #00187-469) given intravenously. Poor quality two-dimensional was performed and interpreted. ______________________________________________________________________________ Interpretation Summary 1. Technically difficult echocardiogram. 2. Mild left ventricular enlargement with decreased systolic function. Estimated ejection fraction is 35-40%. 3. Generalized left ventricular hypokinesis; more severe hypokinesis of the apex. 4. Mild-moderate concentrically increased ventricular wall thickness. 5. Mild right ventricular enlargement with normal systolic function. 6. Estimated right ventricular systolic pressure 38 mmHg (potentially underestimated due to incomplete TR Doppler  signal). 7. Mildly calcified mitral annulus with thickened leaflets. Probable unsupported mitral valve posterior leaflet scallop. Eccentric anteriorly directed mitral regurgitant jet (not fully visualized); may represent moderate-severe or potentially severe mitral regurgitation. 8. No prior exam images available for review. Compared to outside exam from 10/25/2024, LVEF is lower. COMMENTS: If clinically warranted, consider transesophageal echocardiogram for better mechanistic and quantitative assessment of mitral regurgitation. ______________________________________________________________________________ Left Ventricle The left ventricle is mildly dilated. Mild-moderate concentrically increased ventricular wall thickness. Left ventricular diastolic function is indeterminate. The visual ejection fraction is 35-40%. Generalized left ventricular hypokinesis; more severe hypokinesis of the apex. Right Ventricle Mild right ventricular enlargement with normal systolic function. Atria The left atrium is severely dilated. The right atrium is moderately dilated. Atrial septum not well visualized. Mitral Valve There is mild mitral annular calcification. The mitral valve leaflets are mildly thickened. Probable unsupported mitral posterior leaflet scallop. Eccentric anteriorly directed mitral regurgitant jet (not fully visualized); may represent moderate-severe or potentially severe mitral regurgitation. Mitral valve mean diastolic gradient is 3 mmHg (at a heart rate of 85 bpm). Aortic Valve There is moderate trileaflet aortic sclerosis. No aortic stenosis is present. Pulmonic Valve The pulmonic valve is not well seen, but is grossly normal. There is mild (1+) pulmonic valvular regurgitation. There is no pulmonic valvular stenosis. Vessels The aortic Sinus(es) of Valsalva are mildly dilated. Mild ascending aorta dilatation (4.1 cm). Dilation of the inferior vena cava is present with normal respiratory variation in diameter.  ______________________________________________________________________________ MMode/2D Measurements & Calculations IVSd: 1.4 cm LVIDd: 5.8 cm LVIDs: 3.5 cm LVPWd: 1.4 cm FS: 39.1 % LV mass(C)d: 369.7 grams LV mass(C)dI: 151.4 grams/m2 Ao root diam: 4.1 cm LA dimension: 5.6 cm asc Aorta Diam: 4.1 cm LA/Ao: 1.4 LVOT diam: 2.3 cm LVOT area: 4.1 cm2 Ao root diam index Ht(cm/m): 2.2 Ao root diam index BSA (cm/m2): 1.7 Asc Ao diam index BSA (cm/m2): 1.7 Asc Ao diam index Ht(cm/m): 2.2 EF Biplane: 40.7 % LA Volume (BP): 171.0 ml LA Volume Index (BP): 70.1 ml/m2 RV Base: 4.9 cm RWT: 0.49 Doppler Measurements & Calculations MV E max alverto: 118.3 cm/sec MV max P.1 mmHg MV mean PG: 3.3 mmHg MV V2 VTI: 33.1 cm MVA(VTI): 2.7 cm2 MV dec slope: 745.9 cm/sec2 MV dec time: 0.16 sec Ao V2 max: 196.5 cm/sec Ao max P.0 mmHg Ao V2 mean: 144.2 cm/sec Ao mean P.3 mmHg Ao V2 VTI: 38.5 cm LIDIA(I,D): 2.3 cm2 LIDIA(V,D): 2.3 cm2 LV V1 max P.9 mmHg LV V1 max: 110.8 cm/sec LV V1 VTI: 21.3 cm SV(LVOT): 87.7 ml SI(LVOT): 35.9 ml/m2 PA acc time: 0.12 sec TR max alverto: 273.0 cm/sec TR max P.8 mmHg RVSP(TR): 37.8 mmHg AV Alverto Ratio (DI): 0.56 LIDIA Index (cm2/m2): 0.93 E/E' av.2 Lateral E/e': 14.5 Medial E/e': 25.9 RV S Alverto: 12.4 cm/sec ______________________________________________________________________________ Report approved by: Jeane Wayne MD on 2024 02:59 PM    CT Chest With Contrast Diagnostic    Result Date: 12/3/2024  CT CHEST W CONTRAST 12/3/2024 3:08 PM CLINICAL HISTORY: Evaluate for mass and infiltrate seen on chest x-ray, hypoxia TECHNIQUE: CT chest with IV contrast. Multiplanar reformats were obtained. Dose reduction techniques were used. CONTRAST: 135mL [id number] COMPARISON: None. FINDINGS: LUNGS AND PLEURA: Small to moderate bilateral pleural effusions are present. Mixed groundglass and consolidative opacities are seen in the lungs bilaterally with widely largest areas noted along the posterior left upper  lobe measuring up to 9.1 x 5.4 cm (series 4, image 154). Multiple solid and some solid nodules are also present including a 10 mm solid, subpleural nodule in the right lower lobe (series 4, image 266) and subsolid nodule in the right upper lobe measuring up to 2.6 cm with a 1.3 cm solid component (series 4, image 104). Interlobular septal thickening is also evident bilaterally. MEDIASTINUM/AXILLAE: Multiple enlarged hilar and mediastinal lymph nodes are present, the largest measuring 1.9 cm in the subcarinal space (series 2, image 35). Bilateral gynecomastia is present. Heart size is enlarged. Scattered vascular calcifications are seen within the thoracic aorta. CORONARY ARTERY CALCIFICATION: Moderate to severe UPPER ABDOMEN: Diffuse hepatic steatosis is present. Vascular calcification is present in the abdominal aorta and its branches. MUSCULOSKELETAL: Multilevel degenerative changes are seen in the spine. Mild, age-indeterminate vertebral body height loss is seen at T5-T6.    IMPRESSION: 1.  Multifocal, mixed groundglass and consolidative airspace opacities are seen in the lungs suspicious for pneumonia. Multiple pulmonary nodules are also present with one of the largest measuring 2.6 cm in the right upper lobe. These findings may be due to underlying infectious etiology but neoplastic origin cannot be excluded. Recommend 3 month CT chest follow-up exam. 2.  Small to moderate bilateral pleural effusions. 3.  Multiple enlarged mediastinal and hilar lymph nodes which are nonspecific and may be reactive. Suggest attention on follow-up exam. 4.  Interlobular septal thickening seen in the lungs which may be due to underlying infection versus pulmonary edema. 5.  Age-indeterminate mild compression deformities seen at T5-T6. REFERENCE: Guidelines for Management of Incidental Pulmonary Nodules Detected on CT Images: From the Fleischner Society 2017. Guidelines apply to incidental nodules in patients who are 35 years or  older. Guidelines do not apply to lung cancer screening, patients with immunosuppression, or patients with known primary cancer. MULTIPLE NODULES Nodule size <6 mm Low-risk patients: No follow-up needed. High-risk patients: Optional follow-up at 12 months. Nodule size 6 mm or larger  Low-risk patients: Follow-up CT at 3-6 months, then consider CT at 18-24 months. High-risk patients: Follow-up CT at 3-6 months, then at 18-24 months if no change. -Use most suspicious nodule as guide to management. SUBSOLID NODULES Ground glass Nodule size <6 mm No routine follow-up. If suspicious, consider follow-up at 2 and 4 years. Nodule size 6 mm or greater CT at 6-12 months to confirm persistence, then CT every 2 years until 5 years. Part solid Nodule size <6 mm No routine follow-up. Nodule size 6 mm or greater CT at 3-6 months to confirm persistence. If unchanged and solid component remains <6 mm, annual CT for 5 years. Multiple CT at 3-6 months. If stable, consider CT at 2 and 4 years. Management based on the most suspicious nodule. Consider referral to lung nodule clinic. AURELIO NUÑEZ MD SYSTEM ID:  [id number]    XR Chest 2 View    Result Date: 12/3/2024  CHEST TWO VIEWS  12/3/2024 12:46 PM HISTORY: Cough. Hypoxia. COMPARISON: None.    IMPRESSION: A 3.5 cm masslike opacity is noted in the left midlung with ill-defined surrounding opacity noted. There is also mild hazy opacity in the right mid lung. Findings could be related to infection or edema, however a neoplastic mass cannot be excluded. Chest CT is recommended for further evaluation. Small bilateral pleural effusions. There is cardiac enlargement and mild bilateral pulmonary venous congestion. MOHAMUD COOL MD SYSTEM ID:  [id number]    Results for orders placed during the hospital encounter of 10/25/24    Adult Transthoracic Echo Complete w/ Color, Spectral and Contrast if Necessary Per Protocol    Interpretation Summary    Left ventricular systolic function is low  normal. Calculated left ventricular EF = 52.7%    The left ventricular cavity is moderately dilated.    Left ventricular wall thickness is consistent with moderate concentric hypertrophy.    Left ventricular mass index is increased.  Consider infiltrative cardiomyopathy such as amyloidosis in the appropriate clinical setting.    Left ventricular diastolic function was normal.    RV mildly dilated with grossly normal function.    Aortic valve sclerosis with borderline/mild aortic stenosis. Peak velocity of the flow distal to the aortic valve is 221.3 cm/s. Aortic valve mean pressure gradient is 10 mmHg. Aortic valve dimensionless index is 0.5. Aortic valve area is 1.5 cm2.    There is mild, bileaflet mitral valve thickening as well as mitral annular calcification (MAC) present.    Moderate to severe mitral valve regurgitation is present noted, which may be underestimated due to jet eccentricity.    Estimated right ventricular systolic pressure from tricuspid regurgitation is mildly elevated (35-45 mmHg).    The left atrial cavity is severely dilated.  Borderline IVC size.    Consider YAZMIN for further evaluation of MR and consider PYP for evaluation of infiltrative cardiomyopathy if clinically appropriate.      I personally reviewed CT scan of the chest    Active Hospital Problems    Diagnosis  POA    **Acute exacerbation of CHF (congestive heart failure) [I50.9]  Yes    AF (paroxysmal atrial fibrillation) [I48.0]  Yes    Essential hypertension [I10]  Yes    Diabetes mellitus [E11.9]  Yes      Resolved Hospital Problems   No resolved problems to display.         Assessment & Plan     Acute heart failure reduced ejection fraction based on report of EF of 35% recently in [place] diuresis as blood pressure and renal function will allow.  Still has significant pleural effusions on exam  Flash pulmonary edema question related to MR or ischemia  Cardiomyopathy new per cardiology  Valvular heart disease with moderate to  severe mitral regurgitation and mild aortic stenosis  Coronary artery disease  Paroxysmal atrial fibrillation  Pulmonary hypertension by echocardiogram  Bilateral pleural effusions probably secondary to CHF  Pulmonary nodule 9 mm noncalcified lingular nodule follow-up CT scan in 3 months  Subcarinal lymphadenopathy 1.8 cm may all be reactive secondary to edema can follow this up as well with CT scan in a few months anemia mild   pulmonary edema and infiltrates is probably CHF related he has some denser areas of consolidation now the bases have compressive atelectasis from the effusions but particularly in the right there is some areas of more dense consolidation with this is residual from his prior pneumonia is unclear normal white Blood count ,afebrile oxygenation improving quickly with treatment of failure I do not think we  need to cover with antibiotics we just observe at this time  Hypertension blood pressure up quite a bit defer to cardiology    Plan for disposition:    Dominic Sidhu Jr, MD  12/21/24  07:50 EST    Time:

## 2024-12-22 ENCOUNTER — APPOINTMENT (OUTPATIENT)
Dept: GENERAL RADIOLOGY | Facility: HOSPITAL | Age: 74
End: 2024-12-22
Payer: MEDICARE

## 2024-12-22 LAB
ANION GAP SERPL CALCULATED.3IONS-SCNC: 11 MMOL/L (ref 5–15)
BUN SERPL-MCNC: 29 MG/DL (ref 8–23)
BUN/CREAT SERPL: 25.4 (ref 7–25)
CALCIUM SPEC-SCNC: 9.4 MG/DL (ref 8.6–10.5)
CHLORIDE SERPL-SCNC: 102 MMOL/L (ref 98–107)
CO2 SERPL-SCNC: 29 MMOL/L (ref 22–29)
CREAT SERPL-MCNC: 1.14 MG/DL (ref 0.76–1.27)
DEPRECATED RDW RBC AUTO: 47.2 FL (ref 37–54)
EGFRCR SERPLBLD CKD-EPI 2021: 68.3 ML/MIN/1.73
ERYTHROCYTE [DISTWIDTH] IN BLOOD BY AUTOMATED COUNT: 14.7 % (ref 12.3–15.4)
GLUCOSE BLDC GLUCOMTR-MCNC: 116 MG/DL (ref 70–130)
GLUCOSE BLDC GLUCOMTR-MCNC: 128 MG/DL (ref 70–130)
GLUCOSE BLDC GLUCOMTR-MCNC: 131 MG/DL (ref 70–130)
GLUCOSE BLDC GLUCOMTR-MCNC: 150 MG/DL (ref 70–130)
GLUCOSE SERPL-MCNC: 126 MG/DL (ref 65–99)
HCT VFR BLD AUTO: 37.6 % (ref 37.5–51)
HGB BLD-MCNC: 12.7 G/DL (ref 13–17.7)
INR PPP: 1.5 (ref 0.9–1.1)
MAGNESIUM SERPL-MCNC: 2.4 MG/DL (ref 1.6–2.4)
MCH RBC QN AUTO: 29.5 PG (ref 26.6–33)
MCHC RBC AUTO-ENTMCNC: 33.8 G/DL (ref 31.5–35.7)
MCV RBC AUTO: 87.4 FL (ref 79–97)
PHOSPHATE SERPL-MCNC: 5 MG/DL (ref 2.5–4.5)
PLATELET # BLD AUTO: 257 10*3/MM3 (ref 140–450)
PMV BLD AUTO: 10.8 FL (ref 6–12)
POTASSIUM SERPL-SCNC: 3.5 MMOL/L (ref 3.5–5.2)
PROTHROMBIN TIME: 18.3 SECONDS (ref 11.7–14.2)
RBC # BLD AUTO: 4.3 10*6/MM3 (ref 4.14–5.8)
SODIUM SERPL-SCNC: 142 MMOL/L (ref 136–145)
WBC NRBC COR # BLD AUTO: 6.23 10*3/MM3 (ref 3.4–10.8)

## 2024-12-22 PROCEDURE — 80048 BASIC METABOLIC PNL TOTAL CA: CPT | Performed by: STUDENT IN AN ORGANIZED HEALTH CARE EDUCATION/TRAINING PROGRAM

## 2024-12-22 PROCEDURE — 63710000001 INSULIN GLARGINE PER 5 UNITS: Performed by: STUDENT IN AN ORGANIZED HEALTH CARE EDUCATION/TRAINING PROGRAM

## 2024-12-22 PROCEDURE — 85027 COMPLETE CBC AUTOMATED: CPT | Performed by: STUDENT IN AN ORGANIZED HEALTH CARE EDUCATION/TRAINING PROGRAM

## 2024-12-22 PROCEDURE — 84100 ASSAY OF PHOSPHORUS: CPT | Performed by: STUDENT IN AN ORGANIZED HEALTH CARE EDUCATION/TRAINING PROGRAM

## 2024-12-22 PROCEDURE — 99232 SBSQ HOSP IP/OBS MODERATE 35: CPT | Performed by: STUDENT IN AN ORGANIZED HEALTH CARE EDUCATION/TRAINING PROGRAM

## 2024-12-22 PROCEDURE — 85610 PROTHROMBIN TIME: CPT | Performed by: STUDENT IN AN ORGANIZED HEALTH CARE EDUCATION/TRAINING PROGRAM

## 2024-12-22 PROCEDURE — 74220 X-RAY XM ESOPHAGUS 1CNTRST: CPT

## 2024-12-22 PROCEDURE — 63710000001 INSULIN LISPRO (HUMAN) PER 5 UNITS: Performed by: STUDENT IN AN ORGANIZED HEALTH CARE EDUCATION/TRAINING PROGRAM

## 2024-12-22 PROCEDURE — 83735 ASSAY OF MAGNESIUM: CPT | Performed by: STUDENT IN AN ORGANIZED HEALTH CARE EDUCATION/TRAINING PROGRAM

## 2024-12-22 PROCEDURE — 82948 REAGENT STRIP/BLOOD GLUCOSE: CPT

## 2024-12-22 RX ORDER — POTASSIUM CHLORIDE 750 MG/1
40 TABLET, FILM COATED, EXTENDED RELEASE ORAL EVERY 4 HOURS
Status: COMPLETED | OUTPATIENT
Start: 2024-12-22 | End: 2024-12-22

## 2024-12-22 RX ORDER — ACETAMINOPHEN 325 MG/1
650 TABLET ORAL EVERY 4 HOURS PRN
Status: DISCONTINUED | OUTPATIENT
Start: 2024-12-22 | End: 2024-12-24 | Stop reason: HOSPADM

## 2024-12-22 RX ORDER — WARFARIN SODIUM 7.5 MG/1
7.5 TABLET ORAL
Status: DISCONTINUED | OUTPATIENT
Start: 2024-12-22 | End: 2024-12-23

## 2024-12-22 RX ADMIN — TERAZOSIN HYDROCHLORIDE 5 MG: 5 CAPSULE ORAL at 21:32

## 2024-12-22 RX ADMIN — INSULIN GLARGINE 10 UNITS: 100 INJECTION, SOLUTION SUBCUTANEOUS at 21:32

## 2024-12-22 RX ADMIN — ACETAMINOPHEN 650 MG: 325 TABLET ORAL at 21:31

## 2024-12-22 RX ADMIN — POTASSIUM CHLORIDE 40 MEQ: 750 TABLET, EXTENDED RELEASE ORAL at 06:56

## 2024-12-22 RX ADMIN — Medication 10 ML: at 08:50

## 2024-12-22 RX ADMIN — POTASSIUM CHLORIDE 40 MEQ: 750 TABLET, EXTENDED RELEASE ORAL at 10:30

## 2024-12-22 RX ADMIN — SACUBITRIL AND VALSARTAN 1 TABLET: 97; 103 TABLET, FILM COATED ORAL at 10:30

## 2024-12-22 RX ADMIN — INSULIN LISPRO 2 UNITS: 100 INJECTION, SOLUTION INTRAVENOUS; SUBCUTANEOUS at 10:30

## 2024-12-22 RX ADMIN — NEBIVOLOL 5 MG: 5 TABLET ORAL at 08:55

## 2024-12-22 RX ADMIN — ASPIRIN 81 MG: 81 TABLET, COATED ORAL at 08:54

## 2024-12-22 RX ADMIN — NIFEDIPINE 60 MG: 60 TABLET, FILM COATED, EXTENDED RELEASE ORAL at 06:56

## 2024-12-22 RX ADMIN — GABAPENTIN 600 MG: 300 CAPSULE ORAL at 21:32

## 2024-12-22 RX ADMIN — FENOFIBRATE 145 MG: 145 TABLET ORAL at 21:32

## 2024-12-22 RX ADMIN — SACUBITRIL AND VALSARTAN 1 TABLET: 97; 103 TABLET, FILM COATED ORAL at 21:32

## 2024-12-22 RX ADMIN — BARIUM SULFATE 275 ML: 960 POWDER, FOR SUSPENSION ORAL at 14:24

## 2024-12-22 RX ADMIN — BARIUM SULFATE 700 MG: 700 TABLET ORAL at 14:24

## 2024-12-22 RX ADMIN — Medication 10 ML: at 21:32

## 2024-12-22 RX ADMIN — ESCITALOPRAM OXALATE 15 MG: 10 TABLET, FILM COATED ORAL at 18:15

## 2024-12-22 NOTE — PROGRESS NOTES
LOS: 3 days   Patient Care Team:  Tera Salvador MD as PCP - General (Internal Medicine)  Micah Reyes MD as Consulting Physician (Gastroenterology)  Bruna Chávez MD as Referring Physician (Cardiology)    Subjective     following patient for acute hypoxic respiratory failure.  This 75-year-old gentleman was admitted with chest tightness and shortness of breath 1 to 2 hours duration, awakening him from sleep, hypoxic EMS reported sats in the low 80s.  History of sleep apnea diabetes hypertension, paroxysmal A-fib never smoker was found to be in flash pulmonary edema due to cardiomyopathy EF about 35% a month ago felt to be likely valvular but uncertain etiology [place] about 2 weeks ago was admitted for pneumonia there  Breathing improved he is on 1 L and saturating between 98 and 100% not short of breath.  Minimal to no cough.  No chest pain.  Review of Systems:         Objective     Vital Signs  Vital Sign Min/Max for last 24 hours  Temp  Min: 97.7 °F (36.5 °C)  Max: 98.7 °F (37.1 °C)   BP  Min: 133/89  Max: 160/97   Pulse  Min: 71  Max: 80   Resp  Min: 18  Max: 18   SpO2  Min: 96 %  Max: 99 %   No data recorded   Weight  Min: 112 kg (246 lb 1.6 oz)  Max: 112 kg (246 lb 1.6 oz)        Ventilator/Non-Invasive Ventilation Settings (From admission, onward)      None                         Body mass index is 31.6 kg/m².  I/O last 3 completed shifts:  In: 540 [P.O.:540]  Out: 5775 [Urine:5775]  No intake/output data recorded.        Physical Exam:  General Appearance: Well-developed obese white male he is sitting up in bed on 1 L nasal cannula sats 98 to 100% in no distress  Eyes: Conjunctiva are clear and anicteric  ENT: His membranes are moist no exudates Mallampati type II airway  Neck: No jugular venous distention, trachea midline  Lungs: Moving air well no wheezes no rales he has minimal dullness in the right base really do not notice any in the left.  Cardiac: Irregularly irregular with  murmur heard loudest over the left upper sternal border  Abdomen: Obese soft nontender no palpable hepatosplenomegaly  : Not examined  Musculoskeletal: Grossly normal  Skin: Warm and dry no jaundice no petechiae  Neuro: He is alert and oriented cooperative   Extremities/P Vascular: No clubbing no cyanosis no edema palpable radial and dorsalis pedis pulses  MSE: Pleasant gentleman seems to be in pretty good spirits       Labs:  Results from last 7 days   Lab Units 12/22/24  0454 12/21/24  1300 12/21/24  0520 12/20/24  1445 12/20/24  0654 12/19/24  1627 12/19/24  0903 12/19/24  0800 12/18/24  1532   GLUCOSE mg/dL 126*  --  122*  --  106* 209*  --  140* 241*   SODIUM mmol/L 142  --  142  --  142 143  --  143 142   POTASSIUM mmol/L 3.5 4.1 3.7 3.8 3.8 3.4*  --  3.7 4.0   MAGNESIUM mg/dL 2.4  --  2.1  --  2.3  --  2.1  --   --    CO2 mmol/L 29.0  --  28.0  --  27.6 28.1  --  25.2 24   CHLORIDE mmol/L 102  --  102  --  105 103  --  103 106   ANION GAP mmol/L 11.0  --  12.0  --  9.4 11.9  --  14.8  --    CREATININE mg/dL 1.14  --  1.11  --  1.14 1.01  --  1.16 1.17   BUN mg/dL 29*  --  24*  --  20 18  --  20 21   BUN / CREAT RATIO  25.4*  --  21.6  --  17.5 17.8  --  17.2 18   CALCIUM mg/dL 9.4  --  9.1  --  8.8 9.1  --  8.9 8.9   ALK PHOS U/L  --   --   --   --   --   --   --  38*  --    TOTAL PROTEIN g/dL  --   --   --   --   --   --   --  7.2  --    ALT (SGPT) U/L  --   --   --   --   --   --   --  35  --    AST (SGOT) U/L  --   --   --   --   --   --   --  20  --    BILIRUBIN mg/dL  --   --   --   --   --   --   --  0.7  --    ALBUMIN g/dL  --   --   --   --   --   --   --  4.4  --    GLOBULIN gm/dL  --   --   --   --   --   --   --  2.8  --      Estimated Creatinine Clearance: 78 mL/min (by C-G formula based on SCr of 1.14 mg/dL).      Results from last 7 days   Lab Units 12/22/24  0454 12/21/24  0520 12/20/24  0654 12/19/24  0800 12/18/24  1532   WBC 10*3/mm3 6.23 6.50 5.97 6.72 5.6   RBC 10*6/mm3 4.30 4.35 4.17  4.11* 3.77*   HEMOGLOBIN g/dL 12.7* 12.6* 11.7* 11.9* 10.6*   HEMATOCRIT % 37.6 38.2 37.2* 36.2* 34.0*   MCV fL 87.4 87.8 89.2 88.1 90   MCH pg 29.5 29.0 28.1 29.0 28.1   MCHC g/dL 33.8 33.0 31.5 32.9 31.2*   RDW % 14.7 14.4 14.7 14.2 14.2   RDW-SD fl 47.2 45.9 48.0 45.6  --    MPV fL 10.8 10.8 10.9 10.6  --    PLATELETS 10*3/mm3 257 251 248 225 237   NEUTROPHIL % %  --   --   --  73.3 73   LYMPHOCYTE % %  --   --   --  18.9* 18   MONOCYTES % %  --   --   --  4.2* 5   EOSINOPHIL % %  --   --   --  2.2 2   BASOPHIL % %  --   --   --  1.0 1   IMM GRAN % %  --   --   --  0.4  --    NEUTROS ABS 10*3/mm3  --   --   --  4.92 4.1   LYMPHS ABS 10*3/mm3  --   --   --  1.27 1.0   MONOS ABS 10*3/mm3  --   --   --  0.28 0.3   EOS ABS 10*3/mm3  --   --   --  0.15 0.1   BASOS ABS 10*3/mm3  --   --   --  0.07 0.0   IMMATURE GRANS (ABS) 10*3/mm3  --   --   --  0.03  --    NRBC /100 WBC  --   --   --  0.0  --      Results from last 7 days   Lab Units 12/19/24  0831   PH, ARTERIAL pH units 7.399   PO2 ART mm Hg 60.2*   PCO2, ARTERIAL mm Hg 46.9*   HCO3 ART mmol/L 29.0*     Results from last 7 days   Lab Units 12/19/24  0903 12/19/24  0800   HSTROP T ng/L 21 21     Results from last 7 days   Lab Units 12/19/24  0800   PROBNP pg/mL 2,600.0*             Results from last 7 days   Lab Units 12/22/24  0454 12/21/24  0520 12/20/24  0654 12/19/24  0800 12/18/24  1532   INR  1.50* 1.62* 1.77* 1.83* 1.7*     Microbiology Results (last 10 days)       Procedure Component Value - Date/Time    Respiratory Panel PCR w/COVID-19(SARS-CoV-2) YESSY/MACK/ANGELIQUE/PAD/COR/JESSICA In-House, NP Swab in UTM/VTM, 2 HR TAT - Swab, Nasopharynx [[id number]]  (Normal) Collected: 12/19/24 0801    Lab Status: Final result Specimen: Swab from Nasopharynx Updated: 12/19/24 0859     ADENOVIRUS, PCR Not Detected     Coronavirus 229E Not Detected     Coronavirus HKU1 Not Detected     Coronavirus NL63 Not Detected     Coronavirus OC43 Not Detected     COVID19 Not Detected     Human  Metapneumovirus Not Detected     Human Rhinovirus/Enterovirus Not Detected     Influenza A PCR Not Detected     Influenza B PCR Not Detected     Parainfluenza Virus 1 Not Detected     Parainfluenza Virus 2 Not Detected     Parainfluenza Virus 3 Not Detected     Parainfluenza Virus 4 Not Detected     RSV, PCR Not Detected     Bordetella pertussis pcr Not Detected     Bordetella parapertussis PCR Not Detected     Chlamydophila pneumoniae PCR Not Detected     Mycoplasma pneumo by PCR Not Detected    Narrative:      In the setting of a positive respiratory panel with a viral infection PLUS a negative procalcitonin without other underlying concern for bacterial infection, consider observing off antibiotics or discontinuation of antibiotics and continue supportive care. If the respiratory panel is positive for atypical bacterial infection (Bordetella pertussis, Chlamydophila pneumoniae, or Mycoplasma pneumoniae), consider antibiotic de-escalation to target atypical bacterial infection.                aspirin, 81 mg, Oral, Daily  escitalopram, 15 mg, Oral, Q PM  fenofibrate, 145 mg, Oral, Nightly  gabapentin, 600 mg, Oral, Nightly  insulin glargine, 10 Units, Subcutaneous, Nightly  insulin lispro, 2-7 Units, Subcutaneous, 4x Daily AC & at Bedtime  nebivolol, 5 mg, Oral, Daily  NIFEdipine XL, 60 mg, Oral, QAM  potassium chloride ER, 40 mEq, Oral, Q4H  sacubitril-valsartan, 1 tablet, Oral, Q12H  senna-docusate sodium, 2 tablet, Oral, BID  sodium chloride, 10 mL, Intravenous, Q12H  terazosin, 5 mg, Oral, Nightly      Pharmacy to dose warfarin,         Diagnostics:  CT Angiogram Chest    Result Date: 12/19/2024  CT ANGIOGRAM CHEST-  INDICATION: Shortness of air, rule out pulmonary embolism  COMPARISON: None  TECHNIQUE: CTA chest with IV contrast. Coronal and sagittal reformats. Three dimensional reconstructions. Radiation dose reduction techniques were utilized, including automated exposure control and exposure modulation  based on body size.  FINDINGS:  Pulmonary arteries: Streak artifact from contrast in the venous system. No pulmonary embolism.  Chest wall: Gynecomastia. No lymphadenopathy.  Mediastinum: Coronary artery atherosclerotic calcifications. Mild cardiomegaly. Reflux of contrast into the IVC and hepatic veins. Aortic valve calcification. No pericardial effusion. Mild mediastinal and hilar adenopathy. For example, subcarinal lymph node, series 4, axial image 69, measures 1.8 cm.  Lung/pleura: Mild to moderate right greater than left pleural effusions. Airways wall thickening. Smooth interlobular septal thickening. Bilateral groundglass lung opacities. More confluent bronchovascular opacities seen in the bilateral lungs, greatest in the lower lobes where there is also volume loss. Some suspected air trapping in the left upper lobe. Calcified pulmonary nodules in the left lower lobe, consistent with prior granulomatous infection. Subpleural solid pulmonary nodule in the superior lingula, series 5, axial mage 78, measures 9 mm.  Upper abdomen: Incidental splenule. Nonobstructing nephrolithiasis in the right mid kidney, measures 1 to 2 mm.  Osseous structures: Diffuse idiopathic skeletal hyperostosis seen in the mid and lower thoracic spine.       1. No pulmonary embolism. 2. Mild to moderate right greater than left pleural effusions. 3. Cardiomegaly with evidence of right heart dysfunction and with pulmonary edema. 4. Multifocal lung opacities, with volume loss. Suspect combination of pulmonary edema and atelectasis. Pneumonia in the appropriate clinical setting. 5. Subpleural solid pulmonary nodule in the lingula measuring 9 mm. 3-month follow-up chest CT can reevaluate.  This report was finalized on 12/19/2024 11:21 AM by Dr. Dennis Back M.D on Workstation: [id number]      XR Chest 1 View    Result Date: 12/19/2024  XR CHEST 1 VW-  HISTORY: Male who is 74 years-old, short of breath  TECHNIQUE: Frontal view of the  chest  COMPARISON: None available  FINDINGS: The heart is enlarged. Pulmonary vasculature is congested. Alveolar interstitial opacities in both lungs suggest edema and/or pneumonia, follow-up recommended. There may be minimal right pleural effusion. No pneumothorax. No acute osseous process.      As described.  This report was finalized on 2024 8:26 AM by Dr. Mio Cordoba M.D on Workstation: [id number]      [facility] Imaging for Cardiac Amyloidosis    Result Date: 2024    The study is not suggestive of ATTR cardiac amyloidosis.   Semi-quantitative visual grading of myocardial uptake by comparison to rib uptake was grade 0 (no cardiac uptake and normal bone uptake)     Intracardiac echocardiogram    Result Date: 2024  [id number]^JEFF^DAWIT^LAVON [facility]ocardi[facility] [address] [place] [address] Name: BLAYNE RETANA MRN: [id number] : 1950 Study Date: 2024 01:51 PM Age: 74 yrs Gender: Male Patient Location: [facility] Reason For Study: Heart Failure Ordering Physician: DAWIT AGUILAR Performed By: Luis Rivera BSA: 2.4 m2 Height: 74 in Weight: 263 lb HR: 89 BP: 128/87 mmHg ______________________________________________________________________________ Procedure Complete Portable Echo Adult. Definity (NDC #21066-560) given intravenously. Poor quality two-dimensional was performed and interpreted. ______________________________________________________________________________ Interpretation Summary 1. Technically difficult echocardiogram. 2. Mild left ventricular enlargement with decreased systolic function. Estimated ejection fraction is 35-40%. 3. Generalized left ventricular hypokinesis; more severe hypokinesis of the apex. 4. Mild-moderate concentrically increased ventricular wall thickness. 5. Mild right ventricular enlargement with normal systolic function. 6. Estimated right ventricular systolic pressure 38 mmHg  (potentially underestimated due to incomplete TR Doppler signal). 7. Mildly calcified mitral annulus with thickened leaflets. Probable unsupported mitral valve posterior leaflet scallop. Eccentric anteriorly directed mitral regurgitant jet (not fully visualized); may represent moderate-severe or potentially severe mitral regurgitation. 8. No prior exam images available for review. Compared to outside exam from 10/25/2024, LVEF is lower. COMMENTS: If clinically warranted, consider transesophageal echocardiogram for better mechanistic and quantitative assessment of mitral regurgitation. ______________________________________________________________________________ Left Ventricle The left ventricle is mildly dilated. Mild-moderate concentrically increased ventricular wall thickness. Left ventricular diastolic function is indeterminate. The visual ejection fraction is 35-40%. Generalized left ventricular hypokinesis; more severe hypokinesis of the apex. Right Ventricle Mild right ventricular enlargement with normal systolic function. Atria The left atrium is severely dilated. The right atrium is moderately dilated. Atrial septum not well visualized. Mitral Valve There is mild mitral annular calcification. The mitral valve leaflets are mildly thickened. Probable unsupported mitral posterior leaflet scallop. Eccentric anteriorly directed mitral regurgitant jet (not fully visualized); may represent moderate-severe or potentially severe mitral regurgitation. Mitral valve mean diastolic gradient is 3 mmHg (at a heart rate of 85 bpm). Aortic Valve There is moderate trileaflet aortic sclerosis. No aortic stenosis is present. Pulmonic Valve The pulmonic valve is not well seen, but is grossly normal. There is mild (1+) pulmonic valvular regurgitation. There is no pulmonic valvular stenosis. Vessels The aortic Sinus(es) of Valsalva are mildly dilated. Mild ascending aorta dilatation (4.1 cm). Dilation of the inferior vena cava is  present with normal respiratory variation in diameter. ______________________________________________________________________________ MMode/2D Measurements & Calculations IVSd: 1.4 cm LVIDd: 5.8 cm LVIDs: 3.5 cm LVPWd: 1.4 cm FS: 39.1 % LV mass(C)d: 369.7 grams LV mass(C)dI: 151.4 grams/m2 Ao root diam: 4.1 cm LA dimension: 5.6 cm asc Aorta Diam: 4.1 cm LA/Ao: 1.4 LVOT diam: 2.3 cm LVOT area: 4.1 cm2 Ao root diam index Ht(cm/m): 2.2 Ao root diam index BSA (cm/m2): 1.7 Asc Ao diam index BSA (cm/m2): 1.7 Asc Ao diam index Ht(cm/m): 2.2 EF Biplane: 40.7 % LA Volume (BP): 171.0 ml LA Volume Index (BP): 70.1 ml/m2 RV Base: 4.9 cm RWT: 0.49 Doppler Measurements & Calculations MV E max alverto: 118.3 cm/sec MV max P.1 mmHg MV mean PG: 3.3 mmHg MV V2 VTI: 33.1 cm MVA(VTI): 2.7 cm2 MV dec slope: 745.9 cm/sec2 MV dec time: 0.16 sec Ao V2 max: 196.5 cm/sec Ao max P.0 mmHg Ao V2 mean: 144.2 cm/sec Ao mean P.3 mmHg Ao V2 VTI: 38.5 cm LIDIA(I,D): 2.3 cm2 LIDIA(V,D): 2.3 cm2 LV V1 max P.9 mmHg LV V1 max: 110.8 cm/sec LV V1 VTI: 21.3 cm SV(LVOT): 87.7 ml SI(LVOT): 35.9 ml/m2 PA acc time: 0.12 sec TR max alverto: 273.0 cm/sec TR max P.8 mmHg RVSP(TR): 37.8 mmHg AV Alverto Ratio (DI): 0.56 LIDIA Index (cm2/m2): 0.93 E/E' av.2 Lateral E/e': 14.5 Medial E/e': 25.9 RV S Alverto: 12.4 cm/sec ______________________________________________________________________________ Report approved by: Jeane Wayne MD on 2024 02:59 PM    CT Chest With Contrast Diagnostic    Result Date: 12/3/2024  CT CHEST W CONTRAST 12/3/2024 3:08 PM CLINICAL HISTORY: Evaluate for mass and infiltrate seen on chest x-ray, hypoxia TECHNIQUE: CT chest with IV contrast. Multiplanar reformats were obtained. Dose reduction techniques were used. CONTRAST: 135mL Isovue-370 COMPARISON: None. FINDINGS: LUNGS AND PLEURA: Small to moderate bilateral pleural effusions are present. Mixed groundglass and consolidative opacities are seen in the lungs bilaterally with  widely largest areas noted along the posterior left upper lobe measuring up to 9.1 x 5.4 cm (series 4, image 154). Multiple solid and some solid nodules are also present including a 10 mm solid, subpleural nodule in the right lower lobe (series 4, image 266) and subsolid nodule in the right upper lobe measuring up to 2.6 cm with a 1.3 cm solid component (series 4, image 104). Interlobular septal thickening is also evident bilaterally. MEDIASTINUM/AXILLAE: Multiple enlarged hilar and mediastinal lymph nodes are present, the largest measuring 1.9 cm in the subcarinal space (series 2, image 35). Bilateral gynecomastia is present. Heart size is enlarged. Scattered vascular calcifications are seen within the thoracic aorta. CORONARY ARTERY CALCIFICATION: Moderate to severe UPPER ABDOMEN: Diffuse hepatic steatosis is present. Vascular calcification is present in the abdominal aorta and its branches. MUSCULOSKELETAL: Multilevel degenerative changes are seen in the spine. Mild, age-indeterminate vertebral body height loss is seen at T5-T6.    IMPRESSION: 1.  Multifocal, mixed groundglass and consolidative airspace opacities are seen in the lungs suspicious for pneumonia. Multiple pulmonary nodules are also present with one of the largest measuring 2.6 cm in the right upper lobe. These findings may be due to underlying infectious etiology but neoplastic origin cannot be excluded. Recommend 3 month CT chest follow-up exam. 2.  Small to moderate bilateral pleural effusions. 3.  Multiple enlarged mediastinal and hilar lymph nodes which are nonspecific and may be reactive. Suggest attention on follow-up exam. 4.  Interlobular septal thickening seen in the lungs which may be due to underlying infection versus pulmonary edema. 5.  Age-indeterminate mild compression deformities seen at T5-T6. REFERENCE: Guidelines for Management of Incidental Pulmonary Nodules Detected on CT Images: From the Fleischner Society 2017. Guidelines  apply to incidental nodules in patients who are 35 years or older. Guidelines do not apply to lung cancer screening, patients with immunosuppression, or patients with known primary cancer. MULTIPLE NODULES Nodule size <6 mm Low-risk patients: No follow-up needed. High-risk patients: Optional follow-up at 12 months. Nodule size 6 mm or larger  Low-risk patients: Follow-up CT at 3-6 months, then consider CT at 18-24 months. High-risk patients: Follow-up CT at 3-6 months, then at 18-24 months if no change. -Use most suspicious nodule as guide to management. SUBSOLID NODULES Ground glass Nodule size <6 mm No routine follow-up. If suspicious, consider follow-up at 2 and 4 years. Nodule size 6 mm or greater CT at 6-12 months to confirm persistence, then CT every 2 years until 5 years. Part solid Nodule size <6 mm No routine follow-up. Nodule size 6 mm or greater CT at 3-6 months to confirm persistence. If unchanged and solid component remains <6 mm, annual CT for 5 years. Multiple CT at 3-6 months. If stable, consider CT at 2 and 4 years. Management based on the most suspicious nodule. Consider referral to lung nodule clinic. AURELIO NUÑEZ MD SYSTEM ID:  [id number]    XR Chest 2 View    Result Date: 12/3/2024  CHEST TWO VIEWS  12/3/2024 12:46 PM HISTORY: Cough. Hypoxia. COMPARISON: None.    IMPRESSION: A 3.5 cm masslike opacity is noted in the left midlung with ill-defined surrounding opacity noted. There is also mild hazy opacity in the right mid lung. Findings could be related to infection or edema, however a neoplastic mass cannot be excluded. Chest CT is recommended for further evaluation. Small bilateral pleural effusions. There is cardiac enlargement and mild bilateral pulmonary venous congestion. MOHAMUD COOL MD SYSTEM ID:  [id number]    Results for orders placed during the hospital encounter of 10/25/24    Adult Transthoracic Echo Complete w/ Color, Spectral and Contrast if Necessary Per  Protocol    Interpretation Summary    Left ventricular systolic function is low normal. Calculated left ventricular EF = 52.7%    The left ventricular cavity is moderately dilated.    Left ventricular wall thickness is consistent with moderate concentric hypertrophy.    Left ventricular mass index is increased.  Consider infiltrative cardiomyopathy such as amyloidosis in the appropriate clinical setting.    Left ventricular diastolic function was normal.    RV mildly dilated with grossly normal function.    Aortic valve sclerosis with borderline/mild aortic stenosis. Peak velocity of the flow distal to the aortic valve is 221.3 cm/s. Aortic valve mean pressure gradient is 10 mmHg. Aortic valve dimensionless index is 0.5. Aortic valve area is 1.5 cm2.    There is mild, bileaflet mitral valve thickening as well as mitral annular calcification (MAC) present.    Moderate to severe mitral valve regurgitation is present noted, which may be underestimated due to jet eccentricity.    Estimated right ventricular systolic pressure from tricuspid regurgitation is mildly elevated (35-45 mmHg).    The left atrial cavity is severely dilated.  Borderline IVC size.    Consider YAZMIN for further evaluation of MR and consider PYP for evaluation of infiltrative cardiomyopathy if clinically appropriate.      I personally reviewed CT scan of the chest    Active Hospital Problems    Diagnosis  POA    **Acute exacerbation of CHF (congestive heart failure) [I50.9]  Yes    AF (paroxysmal atrial fibrillation) [I48.0]  Yes    Essential hypertension [I10]  Yes    Diabetes mellitus [E11.9]  Yes      Resolved Hospital Problems   No resolved problems to display.         Assessment & Plan     Acute heart failure reduced ejection fraction based on report of EF of 35% recently in [place] diuresis as blood pressure and renal function will allow.  Still has significant pleural effusions on exam  Flash pulmonary edema question related to MR or  ischemia  Cardiomyopathy new per cardiology  Valvular heart disease with moderate to severe mitral regurgitation and mild aortic stenosis  Coronary artery disease  Paroxysmal atrial fibrillation  Pulmonary hypertension by echocardiogram  Bilateral pleural effusions probably secondary to CHF on exam cleared  Acute hypoxic respiratory failure resolved oxygenating well on room air  Pulmonary nodule 9 mm noncalcified lingular nodule follow-up CT scan in 3 months  Subcarinal lymphadenopathy 1.8 cm may all be reactive secondary to edema can follow this up as well with CT scan in a few months anemia mild   pulmonary edema and infiltrates is probably CHF related he has some denser areas of consolidation now the bases have compressive atelectasis from the effusions but particularly in the right there is some areas of more dense consolidation with this is residual from his prior pneumonia is unclear normal white Blood count ,afebrile oxygenation improving quickly with treatment of failure I do not think we  need to cover with antibiotics we just observe at this time.  No evidence for infection.  Hypertension blood pressure up quite a bit defer to cardiology    Plan for disposition: Not much to add at this time from a pulmonary standpoint will see as needed follow-up Dr. Schulte in our office with CT scan noncontrast in 3 months to follow-up nodules and lymphadenopathy    Dominic Sidhu Jr, MD  12/22/24  08:59 EST    Time:

## 2024-12-22 NOTE — PLAN OF CARE
Goal Outcome Evaluation:  Plan of Care Reviewed With: patient        Progress: improving  Outcome Evaluation: Patient  resting  at  this  time.  no  complaints  of  pain  voiced.  Ambulated  around  the  unit   multiple  times.  Room  air/CPAP  at  bedtime.      Afib  on  the monitor.  Nursing  will  continue to  monitor.

## 2024-12-22 NOTE — PROGRESS NOTES
Name: Jeb Olson ADMIT: 2024   : 1950  PCP: Tera Salvador MD    MRN: [id number] LOS: 3 days   AGE/SEX: 74 y.o. male  ROOM: [facility]/     Subjective   Subjective   Sitting up in the bed.  No acute events overnight.  Continues to feel better, on room air.  No shortness of breath or chest pain.  Blood pressure improved    Review of Systems   As above  Objective   Objective   Vital Signs  Temp:  [97.7 °F (36.5 °C)-98.7 °F (37.1 °C)] 98.1 °F (36.7 °C)  Heart Rate:  [71-80] 75  Resp:  [18] 18  BP: (133-153)/(85-89) 151/86  SpO2:  [96 %-99 %] 96 %  on   ;   Device (Oxygen Therapy): room air  Body mass index is 31.6 kg/m².  Physical Exam    General: Alert, sitting up in a chair, not in distress  HEENT: Normocephalic, atraumatic  CV:  irregular rhythm, normal rate  Lungs: CTA, no wheezing, on room air  Abdomen: Soft, nontender, nondistended  Extremities: No significant peripheral edema, no cyanosis       Results Review     I reviewed the patient's new clinical results.  Results from last 7 days   Lab Units 24  0454 24  0520 24  0654 24  0800   WBC 10*3/mm3 6.23 6.50 5.97 6.72   HEMOGLOBIN g/dL 12.7* 12.6* 11.7* 11.9*   PLATELETS 10*3/mm3 257 251 248 225     Results from last 7 days   Lab Units 24  0454 24  1300 24  0520 24  1445 24  0654 24  1627   SODIUM mmol/L 142  --  142  --  142 143   POTASSIUM mmol/L 3.5 4.1 3.7 3.8 3.8 3.4*   CHLORIDE mmol/L 102  --  102  --  105 103   CO2 mmol/L 29.0  --  28.0  --  27.6 28.1   BUN mg/dL 29*  --  24*  --  20 18   CREATININE mg/dL 1.14  --  1.11  --  1.14 1.01   GLUCOSE mg/dL 126*  --  122*  --  106* 209*   Estimated Creatinine Clearance: 78 mL/min (by C-G formula based on SCr of 1.14 mg/dL).  Results from last 7 days   Lab Units 24  0800   ALBUMIN g/dL 4.4   BILIRUBIN mg/dL 0.7   ALK PHOS U/L 38*   AST (SGOT) U/L 20   ALT (SGPT) U/L 35     Results from last 7 days   Lab Units 24  5194  12/21/24  0520 12/20/24  0654 12/19/24  1627 12/19/24  0903 12/19/24  0800   CALCIUM mg/dL 9.4 9.1 8.8 9.1  --  8.9   ALBUMIN g/dL  --   --   --   --   --  4.4   MAGNESIUM mg/dL 2.4 2.1 2.3  --  2.1  --    PHOSPHORUS mg/dL 5.0* 4.3 3.5  --   --   --        COVID19   Date Value Ref Range Status   12/19/2024 Not Detected Not Detected - Ref. Range Final     Glucose   Date/Time Value Ref Range Status   12/22/2024 1024 150 (H) 70 - 130 mg/dL Final   12/22/2024 0602 128 70 - 130 mg/dL Final   12/21/2024 2022 116 70 - 130 mg/dL Final   12/21/2024 1520 119 70 - 130 mg/dL Final   12/21/2024 1020 145 (H) 70 - 130 mg/dL Final   12/21/2024 0615 116 70 - 130 mg/dL Final   12/20/2024 2018 110 70 - 130 mg/dL Final           CT Angiogram Chest  Narrative: CT ANGIOGRAM CHEST-     INDICATION: Shortness of air, rule out pulmonary embolism     COMPARISON: None     TECHNIQUE:  CTA chest with IV contrast. Coronal and sagittal reformats. Three  dimensional reconstructions. Radiation dose reduction techniques were  utilized, including automated exposure control and exposure modulation  based on body size.     FINDINGS:      Pulmonary arteries: Streak artifact from contrast in the venous system.  No pulmonary embolism.     Chest wall: Gynecomastia. No lymphadenopathy.     Mediastinum: Coronary artery atherosclerotic calcifications. Mild  cardiomegaly. Reflux of contrast into the IVC and hepatic veins. Aortic  valve calcification. No pericardial effusion. Mild mediastinal and hilar  adenopathy. For example, subcarinal lymph node, series 4, axial image  69, measures 1.8 cm.     Lung/pleura: Mild to moderate right greater than left pleural effusions.  Airways wall thickening. Smooth interlobular septal thickening.  Bilateral groundglass lung opacities. More confluent bronchovascular  opacities seen in the bilateral lungs, greatest in the lower lobes where  there is also volume loss. Some suspected air trapping in the left upper  lobe.  Calcified pulmonary nodules in the left lower lobe, consistent  with prior granulomatous infection. Subpleural solid pulmonary nodule in  the superior lingula, series 5, axial mage 78, measures 9 mm.     Upper abdomen: Incidental splenule. Nonobstructing nephrolithiasis in  the right mid kidney, measures 1 to 2 mm.     Osseous structures: Diffuse idiopathic skeletal hyperostosis seen in the  mid and lower thoracic spine.     Impression:    1. No pulmonary embolism.  2. Mild to moderate right greater than left pleural effusions.  3. Cardiomegaly with evidence of right heart dysfunction and with  pulmonary edema.  4. Multifocal lung opacities, with volume loss. Suspect combination of  pulmonary edema and atelectasis. Pneumonia in the appropriate clinical  setting.   5. Subpleural solid pulmonary nodule in the lingula measuring 9 mm.  3-month follow-up chest CT can reevaluate.     This report was finalized on 12/19/2024 11:21 AM by Dr. Dennis Back M.D on Workstation: [id number]     XR Chest 1 View  Narrative: XR CHEST 1 VW-     HISTORY: Male who is 74 years-old, short of breath     TECHNIQUE: Frontal view of the chest     COMPARISON: None available     FINDINGS: The heart is enlarged. Pulmonary vasculature is congested.  Alveolar interstitial opacities in both lungs suggest edema and/or  pneumonia, follow-up recommended. There may be minimal right pleural  effusion. No pneumothorax. No acute osseous process.     Impression: As described.     This report was finalized on 12/19/2024 8:26 AM by Dr. Mio Cordoba M.D on Workstation: [id number]       Scheduled Medications  aspirin, 81 mg, Oral, Daily  escitalopram, 15 mg, Oral, Q PM  fenofibrate, 145 mg, Oral, Nightly  gabapentin, 600 mg, Oral, Nightly  insulin glargine, 10 Units, Subcutaneous, Nightly  insulin lispro, 2-7 Units, Subcutaneous, 4x Daily AC & at Bedtime  nebivolol, 5 mg, Oral, Daily  NIFEdipine XL, 60 mg, Oral, QAM  sacubitril-valsartan, 1 tablet,  Oral, Q12H  senna-docusate sodium, 2 tablet, Oral, BID  sodium chloride, 10 mL, Intravenous, Q12H  terazosin, 5 mg, Oral, Nightly    Infusions  Pharmacy to dose warfarin,     Diet  Diet: Cardiac, Diabetic; Healthy Heart (2-3 Na+); Consistent Carbohydrate; Fluid Consistency: Thin (IDDSI 0)  NPO Diet NPO Type: Strict NPO    I have personally reviewed     [x]  Laboratory   [x]  Microbiology   [x]  Radiology   [x]  EKG/Telemetry  [x]  Cardiology/Vascular   []  Pathology    []  Records       Assessment/Plan     Active Hospital Problems    Diagnosis  POA    **Acute exacerbation of CHF (congestive heart failure) [I50.9]  Yes    AF (paroxysmal atrial fibrillation) [I48.0]  Yes    Essential hypertension [I10]  Yes    Diabetes mellitus [E11.9]  Yes      Resolved Hospital Problems   No resolved problems to display.     Acute on chronic diastolic heart failure  PAF  Moderate to severe mitral regurg  Mild aortic stenosis  Hypertension  -CTA on admission with no PE, did show mild to moderate right greater than left pleural effusions.3. Cardiomegaly with evidence of right heart dysfunction and withpulmonary edema.  -BNP elevated to 2600  -Echocardiogram 10/25/2024 showed EF of 52.7%, moderate to severe mitral regurg, mild aortic stenosis  -Cardiology following, plan for YAZMIN with anesthesia on Monday for MR assessment  -On IV Lasix 80 mg twice daily, diuresis per cardiology  -Blood pressure improved  -Warfarin resumed, pharmacy dosing, INR subtherapeutic         Acute hypoxic  respiratory failure secondary to heart failure exacerbation as above  -Recently admitted with PNA in [place]  -Was placed on 10 L nonrebreather on admission, not on oxygen at baseline  -CT scan showed multifocal lung opacities, with volume loss. Suspect combination of  pulmonary edema and atelectasis. Pneumonia in the appropriate clinical setting.   -Patient is afebrile, WBC normal.  Low suspicion for pneumonia currently, without protocol  -IV diuresis as  above  -Wean off O2 as able,, unstageable 90% and above  -Improved, weaned down to room air  -Pulmonology evaluated     Type II DM  -Continue SSI, Lantus 10 units nightly  -Blood glucose stable     Pulmonary nodule  -CT scan showed subpleural solid pulmonary nodule in the lingula measuring 9 mm.  -Will need 3-month follow-up chest CT can reevaluate.  -Pulmonology evaluated, recommend to follow-up with pulmonology, with Dr. Schulte in 3 months with repeat CT scan to follow-up pulmonary nodules and lymphadenopathy.         Lovenox 40 mg SC daily for DVT prophylaxis.  Full code.  Discussed with patient.  Expected Discharge Date: 12/24/2024; Expected Discharge Time:        Copied text in this note has been reviewed and is accurate as of 12/22/24.         Dictated utilizing Dragon dictation        Charo Love MD  [facility]  12/22/24  11:07 EST

## 2024-12-22 NOTE — PROGRESS NOTES
[facility] Clinical Pharmacy Services: Warfarin Dosing/Monitoring Consult    Jeb Olson is a 74 y.o. male, estimated creatinine clearance is 78 mL/min (by C-G formula based on SCr of 1.14 mg/dL). weighing 112 kg (246 lb 1.6 oz).    Results from last 7 days   Lab Units 12/22/24  0454 12/21/24  0520 12/20/24  0654 12/19/24  0800 12/18/24  1532   INR  1.50* 1.62* 1.77* 1.83* 1.7*   HEMOGLOBIN g/dL 12.7* 12.6* 11.7* 11.9* 10.6*   HEMATOCRIT % 37.6 38.2 37.2* 36.2* 34.0*   PLATELETS 10*3/mm3 257 251 248 225 237     Prior to admission anticoagulation: Per [facility] note on 11/11/24. Most recent warfarin regimen is 7.5 mg on Wednesday/Saturday and 5 mg all other days of the week (40 mg/week).    Hospital Anticoagulation:  Consulting provider: Dr. Charo Love  Start date: 12/19/24  Indication: Atrial fibrillation  Target INR: 2 - 3  Expected duration: lifelong   Bridge Therapy: No, but will be using prophylactic enoxaparin while warfarin is held    Potential new disease state or drug interactions: None    Education complete?/Date: Yes;  Clinic    INR Assessment:  Date INR Dose   12/19 1.83 5 mg   12/20 1.77 HELD   12/21 1.62 7.5 mg     Assessment/Plan:  Warfarin resumed yesterday, INR 1.5 today with boost yesterday. Will give another 7.5 mg boost x 1 today based on previous home regimen.  RN to monitor for any signs or symptoms of bleeding.  Follow up daily INRs and dose adjustments.    Pharmacy will continue to follow until discharge or discontinuation of warfarin.     Brenda Haley, PharmD  Clinical Pharmacist

## 2024-12-22 NOTE — PROGRESS NOTES
LOS: 3 days   Patient Care Team:  Tera Salvador MD as PCP - General (Internal Medicine)  Micah Reyes MD as Consulting Physician (Gastroenterology)  Bruna Chávez MD as Referring Physician (Cardiology)    Chief Complaint:  Following for CHF    Interval History:   Wife present at bedside today.  No new complaints.  Breathing at baseline.    Objective   Vital Signs  Temp:  [97.7 °F (36.5 °C)-98.7 °F (37.1 °C)] 98.1 °F (36.7 °C)  Heart Rate:  [71-80] 75  Resp:  [18] 18  BP: (133-190)/() 151/86    Intake/Output Summary (Last 24 hours) at 12/22/2024 0839  Last data filed at 12/22/2024 0614  Gross per 24 hour   Intake 240 ml   Output 3600 ml   Net -3360 ml       Comfortable NAD resting in bed  PERRL, conjunctivae clear  Neck supple, no JVD appreciated  S1/S2 irregularly irregular, systolic murmur best heard at apex  Lungs CTA B, normal effort  Abdomen S/NT/ND (+) BS, no HSM appreciated  Extremities warm, no clubbing, cyanosis, no significant lower extremity edema   No visible or palpable skin lesions  A/O x 3, mood and affect appropriate    Results Review:      Results from last 7 days   Lab Units 12/22/24  0454 12/21/24  1300 12/21/24  0520 12/20/24  1445 12/20/24  0654   SODIUM mmol/L 142  --  142  --  142   POTASSIUM mmol/L 3.5 4.1 3.7   < > 3.8   CHLORIDE mmol/L 102  --  102  --  105   CO2 mmol/L 29.0  --  28.0  --  27.6   BUN mg/dL 29*  --  24*  --  20   CREATININE mg/dL 1.14  --  1.11  --  1.14   GLUCOSE mg/dL 126*  --  122*  --  106*   CALCIUM mg/dL 9.4  --  9.1  --  8.8    < > = values in this interval not displayed.     Results from last 7 days   Lab Units 12/19/24  0903 12/19/24  0800   HSTROP T ng/L 21 21     Results from last 7 days   Lab Units 12/22/24  0454 12/21/24  0520 12/20/24  0654   WBC 10*3/mm3 6.23 6.50 5.97   HEMOGLOBIN g/dL 12.7* 12.6* 11.7*   HEMATOCRIT % 37.6 38.2 37.2*   PLATELETS 10*3/mm3 257 251 248     Results from last 7 days   Lab Units 12/22/24  0454 12/21/24  0520  12/20/24  0654   INR  1.50* 1.62* 1.77*         Results from last 7 days   Lab Units 12/22/24  0454   MAGNESIUM mg/dL 2.4           I reviewed the patient's new clinical results.  I personally viewed and interpreted the patient's EKG/Telemetry data        Medication Review:   aspirin, 81 mg, Oral, Daily  escitalopram, 15 mg, Oral, Q PM  fenofibrate, 145 mg, Oral, Nightly  furosemide, 80 mg, Intravenous, Q12H  gabapentin, 600 mg, Oral, Nightly  insulin glargine, 10 Units, Subcutaneous, Nightly  insulin lispro, 2-7 Units, Subcutaneous, 4x Daily AC & at Bedtime  nebivolol, 5 mg, Oral, Daily  NIFEdipine XL, 60 mg, Oral, QAM  potassium chloride ER, 40 mEq, Oral, Q4H  senna-docusate sodium, 2 tablet, Oral, BID  sodium chloride, 10 mL, Intravenous, Q12H  terazosin, 5 mg, Oral, Nightly  valsartan, 160 mg, Oral, Q12H        Pharmacy to dose warfarin,         Assessment & Plan       Acute exacerbation of CHF (congestive heart failure)    Diabetes mellitus    Essential hypertension    AF (paroxysmal atrial fibrillation)    Acute heart failure with reduced ejection fraction  Hypertension, suboptimally controlled  TTE 35% 12/4/2024  Suspect due to a combination of hypertensive, ischemic (prior PCI to distal LAD) and valvular disease   ACE was stopped, replaced with Entresto.  BP better.  Continue nifedipine for now, for hypertension   Although not ideal for systolic dysfunction, nebivolol may be better for controlling hypertension so we will continue for now, reportedly he has not tolerated higher doses of nebivolol due to sinus bradycardia  Adequately diuresed, hold off on IV Lasix today, likely start oral diuretic tomorrow  I discussed with Dr. Dr. Chávez.  Will plan for a transesophageal echo on Monday for further evaluation.  Possible cardioversion the same time  Flash Pulmonary edema, possibly due to suspected mitral regurgitation  Atrial fibrillation, persistent since September  Continue Coumadin for anticoagulation  He  could not afford [employer]  Pharmacy to dose warfarin.  May need dose of therapeutic Lovenox tomorrow morning if INR not to goal 2  We discussed the need for continuous Coumadin monitoring.  We may need to consider changing back to Eliquis knowing that some of the pricing may change next year, however for now it is best just to stay on the current course.  Valvular heart disease, possible severe mitral regurgitation  PYP scan was unremarkable for amyloidosis  Esophageal dysphagia.  Reports over few months has had difficulty swallowing and a sensation of food getting stuck.  Will evaluate with an esophagram today in anticipation of YAZMIN tomorrow.    Tolerating Entresto well.  Planning for YAZMIN with cardioversion possibly tomorrow.    Noe Anthony MD  12/22/24  08:39 EST      Part of this note may be an electronic transcription/translation of spoken language to printed text using the Dragon Dictation System.

## 2024-12-22 NOTE — PLAN OF CARE
Pt oriented vs stable NAD. Amb nursing station several times today tolerating well. Esophogram completed. Bs mgmt      Problem: Adult Inpatient Plan of Care  Goal: Plan of Care Review  Outcome: Progressing  Goal: Patient-Specific Goal (Individualized)  Outcome: Progressing  Goal: Absence of Hospital-Acquired Illness or Injury  Outcome: Progressing  Intervention: Identify and Manage Fall Risk  Recent Flowsheet Documentation  Taken 12/22/2024 1400 by Oralia Hopkins RN  Safety Promotion/Fall Prevention: patient off unit  Taken 12/22/2024 0850 by Oralia Hopkins RN  Safety Promotion/Fall Prevention:   activity supervised   assistive device/personal items within reach   clutter free environment maintained   fall prevention program maintained   nonskid shoes/slippers when out of bed   room organization consistent   safety round/check completed  Intervention: Prevent Skin Injury  Recent Flowsheet Documentation  Taken 12/22/2024 1200 by Oralia Hopkins RN  Body Position: position changed independently  Taken 12/22/2024 0850 by Oralia Hopkins RN  Body Position: position changed independently  Intervention: Prevent and Manage VTE (Venous Thromboembolism) Risk  Recent Flowsheet Documentation  Taken 12/22/2024 0850 by Oralia Hopkins RN  VTE Prevention/Management: other (see comments)  Intervention: Prevent Infection  Recent Flowsheet Documentation  Taken 12/22/2024 0850 by Oralia Hopkins RN  Infection Prevention:   rest/sleep promoted   single patient room provided  Goal: Optimal Comfort and Wellbeing  Outcome: Progressing  Intervention: Provide Person-Centered Care  Recent Flowsheet Documentation  Taken 12/22/2024 0850 by Oralia Hopkins RN  Trust Relationship/Rapport:   care explained   thoughts/feelings acknowledged   reassurance provided   questions encouraged   questions answered   emotional support provided  Goal: Readiness for Transition of Care  Outcome: Progressing     Problem: Heart Failure  Goal: Optimal  Coping  Outcome: Progressing  Intervention: Support Psychosocial Response  Recent Flowsheet Documentation  Taken 12/22/2024 0850 by Oralia Hopkins RN  Family/Support System Care:   self-care encouraged   presence promoted  Goal: Optimal Cardiac Output and Blood Flow  Outcome: Progressing  Goal: Stable Heart Rate and Rhythm  Outcome: Progressing  Goal: Fluid and Electrolyte Balance  Outcome: Progressing  Goal: Optimal Functional Ability  Outcome: Progressing  Intervention: Optimize Functional Ability  Recent Flowsheet Documentation  Taken 12/22/2024 1600 by Oralia Hopkins RN  Activity Management: up ad albert  Taken 12/22/2024 1000 by Oralia Hopkins RN  Activity Management: up ad albert  Taken 12/22/2024 0850 by Oralia Hopkins RN  Activity Management: up ad albert  Self-Care Promotion:   independence encouraged   BADL personal objects within reach   BADL personal routines maintained  Goal: Improved Oral Intake  Outcome: Progressing  Goal: Effective Oxygenation and Ventilation  Outcome: Progressing  Intervention: Promote Airway Secretion Clearance  Recent Flowsheet Documentation  Taken 12/22/2024 1600 by Oralia Hopkins RN  Activity Management: up ad albert  Taken 12/22/2024 1000 by Oralia Hopkins RN  Activity Management: up ad albert  Taken 12/22/2024 0850 by Oralia Hopkins RN  Activity Management: up ad albert  Cough And Deep Breathing: done independently per patient  Intervention: Optimize Oxygenation and Ventilation  Recent Flowsheet Documentation  Taken 12/22/2024 1200 by Oralia Hopkins RN  Head of Bed (HOB) Positioning: HOB elevated  Taken 12/22/2024 0850 by Oralia Hopkins RN  Head of Bed (HOB) Positioning: HOB elevated  Goal: Effective Breathing Pattern During Sleep  Outcome: Progressing  Intervention: Monitor and Manage Obstructive Sleep Apnea  Recent Flowsheet Documentation  Taken 12/22/2024 0850 by Oralia Hopkins RN  Medication Review/Management: medications reviewed     Problem: Fall Injury Risk  Goal:  Absence of Fall and Fall-Related Injury  Outcome: Progressing  Intervention: Identify and Manage Contributors  Recent Flowsheet Documentation  Taken 12/22/2024 0850 by Oralia Hopkins, RN  Medication Review/Management: medications reviewed  Self-Care Promotion:   independence encouraged   BADL personal objects within reach   BADL personal routines maintained  Intervention: Promote Injury-Free Environment  Recent Flowsheet Documentation  Taken 12/22/2024 1400 by Oralia Hopkins, RN  Safety Promotion/Fall Prevention: patient off unit  Taken 12/22/2024 0850 by Oarlia Hopkins, RN  Safety Promotion/Fall Prevention:   activity supervised   assistive device/personal items within reach   clutter free environment maintained   fall prevention program maintained   nonskid shoes/slippers when out of bed   room organization consistent   safety round/check completed   Goal Outcome Evaluation:

## 2024-12-23 ENCOUNTER — ANESTHESIA (OUTPATIENT)
Dept: POSTOP/PACU | Facility: HOSPITAL | Age: 74
End: 2024-12-23
Payer: MEDICARE

## 2024-12-23 ENCOUNTER — APPOINTMENT (OUTPATIENT)
Dept: POSTOP/PACU | Facility: HOSPITAL | Age: 74
End: 2024-12-23
Payer: MEDICARE

## 2024-12-23 ENCOUNTER — ANESTHESIA EVENT (OUTPATIENT)
Dept: POSTOP/PACU | Facility: HOSPITAL | Age: 74
End: 2024-12-23
Payer: MEDICARE

## 2024-12-23 VITALS — SYSTOLIC BLOOD PRESSURE: 107 MMHG | HEART RATE: 98 BPM | DIASTOLIC BLOOD PRESSURE: 73 MMHG | OXYGEN SATURATION: 91 %

## 2024-12-23 LAB
ANION GAP SERPL CALCULATED.3IONS-SCNC: 14.8 MMOL/L (ref 5–15)
BH CV ECHO MEAS - MR MAX PG: 96.7 MMHG
BH CV ECHO MEAS - MR MAX VEL: 491.8 CM/SEC
BUN SERPL-MCNC: 22 MG/DL (ref 8–23)
BUN/CREAT SERPL: 21.6 (ref 7–25)
CALCIUM SPEC-SCNC: 9.2 MG/DL (ref 8.6–10.5)
CHLORIDE SERPL-SCNC: 101 MMOL/L (ref 98–107)
CO2 SERPL-SCNC: 23.2 MMOL/L (ref 22–29)
CREAT SERPL-MCNC: 1.02 MG/DL (ref 0.76–1.27)
DEPRECATED RDW RBC AUTO: 46.1 FL (ref 37–54)
EGFRCR SERPLBLD CKD-EPI 2021: 78.1 ML/MIN/1.73
ERYTHROCYTE [DISTWIDTH] IN BLOOD BY AUTOMATED COUNT: 14 % (ref 12.3–15.4)
GLUCOSE BLDC GLUCOMTR-MCNC: 109 MG/DL (ref 70–130)
GLUCOSE BLDC GLUCOMTR-MCNC: 144 MG/DL (ref 70–130)
GLUCOSE BLDC GLUCOMTR-MCNC: 160 MG/DL (ref 70–130)
GLUCOSE BLDC GLUCOMTR-MCNC: 207 MG/DL (ref 70–130)
GLUCOSE SERPL-MCNC: 159 MG/DL (ref 65–99)
HCT VFR BLD AUTO: 40.8 % (ref 37.5–51)
HGB BLD-MCNC: 12.9 G/DL (ref 13–17.7)
INR PPP: 1.43 (ref 0.9–1.1)
MAGNESIUM SERPL-MCNC: 2.1 MG/DL (ref 1.6–2.4)
MCH RBC QN AUTO: 28.3 PG (ref 26.6–33)
MCHC RBC AUTO-ENTMCNC: 31.6 G/DL (ref 31.5–35.7)
MCV RBC AUTO: 89.5 FL (ref 79–97)
PHOSPHATE SERPL-MCNC: 3.2 MG/DL (ref 2.5–4.5)
PLATELET # BLD AUTO: 243 10*3/MM3 (ref 140–450)
PMV BLD AUTO: 10.9 FL (ref 6–12)
POTASSIUM SERPL-SCNC: 3.8 MMOL/L (ref 3.5–5.2)
POTASSIUM SERPL-SCNC: 4.1 MMOL/L (ref 3.5–5.2)
PROTHROMBIN TIME: 17.7 SECONDS (ref 11.7–14.2)
RBC # BLD AUTO: 4.56 10*6/MM3 (ref 4.14–5.8)
SODIUM SERPL-SCNC: 139 MMOL/L (ref 136–145)
WBC NRBC COR # BLD AUTO: 10.42 10*3/MM3 (ref 3.4–10.8)

## 2024-12-23 PROCEDURE — 84100 ASSAY OF PHOSPHORUS: CPT | Performed by: STUDENT IN AN ORGANIZED HEALTH CARE EDUCATION/TRAINING PROGRAM

## 2024-12-23 PROCEDURE — 92960 CARDIOVERSION ELECTRIC EXT: CPT

## 2024-12-23 PROCEDURE — 93320 DOPPLER ECHO COMPLETE: CPT | Performed by: INTERNAL MEDICINE

## 2024-12-23 PROCEDURE — 63710000001 INSULIN LISPRO (HUMAN) PER 5 UNITS: Performed by: STUDENT IN AN ORGANIZED HEALTH CARE EDUCATION/TRAINING PROGRAM

## 2024-12-23 PROCEDURE — 25010000002 LIDOCAINE 1 % SOLUTION: Performed by: NURSE ANESTHETIST, CERTIFIED REGISTERED

## 2024-12-23 PROCEDURE — 80048 BASIC METABOLIC PNL TOTAL CA: CPT | Performed by: STUDENT IN AN ORGANIZED HEALTH CARE EDUCATION/TRAINING PROGRAM

## 2024-12-23 PROCEDURE — 93312 ECHO TRANSESOPHAGEAL: CPT | Performed by: INTERNAL MEDICINE

## 2024-12-23 PROCEDURE — 99222 1ST HOSP IP/OBS MODERATE 55: CPT | Performed by: THORACIC SURGERY (CARDIOTHORACIC VASCULAR SURGERY)

## 2024-12-23 PROCEDURE — 93320 DOPPLER ECHO COMPLETE: CPT

## 2024-12-23 PROCEDURE — 25810000003 LACTATED RINGERS PER 1000 ML: Performed by: NURSE ANESTHETIST, CERTIFIED REGISTERED

## 2024-12-23 PROCEDURE — 63710000001 INSULIN GLARGINE PER 5 UNITS: Performed by: STUDENT IN AN ORGANIZED HEALTH CARE EDUCATION/TRAINING PROGRAM

## 2024-12-23 PROCEDURE — 99232 SBSQ HOSP IP/OBS MODERATE 35: CPT | Performed by: INTERNAL MEDICINE

## 2024-12-23 PROCEDURE — 93312 ECHO TRANSESOPHAGEAL: CPT

## 2024-12-23 PROCEDURE — 82948 REAGENT STRIP/BLOOD GLUCOSE: CPT

## 2024-12-23 PROCEDURE — 83735 ASSAY OF MAGNESIUM: CPT | Performed by: STUDENT IN AN ORGANIZED HEALTH CARE EDUCATION/TRAINING PROGRAM

## 2024-12-23 PROCEDURE — 93325 DOPPLER ECHO COLOR FLOW MAPG: CPT

## 2024-12-23 PROCEDURE — 84132 ASSAY OF SERUM POTASSIUM: CPT | Performed by: STUDENT IN AN ORGANIZED HEALTH CARE EDUCATION/TRAINING PROGRAM

## 2024-12-23 PROCEDURE — 25010000002 PROPOFOL 10 MG/ML EMULSION: Performed by: NURSE ANESTHETIST, CERTIFIED REGISTERED

## 2024-12-23 PROCEDURE — 85027 COMPLETE CBC AUTOMATED: CPT | Performed by: STUDENT IN AN ORGANIZED HEALTH CARE EDUCATION/TRAINING PROGRAM

## 2024-12-23 PROCEDURE — 85610 PROTHROMBIN TIME: CPT | Performed by: STUDENT IN AN ORGANIZED HEALTH CARE EDUCATION/TRAINING PROGRAM

## 2024-12-23 PROCEDURE — 93325 DOPPLER ECHO COLOR FLOW MAPG: CPT | Performed by: INTERNAL MEDICINE

## 2024-12-23 RX ORDER — TRAZODONE HYDROCHLORIDE 50 MG/1
50 TABLET, FILM COATED ORAL NIGHTLY PRN
Status: DISCONTINUED | OUTPATIENT
Start: 2024-12-23 | End: 2024-12-24 | Stop reason: HOSPADM

## 2024-12-23 RX ORDER — PROPOFOL 10 MG/ML
VIAL (ML) INTRAVENOUS AS NEEDED
Status: DISCONTINUED | OUTPATIENT
Start: 2024-12-23 | End: 2024-12-23 | Stop reason: SURG

## 2024-12-23 RX ORDER — POTASSIUM CHLORIDE 750 MG/1
20 TABLET, FILM COATED, EXTENDED RELEASE ORAL ONCE
Status: COMPLETED | OUTPATIENT
Start: 2024-12-23 | End: 2024-12-23

## 2024-12-23 RX ORDER — WARFARIN SODIUM 7.5 MG/1
7.5 TABLET ORAL
Status: COMPLETED | OUTPATIENT
Start: 2024-12-23 | End: 2024-12-23

## 2024-12-23 RX ORDER — SODIUM CHLORIDE, SODIUM LACTATE, POTASSIUM CHLORIDE, CALCIUM CHLORIDE 600; 310; 30; 20 MG/100ML; MG/100ML; MG/100ML; MG/100ML
INJECTION, SOLUTION INTRAVENOUS CONTINUOUS PRN
Status: DISCONTINUED | OUTPATIENT
Start: 2024-12-23 | End: 2024-12-23 | Stop reason: SURG

## 2024-12-23 RX ORDER — LIDOCAINE HYDROCHLORIDE 10 MG/ML
INJECTION, SOLUTION INFILTRATION; PERINEURAL AS NEEDED
Status: DISCONTINUED | OUTPATIENT
Start: 2024-12-23 | End: 2024-12-23 | Stop reason: SURG

## 2024-12-23 RX ADMIN — PROPOFOL 120 MCG/KG/MIN: 10 INJECTION, EMULSION INTRAVENOUS at 13:36

## 2024-12-23 RX ADMIN — WARFARIN 7.5 MG: 7.5 TABLET ORAL at 16:20

## 2024-12-23 RX ADMIN — INSULIN GLARGINE 10 UNITS: 100 INJECTION, SOLUTION SUBCUTANEOUS at 21:03

## 2024-12-23 RX ADMIN — NEBIVOLOL 5 MG: 5 TABLET ORAL at 07:46

## 2024-12-23 RX ADMIN — LIDOCAINE HYDROCHLORIDE 100 ML: 10 INJECTION, SOLUTION INFILTRATION; PERINEURAL at 13:34

## 2024-12-23 RX ADMIN — TRAZODONE HYDROCHLORIDE 50 MG: 50 TABLET ORAL at 21:09

## 2024-12-23 RX ADMIN — SODIUM CHLORIDE, POTASSIUM CHLORIDE, SODIUM LACTATE AND CALCIUM CHLORIDE: 600; 310; 30; 20 INJECTION, SOLUTION INTRAVENOUS at 13:31

## 2024-12-23 RX ADMIN — NIFEDIPINE 60 MG: 60 TABLET, FILM COATED, EXTENDED RELEASE ORAL at 06:59

## 2024-12-23 RX ADMIN — INSULIN LISPRO 3 UNITS: 100 INJECTION, SOLUTION INTRAVENOUS; SUBCUTANEOUS at 16:20

## 2024-12-23 RX ADMIN — POTASSIUM CHLORIDE 20 MEQ: 750 TABLET, EXTENDED RELEASE ORAL at 06:59

## 2024-12-23 RX ADMIN — Medication 10 ML: at 07:47

## 2024-12-23 RX ADMIN — SENNOSIDES AND DOCUSATE SODIUM 2 TABLET: 50; 8.6 TABLET ORAL at 21:03

## 2024-12-23 RX ADMIN — PROPOFOL 100 MG: 10 INJECTION, EMULSION INTRAVENOUS at 13:35

## 2024-12-23 RX ADMIN — ASPIRIN 81 MG: 81 TABLET, COATED ORAL at 07:46

## 2024-12-23 RX ADMIN — FENOFIBRATE 145 MG: 145 TABLET ORAL at 21:03

## 2024-12-23 RX ADMIN — SACUBITRIL AND VALSARTAN 1 TABLET: 97; 103 TABLET, FILM COATED ORAL at 21:03

## 2024-12-23 RX ADMIN — Medication 10 ML: at 21:06

## 2024-12-23 RX ADMIN — ESCITALOPRAM OXALATE 15 MG: 10 TABLET, FILM COATED ORAL at 07:46

## 2024-12-23 RX ADMIN — TERAZOSIN HYDROCHLORIDE 5 MG: 5 CAPSULE ORAL at 21:03

## 2024-12-23 RX ADMIN — ACETAMINOPHEN 650 MG: 325 TABLET ORAL at 21:02

## 2024-12-23 RX ADMIN — GABAPENTIN 600 MG: 300 CAPSULE ORAL at 21:03

## 2024-12-23 RX ADMIN — SACUBITRIL AND VALSARTAN 1 TABLET: 97; 103 TABLET, FILM COATED ORAL at 07:46

## 2024-12-23 NOTE — CASE MANAGEMENT/SOCIAL WORK
Continued Stay Note  [facility]     Patient Name: Jeb Olson  MRN: [id number]  Today's Date: 12/23/2024    Admit Date: 12/19/2024    Plan: Plan home with spouse.  SUKUMAR Chang RN   Discharge Plan       Row Name 12/23/24 1421       Plan    Plan Plan home with spouse.  SUKUMAR Chang RN    Patient/Family in Agreement with Plan yes    Plan Comments Per Cardiology -YAZMIN today. If severe MR will have CT surgery see and forego cardioversion. If not severe MR and no SELMA thrombus, will plan cardioversion.   Plan to start oral diuretic tomorrow. Likely home tomorrow.  CCP following for needs.   SUKUMAR Chang RN                   Discharge Codes    No documentation.                 Expected Discharge Date and Time       Expected Discharge Date Expected Discharge Time    Dec 24, 2024               Taylor Chang RN

## 2024-12-23 NOTE — CONSULTS
Patient Care Team:  Tera Salvador MD as PCP - General (Internal Medicine)  Micah Reyes MD as Consulting Physician (Gastroenterology)  Bruna Chávez MD as Referring Physician (Cardiology)    Chief complaint: Severe mitral valve regurgitation     Subjective     History of Present Illness    Patient is a 74 year old male with PMH of atrial fibrillation, mitral valve regurgitation, DM II, CAD with previous stent to LAD (2022), HTN, HLD, HOLLI with CPAP, recent pneumonia (hospitalized last month) who presented to the ED on 12/19 with complaints of acute chest tightness and shortness of breath. Patient was admitted due to acute hypoxic respiratory failure and acute on chronic CHF. Cardiology was consulted and patient has been aggressively diuresed. YAZMIN was completed today to evaluate mitral regurgitation and patient was shown to have severe mitral valve regurgitation. Cardiac surgery was consulted for evaluation of patient and recommendation regarding surgical intervention.     Review of Systems   Respiratory:  Positive for chest tightness and shortness of breath.    Cardiovascular:  Positive for chest pain.   All other systems reviewed and are negative.       Past Medical History:   Diagnosis Date    Allergic Have had for several years    Eyes and nasal issues    Anemia     Anxiety Since about 2014    Since I was taking of my Dad, who also passed away 3yrs ago.    Arthritis 2002    Both knees, hips, and shoulders    Arthritis of knee, left     Cataract 05/2019    Colon polyp 2017    Coronary artery disease     stent    Diabetes mellitus     Dry eyes     Essential hypertension     HL (hearing loss) 1980    no hearing aides    Hyperlipemia     Knee swelling 7/7/2020    Shortly after my left knee replacement    Mild chronic anemia     Obesity     Sleep apnea     cpap     Past Surgical History:   Procedure Laterality Date    ACHILLES TENDON REPAIR Left     CARDIAC CATHETERIZATION      CARDIAC CATHETERIZATION  N/A 09/01/2022    Procedure: Coronary angiography, Left heart catheterization,;  Surgeon: Bruna Chávez MD;  Location:  [facility];  Service: Cardiology;  Laterality: N/A;    CARDIAC CATHETERIZATION N/A 09/01/2022    Procedure: Stent PRAVIN coronary;  Surgeon: Bruna Chávez MD;  Location:  [facility];  Service: Cardiology;  Laterality: N/A;    CATARACT EXTRACTION EXTRACAPSULAR W/ INTRAOCULAR LENS IMPLANTATION Bilateral     COLONOSCOPY      Dr Reyes 6 years ago    ENDOSCOPY      FOOT SURGERY      Achilles repair    JOINT REPLACEMENT  03/12/20    Left knee    TONSILLECTOMY      As a child    TOTAL KNEE ARTHROPLASTY Left 03/12/2020    Procedure: TOTAL KNEE ARTHROPLASTY;  Surgeon: Chepe Alicea MD;  [facility];  Service: Orthopedics;  Laterality: Left;    TOTAL KNEE ARTHROPLASTY Right 03/21/2024    Procedure: TOTAL KNEE ARTHROPLASTY;  Surgeon: Chepe Alicea MD;  Location:  [facility];  Service: Orthopedics;  Laterality: Right;     Family History   Problem Relation Age of Onset    Alcohol abuse Maternal Uncle         Passed away 2013    Alcohol abuse Father         Passed away in 2016    Hyperlipidemia Father         Started about 10 years before his death    Arthritis Mother         Passed away 2006, from Alzheimers    Diabetes Mother     Hyperlipidemia Mother         Started probably at middle age    Osteoporosis Mother     Malig Hyperthermia Neg Hx      Social History     Tobacco Use    Smoking status: Never     Passive exposure: Never    Smokeless tobacco: Never    Tobacco comments:     Naila only every been around people who smoked   Vaping Use    Vaping status: Never Used   Substance Use Topics    Alcohol use: Yes     Alcohol/week: 10.0 standard drinks of alcohol     Types: 10 Drinks containing 0.5 oz of alcohol per week     Comment: Maybe 8-10 drinks mainly on weekends.    Drug use: Never     Medications Prior to Admission   Medication Sig Dispense Refill  Last Dose/Taking    acetaminophen (TYLENOL) 325 MG tablet Take 2 tablets by mouth 2 (Two) Times a Day As Needed for Mild Pain. 60 tablet 0 12/18/2024    aspirin 81 MG EC tablet Take 1 tablet by mouth 2 (Two) Times a Day. 60 tablet 0 12/19/2024    benazepril (LOTENSIN) 40 MG tablet Take 1 tablet by mouth twice daily 180 tablet 0 12/18/2024    cloNIDine (CATAPRES) 0.2 MG tablet Take 1 tablet by mouth twice daily 180 tablet 1 12/18/2024    doxazosin (CARDURA) 4 MG tablet TAKE 1 TABLET BY MOUTH ONCE DAILY AT NIGHT 90 tablet 1 12/18/2024    escitalopram (LEXAPRO) 10 MG tablet Take 1.5 tablets by mouth Every Evening. 135 tablet 1 12/18/2024    ezetimibe (ZETIA) 10 MG tablet Take 1 tablet by mouth once daily 90 tablet 0 12/18/2024    fenofibrate 160 MG tablet TAKE 1 TABLET BY MOUTH AT NIGHT 90 tablet 0 12/18/2024    fluticasone (FLONASE) 50 MCG/ACT nasal spray Administer 2 sprays into the nostril(s) as directed by provider Every Morning. 2 sprays in each nostril   12/18/2024    furosemide (LASIX) 40 MG tablet Take 1 tablet by mouth Daily. 90 tablet 3 12/18/2024    gabapentin (NEURONTIN) 600 MG tablet Take 1 tablet by mouth Every Evening.   12/18/2024    glimepiride (AMARYL) 4 MG tablet Take 1 tablet by mouth 2 (Two) Times a Day. Indications: Type 2 Diabetes 180 tablet 1 12/18/2024    glucose blood (Accu-Chek Anabela Plus) test strip TEST TWICE DAILY Use as instructed 100 each 3 12/19/2024    Janumet XR  MG tablet Take 1 tablet by mouth twice daily 180 tablet 0 12/18/2024    Multiple Vitamins-Minerals (b complex-C-E-zinc) tablet Take 1 tablet by mouth Every Other Day.   12/18/2024    Multiple Vitamins-Minerals (PRESERVISION AREDS 2 PO) Take 1 tablet by mouth 2 (Two) Times a Day.   12/18/2024    nebivolol (BYSTOLIC) 5 MG tablet Take 1 tablet by mouth Daily. 90 tablet 1 12/18/2024    NIFEdipine XL (PROCARDIA XL) 60 MG 24 hr tablet Take 1 tablet by mouth Every Morning. 90 tablet 1 12/18/2024    Olopatadine HCl (PATADAY  "OP) Apply 1 drop to eye(s) as directed by provider 2 (Two) Times a Day As Needed (allergies).   12/18/2024    polyethyl glycol-propyl glycol (SYSTANE) 0.4-0.3 % solution ophthalmic solution (artificial tears) Administer 1 drop to both eyes As Needed (dry eyes).   12/18/2024    traZODone (DESYREL) 50 MG tablet TAKE 1 TABLET BY MOUTH ONCE DAILY AT NIGHT 90 tablet 0 12/18/2024    vitamin D3 125 MCG (5000 UT) capsule capsule Take 1 capsule by mouth Daily.   12/18/2024    warfarin (COUMADIN) 5 MG tablet Take 1 tablet by mouth Daily. (Patient taking differently: Take 1 tablet by mouth Daily. Patient states taking 7.5 MG on Wednesday and Saturday) 180 tablet 3 12/18/2024     aspirin, 81 mg, Oral, Daily  escitalopram, 15 mg, Oral, Q PM  fenofibrate, 145 mg, Oral, Nightly  gabapentin, 600 mg, Oral, Nightly  insulin glargine, 10 Units, Subcutaneous, Nightly  insulin lispro, 2-7 Units, Subcutaneous, 4x Daily AC & at Bedtime  nebivolol, 5 mg, Oral, Daily  NIFEdipine XL, 60 mg, Oral, QAM  sacubitril-valsartan, 1 tablet, Oral, Q12H  senna-docusate sodium, 2 tablet, Oral, BID  sodium chloride, 10 mL, Intravenous, Q12H  terazosin, 5 mg, Oral, Nightly  warfarin, 7.5 mg, Oral, Once      Allergies:  Patient has no known allergies.    Objective      Vital Signs  Temp:  [98.8 °F (37.1 °C)-99.4 °F (37.4 °C)] 99.3 °F (37.4 °C)  Heart Rate:  [] 66  Resp:  [13-20] 20  BP: ()/(62-99) 106/62    Flowsheet Rows      Flowsheet Row First Filed Value   Admission Height 188 cm (74\") Documented at 12/19/2024 0758   Admission Weight 119 kg (262 lb) Documented at 12/19/2024 0758          188 cm (74\")    Physical Exam  Vitals and nursing note reviewed.   Constitutional:       Appearance: Normal appearance. He is obese.   HENT:      Head: Normocephalic and atraumatic.      Nose: Nose normal.      Mouth/Throat:      Mouth: Mucous membranes are moist.      Pharynx: Oropharynx is clear.   Eyes:      Extraocular Movements: Extraocular movements " intact.      Conjunctiva/sclera: Conjunctivae normal.      Pupils: Pupils are equal, round, and reactive to light.   Cardiovascular:      Rate and Rhythm: Normal rate. Rhythm irregular.      Pulses: Normal pulses.      Heart sounds: Murmur heard.   Pulmonary:      Effort: Pulmonary effort is normal.      Breath sounds: Normal breath sounds.   Abdominal:      General: Bowel sounds are normal.      Palpations: Abdomen is soft.   Musculoskeletal:         General: Normal range of motion.      Cervical back: Normal range of motion and neck supple.   Skin:     General: Skin is warm and dry.      Capillary Refill: Capillary refill takes less than 2 seconds.   Neurological:      General: No focal deficit present.      Mental Status: He is alert and oriented to person, place, and time.   Psychiatric:         Mood and Affect: Mood normal.         Behavior: Behavior normal.         Thought Content: Thought content normal.         Judgment: Judgment normal.         Results Review:   Lab Results (last 24 hours)       Procedure Component Value Units Date/Time    POC Glucose Once [[id number]]  (Abnormal) Collected: 12/23/24 1525    Specimen: Blood Updated: 12/23/24 1527     Glucose 207 mg/dL     POC Glucose Once [[id number]]  (Abnormal) Collected: 12/23/24 1052    Specimen: Blood Updated: 12/23/24 1054     Glucose 160 mg/dL     POC Glucose Once [[id number]]  (Abnormal) Collected: 12/23/24 0609    Specimen: Blood Updated: 12/23/24 0628     Glucose 144 mg/dL     Phosphorus [[id number]]  (Normal) Collected: 12/23/24 0333    Specimen: Blood Updated: 12/23/24 0505     Phosphorus 3.2 mg/dL     Protime-INR [[id number]]  (Abnormal) Collected: 12/23/24 0333    Specimen: Blood Updated: 12/23/24 0449     Protime 17.7 Seconds      INR 1.43    Basic Metabolic Panel [[id number]]  (Abnormal) Collected: 12/23/24 0333    Specimen: Blood Updated: 12/23/24 0444     Glucose 159 mg/dL      BUN 22 mg/dL      Creatinine 1.02 mg/dL      Sodium 139 mmol/L       Potassium 3.8 mmol/L      Chloride 101 mmol/L      CO2 23.2 mmol/L      Calcium 9.2 mg/dL      BUN/Creatinine Ratio 21.6     Anion Gap 14.8 mmol/L      eGFR 78.1 mL/min/1.73     Narrative:      GFR Categories in Chronic Kidney Disease (CKD)      GFR Category          GFR (mL/min/1.73)    Interpretation  G1                     90 or greater         Normal or high (1)  G2                      60-89                Mild decrease (1)  G3a                   45-59                Mild to moderate decrease  G3b                   30-44                Moderate to severe decrease  G4                    15-29                Severe decrease  G5                    14 or less           Kidney failure          (1)In the absence of evidence of kidney disease, neither GFR category G1 or G2 fulfill the criteria for CKD.    eGFR calculation 2021 CKD-EPI creatinine equation, which does not include race as a factor    Magnesium [[id number]]  (Normal) Collected: 12/23/24 0333    Specimen: Blood Updated: 12/23/24 0444     Magnesium 2.1 mg/dL     CBC (No Diff) [[id number]]  (Abnormal) Collected: 12/23/24 0333    Specimen: Blood Updated: 12/23/24 0425     WBC 10.42 10*3/mm3      RBC 4.56 10*6/mm3      Hemoglobin 12.9 g/dL      Hematocrit 40.8 %      MCV 89.5 fL      MCH 28.3 pg      MCHC 31.6 g/dL      RDW 14.0 %      RDW-SD 46.1 fl      MPV 10.9 fL      Platelets 243 10*3/mm3     POC Glucose Once [[id number]]  (Normal) Collected: 12/22/24 2045    Specimen: Blood Updated: 12/22/24 2052     Glucose 116 mg/dL                 Assessment & Plan       Acute exacerbation of CHF (congestive heart failure)    Diabetes mellitus    Essential hypertension    AF (paroxysmal atrial fibrillation)      Assessment & Plan    - Severe mitral valve regurgitation  - CAD with previous stent to LAD (2022)  - Atrial fibrillation, recent diagnosis   - DM II  - HTN  - HLD  - HOLLI with CPAP  - Recent pneumonia (hospitalized last month)    Will need cardiac cath and  routine pre-operative testing. Dr. Kelly to discuss with Dr. Chávez.         Gloria Garcia, APRN  12/23/24  15:37 EST     Addendum  Patient was seen and examined by me, agree with the findings above.  I have reviewed the echo, YAZMIN and interpreted results.  He has severe mitral regurgitation with a P2 prolapse and biventricular ventricular dysfunction.  He has atrial fibrillation but he was not cardioverted due to mitral regurgitation.  I recommend mitral valve repair with a 95% repairability rate and a mortality rate of less than 2%.  Also I recommend right and left cryo-maze procedure.  The patient will need to have a cardiac cath to evaluate presence of coronary artery disease.  I discussed the risks (STS calculated), benefits alternatives of surgery and he wished to proceed.  Plan is to have a cardiac cath as an outpatient and surgery as an outpatient probably in the first week of January  Young Kelly MD

## 2024-12-23 NOTE — PROGRESS NOTES
Name: Jeb Olson ADMIT: 2024   : 1950  PCP: Tera Salvador MD    MRN: [id number] LOS: 4 days   AGE/SEX: 74 y.o. male  ROOM: [facility]     Subjective   Subjective   Sitting up in the chair.  Reports did not sleep well, takes trazodone at home for insomnia.  No shortness of breath, chest pain.  On room air.  Pending YAZMIN this morning.      Review of Systems   As above  Objective   Objective   Vital Signs  Temp:  [98.8 °F (37.1 °C)-99.4 °F (37.4 °C)] 99.3 °F (37.4 °C)  Heart Rate:  [] 66  Resp:  [13-20] 20  BP: ()/(62-99) 106/62  SpO2:  [87 %-99 %] 91 %  on  Flow (L/min) (Oxygen Therapy):  [4-10] 4;   Device (Oxygen Therapy): nasal cannula  Body mass index is 32.21 kg/m².  Physical Exam    General: Alert, sitting up in bed, not in distress,  HEENT: Normocephalic, atraumatic  CV:  irregular rhythm, normal rate  Lungs: CTA, no wheezing, on room air  Abdomen: Soft, nontender, nondistended  Extremities: No significant peripheral edema, no cyanosis       Results Review     I reviewed the patient's new clinical results.  Results from last 7 days   Lab Units 24  0333 24  0454 24  0520 24  0654   WBC 10*3/mm3 10.42 6.23 6.50 5.97   HEMOGLOBIN g/dL 12.9* 12.7* 12.6* 11.7*   PLATELETS 10*3/mm3 243 257 251 248     Results from last 7 days   Lab Units 24  0333 24  0454 24  1300 24  0520 24  1445 24  0654   SODIUM mmol/L 139 142  --  142  --  142   POTASSIUM mmol/L 3.8 3.5 4.1 3.7   < > 3.8   CHLORIDE mmol/L 101 102  --  102  --  105   CO2 mmol/L 23.2 29.0  --  28.0  --  27.6   BUN mg/dL 22 29*  --  24*  --  20   CREATININE mg/dL 1.02 1.14  --  1.11  --  1.14   GLUCOSE mg/dL 159* 126*  --  122*  --  106*    < > = values in this interval not displayed.   Estimated Creatinine Clearance: 87.9 mL/min (by C-G formula based on SCr of 1.02 mg/dL).  Results from last 7 days   Lab Units 24  0800   ALBUMIN g/dL 4.4   BILIRUBIN mg/dL 0.7    ALK PHOS U/L 38*   AST (SGOT) U/L 20   ALT (SGPT) U/L 35     Results from last 7 days   Lab Units 12/23/24  0333 12/22/24  0454 12/21/24  0520 12/20/24  0654 12/19/24  0903 12/19/24  0800   CALCIUM mg/dL 9.2 9.4 9.1 8.8   < > 8.9   ALBUMIN g/dL  --   --   --   --   --  4.4   MAGNESIUM mg/dL 2.1 2.4 2.1 2.3   < >  --    PHOSPHORUS mg/dL 3.2 5.0* 4.3 3.5  --   --     < > = values in this interval not displayed.       COVID19   Date Value Ref Range Status   12/19/2024 Not Detected Not Detected - Ref. Range Final     Glucose   Date/Time Value Ref Range Status   12/23/2024 1052 160 (H) 70 - 130 mg/dL Final   12/23/2024 0609 144 (H) 70 - 130 mg/dL Final   12/22/2024 2045 116 70 - 130 mg/dL Final   12/22/2024 1531 131 (H) 70 - 130 mg/dL Final   12/22/2024 1024 150 (H) 70 - 130 mg/dL Final   12/22/2024 0602 128 70 - 130 mg/dL Final   12/21/2024 2022 116 70 - 130 mg/dL Final           FL ESOPHAGRAM SINGLE CONTRAST  Narrative: PROCEDURE: FL ESOPHAGRAM SINGLE CONTRAST-     HISTORY: Dysphagia. Pretransesophageal echo.     TECHNIQUE:  A single phase barium esophagram was performed.     # images obtained: 71  Dose area product: 2581 micro gray x square meter     COMPARISON: CT chest angiogram 12/19/2024     FINDINGS:     Normal swallowing. No laryngeal penetration or aspiration. Esophagus is  well distended and normal in course and caliber. No narrowing/stenosis.  Mild esophageal dysmotility. Normally positioned gastroesophageal  junction. Small volume spontaneous gastroesophageal reflux. Contrast  continued into the stomach.           Impression: No fluoroscopic evidence of esophageal narrowing/stenosis.  Mild esophageal dysmotility. Small volume spontaneous gastroesophageal  reflux.           This report was finalized on 12/22/2024 9:13 PM by Dr. Damien Glaser M.D on Workstation: [id number]       Scheduled Medications  aspirin, 81 mg, Oral, Daily  escitalopram, 15 mg, Oral, Q PM  fenofibrate, 145 mg, Oral,  Nightly  gabapentin, 600 mg, Oral, Nightly  insulin glargine, 10 Units, Subcutaneous, Nightly  insulin lispro, 2-7 Units, Subcutaneous, 4x Daily AC & at Bedtime  nebivolol, 5 mg, Oral, Daily  NIFEdipine XL, 60 mg, Oral, QAM  sacubitril-valsartan, 1 tablet, Oral, Q12H  senna-docusate sodium, 2 tablet, Oral, BID  sodium chloride, 10 mL, Intravenous, Q12H  terazosin, 5 mg, Oral, Nightly  warfarin, 7.5 mg, Oral, Once    Infusions  Pharmacy to dose warfarin,     Diet  NPO Diet NPO Type: Strict NPO    I have personally reviewed     [x]  Laboratory   [x]  Microbiology   [x]  Radiology   [x]  EKG/Telemetry  [x]  Cardiology/Vascular   []  Pathology    []  Records       Assessment/Plan     Active Hospital Problems    Diagnosis  POA    **Acute exacerbation of CHF (congestive heart failure) [I50.9]  Yes    AF (paroxysmal atrial fibrillation) [I48.0]  Yes    Essential hypertension [I10]  Yes    Diabetes mellitus [E11.9]  Yes      Resolved Hospital Problems   No resolved problems to display.     Acute on chronic diastolic heart failure  PAF  Moderate to severe mitral regurg  Mild aortic stenosis  Hypertension  -CTA on admission with no PE, did show mild to moderate right greater than left pleural effusions.3. Cardiomegaly with evidence of right heart dysfunction and withpulmonary edema.  -BNP elevated to 2600  -Echocardiogram 10/25/2024 showed EF of 52.7%, moderate to severe mitral regurg, mild aortic stenosis  -Cardiology following, plan for YAZMIN with anesthesia on Monday for MR assessment  -Status post IV Lasix , diuresis per cardiology, currently on  hold   -Blood pressure improved  -Warfarin resumed, pharmacy dosing, INR subtherapeutic  - Plan for YAZMIN this morning       Acute hypoxic  respiratory failure secondary to heart failure exacerbation as above  -Recently admitted with [facility] in [place]  -Was placed on 10 L nonrebreather on admission, not on oxygen at baseline  -CT scan showed multifocal lung opacities, with volume  loss. Suspect combination of  pulmonary edema and atelectasis. Pneumonia in the appropriate clinical setting.   -Patient is afebrile, WBC normal.  Low suspicion for pneumonia currently, without protocol  -IV diuresis as above  -Wean off O2 as able,, unstageable 90% and above  -Improved, weaned down to room air  -Pulmonology evaluated     Type II DM  -Continue SSI, Lantus 10 units nightly  -Blood glucose stable     Pulmonary nodule  -CT scan showed subpleural solid pulmonary nodule in the lingula measuring 9 mm.  -Will need 3-month follow-up chest CT can reevaluate.  -Pulmonology evaluated, recommend to follow-up with pulmonology, with Dr. Schulte in 3 months with repeat CT scan to follow-up pulmonary nodules and lymphadenopathy.       Insomnia  -PRN trazodone       Lovenox 40 mg SC daily for DVT prophylaxis.  Full code.  Discussed with patient.   Discharge, home , likely home 1-2 days pending YAZIMN results   Expected Discharge Date: 12/24/2024; Expected Discharge Time:        Copied text in this note has been reviewed and is accurate as of 12/23/24.         Dictated utilizing Dragon dictation        Charo Love MD  [facility]  12/23/24  14:54 EST

## 2024-12-23 NOTE — PLAN OF CARE
Problem: Adult Inpatient Plan of Care  Goal: Plan of Care Review  Outcome: Progressing  Flowsheets (Taken 12/23/2024 1450)  Progress: improving  Outcome Evaluation: Patient has been pleasant and cooperative during shift. AOx4, ad albert, room air, Afib. YAZMIN completed, no cardioversion. No complaints of pain, nausea or SOA. Will continue to monitor and assist patient as needed.  Plan of Care Reviewed With: patient  Goal: Patient-Specific Goal (Individualized)  Outcome: Progressing  Goal: Absence of Hospital-Acquired Illness or Injury  Outcome: Progressing  Intervention: Identify and Manage Fall Risk  Recent Flowsheet Documentation  Taken 12/23/2024 1400 by Dennis Garcia RN  Safety Promotion/Fall Prevention: patient off unit  Taken 12/23/2024 1200 by Dennis Garcia RN  Safety Promotion/Fall Prevention:   assistive device/personal items within reach   fall prevention program maintained   nonskid shoes/slippers when out of bed   safety round/check completed  Taken 12/23/2024 1000 by Dennis Garcia RN  Safety Promotion/Fall Prevention:   assistive device/personal items within reach   fall prevention program maintained   nonskid shoes/slippers when out of bed   safety round/check completed  Taken 12/23/2024 0748 by Dennis Garcia RN  Safety Promotion/Fall Prevention:   assistive device/personal items within reach   fall prevention program maintained   nonskid shoes/slippers when out of bed   safety round/check completed  Intervention: Prevent Skin Injury  Recent Flowsheet Documentation  Taken 12/23/2024 1200 by Dennis Garcia RN  Body Position: (chair) other (see comments)  Taken 12/23/2024 1000 by Dennis Garcia RN  Body Position: (chair) other (see comments)  Taken 12/23/2024 0748 by Dennis Garcia RN  Body Position: (chair) other (see comments)  Goal: Optimal Comfort and Wellbeing  Outcome: Progressing  Goal: Readiness for Transition of Care  Outcome: Progressing     Problem: Heart Failure  Goal:  Optimal Coping  Outcome: Progressing  Goal: Optimal Cardiac Output and Blood Flow  Outcome: Progressing  Goal: Stable Heart Rate and Rhythm  Outcome: Progressing  Goal: Fluid and Electrolyte Balance  Outcome: Progressing  Goal: Optimal Functional Ability  Outcome: Progressing  Intervention: Optimize Functional Ability  Recent Flowsheet Documentation  Taken 12/23/2024 1200 by Dennis Garcia RN  Activity Management:   up in chair   up ad albert  Taken 12/23/2024 1000 by Dennis Garcia RN  Activity Management: up in chair  Taken 12/23/2024 0748 by Dennis Garcia RN  Activity Management:   up ad albert   up in chair  Goal: Improved Oral Intake  Outcome: Progressing  Goal: Effective Oxygenation and Ventilation  Outcome: Progressing  Intervention: Promote Airway Secretion Clearance  Recent Flowsheet Documentation  Taken 12/23/2024 1200 by Dennis Garcia RN  Activity Management:   up in chair   up ad albert  Taken 12/23/2024 1000 by Dennis Garcia RN  Activity Management: up in chair  Taken 12/23/2024 0748 by Dennis Garcia RN  Activity Management:   up ad albert   up in chair  Cough And Deep Breathing: done independently per patient  Goal: Effective Breathing Pattern During Sleep  Outcome: Progressing     Problem: Fall Injury Risk  Goal: Absence of Fall and Fall-Related Injury  Outcome: Progressing  Intervention: Promote Injury-Free Environment  Recent Flowsheet Documentation  Taken 12/23/2024 1400 by Dennis Garcia RN  Safety Promotion/Fall Prevention: patient off unit  Taken 12/23/2024 1200 by Dennis Garcia RN  Safety Promotion/Fall Prevention:   assistive device/personal items within reach   fall prevention program maintained   nonskid shoes/slippers when out of bed   safety round/check completed  Taken 12/23/2024 1000 by Dennis Garcia RN  Safety Promotion/Fall Prevention:   assistive device/personal items within reach   fall prevention program maintained   nonskid shoes/slippers when out of bed    safety round/check completed  Taken 12/23/2024 0748 by Dennis Garcia, RN  Safety Promotion/Fall Prevention:   assistive device/personal items within reach   fall prevention program maintained   nonskid shoes/slippers when out of bed   safety round/check completed   Goal Outcome Evaluation:  Plan of Care Reviewed With: patient        Progress: improving  Outcome Evaluation: Patient has been pleasant and cooperative during shift. AOx4, ad albert, room air, Afib. YAZMIN completed, no cardioversion. No complaints of pain, nausea or SOA. Will continue to monitor and assist patient as needed.

## 2024-12-23 NOTE — PROGRESS NOTES
"    Patient Name: Jeb Olson  :1950  74 y.o.      Patient Care Team:  Tera Salvador MD as PCP - General (Internal Medicine)  Micah Reyes MD as Consulting Physician (Gastroenterology)  Bruna Chávez MD as Referring Physician (Cardiology)    Chief Complaint:   CHF AF MR  Interval History:   Off oxygen, no dyspnea.     Objective   Vital Signs  Temp:  [98.2 °F (36.8 °C)-99.4 °F (37.4 °C)] 98.9 °F (37.2 °C)  Heart Rate:  [79-94] 83  Resp:  [16-18] 16  BP: (116-150)/(82-99) 150/99    Intake/Output Summary (Last 24 hours) at 2024 1115  Last data filed at 2024 0600  Gross per 24 hour   Intake 240 ml   Output 1150 ml   Net -910 ml     Flowsheet Rows      Flowsheet Row First Filed Value   Admission Height 188 cm (74\") Documented at 2024 0758   Admission Weight 119 kg (262 lb) Documented at 2024 0758            Physical Exam:   General Appearance:    Alert, cooperative, in no acute distress   Lungs:     clear    Heart:    iRegular rhythm and normal rate, normal S1 and S2, no murmurs, gallops or rubs.     Chest Wall:    No abnormalities observed   Abdomen:     Soft, nontender, positive bowel sounds.     Extremities:   no cyanosis, clubbing or edema.  No marked joint deformities.  Adequate musculoskeletal strength.       Results Review:    Results from last 7 days   Lab Units 24  0333   SODIUM mmol/L 139   POTASSIUM mmol/L 3.8   CHLORIDE mmol/L 101   CO2 mmol/L 23.2   BUN mg/dL 22   CREATININE mg/dL 1.02   GLUCOSE mg/dL 159*   CALCIUM mg/dL 9.2     Results from last 7 days   Lab Units 24  0903 24  0800   HSTROP T ng/L 21 21     Results from last 7 days   Lab Units 24  0333   WBC 10*3/mm3 10.42   HEMOGLOBIN g/dL 12.9*   HEMATOCRIT % 40.8   PLATELETS 10*3/mm3 243     Results from last 7 days   Lab Units 24  0333 24  0454 24  0520   INR  1.43* 1.50* 1.62*     Results from last 7 days   Lab Units 24  0333   MAGNESIUM mg/dL 2.1       "   Results from last 7 days   Lab Units 12/18/24  1532   BNP pg/mL 443.0*           Medication Review:   aspirin, 81 mg, Oral, Daily  escitalopram, 15 mg, Oral, Q PM  fenofibrate, 145 mg, Oral, Nightly  gabapentin, 600 mg, Oral, Nightly  insulin glargine, 10 Units, Subcutaneous, Nightly  insulin lispro, 2-7 Units, Subcutaneous, 4x Daily AC & at Bedtime  nebivolol, 5 mg, Oral, Daily  NIFEdipine XL, 60 mg, Oral, QAM  sacubitril-valsartan, 1 tablet, Oral, Q12H  senna-docusate sodium, 2 tablet, Oral, BID  sodium chloride, 10 mL, Intravenous, Q12H  terazosin, 5 mg, Oral, Nightly  warfarin, 7.5 mg, Oral, Once         Pharmacy to dose warfarin,         Assessment & Plan   1 Flash pulmonary edema  2 CAD remote PCI  3 Relatively new AF  4 New cardiomyopathy likely valvular, unknown etiology. EF 35% in [place] last month.    YAZMIN today. If severe MR will have CT surgery see and forego cardioversion. If not severe MR and no SELMA thrombus, will plan cardioversion.   Plan to start oral diuretic tomorrrow. Likely home tomorrow.     Bruna Chávez MD  [facility]  12/23/24  11:15 EST

## 2024-12-23 NOTE — PLAN OF CARE
Goal Outcome Evaluation:  Plan of Care Reviewed With: patient        Progress: improving  Outcome Evaluation: Patient  resting  at this  time. Alert and   oriented. HAd Tylenol  for  complaint  of  bilateral  knee  pain  with  some  releif..  NPO at  this  time  for  YAZMIN  and  Cardioversion.  Up  ad albert to the bathroom   Complaint  of  Insimnia, . Unable  to  stay  asleep.  Has  been  on  room air. Afib  on  the  monitor.

## 2024-12-23 NOTE — ANESTHESIA PREPROCEDURE EVALUATION
Anesthesia Evaluation     Patient summary reviewed and Nursing notes reviewed   no history of anesthetic complications:   NPO Solid Status: > 8 hours  NPO Liquid Status: > 8 hours           Airway   Mallampati: II  TM distance: >3 FB  Neck ROM: full  No difficulty expected  Comment: Beard   Dental - normal exam     Pulmonary    (+) ,sleep apnea on CPAP  (-) asthma, rhonchi, decreased breath sounds, wheezes, not a smoker    ROS comment: Improved breathing last 2 days now on room air sats 96%  Cardiovascular   Exercise tolerance: good (4-7 METS)    Rhythm: irregular  Rate: abnormal    (+) hypertension, CAD, cardiac stents (2.5 yrs ago to LAD) Drug eluting stent more than 12 months ago , dysrhythmias Atrial Fib, CHF Systolic <55%, murmur, hyperlipidemia  (-) angina, HEREDIA      Neuro/Psych  (-) seizures, CVA  GI/Hepatic/Renal/Endo    (+) obesity, diabetes mellitus type 2 well controlled  (-) liver disease, no renal disease, no thyroid disorder    Musculoskeletal     Abdominal     Abdomen: soft.   Substance History      OB/GYN          Other   arthritis,                 Anesthesia Plan    ASA 3     MAC   total IV anesthesia  intravenous induction     Anesthetic plan, risks, benefits, and alternatives have been provided, discussed and informed consent has been obtained with: patient.    CODE STATUS:    Level Of Support Discussed With: Patient  Code Status (Patient has no pulse and is not breathing): CPR (Attempt to Resuscitate)  Medical Interventions (Patient has pulse or is breathing): Full Support

## 2024-12-23 NOTE — ANESTHESIA POSTPROCEDURE EVALUATION
"Patient: Jeb Olson    Procedure Summary       Date: 12/23/24 Room / Location: [facility] PACU    Anesthesia Start: 1331 Anesthesia Stop: 1401    Procedures:       ADULT TRANSESOPHAGEAL ECHO (YAZMIN) W/ CONT IF NECESSARY PER PROTOCOL      CARDIOVERSION EXTERNAL IN CARDIOLOGY DEPARTMENT Diagnosis:       (Arrhythmia)      (Pre-cardioversion)      (atrial fib)    Scheduled Providers: Zay Cadena MD Provider: Gopal Zaldivar MD    Anesthesia Type: MAC ASA Status: 3            Anesthesia Type: MAC    Vitals  Vitals Value Taken Time   /62 12/23/24 1415   Temp     Pulse 87 12/23/24 1428   Resp 20 12/23/24 1415   SpO2 96 % 12/23/24 1428   Vitals shown include unfiled device data.        Post Anesthesia Care and Evaluation    Level of consciousness: awake and alert  Pain management: adequate    Airway patency: patent  Anesthetic complications: No anesthetic complications  PONV Status: none  Cardiovascular status: acceptable  Respiratory status: acceptable  Hydration status: acceptable    Comments: /62   Pulse 66   Temp 37.4 °C (99.3 °F) (Oral)   Resp 20   Ht 188 cm (74\")   Wt 114 kg (250 lb 14.1 oz)   SpO2 91%   BMI 32.21 kg/m²           "

## 2024-12-23 NOTE — PROGRESS NOTES
[facility] Clinical Pharmacy Services: Warfarin Dosing/Monitoring Consult    Jeb Olson is a 74 y.o. male, estimated creatinine clearance is 87.9 mL/min (by C-G formula based on SCr of 1.02 mg/dL). weighing 114 kg (250 lb 14.1 oz).    Results from last 7 days   Lab Units 12/23/24  0333 12/22/24  0454 12/21/24  0520 12/20/24  0654 12/19/24  0800   INR  1.43* 1.50* 1.62* 1.77* 1.83*   HEMOGLOBIN g/dL 12.9* 12.7* 12.6* 11.7* 11.9*   HEMATOCRIT % 40.8 37.6 38.2 37.2* 36.2*   PLATELETS 10*3/mm3 243 257 251 248 225     Prior to admission anticoagulation: Per [facility] note on 11/11/24. Most recent warfarin regimen is 7.5 mg on Wednesday/Saturday and 5 mg all other days of the week (40 mg/week).     Hospital Anticoagulation:  Consulting provider: Dr. Charo Love  Start date: 12/19/24  Indication: Atrial fibrillation  Target INR: 2 - 3  Expected duration: lifelong   Bridge Therapy: No      Potential new disease state or drug interactions: none    INR Assessment:  Date INR Dose   12/20 1.77 HELD   12/21 1.62 HELD   12/22 1.50 HELD   12/23 1.43      Assessment/Plan:  Patient just had YAZMIN performed today. Will restart warfarin tonight and give a 7.5 mg dose. Confirm with Cardiology that they want dose given.  Will assess INR tomorrow morning to determine further dosing.  Monitor for any signs or symptoms of bleeding.  Follow up daily INRs and dose adjustments.    Pharmacy will continue to follow until discharge or discontinuation of warfarin.     Jacquelyn Ferreira, Pharm.D., [facility]   Clinical Pharmacist   Phone Extension #[phone]

## 2024-12-24 ENCOUNTER — TELEPHONE (OUTPATIENT)
Dept: GASTROENTEROLOGY | Facility: CLINIC | Age: 74
End: 2024-12-24
Payer: MEDICARE

## 2024-12-24 ENCOUNTER — READMISSION MANAGEMENT (OUTPATIENT)
Dept: CALL CENTER | Facility: HOSPITAL | Age: 74
End: 2024-12-24
Payer: MEDICARE

## 2024-12-24 VITALS
TEMPERATURE: 98.4 F | WEIGHT: 247.6 LBS | DIASTOLIC BLOOD PRESSURE: 86 MMHG | RESPIRATION RATE: 16 BRPM | BODY MASS INDEX: 31.78 KG/M2 | SYSTOLIC BLOOD PRESSURE: 142 MMHG | HEART RATE: 74 BPM | HEIGHT: 74 IN | OXYGEN SATURATION: 98 %

## 2024-12-24 PROBLEM — I34.0 SEVERE MITRAL REGURGITATION: Chronic | Status: ACTIVE | Noted: 2024-12-24

## 2024-12-24 LAB
ANION GAP SERPL CALCULATED.3IONS-SCNC: 7.9 MMOL/L (ref 5–15)
BUN SERPL-MCNC: 21 MG/DL (ref 8–23)
BUN/CREAT SERPL: 21.9 (ref 7–25)
CALCIUM SPEC-SCNC: 9 MG/DL (ref 8.6–10.5)
CHLORIDE SERPL-SCNC: 106 MMOL/L (ref 98–107)
CO2 SERPL-SCNC: 25.1 MMOL/L (ref 22–29)
CREAT SERPL-MCNC: 0.96 MG/DL (ref 0.76–1.27)
DEPRECATED RDW RBC AUTO: 47.1 FL (ref 37–54)
EGFRCR SERPLBLD CKD-EPI 2021: 84 ML/MIN/1.73
ERYTHROCYTE [DISTWIDTH] IN BLOOD BY AUTOMATED COUNT: 13.9 % (ref 12.3–15.4)
GLUCOSE BLDC GLUCOMTR-MCNC: 157 MG/DL (ref 70–130)
GLUCOSE BLDC GLUCOMTR-MCNC: 219 MG/DL (ref 70–130)
GLUCOSE SERPL-MCNC: 150 MG/DL (ref 65–99)
HCT VFR BLD AUTO: 40.6 % (ref 37.5–51)
HGB BLD-MCNC: 12.5 G/DL (ref 13–17.7)
INR PPP: 1.47 (ref 0.9–1.1)
MCH RBC QN AUTO: 28.1 PG (ref 26.6–33)
MCHC RBC AUTO-ENTMCNC: 30.8 G/DL (ref 31.5–35.7)
MCV RBC AUTO: 91.2 FL (ref 79–97)
PLATELET # BLD AUTO: 207 10*3/MM3 (ref 140–450)
PMV BLD AUTO: 10.9 FL (ref 6–12)
POTASSIUM SERPL-SCNC: 3.6 MMOL/L (ref 3.5–5.2)
PROTHROMBIN TIME: 18 SECONDS (ref 11.7–14.2)
RBC # BLD AUTO: 4.45 10*6/MM3 (ref 4.14–5.8)
SODIUM SERPL-SCNC: 139 MMOL/L (ref 136–145)
WBC NRBC COR # BLD AUTO: 6.87 10*3/MM3 (ref 3.4–10.8)

## 2024-12-24 PROCEDURE — 85610 PROTHROMBIN TIME: CPT | Performed by: STUDENT IN AN ORGANIZED HEALTH CARE EDUCATION/TRAINING PROGRAM

## 2024-12-24 PROCEDURE — 82948 REAGENT STRIP/BLOOD GLUCOSE: CPT

## 2024-12-24 PROCEDURE — 80048 BASIC METABOLIC PNL TOTAL CA: CPT | Performed by: STUDENT IN AN ORGANIZED HEALTH CARE EDUCATION/TRAINING PROGRAM

## 2024-12-24 PROCEDURE — 99232 SBSQ HOSP IP/OBS MODERATE 35: CPT | Performed by: INTERNAL MEDICINE

## 2024-12-24 PROCEDURE — 85027 COMPLETE CBC AUTOMATED: CPT | Performed by: STUDENT IN AN ORGANIZED HEALTH CARE EDUCATION/TRAINING PROGRAM

## 2024-12-24 PROCEDURE — 63710000001 INSULIN LISPRO (HUMAN) PER 5 UNITS: Performed by: STUDENT IN AN ORGANIZED HEALTH CARE EDUCATION/TRAINING PROGRAM

## 2024-12-24 RX ORDER — WARFARIN SODIUM 5 MG/1
5 TABLET ORAL
Start: 2024-12-24 | End: 2024-12-24

## 2024-12-24 RX ORDER — WARFARIN SODIUM 7.5 MG/1
7.5 TABLET ORAL ONCE
Status: COMPLETED | OUTPATIENT
Start: 2024-12-24 | End: 2024-12-24

## 2024-12-24 RX ORDER — FUROSEMIDE 80 MG/1
80 TABLET ORAL DAILY
Qty: 30 TABLET | Refills: 0 | Status: SHIPPED | OUTPATIENT
Start: 2024-12-24 | End: 2025-01-23

## 2024-12-24 RX ORDER — WARFARIN SODIUM 5 MG/1
5 TABLET ORAL
Start: 2024-12-25

## 2024-12-24 RX ORDER — FUROSEMIDE 40 MG/1
80 TABLET ORAL DAILY
Status: DISCONTINUED | OUTPATIENT
Start: 2024-12-24 | End: 2024-12-24 | Stop reason: HOSPADM

## 2024-12-24 RX ADMIN — NEBIVOLOL 5 MG: 5 TABLET ORAL at 07:03

## 2024-12-24 RX ADMIN — ASPIRIN 81 MG: 81 TABLET, COATED ORAL at 07:03

## 2024-12-24 RX ADMIN — Medication 10 ML: at 07:04

## 2024-12-24 RX ADMIN — WARFARIN 7.5 MG: 7.5 TABLET ORAL at 10:49

## 2024-12-24 RX ADMIN — INSULIN LISPRO 3 UNITS: 100 INJECTION, SOLUTION INTRAVENOUS; SUBCUTANEOUS at 10:49

## 2024-12-24 RX ADMIN — FUROSEMIDE 80 MG: 40 TABLET ORAL at 10:49

## 2024-12-24 RX ADMIN — SACUBITRIL AND VALSARTAN 1 TABLET: 97; 103 TABLET, FILM COATED ORAL at 07:04

## 2024-12-24 RX ADMIN — NIFEDIPINE 60 MG: 60 TABLET, FILM COATED, EXTENDED RELEASE ORAL at 06:40

## 2024-12-24 RX ADMIN — INSULIN LISPRO 2 UNITS: 100 INJECTION, SOLUTION INTRAVENOUS; SUBCUTANEOUS at 06:40

## 2024-12-24 RX ADMIN — ESCITALOPRAM OXALATE 15 MG: 10 TABLET, FILM COATED ORAL at 07:04

## 2024-12-24 NOTE — PROGRESS NOTES
[facility] Clinical Pharmacy Services: Warfarin Dosing/Monitoring Consult    Jeb Olson is a 74 y.o. male, estimated creatinine clearance is 92.6 mL/min (by C-G formula based on SCr of 0.96 mg/dL). weighing 112 kg (247 lb 9.6 oz).    Results from last 7 days   Lab Units 12/24/24  0601 12/23/24  0333 12/22/24  0454 12/21/24  0520 12/20/24  0654   INR  1.47* 1.43* 1.50* 1.62* 1.77*   HEMOGLOBIN g/dL 12.5* 12.9* 12.7* 12.6* 11.7*   HEMATOCRIT % 40.6 40.8 37.6 38.2 37.2*   PLATELETS 10*3/mm3 207 243 257 251 248     Prior to admission anticoagulation: Per [facility] note on 11/11/24. Most recent warfarin regimen is 7.5 mg on Wednesday/Saturday and 5 mg all other days of the week (40 mg/week).     Hospital Anticoagulation:  Consulting provider: Dr. Charo Love  Start date: 12/19/24  Indication: Atrial fibrillation  Target INR: 2 - 3  Expected duration: lifelong   Bridge Therapy: No      Potential new disease state or drug interactions: none    INR Assessment:  Date INR Dose   12/21 1.62 HELD   12/22 1.50 HELD   12/23 1.43 7.5 mg   12/24 1.47 7.5 mg     Assessment/Plan:  Patient had a YAZMIN on 12/23, for which his warfarin was put on hold for three days prior. INR is currently subtherapeutic at 1.47. This is a very slow trend upwards, but is normal after holding warfarin. Will give a 7.5 mg dose today before discharge.  After receiving the 7.5 mg dose this morning, patient can resume home dose tomorrow 12/25. Set up anticoagulation clinic appointment on 12/26 at 1030.  Monitor for any signs or symptoms of bleeding.    Pharmacy will continue to follow until discharge or discontinuation of warfarin.     Jacquelyn Ferreira, Pharm.D., [facility]   Clinical Pharmacist   Phone Extension #[phone]

## 2024-12-24 NOTE — DISCHARGE SUMMARY
Patient Name: Jeb Olson  : 1950  MRN: [id number]    Date of Admission: 2024  Date of Discharge:  2024  Primary Care Physician: Tera Salvador MD      Chief Complaint:   Chest Pain and Shortness of Breath      Discharge Diagnoses     Active Hospital Problems    Diagnosis  POA    **Acute exacerbation of CHF (congestive heart failure) [I50.9]  Yes    Severe mitral regurgitation [I34.0]  Yes    AF (paroxysmal atrial fibrillation) [I48.0]  Yes    Essential hypertension [I10]  Yes    Diabetes mellitus [E11.9]  Yes      Resolved Hospital Problems   No resolved problems to display.        Hospital Course     Acute on chronic diastolic heart failure  Severe mitral regurgitation  PAF  Mild aortic stenosis  Hypertension  -CTA on admission with no PE, did show mild to moderate right greater than left pleural effusions.3. Cardiomegaly with evidence of right heart dysfunction and with pulmonary edema.  -BNP elevated to 2600  -Echocardiogram 10/25/2024 showed EF of 52.7%, moderate to severe mitral regurg, mild aortic stenosis  -Patient was initiated on aggressive IV diuretics, cardiology was consulted, and patient underwent YAZMIN on 2024 which showed severe mitral regurgitation with a P2 prolapse and biventricular ventricular dysfunction, A-fib was not cardioverted due to severe mitral regurg.  Presenting symptoms and flash pulmonary edema felt to be secondary to severe mitral regurg primarily.  Cardiothoracic surgery evaluated and was recommended to proceed with surgery outpatient.  Outpatient right and left heart cath scheduled on 2025 and timing for mitral valve surgery to be determined afterwards.  Discharged on higher dose of Lasix 80 mg daily, Entresto was initiated and continued to discharge.  Continued outpatient nifedipine and nebivolol, clonidine and benazepril was discontinued.  INR was subtherapeutic prior to discharge, patient received x-ray to hold warfarin if  instructions to resume outpatient warfarin starting tomorrow.  Patient follow-up in anticoagulation clinic on 12/26/2024 to monitor INR level.     Acute hypoxic  respiratory failure secondary to flash pulmonary edema in the setting of heart failure exacerbation as above  -Recently admitted with PNA in [place]  -Was placed on 10 L nonrebreather on admission, not on oxygen at baseline  -CT scan showed multifocal lung opacities, with volume loss. Suspect combination of  pulmonary edema and atelectasis. Pneumonia in the appropriate clinical setting.   -Pulmonology was consulted, there was no indication for antibiotics at symptoms were due to pulmonary edema in the setting of heart exacerbation.  Was aggressively diuresed, and weaned down to room air.      Pulmonary nodule  -CT scan showed subpleural solid pulmonary nodule in the lingula measuring 9 mm.  -Will need 3-month follow-up chest CT can reevaluate.  -Pulmonology evaluated, recommend to follow-up with pulmonology, with Dr. Schulte in 3 months with repeat CT scan to follow-up pulmonary nodules and lymphadenopathy.  Discussed with patient.      At the time of discharge patient was told to take all medications as prescribed, keep all follow-up appointments, and call their doctor or return to the hospital with any worsening or concerning symptoms.                    Day of Discharge     Subjective:  Patient seen this morning.  No acute events overnight.  Reports does have some nonproductive cough secondary to irritation from YAZMIN yesterday, otherwise no new complaints.  Feeling better.  No chest pain or shortness of breath.  Remains on room air, wants to be discharged.    Physical Exam:  Temp:  [98.4 °F (36.9 °C)-99.3 °F (37.4 °C)] 98.4 °F (36.9 °C)  Heart Rate:  [] 74  Resp:  [13-20] 16  BP: ()/(62-96) 142/86  Body mass index is 31.79 kg/m².  Physical Exam    General: Alert, sitting up in the chair,  HEENT: Normocephalic, atraumatic  CV: Regular rate and  rhythm,+ murmur  Lungs: Clear to auscultation bilaterally, no crackles or wheezes  Abdomen: Soft, nontender, nondistended  Extremities: No significant peripheral edema , no cyanosis     Consultants     Consult Orders (all) (From admission, onward)       Start     Ordered    12/19/24 1326  Inpatient Pulmonology Consult  Once        Specialty:  Pulmonary Disease  Provider:  (Not yet assigned)    12/19/24 1325    12/19/24 1242  Inpatient Cardiology Consult  Once        Specialty:  Cardiology  Provider:  Bruna Chávez MD    12/19/24 1241    12/19/24 1146  [facility] (on-call MD unless specified) Details  Once        Specialty:  Hospitalist  Provider:  Charo Love MD    12/19/24 1145                  Procedures     * Surgery not found *      Imaging Results (All)       Procedure Component Value Units Date/Time    FL ESOPHAGRAM SINGLE CONTRAST [[id number]] Collected: 12/22/24 2109     Updated: 12/22/24 2116    Narrative:      PROCEDURE: FL ESOPHAGRAM SINGLE CONTRAST-     HISTORY: Dysphagia. Pretransesophageal echo.     TECHNIQUE:  A single phase barium esophagram was performed.     # images obtained: 71  Dose area product: 2581 micro gray x square meter     COMPARISON: CT chest angiogram 12/19/2024     FINDINGS:     Normal swallowing. No laryngeal penetration or aspiration. Esophagus is  well distended and normal in course and caliber. No narrowing/stenosis.  Mild esophageal dysmotility. Normally positioned gastroesophageal  junction. Small volume spontaneous gastroesophageal reflux. Contrast  continued into the stomach.             Impression:      No fluoroscopic evidence of esophageal narrowing/stenosis.  Mild esophageal dysmotility. Small volume spontaneous gastroesophageal  reflux.           This report was finalized on 12/22/2024 9:13 PM by Dr. Damien Glaser M.D on Workstation: [id number]       CT Angiogram Chest [[id number]] Collected: 12/19/24 1109     Updated: 12/19/24 1124    Narrative:      CT ANGIOGRAM  CHEST-     INDICATION: Shortness of air, rule out pulmonary embolism     COMPARISON: None     TECHNIQUE:  CTA chest with IV contrast. Coronal and sagittal reformats. Three  dimensional reconstructions. Radiation dose reduction techniques were  utilized, including automated exposure control and exposure modulation  based on body size.     FINDINGS:      Pulmonary arteries: Streak artifact from contrast in the venous system.  No pulmonary embolism.     Chest wall: Gynecomastia. No lymphadenopathy.     Mediastinum: Coronary artery atherosclerotic calcifications. Mild  cardiomegaly. Reflux of contrast into the IVC and hepatic veins. Aortic  valve calcification. No pericardial effusion. Mild mediastinal and hilar  adenopathy. For example, subcarinal lymph node, series 4, axial image  69, measures 1.8 cm.     Lung/pleura: Mild to moderate right greater than left pleural effusions.  Airways wall thickening. Smooth interlobular septal thickening.  Bilateral groundglass lung opacities. More confluent bronchovascular  opacities seen in the bilateral lungs, greatest in the lower lobes where  there is also volume loss. Some suspected air trapping in the left upper  lobe. Calcified pulmonary nodules in the left lower lobe, consistent  with prior granulomatous infection. Subpleural solid pulmonary nodule in  the superior lingula, series 5, axial mage 78, measures 9 mm.     Upper abdomen: Incidental splenule. Nonobstructing nephrolithiasis in  the right mid kidney, measures 1 to 2 mm.     Osseous structures: Diffuse idiopathic skeletal hyperostosis seen in the  mid and lower thoracic spine.       Impression:         1. No pulmonary embolism.  2. Mild to moderate right greater than left pleural effusions.  3. Cardiomegaly with evidence of right heart dysfunction and with  pulmonary edema.  4. Multifocal lung opacities, with volume loss. Suspect combination of  pulmonary edema and atelectasis. Pneumonia in the appropriate  clinical  setting.   5. Subpleural solid pulmonary nodule in the lingula measuring 9 mm.  3-month follow-up chest CT can reevaluate.     This report was finalized on 12/19/2024 11:21 AM by Dr. Dennis Back M.D on Workstation: [id number]       XR Chest 1 View [[id number]] Collected: 12/19/24 0819     Updated: 12/19/24 0830    Narrative:      XR CHEST 1 VW-     HISTORY: Male who is 74 years-old, short of breath     TECHNIQUE: Frontal view of the chest     COMPARISON: None available     FINDINGS: The heart is enlarged. Pulmonary vasculature is congested.  Alveolar interstitial opacities in both lungs suggest edema and/or  pneumonia, follow-up recommended. There may be minimal right pleural  effusion. No pneumothorax. No acute osseous process.       Impression:      As described.     This report was finalized on 12/19/2024 8:26 AM by Dr. Mio Cordoba M.D on Workstation: [id number]               Results for orders placed during the hospital encounter of 12/19/24    Adult Transesophageal Echo (YAZMIN) W/ Cont if Necessary Per Protocol    Interpretation Summary    There is a torn chordae attached to the tip of the posterior mitral valve leaflet, resulting in a partially flail leaflet. There is severe and highly eccentric anteriorly directed mitral regurgitation. There is flow reversal in at least 2 of the pulmonary veins    The left ventricular cavity is mild to moderately dilated.    Left ventricular systolic function is normal. Left ventricular ejection fraction appears to be 56 - 60%.    The right ventricular cavity is mildly dilated. Normal right ventricular systolic function noted.    The left atrial cavity is moderate to severely dilated.    Insufficient TR velocity profile to estimate the right ventricular systolic pressure.    There is a trivial pericardial effusion.    Pertinent Labs     Results from last 7 days   Lab Units 12/24/24  0601 12/23/24  0333 12/22/24  0454 12/21/24  0520   WBC 10*3/mm3 6.87  "10.42 6.23 6.50   HEMOGLOBIN g/dL 12.5* 12.9* 12.7* 12.6*   PLATELETS 10*3/mm3 207 243 257 251     Results from last 7 days   Lab Units 12/24/24  0601 12/23/24  1744 12/23/24  0333 12/22/24  0454 12/21/24  1300 12/21/24  0520   SODIUM mmol/L 139  --  139 142  --  142   POTASSIUM mmol/L 3.6 4.1 3.8 3.5   < > 3.7   CHLORIDE mmol/L 106  --  101 102  --  102   CO2 mmol/L 25.1  --  23.2 29.0  --  28.0   BUN mg/dL 21  --  22 29*  --  24*   CREATININE mg/dL 0.96  --  1.02 1.14  --  1.11   GLUCOSE mg/dL 150*  --  159* 126*  --  122*    < > = values in this interval not displayed.   Estimated Creatinine Clearance: 92.6 mL/min (by C-G formula based on SCr of 0.96 mg/dL).  Results from last 7 days   Lab Units 12/19/24  0800   ALBUMIN g/dL 4.4   BILIRUBIN mg/dL 0.7   ALK PHOS U/L 38*   AST (SGOT) U/L 20   ALT (SGPT) U/L 35     Results from last 7 days   Lab Units 12/24/24  0601 12/23/24  0333 12/22/24  0454 12/21/24  0520 12/20/24  0654 12/19/24  0903 12/19/24  0800   CALCIUM mg/dL 9.0 9.2 9.4 9.1 8.8   < > 8.9   ALBUMIN g/dL  --   --   --   --   --   --  4.4   MAGNESIUM mg/dL  --  2.1 2.4 2.1 2.3   < >  --    PHOSPHORUS mg/dL  --  3.2 5.0* 4.3 3.5  --   --     < > = values in this interval not displayed.       Results from last 7 days   Lab Units 12/19/24  0903 12/19/24  0800   HSTROP T ng/L 21 21   PROBNP pg/mL  --  2,600.0*   D DIMER QUANT MCGFEU/mL  --  1.72*           Invalid input(s): \"LDLCALC\"      Results from last 7 days   Lab Units 12/19/24  0801   COVID19  Not Detected       Test Results Pending at Discharge       Discharge Details        Discharge Medications        New Medications        Instructions Start Date   Entresto  MG tablet  Generic drug: sacubitril-valsartan   1 tablet, Oral, Every 12 Hours Scheduled             Changes to Medications        Instructions Start Date   furosemide 80 MG tablet  Commonly known as: LASIX  What changed:   medication strength  how much to take   80 mg, Oral, Daily         "     Continue These Medications        Instructions Start Date   acetaminophen 325 MG tablet  Commonly known as: TYLENOL   650 mg, Oral, 2 Times Daily PRN      Aspirin Low Dose 81 MG EC tablet  Generic drug: aspirin   81 mg, Oral, 2 Times Daily      doxazosin 4 MG tablet  Commonly known as: CARDURA   4 mg, Oral, Nightly      escitalopram 10 MG tablet  Commonly known as: LEXAPRO   15 mg, Oral, Every Evening      ezetimibe 10 MG tablet  Commonly known as: ZETIA   10 mg, Oral, Daily      fenofibrate 160 MG tablet   TAKE 1 TABLET BY MOUTH AT NIGHT      fluticasone 50 MCG/ACT nasal spray  Commonly known as: FLONASE   2 sprays, Every Morning      gabapentin 600 MG tablet  Commonly known as: NEURONTIN   600 mg, Every Evening      glimepiride 4 MG tablet  Commonly known as: AMARYL   4 mg, Oral, 2 Times Daily      glucose blood test strip  Commonly known as: Accu-Chek Anabela Plus   TEST TWICE DAILY Use as instructed      Janumet XR  MG tablet  Generic drug: SITagliptin-metFORMIN HCl ER   Take 1 tablet by mouth twice daily      nebivolol 5 MG tablet  Commonly known as: BYSTOLIC   5 mg, Oral, Daily      NIFEdipine XL 60 MG 24 hr tablet  Commonly known as: PROCARDIA XL   60 mg, Oral, Every Morning      PATADAY OP   1 drop, 2 Times Daily PRN      polyethyl glycol-propyl glycol 0.4-0.3 % solution ophthalmic solution (artificial tears)  Commonly known as: SYSTANE   1 drop, As Needed      PRESERVISION AREDS 2 PO   1 tablet, 2 Times Daily      b complex-C-E-zinc tablet   1 tablet, Every Other Day      traZODone 50 MG tablet  Commonly known as: DESYREL   50 mg, Oral, Nightly      vitamin D3 125 MCG (5000 UT) capsule capsule   5,000 Units, Daily      warfarin 5 MG tablet  Commonly known as: COUMADIN   5 mg, Oral, Daily Warfarin, Patient states taking 7.5 MG on Wednesday and Saturday   Start Date: December 25, 2024            Stop These Medications      benazepril 40 MG tablet  Commonly known as: LOTENSIN     cloNIDine 0.2 MG  tablet  Commonly known as: CATAPRES              No Known Allergies    Discharge Disposition:  Home or Self Care      Discharge Diet:  Diet Order   Procedures    Diet: Cardiac, Diabetic; Healthy Heart (2-3 Na+); Consistent Carbohydrate; Fluid Consistency: Thin (IDDSI 0)       Discharge Activity:       CODE STATUS:    Code Status and Medical Interventions: CPR (Attempt to Resuscitate); Full Support   Ordered at: 12/19/24 1241     Level Of Support Discussed With:    Patient     Code Status (Patient has no pulse and is not breathing):    CPR (Attempt to Resuscitate)     Medical Interventions (Patient has pulse or is breathing):    Full Support       Future Appointments   Date Time Provider Department Center   12/26/2024 10:30 AM ANTI COAG CLINIC [facility] None   1/13/2025 10:30 AM Bruna Chávez MD [facility]   1/22/2025  2:45 PM YESS[facility] CT 3 BH [facility] CT [facility]   2/11/2025 11:00 AM Bruna Chávez MD [facility] None   3/17/2025 10:00 AM Chepe Alicea MD [facility]      Follow-up Information       Adrián Schulte MD Follow up in 3 month(s).    Specialties: Sleep Medicine, Pulmonary Disease  Why: noncontrast CT chest prior to follow up pulmonary nodules  Contact information:  [address] [facility] [address]  [place] [id number] [phone]               Tera Salvador MD. Schedule an appointment as soon as possible for a visit in 1 week(s).    Specialty: Internal Medicine  Why: Will need BMP in 1 week to monitor renal function and electrolytes  Contact information:  [address] [place] [id number] [phone]               Bruna Chávez MD Follow up.    Specialty: Cardiology  Why: Plan home today on lasix 80 daily, entresto, nebivolol 5, nifedipine, aspirin, coumadin.   Plan cath as outpatient on 1/2/25. Hold coumadin 2 days prior. CTS to plan surgery after.  Contact information:  [address] [facility]  [address]  [facility] [id number] [phone]                             Time Spent on Discharge:   Greater than 30 minutes      Charo Love MD  [facility]  12/24/24  08:56 EST

## 2024-12-24 NOTE — TELEPHONE ENCOUNTER
"  Hub staff attempted to follow warm transfer process and was unsuccessful     Caller: Jeb Olson \"Rosas\"    Relationship to patient: Self    Best call back number: [phone]    Patient is needing: PT NEEDS TO CANCEL HIS C-SCOPE FOR 01/02/25.  HE IS IN THE HOSPITAL RIGHT NOW FOR CONGESTIVE HEART FAILURE AND WILL HAVE SURGERY IN A FEW DAYS FOR A LEAKING VALVE.   HE WILL CALL BACK WHEN HE IS READY TO R/S.    "

## 2024-12-24 NOTE — DISCHARGE INSTRUCTIONS
Notify your primary care provider if you experience chest pain, difficulty breathing, fevers/chills, nausea or vomiting, bleeding in stool, excessive diarrhea, numbness or weakness or tingling in any part of your body.      Follow-up outpatient with cardiology of for heart cath on 1/2/25 as scheduled. Hold coumadin 2 days prior. CTS to plan surgery after.

## 2024-12-24 NOTE — PLAN OF CARE
Problem: Adult Inpatient Plan of Care  Goal: Plan of Care Review  Outcome: Progressing  Flowsheets (Taken 12/24/2024 1217)  Progress: improving  Outcome Evaluation: Patient has been pleasant and cooperative during shift. No complaints of pain, nausea or SOA. AOx4, ad albert, room air, Afib. Discharge work complete. Monitor and IV removed. Medications received. Will continue to monitor and assist patient as needed.  Plan of Care Reviewed With: patient  Goal: Patient-Specific Goal (Individualized)  Outcome: Progressing  Goal: Absence of Hospital-Acquired Illness or Injury  Outcome: Progressing  Intervention: Identify and Manage Fall Risk  Recent Flowsheet Documentation  Taken 12/24/2024 1200 by Dennis Garcia RN  Safety Promotion/Fall Prevention:   assistive device/personal items within reach   fall prevention program maintained   nonskid shoes/slippers when out of bed   safety round/check completed  Taken 12/24/2024 1000 by Dennis Garcia RN  Safety Promotion/Fall Prevention:   assistive device/personal items within reach   fall prevention program maintained   nonskid shoes/slippers when out of bed   safety round/check completed  Taken 12/24/2024 0707 by Dennis Garcia RN  Safety Promotion/Fall Prevention:   assistive device/personal items within reach   fall prevention program maintained   nonskid shoes/slippers when out of bed   safety round/check completed  Intervention: Prevent Skin Injury  Recent Flowsheet Documentation  Taken 12/24/2024 1200 by Dennis Garcia RN  Body Position: (chair) other (see comments)  Taken 12/24/2024 1000 by Dennis Garcia RN  Body Position: supine  Taken 12/24/2024 0707 by Dennis Garcia RN  Body Position: supine  Goal: Optimal Comfort and Wellbeing  Outcome: Progressing  Goal: Readiness for Transition of Care  Outcome: Progressing     Problem: Heart Failure  Goal: Optimal Coping  Outcome: Progressing  Goal: Optimal Cardiac Output and Blood Flow  Outcome:  Progressing  Goal: Stable Heart Rate and Rhythm  Outcome: Progressing  Goal: Fluid and Electrolyte Balance  Outcome: Progressing  Goal: Optimal Functional Ability  Outcome: Progressing  Intervention: Optimize Functional Ability  Recent Flowsheet Documentation  Taken 12/24/2024 1200 by Dennis Garcia RN  Activity Management:   up ad albert   up in chair  Taken 12/24/2024 1000 by Dennis Garcia RN  Activity Management: up ad albert  Taken 12/24/2024 0707 by Dennis Garcia RN  Activity Management: up ad albert  Goal: Improved Oral Intake  Outcome: Progressing  Goal: Effective Oxygenation and Ventilation  Outcome: Progressing  Intervention: Promote Airway Secretion Clearance  Recent Flowsheet Documentation  Taken 12/24/2024 1200 by Dennis Garcia RN  Activity Management:   up ad albert   up in chair  Taken 12/24/2024 1000 by Dennis Garcia RN  Activity Management: up ad albert  Taken 12/24/2024 0707 by Dennis Garcia RN  Activity Management: up ad albert  Cough And Deep Breathing: done independently per patient  Intervention: Optimize Oxygenation and Ventilation  Recent Flowsheet Documentation  Taken 12/24/2024 1000 by Dennis Garcia RN  Head of Bed (HOB) Positioning: HOB at 30 degrees  Taken 12/24/2024 0707 by Dennis Garcia RN  Head of Bed (HOB) Positioning: HOB at 60 degrees  Goal: Effective Breathing Pattern During Sleep  Outcome: Progressing     Problem: Fall Injury Risk  Goal: Absence of Fall and Fall-Related Injury  Outcome: Progressing  Intervention: Promote Injury-Free Environment  Recent Flowsheet Documentation  Taken 12/24/2024 1200 by Dennis Garcia RN  Safety Promotion/Fall Prevention:   assistive device/personal items within reach   fall prevention program maintained   nonskid shoes/slippers when out of bed   safety round/check completed  Taken 12/24/2024 1000 by Dennis Garcia RN  Safety Promotion/Fall Prevention:   assistive device/personal items within reach   fall prevention program  maintained   nonskid shoes/slippers when out of bed   safety round/check completed  Taken 12/24/2024 0707 by Dennis Garcia RN  Safety Promotion/Fall Prevention:   assistive device/personal items within reach   fall prevention program maintained   nonskid shoes/slippers when out of bed   safety round/check completed   Goal Outcome Evaluation:  Plan of Care Reviewed With: patient        Progress: improving  Outcome Evaluation: Patient has been pleasant and cooperative during shift. No complaints of pain, nausea or SOA. AOx4, ad albert, room air, Afib. Discharge work complete. Monitor and IV removed. Medications received. Will continue to monitor and assist patient as needed.

## 2024-12-24 NOTE — PROGRESS NOTES
" LOS: 5 days   Patient Care Team:  Tera Salvador MD as PCP - General (Internal Medicine)  Micah Reyse MD as Consulting Physician (Gastroenterology)  Bruna Chávez MD as Referring Physician (Cardiology)    Chief Complaint: MV regurgitation    Subjective: Doing well. Hopeful to go home later.     Vital Signs  Temp:  [98.4 °F (36.9 °C)-99.3 °F (37.4 °C)] 98.4 °F (36.9 °C)  Heart Rate:  [] 74  Resp:  [13-20] 16  BP: ()/(62-96) 142/86      12/22/24  0614 12/23/24  0443 12/24/24  0630   Weight: 112 kg (246 lb 1.6 oz) 114 kg (250 lb 14.1 oz) 112 kg (247 lb 9.6 oz)     Body mass index is 31.79 kg/m².    Intake/Output Summary (Last 24 hours) at 12/24/2024 0809  Last data filed at 12/24/2024 0630  Gross per 24 hour   Intake 540 ml   Output 2070 ml   Net -1530 ml     No intake/output data recorded.      Objective:  Vital signs: (most recent): Blood pressure 142/86, pulse 74, temperature 98.4 °F (36.9 °C), temperature source Oral, resp. rate 16, height 188 cm (74\"), weight 112 kg (247 lb 9.6 oz), SpO2 98%.            Physical Examination:   General appearance - alert, well appearing, and in no distress  Mental status - normal mood, behavior, speech, dress, motor activity, and thought processes  Chest - clear to auscultation, no wheezes, rales or rhonchi, symmetric air entry  Heart - S1 and S2 normal, irregularly irregular rhythm with rate 90's, murmur  Abdomen - soft, nontender, nondistended, no masses or organomegaly  bowel sounds normal  Neurological - alert, oriented, normal speech, no focal findings or movement disorder noted, motor and sensory grossly normal bilaterally  Extremities - peripheral pulses normal, no pedal edema, no clubbing or cyanosis  Skin - normal coloration and turgor, no rashes, no suspicious skin lesions noted     Results Review:      WBC WBC   Date Value Ref Range Status   12/24/2024 6.87 3.40 - 10.80 10*3/mm3 Final   12/23/2024 10.42 3.40 - 10.80 10*3/mm3 Final   12/22/2024 " 6.23 3.40 - 10.80 10*3/mm3 Final      HGB Hemoglobin   Date Value Ref Range Status   12/24/2024 12.5 (L) 13.0 - 17.7 g/dL Final   12/23/2024 12.9 (L) 13.0 - 17.7 g/dL Final   12/22/2024 12.7 (L) 13.0 - 17.7 g/dL Final      HCT Hematocrit   Date Value Ref Range Status   12/24/2024 40.6 37.5 - 51.0 % Final   12/23/2024 40.8 37.5 - 51.0 % Final   12/22/2024 37.6 37.5 - 51.0 % Final      Platelets Platelets   Date Value Ref Range Status   12/24/2024 207 140 - 450 10*3/mm3 Final   12/23/2024 243 140 - 450 10*3/mm3 Final   12/22/2024 257 140 - 450 10*3/mm3 Final        PT/INR:    Protime   Date Value Ref Range Status   12/24/2024 18.0 (H) 11.7 - 14.2 Seconds Final   12/23/2024 17.7 (H) 11.7 - 14.2 Seconds Final   12/22/2024 18.3 (H) 11.7 - 14.2 Seconds Final   /  INR   Date Value Ref Range Status   12/24/2024 1.47 (H) 0.90 - 1.10 Final   12/23/2024 1.43 (H) 0.90 - 1.10 Final   12/22/2024 1.50 (H) 0.90 - 1.10 Final       Sodium Sodium   Date Value Ref Range Status   12/24/2024 139 136 - 145 mmol/L Final   12/23/2024 139 136 - 145 mmol/L Final   12/22/2024 142 136 - 145 mmol/L Final      Potassium Potassium   Date Value Ref Range Status   12/24/2024 3.6 3.5 - 5.2 mmol/L Final   12/23/2024 4.1 3.5 - 5.2 mmol/L Final   12/23/2024 3.8 3.5 - 5.2 mmol/L Final   12/22/2024 3.5 3.5 - 5.2 mmol/L Final   12/21/2024 4.1 3.5 - 5.2 mmol/L Final      Chloride Chloride   Date Value Ref Range Status   12/24/2024 106 98 - 107 mmol/L Final   12/23/2024 101 98 - 107 mmol/L Final   12/22/2024 102 98 - 107 mmol/L Final      Bicarbonate CO2   Date Value Ref Range Status   12/24/2024 25.1 22.0 - 29.0 mmol/L Final   12/23/2024 23.2 22.0 - 29.0 mmol/L Final   12/22/2024 29.0 22.0 - 29.0 mmol/L Final      BUN BUN   Date Value Ref Range Status   12/24/2024 21 8 - 23 mg/dL Final   12/23/2024 22 8 - 23 mg/dL Final   12/22/2024 29 (H) 8 - 23 mg/dL Final      Creatinine Creatinine   Date Value Ref Range Status   12/24/2024 0.96 0.76 - 1.27 mg/dL Final    12/23/2024 1.02 0.76 - 1.27 mg/dL Final   12/22/2024 1.14 0.76 - 1.27 mg/dL Final      Calcium Calcium   Date Value Ref Range Status   12/24/2024 9.0 8.6 - 10.5 mg/dL Final   12/23/2024 9.2 8.6 - 10.5 mg/dL Final   12/22/2024 9.4 8.6 - 10.5 mg/dL Final      Magnesium Magnesium   Date Value Ref Range Status   12/23/2024 2.1 1.6 - 2.4 mg/dL Final   12/22/2024 2.4 1.6 - 2.4 mg/dL Final          aspirin, 81 mg, Oral, Daily  escitalopram, 15 mg, Oral, Q PM  fenofibrate, 145 mg, Oral, Nightly  furosemide, 80 mg, Oral, Daily  gabapentin, 600 mg, Oral, Nightly  insulin glargine, 10 Units, Subcutaneous, Nightly  insulin lispro, 2-7 Units, Subcutaneous, 4x Daily AC & at Bedtime  nebivolol, 5 mg, Oral, Daily  NIFEdipine XL, 60 mg, Oral, QAM  sacubitril-valsartan, 1 tablet, Oral, Q12H  senna-docusate sodium, 2 tablet, Oral, BID  sodium chloride, 10 mL, Intravenous, Q12H  terazosin, 5 mg, Oral, Nightly      Pharmacy to dose warfarin,           Acute exacerbation of CHF (congestive heart failure)    Diabetes mellitus    Essential hypertension    AF (paroxysmal atrial fibrillation)      Assessment & Plan    - Severe mitral valve regurgitation  - CAD with previous stent to LAD (2022)  - Atrial fibrillation, recent diagnosis   - DM II  - HTN  - HLD  - HOLLI with CPAP  - Recent pneumonia (hospitalized last month)    Discussed with Dr. Chávez. Plan for heart cath on 1/2/25. Will stop coumadin 2 days prior. Will discuss with Dr. Kelly whether he would prefer that he stay and have surgery or go home and do lovenox bridge prior to surgery.       TIFFANIE Franco  12/24/24  08:09 EST

## 2024-12-24 NOTE — OUTREACH NOTE
Prep Survey      Flowsheet Row Responses   [facility] patient discharged from? [place]   Is LACE score < 7 ? No   Eligibility [facility]   Date of Admission 12/19/24   Date of Discharge 12/24/24   Discharge diagnosis Acute exacerbation of CHF (congestive heart failure)   Does the patient have one of the following disease processes/diagnoses(primary or secondary)? CHF   Prep survey completed? Yes            Rosa M SUMMERS - Registered Nurse

## 2024-12-24 NOTE — PLAN OF CARE
Goal Outcome Evaluation:   Pt resting in bed , Aox4 , A Fib on tele. room air / CPAP while asleep . VSS, No sign of acute distress noted . Plan of care is ongoing

## 2024-12-24 NOTE — PROGRESS NOTES
"    Patient Name: Jeb Olson  :1950  74 y.o.      Patient Care Team:  Tera Salvador MD as PCP - General (Internal Medicine)  Micah Reyes MD as Consulting Physician (Gastroenterology)  Bruna Chávez MD as Referring Physician (Cardiology)    Chief Complaint:   CHF AF MR  Interval History:   Feels well, cough after YAZMIN    Objective   Vital Signs  Temp:  [98.4 °F (36.9 °C)-99.3 °F (37.4 °C)] 98.4 °F (36.9 °C)  Heart Rate:  [] 74  Resp:  [13-20] 16  BP: ()/(62-96) 142/86    Intake/Output Summary (Last 24 hours) at 2024 0831  Last data filed at 2024 0630  Gross per 24 hour   Intake 540 ml   Output 2070 ml   Net -1530 ml     Flowsheet Rows      Flowsheet Row First Filed Value   Admission Height 188 cm (74\") Documented at 2024 0758   Admission Weight 119 kg (262 lb) Documented at 2024 0758            Physical Exam:   General Appearance:    Alert, cooperative, in no acute distress   Lungs:     clear    Heart:    iRegular rhythm and normal rate, normal S1 and S2, no murmurs, gallops or rubs.     Chest Wall:    No abnormalities observed   Abdomen:     Soft, nontender, positive bowel sounds.     Extremities:   no cyanosis, clubbing or edema.  No marked joint deformities.  Adequate musculoskeletal strength.       Results Review:    Results from last 7 days   Lab Units 24  0601   SODIUM mmol/L 139   POTASSIUM mmol/L 3.6   CHLORIDE mmol/L 106   CO2 mmol/L 25.1   BUN mg/dL 21   CREATININE mg/dL 0.96   GLUCOSE mg/dL 150*   CALCIUM mg/dL 9.0     Results from last 7 days   Lab Units 24  0903 24  0800   HSTROP T ng/L 21 21     Results from last 7 days   Lab Units 24  0601   WBC 10*3/mm3 6.87   HEMOGLOBIN g/dL 12.5*   HEMATOCRIT % 40.6   PLATELETS 10*3/mm3 207     Results from last 7 days   Lab Units 24  0601 24  0333 24  0454   INR  1.47* 1.43* 1.50*     Results from last 7 days   Lab Units 24  0333   MAGNESIUM mg/dL 2.1       "   Results from last 7 days   Lab Units 12/18/24  1532   BNP pg/mL 443.0*           Medication Review:   aspirin, 81 mg, Oral, Daily  escitalopram, 15 mg, Oral, Q PM  fenofibrate, 145 mg, Oral, Nightly  furosemide, 80 mg, Oral, Daily  gabapentin, 600 mg, Oral, Nightly  insulin glargine, 10 Units, Subcutaneous, Nightly  insulin lispro, 2-7 Units, Subcutaneous, 4x Daily AC & at Bedtime  nebivolol, 5 mg, Oral, Daily  NIFEdipine XL, 60 mg, Oral, QAM  sacubitril-valsartan, 1 tablet, Oral, Q12H  senna-docusate sodium, 2 tablet, Oral, BID  sodium chloride, 10 mL, Intravenous, Q12H  terazosin, 5 mg, Oral, Nightly         Pharmacy to dose warfarin,         Assessment & Plan   1 Flash pulmonary edema  2 CAD remote PCI  3 Relatively new AF  4 New cardiomyopathy likely valvular, unknown etiology. EF 35% in [place] last month.    Plan home today on lasix 80 daily, entresto, nebivolol 5, nifedipine, aspirin, coumadin.   Plan cath as outpatient on 1/2/25. Hold coumadin 2 days prior. CTS to plan surgery after.     Bruna Chávez MD  [facility]  12/24/24  08:31 EST

## 2024-12-24 NOTE — CASE MANAGEMENT/SOCIAL WORK
Continued Stay Note  [facility]     Patient Name: Jeb Olson  MRN: [id number]  Today's Date: 12/24/2024    Admit Date: 12/19/2024    Plan: Plan home with spouse.  SUKUMAR Chang RN   Discharge Plan       Row Name 12/24/24 0952       Plan    Plan Plan home with spouse.  SUKUMAR Chang RN    Patient/Family in Agreement with Plan yes    Plan Comments Spoke with pt at bedside. Pt denies any discharge needs and states plan is home with spouse.  SUKUMAR Chang RN                   Discharge Codes    No documentation.                 Expected Discharge Date and Time       Expected Discharge Date Expected Discharge Time    Dec 24, 2024               Taylor Chang RN

## 2024-12-24 NOTE — H&P (VIEW-ONLY)
"    Patient Name: Jeb Olson  :1950  74 y.o.      Patient Care Team:  Tera Salvador MD as PCP - General (Internal Medicine)  Micah Reyes MD as Consulting Physician (Gastroenterology)  Bruna Chávez MD as Referring Physician (Cardiology)    Chief Complaint:   CHF AF MR  Interval History:   Feels well, cough after YAZMIN    Objective   Vital Signs  Temp:  [98.4 °F (36.9 °C)-99.3 °F (37.4 °C)] 98.4 °F (36.9 °C)  Heart Rate:  [] 74  Resp:  [13-20] 16  BP: ()/(62-96) 142/86    Intake/Output Summary (Last 24 hours) at 2024 0831  Last data filed at 2024 0630  Gross per 24 hour   Intake 540 ml   Output 2070 ml   Net -1530 ml     Flowsheet Rows      Flowsheet Row First Filed Value   Admission Height 188 cm (74\") Documented at 2024 0758   Admission Weight 119 kg (262 lb) Documented at 2024 0758            Physical Exam:   General Appearance:    Alert, cooperative, in no acute distress   Lungs:     clear    Heart:    iRegular rhythm and normal rate, normal S1 and S2, no murmurs, gallops or rubs.     Chest Wall:    No abnormalities observed   Abdomen:     Soft, nontender, positive bowel sounds.     Extremities:   no cyanosis, clubbing or edema.  No marked joint deformities.  Adequate musculoskeletal strength.       Results Review:    Results from last 7 days   Lab Units 24  0601   SODIUM mmol/L 139   POTASSIUM mmol/L 3.6   CHLORIDE mmol/L 106   CO2 mmol/L 25.1   BUN mg/dL 21   CREATININE mg/dL 0.96   GLUCOSE mg/dL 150*   CALCIUM mg/dL 9.0     Results from last 7 days   Lab Units 24  0903 24  0800   HSTROP T ng/L 21 21     Results from last 7 days   Lab Units 24  0601   WBC 10*3/mm3 6.87   HEMOGLOBIN g/dL 12.5*   HEMATOCRIT % 40.6   PLATELETS 10*3/mm3 207     Results from last 7 days   Lab Units 24  0601 24  0333 24  0454   INR  1.47* 1.43* 1.50*     Results from last 7 days   Lab Units 24  0333   MAGNESIUM mg/dL 2.1       "   Results from last 7 days   Lab Units 12/18/24  1532   BNP pg/mL 443.0*           Medication Review:   aspirin, 81 mg, Oral, Daily  escitalopram, 15 mg, Oral, Q PM  fenofibrate, 145 mg, Oral, Nightly  furosemide, 80 mg, Oral, Daily  gabapentin, 600 mg, Oral, Nightly  insulin glargine, 10 Units, Subcutaneous, Nightly  insulin lispro, 2-7 Units, Subcutaneous, 4x Daily AC & at Bedtime  nebivolol, 5 mg, Oral, Daily  NIFEdipine XL, 60 mg, Oral, QAM  sacubitril-valsartan, 1 tablet, Oral, Q12H  senna-docusate sodium, 2 tablet, Oral, BID  sodium chloride, 10 mL, Intravenous, Q12H  terazosin, 5 mg, Oral, Nightly         Pharmacy to dose warfarin,         Assessment & Plan   1 Flash pulmonary edema  2 CAD remote PCI  3 Relatively new AF  4 New cardiomyopathy likely valvular, unknown etiology. EF 35% in [place] last month.    Plan home today on lasix 80 daily, entresto, nebivolol 5, nifedipine, aspirin, coumadin.   Plan cath as outpatient on 1/2/25. Hold coumadin 2 days prior. CTS to plan surgery after.     Bruna Chávez MD  [facility]  12/24/24  08:31 EST

## 2024-12-25 NOTE — CASE MANAGEMENT/SOCIAL WORK
Case Management Discharge Note      Final Note: Home, family to transport         Selected Continued Care - Discharged on 12/24/2024 Admission date: 12/19/2024 - Discharge disposition: Home or Self Care      Destination    No services have been selected for the patient.                Durable Medical Equipment    No services have been selected for the patient.                Dialysis/Infusion    No services have been selected for the patient.                Home Medical Care    No services have been selected for the patient.                Therapy    No services have been selected for the patient.                Community Resources    No services have been selected for the patient.                Community & DME    No services have been selected for the patient.                    Transportation Services  Private: Car    Final Discharge Disposition Code: 01 - home or self-care

## 2024-12-26 ENCOUNTER — TRANSCRIBE ORDERS (OUTPATIENT)
Dept: ADMINISTRATIVE | Facility: HOSPITAL | Age: 74
End: 2024-12-26
Payer: MEDICARE

## 2024-12-26 ENCOUNTER — TRANSITIONAL CARE MANAGEMENT TELEPHONE ENCOUNTER (OUTPATIENT)
Dept: CALL CENTER | Facility: HOSPITAL | Age: 74
End: 2024-12-26
Payer: MEDICARE

## 2024-12-26 ENCOUNTER — ANTICOAGULATION VISIT (OUTPATIENT)
Dept: PHARMACY | Facility: HOSPITAL | Age: 74
End: 2024-12-26
Payer: MEDICARE

## 2024-12-26 DIAGNOSIS — I48.0 AF (PAROXYSMAL ATRIAL FIBRILLATION): Primary | ICD-10-CM

## 2024-12-26 DIAGNOSIS — R91.8 LUNG NODULES: Primary | ICD-10-CM

## 2024-12-26 NOTE — PROGRESS NOTES
Anticoagulation Clinic Progress Note    Anticoagulation Summary  As of 12/26/2024      INR goal:  2.0-3.0   TTR:  63.1% (2.6 mo)   INR used for dosing:  No new INR was available at the time of this encounter.   Warfarin maintenance plan:  7.5 mg every Wed, Sat; 5 mg all other days   Weekly warfarin total:  40 mg   Plan last modified:  Gurjit Velásquez, PharmD (10/30/2024)   Next INR check:  1/9/2025   Target end date:  --    Indications    AF (paroxysmal atrial fibrillation) [I48.0]                 Anticoagulation Episode Summary       INR check location:  --    Preferred lab:  --    Send INR reminders to:   YESSY BERRY [facility]    Comments:  --          Anticoagulation Care Providers       Provider Role Specialty Phone number    Bruna Chávez MD Referring Cardiology [phone]            Clinic Interview:  Patient Findings     Positives:  Upcoming invasive procedure, Hospital admission, Other   complaints    Negatives:  Signs/symptoms of thrombosis, Signs/symptoms of bleeding,   Laboratory test error suspected, Change in health, Change in alcohol use,   Change in activity, Emergency department visit, Upcoming dental procedure,   Missed doses, Extra doses, Change in medications, Change in diet/appetite,   Bruising    Comments:  Mr. Olson called to cancel today's in-person appointment   because he is not feeling well. Hospital admission 12/18/24 - 12/24/24 for   CHF exacerbation. He has left heart cath scheduled for 1/2/25, prior to   which Dr. Chávez has instructed him to hold warfarin 2 days (refer to Dr. Chávez's inpatient note dated 12/24/24).       Clinical Outcomes     Negatives:  Major bleeding event, Thromboembolic event,   Anticoagulation-related hospital admission, Anticoagulation-related ED   visit, Anticoagulation-related fatality    Comments:  Mr. Olson called to cancel today's in-person appointment   because he is not feeling well. Hospital admission 12/18/24 - 12/24/24 for   CHF exacerbation.  He has left heart cath scheduled for 1/2/25, prior to   which Dr. Chávez has instructed him to hold warfarin 2 days (refer to Dr. Chávez's inpatient note dated 12/24/24).         INR History:      Latest Ref Rng & Units 12/19/2024     8:00 AM 12/20/2024     6:54 AM 12/21/2024     5:20 AM 12/22/2024     4:54 AM 12/23/2024     3:33 AM 12/24/2024     6:01 AM 12/26/2024     9:43 AM   Anticoagulation Monitoring   INR        --   INR Goal        2.0-3.0   Trend        Same   Last Week Total        35 mg   Next Week Total        27.5 mg   Sun        5 mg   Mon        5 mg   Tue        Hold (12/31); Otherwise 5 mg   Wed        Hold (1/1); Otherwise 7.5 mg   Thu        7.5 mg (1/2); Otherwise 5 mg   Fri        7.5 mg (1/3); Otherwise 5 mg   Sat        7.5 mg   Historical INR 0.90 - 1.10 1.83  1.77  1.62  1.50  1.43  1.47         Plan:  1. INR is  unknown  today- see above in Anticoagulation Summary.   Will instruct Jeb Olson to Change their warfarin regimen surrounding upcoming left heart cath as directed - see above in Anticoagulation Summary. Hold warfarin 2 days prior to left heart cath. Take 7.5 mg x 2 days following left heart cath. Otherwise, continue usual regimen of 7.5 mg Wed/Sat, 5 mg all other days.   2. Follow up 1 week following left heart cath.   3. They have been instructed to call if any changes in medications, doses, concerns, etc. Patient expresses understanding and has no further questions at this time.    Gurjit Velásquez, PharmD

## 2024-12-26 NOTE — OUTREACH NOTE
"Call Center TCM Note      Flowsheet Row Responses   [facility] patient discharged from? [place]   Does the patient have one of the following disease processes/diagnoses(primary or secondary)? CHF   TCM attempt successful? Yes  [vr for Precious, wife]   Call start time 1406   Call end time 1419   Discharge diagnosis Acute exacerbation of CHF (congestive heart failure)   Medication alerts for this patient warfarin   Meds reviewed with patient/caregiver? Yes   Is the patient having any side effects they believe may be caused by any medication additions or changes? No   Does the patient have all medications ordered at discharge? Yes   Is the patient taking all medications as directed (includes completed medication regime)? Yes   Medication comments entresto added at discharge   Comments Pt to have heart cath on 1/2/25 and then CABG thereafter.  Received notice that pt needs HF clinic f/u,  message to clinic for scheduling needs.   Does the patient have an appointment with their PCP within 7-14 days of discharge? No   Nursing Interventions Routed TCM call to PCP office   Has home health visited the patient within 72 hours of discharge? N/A   Psychosocial issues? No   Did the patient receive a copy of their discharge instructions? Yes   Nursing interventions Reviewed instructions with patient   What is the patient's perception of their health status since discharge? Same  [Reports he has been \"coughing alot\" and reports productive of white clear sputum.  Pt has no increase in SOA, no edema, wt is 242#.  No fever noted.  Pt has notified cardio and PCP of his concerns,  reports he added coricidin to his med & alerted cardio]   Nursing interventions Nurse provided patient education   Is the patient able to teach back signs and symptoms of worsening condition? (i.e. weight gain, shortness of air, etc.) Yes   Is the patient/caregiver able to teach back the hierarchy of who to call/visit for symptoms/problems? PCP, " Specialist, Home health nurse, Urgent Care, ED, 911 Yes   CHF Zone this Call Green Zone   Green Zone Weight check stable, No new swelling -  feet, ankles and legs look normal for you, No chest pain   TCM call completed? Yes   Call end time 1419             Martine Salinas RN    12/26/2024, 14:26 EST

## 2025-01-02 ENCOUNTER — READMISSION MANAGEMENT (OUTPATIENT)
Dept: CALL CENTER | Facility: HOSPITAL | Age: 75
End: 2025-01-02
Payer: MEDICARE

## 2025-01-02 ENCOUNTER — TELEPHONE (OUTPATIENT)
Dept: CARDIAC SURGERY | Facility: CLINIC | Age: 75
End: 2025-01-02
Payer: MEDICARE

## 2025-01-02 ENCOUNTER — HOSPITAL ENCOUNTER (OUTPATIENT)
Facility: HOSPITAL | Age: 75
Setting detail: HOSPITAL OUTPATIENT SURGERY
Discharge: HOME | DRG: 220 | End: 2025-01-02
Attending: INTERNAL MEDICINE | Admitting: INTERNAL MEDICINE
Payer: MEDICARE

## 2025-01-02 VITALS
TEMPERATURE: 97.6 F | OXYGEN SATURATION: 92 % | SYSTOLIC BLOOD PRESSURE: 119 MMHG | HEIGHT: 74 IN | HEART RATE: 72 BPM | DIASTOLIC BLOOD PRESSURE: 72 MMHG | BODY MASS INDEX: 31.32 KG/M2 | RESPIRATION RATE: 16 BRPM | WEIGHT: 244 LBS

## 2025-01-02 DIAGNOSIS — I34.0 MITRAL VALVE INSUFFICIENCY, UNSPECIFIED ETIOLOGY: Primary | ICD-10-CM

## 2025-01-02 DIAGNOSIS — I34.0 SEVERE MITRAL REGURGITATION: Primary | Chronic | ICD-10-CM

## 2025-01-02 DIAGNOSIS — I52 OTHER HEART DISORDERS IN DISEASES CLASSIFIED ELSEWHERE: ICD-10-CM

## 2025-01-02 DIAGNOSIS — R06.02 SHORTNESS OF BREATH: ICD-10-CM

## 2025-01-02 DIAGNOSIS — R79.9 ABNORMAL FINDING OF BLOOD CHEMISTRY, UNSPECIFIED: ICD-10-CM

## 2025-01-02 DIAGNOSIS — I79.8 OTHER DISORDERS OF ARTERIES, ARTERIOLES AND CAPILLARIES IN DISEASES CLASSIFIED ELSEWHERE: ICD-10-CM

## 2025-01-02 DIAGNOSIS — I34.0 MITRAL VALVE INSUFFICIENCY, UNSPECIFIED ETIOLOGY: ICD-10-CM

## 2025-01-02 DIAGNOSIS — R79.1 ABNORMAL COAGULATION PROFILE: ICD-10-CM

## 2025-01-02 DIAGNOSIS — R94.39 ABNORMAL STRESS TEST: ICD-10-CM

## 2025-01-02 LAB — GLUCOSE BLDC GLUCOMTR-MCNC: 132 MG/DL (ref 70–130)

## 2025-01-02 PROCEDURE — 82948 REAGENT STRIP/BLOOD GLUCOSE: CPT

## 2025-01-02 PROCEDURE — 25010000002 LIDOCAINE 2% SOLUTION: Performed by: INTERNAL MEDICINE

## 2025-01-02 PROCEDURE — 25010000002 HEPARIN (PORCINE) PER 1000 UNITS: Performed by: INTERNAL MEDICINE

## 2025-01-02 PROCEDURE — C1769 GUIDE WIRE: HCPCS | Performed by: INTERNAL MEDICINE

## 2025-01-02 PROCEDURE — 25010000002 FENTANYL CITRATE (PF) 50 MCG/ML SOLUTION: Performed by: INTERNAL MEDICINE

## 2025-01-02 PROCEDURE — C1894 INTRO/SHEATH, NON-LASER: HCPCS | Performed by: INTERNAL MEDICINE

## 2025-01-02 PROCEDURE — 93458 L HRT ARTERY/VENTRICLE ANGIO: CPT | Performed by: INTERNAL MEDICINE

## 2025-01-02 PROCEDURE — 25010000002 MIDAZOLAM PER 1 MG: Performed by: INTERNAL MEDICINE

## 2025-01-02 PROCEDURE — 25510000001 IOPAMIDOL PER 1 ML: Performed by: INTERNAL MEDICINE

## 2025-01-02 RX ORDER — CHLORHEXIDINE GLUCONATE 500 MG/1
1 CLOTH TOPICAL EVERY 12 HOURS PRN
Status: CANCELLED | OUTPATIENT
Start: 2025-01-02

## 2025-01-02 RX ORDER — CHLORHEXIDINE GLUCONATE ORAL RINSE 1.2 MG/ML
15 SOLUTION DENTAL ONCE
Status: CANCELLED | OUTPATIENT
Start: 2025-01-02 | End: 2025-01-02

## 2025-01-02 RX ORDER — HEPARIN SODIUM 1000 [USP'U]/ML
INJECTION, SOLUTION INTRAVENOUS; SUBCUTANEOUS
Status: DISCONTINUED | OUTPATIENT
Start: 2025-01-02 | End: 2025-01-02 | Stop reason: HOSPADM

## 2025-01-02 RX ORDER — IOPAMIDOL 755 MG/ML
INJECTION, SOLUTION INTRAVASCULAR
Status: DISCONTINUED | OUTPATIENT
Start: 2025-01-02 | End: 2025-01-02 | Stop reason: HOSPADM

## 2025-01-02 RX ORDER — LIDOCAINE HYDROCHLORIDE 20 MG/ML
INJECTION, SOLUTION INFILTRATION; PERINEURAL
Status: DISCONTINUED | OUTPATIENT
Start: 2025-01-02 | End: 2025-01-02 | Stop reason: HOSPADM

## 2025-01-02 RX ORDER — ACETAMINOPHEN 325 MG/1
650 TABLET ORAL EVERY 4 HOURS PRN
Status: DISCONTINUED | OUTPATIENT
Start: 2025-01-02 | End: 2025-01-03 | Stop reason: HOSPADM

## 2025-01-02 RX ORDER — FENTANYL CITRATE 50 UG/ML
INJECTION, SOLUTION INTRAMUSCULAR; INTRAVENOUS
Status: DISCONTINUED | OUTPATIENT
Start: 2025-01-02 | End: 2025-01-02 | Stop reason: HOSPADM

## 2025-01-02 RX ORDER — NITROGLYCERIN 0.4 MG/1
0.4 TABLET SUBLINGUAL
Status: DISCONTINUED | OUTPATIENT
Start: 2025-01-02 | End: 2025-01-03 | Stop reason: HOSPADM

## 2025-01-02 RX ORDER — MIDAZOLAM HYDROCHLORIDE 1 MG/ML
INJECTION, SOLUTION INTRAMUSCULAR; INTRAVENOUS
Status: DISCONTINUED | OUTPATIENT
Start: 2025-01-02 | End: 2025-01-02 | Stop reason: HOSPADM

## 2025-01-02 RX ORDER — SODIUM CHLORIDE 0.9 % (FLUSH) 0.9 %
10 SYRINGE (ML) INJECTION AS NEEDED
Status: DISCONTINUED | OUTPATIENT
Start: 2025-01-02 | End: 2025-01-02 | Stop reason: HOSPADM

## 2025-01-02 RX ORDER — SODIUM CHLORIDE 9 MG/ML
75 INJECTION, SOLUTION INTRAVENOUS CONTINUOUS
Status: ACTIVE | OUTPATIENT
Start: 2025-01-02 | End: 2025-01-02

## 2025-01-02 RX ORDER — VERAPAMIL HYDROCHLORIDE 2.5 MG/ML
INJECTION, SOLUTION INTRAVENOUS
Status: DISCONTINUED | OUTPATIENT
Start: 2025-01-02 | End: 2025-01-02 | Stop reason: HOSPADM

## 2025-01-02 RX ORDER — SODIUM CHLORIDE 0.9 % (FLUSH) 0.9 %
10 SYRINGE (ML) INJECTION EVERY 12 HOURS SCHEDULED
Status: DISCONTINUED | OUTPATIENT
Start: 2025-01-02 | End: 2025-01-02 | Stop reason: HOSPADM

## 2025-01-02 RX ORDER — METOPROLOL TARTRATE 25 MG/1
12.5 TABLET, FILM COATED ORAL
Status: CANCELLED | OUTPATIENT
Start: 2025-01-03 | End: 2025-01-04

## 2025-01-02 RX ORDER — ASPIRIN 81 MG/1
81 TABLET ORAL DAILY
Start: 2025-01-02

## 2025-01-02 RX ORDER — ENOXAPARIN SODIUM 150 MG/ML
1 INJECTION SUBCUTANEOUS EVERY 12 HOURS SCHEDULED
Qty: 4 ML | Refills: 0 | Status: SHIPPED | OUTPATIENT
Start: 2025-01-03 | End: 2025-01-14 | Stop reason: HOSPADM

## 2025-01-02 NOTE — DISCHARGE INSTRUCTIONS
[facility]  [address] [place] [address]      Coronary Angiogram (Femoral Approach) After Care     Refer to this sheet in the next few weeks. These instructions provide you with information on caring for yourself after your procedure. Your health care provider may also give you more specific instructions. Your treatment has been planned according to current medical practices, but problems sometimes occur. Call your health care provider if you have any problems or questions after your procedure.      What to Expect After the Procedure:  After your procedure, it is typical to have the following sensations:  Minor discomfort or tenderness and a small bump at the catheter insertion site. The bump should usually decrease in size and tenderness within 1 to 2 weeks.  Any bruising will usually fade within 2 to 4 weeks.    Home Care Instructions:  Do not apply powder or lotion to the site.  Do not take baths, swim, or use a hot tub until your health care provider approves and the site is completely healed.  Do not bend, squat, or lift anything over 20 lb (9 kg) or as directed by your health care provider. However, we recommend lifting nothing heavier than a gallon of milk.    You may shower 24 hours after the procedure. Remove the bandage (dressing) and gently wash the site with plain soap and water. Gently pat the site dry. You may apply a band aid daily for 2 days if desired.    Inspect the site at least twice daily.  Increase your fluid intake for the next 2 days.    Limit your activity for the first 48 hours. .    Avoid strenuous activity for 1 week or as advised by your physician.    Follow instructions about when you can drive or return to work as directed by your physician.    Hold direct pressure over the site when you cough, sneeze, laugh or change positions.  Do this for the next 2 days.    Do not operate machinery or power tools for 24 hours.  A responsible adult should be with you for the  first 24 hours after you arrive home. Do not make any important legal decisions or sign legal papers for 24 hours.  Do not drink alcohol for 24 hours.  Metformin or any medications containing Metformin should not be taken for 48 hours after your procedure.      Call Your Doctor If:  You have drainage (other than a small amount of blood on the dressing).  You have chills or a fever > 101.  You have redness, warmth, swelling(larger than a walnut), or pain at the insertion site  You develop chest pain or shortness of breath, feel faint, or pass out.  You develop pain, discoloration, coldness, numbness, tingling, or severe bruising in the leg that held the catheter.  You develop bleeding from any other place, such as the bowels.  You have heavy bleeding from the site, hold pressure on the site for 20 minutes.  If the bleeding stops, apply a fresh bandage and call your cardiologist.  However, if you continue to have bleeding, call 911.  You have any symptoms of a stroke.  Remember BE FAST  B-balance. Sudden trouble walking or loss of balance.  E-eyes.  Sudden changes in how you see or a sudden onset of a very bad headache.   F-face. Sudden weakness or loss of feeling of the face or facial droop on one side.   A-arms Sudden weakness or numbness in one arm.  One arm drifts down if they are both held out in front of you. This happens suddenly and usually on one side of the body.  S-speech.  Sudden trouble speaking, slurred speech or trouble understanding what people are saying.   T-time  Time to call emergency services.  Write down the symptoms and the time they started.        Make Sure You:  Understand these instructions.  Will watch your condition.  Will get help right away if you are not doing well or get worse.[facility]  [address] [place] [address]    Coronary Angiogram (Radial/Ulnar Approach) After Care    Refer to this sheet in the next few weeks. These instructions provide you with  information on caring for yourself after your procedure. Your caregiver may also give you more specific instructions. Your treatment has been planned according to current medical practices, but problems sometimes occur. Call your caregiver if you have any problems or questions after your procedure.    Home Care Instructions:  You may shower the day after the procedure. Remove the bandage (dressing) and gently wash the site with plain soap and water. Gently pat the site dry. You may apply a band aid daily for 2 days if desired.    Do not apply powder or lotion to the site.  Do not submerge the affected site in water for 3 to 5 days or until the site is completely healed.   Do not lift, push or pull anything over 5 pounds for 5 days after your procedure or as directed by your physician.  As a reference, a gallon of milk weighs 8 pounds.   Inspect the site at least twice daily. You may notice some bruising at the site and it may be tender for 1 to 2 weeks.     Increase your fluid intake for the next 2 days.    Keep arm elevated for 24 hours. For the remainder of the day, keep your arm in “Pledge of Allegiance” position when up and about.     You may drive 24 hours after the procedure unless otherwise instructed by your caregiver.  Do not operate machinery or power tools for 24 hours.  A responsible adult should be with you for the first 24 hours after you arrive home. Do not make any important legal decisions or sign legal papers for 24 hours.  Do not drink alcohol for 24 hours.    Metformin or any medications containing Metformin should not be taken for 48 hours after your procedure.      Call Your Doctor if:   You have unusual pain at the radial/ulnar (wrist) site.  You have redness, warmth, swelling, or pain at the radial/ulnar (wrist) site.  You have drainage (other than a small amount of blood on the dressing).  `You have chills or a fever > 101.  Your arm becomes pale or dark, cool, tingly, or numb.  You develop  chest pain, shortness of breath, feel faint or pass out.    You have heavy bleeding from the site, hold pressure on the site for 20 minutes.  If the bleeding stops, apply a fresh bandage and call your cardiologist.  However, if you        continue to have bleeding, call 911 and continue to apply pressure to the site.   You have any symptoms of a stroke.  Remember BE FAST  B-balance. Sudden trouble walking or loss of balance.  E-eyes.  Sudden changes in how you see or a sudden onset of a very bad headache.   F-face. Sudden weakness or loss of feeling of the face or facial droop on one side.   A-arms Sudden weakness or numbness in one arm.  One arm drifts down if they are both held out in front of you. This happens suddenly and usually on one side of the body.   S-speech.  Sudden trouble speaking, slurred speech or trouble understanding what are saying.   T-time  Time to call emergency services.  Write down the symptoms and the time they started.

## 2025-01-02 NOTE — Clinical Note
A 4 fr sheath was successfully inserted using micropuncture technique into the right femoral artery. RFA angio

## 2025-01-02 NOTE — Clinical Note
The right DP pulse is detected w/ doppler. The right PT pulse is detected w/ doppler. The right radial pulse is +2. The right ulnar pulse is +2. The right femoral pulse is +2.

## 2025-01-02 NOTE — TELEPHONE ENCOUNTER
Spoke to wife with PAT and surgery information. PAT is Friday 1-3-2025 at 0800 with any testing to follow. Surgery is scheduled for 1-6-2025 as a to follow case, Robin has two cases and Ruddy (TAVR's). His arrival time is 0700. Our nurse or APRN will call patient with instructions on his COUMADIN. She expressed a verbal understanding of these instructions; Instructed to call office with any further questions.

## 2025-01-02 NOTE — Clinical Note
Hemostasis started on the right radial artery. R-Band was used in achieving hemostasis. Radial compression device applied to vessel. Hemostasis achieved successfully. Closure device additional comment: TR w/ 14cc air

## 2025-01-03 ENCOUNTER — PRE-ADMISSION TESTING (OUTPATIENT)
Dept: PREADMISSION TESTING | Facility: HOSPITAL | Age: 75
DRG: 220 | End: 2025-01-03
Payer: MEDICARE

## 2025-01-03 ENCOUNTER — TELEPHONE (OUTPATIENT)
Dept: CARDIAC SURGERY | Facility: CLINIC | Age: 75
End: 2025-01-03
Payer: MEDICARE

## 2025-01-03 ENCOUNTER — PREP FOR SURGERY (OUTPATIENT)
Dept: OTHER | Facility: HOSPITAL | Age: 75
End: 2025-01-03
Payer: MEDICARE

## 2025-01-03 ENCOUNTER — HOSPITAL ENCOUNTER (OUTPATIENT)
Dept: GENERAL RADIOLOGY | Facility: HOSPITAL | Age: 75
Discharge: HOME | End: 2025-01-03
Payer: MEDICARE

## 2025-01-03 ENCOUNTER — HOSPITAL ENCOUNTER (OUTPATIENT)
Facility: HOSPITAL | Age: 75
Discharge: HOME | End: 2025-01-03
Payer: MEDICARE

## 2025-01-03 ENCOUNTER — ANTICOAGULATION VISIT (OUTPATIENT)
Dept: PHARMACY | Facility: HOSPITAL | Age: 75
End: 2025-01-03
Payer: MEDICARE

## 2025-01-03 VITALS
BODY MASS INDEX: 31.44 KG/M2 | DIASTOLIC BLOOD PRESSURE: 79 MMHG | WEIGHT: 245 LBS | HEIGHT: 74 IN | OXYGEN SATURATION: 96 % | RESPIRATION RATE: 20 BRPM | HEART RATE: 74 BPM | SYSTOLIC BLOOD PRESSURE: 137 MMHG | TEMPERATURE: 95.9 F

## 2025-01-03 DIAGNOSIS — R06.02 SHORTNESS OF BREATH: ICD-10-CM

## 2025-01-03 DIAGNOSIS — I34.0 MITRAL VALVE INSUFFICIENCY, UNSPECIFIED ETIOLOGY: ICD-10-CM

## 2025-01-03 DIAGNOSIS — I34.0 MITRAL VALVE INSUFFICIENCY, UNSPECIFIED ETIOLOGY: Primary | ICD-10-CM

## 2025-01-03 DIAGNOSIS — I52 OTHER HEART DISORDERS IN DISEASES CLASSIFIED ELSEWHERE: ICD-10-CM

## 2025-01-03 DIAGNOSIS — I79.8 OTHER DISORDERS OF ARTERIES, ARTERIOLES AND CAPILLARIES IN DISEASES CLASSIFIED ELSEWHERE: ICD-10-CM

## 2025-01-03 DIAGNOSIS — I48.0 AF (PAROXYSMAL ATRIAL FIBRILLATION): Primary | ICD-10-CM

## 2025-01-03 DIAGNOSIS — R79.9 ABNORMAL FINDING OF BLOOD CHEMISTRY, UNSPECIFIED: ICD-10-CM

## 2025-01-03 DIAGNOSIS — R79.1 ABNORMAL COAGULATION PROFILE: ICD-10-CM

## 2025-01-03 LAB
ABO GROUP BLD: NORMAL
ALBUMIN SERPL-MCNC: 4.2 G/DL (ref 3.5–5.2)
ALBUMIN/GLOB SERPL: 1.5 G/DL
ALP SERPL-CCNC: 34 U/L (ref 39–117)
ALT SERPL W P-5'-P-CCNC: 26 U/L (ref 1–41)
ANION GAP SERPL CALCULATED.3IONS-SCNC: 10.2 MMOL/L (ref 5–15)
APTT PPP: 32.2 SECONDS (ref 22.7–35.4)
ARTERIAL PATENCY WRIST A: POSITIVE
AST SERPL-CCNC: 25 U/L (ref 1–40)
ATMOSPHERIC PRESS: 753.6 MMHG
BASE EXCESS BLDA CALC-SCNC: 4.5 MMOL/L (ref 0–2)
BASOPHILS # BLD AUTO: 0.02 10*3/MM3 (ref 0–0.2)
BASOPHILS NFR BLD AUTO: 0.3 % (ref 0–1.5)
BDY SITE: ABNORMAL
BH CV XLRA MEAS LEFT CAROTID BULB EDV: -13.2 CM/SEC
BH CV XLRA MEAS LEFT CAROTID BULB PSV: -56 CM/SEC
BH CV XLRA MEAS LEFT DIST CCA EDV: -14.3 CM/SEC
BH CV XLRA MEAS LEFT DIST CCA PSV: -48.3 CM/SEC
BH CV XLRA MEAS LEFT DIST ICA EDV: -15.4 CM/SEC
BH CV XLRA MEAS LEFT DIST ICA PSV: -47.2 CM/SEC
BH CV XLRA MEAS LEFT ICA/CCA RATIO: 1.16
BH CV XLRA MEAS LEFT MID CCA EDV: -14.1 CM/SEC
BH CV XLRA MEAS LEFT MID CCA PSV: -90.2 CM/SEC
BH CV XLRA MEAS LEFT MID ICA EDV: -15.9 CM/SEC
BH CV XLRA MEAS LEFT MID ICA PSV: -41.2 CM/SEC
BH CV XLRA MEAS LEFT PROX CCA EDV: 15.7 CM/SEC
BH CV XLRA MEAS LEFT PROX CCA PSV: 84.7 CM/SEC
BH CV XLRA MEAS LEFT PROX ECA EDV: -9.3 CM/SEC
BH CV XLRA MEAS LEFT PROX ECA PSV: -69.1 CM/SEC
BH CV XLRA MEAS LEFT PROX ICA EDV: -14.8 CM/SEC
BH CV XLRA MEAS LEFT PROX ICA PSV: -46.6 CM/SEC
BH CV XLRA MEAS LEFT PROX SCLA PSV: 186.6 CM/SEC
BH CV XLRA MEAS LEFT VERTEBRAL A EDV: 16.5 CM/SEC
BH CV XLRA MEAS LEFT VERTEBRAL A PSV: 54.3 CM/SEC
BH CV XLRA MEAS RIGHT CAROTID BULB EDV: -8.8 CM/SEC
BH CV XLRA MEAS RIGHT CAROTID BULB PSV: -28.5 CM/SEC
BH CV XLRA MEAS RIGHT DIST CCA EDV: -10.5 CM/SEC
BH CV XLRA MEAS RIGHT DIST CCA PSV: -53.2 CM/SEC
BH CV XLRA MEAS RIGHT DIST ICA EDV: -20.3 CM/SEC
BH CV XLRA MEAS RIGHT DIST ICA PSV: -69.7 CM/SEC
BH CV XLRA MEAS RIGHT ICA/CCA RATIO: 1.31
BH CV XLRA MEAS RIGHT MID CCA EDV: 11.9 CM/SEC
BH CV XLRA MEAS RIGHT MID CCA PSV: 76.4 CM/SEC
BH CV XLRA MEAS RIGHT MID ICA EDV: -15.9 CM/SEC
BH CV XLRA MEAS RIGHT MID ICA PSV: -56.5 CM/SEC
BH CV XLRA MEAS RIGHT PROX CCA EDV: -13.9 CM/SEC
BH CV XLRA MEAS RIGHT PROX CCA PSV: -71.9 CM/SEC
BH CV XLRA MEAS RIGHT PROX ECA EDV: -12.6 CM/SEC
BH CV XLRA MEAS RIGHT PROX ECA PSV: -79.9 CM/SEC
BH CV XLRA MEAS RIGHT PROX ICA EDV: -11.5 CM/SEC
BH CV XLRA MEAS RIGHT PROX ICA PSV: -42.8 CM/SEC
BH CV XLRA MEAS RIGHT PROX SCLA PSV: 107.4 CM/SEC
BH CV XLRA MEAS RIGHT VERTEBRAL A EDV: -10.4 CM/SEC
BH CV XLRA MEAS RIGHT VERTEBRAL A PSV: -28 CM/SEC
BILIRUB SERPL-MCNC: 0.5 MG/DL (ref 0–1.2)
BILIRUB UR QL STRIP: NEGATIVE
BLD GP AB SCN SERPL QL: NEGATIVE
BUN SERPL-MCNC: 21 MG/DL (ref 8–23)
BUN/CREAT SERPL: 16.3 (ref 7–25)
CALCIUM SPEC-SCNC: 8.8 MG/DL (ref 8.6–10.5)
CHLORIDE SERPL-SCNC: 102 MMOL/L (ref 98–107)
CHOLEST SERPL-MCNC: 161 MG/DL (ref 0–200)
CLARITY UR: CLEAR
CLOSE TME COLL+ADP + EPINEP PNL BLD: 97 % (ref 86–100)
CO2 BLDA-SCNC: 31.4 MMOL/L (ref 23–27)
CO2 SERPL-SCNC: 29.8 MMOL/L (ref 22–29)
COLOR UR: YELLOW
CREAT SERPL-MCNC: 1.29 MG/DL (ref 0.76–1.27)
DEPRECATED RDW RBC AUTO: 42.1 FL (ref 37–54)
EGFRCR SERPLBLD CKD-EPI 2021: 58.9 ML/MIN/1.73
EOSINOPHIL # BLD AUTO: 0.15 10*3/MM3 (ref 0–0.4)
EOSINOPHIL NFR BLD AUTO: 2.5 % (ref 0.3–6.2)
ERYTHROCYTE [DISTWIDTH] IN BLOOD BY AUTOMATED COUNT: 13.4 % (ref 12.3–15.4)
GLOBULIN UR ELPH-MCNC: 2.8 GM/DL
GLUCOSE SERPL-MCNC: 149 MG/DL (ref 65–99)
GLUCOSE UR STRIP-MCNC: NEGATIVE MG/DL
HBA1C MFR BLD: 6.3 % (ref 4.8–5.6)
HCO3 BLDA-SCNC: 30 MMOL/L (ref 22–28)
HCT VFR BLD AUTO: 40 % (ref 37.5–51)
HDLC SERPL-MCNC: 33 MG/DL (ref 40–60)
HEMODILUTION: NO
HGB BLD-MCNC: 13.1 G/DL (ref 13–17.7)
HGB UR QL STRIP.AUTO: NEGATIVE
IMM GRANULOCYTES # BLD AUTO: 0.02 10*3/MM3 (ref 0–0.05)
IMM GRANULOCYTES NFR BLD AUTO: 0.3 % (ref 0–0.5)
INR PPP: 1.25 (ref 0.9–1.1)
KETONES UR QL STRIP: NEGATIVE
LDLC SERPL CALC-MCNC: 104 MG/DL (ref 0–100)
LDLC/HDLC SERPL: 3.07 {RATIO}
LEFT ARM BP: NORMAL MMHG
LEUKOCYTE ESTERASE UR QL STRIP.AUTO: NEGATIVE
LYMPHOCYTES # BLD AUTO: 1.29 10*3/MM3 (ref 0.7–3.1)
LYMPHOCYTES NFR BLD AUTO: 21.1 % (ref 19.6–45.3)
MAGNESIUM SERPL-MCNC: 1.9 MG/DL (ref 1.6–2.4)
MCH RBC QN AUTO: 28.5 PG (ref 26.6–33)
MCHC RBC AUTO-ENTMCNC: 32.8 G/DL (ref 31.5–35.7)
MCV RBC AUTO: 87.1 FL (ref 79–97)
MODALITY: ABNORMAL
MONOCYTES # BLD AUTO: 0.38 10*3/MM3 (ref 0.1–0.9)
MONOCYTES NFR BLD AUTO: 6.2 % (ref 5–12)
NEUTROPHILS NFR BLD AUTO: 4.26 10*3/MM3 (ref 1.7–7)
NEUTROPHILS NFR BLD AUTO: 69.6 % (ref 42.7–76)
NITRITE UR QL STRIP: NEGATIVE
NRBC BLD AUTO-RTO: 0 /100 WBC (ref 0–0.2)
NT-PROBNP SERPL-MCNC: 516 PG/ML (ref 0–900)
PCO2 BLDA: 46.7 MM HG (ref 35–45)
PH BLDA: 7.42 PH UNITS (ref 7.35–7.45)
PH UR STRIP.AUTO: 6 [PH] (ref 5–8)
PLATELET # BLD AUTO: 234 10*3/MM3 (ref 140–450)
PMV BLD AUTO: 11.1 FL (ref 6–12)
PO2 BLDA: 72.4 MM HG (ref 80–100)
POTASSIUM SERPL-SCNC: 3.6 MMOL/L (ref 3.5–5.2)
PROT SERPL-MCNC: 7 G/DL (ref 6–8.5)
PROT UR QL STRIP: NEGATIVE
PROTHROMBIN TIME: 15.9 SECONDS (ref 11.7–14.2)
QT INTERVAL: 401 MS
QTC INTERVAL: 460 MS
RBC # BLD AUTO: 4.59 10*6/MM3 (ref 4.14–5.8)
RH BLD: POSITIVE
RIGHT ARM BP: NORMAL MMHG
SAO2 % BLDCOA: 94.4 % (ref 92–98.5)
SODIUM SERPL-SCNC: 142 MMOL/L (ref 136–145)
SP GR UR STRIP: 1.01 (ref 1–1.03)
T&S EXPIRATION DATE: NORMAL
TOTAL RATE: 20 BREATHS/MINUTE
TRIGL SERPL-MCNC: 133 MG/DL (ref 0–150)
UROBILINOGEN UR QL STRIP: NORMAL
VLDLC SERPL-MCNC: 24 MG/DL (ref 5–40)
WBC NRBC COR # BLD AUTO: 6.12 10*3/MM3 (ref 3.4–10.8)

## 2025-01-03 PROCEDURE — 81003 URINALYSIS AUTO W/O SCOPE: CPT

## 2025-01-03 PROCEDURE — 83880 ASSAY OF NATRIURETIC PEPTIDE: CPT

## 2025-01-03 PROCEDURE — 80061 LIPID PANEL: CPT

## 2025-01-03 PROCEDURE — 85025 COMPLETE CBC W/AUTO DIFF WBC: CPT

## 2025-01-03 PROCEDURE — 93880 EXTRACRANIAL BILAT STUDY: CPT

## 2025-01-03 PROCEDURE — 93010 ELECTROCARDIOGRAM REPORT: CPT | Performed by: INTERNAL MEDICINE

## 2025-01-03 PROCEDURE — 86901 BLOOD TYPING SEROLOGIC RH(D): CPT

## 2025-01-03 PROCEDURE — 82803 BLOOD GASES ANY COMBINATION: CPT | Performed by: THORACIC SURGERY (CARDIOTHORACIC VASCULAR SURGERY)

## 2025-01-03 PROCEDURE — 71046 X-RAY EXAM CHEST 2 VIEWS: CPT

## 2025-01-03 PROCEDURE — 36600 WITHDRAWAL OF ARTERIAL BLOOD: CPT | Performed by: THORACIC SURGERY (CARDIOTHORACIC VASCULAR SURGERY)

## 2025-01-03 PROCEDURE — 85730 THROMBOPLASTIN TIME PARTIAL: CPT

## 2025-01-03 PROCEDURE — 85610 PROTHROMBIN TIME: CPT

## 2025-01-03 PROCEDURE — 80053 COMPREHEN METABOLIC PANEL: CPT

## 2025-01-03 PROCEDURE — 36415 COLL VENOUS BLD VENIPUNCTURE: CPT

## 2025-01-03 PROCEDURE — 86900 BLOOD TYPING SEROLOGIC ABO: CPT

## 2025-01-03 PROCEDURE — 93005 ELECTROCARDIOGRAM TRACING: CPT

## 2025-01-03 PROCEDURE — 85576 BLOOD PLATELET AGGREGATION: CPT

## 2025-01-03 PROCEDURE — 83735 ASSAY OF MAGNESIUM: CPT

## 2025-01-03 PROCEDURE — 86923 COMPATIBILITY TEST ELECTRIC: CPT

## 2025-01-03 PROCEDURE — 83036 HEMOGLOBIN GLYCOSYLATED A1C: CPT

## 2025-01-03 PROCEDURE — 86850 RBC ANTIBODY SCREEN: CPT

## 2025-01-03 RX ORDER — CHLORHEXIDINE GLUCONATE 500 MG/1
1 CLOTH TOPICAL EVERY 12 HOURS PRN
OUTPATIENT
Start: 2025-01-03

## 2025-01-03 RX ORDER — WARFARIN SODIUM 5 MG/1
1 TABLET ORAL EVERY MORNING
COMMUNITY
Start: 2024-09-26

## 2025-01-03 RX ORDER — CHLORHEXIDINE GLUCONATE ORAL RINSE 1.2 MG/ML
15 SOLUTION DENTAL EVERY 12 HOURS
Status: DISPENSED | OUTPATIENT
Start: 2025-01-03 | End: 2025-01-04

## 2025-01-03 RX ORDER — CHLORHEXIDINE GLUCONATE ORAL RINSE 1.2 MG/ML
15 SOLUTION DENTAL EVERY 12 HOURS
Status: CANCELLED | OUTPATIENT
Start: 2025-01-03 | End: 2025-01-04

## 2025-01-03 NOTE — DISCHARGE INSTRUCTIONS
Take the following medications the morning of surgery with a small sip of water:    Talk to APrn    If you are on prescription narcotic pain medication to control your pain you may also take that medication the morning of surgery.    General Instructions:  Do not eat or drink anything after midnight the night before surgery.  Infants may have breast milk up to four hours before surgery.  Infants drinking formula may drink formula up to six hours before surgery.   Patients who avoid smoking, chewing tobacco and alcohol for 4 weeks prior to surgery have a reduced risk of post-operative complications.  Quit smoking as many days before surgery as you can.  Do not smoke, use chewing tobacco or drink alcohol the day of surgery.   If applicable bring your C-PAP/ BI-PAP machine in with you to preop day of surgery.  Bring any papers given to you in the doctor’s office.  Wear clean comfortable clothes.  Do not wear contact lenses, false eyelashes or make-up.  Bring a case for your glasses.   Bring crutches or walker if applicable.  Remove all piercings.  Leave jewelry and any other valuables at home.  Hair extensions with metal clips must be removed prior to surgery.  The Pre-Admission Testing nurse will instruct you to bring medications if unable to obtain an accurate list in Pre-Admission Testing.        If you were given a blood bank ID arm band remember to bring it with you the day of surgery.    Preventing a Surgical Site Infection:  For 2 to 3 days before surgery, avoid shaving with a razor because the razor can irritate skin and make it easier to develop an infection.    Any areas of open skin can increase the risk of a post-operative wound infection by allowing bacteria to enter and travel throughout the body.  Notify your surgeon if you have any skin wounds / rashes even if it is not near the expected surgical site.  The area will need assessed to determine if surgery should be delayed until it is healed.  The night  prior to surgery shower using a fresh bar of anti-bacterial soap (such as Dial) and clean washcloth.  Sleep in a clean bed with clean clothing.  Do not allow pets to sleep with you.  Shower on the morning of surgery using a fresh bar of anti-bacterial soap (such as Dial) and clean washcloth.  Dry with a clean towel and dress in clean clothing.  Ask your surgeon if you will be receiving antibiotics prior to surgery.  Make sure you, your family, and all healthcare providers clean their hands with soap and water or an alcohol based hand  before caring for you or your wound.    Day of surgery:1/6/2025  Your arrival time is approximately two hours before your scheduled surgery time.  Please note if you have an early arrival time the surgery doors do not open before 5:00 AM.  Upon arrival, a Pre-op nurse and Anesthesiologist will review your health history, obtain vital signs, and answer questions you may have.  The only belongings needed at this time will be your home medications and if applicable your C-PAP/BI-PAP machine.  A Pre-op nurse will start an IV and you may receive medication in preparation for surgery, including something to help you relax.      Please be aware that surgery does come with discomfort.  We want to make every effort to control your discomfort so please discuss any uncontrolled symptoms with your nurse.   Your doctor will most likely have prescribed pain medications.      If you are going home after surgery you will receive individualized written care instructions before being discharged.  A responsible adult must drive you to and from the hospital on the day of your surgery and ideally stay with you through the night.  Discharge prescriptions can be filled by the hospital pharmacy during regular pharmacy hours.  If you are having surgery late in the day/evening your prescription may be e-prescribed to your pharmacy.  Please verify your pharmacy hours or chose a 24 hour pharmacy to avoid  not having access to your prescription because your pharmacy has closed for the day.    If you are staying overnight following surgery, you will be transported to your hospital room following the recovery period.  [facility] has all private rooms.    If you have any questions please call Pre-Admission Testing at [phone].  Deductibles and co-payments are collected on the day of service. Please be prepared to pay the required co-pay, deductible or deposit on the day of service as defined by your plan.    Call your surgeon immediately if you experience any of the following symptoms:  Sore Throat  Shortness of Breath or difficulty breathing  Cough  Chills  Body soreness or muscle pain  Headache  Fever  New loss of taste or smell  Do not arrive for your surgery ill.  Your procedure will need to be rescheduled to another time.  You will need to call your physician before the day of surgery to avoid any unnecessary exposure to hospital staff as well as other patients.  CHLORHEXIDINE CLOTH INSTRUCTIONS  The morning of surgery follow these instructions using the Chlorhexidine cloths you've been given.  These steps reduce bacteria on the body.  Do not use the cloths near your eyes, ears mouth, genitalia or on open wounds.  Throw the cloths away after use but do not try to flush them down a toilet.      Open and remove one cloth at a time from the package.    Leave the cloth unfolded and begin the bathing.  Massage the skin with the cloths using gentle pressure to remove bacteria.  Do not scrub harshly.   Follow the steps below with one 2% CHG cloth per area (6 total cloths).  One cloth for neck, shoulders and chest.  One cloth for both arms, hands, fingers and underarms (do underarms last).  One cloth for the abdomen followed by groin.  One cloth for right leg and foot including between the toes.  One cloth for left leg and foot including between the toes.  The last cloth is to be used for the back of the  neck, back and buttocks.    Allow the CHG to air dry 3 minutes on the skin which will give it time to work and decrease the chance of irritation.  The skin may feel sticky until it is dry.  Do not rinse with water or any other liquid or you will lose the beneficial effects of the CHG.  If mild skin irritation occurs, do rinse the skin to remove the CHG.  Report this to the nurse at time of admission.  Do not apply lotions, creams, ointments, deodorants or perfumes after using the clothes. Dress in clean clothes before coming to the hospital.    BACTROBAN NASAL OINTMENT  There are many germs normally in your nose. Bactroban is an ointment that will help reduce these germs. Please follow these instructions for Bactroban use:      _1____The day before surgery in the evening              Date_1/5/2025______    _2___The day of surgery in the morning    Date_1/6/2025_______    **Squirt ½ package of Bactroban Ointment onto a cotton applicator and apply to inside of 1st nostril.  Squirt the remaining Bactroban and apply to the inside of the other nostril.    PERIDEX- ORAL:  Use only if your surgeon has ordered  Use the night before and morning of surgery - Swish, gargle, and spit - do not swallow.

## 2025-01-03 NOTE — H&P (VIEW-ONLY)
ADDENDUM:     Date of Service: 12/23/24 1501  Creation Time: 12/23/24 1501     Expand All Collapse All[]Expand All by Default       Patient Care Team:  Tera Salvador MD as PCP - General (Internal Medicine)  Micah Reyes MD as Consulting Physician (Gastroenterology)  Bruna Chávez MD as Referring Physician (Cardiology)     Chief complaint: Severe mitral valve regurgitation         Subjective  History of Present Illness     Patient is a 74 year old male with PMH of atrial fibrillation, mitral valve regurgitation, DM II, CAD with previous stent to LAD (2022), HTN, HLD, HOLLI with CPAP, recent pneumonia (hospitalized last month) who presented to the ED on 12/19 with complaints of acute chest tightness and shortness of breath. Patient was admitted due to acute hypoxic respiratory failure and acute on chronic CHF. Cardiology was consulted and patient has been aggressively diuresed. YAZMIN was completed today to evaluate mitral regurgitation and patient was shown to have severe mitral valve regurgitation. Cardiac surgery was consulted for evaluation of patient and recommendation regarding surgical intervention.      Review of Systems   Respiratory:  Positive for chest tightness and shortness of breath.    Cardiovascular:  Positive for chest pain.   All other systems reviewed and are negative.        Medical History        Past Medical History:   Diagnosis Date    Allergic Have had for several years     Eyes and nasal issues    Anemia      Anxiety Since about 2014     Since I was taking of my Dad, who also passed away 3yrs ago.    Arthritis 2002     Both knees, hips, and shoulders    Arthritis of knee, left      Cataract 05/2019    Colon polyp 2017    Coronary artery disease       stent    Diabetes mellitus      Dry eyes      Essential hypertension      HL (hearing loss) 1980     no hearing aides    Hyperlipemia      Knee swelling 7/7/2020     Shortly after my left knee replacement    Mild chronic anemia       Obesity      Sleep apnea       cpap         Surgical History         Past Surgical History:   Procedure Laterality Date    ACHILLES TENDON REPAIR Left      CARDIAC CATHETERIZATION        CARDIAC CATHETERIZATION N/A 09/01/2022     Procedure: Coronary angiography, Left heart catheterization,;  Surgeon: Bruna Chávez MD;  Location: [facility] CATH INVASIVE LOCATION;  Service: Cardiology;  Laterality: N/A;    CARDIAC CATHETERIZATION N/A 09/01/2022     Procedure: Stent PRAVIN coronary;  Surgeon: Bruna Chávez MD;  [facility] C[facility]IVE LOC[facility];  Service: Cardiology;  Laterality: N/A;    CATARACT EXTRACTION EXTRACAPSULAR W/ INTRAOCULAR LENS IMPLANTATION Bilateral      COLONOSCOPY         Dr Reyes 6 years ago    ENDOSCOPY        FOOT SURGERY         Achilles repair    JOINT REPLACEMENT   03/12/20     Left knee    TONSILLECTOMY         As a child    TOTAL KNEE ARTHROPLASTY Left 03/12/2020     Procedure: TOTAL KNEE ARTHROPLASTY;  Surgeon: Chepe Alicea MD;  Location: [facility] OR;  Service: Orthopedics;  Laterality: Left;    TOTAL KNEE ARTHROPLASTY Right 03/21/2024     Procedure: TOTAL KNEE ARTHROPLASTY;  Surgeon: Chepe Alicea MD;  Location: [facility];  Service: Orthopedics;  Laterality: Right;               Family History   Problem Relation Age of Onset    Alcohol abuse Maternal Uncle           Passed away 2013    Alcohol abuse Father           Passed away in 2016    Hyperlipidemia Father           Started about 10 years before his death    Arthritis Mother           Passed away 2006, from Alzheimers    Diabetes Mother      Hyperlipidemia Mother           Started probably at middle age    Osteoporosis Mother      Malig Hyperthermia Neg Hx        Social History   Social History            Tobacco Use    Smoking status: Never       Passive exposure: Never    Smokeless tobacco: Never    Tobacco comments:       Naila only every been around people who smoked   Vaping Use    Vaping status: Never Used   Substance  Use Topics    Alcohol use: Yes       Alcohol/week: 10.0 standard drinks of alcohol       Types: 10 Drinks containing 0.5 oz of alcohol per week       Comment: Maybe 8-10 drinks mainly on weekends.    Drug use: Never         Prescriptions Prior to Admission           Medications Prior to Admission   Medication Sig Dispense Refill Last Dose/Taking    acetaminophen (TYLENOL) 325 MG tablet Take 2 tablets by mouth 2 (Two) Times a Day As Needed for Mild Pain. 60 tablet 0 12/18/2024    aspirin 81 MG EC tablet Take 1 tablet by mouth 2 (Two) Times a Day. 60 tablet 0 12/19/2024    benazepril (LOTENSIN) 40 MG tablet Take 1 tablet by mouth twice daily 180 tablet 0 12/18/2024    cloNIDine (CATAPRES) 0.2 MG tablet Take 1 tablet by mouth twice daily 180 tablet 1 12/18/2024    doxazosin (CARDURA) 4 MG tablet TAKE 1 TABLET BY MOUTH ONCE DAILY AT NIGHT 90 tablet 1 12/18/2024    escitalopram (LEXAPRO) 10 MG tablet Take 1.5 tablets by mouth Every Evening. 135 tablet 1 12/18/2024    ezetimibe (ZETIA) 10 MG tablet Take 1 tablet by mouth once daily 90 tablet 0 12/18/2024    fenofibrate 160 MG tablet TAKE 1 TABLET BY MOUTH AT NIGHT 90 tablet 0 12/18/2024    fluticasone (FLONASE) 50 MCG/ACT nasal spray Administer 2 sprays into the nostril(s) as directed by provider Every Morning. 2 sprays in each nostril     12/18/2024    furosemide (LASIX) 40 MG tablet Take 1 tablet by mouth Daily. 90 tablet 3 12/18/2024    gabapentin (NEURONTIN) 600 MG tablet Take 1 tablet by mouth Every Evening.     12/18/2024    glimepiride (AMARYL) 4 MG tablet Take 1 tablet by mouth 2 (Two) Times a Day. Indications: Type 2 Diabetes 180 tablet 1 12/18/2024    glucose blood (Accu-Chek Anabela Plus) test strip TEST TWICE DAILY Use as instructed 100 each 3 12/19/2024    Janumet XR  MG tablet Take 1 tablet by mouth twice daily 180 tablet 0 12/18/2024    Multiple Vitamins-Minerals (b complex-C-E-zinc) tablet Take 1 tablet by mouth Every Other Day.     12/18/2024     "Multiple Vitamins-Minerals (PRESERVISION AREDS 2 PO) Take 1 tablet by mouth 2 (Two) Times a Day.     12/18/2024    nebivolol (BYSTOLIC) 5 MG tablet Take 1 tablet by mouth Daily. 90 tablet 1 12/18/2024    NIFEdipine XL (PROCARDIA XL) 60 MG 24 hr tablet Take 1 tablet by mouth Every Morning. 90 tablet 1 12/18/2024    Olopatadine HCl (PATADAY OP) Apply 1 drop to eye(s) as directed by provider 2 (Two) Times a Day As Needed (allergies).     12/18/2024    polyethyl glycol-propyl glycol (SYSTANE) 0.4-0.3 % solution ophthalmic solution (artificial tears) Administer 1 drop to both eyes As Needed (dry eyes).     12/18/2024    traZODone (DESYREL) 50 MG tablet TAKE 1 TABLET BY MOUTH ONCE DAILY AT NIGHT 90 tablet 0 12/18/2024    vitamin D3 125 MCG (5000 UT) capsule capsule Take 1 capsule by mouth Daily.     12/18/2024    warfarin (COUMADIN) 5 MG tablet Take 1 tablet by mouth Daily. (Patient taking differently: Take 1 tablet by mouth Daily. Patient states taking 7.5 MG on Wednesday and Saturday) 180 tablet 3 12/18/2024           Scheduled Medication   aspirin, 81 mg, Oral, Daily  escitalopram, 15 mg, Oral, Q PM  fenofibrate, 145 mg, Oral, Nightly  gabapentin, 600 mg, Oral, Nightly  insulin glargine, 10 Units, Subcutaneous, Nightly  insulin lispro, 2-7 Units, Subcutaneous, 4x Daily AC & at Bedtime  nebivolol, 5 mg, Oral, Daily  NIFEdipine XL, 60 mg, Oral, QAM  sacubitril-valsartan, 1 tablet, Oral, Q12H  senna-docusate sodium, 2 tablet, Oral, BID  sodium chloride, 10 mL, Intravenous, Q12H  terazosin, 5 mg, Oral, Nightly  warfarin, 7.5 mg, Oral, Once         Allergies:  Patient has no known allergies.        Objective  Vital Signs  Temp:  [98.8 °F (37.1 °C)-99.4 °F (37.4 °C)] 99.3 °F (37.4 °C)  Heart Rate:  [] 66  Resp:  [13-20] 20  BP: ()/(62-99) 106/62     Flowsheet Rows       Flowsheet Row First Filed Value   Admission Height 188 cm (74\") Documented at 12/19/2024 0758   Admission Weight 119 kg (262 lb) Documented at " "12/19/2024 0758             188 cm (74\")     Physical Exam  Vitals and nursing note reviewed.   Constitutional:       Appearance: Normal appearance. He is obese.   HENT:      Head: Normocephalic and atraumatic.      Nose: Nose normal.      Mouth/Throat:      Mouth: Mucous membranes are moist.      Pharynx: Oropharynx is clear.   Eyes:      Extraocular Movements: Extraocular movements intact.      Conjunctiva/sclera: Conjunctivae normal.      Pupils: Pupils are equal, round, and reactive to light.   Cardiovascular:      Rate and Rhythm: Normal rate. Rhythm irregular.      Pulses: Normal pulses.      Heart sounds: Murmur heard.   Pulmonary:      Effort: Pulmonary effort is normal.      Breath sounds: Normal breath sounds.   Abdominal:      General: Bowel sounds are normal.      Palpations: Abdomen is soft.   Musculoskeletal:         General: Normal range of motion.      Cervical back: Normal range of motion and neck supple.   Skin:     General: Skin is warm and dry.      Capillary Refill: Capillary refill takes less than 2 seconds.   Neurological:      General: No focal deficit present.      Mental Status: He is alert and oriented to person, place, and time.   Psychiatric:         Mood and Affect: Mood normal.         Behavior: Behavior normal.         Thought Content: Thought content normal.         Judgment: Judgment normal.            Results Review:   Lab Results (last 24 hours)         Procedure Component Value Units Date/Time     POC Glucose Once [[id number]]  (Abnormal) Collected: 12/23/24 1525     Specimen: Blood Updated: 12/23/24 1527       Glucose 207 mg/dL       POC Glucose Once [[id number]]  (Abnormal) Collected: 12/23/24 1052     Specimen: Blood Updated: 12/23/24 1054       Glucose 160 mg/dL       POC Glucose Once [[id number]]  (Abnormal) Collected: 12/23/24 0609     Specimen: Blood Updated: 12/23/24 0628       Glucose 144 mg/dL       Phosphorus [[id number]]  (Normal) Collected: 12/23/24 0333     Specimen: " Blood Updated: 12/23/24 0505       Phosphorus 3.2 mg/dL       Protime-INR [[id number]]  (Abnormal) Collected: 12/23/24 0333     Specimen: Blood Updated: 12/23/24 0449       Protime 17.7 Seconds         INR 1.43     Basic Metabolic Panel [[id number]]  (Abnormal) Collected: 12/23/24 0333     Specimen: Blood Updated: 12/23/24 0444       Glucose 159 mg/dL         BUN 22 mg/dL         Creatinine 1.02 mg/dL         Sodium 139 mmol/L         Potassium 3.8 mmol/L         Chloride 101 mmol/L         CO2 23.2 mmol/L         Calcium 9.2 mg/dL         BUN/Creatinine Ratio 21.6       Anion Gap 14.8 mmol/L         eGFR 78.1 mL/min/1.73       Narrative:       GFR Categories in Chronic Kidney Disease (CKD)                         GFR Category          GFR (mL/min/1.73)    Interpretation  G1                       90 or greater              Normal or high (1)  G2                               60-89                Mild decrease (1)  G3a                   45-59                Mild to moderate decrease  G3b                   30-44                Moderate to severe decrease  G4                    15-29                Severe decrease  G5                    14 or less           Kidney failure                                                 (1)In the absence of evidence of kidney disease, neither GFR category G1 or G2 fulfill the criteria for CKD.     eGFR calculation 2021 CKD-EPI creatinine equation, which does not include race as a factor     Magnesium [[id number]]  (Normal) Collected: 12/23/24 0333     Specimen: Blood Updated: 12/23/24 0444       Magnesium 2.1 mg/dL       CBC (No Diff) [[id number]]  (Abnormal) Collected: 12/23/24 0333     Specimen: Blood Updated: 12/23/24 0425       WBC 10.42 10*3/mm3         RBC 4.56 10*6/mm3         Hemoglobin 12.9 g/dL         Hematocrit 40.8 %         MCV 89.5 fL         MCH 28.3 pg         MCHC 31.6 g/dL         RDW 14.0 %         RDW-SD 46.1 fl         MPV 10.9 fL         Platelets 243 10*3/mm3        POC Glucose Once [[id number]]  (Normal) Collected: 12/22/24 2045     Specimen: Blood Updated: 12/22/24 2052       Glucose 116 mg/dL                              Assessment & Plan    Acute exacerbation of CHF (congestive heart failure)    Diabetes mellitus    Essential hypertension    AF (paroxysmal atrial fibrillation)        Assessment & Plan     - Severe mitral valve regurgitation  - CAD with previous stent to LAD (2022)  - Atrial fibrillation, recent diagnosis   - DM II  - HTN  - HLD  - HOLLI with CPAP  - Recent pneumonia (hospitalized last month)     Will need cardiac cath and routine pre-operative testing. Dr. Kelly to discuss with Dr. Chávez.            Gloria Garcia, APRN  12/23/24  15:37 EST      Addendum  Patient was seen and examined by me, agree with the findings above.  I have reviewed the echo, YAZMIN and interpreted results.  He has severe mitral regurgitation with a P2 prolapse and biventricular ventricular dysfunction.  He has atrial fibrillation but he was not cardioverted due to mitral regurgitation.  I recommend mitral valve repair with a 95% repairability rate and a mortality rate of less than 2%.  Also I recommend right and left cryo-maze procedure.  The patient will need to have a cardiac cath to evaluate presence of coronary artery disease.  I discussed the risks (STS calculated), benefits alternatives of surgery and he wished to proceed.  Plan is to have a cardiac cath as an outpatient and surgery as an outpatient probably in the first week of January  Young Kelly MD          ADDENDUM:  No changes to H&P.    Patient seen in [facility] today.  Denies any changes to medical history or medications.  He denies any smoking history.  He drinks alcohol socially and states he has not had any alcohol since Thanksgiving.  Denies any recent fever or illness.  Denies any issues with his teeth. Teeth look good on assessment. Plan for mitral valve repair/replacement, maze on Monday 1/6/25.  Labs and diagnostics  reviewed. Carotids noted to be tortuous and have < 50% plaque with good antegrade flow. Chest x-ray postive Patient instructed not to eat or drink anything after midnight the night before surgery or take any medications the morning of surgery. Lovenox bridge to surgery due to AF. Patient instructed on Lovenox injections. He is to have a dose AM & PM on 1/3 and 1/4 and a dose on the morning of 1/5. He verbalized understanding.     With inclement weather expected on Monday, Dr. Kelly would like to admit him on Sunday 1/5 when there is a bed available. Admitting made aware.     Electronically signed by TIFFANIE Franco, 01/03/25, 5:25 PM EST.

## 2025-01-03 NOTE — TELEPHONE ENCOUNTER
Notified Lakeshia with [facility] bed board that patient needs to be admitted to Dr. Kelly on 1/5/25 for mitral valve surgery. Requested telemetry bed.

## 2025-01-03 NOTE — OUTREACH NOTE
CHF Week 2 Survey      Flowsheet Row Responses   [facility] patient discharged from? [place]   Does the patient have one of the following disease processes/diagnoses(primary or secondary)? CHF   Week 2 attempt successful? No   Unsuccessful attempts Attempt 1   Revoke Readmitted            Anny NICHOLS - Registered Nurse

## 2025-01-03 NOTE — PROGRESS NOTES
Anticoagulation Clinic Progress Note    Anticoagulation Summary  As of 1/3/2025      INR goal:  2.0-3.0   TTR:  63.1% (2.6 mo)   INR used for dosin.25 (1/3/2025)   Warfarin maintenance plan:  7.5 mg every Wed, Sat; 5 mg all other days   Weekly warfarin total:  40 mg   Plan last modified:  Gurjit Velásquez, PharmD (10/30/2024)   Next INR check:  2025   Target end date:  --    Indications    AF (paroxysmal atrial fibrillation) [I48.0]                 Anticoagulation Episode Summary       INR check location:  --    Preferred lab:  --    Send INR reminders to:   [facility]ESS[facility]    Comments:  --          Anticoagulation Care Providers       Provider Role Specialty Phone number    Bruna Chávez MD Referring Cardiology [phone]            Clinic Interview:  Patient Findings     Positives:  Upcoming invasive procedure, Change in medications    Negatives:  Signs/symptoms of thrombosis, Signs/symptoms of bleeding,   Laboratory test error suspected, Change in health, Change in alcohol use,   Change in activity, Emergency department visit, Upcoming dental procedure,   Missed doses, Extra doses, Change in diet/appetite, Hospital admission,   Bruising, Other complaints    Comments:  Mr. Olson had heart cath yesterday, and he has newly been   scheduled for mitral valve repair/replacement 25. Caridiology has   already instructed him to remain off warfarin until his procedure and   begin bridging with enoxaparin 105 mg every 12 hours through the morning   of 25 (already started). He reports being told he will remain in the   hospital for at least 4-7 days post-op.       Clinical Outcomes     Negatives:  Major bleeding event, Thromboembolic event,   Anticoagulation-related hospital admission, Anticoagulation-related ED   visit, Anticoagulation-related fatality    Comments:  Mr. Olson had heart cath yesterday, and he has newly been   scheduled for mitral valve repair/replacement 25. [facility] has    already instructed him to remain off warfarin until his procedure and   begin bridging with enoxaparin 105 mg every 12 hours through the morning   of 1/5/25 (already started). He reports being told he will remain in the   hospital for at least 4-7 days post-op.         INR History:      Latest Ref Rng & Units 12/22/2024     4:54 AM 12/23/2024     3:33 AM 12/24/2024     6:01 AM 12/26/2024     9:43 AM 12/26/2024    10:30 AM 1/3/2025     8:16 AM 1/3/2025     1:04 PM   Anticoagulation Monitoring   INR     -- --  1.25   INR Date        1/3/2025   INR Goal     2.0-3.0 2.0-3.0  2.0-3.0   Trend     Same   Same   Last Week Total     35 mg   22.5 mg   Next Week Total     27.5 mg   22.5 mg   Sun     5 mg   Hold (1/5)   Mon     5 mg   -   Tue     Hold (12/31); Otherwise 5 mg   -   Wed     Hold (1/1); Otherwise 7.5 mg   -   Thu     7.5 mg (1/2); Otherwise 5 mg   -   Fri     7.5 mg (1/3); Otherwise 5 mg   Hold (1/3)   Sat     7.5 mg   Hold (1/4)   Historical INR 0.90 - 1.10 1.50  1.43  1.47    1.25         Plan:  1. INR is Subtherapeutic today- see above in Anticoagulation Summary.   Cardiology has already instructed Jeb Olson to continue HOLDing their warfarin regimen and begin enoxaparin 105 mg every 12 hours on 1/3/25 through the morning of 1/5/24 - see above in Anticoagulation Summary.   2. Follow up:  To be determined pending date of discharge home post-operatively.   3. They have been instructed to call if any changes in medications, doses, concerns, etc. Patient expresses understanding and has no further questions at this time.    Gurjit Velásquez, PharmD

## 2025-01-05 ENCOUNTER — HOSPITAL ENCOUNTER (INPATIENT)
Facility: HOSPITAL | Age: 75
LOS: 9 days | Discharge: HOME HEALTH | DRG: 220 | End: 2025-01-14
Attending: THORACIC SURGERY (CARDIOTHORACIC VASCULAR SURGERY) | Admitting: THORACIC SURGERY (CARDIOTHORACIC VASCULAR SURGERY)
Payer: MEDICARE

## 2025-01-05 DIAGNOSIS — Z98.890 S/P MVR (MITRAL VALVE REPAIR): Primary | ICD-10-CM

## 2025-01-05 DIAGNOSIS — I34.0 SEVERE MITRAL REGURGITATION: ICD-10-CM

## 2025-01-05 DIAGNOSIS — I48.0 AF (PAROXYSMAL ATRIAL FIBRILLATION): ICD-10-CM

## 2025-01-05 DIAGNOSIS — I34.0 MITRAL VALVE INSUFFICIENCY, UNSPECIFIED ETIOLOGY: ICD-10-CM

## 2025-01-05 LAB
ABO GROUP BLD: NORMAL
ALBUMIN SERPL-MCNC: 3.9 G/DL (ref 3.5–5.2)
ALBUMIN/GLOB SERPL: 1.3 G/DL
ALP SERPL-CCNC: 32 U/L (ref 39–117)
ALT SERPL W P-5'-P-CCNC: 19 U/L (ref 1–41)
ANION GAP SERPL CALCULATED.3IONS-SCNC: 11.7 MMOL/L (ref 5–15)
APTT PPP: 30 SECONDS (ref 22.7–35.4)
AST SERPL-CCNC: 22 U/L (ref 1–40)
BASOPHILS # BLD AUTO: 0.03 10*3/MM3 (ref 0–0.2)
BASOPHILS NFR BLD AUTO: 0.5 % (ref 0–1.5)
BILIRUB SERPL-MCNC: 0.4 MG/DL (ref 0–1.2)
BILIRUB UR QL STRIP: NEGATIVE
BUN SERPL-MCNC: 16 MG/DL (ref 8–23)
BUN/CREAT SERPL: 15.4 (ref 7–25)
CALCIUM SPEC-SCNC: 8.9 MG/DL (ref 8.6–10.5)
CHLORIDE SERPL-SCNC: 102 MMOL/L (ref 98–107)
CLARITY UR: CLEAR
CLOSE TME COLL+ADP + EPINEP PNL BLD: 94 % (ref 86–100)
CO2 SERPL-SCNC: 27.3 MMOL/L (ref 22–29)
COLOR UR: YELLOW
CREAT SERPL-MCNC: 1.04 MG/DL (ref 0.76–1.27)
DEPRECATED RDW RBC AUTO: 42.3 FL (ref 37–54)
EGFRCR SERPLBLD CKD-EPI 2021: 76.3 ML/MIN/1.73
EOSINOPHIL # BLD AUTO: 0.12 10*3/MM3 (ref 0–0.4)
EOSINOPHIL NFR BLD AUTO: 1.9 % (ref 0.3–6.2)
ERYTHROCYTE [DISTWIDTH] IN BLOOD BY AUTOMATED COUNT: 13.3 % (ref 12.3–15.4)
GLOBULIN UR ELPH-MCNC: 3.1 GM/DL
GLUCOSE SERPL-MCNC: 166 MG/DL (ref 65–99)
GLUCOSE UR STRIP-MCNC: ABNORMAL MG/DL
HCT VFR BLD AUTO: 39.1 % (ref 37.5–51)
HGB BLD-MCNC: 12.8 G/DL (ref 13–17.7)
HGB UR QL STRIP.AUTO: NEGATIVE
IMM GRANULOCYTES # BLD AUTO: 0.04 10*3/MM3 (ref 0–0.05)
IMM GRANULOCYTES NFR BLD AUTO: 0.6 % (ref 0–0.5)
INR PPP: 1.15 (ref 0.9–1.1)
KETONES UR QL STRIP: NEGATIVE
LEUKOCYTE ESTERASE UR QL STRIP.AUTO: NEGATIVE
LYMPHOCYTES # BLD AUTO: 1.13 10*3/MM3 (ref 0.7–3.1)
LYMPHOCYTES NFR BLD AUTO: 17.7 % (ref 19.6–45.3)
MCH RBC QN AUTO: 28.5 PG (ref 26.6–33)
MCHC RBC AUTO-ENTMCNC: 32.7 G/DL (ref 31.5–35.7)
MCV RBC AUTO: 87.1 FL (ref 79–97)
MONOCYTES # BLD AUTO: 0.37 10*3/MM3 (ref 0.1–0.9)
MONOCYTES NFR BLD AUTO: 5.8 % (ref 5–12)
NEUTROPHILS NFR BLD AUTO: 4.68 10*3/MM3 (ref 1.7–7)
NEUTROPHILS NFR BLD AUTO: 73.5 % (ref 42.7–76)
NITRITE UR QL STRIP: NEGATIVE
NRBC BLD AUTO-RTO: 0 /100 WBC (ref 0–0.2)
PH UR STRIP.AUTO: 6 [PH] (ref 5–8)
PLATELET # BLD AUTO: 242 10*3/MM3 (ref 140–450)
PMV BLD AUTO: 11.2 FL (ref 6–12)
POTASSIUM SERPL-SCNC: 3.3 MMOL/L (ref 3.5–5.2)
PROT SERPL-MCNC: 7 G/DL (ref 6–8.5)
PROT UR QL STRIP: NEGATIVE
PROTHROMBIN TIME: 14.9 SECONDS (ref 11.7–14.2)
RBC # BLD AUTO: 4.49 10*6/MM3 (ref 4.14–5.8)
RH BLD: POSITIVE
SODIUM SERPL-SCNC: 141 MMOL/L (ref 136–145)
SP GR UR STRIP: 1.01 (ref 1–1.03)
UROBILINOGEN UR QL STRIP: ABNORMAL
WBC NRBC COR # BLD AUTO: 6.37 10*3/MM3 (ref 3.4–10.8)

## 2025-01-05 PROCEDURE — 86900 BLOOD TYPING SEROLOGIC ABO: CPT

## 2025-01-05 PROCEDURE — 81003 URINALYSIS AUTO W/O SCOPE: CPT | Performed by: THORACIC SURGERY (CARDIOTHORACIC VASCULAR SURGERY)

## 2025-01-05 PROCEDURE — 82948 REAGENT STRIP/BLOOD GLUCOSE: CPT

## 2025-01-05 PROCEDURE — 86901 BLOOD TYPING SEROLOGIC RH(D): CPT

## 2025-01-05 PROCEDURE — 85610 PROTHROMBIN TIME: CPT | Performed by: THORACIC SURGERY (CARDIOTHORACIC VASCULAR SURGERY)

## 2025-01-05 PROCEDURE — 80053 COMPREHEN METABOLIC PANEL: CPT | Performed by: THORACIC SURGERY (CARDIOTHORACIC VASCULAR SURGERY)

## 2025-01-05 PROCEDURE — 85730 THROMBOPLASTIN TIME PARTIAL: CPT | Performed by: THORACIC SURGERY (CARDIOTHORACIC VASCULAR SURGERY)

## 2025-01-05 PROCEDURE — 85576 BLOOD PLATELET AGGREGATION: CPT | Performed by: THORACIC SURGERY (CARDIOTHORACIC VASCULAR SURGERY)

## 2025-01-05 PROCEDURE — 85025 COMPLETE CBC W/AUTO DIFF WBC: CPT | Performed by: THORACIC SURGERY (CARDIOTHORACIC VASCULAR SURGERY)

## 2025-01-05 RX ORDER — METOPROLOL TARTRATE 25 MG/1
12.5 TABLET, FILM COATED ORAL
Status: DISCONTINUED | OUTPATIENT
Start: 2025-01-06 | End: 2025-01-06

## 2025-01-05 RX ORDER — BISACODYL 10 MG
10 SUPPOSITORY, RECTAL RECTAL DAILY PRN
Status: DISCONTINUED | OUTPATIENT
Start: 2025-01-05 | End: 2025-01-08

## 2025-01-05 RX ORDER — HYDRALAZINE HYDROCHLORIDE 20 MG/ML
10 INJECTION INTRAMUSCULAR; INTRAVENOUS EVERY 8 HOURS PRN
Status: ACTIVE | OUTPATIENT
Start: 2025-01-05 | End: 2025-01-06

## 2025-01-05 RX ORDER — METOPROLOL TARTRATE 25 MG/1
12.5 TABLET, FILM COATED ORAL
Status: DISCONTINUED | OUTPATIENT
Start: 2025-01-06 | End: 2025-01-05

## 2025-01-05 RX ORDER — POLYETHYLENE GLYCOL 3350 17 G/17G
17 POWDER, FOR SOLUTION ORAL DAILY PRN
Status: DISCONTINUED | OUTPATIENT
Start: 2025-01-05 | End: 2025-01-08

## 2025-01-05 RX ORDER — AMOXICILLIN 250 MG
2 CAPSULE ORAL 2 TIMES DAILY PRN
Status: DISCONTINUED | OUTPATIENT
Start: 2025-01-05 | End: 2025-01-08

## 2025-01-05 RX ORDER — CHLORHEXIDINE GLUCONATE ORAL RINSE 1.2 MG/ML
15 SOLUTION DENTAL ONCE
Status: DISCONTINUED | OUTPATIENT
Start: 2025-01-05 | End: 2025-01-05

## 2025-01-05 RX ORDER — POTASSIUM CHLORIDE 750 MG/1
40 TABLET, FILM COATED, EXTENDED RELEASE ORAL EVERY 4 HOURS
Status: COMPLETED | OUTPATIENT
Start: 2025-01-05 | End: 2025-01-05

## 2025-01-05 RX ORDER — ALPRAZOLAM 0.25 MG/1
0.25 TABLET ORAL EVERY 8 HOURS PRN
Status: DISCONTINUED | OUTPATIENT
Start: 2025-01-05 | End: 2025-01-08 | Stop reason: HOSPADM

## 2025-01-05 RX ORDER — SODIUM CHLORIDE 0.9 % (FLUSH) 0.9 %
10 SYRINGE (ML) INJECTION AS NEEDED
Status: DISCONTINUED | OUTPATIENT
Start: 2025-01-05 | End: 2025-01-08

## 2025-01-05 RX ORDER — CHLORHEXIDINE GLUCONATE 500 MG/1
1 CLOTH TOPICAL EVERY 12 HOURS PRN
Status: DISCONTINUED | OUTPATIENT
Start: 2025-01-05 | End: 2025-01-08 | Stop reason: HOSPADM

## 2025-01-05 RX ORDER — DIPHENHYDRAMINE HCL 25 MG
25 CAPSULE ORAL NIGHTLY PRN
Status: DISCONTINUED | OUTPATIENT
Start: 2025-01-05 | End: 2025-01-08 | Stop reason: HOSPADM

## 2025-01-05 RX ORDER — SODIUM CHLORIDE 0.9 % (FLUSH) 0.9 %
10 SYRINGE (ML) INJECTION EVERY 12 HOURS SCHEDULED
Status: DISCONTINUED | OUTPATIENT
Start: 2025-01-05 | End: 2025-01-08

## 2025-01-05 RX ORDER — NITROGLYCERIN 0.4 MG/1
0.4 TABLET SUBLINGUAL
Status: DISCONTINUED | OUTPATIENT
Start: 2025-01-05 | End: 2025-01-08

## 2025-01-05 RX ORDER — CHLORHEXIDINE GLUCONATE ORAL RINSE 1.2 MG/ML
15 SOLUTION DENTAL ONCE
Status: COMPLETED | OUTPATIENT
Start: 2025-01-05 | End: 2025-01-05

## 2025-01-05 RX ORDER — SODIUM CHLORIDE 9 MG/ML
40 INJECTION, SOLUTION INTRAVENOUS AS NEEDED
Status: DISCONTINUED | OUTPATIENT
Start: 2025-01-05 | End: 2025-01-08

## 2025-01-05 RX ORDER — ACETAMINOPHEN 325 MG/1
650 TABLET ORAL EVERY 4 HOURS PRN
Status: DISCONTINUED | OUTPATIENT
Start: 2025-01-05 | End: 2025-01-08 | Stop reason: HOSPADM

## 2025-01-05 RX ORDER — BISACODYL 5 MG/1
5 TABLET, DELAYED RELEASE ORAL DAILY PRN
Status: DISCONTINUED | OUTPATIENT
Start: 2025-01-05 | End: 2025-01-08

## 2025-01-05 RX ADMIN — 0.12% CHLORHEXIDINE GLUCONATE 15 ML: 1.2 RINSE ORAL at 21:08

## 2025-01-05 RX ADMIN — Medication 10 ML: at 16:40

## 2025-01-05 RX ADMIN — Medication 10 ML: at 21:09

## 2025-01-05 RX ADMIN — POTASSIUM CHLORIDE 40 MEQ: 750 TABLET, EXTENDED RELEASE ORAL at 21:08

## 2025-01-05 RX ADMIN — MUPIROCIN 1 APPLICATION: 20 OINTMENT TOPICAL at 21:08

## 2025-01-05 RX ADMIN — POTASSIUM CHLORIDE 40 MEQ: 750 TABLET, EXTENDED RELEASE ORAL at 18:37

## 2025-01-05 NOTE — PLAN OF CARE
Goal Outcome Evaluation:           Progress: no change  Outcome Evaluation: 74 yr old male arrived direct admit for scheduled surgery in am. Pt denies any chest pain or soa. Preop labs completed. Reviewed surgery plan, No acute concerns. Plan of care ongoing

## 2025-01-06 ENCOUNTER — ANESTHESIA (OUTPATIENT)
Dept: PERIOP | Facility: HOSPITAL | Age: 75
End: 2025-01-06
Payer: MEDICARE

## 2025-01-06 ENCOUNTER — ANESTHESIA EVENT (OUTPATIENT)
Dept: PERIOP | Facility: HOSPITAL | Age: 75
End: 2025-01-06
Payer: MEDICARE

## 2025-01-06 LAB
ANION GAP SERPL CALCULATED.3IONS-SCNC: 12 MMOL/L (ref 5–15)
APTT PPP: 29.5 SECONDS (ref 22.7–35.4)
APTT PPP: 32.7 SECONDS (ref 22.7–35.4)
BASOPHILS # BLD AUTO: 0.03 10*3/MM3 (ref 0–0.2)
BASOPHILS NFR BLD AUTO: 0.5 % (ref 0–1.5)
BUN SERPL-MCNC: 13 MG/DL (ref 8–23)
BUN/CREAT SERPL: 13.3 (ref 7–25)
CALCIUM SPEC-SCNC: 8.7 MG/DL (ref 8.6–10.5)
CHLORIDE SERPL-SCNC: 106 MMOL/L (ref 98–107)
CO2 SERPL-SCNC: 27 MMOL/L (ref 22–29)
CREAT SERPL-MCNC: 0.98 MG/DL (ref 0.76–1.27)
DEPRECATED RDW RBC AUTO: 43.5 FL (ref 37–54)
EGFRCR SERPLBLD CKD-EPI 2021: 81.9 ML/MIN/1.73
EOSINOPHIL # BLD AUTO: 0.11 10*3/MM3 (ref 0–0.4)
EOSINOPHIL NFR BLD AUTO: 2 % (ref 0.3–6.2)
ERYTHROCYTE [DISTWIDTH] IN BLOOD BY AUTOMATED COUNT: 13.5 % (ref 12.3–15.4)
GLUCOSE BLDC GLUCOMTR-MCNC: 121 MG/DL (ref 70–130)
GLUCOSE BLDC GLUCOMTR-MCNC: 129 MG/DL (ref 70–130)
GLUCOSE BLDC GLUCOMTR-MCNC: 148 MG/DL (ref 70–130)
GLUCOSE BLDC GLUCOMTR-MCNC: 184 MG/DL (ref 70–130)
GLUCOSE BLDC GLUCOMTR-MCNC: 195 MG/DL (ref 70–130)
GLUCOSE SERPL-MCNC: 100 MG/DL (ref 65–99)
HCT VFR BLD AUTO: 37.9 % (ref 37.5–51)
HGB BLD-MCNC: 12.4 G/DL (ref 13–17.7)
IMM GRANULOCYTES # BLD AUTO: 0.02 10*3/MM3 (ref 0–0.05)
IMM GRANULOCYTES NFR BLD AUTO: 0.4 % (ref 0–0.5)
INR PPP: 1.23 (ref 0.9–1.1)
LYMPHOCYTES # BLD AUTO: 1.03 10*3/MM3 (ref 0.7–3.1)
LYMPHOCYTES NFR BLD AUTO: 18.8 % (ref 19.6–45.3)
MAGNESIUM SERPL-MCNC: 1.9 MG/DL (ref 1.6–2.4)
MCH RBC QN AUTO: 28.9 PG (ref 26.6–33)
MCHC RBC AUTO-ENTMCNC: 32.7 G/DL (ref 31.5–35.7)
MCV RBC AUTO: 88.3 FL (ref 79–97)
MONOCYTES # BLD AUTO: 0.35 10*3/MM3 (ref 0.1–0.9)
MONOCYTES NFR BLD AUTO: 6.4 % (ref 5–12)
NEUTROPHILS NFR BLD AUTO: 3.94 10*3/MM3 (ref 1.7–7)
NEUTROPHILS NFR BLD AUTO: 71.9 % (ref 42.7–76)
NRBC BLD AUTO-RTO: 0 /100 WBC (ref 0–0.2)
PLATELET # BLD AUTO: 233 10*3/MM3 (ref 140–450)
PMV BLD AUTO: 11.5 FL (ref 6–12)
POTASSIUM SERPL-SCNC: 3.4 MMOL/L (ref 3.5–5.2)
POTASSIUM SERPL-SCNC: 4.2 MMOL/L (ref 3.5–5.2)
PROTHROMBIN TIME: 15.7 SECONDS (ref 11.7–14.2)
RBC # BLD AUTO: 4.29 10*6/MM3 (ref 4.14–5.8)
SODIUM SERPL-SCNC: 145 MMOL/L (ref 136–145)
WBC NRBC COR # BLD AUTO: 5.48 10*3/MM3 (ref 3.4–10.8)

## 2025-01-06 PROCEDURE — 85730 THROMBOPLASTIN TIME PARTIAL: CPT | Performed by: NURSE PRACTITIONER

## 2025-01-06 PROCEDURE — 80048 BASIC METABOLIC PNL TOTAL CA: CPT | Performed by: THORACIC SURGERY (CARDIOTHORACIC VASCULAR SURGERY)

## 2025-01-06 PROCEDURE — 85025 COMPLETE CBC W/AUTO DIFF WBC: CPT | Performed by: NURSE PRACTITIONER

## 2025-01-06 PROCEDURE — 85730 THROMBOPLASTIN TIME PARTIAL: CPT | Performed by: THORACIC SURGERY (CARDIOTHORACIC VASCULAR SURGERY)

## 2025-01-06 PROCEDURE — 82948 REAGENT STRIP/BLOOD GLUCOSE: CPT

## 2025-01-06 PROCEDURE — 25010000002 HEPARIN (PORCINE) 25000-0.45 UT/250ML-% SOLUTION: Performed by: NURSE PRACTITIONER

## 2025-01-06 PROCEDURE — 63710000001 INSULIN LISPRO (HUMAN) PER 5 UNITS: Performed by: NURSE PRACTITIONER

## 2025-01-06 PROCEDURE — 85610 PROTHROMBIN TIME: CPT | Performed by: NURSE PRACTITIONER

## 2025-01-06 PROCEDURE — 83735 ASSAY OF MAGNESIUM: CPT | Performed by: THORACIC SURGERY (CARDIOTHORACIC VASCULAR SURGERY)

## 2025-01-06 PROCEDURE — 84132 ASSAY OF SERUM POTASSIUM: CPT | Performed by: THORACIC SURGERY (CARDIOTHORACIC VASCULAR SURGERY)

## 2025-01-06 RX ORDER — IBUPROFEN 600 MG/1
1 TABLET ORAL
Status: DISCONTINUED | OUTPATIENT
Start: 2025-01-06 | End: 2025-01-08

## 2025-01-06 RX ORDER — METOPROLOL TARTRATE 25 MG/1
12.5 TABLET, FILM COATED ORAL
Status: COMPLETED | OUTPATIENT
Start: 2025-01-08 | End: 2025-01-08

## 2025-01-06 RX ORDER — HEPARIN SODIUM 10000 [USP'U]/100ML
9.09 INJECTION, SOLUTION INTRAVENOUS
Status: DISCONTINUED | OUTPATIENT
Start: 2025-01-06 | End: 2025-01-08

## 2025-01-06 RX ORDER — METOPROLOL TARTRATE 25 MG/1
25 TABLET, FILM COATED ORAL EVERY 12 HOURS SCHEDULED
Status: DISCONTINUED | OUTPATIENT
Start: 2025-01-06 | End: 2025-01-07

## 2025-01-06 RX ORDER — CHLORHEXIDINE GLUCONATE ORAL RINSE 1.2 MG/ML
15 SOLUTION DENTAL EVERY 12 HOURS SCHEDULED
Status: DISCONTINUED | OUTPATIENT
Start: 2025-01-06 | End: 2025-01-06

## 2025-01-06 RX ORDER — NICOTINE POLACRILEX 4 MG
15 LOZENGE BUCCAL
Status: DISCONTINUED | OUTPATIENT
Start: 2025-01-06 | End: 2025-01-08

## 2025-01-06 RX ORDER — CHLORHEXIDINE GLUCONATE ORAL RINSE 1.2 MG/ML
15 SOLUTION DENTAL ONCE
Status: COMPLETED | OUTPATIENT
Start: 2025-01-06 | End: 2025-01-06

## 2025-01-06 RX ORDER — INSULIN LISPRO 100 [IU]/ML
2-9 INJECTION, SOLUTION INTRAVENOUS; SUBCUTANEOUS
Status: DISCONTINUED | OUTPATIENT
Start: 2025-01-06 | End: 2025-01-08

## 2025-01-06 RX ORDER — CHLORHEXIDINE GLUCONATE 500 MG/1
1 CLOTH TOPICAL EVERY 12 HOURS
Status: DISCONTINUED | OUTPATIENT
Start: 2025-01-07 | End: 2025-01-08

## 2025-01-06 RX ORDER — ASPIRIN 81 MG/1
81 TABLET ORAL DAILY
Status: DISCONTINUED | OUTPATIENT
Start: 2025-01-06 | End: 2025-01-08

## 2025-01-06 RX ORDER — DEXTROSE MONOHYDRATE 25 G/50ML
25 INJECTION, SOLUTION INTRAVENOUS
Status: DISCONTINUED | OUTPATIENT
Start: 2025-01-06 | End: 2025-01-08

## 2025-01-06 RX ORDER — CHLORHEXIDINE GLUCONATE ORAL RINSE 1.2 MG/ML
15 SOLUTION DENTAL EVERY 12 HOURS SCHEDULED
Status: COMPLETED | OUTPATIENT
Start: 2025-01-07 | End: 2025-01-07

## 2025-01-06 RX ORDER — GABAPENTIN 300 MG/1
300 CAPSULE ORAL EVERY 12 HOURS SCHEDULED
Status: DISCONTINUED | OUTPATIENT
Start: 2025-01-06 | End: 2025-01-08

## 2025-01-06 RX ORDER — TERAZOSIN 5 MG/1
5 CAPSULE ORAL NIGHTLY
Status: DISCONTINUED | OUTPATIENT
Start: 2025-01-06 | End: 2025-01-08

## 2025-01-06 RX ORDER — POTASSIUM CHLORIDE 750 MG/1
40 TABLET, FILM COATED, EXTENDED RELEASE ORAL EVERY 4 HOURS
Status: COMPLETED | OUTPATIENT
Start: 2025-01-06 | End: 2025-01-06

## 2025-01-06 RX ADMIN — ESCITALOPRAM OXALATE 15 MG: 10 TABLET ORAL at 18:46

## 2025-01-06 RX ADMIN — HEPARIN SODIUM 9.09 UNITS/KG/HR: 10000 INJECTION, SOLUTION INTRAVENOUS at 11:15

## 2025-01-06 RX ADMIN — POTASSIUM CHLORIDE 40 MEQ: 750 TABLET, EXTENDED RELEASE ORAL at 11:15

## 2025-01-06 RX ADMIN — INSULIN LISPRO 2 UNITS: 100 INJECTION, SOLUTION INTRAVENOUS; SUBCUTANEOUS at 13:14

## 2025-01-06 RX ADMIN — GABAPENTIN 300 MG: 300 CAPSULE ORAL at 11:15

## 2025-01-06 RX ADMIN — METOPROLOL TARTRATE 25 MG: 25 TABLET, FILM COATED ORAL at 21:52

## 2025-01-06 RX ADMIN — TERAZOSIN HYDROCHLORIDE 5 MG: 5 CAPSULE ORAL at 21:52

## 2025-01-06 RX ADMIN — 0.12% CHLORHEXIDINE GLUCONATE 15 ML: 1.2 RINSE ORAL at 11:25

## 2025-01-06 RX ADMIN — ASPIRIN 81 MG: 81 TABLET, COATED ORAL at 11:15

## 2025-01-06 RX ADMIN — POTASSIUM CHLORIDE 40 MEQ: 750 TABLET, EXTENDED RELEASE ORAL at 05:06

## 2025-01-06 RX ADMIN — Medication 10 ML: at 11:24

## 2025-01-06 RX ADMIN — 0.12% CHLORHEXIDINE GLUCONATE 15 ML: 1.2 RINSE ORAL at 11:15

## 2025-01-06 RX ADMIN — METOPROLOL TARTRATE 25 MG: 25 TABLET, FILM COATED ORAL at 11:15

## 2025-01-06 RX ADMIN — Medication 10 ML: at 21:54

## 2025-01-06 RX ADMIN — GABAPENTIN 300 MG: 300 CAPSULE ORAL at 21:52

## 2025-01-06 NOTE — INTERVAL H&P NOTE
H&P updated. The patient was examined and the following changes are noted:  admitted early due to inclement weather.  Case delayed due to weather.  Plan for surgery on Wednesday 0730

## 2025-01-06 NOTE — PLAN OF CARE
Goal Outcome Evaluation:           Progress: no change  Outcome Evaluation: 74 yr old male AOx4 VSS hr 72 Afib with multiple PVC. Surgery cancelled for today and reschedule for Wednesday. Potassium replaced per protocol. Heparin drip started as ordered. No acute concerns at present. Plan of care ongoing

## 2025-01-06 NOTE — PLAN OF CARE
Goal Outcome Evaluation:  Plan of Care Reviewed With: patient        Progress: improving  Outcome Evaluation: Pt aox4, on RA, no c/o pain. VSS. Afib on monitor with PVCs, potassium replaced per protocol, plan for surgery today.  Continue plan of care.                              Went over exam prep with pt 8:45 check in time

## 2025-01-07 LAB
APTT PPP: 37.4 SECONDS (ref 22.7–35.4)
APTT PPP: 41.9 SECONDS (ref 22.7–35.4)
APTT PPP: 50.2 SECONDS (ref 22.7–35.4)
APTT PPP: 54.4 SECONDS (ref 22.7–35.4)
APTT PPP: 63.5 SECONDS (ref 22.7–35.4)
BASOPHILS # BLD AUTO: 0.05 10*3/MM3 (ref 0–0.2)
BASOPHILS NFR BLD AUTO: 1 % (ref 0–1.5)
DEPRECATED RDW RBC AUTO: 44 FL (ref 37–54)
EOSINOPHIL # BLD AUTO: 0.21 10*3/MM3 (ref 0–0.4)
EOSINOPHIL NFR BLD AUTO: 4.1 % (ref 0.3–6.2)
ERYTHROCYTE [DISTWIDTH] IN BLOOD BY AUTOMATED COUNT: 13.5 % (ref 12.3–15.4)
GLUCOSE BLDC GLUCOMTR-MCNC: 111 MG/DL (ref 70–130)
GLUCOSE BLDC GLUCOMTR-MCNC: 130 MG/DL (ref 70–130)
GLUCOSE BLDC GLUCOMTR-MCNC: 176 MG/DL (ref 70–130)
GLUCOSE BLDC GLUCOMTR-MCNC: 93 MG/DL (ref 70–130)
HCT VFR BLD AUTO: 37.4 % (ref 37.5–51)
HGB BLD-MCNC: 12.1 G/DL (ref 13–17.7)
IMM GRANULOCYTES # BLD AUTO: 0.02 10*3/MM3 (ref 0–0.05)
IMM GRANULOCYTES NFR BLD AUTO: 0.4 % (ref 0–0.5)
LYMPHOCYTES # BLD AUTO: 1.59 10*3/MM3 (ref 0.7–3.1)
LYMPHOCYTES NFR BLD AUTO: 31.1 % (ref 19.6–45.3)
MCH RBC QN AUTO: 28.7 PG (ref 26.6–33)
MCHC RBC AUTO-ENTMCNC: 32.4 G/DL (ref 31.5–35.7)
MCV RBC AUTO: 88.8 FL (ref 79–97)
MONOCYTES # BLD AUTO: 0.34 10*3/MM3 (ref 0.1–0.9)
MONOCYTES NFR BLD AUTO: 6.7 % (ref 5–12)
NEUTROPHILS NFR BLD AUTO: 2.9 10*3/MM3 (ref 1.7–7)
NEUTROPHILS NFR BLD AUTO: 56.7 % (ref 42.7–76)
NRBC BLD AUTO-RTO: 0 /100 WBC (ref 0–0.2)
PLATELET # BLD AUTO: 243 10*3/MM3 (ref 140–450)
PMV BLD AUTO: 11.8 FL (ref 6–12)
RBC # BLD AUTO: 4.21 10*6/MM3 (ref 4.14–5.8)
WBC NRBC COR # BLD AUTO: 5.11 10*3/MM3 (ref 3.4–10.8)

## 2025-01-07 PROCEDURE — 82948 REAGENT STRIP/BLOOD GLUCOSE: CPT

## 2025-01-07 PROCEDURE — 85025 COMPLETE CBC W/AUTO DIFF WBC: CPT | Performed by: NURSE PRACTITIONER

## 2025-01-07 PROCEDURE — 85730 THROMBOPLASTIN TIME PARTIAL: CPT | Performed by: NURSE PRACTITIONER

## 2025-01-07 PROCEDURE — 63710000001 INSULIN LISPRO (HUMAN) PER 5 UNITS: Performed by: NURSE PRACTITIONER

## 2025-01-07 PROCEDURE — 85730 THROMBOPLASTIN TIME PARTIAL: CPT | Performed by: THORACIC SURGERY (CARDIOTHORACIC VASCULAR SURGERY)

## 2025-01-07 PROCEDURE — 25010000002 HEPARIN (PORCINE) 25000-0.45 UT/250ML-% SOLUTION: Performed by: NURSE PRACTITIONER

## 2025-01-07 PROCEDURE — 99024 POSTOP FOLLOW-UP VISIT: CPT | Performed by: NURSE PRACTITIONER

## 2025-01-07 PROCEDURE — 99222 1ST HOSP IP/OBS MODERATE 55: CPT | Performed by: INTERNAL MEDICINE

## 2025-01-07 PROCEDURE — 85730 THROMBOPLASTIN TIME PARTIAL: CPT | Performed by: INTERNAL MEDICINE

## 2025-01-07 RX ORDER — ACETAMINOPHEN 500 MG
1000 TABLET ORAL ONCE
Status: CANCELLED | OUTPATIENT
Start: 2025-01-07 | End: 2025-01-07

## 2025-01-07 RX ORDER — METOPROLOL TARTRATE 50 MG
50 TABLET ORAL EVERY 12 HOURS SCHEDULED
Status: DISCONTINUED | OUTPATIENT
Start: 2025-01-07 | End: 2025-01-08

## 2025-01-07 RX ORDER — SODIUM CHLORIDE 0.9 % (FLUSH) 0.9 %
10 SYRINGE (ML) INJECTION AS NEEDED
Status: CANCELLED | OUTPATIENT
Start: 2025-01-07

## 2025-01-07 RX ORDER — LORAZEPAM 2 MG/ML
1 INJECTION INTRAMUSCULAR ONCE
Status: CANCELLED | OUTPATIENT
Start: 2025-01-08 | End: 2025-01-08

## 2025-01-07 RX ORDER — SODIUM CHLORIDE 0.9 % (FLUSH) 0.9 %
10 SYRINGE (ML) INJECTION EVERY 12 HOURS SCHEDULED
Status: CANCELLED | OUTPATIENT
Start: 2025-01-07

## 2025-01-07 RX ORDER — SODIUM CHLORIDE 9 MG/ML
40 INJECTION, SOLUTION INTRAVENOUS AS NEEDED
Status: CANCELLED | OUTPATIENT
Start: 2025-01-07

## 2025-01-07 RX ORDER — FUROSEMIDE 40 MG/1
80 TABLET ORAL DAILY
Status: DISCONTINUED | OUTPATIENT
Start: 2025-01-07 | End: 2025-01-08

## 2025-01-07 RX ORDER — NIFEDIPINE 60 MG/1
60 TABLET, EXTENDED RELEASE ORAL
Status: DISCONTINUED | OUTPATIENT
Start: 2025-01-07 | End: 2025-01-08

## 2025-01-07 RX ORDER — FAMOTIDINE 10 MG/ML
20 INJECTION, SOLUTION INTRAVENOUS ONCE
Status: CANCELLED | OUTPATIENT
Start: 2025-01-08 | End: 2025-01-08

## 2025-01-07 RX ADMIN — ESCITALOPRAM OXALATE 15 MG: 10 TABLET ORAL at 18:40

## 2025-01-07 RX ADMIN — METOPROLOL TARTRATE 25 MG: 25 TABLET, FILM COATED ORAL at 09:39

## 2025-01-07 RX ADMIN — Medication 10 ML: at 21:57

## 2025-01-07 RX ADMIN — HEPARIN SODIUM 19.09 UNITS/KG/HR: 10000 INJECTION, SOLUTION INTRAVENOUS at 21:29

## 2025-01-07 RX ADMIN — 0.12% CHLORHEXIDINE GLUCONATE 15 ML: 1.2 RINSE ORAL at 21:57

## 2025-01-07 RX ADMIN — HEPARIN SODIUM 15.09 UNITS/KG/HR: 10000 INJECTION, SOLUTION INTRAVENOUS at 06:35

## 2025-01-07 RX ADMIN — GABAPENTIN 300 MG: 300 CAPSULE ORAL at 09:39

## 2025-01-07 RX ADMIN — METOPROLOL TARTRATE 50 MG: 50 TABLET, FILM COATED ORAL at 21:53

## 2025-01-07 RX ADMIN — MUPIROCIN 1 APPLICATION: 20 OINTMENT TOPICAL at 21:57

## 2025-01-07 RX ADMIN — GABAPENTIN 300 MG: 300 CAPSULE ORAL at 21:53

## 2025-01-07 RX ADMIN — Medication 10 ML: at 09:40

## 2025-01-07 RX ADMIN — FUROSEMIDE 80 MG: 40 TABLET ORAL at 12:34

## 2025-01-07 RX ADMIN — ASPIRIN 81 MG: 81 TABLET, COATED ORAL at 09:39

## 2025-01-07 RX ADMIN — INSULIN LISPRO 2 UNITS: 100 INJECTION, SOLUTION INTRAVENOUS; SUBCUTANEOUS at 12:33

## 2025-01-07 RX ADMIN — NIFEDIPINE 60 MG: 60 TABLET, FILM COATED, EXTENDED RELEASE ORAL at 12:34

## 2025-01-07 RX ADMIN — TERAZOSIN HYDROCHLORIDE 5 MG: 5 CAPSULE ORAL at 21:53

## 2025-01-07 NOTE — PROGRESS NOTES
" LOS: 2 days   Patient Care Team:  Tera Salvador MD as PCP - General (Internal Medicine)  Micah Reyes MD as Consulting Physician (Gastroenterology)  Bruna Chávez MD as Referring Physician (Cardiology)    Chief Complaint: post op open heart    Subjective  No new complaints    Vital Signs  Temp:  [97.5 °F (36.4 °C)-98 °F (36.7 °C)] 97.9 °F (36.6 °C)  Heart Rate:  [66-73] 66  Resp:  [16] 16  BP: (126-166)/(93-98) 126/94  Body mass index is 31.25 kg/m².    Intake/Output Summary (Last 24 hours) at 1/7/2025 0924  Last data filed at 1/7/2025 0606  Gross per 24 hour   Intake 480 ml   Output 675 ml   Net -195 ml     No intake/output data recorded.    Chest tube drainage last 8 hours         01/05/25  1415 01/06/25  0556   Weight: 112 kg (246 lb 3.2 oz) 110 kg (243 lb 6.4 oz)         Objective:  Vital signs: (most recent): Blood pressure 126/94, pulse 66, temperature 97.9 °F (36.6 °C), temperature source Oral, resp. rate 16, height 188 cm (74\"), weight 110 kg (243 lb 6.4 oz), SpO2 95%.                Physical Exam:   General Appearance: awake and alert, no acute distress   Lungs: clear to auscultation, respirations regular, respirations even, and respirations unlabored   Heart:  murmur   Abdomen: bowel sounds present and normal in all 4 quadrants    Skin:clean/dry   Neuro: alert and oriented, no focal deficits.     Results Review:        WBC WBC   Date Value Ref Range Status   01/07/2025 5.11 3.40 - 10.80 10*3/mm3 Final   01/06/2025 5.48 3.40 - 10.80 10*3/mm3 Final   01/05/2025 6.37 3.40 - 10.80 10*3/mm3 Final      HGB Hemoglobin   Date Value Ref Range Status   01/07/2025 12.1 (L) 13.0 - 17.7 g/dL Final   01/06/2025 12.4 (L) 13.0 - 17.7 g/dL Final   01/05/2025 12.8 (L) 13.0 - 17.7 g/dL Final      HCT Hematocrit   Date Value Ref Range Status   01/07/2025 37.4 (L) 37.5 - 51.0 % Final   01/06/2025 37.9 37.5 - 51.0 % Final   01/05/2025 39.1 37.5 - 51.0 % Final      Platelets Platelets   Date Value Ref Range Status "   01/07/2025 243 140 - 450 10*3/mm3 Final   01/06/2025 233 140 - 450 10*3/mm3 Final   01/05/2025 242 140 - 450 10*3/mm3 Final        PT/INR:    Protime   Date Value Ref Range Status   01/06/2025 15.7 (H) 11.7 - 14.2 Seconds Final   01/05/2025 14.9 (H) 11.7 - 14.2 Seconds Final   /  INR   Date Value Ref Range Status   01/06/2025 1.23 (H) 0.90 - 1.10 Final   01/05/2025 1.15 (H) 0.90 - 1.10 Final       Sodium Sodium   Date Value Ref Range Status   01/06/2025 145 136 - 145 mmol/L Final   01/05/2025 141 136 - 145 mmol/L Final      Potassium Potassium   Date Value Ref Range Status   01/06/2025 4.2 3.5 - 5.2 mmol/L Final   01/06/2025 3.4 (L) 3.5 - 5.2 mmol/L Final   01/05/2025 3.3 (L) 3.5 - 5.2 mmol/L Final      Chloride Chloride   Date Value Ref Range Status   01/06/2025 106 98 - 107 mmol/L Final   01/05/2025 102 98 - 107 mmol/L Final      Bicarbonate CO2   Date Value Ref Range Status   01/06/2025 27.0 22.0 - 29.0 mmol/L Final   01/05/2025 27.3 22.0 - 29.0 mmol/L Final      BUN BUN   Date Value Ref Range Status   01/06/2025 13 8 - 23 mg/dL Final   01/05/2025 16 8 - 23 mg/dL Final      Creatinine Creatinine   Date Value Ref Range Status   01/06/2025 0.98 0.76 - 1.27 mg/dL Final   01/05/2025 1.04 0.76 - 1.27 mg/dL Final      Calcium Calcium   Date Value Ref Range Status   01/06/2025 8.7 8.6 - 10.5 mg/dL Final   01/05/2025 8.9 8.6 - 10.5 mg/dL Final      Magnesium Magnesium   Date Value Ref Range Status   01/06/2025 1.9 1.6 - 2.4 mg/dL Final          aspirin, 81 mg, Oral, Daily  [START ON 1/8/2025] ceFAZolin, 2,000 mg, Intravenous, On Call to OR  chlorhexidine, 15 mL, Mouth/Throat, Q12H  Chlorhexidine Gluconate Cloth, 1 Application, Topical, Q12H  escitalopram, 15 mg, Oral, Q PM  gabapentin, 300 mg, Oral, Q12H  insulin lispro, 2-9 Units, Subcutaneous, 4x Daily AC & at Bedtime  [START ON 1/8/2025] metoprolol tartrate, 12.5 mg, Oral, On Call to OR  metoprolol tartrate, 25 mg, Oral, Q12H  mupirocin, 1 Application, Each Nare,  Q12H  sodium chloride, 10 mL, Intravenous, Q12H  terazosin, 5 mg, Oral, Nightly      heparin, 9.09 Units/kg/hr, Last Rate: 16.09 Units/kg/hr (01/07/25 1959)              Mitral valve regurgitation    Severe mitral regurgitation      Assessment & Plan    - Severe mitral valve regurgitation  - CAD with previous stent to LAD (2022)  - Atrial fibrillation, recent diagnosis   - DM II  - HTN  - HLD  - HOLLI with CPAP    Discontinue heparin on call to OR  Plan for surgery tomorrow.        TIFFANIE Pond  01/07/25  09:24 EST
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
## 2025-01-07 NOTE — CASE MANAGEMENT/SOCIAL WORK
Discharge Planning Assessment  [facility]     Patient Name: Jeb Olson  MRN: [id number]  Today's Date: 1/7/2025    Admit Date: 1/5/2025    Plan: Home w/ Geoff YOUNG   Discharge Needs Assessment       Row Name 01/07/25 1553       Living Environment    People in Home spouse    Name(s) of People in Home ONI OLSON/WIFE    Current Living Arrangements home    Potentially Unsafe Housing Conditions none    Primary Care Provided by self    Provides Primary Care For no one    Family Caregiver if Needed spouse    Family Caregiver Names ONI    Quality of Family Relationships helpful;involved;supportive    Able to Return to Prior Arrangements yes       Resource/Environmental Concerns    Resource/Environmental Concerns home accessibility    Home Accessibility Concerns stairs to enter home    Transportation Concerns none       Transportation Needs    In the past 12 months, has lack of transportation kept you from medical appointments or from getting medications? no       Food Insecurity    Within the past 12 months, you worried that your food would run out before you got the money to buy more. Never true       Transition Planning    Patient/Family Anticipates Transition to home with family    Patient/Family Anticipated Services at Transition home health care    Transportation Anticipated family or friend will provide       Discharge Needs Assessment    Readmission Within the Last 30 Days planned readmission    Equipment Currently Used at Home cpap;glucometer;cane, straight;grab bar;pulse ox;shower chair;walker, rolling;other (see comments)  stair/chair lift to basement    Concerns to be Addressed discharge planning    Do you want help with school or training? For example, starting or completing job training or getting a high school diploma, GED or equivalent No    Anticipated Changes Related to Illness none    Equipment Needed After Discharge none    Discharge Facility/Level of Care Needs home with home health     Provided Post Acute Provider Quality & Resource List? N/A    Patient's Choice of Community Agency(s) Pt reports he has used [facility] in the past and wants to use them again                   Discharge Plan       Row Name 01/07/25 5117       Plan    Plan Home w/ [facility]    Plan Comments CCP spoke with patient at bedside. Introduced self and role.  Discharge planning discussed.  Information on face sheet verified.  Patient stated he is IADL's, retired and drives.  Patient lives with spouse/Jr Olson in a single-story home with a basement and five entrance stair steps.  Pt has electric chair lift to the basement.  Patients PCP confirmed as, Tera Salvador and home pharmacy is [facility].  Patient has the following DME- CPAP ([employer]), pulse oximeter, straight cane (at bedside), rolling walker, glucometer, grab bars, shower chair, BP cuff and scale.  Patient has used [facility] in the past (3/2024).  Pt denies past sub-acute rehab.  Patient plans to return home at discharge with 24/7 assistance of spouse after his heart surgery.  Pt chose [facility] to follow post discharge.  Referral sent to [facility], and they accepted.  CCP will f/u after surgery.…Irina BYRNE /ACE.                  Continued Care and Services - Admitted Since 1/5/2025       Home Medical Care Coordination complete.      Service Provider Request Status Services Address Phone Fax Patient Preferred    [facility]  Selected Home Health Services [address] [place] [id number] [phone] --                  Expected Discharge Date and Time       Expected Discharge Date Expected Discharge Time    Jan 12, 2025            Demographic Summary       Row Name 01/07/25 1548       General Information    Admission Type inpatient    Arrived From home    Required Notices Provided Important Message from Medicare    Referral Source admission list;physician  scheduled for heart surgery this admission     Reason for Consult discharge planning    Preferred Language English       Contact Information    Permission Granted to Share Info With family/designee                   Functional Status       Row Name 01/07/25 1552       Functional Status    Usual Activity Tolerance good    Current Activity Tolerance good       Assessment of Health Literacy    How often do you have someone help you read hospital materials? Never    Health Literacy Good       Functional Status, IADL    Medications independent    Meal Preparation assistive person    Housekeeping assistive equipment and person    Laundry assistive equipment and person    Shopping assistive equipment and person    IADL Comments Uses straight cane when ambulating.       Mental Status    General Appearance WDL WDL       Mental Status Summary    Recent Changes in Mental Status/Cognitive Functioning no changes       Employment/    Employment Status retired                   Psychosocial    No documentation.                  Abuse/Neglect    No documentation.                  Legal    No documentation.                  Substance Abuse    No documentation.                  Patient Forms    No documentation.                     Irina Doyle RN

## 2025-01-07 NOTE — DISCHARGE PLACEMENT REQUEST
"Blayne Olson \"Rosas\" (74 y.o. Male)       Date of Birth   1950    Social Security Number       Address   [address] [address]    Home Phone   [phone]    MRN   [id number]       Sikh   Spiritism    Marital Status                               Admission Date   1/5/25    Admission Type   Urgent    Admitting Provider   Young Kelly MD    Attending Provider   Young Kelly MD    Department, Room/Bed   [facility] CARDIOVASC UNIT, 2212/1       Discharge Date       Discharge Disposition       Discharge Destination                                 Attending Provider: Young Kelly MD    Allergies: No Known Allergies    Isolation: None   Infection: None   Code Status: CPR    Ht: 188 cm (74\")   Wt: 110 kg (243 lb 6.4 oz)    Admission Cmt: None   Principal Problem: Mitral valve regurgitation [I34.0]                   Active Insurance as of 1/5/2025       Primary Coverage       Payor Plan Insurance Group Employer/Plan Group    ANTHEM MEDICARE REPLACEMENT ANTHEM MED ADV PPO [id number]       Payor Plan Address Payor Plan Phone Number Payor Plan Fax Number Effective Dates    [address] [phone]  1/1/2024 - None Entered    [facility] [id number]         Subscriber Name Subscriber Birth Date Member ID       BLAYNE OLSON 1950 [id number]                     Emergency Contacts        (Rel.) Home Phone Work Phone Mobile Phone    Jr Olson (Spouse) [phone] -- [phone]    Fragoso,Lalo (Son) -- -- [phone]                "

## 2025-01-07 NOTE — PLAN OF CARE
Goal Outcome Evaluation:  Plan of Care Reviewed With: patient        Progress: improving  Outcome Evaluation: Pt aox4, using CPAP while sleeping, VSS. Afib with PVCs on monitor, Heparin gtt infusing, Surgery planned for wednesday, continue plan of care.

## 2025-01-07 NOTE — PLAN OF CARE
Goal Outcome Evaluation:           Progress: no change  Outcome Evaluation: 74 yr old Male AOx4 VSS 60's Afib with PVC's, Heparin drip per protocol. Pt up ambulating in turpin frequently. Pt denies any chest pain or soa. preop for surgery in am. Plan of care ongoing.

## 2025-01-08 ENCOUNTER — APPOINTMENT (OUTPATIENT)
Dept: GENERAL RADIOLOGY | Facility: HOSPITAL | Age: 75
DRG: 220 | End: 2025-01-08
Payer: MEDICARE

## 2025-01-08 ENCOUNTER — ANCILLARY PROCEDURE (OUTPATIENT)
Dept: PERIOP | Facility: HOSPITAL | Age: 75
DRG: 220 | End: 2025-01-08
Payer: MEDICARE

## 2025-01-08 LAB
ACT BLD: 118 SECONDS (ref 82–152)
ACT BLD: 141 SECONDS (ref 82–152)
ACT BLD: 331 SECONDS (ref 82–152)
ACT BLD: 354 SECONDS (ref 82–152)
ACT BLD: 383 SECONDS (ref 82–152)
ACT BLD: 389 SECONDS (ref 82–152)
ALBUMIN SERPL-MCNC: 3.7 G/DL (ref 3.5–5.2)
ALBUMIN SERPL-MCNC: 4 G/DL (ref 3.5–5.2)
ANION GAP SERPL CALCULATED.3IONS-SCNC: 11.1 MMOL/L (ref 5–15)
ANION GAP SERPL CALCULATED.3IONS-SCNC: 11.2 MMOL/L (ref 5–15)
ANION GAP SERPL CALCULATED.3IONS-SCNC: 15 MMOL/L (ref 5–15)
APTT PPP: 28.4 SECONDS (ref 22.7–35.4)
APTT PPP: 89.6 SECONDS (ref 22.7–35.4)
ARTERIAL PATENCY WRIST A: ABNORMAL
ARTERIAL PATENCY WRIST A: ABNORMAL
ATMOSPHERIC PRESS: 756.8 MMHG
ATMOSPHERIC PRESS: 759.4 MMHG
BASE EXCESS BLDA CALC-SCNC: -3.3 MMOL/L (ref 0–2)
BASE EXCESS BLDA CALC-SCNC: 0 MMOL/L (ref -5–5)
BASE EXCESS BLDA CALC-SCNC: 0.2 MMOL/L (ref 0–2)
BASE EXCESS BLDA CALC-SCNC: 1 MMOL/L (ref -5–5)
BASE EXCESS BLDA CALC-SCNC: 1 MMOL/L (ref -5–5)
BASOPHILS # BLD AUTO: 0 10*3/MM3 (ref 0–0.2)
BASOPHILS # BLD AUTO: 0.03 10*3/MM3 (ref 0–0.2)
BASOPHILS NFR BLD AUTO: 0 % (ref 0–1.5)
BASOPHILS NFR BLD AUTO: 0.5 % (ref 0–1.5)
BDY SITE: ABNORMAL
BDY SITE: ABNORMAL
BUN SERPL-MCNC: 18 MG/DL (ref 8–23)
BUN SERPL-MCNC: 18 MG/DL (ref 8–23)
BUN SERPL-MCNC: 21 MG/DL (ref 8–23)
BUN/CREAT SERPL: 12.1 (ref 7–25)
BUN/CREAT SERPL: 12.7 (ref 7–25)
BUN/CREAT SERPL: 14.2 (ref 7–25)
CA-I SERPL ISE-MCNC: 1.2 MMOL/L (ref 1.15–1.35)
CALCIUM SPEC-SCNC: 8.4 MG/DL (ref 8.6–10.5)
CALCIUM SPEC-SCNC: 8.7 MG/DL (ref 8.6–10.5)
CALCIUM SPEC-SCNC: 8.7 MG/DL (ref 8.6–10.5)
CHLORIDE SERPL-SCNC: 107 MMOL/L (ref 98–107)
CHLORIDE SERPL-SCNC: 107 MMOL/L (ref 98–107)
CHLORIDE SERPL-SCNC: 108 MMOL/L (ref 98–107)
CO2 BLDA-SCNC: 23.3 MMOL/L (ref 23–27)
CO2 BLDA-SCNC: 23.9 MMOL/L (ref 23–27)
CO2 BLDA-SCNC: 26 MMOL/L (ref 24–29)
CO2 BLDA-SCNC: 28 MMOL/L (ref 24–29)
CO2 BLDA-SCNC: 28 MMOL/L (ref 24–29)
CO2 BLDA-SCNC: 29 MMOL/L (ref 24–29)
CO2 SERPL-SCNC: 19 MMOL/L (ref 22–29)
CO2 SERPL-SCNC: 19.9 MMOL/L (ref 22–29)
CO2 SERPL-SCNC: 25.8 MMOL/L (ref 22–29)
CREAT SERPL-MCNC: 1.27 MG/DL (ref 0.76–1.27)
CREAT SERPL-MCNC: 1.42 MG/DL (ref 0.76–1.27)
CREAT SERPL-MCNC: 1.73 MG/DL (ref 0.76–1.27)
DEPRECATED RDW RBC AUTO: 43.3 FL (ref 37–54)
DEPRECATED RDW RBC AUTO: 44.6 FL (ref 37–54)
DEPRECATED RDW RBC AUTO: 47.3 FL (ref 37–54)
EGFRCR SERPLBLD CKD-EPI 2021: 41.4 ML/MIN/1.73
EGFRCR SERPLBLD CKD-EPI 2021: 52.5 ML/MIN/1.73
EGFRCR SERPLBLD CKD-EPI 2021: 60 ML/MIN/1.73
EOSINOPHIL # BLD AUTO: 0.02 10*3/MM3 (ref 0–0.4)
EOSINOPHIL # BLD AUTO: 0.16 10*3/MM3 (ref 0–0.4)
EOSINOPHIL NFR BLD AUTO: 0.4 % (ref 0.3–6.2)
EOSINOPHIL NFR BLD AUTO: 2.9 % (ref 0.3–6.2)
ERYTHROCYTE [DISTWIDTH] IN BLOOD BY AUTOMATED COUNT: 13.5 % (ref 12.3–15.4)
ERYTHROCYTE [DISTWIDTH] IN BLOOD BY AUTOMATED COUNT: 13.7 % (ref 12.3–15.4)
ERYTHROCYTE [DISTWIDTH] IN BLOOD BY AUTOMATED COUNT: 13.9 % (ref 12.3–15.4)
FIBRINOGEN PPP-MCNC: 208 MG/DL (ref 219–464)
FIBRINOGEN PPP-MCNC: 228 MG/DL (ref 219–464)
GLUCOSE BLDC GLUCOMTR-MCNC: 130 MG/DL (ref 70–130)
GLUCOSE BLDC GLUCOMTR-MCNC: 142 MG/DL (ref 70–130)
GLUCOSE BLDC GLUCOMTR-MCNC: 147 MG/DL (ref 70–130)
GLUCOSE BLDC GLUCOMTR-MCNC: 148 MG/DL (ref 70–130)
GLUCOSE BLDC GLUCOMTR-MCNC: 148 MG/DL (ref 70–130)
GLUCOSE BLDC GLUCOMTR-MCNC: 149 MG/DL (ref 70–130)
GLUCOSE BLDC GLUCOMTR-MCNC: 151 MG/DL (ref 70–130)
GLUCOSE BLDC GLUCOMTR-MCNC: 154 MG/DL (ref 70–130)
GLUCOSE BLDC GLUCOMTR-MCNC: 155 MG/DL (ref 70–130)
GLUCOSE BLDC GLUCOMTR-MCNC: 155 MG/DL (ref 70–130)
GLUCOSE BLDC GLUCOMTR-MCNC: 157 MG/DL (ref 70–130)
GLUCOSE BLDC GLUCOMTR-MCNC: 164 MG/DL (ref 70–130)
GLUCOSE BLDC GLUCOMTR-MCNC: 168 MG/DL (ref 70–130)
GLUCOSE BLDC GLUCOMTR-MCNC: 169 MG/DL (ref 70–130)
GLUCOSE BLDC GLUCOMTR-MCNC: 180 MG/DL (ref 70–130)
GLUCOSE BLDC GLUCOMTR-MCNC: 192 MG/DL (ref 70–130)
GLUCOSE SERPL-MCNC: 126 MG/DL (ref 65–99)
GLUCOSE SERPL-MCNC: 130 MG/DL (ref 65–99)
GLUCOSE SERPL-MCNC: 171 MG/DL (ref 65–99)
HCO3 BLDA-SCNC: 22.1 MMOL/L (ref 22–28)
HCO3 BLDA-SCNC: 23 MMOL/L (ref 22–28)
HCO3 BLDA-SCNC: 24.8 MMOL/L (ref 22–26)
HCO3 BLDA-SCNC: 24.9 MMOL/L (ref 22–26)
HCO3 BLDA-SCNC: 25 MMOL/L (ref 22–26)
HCO3 BLDA-SCNC: 26.5 MMOL/L (ref 22–26)
HCO3 BLDA-SCNC: 26.8 MMOL/L (ref 22–26)
HCO3 BLDA-SCNC: 27.4 MMOL/L (ref 22–26)
HCT VFR BLD AUTO: 31.5 % (ref 37.5–51)
HCT VFR BLD AUTO: 32.2 % (ref 37.5–51)
HCT VFR BLD AUTO: 35.7 % (ref 37.5–51)
HCT VFR BLDA CALC: 26 % (ref 38–51)
HCT VFR BLDA CALC: 27 % (ref 38–51)
HCT VFR BLDA CALC: 28 % (ref 38–51)
HCT VFR BLDA CALC: 29 % (ref 38–51)
HCT VFR BLDA CALC: 29 % (ref 38–51)
HCT VFR BLDA CALC: 34 % (ref 38–51)
HEMODILUTION: NO
HEMODILUTION: NO
HGB BLD-MCNC: 10.1 G/DL (ref 13–17.7)
HGB BLD-MCNC: 11.6 G/DL (ref 13–17.7)
HGB BLD-MCNC: 9.6 G/DL (ref 13–17.7)
HGB BLDA-MCNC: 11.6 G/DL (ref 12–17)
HGB BLDA-MCNC: 8.8 G/DL (ref 12–17)
HGB BLDA-MCNC: 9.2 G/DL (ref 12–17)
HGB BLDA-MCNC: 9.5 G/DL (ref 12–17)
HGB BLDA-MCNC: 9.9 G/DL (ref 12–17)
HGB BLDA-MCNC: 9.9 G/DL (ref 12–17)
IMM GRANULOCYTES # BLD AUTO: 0.03 10*3/MM3 (ref 0–0.05)
IMM GRANULOCYTES # BLD AUTO: 0.06 10*3/MM3 (ref 0–0.05)
IMM GRANULOCYTES NFR BLD AUTO: 0.5 % (ref 0–0.5)
IMM GRANULOCYTES NFR BLD AUTO: 1.2 % (ref 0–0.5)
INHALED O2 CONCENTRATION: 100 %
INHALED O2 CONCENTRATION: 40 %
INR PPP: 1.43 (ref 0.9–1.1)
LYMPHOCYTES # BLD AUTO: 0.34 10*3/MM3 (ref 0.7–3.1)
LYMPHOCYTES # BLD AUTO: 1.53 10*3/MM3 (ref 0.7–3.1)
LYMPHOCYTES NFR BLD AUTO: 27.9 % (ref 19.6–45.3)
LYMPHOCYTES NFR BLD AUTO: 6.9 % (ref 19.6–45.3)
MAGNESIUM SERPL-MCNC: 2.2 MG/DL (ref 1.6–2.4)
MAGNESIUM SERPL-MCNC: 2.4 MG/DL (ref 1.6–2.4)
MAGNESIUM SERPL-MCNC: 2.6 MG/DL (ref 1.6–2.4)
MCH RBC QN AUTO: 27.4 PG (ref 26.6–33)
MCH RBC QN AUTO: 28.4 PG (ref 26.6–33)
MCH RBC QN AUTO: 28.6 PG (ref 26.6–33)
MCHC RBC AUTO-ENTMCNC: 29.8 G/DL (ref 31.5–35.7)
MCHC RBC AUTO-ENTMCNC: 32.1 G/DL (ref 31.5–35.7)
MCHC RBC AUTO-ENTMCNC: 32.5 G/DL (ref 31.5–35.7)
MCV RBC AUTO: 87.5 FL (ref 79–97)
MCV RBC AUTO: 89.2 FL (ref 79–97)
MCV RBC AUTO: 91.7 FL (ref 79–97)
MODALITY: ABNORMAL
MODALITY: ABNORMAL
MONOCYTES # BLD AUTO: 0.01 10*3/MM3 (ref 0.1–0.9)
MONOCYTES # BLD AUTO: 0.31 10*3/MM3 (ref 0.1–0.9)
MONOCYTES NFR BLD AUTO: 0.2 % (ref 5–12)
MONOCYTES NFR BLD AUTO: 5.7 % (ref 5–12)
NEUTROPHILS NFR BLD AUTO: 3.42 10*3/MM3 (ref 1.7–7)
NEUTROPHILS NFR BLD AUTO: 4.53 10*3/MM3 (ref 1.7–7)
NEUTROPHILS NFR BLD AUTO: 62.5 % (ref 42.7–76)
NEUTROPHILS NFR BLD AUTO: 91.3 % (ref 42.7–76)
NRBC BLD AUTO-RTO: 0 /100 WBC (ref 0–0.2)
NRBC BLD AUTO-RTO: 0 /100 WBC (ref 0–0.2)
O2 A-A PPRESDIFF RESPIRATORY: 0.2 MMHG
O2 A-A PPRESDIFF RESPIRATORY: 0.3 MMHG
PCO2 BLDA: 30.1 MM HG (ref 35–45)
PCO2 BLDA: 38.2 MM HG (ref 35–45)
PCO2 BLDA: 38.9 MM HG (ref 35–45)
PCO2 BLDA: 39.9 MM HG (ref 35–45)
PCO2 BLDA: 41 MM HG (ref 35–45)
PCO2 BLDA: 47.9 MM HG (ref 35–45)
PCO2 BLDA: 50.8 MM HG (ref 35–45)
PCO2 BLDA: 54 MM HG (ref 35–45)
PEEP RESPIRATORY: 8 CM[H2O]
PEEP RESPIRATORY: 8 CM[H2O]
PH BLDA: 7.34 PH UNITS (ref 7.35–7.6)
PH BLDA: 7.35 PH UNITS (ref 7.35–7.45)
PH BLDA: 7.36 PH UNITS (ref 7.35–7.6)
PH BLDA: 7.39 PH UNITS (ref 7.35–7.6)
PH BLDA: 7.39 PH UNITS (ref 7.35–7.6)
PH BLDA: 7.41 PH UNITS (ref 7.35–7.6)
PH BLDA: 7.42 PH UNITS (ref 7.35–7.6)
PH BLDA: 7.49 PH UNITS (ref 7.35–7.45)
PHOSPHATE SERPL-MCNC: 0.8 MG/DL (ref 2.5–4.5)
PHOSPHATE SERPL-MCNC: 0.8 MG/DL (ref 2.5–4.5)
PHOSPHATE SERPL-MCNC: 0.9 MG/DL (ref 2.5–4.5)
PHOSPHATE SERPL-MCNC: 2.4 MG/DL (ref 2.5–4.5)
PLATELET # BLD AUTO: 166 10*3/MM3 (ref 140–450)
PLATELET # BLD AUTO: 168 10*3/MM3 (ref 140–450)
PLATELET # BLD AUTO: 174 10*3/MM3 (ref 140–450)
PLATELET # BLD AUTO: 243 10*3/MM3 (ref 140–450)
PMV BLD AUTO: 11.2 FL (ref 6–12)
PMV BLD AUTO: 11.3 FL (ref 6–12)
PMV BLD AUTO: 11.4 FL (ref 6–12)
PO2 BLD: 149 MM[HG] (ref 0–500)
PO2 BLD: 219 MM[HG] (ref 0–500)
PO2 BLDA: 148.5 MM HG (ref 80–100)
PO2 BLDA: 154 MMHG (ref 80–105)
PO2 BLDA: 400 MMHG (ref 80–105)
PO2 BLDA: 418 MMHG (ref 80–105)
PO2 BLDA: 420 MMHG (ref 80–105)
PO2 BLDA: 44 MMHG (ref 80–105)
PO2 BLDA: 473 MMHG (ref 80–105)
PO2 BLDA: 87.5 MM HG (ref 80–100)
POTASSIUM BLDA-SCNC: 3.5 MMOL/L (ref 3.5–4.9)
POTASSIUM BLDA-SCNC: 3.6 MMOL/L (ref 3.5–4.9)
POTASSIUM BLDA-SCNC: 3.9 MMOL/L (ref 3.5–4.9)
POTASSIUM BLDA-SCNC: 4.4 MMOL/L (ref 3.5–4.9)
POTASSIUM SERPL-SCNC: 3.5 MMOL/L (ref 3.5–5.2)
POTASSIUM SERPL-SCNC: 3.7 MMOL/L (ref 3.5–5.2)
PROTHROMBIN TIME: 17.7 SECONDS (ref 11.7–14.2)
PSV: 8 CMH2O
RBC # BLD AUTO: 3.51 10*6/MM3 (ref 4.14–5.8)
RBC # BLD AUTO: 3.53 10*6/MM3 (ref 4.14–5.8)
RBC # BLD AUTO: 4.08 10*6/MM3 (ref 4.14–5.8)
SAO2 % BLDA: 100 % (ref 95–98)
SAO2 % BLDA: 76 % (ref 95–98)
SAO2 % BLDA: 99 % (ref 95–98)
SAO2 % BLDCOA: 96.2 % (ref 92–98.5)
SAO2 % BLDCOA: 99.5 % (ref 92–98.5)
SET MECH RESP RATE: 15
SODIUM SERPL-SCNC: 139 MMOL/L (ref 136–145)
SODIUM SERPL-SCNC: 141 MMOL/L (ref 136–145)
SODIUM SERPL-SCNC: 144 MMOL/L (ref 136–145)
TOTAL RATE: 12 BREATHS/MINUTE
TOTAL RATE: 15 BREATHS/MINUTE
VENTILATOR MODE: ABNORMAL
VENTILATOR MODE: AC
VT ON VENT VENT: 800 ML
WBC NRBC COR # BLD AUTO: 17.2 10*3/MM3 (ref 3.4–10.8)
WBC NRBC COR # BLD AUTO: 4.96 10*3/MM3 (ref 3.4–10.8)
WBC NRBC COR # BLD AUTO: 5.48 10*3/MM3 (ref 3.4–10.8)

## 2025-01-08 PROCEDURE — 63710000001 INSULIN REGULAR HUMAN PER 5 UNITS: Performed by: ANESTHESIOLOGY

## 2025-01-08 PROCEDURE — 25010000002 CEFAZOLIN PER 500 MG: Performed by: THORACIC SURGERY (CARDIOTHORACIC VASCULAR SURGERY)

## 2025-01-08 PROCEDURE — 25010000002 NICARDIPINE 2.5 MG/ML SOLUTION: Performed by: ANESTHESIOLOGY

## 2025-01-08 PROCEDURE — 85384 FIBRINOGEN ACTIVITY: CPT | Performed by: NURSE PRACTITIONER

## 2025-01-08 PROCEDURE — 82803 BLOOD GASES ANY COMBINATION: CPT

## 2025-01-08 PROCEDURE — 02BG0ZZ EXCISION OF MITRAL VALVE, OPEN APPROACH: ICD-10-PCS | Performed by: THORACIC SURGERY (CARDIOTHORACIC VASCULAR SURGERY)

## 2025-01-08 PROCEDURE — 84100 ASSAY OF PHOSPHORUS: CPT | Performed by: ANESTHESIOLOGY

## 2025-01-08 PROCEDURE — 85025 COMPLETE CBC W/AUTO DIFF WBC: CPT | Performed by: NURSE PRACTITIONER

## 2025-01-08 PROCEDURE — 25010000002 FENTANYL CITRATE (PF) 250 MCG/5ML SOLUTION: Performed by: ANESTHESIOLOGY

## 2025-01-08 PROCEDURE — C2618 PROBE/NEEDLE, CRYO: HCPCS | Performed by: THORACIC SURGERY (CARDIOTHORACIC VASCULAR SURGERY)

## 2025-01-08 PROCEDURE — 94761 N-INVAS EAR/PLS OXIMETRY MLT: CPT

## 2025-01-08 PROCEDURE — 93010 ELECTROCARDIOGRAM REPORT: CPT | Performed by: INTERNAL MEDICINE

## 2025-01-08 PROCEDURE — 85384 FIBRINOGEN ACTIVITY: CPT | Performed by: THORACIC SURGERY (CARDIOTHORACIC VASCULAR SURGERY)

## 2025-01-08 PROCEDURE — 25010000002 LIDOCAINE HCL (CARDIAC) 50 MG/5ML SOLUTION PREFILLED SYRINGE

## 2025-01-08 PROCEDURE — 25010000002 PROPOFOL 10 MG/ML EMULSION: Performed by: ANESTHESIOLOGY

## 2025-01-08 PROCEDURE — 83735 ASSAY OF MAGNESIUM: CPT | Performed by: THORACIC SURGERY (CARDIOTHORACIC VASCULAR SURGERY)

## 2025-01-08 PROCEDURE — 25010000002 MIDAZOLAM PER 1 MG: Performed by: ANESTHESIOLOGY

## 2025-01-08 PROCEDURE — 80048 BASIC METABOLIC PNL TOTAL CA: CPT | Performed by: THORACIC SURGERY (CARDIOTHORACIC VASCULAR SURGERY)

## 2025-01-08 PROCEDURE — 25010000002 HEPARIN (PORCINE) PER 1000 UNITS: Performed by: ANESTHESIOLOGY

## 2025-01-08 PROCEDURE — 25010000002 VANCOMYCIN 1 G RECONSTITUTED SOLUTION

## 2025-01-08 PROCEDURE — 25010000002 ACETAMINOPHEN 10 MG/ML SOLUTION: Performed by: NURSE PRACTITIONER

## 2025-01-08 PROCEDURE — 25010000002 MILRINONE LACTATE 10 MG/10ML SOLUTION 10 ML VIAL: Performed by: ANESTHESIOLOGY

## 2025-01-08 PROCEDURE — 25010000002 LIDOCAINE 2% SOLUTION: Performed by: ANESTHESIOLOGY

## 2025-01-08 PROCEDURE — 85347 COAGULATION TIME ACTIVATED: CPT

## 2025-01-08 PROCEDURE — 85730 THROMBOPLASTIN TIME PARTIAL: CPT | Performed by: NURSE PRACTITIONER

## 2025-01-08 PROCEDURE — 33259 ABLATE ATRIA W/BYPASS ADD-ON: CPT | Performed by: THORACIC SURGERY (CARDIOTHORACIC VASCULAR SURGERY)

## 2025-01-08 PROCEDURE — 33427 REPAIR OF MITRAL VALVE: CPT | Performed by: THORACIC SURGERY (CARDIOTHORACIC VASCULAR SURGERY)

## 2025-01-08 PROCEDURE — 25010000002 PROTAMINE SULFATE PER 10 MG: Performed by: ANESTHESIOLOGY

## 2025-01-08 PROCEDURE — 33427 REPAIR OF MITRAL VALVE: CPT | Performed by: SPECIALIST/TECHNOLOGIST, OTHER

## 2025-01-08 PROCEDURE — P9047 ALBUMIN (HUMAN), 25%, 50ML: HCPCS

## 2025-01-08 PROCEDURE — C1751 CATH, INF, PER/CENT/MIDLINE: HCPCS | Performed by: ANESTHESIOLOGY

## 2025-01-08 PROCEDURE — 94799 UNLISTED PULMONARY SVC/PX: CPT

## 2025-01-08 PROCEDURE — 94002 VENT MGMT INPAT INIT DAY: CPT

## 2025-01-08 PROCEDURE — 25010000002 METOCLOPRAMIDE PER 10 MG: Performed by: NURSE PRACTITIONER

## 2025-01-08 PROCEDURE — C1729 CATH, DRAINAGE: HCPCS | Performed by: THORACIC SURGERY (CARDIOTHORACIC VASCULAR SURGERY)

## 2025-01-08 PROCEDURE — 25810000003 SODIUM CHLORIDE 0.9 % SOLUTION 250 ML FLEX CONT: Performed by: ANESTHESIOLOGY

## 2025-01-08 PROCEDURE — C1889 IMPLANT/INSERT DEVICE, NOC: HCPCS | Performed by: THORACIC SURGERY (CARDIOTHORACIC VASCULAR SURGERY)

## 2025-01-08 PROCEDURE — 71045 X-RAY EXAM CHEST 1 VIEW: CPT

## 2025-01-08 PROCEDURE — 25810000003 SODIUM CHLORIDE 0.9 % SOLUTION

## 2025-01-08 PROCEDURE — 5A1221Z PERFORMANCE OF CARDIAC OUTPUT, CONTINUOUS: ICD-10-PCS | Performed by: THORACIC SURGERY (CARDIOTHORACIC VASCULAR SURGERY)

## 2025-01-08 PROCEDURE — 33259 ABLATE ATRIA W/BYPASS ADD-ON: CPT | Performed by: SPECIALIST/TECHNOLOGIST, OTHER

## 2025-01-08 PROCEDURE — 25010000002 CEFAZOLIN PER 500 MG: Performed by: NURSE PRACTITIONER

## 2025-01-08 PROCEDURE — 25010000002 POTASSIUM CHLORIDE PER 2 MEQ: Performed by: THORACIC SURGERY (CARDIOTHORACIC VASCULAR SURGERY)

## 2025-01-08 PROCEDURE — 93005 ELECTROCARDIOGRAM TRACING: CPT | Performed by: NURSE PRACTITIONER

## 2025-01-08 PROCEDURE — 84132 ASSAY OF SERUM POTASSIUM: CPT | Performed by: THORACIC SURGERY (CARDIOTHORACIC VASCULAR SURGERY)

## 2025-01-08 PROCEDURE — 25010000002 ALBUMIN HUMAN 5% PER 50 ML: Performed by: NURSE PRACTITIONER

## 2025-01-08 PROCEDURE — 94760 N-INVAS EAR/PLS OXIMETRY 1: CPT

## 2025-01-08 PROCEDURE — 85014 HEMATOCRIT: CPT

## 2025-01-08 PROCEDURE — C1713 ANCHOR/SCREW BN/BN,TIS/BN: HCPCS | Performed by: THORACIC SURGERY (CARDIOTHORACIC VASCULAR SURGERY)

## 2025-01-08 PROCEDURE — 25010000002 MAGNESIUM SULFATE PER 500 MG OF MAGNESIUM: Performed by: ANESTHESIOLOGY

## 2025-01-08 PROCEDURE — 25010000002 AMIODARONE IN DEXTROSE 5% 360-4.14 MG/200ML-% SOLUTION: Performed by: ANESTHESIOLOGY

## 2025-01-08 PROCEDURE — P9041 ALBUMIN (HUMAN),5%, 50ML: HCPCS | Performed by: NURSE PRACTITIONER

## 2025-01-08 PROCEDURE — 02UG0JZ SUPPLEMENT MITRAL VALVE WITH SYNTHETIC SUBSTITUTE, OPEN APPROACH: ICD-10-PCS | Performed by: THORACIC SURGERY (CARDIOTHORACIC VASCULAR SURGERY)

## 2025-01-08 PROCEDURE — 3E080GC INTRODUCTION OF OTHER THERAPEUTIC SUBSTANCE INTO HEART, OPEN APPROACH: ICD-10-PCS | Performed by: THORACIC SURGERY (CARDIOTHORACIC VASCULAR SURGERY)

## 2025-01-08 PROCEDURE — 94660 CPAP INITIATION&MGMT: CPT

## 2025-01-08 PROCEDURE — 02580ZZ DESTRUCTION OF CONDUCTION MECHANISM, OPEN APPROACH: ICD-10-PCS | Performed by: THORACIC SURGERY (CARDIOTHORACIC VASCULAR SURGERY)

## 2025-01-08 PROCEDURE — 82330 ASSAY OF CALCIUM: CPT | Performed by: NURSE PRACTITIONER

## 2025-01-08 PROCEDURE — 88305 TISSUE EXAM BY PATHOLOGIST: CPT | Performed by: THORACIC SURGERY (CARDIOTHORACIC VASCULAR SURGERY)

## 2025-01-08 PROCEDURE — 83735 ASSAY OF MAGNESIUM: CPT | Performed by: NURSE PRACTITIONER

## 2025-01-08 PROCEDURE — 93005 ELECTROCARDIOGRAM TRACING: CPT | Performed by: THORACIC SURGERY (CARDIOTHORACIC VASCULAR SURGERY)

## 2025-01-08 PROCEDURE — 99291 CRITICAL CARE FIRST HOUR: CPT | Performed by: ANESTHESIOLOGY

## 2025-01-08 PROCEDURE — 25010000002 MAGNESIUM SULFATE 2 GM/50ML SOLUTION: Performed by: NURSE PRACTITIONER

## 2025-01-08 PROCEDURE — 82948 REAGENT STRIP/BLOOD GLUCOSE: CPT

## 2025-01-08 PROCEDURE — 82803 BLOOD GASES ANY COMBINATION: CPT | Performed by: NURSE PRACTITIONER

## 2025-01-08 PROCEDURE — 25010000002 PHENYLEPHRINE 10 MG/ML SOLUTION 5 ML VIAL: Performed by: ANESTHESIOLOGY

## 2025-01-08 PROCEDURE — 85027 COMPLETE CBC AUTOMATED: CPT | Performed by: NURSE PRACTITIONER

## 2025-01-08 PROCEDURE — 85610 PROTHROMBIN TIME: CPT | Performed by: NURSE PRACTITIONER

## 2025-01-08 PROCEDURE — 80069 RENAL FUNCTION PANEL: CPT | Performed by: NURSE PRACTITIONER

## 2025-01-08 PROCEDURE — 25010000002 ALBUMIN HUMAN 25% PER 50 ML

## 2025-01-08 PROCEDURE — 25010000002 HEPARIN (PORCINE) PER 1000 UNITS

## 2025-01-08 PROCEDURE — 85018 HEMOGLOBIN: CPT

## 2025-01-08 PROCEDURE — 85049 AUTOMATED PLATELET COUNT: CPT | Performed by: THORACIC SURGERY (CARDIOTHORACIC VASCULAR SURGERY)

## 2025-01-08 PROCEDURE — 85730 THROMBOPLASTIN TIME PARTIAL: CPT | Performed by: THORACIC SURGERY (CARDIOTHORACIC VASCULAR SURGERY)

## 2025-01-08 PROCEDURE — 82947 ASSAY GLUCOSE BLOOD QUANT: CPT

## 2025-01-08 DEVICE — SS SUTURE, 3 PER SLEEVE
Type: IMPLANTABLE DEVICE | Site: CHEST WALL | Status: FUNCTIONAL
Brand: MYO/WIRE II

## 2025-01-08 DEVICE — COR-KNOT MINI® COMBO KITBASE PACKAGE TYPE - KITEACH STERILE PACKAGE KIT CONTAINS (2) SINGLE PATIENT USE COR-KNOT MINI® DEVICES AND (12) COR-KNOT® QUICK LOADS®.
Type: IMPLANTABLE DEVICE | Site: HEART | Status: FUNCTIONAL
Brand: COR-KNOT MINI®

## 2025-01-08 DEVICE — SS SUTURE, 4 PER SLEEVE
Type: IMPLANTABLE DEVICE | Site: CHEST WALL | Status: FUNCTIONAL
Brand: MYO/WIRE II

## 2025-01-08 DEVICE — ABSORBABLE HEMOSTAT (OXIDIZED REGENERATED CELLULOSE)
Type: IMPLANTABLE DEVICE | Site: HEART | Status: FUNCTIONAL
Brand: SURGICEL

## 2025-01-08 DEVICE — BND ANULPLSTY SIMUPLUS 36MM: Type: IMPLANTABLE DEVICE | Site: HEART | Status: FUNCTIONAL

## 2025-01-08 DEVICE — TEMP PACING WIRE
Type: IMPLANTABLE DEVICE | Site: HEART | Status: FUNCTIONAL
Brand: MYO/WIRE

## 2025-01-08 RX ORDER — NITROGLYCERIN 20 MG/100ML
5-200 INJECTION INTRAVENOUS
Status: DISCONTINUED | OUTPATIENT
Start: 2025-01-08 | End: 2025-01-09

## 2025-01-08 RX ORDER — MEPERIDINE HYDROCHLORIDE 25 MG/ML
25 INJECTION INTRAMUSCULAR; INTRAVENOUS; SUBCUTANEOUS EVERY 4 HOURS PRN
Status: ACTIVE | OUTPATIENT
Start: 2025-01-08 | End: 2025-01-08

## 2025-01-08 RX ORDER — OXYCODONE HYDROCHLORIDE 5 MG/1
5 TABLET ORAL EVERY 4 HOURS PRN
Status: DISCONTINUED | OUTPATIENT
Start: 2025-01-08 | End: 2025-01-14 | Stop reason: HOSPADM

## 2025-01-08 RX ORDER — FENTANYL/ROPIVACAINE/NS/PF 2-625MCG/1
15 PLASTIC BAG, INJECTION (ML) EPIDURAL
Status: COMPLETED | OUTPATIENT
Start: 2025-01-08 | End: 2025-01-08

## 2025-01-08 RX ORDER — MIDAZOLAM HYDROCHLORIDE 1 MG/ML
INJECTION, SOLUTION INTRAMUSCULAR; INTRAVENOUS AS NEEDED
Status: DISCONTINUED | OUTPATIENT
Start: 2025-01-08 | End: 2025-01-08 | Stop reason: SURG

## 2025-01-08 RX ORDER — NOREPINEPHRINE BITARTRATE 0.03 MG/ML
.02-.2 INJECTION, SOLUTION INTRAVENOUS CONTINUOUS PRN
Status: DISCONTINUED | OUTPATIENT
Start: 2025-01-08 | End: 2025-01-11

## 2025-01-08 RX ORDER — POTASSIUM CHLORIDE 750 MG/1
40 TABLET, FILM COATED, EXTENDED RELEASE ORAL EVERY 4 HOURS
Status: DISCONTINUED | OUTPATIENT
Start: 2025-01-08 | End: 2025-01-08

## 2025-01-08 RX ORDER — TERAZOSIN 5 MG/1
5 CAPSULE ORAL NIGHTLY
Status: DISCONTINUED | OUTPATIENT
Start: 2025-01-09 | End: 2025-01-14 | Stop reason: HOSPADM

## 2025-01-08 RX ORDER — NICOTINE POLACRILEX 4 MG
15 LOZENGE BUCCAL
Status: DISCONTINUED | OUTPATIENT
Start: 2025-01-08 | End: 2025-01-10

## 2025-01-08 RX ORDER — CHLORHEXIDINE GLUCONATE ORAL RINSE 1.2 MG/ML
15 SOLUTION DENTAL EVERY 12 HOURS SCHEDULED
Status: DISCONTINUED | OUTPATIENT
Start: 2025-01-08 | End: 2025-01-08

## 2025-01-08 RX ORDER — DEXMEDETOMIDINE HYDROCHLORIDE 4 UG/ML
.2-1.5 INJECTION, SOLUTION INTRAVENOUS
Status: DISCONTINUED | OUTPATIENT
Start: 2025-01-08 | End: 2025-01-09

## 2025-01-08 RX ORDER — MORPHINE SULFATE 2 MG/ML
1 INJECTION, SOLUTION INTRAMUSCULAR; INTRAVENOUS EVERY 4 HOURS PRN
Status: DISCONTINUED | OUTPATIENT
Start: 2025-01-08 | End: 2025-01-14 | Stop reason: HOSPADM

## 2025-01-08 RX ORDER — MAGNESIUM SULFATE HEPTAHYDRATE 500 MG/ML
INJECTION, SOLUTION INTRAMUSCULAR; INTRAVENOUS AS NEEDED
Status: DISCONTINUED | OUTPATIENT
Start: 2025-01-08 | End: 2025-01-08 | Stop reason: SURG

## 2025-01-08 RX ORDER — AMINOCAPROIC ACID 250 MG/ML
INJECTION, SOLUTION INTRAVENOUS AS NEEDED
Status: DISCONTINUED | OUTPATIENT
Start: 2025-01-08 | End: 2025-01-08 | Stop reason: SURG

## 2025-01-08 RX ORDER — ACETAMINOPHEN 650 MG/1
650 SUPPOSITORY RECTAL EVERY 4 HOURS
Status: ACTIVE | OUTPATIENT
Start: 2025-01-08 | End: 2025-01-09

## 2025-01-08 RX ORDER — MAGNESIUM SULFATE HEPTAHYDRATE 40 MG/ML
2 INJECTION, SOLUTION INTRAVENOUS EVERY 8 HOURS
Status: DISPENSED | OUTPATIENT
Start: 2025-01-08 | End: 2025-01-09

## 2025-01-08 RX ORDER — PANTOPRAZOLE SODIUM 40 MG/10ML
40 INJECTION, POWDER, LYOPHILIZED, FOR SOLUTION INTRAVENOUS ONCE
Status: COMPLETED | OUTPATIENT
Start: 2025-01-08 | End: 2025-01-08

## 2025-01-08 RX ORDER — IBUPROFEN 600 MG/1
1 TABLET ORAL
Status: DISCONTINUED | OUTPATIENT
Start: 2025-01-08 | End: 2025-01-10

## 2025-01-08 RX ORDER — CYCLOBENZAPRINE HCL 10 MG
10 TABLET ORAL EVERY 8 HOURS PRN
Status: DISCONTINUED | OUTPATIENT
Start: 2025-01-09 | End: 2025-01-14 | Stop reason: HOSPADM

## 2025-01-08 RX ORDER — FENTANYL CITRATE 50 UG/ML
INJECTION, SOLUTION INTRAMUSCULAR; INTRAVENOUS AS NEEDED
Status: DISCONTINUED | OUTPATIENT
Start: 2025-01-08 | End: 2025-01-08 | Stop reason: SURG

## 2025-01-08 RX ORDER — POTASSIUM CHLORIDE 29.8 MG/ML
20 INJECTION INTRAVENOUS ONCE
Status: COMPLETED | OUTPATIENT
Start: 2025-01-08 | End: 2025-01-08

## 2025-01-08 RX ORDER — AMOXICILLIN 250 MG
2 CAPSULE ORAL NIGHTLY
Status: DISCONTINUED | OUTPATIENT
Start: 2025-01-09 | End: 2025-01-14 | Stop reason: HOSPADM

## 2025-01-08 RX ORDER — ACETAMINOPHEN 10 MG/ML
1000 INJECTION, SOLUTION INTRAVENOUS EVERY 8 HOURS
Status: COMPLETED | OUTPATIENT
Start: 2025-01-08 | End: 2025-01-09

## 2025-01-08 RX ORDER — NICARDIPINE HYDROCHLORIDE 2.5 MG/ML
INJECTION INTRAVENOUS CONTINUOUS PRN
Status: DISCONTINUED | OUTPATIENT
Start: 2025-01-08 | End: 2025-01-08 | Stop reason: SURG

## 2025-01-08 RX ORDER — ROCURONIUM BROMIDE 10 MG/ML
INJECTION, SOLUTION INTRAVENOUS AS NEEDED
Status: DISCONTINUED | OUTPATIENT
Start: 2025-01-08 | End: 2025-01-08 | Stop reason: SURG

## 2025-01-08 RX ORDER — ACETAMINOPHEN 325 MG/1
650 TABLET ORAL EVERY 4 HOURS PRN
Status: DISCONTINUED | OUTPATIENT
Start: 2025-01-09 | End: 2025-01-14 | Stop reason: HOSPADM

## 2025-01-08 RX ORDER — MILRINONE LACTATE 0.2 MG/ML
.25-.375 INJECTION, SOLUTION INTRAVENOUS CONTINUOUS PRN
Status: DISCONTINUED | OUTPATIENT
Start: 2025-01-08 | End: 2025-01-10

## 2025-01-08 RX ORDER — PHENYLEPHRINE HCL IN 0.9% NACL 0.5 MG/5ML
.2-2 SYRINGE (ML) INTRAVENOUS CONTINUOUS PRN
Status: DISCONTINUED | OUTPATIENT
Start: 2025-01-08 | End: 2025-01-11

## 2025-01-08 RX ORDER — ASPIRIN 81 MG/1
81 TABLET ORAL DAILY
Status: DISCONTINUED | OUTPATIENT
Start: 2025-01-09 | End: 2025-01-14 | Stop reason: HOSPADM

## 2025-01-08 RX ORDER — POLYETHYLENE GLYCOL 3350 17 G/17G
17 POWDER, FOR SOLUTION ORAL DAILY PRN
Status: DISCONTINUED | OUTPATIENT
Start: 2025-01-08 | End: 2025-01-14 | Stop reason: HOSPADM

## 2025-01-08 RX ORDER — NALOXONE HCL 0.4 MG/ML
0.4 VIAL (ML) INJECTION
Status: DISCONTINUED | OUTPATIENT
Start: 2025-01-08 | End: 2025-01-14 | Stop reason: HOSPADM

## 2025-01-08 RX ORDER — ACETAMINOPHEN 160 MG/5ML
650 SOLUTION ORAL EVERY 4 HOURS PRN
Status: DISCONTINUED | OUTPATIENT
Start: 2025-01-09 | End: 2025-01-14 | Stop reason: HOSPADM

## 2025-01-08 RX ORDER — METOPROLOL TARTRATE 25 MG/1
12.5 TABLET, FILM COATED ORAL EVERY 12 HOURS SCHEDULED
Status: DISCONTINUED | OUTPATIENT
Start: 2025-01-09 | End: 2025-01-10

## 2025-01-08 RX ORDER — ESCITALOPRAM OXALATE 10 MG/1
15 TABLET ORAL EVERY EVENING
Status: DISCONTINUED | OUTPATIENT
Start: 2025-01-09 | End: 2025-01-14 | Stop reason: HOSPADM

## 2025-01-08 RX ORDER — BISACODYL 10 MG
10 SUPPOSITORY, RECTAL RECTAL DAILY PRN
Status: DISCONTINUED | OUTPATIENT
Start: 2025-01-09 | End: 2025-01-14 | Stop reason: HOSPADM

## 2025-01-08 RX ORDER — ACETAMINOPHEN 160 MG/5ML
650 SOLUTION ORAL EVERY 4 HOURS
Status: ACTIVE | OUTPATIENT
Start: 2025-01-08 | End: 2025-01-09

## 2025-01-08 RX ORDER — ATORVASTATIN CALCIUM 20 MG/1
40 TABLET, FILM COATED ORAL NIGHTLY
Status: DISCONTINUED | OUTPATIENT
Start: 2025-01-08 | End: 2025-01-14 | Stop reason: HOSPADM

## 2025-01-08 RX ORDER — LIDOCAINE HYDROCHLORIDE 20 MG/ML
INJECTION, SOLUTION INFILTRATION; PERINEURAL AS NEEDED
Status: DISCONTINUED | OUTPATIENT
Start: 2025-01-08 | End: 2025-01-08 | Stop reason: SURG

## 2025-01-08 RX ORDER — HEPARIN SODIUM 1000 [USP'U]/ML
INJECTION, SOLUTION INTRAVENOUS; SUBCUTANEOUS AS NEEDED
Status: DISCONTINUED | OUTPATIENT
Start: 2025-01-08 | End: 2025-01-08 | Stop reason: SURG

## 2025-01-08 RX ORDER — PROTAMINE SULFATE 10 MG/ML
INJECTION, SOLUTION INTRAVENOUS AS NEEDED
Status: DISCONTINUED | OUTPATIENT
Start: 2025-01-08 | End: 2025-01-08 | Stop reason: SURG

## 2025-01-08 RX ORDER — ALPRAZOLAM 0.25 MG/1
0.25 TABLET ORAL EVERY 8 HOURS PRN
Status: DISCONTINUED | OUTPATIENT
Start: 2025-01-08 | End: 2025-01-09

## 2025-01-08 RX ORDER — DEXTROSE MONOHYDRATE 25 G/50ML
10-50 INJECTION, SOLUTION INTRAVENOUS
Status: DISCONTINUED | OUTPATIENT
Start: 2025-01-08 | End: 2025-01-10

## 2025-01-08 RX ORDER — PANTOPRAZOLE SODIUM 40 MG/1
40 TABLET, DELAYED RELEASE ORAL EVERY MORNING
Status: DISCONTINUED | OUTPATIENT
Start: 2025-01-09 | End: 2025-01-14 | Stop reason: HOSPADM

## 2025-01-08 RX ORDER — MORPHINE SULFATE 2 MG/ML
4 INJECTION, SOLUTION INTRAMUSCULAR; INTRAVENOUS
Status: DISCONTINUED | OUTPATIENT
Start: 2025-01-08 | End: 2025-01-09

## 2025-01-08 RX ORDER — NITROGLYCERIN 0.4 MG/1
0.4 TABLET SUBLINGUAL
Status: DISCONTINUED | OUTPATIENT
Start: 2025-01-08 | End: 2025-01-14 | Stop reason: HOSPADM

## 2025-01-08 RX ORDER — MIDAZOLAM HYDROCHLORIDE 1 MG/ML
2 INJECTION, SOLUTION INTRAMUSCULAR; INTRAVENOUS
Status: DISCONTINUED | OUTPATIENT
Start: 2025-01-08 | End: 2025-01-09

## 2025-01-08 RX ORDER — ONDANSETRON 2 MG/ML
4 INJECTION INTRAMUSCULAR; INTRAVENOUS EVERY 6 HOURS PRN
Status: DISCONTINUED | OUTPATIENT
Start: 2025-01-08 | End: 2025-01-14 | Stop reason: HOSPADM

## 2025-01-08 RX ORDER — ENOXAPARIN SODIUM 100 MG/ML
40 INJECTION SUBCUTANEOUS DAILY
Status: DISCONTINUED | OUTPATIENT
Start: 2025-01-09 | End: 2025-01-11

## 2025-01-08 RX ORDER — CHLORHEXIDINE GLUCONATE ORAL RINSE 1.2 MG/ML
15 SOLUTION DENTAL EVERY 12 HOURS
Status: DISCONTINUED | OUTPATIENT
Start: 2025-01-08 | End: 2025-01-14 | Stop reason: HOSPADM

## 2025-01-08 RX ORDER — ACETAMINOPHEN 325 MG/1
650 TABLET ORAL EVERY 4 HOURS
Status: ACTIVE | OUTPATIENT
Start: 2025-01-08 | End: 2025-01-09

## 2025-01-08 RX ORDER — DOPAMINE HYDROCHLORIDE 160 MG/100ML
2-20 INJECTION, SOLUTION INTRAVENOUS CONTINUOUS PRN
Status: DISCONTINUED | OUTPATIENT
Start: 2025-01-08 | End: 2025-01-09

## 2025-01-08 RX ORDER — BISACODYL 5 MG/1
10 TABLET, DELAYED RELEASE ORAL DAILY PRN
Status: DISCONTINUED | OUTPATIENT
Start: 2025-01-08 | End: 2025-01-14 | Stop reason: HOSPADM

## 2025-01-08 RX ORDER — SODIUM CHLORIDE 9 MG/ML
INJECTION, SOLUTION INTRAVENOUS CONTINUOUS PRN
Status: DISCONTINUED | OUTPATIENT
Start: 2025-01-08 | End: 2025-01-08 | Stop reason: SURG

## 2025-01-08 RX ORDER — ALBUMIN HUMAN 50 G/1000ML
1000 SOLUTION INTRAVENOUS AS NEEDED
Status: DISPENSED | OUTPATIENT
Start: 2025-01-08 | End: 2025-01-09

## 2025-01-08 RX ORDER — PROPOFOL 10 MG/ML
VIAL (ML) INTRAVENOUS AS NEEDED
Status: DISCONTINUED | OUTPATIENT
Start: 2025-01-08 | End: 2025-01-08 | Stop reason: SURG

## 2025-01-08 RX ORDER — METOCLOPRAMIDE HYDROCHLORIDE 5 MG/ML
10 INJECTION INTRAMUSCULAR; INTRAVENOUS EVERY 6 HOURS
Status: DISPENSED | OUTPATIENT
Start: 2025-01-08 | End: 2025-01-09

## 2025-01-08 RX ORDER — ACETAMINOPHEN 650 MG/1
650 SUPPOSITORY RECTAL EVERY 4 HOURS PRN
Status: DISCONTINUED | OUTPATIENT
Start: 2025-01-09 | End: 2025-01-14 | Stop reason: HOSPADM

## 2025-01-08 RX ORDER — HYDROCODONE BITARTRATE AND ACETAMINOPHEN 5; 325 MG/1; MG/1
2 TABLET ORAL EVERY 4 HOURS PRN
Status: DISCONTINUED | OUTPATIENT
Start: 2025-01-08 | End: 2025-01-14 | Stop reason: HOSPADM

## 2025-01-08 RX ADMIN — METOPROLOL TARTRATE 12.5 MG: 25 TABLET, FILM COATED ORAL at 05:50

## 2025-01-08 RX ADMIN — SODIUM CHLORIDE: 9 INJECTION, SOLUTION INTRAVENOUS at 06:51

## 2025-01-08 RX ADMIN — ACETAMINOPHEN 1000 MG: 10 INJECTION INTRAVENOUS at 19:53

## 2025-01-08 RX ADMIN — SODIUM CHLORIDE 1.6 UNITS/HR: 9 INJECTION, SOLUTION INTRAVENOUS at 08:07

## 2025-01-08 RX ADMIN — SODIUM CHLORIDE 0.5 MCG/KG/HR: 9 INJECTION, SOLUTION INTRAVENOUS at 07:10

## 2025-01-08 RX ADMIN — DEXMEDETOMIDINE HYDROCHLORIDE 0.1 MCG/KG/HR: 4 INJECTION, SOLUTION INTRAVENOUS at 14:06

## 2025-01-08 RX ADMIN — FENTANYL CITRATE 50 MCG: 50 INJECTION, SOLUTION INTRAMUSCULAR; INTRAVENOUS at 07:18

## 2025-01-08 RX ADMIN — OXYCODONE HYDROCHLORIDE 5 MG: 5 TABLET ORAL at 23:59

## 2025-01-08 RX ADMIN — ALBUMIN (HUMAN) 250 ML: 12.5 INJECTION, SOLUTION INTRAVENOUS at 14:03

## 2025-01-08 RX ADMIN — METOCLOPRAMIDE 10 MG: 5 INJECTION, SOLUTION INTRAMUSCULAR; INTRAVENOUS at 23:59

## 2025-01-08 RX ADMIN — PROPOFOL 25 MCG/KG/MIN: 10 INJECTION, EMULSION INTRAVENOUS at 07:10

## 2025-01-08 RX ADMIN — SODIUM CHLORIDE 2 G: 900 INJECTION INTRAVENOUS at 09:28

## 2025-01-08 RX ADMIN — AMINOCAPROIC ACID 10 G: 250 INJECTION, SOLUTION INTRAVENOUS at 07:51

## 2025-01-08 RX ADMIN — Medication 0.02 MCG/KG/MIN: at 12:15

## 2025-01-08 RX ADMIN — MAGNESIUM SULFATE HEPTAHYDRATE 2 G: 500 INJECTION, SOLUTION INTRAMUSCULAR; INTRAVENOUS at 09:08

## 2025-01-08 RX ADMIN — ACETAMINOPHEN 1000 MG: 10 INJECTION INTRAVENOUS at 11:26

## 2025-01-08 RX ADMIN — 0.12% CHLORHEXIDINE GLUCONATE 15 ML: 1.2 RINSE ORAL at 05:56

## 2025-01-08 RX ADMIN — SODIUM CHLORIDE 0.25 MCG/KG/MIN: 0.9 INJECTION, SOLUTION INTRAVENOUS at 09:05

## 2025-01-08 RX ADMIN — PANTOPRAZOLE SODIUM 40 MG: 40 INJECTION, POWDER, FOR SOLUTION INTRAVENOUS at 13:22

## 2025-01-08 RX ADMIN — SODIUM CHLORIDE 2 G: 900 INJECTION INTRAVENOUS at 07:10

## 2025-01-08 RX ADMIN — ALBUMIN (HUMAN) 250 ML: 12.5 INJECTION, SOLUTION INTRAVENOUS at 12:00

## 2025-01-08 RX ADMIN — PHENYLEPHRINE HYDROCHLORIDE 0.5 MCG/KG/MIN: 10 INJECTION, SOLUTION INTRAVENOUS at 06:56

## 2025-01-08 RX ADMIN — FENTANYL CITRATE 100 MCG: 50 INJECTION, SOLUTION INTRAMUSCULAR; INTRAVENOUS at 09:42

## 2025-01-08 RX ADMIN — 0.12% CHLORHEXIDINE GLUCONATE 15 ML: 1.2 RINSE ORAL at 20:25

## 2025-01-08 RX ADMIN — MAGNESIUM SULFATE HEPTAHYDRATE 2 G: 40 INJECTION, SOLUTION INTRAVENOUS at 19:51

## 2025-01-08 RX ADMIN — ROCURONIUM BROMIDE 50 MG: 10 INJECTION, SOLUTION INTRAVENOUS at 08:02

## 2025-01-08 RX ADMIN — OXYCODONE HYDROCHLORIDE 5 MG: 5 TABLET ORAL at 19:11

## 2025-01-08 RX ADMIN — PROPOFOL 30 MG: 10 INJECTION, EMULSION INTRAVENOUS at 07:15

## 2025-01-08 RX ADMIN — AMIODARONE HYDROCHLORIDE 1 MG/MIN: 1.8 INJECTION, SOLUTION INTRAVENOUS at 09:05

## 2025-01-08 RX ADMIN — ALBUMIN (HUMAN) 250 ML: 12.5 INJECTION, SOLUTION INTRAVENOUS at 12:39

## 2025-01-08 RX ADMIN — MUPIROCIN 1 APPLICATION: 20 OINTMENT TOPICAL at 20:26

## 2025-01-08 RX ADMIN — MIDAZOLAM 2 MG: 1 INJECTION INTRAMUSCULAR; INTRAVENOUS at 06:41

## 2025-01-08 RX ADMIN — CYCLOBENZAPRINE HYDROCHLORIDE 10 MG: 10 TABLET, FILM COATED ORAL at 23:59

## 2025-01-08 RX ADMIN — LIDOCAINE HYDROCHLORIDE 100 MG: 20 INJECTION, SOLUTION INFILTRATION; PERINEURAL at 06:51

## 2025-01-08 RX ADMIN — POTASSIUM CHLORIDE 20 MEQ: 29.8 INJECTION, SOLUTION INTRAVENOUS at 13:20

## 2025-01-08 RX ADMIN — AMINOCAPROIC ACID 10 G: 250 INJECTION, SOLUTION INTRAVENOUS at 09:32

## 2025-01-08 RX ADMIN — HEPARIN SODIUM 30000 UNITS: 1000 INJECTION, SOLUTION INTRAVENOUS; SUBCUTANEOUS at 07:48

## 2025-01-08 RX ADMIN — ATORVASTATIN CALCIUM 40 MG: 20 TABLET, FILM COATED ORAL at 20:25

## 2025-01-08 RX ADMIN — POTASSIUM CHLORIDE 40 MEQ: 750 TABLET, EXTENDED RELEASE ORAL at 05:49

## 2025-01-08 RX ADMIN — INSULIN HUMAN 4 UNITS: 100 INJECTION, SOLUTION PARENTERAL at 08:07

## 2025-01-08 RX ADMIN — PROPOFOL 70 MG: 10 INJECTION, EMULSION INTRAVENOUS at 06:51

## 2025-01-08 RX ADMIN — ROCURONIUM BROMIDE 100 MG: 10 INJECTION, SOLUTION INTRAVENOUS at 06:51

## 2025-01-08 RX ADMIN — POTASSIUM PHOSPHATES 15 MMOL: 236; 224 INJECTION, SOLUTION INTRAVENOUS at 19:11

## 2025-01-08 RX ADMIN — FENTANYL CITRATE 100 MCG: 50 INJECTION, SOLUTION INTRAMUSCULAR; INTRAVENOUS at 09:35

## 2025-01-08 RX ADMIN — PROTAMINE SULFATE 600 MG: 10 INJECTION, SOLUTION INTRAVENOUS at 09:24

## 2025-01-08 RX ADMIN — POTASSIUM PHOSPHATES 15 MMOL: 236; 224 INJECTION, SOLUTION INTRAVENOUS at 20:25

## 2025-01-08 RX ADMIN — SODIUM CHLORIDE 2 G: 900 INJECTION INTRAVENOUS at 17:28

## 2025-01-08 RX ADMIN — MUPIROCIN 1 APPLICATION: 20 OINTMENT TOPICAL at 05:54

## 2025-01-08 RX ADMIN — FENTANYL CITRATE 100 MCG: 50 INJECTION, SOLUTION INTRAMUSCULAR; INTRAVENOUS at 10:12

## 2025-01-08 RX ADMIN — FENTANYL CITRATE 150 MCG: 50 INJECTION, SOLUTION INTRAMUSCULAR; INTRAVENOUS at 06:51

## 2025-01-08 RX ADMIN — NICARDIPINE HYDROCHLORIDE 5 MG/HR: 25 INJECTION, SOLUTION INTRAVENOUS at 07:16

## 2025-01-08 NOTE — ANESTHESIA PREPROCEDURE EVALUATION
Anesthesia Evaluation     Patient summary reviewed                Airway   Mallampati: II  TM distance: >3 FB  Neck ROM: full  No difficulty expected  Dental - normal exam     Pulmonary    (+) pneumonia improving ,sleep apnea on CPAP  Cardiovascular   Exercise tolerance: poor (<4 METS)    ECG reviewed  PT is on anticoagulation therapy  Rhythm: irregular  Rate: normal    (+) hypertension, valvular problems/murmurs MR, CAD, cardiac stents more than 12 months ago , dysrhythmias Atrial Fib, CHF Diastolic >=55%, HEREDIA, hyperlipidemia      Neuro/Psych  GI/Hepatic/Renal/Endo    (+) obesity, diabetes mellitus type 2    Musculoskeletal     Abdominal    Substance History      OB/GYN          Other   arthritis,     ROS/Med Hx Other: Coumadin stopped, on heparin gtt now.               Anesthesia Plan    ASA 4     general, Chichi, CVL and PAC     intravenous induction   Postoperative Plan: Expected vent after surgery  Anesthetic plan, risks, benefits, and alternatives have been provided, discussed and informed consent has been obtained with: patient.    Use of blood products discussed with patient  Consented to blood products.      CODE STATUS:    Level Of Support Discussed With: Patient  Code Status (Patient has no pulse and is not breathing): CPR (Attempt to Resuscitate)  Medical Interventions (Patient has pulse or is breathing): Full Support

## 2025-01-08 NOTE — ANESTHESIA PROCEDURE NOTES
Airway  Urgency: elective    Date/Time: 1/8/2025 6:55 AM  Difficult airway    General Information and Staff    Patient location during procedure: OR  Anesthesiologist: Radha Arriaza MD    Indications and Patient Condition  Indications for airway management: airway protection    Preoxygenated: yes  MILS maintained throughout  Mask difficulty assessment: 3 - difficult mask (inadequate, unstable or two providers) +/- NMBA    Final Airway Details  Final airway type: endotracheal airway      Successful airway: ETT  Cuffed: yes   Successful intubation technique: video laryngoscopy  Endotracheal tube insertion site: oral  Blade: Patricia  Blade size: D  ETT size (mm): 8.0  Cormack-Lehane Classification: grade I - full view of glottis  Placement verified by: chest auscultation and capnometry   Measured from: teeth  Number of attempts at approach: 1  Assessment: lips, teeth, and gum same as pre-op and atraumatic intubation    Additional Comments  Small mouth opening, beard. Previous difficult intubation, went to straight to [facility] D blade, easy this way.

## 2025-01-08 NOTE — ANESTHESIA PROCEDURE NOTES
PA Catheter      Patient reassessed immediately prior to procedure    Patient location during procedure: OR  Start time: 1/8/2025 7:06 AM  Stop Time:1/8/2025 7:10 AM  Indications: central pressure monitoring  Staff  Anesthesiologist: Radha Arriaza MD  Preanesthetic Checklist  Completed: patient identified, IV checked, site marked, risks and benefits discussed, surgical consent, monitors and equipment checked, pre-op evaluation and timeout performed  Central Line Prep  Sterile Tech:cap, gloves, gown, mask and sterile barriers  Prep: chloraprep  Patient monitoring: blood pressure monitoring, EKG and continuous pulse oximetry  Central Line Procedure  Laterality:right  Location:internal jugular  Catheter Type:[place]-Damián  Assessment  Complications:no  Patient Tolerance:patient tolerated the procedure well with no apparent complications

## 2025-01-08 NOTE — PLAN OF CARE
Goal Outcome Evaluation:  Plan of Care Reviewed With: patient        Progress: no change  Outcome Evaluation: VSS; A&Ox4. Pt remains Afib on the monitor with PVCs; rates in the 50s-60s. Pt -120s. Pt w/o c/o of pain during the night. Pt remains room air; sat 97-99. Pt prepared for 1st case this am; turned over to surgery. Plan of care is ongoing.

## 2025-01-08 NOTE — ANESTHESIA PROCEDURE NOTES
Diagnostic IntraOp Adán    Procedure Performed: Diagnostic IntraOp Adán       Start Time:  1/8/2025 9:52 AM       End Time:   1/8/2025 9:52 AM    Preanesthesia Checklist:  Patient identified, IV assessed, risks and benefits discussed, monitors and equipment assessed, procedure being performed at surgeon's request and anesthesia consent obtained.    General Procedure Information  Diagnostic Indications for Echo:  assessment of ascending aorta, assessment of surgical repair, defect repair evaluation and hemodynamic monitoring  Physician Requesting Echo: Young Kelly MD  Location performed:  OR  Intubated  Bite block placed  Heart visualized  Probe Insertion:  Easy  Probe Type:  Multiplane  Modalities:  2D only, color flow mapping, continuous wave Doppler and pulse wave Doppler    Echocardiographic and Doppler Measurements    Ventricles    Right Ventricle:  Cavity size dilated.  Thrombus not present.  Global function mildly impaired.    Left Ventricle:  Cavity size dilated.  Diastolic dimension 5.4 cm.  Thrombus not present.  Global Function moderately impaired.  Ejection Fraction 40%.          Valves    Aortic Valve:  Annulus normal.  Stenosis not present.  Regurgitation trace.  Leaflets calcified and thickened.  Leaflet motions normal.      Mitral Valve:  Annulus dilated.  Stenosis not present.  Regurgitation severe.  Leaflet motions prolapsed and flail.  Specific leaflet segments with abnormal motions are described in the following comments:       P2    Tricuspid Valve:  Annulus dilated.  Stenosis not present.  Regurgitation mild.        Aorta    Ascending Aorta:  Size normal.  Dissection not present.  Plaque thickness less than 3 mm.  Mobile plaque not present.    Aortic Arch:  Size normal.  Dissection not present.  Plaque thickness less than 3 mm.  Mobile plaque not present.    Descending Aorta:  Size normal.  Dissection not present.  Plaque thickness less than 3 mm.  Mobile plaque not present.         Atria    Right Atrium:  Size dilated.  Spontaneous echo contrast not present.  Thrombus not present.  Tumor not present.  Device present.    Left Atrium:  Size dilated.  Spontaneous echo contrast present.  Tumor not present.  Device not present.    Other Atria Findings:       SELMA cannot rule out thrombus     Septa        Ventricular Septum:  Intra-ventricular septum morphology normal.      Diastolic Function Measurements:  Diastolic Dysfunction Grade= indeterminate  E=  ms  A=  ms  E/A Ratio=   DT=  ms  S/D=   IVRT=    Other Findings  Pericardium:  normal  Pleural Effusion:  none  Pulmonary Arteries:  dilated  Pulmonary Venous Flow:  systolic flow reversal    Anesthesia Information  Performed Personally  Anesthesiologist:  Radha Arriaza MD      Echocardiogram Comments:       LVEF 40-45%  Dilated RV with mildly reduced function  Severe MR secondary to flail P2, eccentric anteriorly directed jet  Dilated tricuspid annulus with mild to modderate TR    Post CPB  LVEF 45-50% Primacor, paced  Mitral valve s/p repair with annuloplasty band, triangular resection of P2, neochords  Good coaptation, trace residual MR, no MS  Still with mild to moderate TR  No aortic dissection post decannulation

## 2025-01-08 NOTE — PROGRESS NOTES
CVICU Intensivist Progress Note    HPI:73 yo male with severe MR and progressive dyspnea as well as h/o Afib presents to CVICU immediately s/p mitral valve repair + P2 resection + MAZE + SELMA clip    PMHx: HOLLI using cpap, DM2, h/p PVCs, h/o Afib (on coumadin), PCI/PRAVIN to distal LAD in 2022 w/ recent cath showing no further disease, ?recent pneumonia    Procedure: 1/8/25 mitral valve repair + P2 resection + SELMA clip + MAZE by Dr. Kelly. He had a history of difficult airway, so CMAC was used without issue. Intra-op YAZMIN showed LVEF 40%, RV dilated w/ mild dysfunction, severe MR and mild-mod TR; post-cpb YAZMIN showed improvement in LVEF to 45-50%, no change in RV fxn, trace MR following the repair and unchanged mild-mod TR. He was brought to CVICU on amio .5, milrinone .25, insulin, propofol and precedex. Epicardial pacer set to DDD @ 90 bpm; difficulty with atrial capture, possibly related to lesions burned during the MAZE procedure. V wires capture appropriately.      Daily Assessment and Plan 1/8/25:    Neuro: maintain sedation w/ propofol and precedex for now while assessing hemodynamics, wean when ready to work toward extubation. Ofirmev x3 doses for post-op pain control, then transition to apap vs. Norco as needed. On gabapentin at home, restart when needed. Restart home lexapro for depression, hold off on home trazodone for sleep in immediate post-op period    CV: immediately s/p mitral valve repair and MAZE. Statin tonight, start ASA tomorrow for CAD and h/o stent. Will d/w CVS re: anticoagulation if needed in setting of SELMA clip. Continue milrinone for RV support and post-op cardiogenic shock, maintain CI >2.2. Gentle volume resuscitation, pressors as needed for hypotension and to maintain MAP >65.     - Rhythm: h/o PVCs and Afib now s/p MAZE. A wires without capture but V wires pacing appropriately. Wean pacing as tolerated, likely rhythm will fluctuate given procedure    Pulm: Minimal vent settings currently,  work toward SBT when appropriate. Aggressive pulmonary toilet once extubated to prevent atelectasis. H/o HOLLI on cpap, will ensure cpap orders for sleep and qHS    Renal: appropriate UOP immediately post-op, replete electrolytes as needed. Mild TY, Cr this AM 1.2 but .9 in December. H/o BPH on doxazosin restart once BP acceptable    GI: NPO while intubated, advance diet as tolerated once extubated. PPI for prophy, bowel regimen    Endo: hyperglycemia w/ DM2, A1C 6.3. Insulin drip to maintain euglycemia    ID: cefazolin for periop prophy    Heme: mild hypofibrinogenemia, post-op Hgb acceptable. Only received cell saver intra-op, no exogenous products. Minimal chest tube output at this time, watch closely.    acetaminophen, 1,000 mg, Intravenous, Q8H  acetaminophen, 650 mg, Oral, Q4H   Or  acetaminophen, 650 mg, Oral, Q4H   Or  acetaminophen, 650 mg, Rectal, Q4H  [START ON 1/9/2025] aspirin, 81 mg, Oral, Daily  atorvastatin, 40 mg, Oral, Nightly  ceFAZolin, 2 g, Intravenous, Q8H  chlorhexidine, 15 mL, Mouth/Throat, Q12H  [START ON 1/9/2025] enoxaparin, 40 mg, Subcutaneous, Daily  [START ON 1/9/2025] escitalopram, 15 mg, Oral, Q PM  magnesium sulfate, 2 g, Intravenous, Q8H  metoclopramide, 10 mg, Intravenous, Q6H  [START ON 1/9/2025] metoprolol tartrate, 12.5 mg, Oral, Q12H  mupirocin, 1 Application, Each Nare, BID  pantoprazole, 40 mg, Intravenous, Once   Followed by  [START ON 1/9/2025] pantoprazole, 40 mg, Oral, QAM  [START ON 1/9/2025] senna-docusate sodium, 2 tablet, Oral, Nightly  [START ON 1/9/2025] terazosin, 5 mg, Oral, Nightly    ------------------------------------------------------------------------------------------------------------------------------------------------------  Prophy: HOB elevated + oral care + scds + choe stat lock + PPI + (subglottic suction) + (chemical DVT prophy)  Code Status: full  Family contact/POA:    Critical Care time: 39 mins on 1/8/25 by Melodie Case MD for the assessment  and treatment of: cardiogenic shock, hyperglycemia requiring insulin drip, epicardial pacing

## 2025-01-08 NOTE — ANESTHESIA PROCEDURE NOTES
Central Line      Patient reassessed immediately prior to procedure    Patient location during procedure: OR  Start time: 1/8/2025 7:00 AM  Stop Time:1/8/2025 7:06 AM  Indications: central pressure monitoring, vascular access and MD/Surgeon request  Staff  Anesthesiologist: Radha Arriaza MD  Preanesthetic Checklist  Completed: patient identified, IV checked, site marked, risks and benefits discussed, surgical consent, monitors and equipment checked, pre-op evaluation and timeout performed  Central Line Prep  Sterile Tech:cap, gloves, gown, mask and sterile barriers  Prep: chloraprep  Patient monitoring: blood pressure monitoring, EKG and continuous pulse oximetry  Central Line Procedure  Laterality:right  Location:internal jugular  Catheter Type:Cordis (MAC Introducer)  Catheter Size:9 Fr  Guidance:ultrasound guided  PROCEDURE NOTE/ULTRASOUND INTERPRETATION.  Using ultrasound guidance the potential vascular sites for insertion of the catheter were visualized to determine the patency of the vessel to be used for vascular access.  After selecting the appropriate site for insertion, the needle was visualized under ultrasound being inserted into the internal jugular vein, followed by ultrasound confirmation of wire and catheter placement. There were no abnormalities seen on ultrasound; an image was taken; and the patient tolerated the procedure with no complications. Images: still images obtained, printed/placed on chart (image in YAZMIN)  Assessment  Post procedure:biopatch applied, line sutured, occlusive dressing applied and secured with tape  Assessement:blood return through all ports, free fluid flow, chest x-ray ordered and Antonino Test  Complications:no  Patient Tolerance:patient tolerated the procedure well with no apparent complications

## 2025-01-08 NOTE — ANESTHESIA PROCEDURE NOTES
Arterial Line      Patient reassessed immediately prior to procedure    Start time: 1/8/2025 6:45 AM  Stop Time:1/8/2025 6:49 AM       Line placed for hemodynamic monitoring and ABGs/Labs/ISTAT.  Performed By   Anesthesiologist: Radha Arriaza MD   Preanesthetic Checklist  Completed: patient identified, IV checked, site marked, risks and benefits discussed, surgical consent, monitors and equipment checked, pre-op evaluation and timeout performed  Arterial Line Prep    Sterile Tech: gloves, mask and cap  Prep: ChloraPrep  Patient monitoring: blood pressure monitoring, continuous pulse oximetry and EKG  Arterial Line Procedure   Laterality:left  Location:  radial artery  Catheter size: 20 G   Guidance: palpation technique  Number of attempts: 1  Successful placement: yes   Post Assessment   Dressing Type: secured with tape and occlusive dressing applied.   Complications no  Circ/Move/Sens Assessment: normal and unchanged.   Patient Tolerance: patient tolerated the procedure well with no apparent complications

## 2025-01-09 ENCOUNTER — APPOINTMENT (OUTPATIENT)
Dept: GENERAL RADIOLOGY | Facility: HOSPITAL | Age: 75
DRG: 220 | End: 2025-01-09
Payer: COMMERCIAL

## 2025-01-09 LAB
ALBUMIN SERPL-MCNC: 3.7 G/DL (ref 3.5–5.2)
ANION GAP SERPL CALCULATED.3IONS-SCNC: 11 MMOL/L (ref 5–15)
ANION GAP SERPL CALCULATED.3IONS-SCNC: 15.4 MMOL/L (ref 5–15)
BASOPHILS # BLD AUTO: 0.03 10*3/MM3 (ref 0–0.2)
BASOPHILS NFR BLD AUTO: 0.2 % (ref 0–1.5)
BUN SERPL-MCNC: 23 MG/DL (ref 8–23)
BUN SERPL-MCNC: 26 MG/DL (ref 8–23)
BUN/CREAT SERPL: 11.6 (ref 7–25)
BUN/CREAT SERPL: 12.6 (ref 7–25)
CA-I SERPL ISE-MCNC: 1.19 MMOL/L (ref 1.15–1.35)
CA-I SERPL ISE-MCNC: 1.21 MMOL/L (ref 1.15–1.35)
CALCIUM SPEC-SCNC: 8.4 MG/DL (ref 8.6–10.5)
CALCIUM SPEC-SCNC: 8.4 MG/DL (ref 8.6–10.5)
CHLORIDE SERPL-SCNC: 106 MMOL/L (ref 98–107)
CHLORIDE SERPL-SCNC: 109 MMOL/L (ref 98–107)
CO2 SERPL-SCNC: 19.6 MMOL/L (ref 22–29)
CO2 SERPL-SCNC: 20 MMOL/L (ref 22–29)
CREAT SERPL-MCNC: 1.98 MG/DL (ref 0.76–1.27)
CREAT SERPL-MCNC: 2.06 MG/DL (ref 0.76–1.27)
CYTO UR: NORMAL
D-LACTATE SERPL-SCNC: 0.9 MMOL/L (ref 0.5–2)
DEPRECATED RDW RBC AUTO: 43.2 FL (ref 37–54)
DEPRECATED RDW RBC AUTO: 44.7 FL (ref 37–54)
EGFRCR SERPLBLD CKD-EPI 2021: 33.6 ML/MIN/1.73
EGFRCR SERPLBLD CKD-EPI 2021: 35.2 ML/MIN/1.73
EOSINOPHIL # BLD AUTO: 0.01 10*3/MM3 (ref 0–0.4)
EOSINOPHIL NFR BLD AUTO: 0.1 % (ref 0.3–6.2)
ERYTHROCYTE [DISTWIDTH] IN BLOOD BY AUTOMATED COUNT: 13.5 % (ref 12.3–15.4)
ERYTHROCYTE [DISTWIDTH] IN BLOOD BY AUTOMATED COUNT: 13.7 % (ref 12.3–15.4)
GLUCOSE BLDC GLUCOMTR-MCNC: 139 MG/DL (ref 70–130)
GLUCOSE BLDC GLUCOMTR-MCNC: 143 MG/DL (ref 70–130)
GLUCOSE BLDC GLUCOMTR-MCNC: 144 MG/DL (ref 70–130)
GLUCOSE BLDC GLUCOMTR-MCNC: 144 MG/DL (ref 70–130)
GLUCOSE BLDC GLUCOMTR-MCNC: 147 MG/DL (ref 70–130)
GLUCOSE BLDC GLUCOMTR-MCNC: 159 MG/DL (ref 70–130)
GLUCOSE BLDC GLUCOMTR-MCNC: 161 MG/DL (ref 70–130)
GLUCOSE BLDC GLUCOMTR-MCNC: 183 MG/DL (ref 70–130)
GLUCOSE BLDC GLUCOMTR-MCNC: 187 MG/DL (ref 70–130)
GLUCOSE SERPL-MCNC: 142 MG/DL (ref 65–99)
GLUCOSE SERPL-MCNC: 144 MG/DL (ref 65–99)
HCT VFR BLD AUTO: 27.6 % (ref 37.5–51)
HCT VFR BLD AUTO: 28.6 % (ref 37.5–51)
HGB BLD-MCNC: 8.8 G/DL (ref 13–17.7)
HGB BLD-MCNC: 9.5 G/DL (ref 13–17.7)
IMM GRANULOCYTES # BLD AUTO: 0.08 10*3/MM3 (ref 0–0.05)
IMM GRANULOCYTES NFR BLD AUTO: 0.5 % (ref 0–0.5)
INR PPP: 1.33 (ref 0.9–1.1)
IRON 24H UR-MRATE: 14 MCG/DL (ref 59–158)
IRON SATN MFR SERPL: 5 % (ref 20–50)
LAB AP CASE REPORT: NORMAL
LYMPHOCYTES # BLD AUTO: 0.63 10*3/MM3 (ref 0.7–3.1)
LYMPHOCYTES NFR BLD AUTO: 3.8 % (ref 19.6–45.3)
MAGNESIUM SERPL-MCNC: 2.8 MG/DL (ref 1.6–2.4)
MAGNESIUM SERPL-MCNC: 3.1 MG/DL (ref 1.6–2.4)
MCH RBC QN AUTO: 28.8 PG (ref 26.6–33)
MCH RBC QN AUTO: 29.1 PG (ref 26.6–33)
MCHC RBC AUTO-ENTMCNC: 31.9 G/DL (ref 31.5–35.7)
MCHC RBC AUTO-ENTMCNC: 33.2 G/DL (ref 31.5–35.7)
MCV RBC AUTO: 87.5 FL (ref 79–97)
MCV RBC AUTO: 90.2 FL (ref 79–97)
MONOCYTES # BLD AUTO: 0.43 10*3/MM3 (ref 0.1–0.9)
MONOCYTES NFR BLD AUTO: 2.6 % (ref 5–12)
NEUTROPHILS NFR BLD AUTO: 15.39 10*3/MM3 (ref 1.7–7)
NEUTROPHILS NFR BLD AUTO: 92.8 % (ref 42.7–76)
NRBC BLD AUTO-RTO: 0 /100 WBC (ref 0–0.2)
PATH REPORT.FINAL DX SPEC: NORMAL
PATH REPORT.GROSS SPEC: NORMAL
PHOSPHATE SERPL-MCNC: 6.9 MG/DL (ref 2.5–4.5)
PHOSPHATE SERPL-MCNC: 7 MG/DL (ref 2.5–4.5)
PLATELET # BLD AUTO: 142 10*3/MM3 (ref 140–450)
PLATELET # BLD AUTO: 144 10*3/MM3 (ref 140–450)
PMV BLD AUTO: 11.6 FL (ref 6–12)
PMV BLD AUTO: 11.8 FL (ref 6–12)
POTASSIUM SERPL-SCNC: 3.9 MMOL/L (ref 3.5–5.2)
POTASSIUM SERPL-SCNC: 4.4 MMOL/L (ref 3.5–5.2)
POTASSIUM SERPL-SCNC: 4.8 MMOL/L (ref 3.5–5.2)
PROTHROMBIN TIME: 16.8 SECONDS (ref 11.7–14.2)
QT INTERVAL: 462 MS
QT INTERVAL: 468 MS
QT INTERVAL: 472 MS
QT INTERVAL: 516 MS
QTC INTERVAL: 382 MS
QTC INTERVAL: 495 MS
QTC INTERVAL: 506 MS
QTC INTERVAL: 520 MS
RBC # BLD AUTO: 3.06 10*6/MM3 (ref 4.14–5.8)
RBC # BLD AUTO: 3.27 10*6/MM3 (ref 4.14–5.8)
SODIUM SERPL-SCNC: 140 MMOL/L (ref 136–145)
SODIUM SERPL-SCNC: 141 MMOL/L (ref 136–145)
TIBC SERPL-MCNC: 304 MCG/DL (ref 298–536)
TRANSFERRIN SERPL-MCNC: 204 MG/DL (ref 200–360)
WBC NRBC COR # BLD AUTO: 15.09 10*3/MM3 (ref 3.4–10.8)
WBC NRBC COR # BLD AUTO: 16.57 10*3/MM3 (ref 3.4–10.8)

## 2025-01-09 PROCEDURE — 25010000002 MILRINONE LACTATE IN DEXTROSE 20-5 MG/100ML-% SOLUTION: Performed by: NURSE PRACTITIONER

## 2025-01-09 PROCEDURE — 25010000002 MAGNESIUM SULFATE 2 GM/50ML SOLUTION: Performed by: NURSE PRACTITIONER

## 2025-01-09 PROCEDURE — 25010000002 ENOXAPARIN PER 10 MG: Performed by: NURSE PRACTITIONER

## 2025-01-09 PROCEDURE — 97162 PT EVAL MOD COMPLEX 30 MIN: CPT

## 2025-01-09 PROCEDURE — 83735 ASSAY OF MAGNESIUM: CPT | Performed by: ANESTHESIOLOGY

## 2025-01-09 PROCEDURE — 25010000002 ONDANSETRON PER 1 MG: Performed by: NURSE PRACTITIONER

## 2025-01-09 PROCEDURE — 25010000002 MORPHINE PER 10 MG: Performed by: NURSE PRACTITIONER

## 2025-01-09 PROCEDURE — 82330 ASSAY OF CALCIUM: CPT | Performed by: NURSE PRACTITIONER

## 2025-01-09 PROCEDURE — 25010000002 ACETAMINOPHEN 10 MG/ML SOLUTION: Performed by: NURSE PRACTITIONER

## 2025-01-09 PROCEDURE — 83735 ASSAY OF MAGNESIUM: CPT | Performed by: NURSE PRACTITIONER

## 2025-01-09 PROCEDURE — 71045 X-RAY EXAM CHEST 1 VIEW: CPT

## 2025-01-09 PROCEDURE — 25010000002 FUROSEMIDE PER 20 MG: Performed by: INTERNAL MEDICINE

## 2025-01-09 PROCEDURE — 82948 REAGENT STRIP/BLOOD GLUCOSE: CPT

## 2025-01-09 PROCEDURE — 25010000002 HYDRALAZINE PER 20 MG: Performed by: NURSE PRACTITIONER

## 2025-01-09 PROCEDURE — 82330 ASSAY OF CALCIUM: CPT | Performed by: ANESTHESIOLOGY

## 2025-01-09 PROCEDURE — 99024 POSTOP FOLLOW-UP VISIT: CPT | Performed by: NURSE PRACTITIONER

## 2025-01-09 PROCEDURE — 99291 CRITICAL CARE FIRST HOUR: CPT | Performed by: ANESTHESIOLOGY

## 2025-01-09 PROCEDURE — 25010000002 FUROSEMIDE PER 20 MG: Performed by: ANESTHESIOLOGY

## 2025-01-09 PROCEDURE — 99232 SBSQ HOSP IP/OBS MODERATE 35: CPT | Performed by: INTERNAL MEDICINE

## 2025-01-09 PROCEDURE — 63710000001 INSULIN LISPRO (HUMAN) PER 5 UNITS: Performed by: ANESTHESIOLOGY

## 2025-01-09 PROCEDURE — 85027 COMPLETE CBC AUTOMATED: CPT | Performed by: NURSE PRACTITIONER

## 2025-01-09 PROCEDURE — 83605 ASSAY OF LACTIC ACID: CPT | Performed by: ANESTHESIOLOGY

## 2025-01-09 PROCEDURE — 85610 PROTHROMBIN TIME: CPT | Performed by: NURSE PRACTITIONER

## 2025-01-09 PROCEDURE — 97530 THERAPEUTIC ACTIVITIES: CPT

## 2025-01-09 PROCEDURE — 93010 ELECTROCARDIOGRAM REPORT: CPT | Performed by: INTERNAL MEDICINE

## 2025-01-09 PROCEDURE — 84100 ASSAY OF PHOSPHORUS: CPT | Performed by: ANESTHESIOLOGY

## 2025-01-09 PROCEDURE — 25010000002 METOCLOPRAMIDE PER 10 MG: Performed by: NURSE PRACTITIONER

## 2025-01-09 PROCEDURE — 80069 RENAL FUNCTION PANEL: CPT | Performed by: NURSE PRACTITIONER

## 2025-01-09 PROCEDURE — 83540 ASSAY OF IRON: CPT | Performed by: NURSE PRACTITIONER

## 2025-01-09 PROCEDURE — 25010000002 AMIODARONE IN DEXTROSE 5% 360-4.14 MG/200ML-% SOLUTION: Performed by: THORACIC SURGERY (CARDIOTHORACIC VASCULAR SURGERY)

## 2025-01-09 PROCEDURE — 80048 BASIC METABOLIC PNL TOTAL CA: CPT | Performed by: ANESTHESIOLOGY

## 2025-01-09 PROCEDURE — 84132 ASSAY OF SERUM POTASSIUM: CPT | Performed by: THORACIC SURGERY (CARDIOTHORACIC VASCULAR SURGERY)

## 2025-01-09 PROCEDURE — 63710000001 INSULIN GLARGINE PER 5 UNITS: Performed by: ANESTHESIOLOGY

## 2025-01-09 PROCEDURE — 25010000002 CEFAZOLIN PER 500 MG: Performed by: NURSE PRACTITIONER

## 2025-01-09 PROCEDURE — 93005 ELECTROCARDIOGRAM TRACING: CPT | Performed by: NURSE PRACTITIONER

## 2025-01-09 PROCEDURE — 84466 ASSAY OF TRANSFERRIN: CPT | Performed by: NURSE PRACTITIONER

## 2025-01-09 PROCEDURE — 85025 COMPLETE CBC W/AUTO DIFF WBC: CPT | Performed by: NURSE PRACTITIONER

## 2025-01-09 RX ORDER — POTASSIUM CHLORIDE 750 MG/1
20 TABLET, FILM COATED, EXTENDED RELEASE ORAL ONCE
Status: COMPLETED | OUTPATIENT
Start: 2025-01-09 | End: 2025-01-09

## 2025-01-09 RX ORDER — AMIODARONE HYDROCHLORIDE 200 MG/1
200 TABLET ORAL EVERY 12 HOURS SCHEDULED
Status: DISCONTINUED | OUTPATIENT
Start: 2025-01-09 | End: 2025-01-10

## 2025-01-09 RX ORDER — HYDRALAZINE HYDROCHLORIDE 20 MG/ML
20 INJECTION INTRAMUSCULAR; INTRAVENOUS EVERY 6 HOURS PRN
Status: DISCONTINUED | OUTPATIENT
Start: 2025-01-09 | End: 2025-01-14 | Stop reason: HOSPADM

## 2025-01-09 RX ORDER — FUROSEMIDE 10 MG/ML
80 INJECTION INTRAMUSCULAR; INTRAVENOUS ONCE
Status: COMPLETED | OUTPATIENT
Start: 2025-01-09 | End: 2025-01-09

## 2025-01-09 RX ORDER — INSULIN LISPRO 100 [IU]/ML
2-7 INJECTION, SOLUTION INTRAVENOUS; SUBCUTANEOUS
Status: DISCONTINUED | OUTPATIENT
Start: 2025-01-09 | End: 2025-01-14 | Stop reason: HOSPADM

## 2025-01-09 RX ORDER — NICOTINE POLACRILEX 4 MG
15 LOZENGE BUCCAL
Status: DISCONTINUED | OUTPATIENT
Start: 2025-01-09 | End: 2025-01-14 | Stop reason: HOSPADM

## 2025-01-09 RX ORDER — GUAIFENESIN 600 MG/1
1200 TABLET, EXTENDED RELEASE ORAL EVERY 12 HOURS SCHEDULED
Status: DISCONTINUED | OUTPATIENT
Start: 2025-01-09 | End: 2025-01-14 | Stop reason: HOSPADM

## 2025-01-09 RX ORDER — FUROSEMIDE 10 MG/ML
40 INJECTION INTRAMUSCULAR; INTRAVENOUS ONCE
Status: COMPLETED | OUTPATIENT
Start: 2025-01-09 | End: 2025-01-09

## 2025-01-09 RX ORDER — DEXTROSE MONOHYDRATE 25 G/50ML
25 INJECTION, SOLUTION INTRAVENOUS
Status: DISCONTINUED | OUTPATIENT
Start: 2025-01-09 | End: 2025-01-14 | Stop reason: HOSPADM

## 2025-01-09 RX ORDER — GABAPENTIN 300 MG/1
300 CAPSULE ORAL NIGHTLY
Status: DISCONTINUED | OUTPATIENT
Start: 2025-01-09 | End: 2025-01-10

## 2025-01-09 RX ORDER — IBUPROFEN 600 MG/1
1 TABLET ORAL
Status: DISCONTINUED | OUTPATIENT
Start: 2025-01-09 | End: 2025-01-14 | Stop reason: HOSPADM

## 2025-01-09 RX ORDER — NEBIVOLOL 5 MG/1
2.5 TABLET ORAL
Status: DISCONTINUED | OUTPATIENT
Start: 2025-01-09 | End: 2025-01-10 | Stop reason: SDUPTHER

## 2025-01-09 RX ADMIN — MILRINONE LACTATE 0.12 MCG/KG/MIN: 0.2 INJECTION, SOLUTION INTRAVENOUS at 17:29

## 2025-01-09 RX ADMIN — INSULIN LISPRO 2 UNITS: 100 INJECTION, SOLUTION INTRAVENOUS; SUBCUTANEOUS at 21:15

## 2025-01-09 RX ADMIN — ESCITALOPRAM 15 MG: 5 TABLET, FILM COATED ORAL at 16:11

## 2025-01-09 RX ADMIN — MAGNESIUM SULFATE HEPTAHYDRATE 2 G: 40 INJECTION, SOLUTION INTRAVENOUS at 03:07

## 2025-01-09 RX ADMIN — INSULIN GLARGINE 15 UNITS: 100 INJECTION, SOLUTION SUBCUTANEOUS at 10:57

## 2025-01-09 RX ADMIN — ACETAMINOPHEN 1000 MG: 10 INJECTION INTRAVENOUS at 03:07

## 2025-01-09 RX ADMIN — NEBIVOLOL 2.5 MG: 5 TABLET ORAL at 18:30

## 2025-01-09 RX ADMIN — ASPIRIN 81 MG: 81 TABLET, COATED ORAL at 08:19

## 2025-01-09 RX ADMIN — MUPIROCIN 1 APPLICATION: 20 OINTMENT TOPICAL at 21:01

## 2025-01-09 RX ADMIN — TERAZOSIN HYDROCHLORIDE 5 MG: 5 CAPSULE ORAL at 21:01

## 2025-01-09 RX ADMIN — 0.12% CHLORHEXIDINE GLUCONATE 15 ML: 1.2 RINSE ORAL at 08:19

## 2025-01-09 RX ADMIN — 0.12% CHLORHEXIDINE GLUCONATE 15 ML: 1.2 RINSE ORAL at 21:01

## 2025-01-09 RX ADMIN — ATORVASTATIN CALCIUM 40 MG: 20 TABLET, FILM COATED ORAL at 21:01

## 2025-01-09 RX ADMIN — METOCLOPRAMIDE 10 MG: 5 INJECTION, SOLUTION INTRAMUSCULAR; INTRAVENOUS at 04:25

## 2025-01-09 RX ADMIN — AMIODARONE HYDROCHLORIDE 0.5 MG/MIN: 1.8 INJECTION, SOLUTION INTRAVENOUS at 06:42

## 2025-01-09 RX ADMIN — FUROSEMIDE 80 MG: 10 INJECTION, SOLUTION INTRAMUSCULAR; INTRAVENOUS at 10:48

## 2025-01-09 RX ADMIN — CYCLOBENZAPRINE HYDROCHLORIDE 10 MG: 10 TABLET, FILM COATED ORAL at 10:15

## 2025-01-09 RX ADMIN — SODIUM CHLORIDE 2 G: 900 INJECTION INTRAVENOUS at 16:11

## 2025-01-09 RX ADMIN — AMIODARONE HYDROCHLORIDE 200 MG: 200 TABLET ORAL at 21:01

## 2025-01-09 RX ADMIN — OXYCODONE HYDROCHLORIDE 5 MG: 5 TABLET ORAL at 06:23

## 2025-01-09 RX ADMIN — PANTOPRAZOLE SODIUM 40 MG: 40 TABLET, DELAYED RELEASE ORAL at 06:23

## 2025-01-09 RX ADMIN — HYDRALAZINE HYDROCHLORIDE 20 MG: 20 INJECTION INTRAMUSCULAR; INTRAVENOUS at 09:26

## 2025-01-09 RX ADMIN — GABAPENTIN 300 MG: 300 CAPSULE ORAL at 21:01

## 2025-01-09 RX ADMIN — FUROSEMIDE 40 MG: 10 INJECTION, SOLUTION INTRAMUSCULAR; INTRAVENOUS at 17:51

## 2025-01-09 RX ADMIN — ONDANSETRON 4 MG: 2 INJECTION, SOLUTION INTRAMUSCULAR; INTRAVENOUS at 03:12

## 2025-01-09 RX ADMIN — OXYCODONE HYDROCHLORIDE 5 MG: 5 TABLET ORAL at 17:51

## 2025-01-09 RX ADMIN — GUAIFENESIN 1200 MG: 600 TABLET, MULTILAYER, EXTENDED RELEASE ORAL at 08:19

## 2025-01-09 RX ADMIN — MUPIROCIN 1 APPLICATION: 20 OINTMENT TOPICAL at 08:19

## 2025-01-09 RX ADMIN — HYDRALAZINE HYDROCHLORIDE 20 MG: 20 INJECTION INTRAMUSCULAR; INTRAVENOUS at 16:42

## 2025-01-09 RX ADMIN — MILRINONE LACTATE 0.12 MCG/KG/MIN: 0.2 INJECTION, SOLUTION INTRAVENOUS at 00:29

## 2025-01-09 RX ADMIN — SENNOSIDES AND DOCUSATE SODIUM 2 TABLET: 50; 8.6 TABLET ORAL at 21:01

## 2025-01-09 RX ADMIN — AMIODARONE HYDROCHLORIDE 200 MG: 200 TABLET ORAL at 10:43

## 2025-01-09 RX ADMIN — HYDROCODONE BITARTRATE AND ACETAMINOPHEN 2 TABLET: 5; 325 TABLET ORAL at 12:32

## 2025-01-09 RX ADMIN — MORPHINE SULFATE 1 MG: 2 INJECTION, SOLUTION INTRAMUSCULAR; INTRAVENOUS at 06:04

## 2025-01-09 RX ADMIN — SODIUM CHLORIDE 2 G: 900 INJECTION INTRAVENOUS at 08:20

## 2025-01-09 RX ADMIN — CYCLOBENZAPRINE HYDROCHLORIDE 10 MG: 10 TABLET, FILM COATED ORAL at 21:16

## 2025-01-09 RX ADMIN — ENOXAPARIN SODIUM 40 MG: 100 INJECTION SUBCUTANEOUS at 17:29

## 2025-01-09 RX ADMIN — GUAIFENESIN 1200 MG: 600 TABLET, MULTILAYER, EXTENDED RELEASE ORAL at 21:01

## 2025-01-09 RX ADMIN — SODIUM CHLORIDE 2 G: 900 INJECTION INTRAVENOUS at 01:08

## 2025-01-09 RX ADMIN — POTASSIUM CHLORIDE 20 MEQ: 750 TABLET, EXTENDED RELEASE ORAL at 16:27

## 2025-01-09 NOTE — ANESTHESIA POSTPROCEDURE EVALUATION
"Patient: Jeb Olson    Procedure Summary       Date: 01/08/25 Room / Location: [place] [id number] / [facility] CARDIOVASCULAR OPERATING ROOM    Anesthesia Start: 0639 Anesthesia Stop: 1037    Procedure: STERNOTOMY, MITRAL VALVE REPAIR, MAZE PROCEDURE TRANSESOPHAGEAL ECHOCARDIOGRAM, PRP (Chest) Diagnosis:       Severe mitral regurgitation      (Severe mitral regurgitation [I34.0])    Surgeons: Young Kelly MD Provider: Radha Arriaza MD    Anesthesia Type: general, Chichi, CVL, PAC ASA Status: 4            Anesthesia Type: general, Chichi, CVL, PAC    Vitals  Vitals Value Taken Time   /86 01/09/25 0800   Temp 36.8 °C (98.2 °F) 01/09/25 0700   Pulse 65 01/09/25 0805   Resp 11 01/09/25 0700   SpO2 99 % 01/09/25 0805   Vitals shown include unfiled device data.        Post Anesthesia Care and Evaluation    Patient location during evaluation: bedside  Patient participation: complete - patient participated  Pain management: adequate    Airway patency: patent  Anesthetic complications: No anesthetic complications    Cardiovascular status: acceptable  Respiratory status: acceptable  Hydration status: acceptable    Comments: /77   Pulse 60   Temp 36.8 °C (98.2 °F) (Axillary)   Resp 11   Ht 188 cm (74\")   Wt 111 kg (244 lb)   SpO2 98%   BMI 31.33 kg/m²     "

## 2025-01-09 NOTE — PLAN OF CARE
Goal Outcome Evaluation:               POD1. A&Ox4. NSR with frequent PVCs. Hydralazine given per mar. On 2LNC. On clear liquid diet. Potassium replaced. Pain controlled per mar. Adequate UOP. Will continue to monitor.

## 2025-01-09 NOTE — PROGRESS NOTES
CVICU Intensivist Progress Note    HPI: 75 yo male with severe MR and progressive dyspnea as well as h/o Afib presents to CVICU immediately s/p mitral valve repair + P2 resection + MAZE + SELMA clip     PMHx: HOLLI using cpap, DM2, h/p PVCs, h/o Afib (on coumadin), PCI/PRAVIN to distal LAD in 2022 w/ recent cath showing no further disease, ?recent pneumonia w/ hospitalization at OSH x5 days (did not require intubation)     Procedure: 1/8/25 mitral valve repair + P2 resection + SELMA clip + MAZE by Dr. Kelly. He had a history of difficult airway, so CMAC was used without issue. Intra-op YAZMIN showed LVEF 40%, RV dilated w/ mild dysfunction, severe MR and mild-mod TR; post-cpb YAZMIN showed improvement in LVEF to 45-50%, no change in RV fxn, trace MR following the repair and unchanged mild-mod TR. He was brought to CVICU on amio .5, milrinone .25, insulin, propofol and precedex. Epicardial pacer set to DDD @ 90 bpm; difficulty with atrial capture, possibly related to lesions burned during the MAZE procedure. V wires capture appropriately.                           Daily Assessment and Plan 1/9/25:    Neuro: Awake, alert with some pain complaints this AM, most MSK pain --> flexeril added to prn tylenol and oxycodone. Mobilizing with PT/OT and RN staff. Restart home lexapro qHS. Hold off on home trazodone unless issues sleeping tonight, restart gabapentin at lower dose 2/2 TY    CV: Continue milrinone .125mcg/kg/min for RV support in setting of mild dysfxn on YAZMIN as well as rising Cr, likely able to d/c tomorrow. Last CI on swan prior to removal this AM was 2.4. Prn hydralazine for hypertension, hold off on home Procardia and Bystolic at this time w/ consideration to resume bystolic later today vs. Tomorrow. ASA + statin.   Rhythm: Currently in sinus in the 70s w/ prolonged ND and intermittent PVCs (h/o PVCs) --> amio transitioned from IV to PO s/p MAZE. Pacer set to backup VVI @ 40bpm    Pulm: HOLLI using home cpap at night, mild  respiratory insufficiency today w/ pulmonary vascular congestion and atelectasis on CXR. Attempt to wean supplemental O2 as able, aggressive IS/flutter for pulmonary toilet and guaifenesin to assist w/ secretion clearance. Diuresis for pulmonary edema    Renal: TY w/ Cr up to 1.9 this morning. Appears that Cr has fluctuated in the past, hopeful that this will downtrend given appropriate perfusion and BP as well as improved rhythm at this time. Lasix per Cardiology (home lasix 80mg daily), having excellent response thus far. Eval for need to redose. Follow electrolytes, replete as necessary. Phos extremely low yesterday even on recheck, received 30mmol repletion and now high --> will recheck after diuresis. Terazosin (in place of home doxazosin) for h/o BPH    GI:slowly advancing diet, bowel regimen for constipation. PPI for prophy    Endo: hyperglycemia with h/o DM2 and A1c 6.3. Transition off insulin drip to lantus 15units x1, SSI based on calculation of previous 12 hours of drip. Eval over next 12-24 hours need for addition of mealtime and further scheduled lantus.    ID: periop prophy complete, no current signs/sx of infection    Heme: acute blood loss anemia w/ Hgb down to 8.8 this AM. Chest tubes with acceptable output, will recheck CBC this afternoon. Lovenox for prophy to start tonight    amiodarone, 200 mg, Oral, Q12H  aspirin, 81 mg, Oral, Daily  atorvastatin, 40 mg, Oral, Nightly  ceFAZolin, 2 g, Intravenous, Q8H  chlorhexidine, 15 mL, Mouth/Throat, Q12H  enoxaparin, 40 mg, Subcutaneous, Daily  escitalopram, 15 mg, Oral, Q PM  guaiFENesin, 1,200 mg, Oral, Q12H  insulin lispro, 2-7 Units, Subcutaneous, 4x Daily AC & at Bedtime  [Held by provider] metoprolol tartrate, 12.5 mg, Oral, Q12H  mupirocin, 1 Application, Each Nare, BID  pantoprazole, 40 mg, Oral, QAM  senna-docusate sodium, 2 tablet, Oral, Nightly  terazosin, 5 mg, Oral,  Nightly      ------------------------------------------------------------------------------------------------------------------------------------------------------  Prophy: HOB elevated + oral care + scds /mobilization + choe stat lock + PPI + lovenox  Code Status: full  Family contact/POA:    Critical Care time: 38 mins on 1/9/25 by Melodie Case MD for the assessment and treatment of: RV dysfunction requiring inotropic support, acute kidney injury, hyperglycemia, pain control        Afternoon addendum: Cr slightly up again this afternoon with low but stable bicarb on BMP. Anion gap increased as well, lactate checked and wnl. Making excellent urine from the lasix this morning, will redose 40 for tonight and eval again in AM. Extremities warm and well perfused, feeling overall better this afternoon. Will start 1/2 of home dose of Bystolic for HTN and ongoing PVC burden. Continue milrinone tonight, likely stop tomorrow. Will reassess dosing in AM

## 2025-01-09 NOTE — PLAN OF CARE
Goal Outcome Evaluation:  Plan of Care Reviewed With: patient        Progress: improving  Outcome Evaluation: Pt seen for PT eval this AM. He was admitted to [facility] on 1/5. Pt is now POD 1 MVR. He has hx of A fib, DM2, CAD, HTN, HLD, and HOLLI. At baseline, pt reports living at home w his wife. He is independent at baseline w mobility and ADLS, uses cane for long distances, still drives. Today, pt presents w expected post op pain, weakness, and decreased activity tolerance. He is up in chair upon entry to room. Pt tolerated all cardiac exercises well. He was able to stand w min A x 2 and then ambulated approx 40 ft w HHA/min A x 2. Pt demos slow pace w decreased step length. No overt unsteadiness noted. Pt assisted to bed at end of session w min/mod A x 2. Pt plans home at [place] w assist of wife. HE will continue to benefit from skilled PT to maximize safety, strength, and independence w mobility.    Anticipated Discharge Disposition (PT): home with assist, home with home health

## 2025-01-09 NOTE — PROGRESS NOTES
"    Patient Name: Jeb Olson  :1950  74 y.o.      Patient Care Team:  Tera Salvador MD as PCP - General (Internal Medicine)  Micah Reyes MD as Consulting Physician (Gastroenterology)  Bruna Chávez MD as Referring Physician (Cardiology)    Chief Complaint: post op MVR MAZE SELMA clip    Interval History:    Feels poorly. Net + UOP. Remains on milrinone for reported RV dysfunction intra op.     Objective   Vital Signs  Temp:  [98.1 °F (36.7 °C)-100.8 °F (38.2 °C)] 98.6 °F (37 °C)  Heart Rate:  [47-80] 60  Resp:  [11-18] 13  BP: ()/(59-82) 137/77  Arterial Line BP: ()/(50-63) 139/54  FiO2 (%):  [40 %] 40 %    Intake/Output Summary (Last 24 hours) at 2025 1045  Last data filed at 2025 0900  Gross per 24 hour   Intake 1678.95 ml   Output 1939 ml   Net -260.05 ml     Flowsheet Rows      Flowsheet Row First Filed Value   Admission Height 188 cm (74\") Documented at 2025 1415   Admission Weight 112 kg (246 lb 3.2 oz) Documented at 2025 1415            Physical Exam:   General Appearance:    Alert, cooperative, in no acute distress   Lungs:     Bibasilar rales.      Heart:    Regular rhythm and normal rate, normal S1 and S2, no murmurs, gallops or rubs.     Chest Wall:    No abnormalities observed   Abdomen:     Soft, nontender, positive bowel sounds.     Extremities:   no cyanosis, clubbing or edema.  No marked joint deformities.  Adequate musculoskeletal strength.       Results Review:    Results from last 7 days   Lab Units 25  0307   SODIUM mmol/L 140   POTASSIUM mmol/L 4.8   CHLORIDE mmol/L 109*   CO2 mmol/L 20.0*   BUN mg/dL 23   CREATININE mg/dL 1.98*   GLUCOSE mg/dL 142*   CALCIUM mg/dL 8.4*         Results from last 7 days   Lab Units 25  0307   WBC 10*3/mm3 16.57*   HEMOGLOBIN g/dL 8.8*   HEMATOCRIT % 27.6*   PLATELETS 10*3/mm3 142     Results from last 7 days   Lab Units 25  0307 25  1042 25  0319 25  2033 25  1716 " 01/06/25  1104   INR  1.33* 1.43*  --   --   --  1.23*   APTT seconds  --  28.4 89.6* 63.5*   < > 29.5    < > = values in this interval not displayed.     Results from last 7 days   Lab Units 01/09/25  0307   MAGNESIUM mg/dL 2.8*     Results from last 7 days   Lab Units 01/03/25  0816   CHOLESTEROL mg/dL 161   TRIGLYCERIDES mg/dL 133   HDL CHOL mg/dL 33*   LDL CHOL mg/dL 104*               Medication Review:   amiodarone, 200 mg, Oral, Q12H  aspirin, 81 mg, Oral, Daily  atorvastatin, 40 mg, Oral, Nightly  ceFAZolin, 2 g, Intravenous, Q8H  chlorhexidine, 15 mL, Mouth/Throat, Q12H  enoxaparin, 40 mg, Subcutaneous, Daily  escitalopram, 15 mg, Oral, Q PM  furosemide, 80 mg, Intravenous, Once  guaiFENesin, 1,200 mg, Oral, Q12H  insulin glargine, 15 Units, Subcutaneous, Once When Specified  insulin lispro, 2-7 Units, Subcutaneous, 4x Daily AC & at Bedtime  magnesium sulfate, 2 g, Intravenous, Q8H  metoclopramide, 10 mg, Intravenous, Q6H  [Held by provider] metoprolol tartrate, 12.5 mg, Oral, Q12H  mupirocin, 1 Application, Each Nare, BID  pantoprazole, 40 mg, Oral, QAM  senna-docusate sodium, 2 tablet, Oral, Nightly  terazosin, 5 mg, Oral, Nightly         insulin, 0-100 Units/hr, Last Rate: 2.5 Units/hr (01/09/25 0847)  milrinone, 0.25-0.375 mcg/kg/min, Last Rate: 0.125 mcg/kg/min (01/09/25 0029)  niCARdipine, 5-15 mg/hr  norepinephrine, 0.02-0.2 mcg/kg/min, Last Rate: Stopped (01/09/25 0130)  phenylephrine, 0.2-2 mcg/kg/min        Assessment & Plan   Severe MR, s/p repair, MAZE, SELMA clip  AF  Nonischemic CM  CAD remote LAD PCI  TY/CKD    - Appears overloaded on exam and CXR today with congestion. Lasix 80 IV x 1, repeat tomorrow likely will reassess Cr and decide  - ASA for CAD, will need to restart warfarin for asymptomatic AF once tubes out. Today in NSR with 1st degree AV block s/p MAZE p waves are small.   - start bystolic for HTN if BP remains small    Bruna Chávez MD  [facility]  01/09/25  10:45  EST

## 2025-01-09 NOTE — THERAPY EVALUATION
Patient Name: Jeb Olson  : 1950    MRN: [id number]                              Today's Date: 2025       Admit Date: 2025    Visit Dx:     ICD-10-CM ICD-9-CM   1. Mitral valve insufficiency, unspecified etiology  I34.0 424.0   2. Severe mitral regurgitation  I34.0 424.0     Patient Active Problem List   Diagnosis    Allergic rhinitis    Arthritis of knee, left    Diabetes mellitus    Essential hypertension    Hyperlipidemia    High risk medication use    Obesity    Primary osteoarthritis of knee    Mild chronic anemia    Obstructive sleep apnea    Arthritis of left knee    Sinus bradycardia    Abnormal stress test    Colon polyp    Family history of colonic polyps    Reactive depression    Arthritis of knee, right    Arthritis of knee    AF (paroxysmal atrial fibrillation)    Acute exacerbation of CHF (congestive heart failure)    Severe mitral regurgitation    Mitral valve regurgitation     Past Medical History:   Diagnosis Date    AF (paroxysmal atrial fibrillation)     Allergic Have had for several years    Eyes and nasal issues    Anemia     Anticoagulant long-term use     COUMADIN    Anxiety Since about     Since I was taking of my Dad, who also passed away 3yrs ago.    Arthritis     Both knees, hips, and shoulders    Arthritis of knee, left     Cataract 2019    CHF (congestive heart failure)     Colon polyp     Coronary artery disease     stent    Diabetes mellitus     Dry eyes     Essential hypertension     Heart murmur     HL (hearing loss)     no hearing aides    Hyperlipemia     Knee swelling 2020    Shortly after my left knee replacement    Macular degeneration     Mild chronic anemia     Mitral valve disorder     Obesity     Pneumonia 2024    Sleep apnea     cpap     Past Surgical History:   Procedure Laterality Date    ACHILLES TENDON REPAIR Left     CARDIAC CATHETERIZATION      CARDIAC CATHETERIZATION N/A 2022    Procedure: Coronary angiography, Left  heart catheterization,;  Surgeon: Bruna Chávez MD;  Location: [facility] CATH INVASIVE LOCATION;  Service: Cardiology;  Laterality: N/A;    CARDIAC CATHETERIZATION N/A 09/01/2022    Procedure: Stent PRAVIN coronary;  Surgeon: Bruna Chávez MD;  Location: [facility] CATH INVASIVE LOCATION;  Service: Cardiology;  Laterality: N/A;    CARDIAC CATHETERIZATION N/A 1/2/2025    Procedure: Left Heart Cath;  Surgeon: Bruna Chávez MD;  Location: [facility] CATH INVASIVE LOCATION;  Service: Cardiology;  Laterality: N/A;    CARDIAC CATHETERIZATION N/A 1/2/2025    Procedure: Coronary angiography;  Surgeon: Bruna Chávez MD;  Location: [facility] CATH INVASIVE LOCATION;  Service: Cardiology;  Laterality: N/A;    CATARACT EXTRACTION EXTRACAPSULAR W/ INTRAOCULAR LENS IMPLANTATION Bilateral     COLONOSCOPY  2018    Dr Reyes    ENDOSCOPY      MITRAL VALVE REPAIR/REPLACEMENT N/A 1/8/2025    Procedure: STERNOTOMY, MITRAL VALVE REPAIR, MAZE PROCEDURE TRANSESOPHAGEAL ECHOCARDIOGRAM, PRP;  Surgeon: Young Kelly MD;  Location: [facility] CVOR;  Service: Cardiothoracic;  Laterality: N/A;    TONSILLECTOMY      As a child    TOTAL KNEE ARTHROPLASTY Left 03/12/2020    Procedure: TOTAL KNEE ARTHROPLASTY;  Surgeon: Chepe Alicea MD;  Location: [facility] MAIN OR;  Service: Orthopedics;  Laterality: Left;    TOTAL KNEE ARTHROPLASTY Right 03/21/2024    Procedure: TOTAL KNEE ARTHROPLASTY;  Surgeon: Chepe Alicea MD;  Location: [facility] OR OSC;  Service: Orthopedics;  Laterality: Right;      General Information       Row Name 01/09/25 1044          Physical Therapy Time and Intention    Document Type evaluation  -EJ     Mode of Treatment physical therapy  -EJ       Row Name 01/09/25 1044          General Information    Patient Profile Reviewed yes  -EJ     Prior Level of Function independent:;all household mobility;community mobility;ADL's;driving  uses cane for long distances  -EJ     Existing Precautions/Restrictions cardiac  -EJ     Barriers to Rehab none  identified  -       Row Name 01/09/25 1044          Living Environment    People in Home spouse  -       Row Name 01/09/25 1044          Home Main Entrance    Number of Stairs, Main Entrance five  -EJ     Stair Railings, Main Entrance railings safe and in good condition  -       Row Name 01/09/25 1044          Cognition    Orientation Status (Cognition) oriented x 4  -EJ       Row Name 01/09/25 1044          Safety Issues/Impairments Affecting Functional Mobility    Impairments Affecting Function (Mobility) strength;balance;endurance/activity tolerance;pain  -EJ               User Key  (r) = Recorded By, (t) = Taken By, (c) = Cosigned By      Initials Name Provider Type    Delfina Masters, PT Physical Therapist                   Mobility       Row Name 01/09/25 1045          Bed Mobility    Bed Mobility sit-supine;supine-sit  -EJ     Supine-Sit [place] (Bed Mobility) not tested  -EJ     Sit-Supine [place] (Bed Mobility) verbal cues;minimum assist (75% patient effort);moderate assist (50% patient effort);2 person assist  -EJ     Assistive Device (Bed Mobility) head of bed elevated  -       Row Name 01/09/25 1045          Sit-Stand Transfer    Sit-Stand [place] (Transfers) verbal cues;minimum assist (75% patient effort);2 person assist  -EJ     Comment, (Sit-Stand Transfer) HHA x 2  -EJ       Row Name 01/09/25 1045          Gait/Stairs (Locomotion)    [place] Level (Gait) verbal cues;minimum assist (75% patient effort);2 person assist  -EJ     Assistive Device (Gait) --  HHA x 2  -EJ     Distance in Feet (Gait) 40  -EJ     Deviations/Abnormal Patterns (Gait) noe decreased;stride length decreased;weight shifting decreased  -EJ     Bilateral Gait Deviations heel strike decreased  -EJ     Comment, (Gait/Stairs) slow pace, but steady, no overt LOB  -EJ               User Key  (r) = Recorded By, (t) = Taken By, (c) = Cosigned By      Initials Name Provider Type    Delfina Masters  C, PT Physical Therapist                   Obj/Interventions       Row Name 01/09/25 1046          Range of Motion Comprehensive    General Range of Motion no range of motion deficits identified  -EJ       [facility] Name 01/09/25 1046          Strength Comprehensive (MMT)    Comment, General Manual Muscle Testing (MMT) Assessment post op weakness  -[facility] Name 01/09/25 1046          Motor Skills    Therapeutic Exercise --  cardiac protocol x 5 reps  -EJ       Row Name 01/09/25 1046          Balance    Balance Assessment standing static balance;standing dynamic balance  -EJ     Static Standing Balance minimal assist  -EJ     Dynamic Standing Balance minimal assist;2-person assist  -EJ               User Key  (r) = Recorded By, (t) = Taken By, (c) = Cosigned By      Initials Name Provider Type    Delfina Masters, PT Physical Therapist                   Goals/Plan       [facility] Name 01/09/25 1053          Problem Specific Goal 1 (PT)    Problem Specific Goal 1 (PT) Cardiac Level 3  -EJ     Time Frame (Problem Specific Goal 1, PT) 1 week  -[facility] Name 01/09/25 1053          Therapy Assessment/Plan (PT)    Planned Therapy Interventions (PT) balance training;bed mobility training;gait training;home exercise program;transfer training;stretching;strengthening;stair training;ROM (range of motion);patient/family education  -EJ               User Key  (r) = Recorded By, (t) = Taken By, (c) = Cosigned By      Initials Name Provider Type    Delfina Masters, PT Physical Therapist                   Clinical Impression       [facility] Name 01/09/25 1046          Pain    Pretreatment Pain Rating 4/10  -EJ     Posttreatment Pain Rating 5/10  -EJ     Pain Location chest  -EJ     Pain Side/Orientation generalized  -EJ     Pain Management Interventions exercise or physical activity utilized  -       Row Name 01/09/25 1046          Plan of Care Review    Plan of Care Reviewed With patient  -EJ     Progress improving  -EJ      Outcome Evaluation Pt seen for PT eval this AM. He was admitted to [facility] on 1/5. Pt is now POD 1 MVR. He has hx of A fib, DM2, CAD, HTN, HLD, and HOLLI. At baseline, pt reports living at home w his wife. He is independent at baseline w mobility and ADLS, uses cane for long distances, still drives. Today, pt presents w expected post op pain, weakness, and decreased activity tolerance. He is up in chair upon entry to room. Pt tolerated all cardiac exercises well. He was able to stand w min A x 2 and then ambulated approx 40 ft w HHA/min A x 2. Pt demos slow pace w decreased step length. No overt unsteadiness noted. Pt assisted to bed at end of session w min/mod A x 2. Pt plans home at [place] w assist of wife. HE will continue to benefit from skilled PT to maximize safety, strength, and independence w mobility.  -       Row Name 01/09/25 1046          Therapy Assessment/Plan (PT)    Rehab Potential (PT) good  -EJ     Criteria for Skilled Interventions Met (PT) yes  -EJ     Therapy Frequency (PT) daily  -EJ       Row Name 01/09/25 1046          Vital Signs    Pre Systolic BP Rehab 122  -EJ     Pre Treatment Diastolic BP 68  -EJ     Pretreatment Heart Rate (beats/min) 69  -EJ     Posttreatment Heart Rate (beats/min) 69  -EJ     Pre SpO2 (%) 96  -EJ     O2 Delivery Pre Treatment supplemental O2  -EJ     O2 Delivery Intra Treatment supplemental O2  -EJ     Post SpO2 (%) 93  -EJ     O2 Delivery Post Treatment supplemental O2  -EJ     Pre Patient Position Sitting  -EJ     Intra Patient Position Standing  -EJ     Post Patient Position Supine  -EJ       Row Name 01/09/25 1046          Positioning and Restraints    Pre-Treatment Position sitting in chair/recliner  -EJ     Post Treatment Position bed  -EJ     In Bed notified nsg;supine;call light within reach;encouraged to call for assist  -EJ               User Key  (r) = Recorded By, (t) = Taken By, (c) = Cosigned By      Initials Name Provider Type    Delfina Masters, PT  Physical Therapist                   Outcome Measures       Row Name 01/09/25 1054          How much help from another person do you currently need...    Turning from your back to your side while in flat bed without using bedrails? 3  -EJ     Moving from lying on back to sitting on the side of a flat bed without bedrails? 2  -EJ     Moving to and from a bed to a chair (including a wheelchair)? 3  -EJ     Standing up from a chair using your arms (e.g., wheelchair, bedside chair)? 3  -EJ     Climbing 3-5 steps with a railing? 2  -EJ     To walk in hospital room? 3  -EJ     AM-PAC 6 Clicks Score (PT) 16  -EJ               User Key  (r) = Recorded By, (t) = Taken By, (c) = Cosigned By      Initials Name Provider Type    Delfina Masters, PT Physical Therapist                                 Physical Therapy Education        No education to display                  PT Recommendation and Plan  Planned Therapy Interventions (PT): balance training, bed mobility training, gait training, home exercise program, transfer training, stretching, strengthening, stair training, ROM (range of motion), patient/family education  Progress: improving  Outcome Evaluation: Pt seen for PT eval this AM. He was admitted to [facility] on 1/5. Pt is now POD 1 MVR. He has hx of A fib, DM2, CAD, HTN, HLD, and HOLLI. At baseline, pt reports living at home w his wife. He is independent at baseline w mobility and ADLS, uses cane for long distances, still drives. Today, pt presents w expected post op pain, weakness, and decreased activity tolerance. He is up in chair upon entry to room. Pt tolerated all cardiac exercises well. He was able to stand w min A x 2 and then ambulated approx 40 ft w HHA/min A x 2. Pt demos slow pace w decreased step length. No overt unsteadiness noted. Pt assisted to bed at end of session w min/mod A x 2. Pt [place] home at [place] w [place] of wife. HE will continue to benefit from skilled PT to maximize safety, strength, and  independence w mobility.     Time Calculation:         PT Charges       Row Name 01/09/25 1054             Time Calculation    Start Time 1002  -EJ      Stop Time 1027  -EJ      Time Calculation (min) 25 min  -EJ      PT Received On 01/09/25  -EJ      PT - Next Appointment 01/10/25  -EJ      PT Goal Re-Cert Due Date 01/16/25  -EJ         Time Calculation- PT    Total Timed Code Minutes- PT 18 minute(s)  -EJ                User Key  (r) = Recorded By, (t) = Taken By, (c) = Cosigned By      Initials Name Provider Type    Delfina Masters, PT Physical Therapist                  Therapy Charges for Today       Code Description Service Date Service Provider Modifiers Qty    50416742003 HC PT EVAL MOD COMPLEXITY 3 1/9/2025 Delfina Maurice, PT GP 1    18770147946 HC PT THERAPEUTIC ACT EA 15 MIN 1/9/2025 Delfina Maurice, PT GP 1    49337961282 HC PT THER SUPP EA 15 MIN 1/9/2025 Delfina Maurice, PT GP 1            PT G-Codes  AM-PAC 6 Clicks Score (PT): 16  PT Discharge Summary  Anticipated Discharge Disposition (PT): home with assist, home with home health    Delfina Maurice, PT  1/9/2025

## 2025-01-10 ENCOUNTER — APPOINTMENT (OUTPATIENT)
Dept: GENERAL RADIOLOGY | Facility: HOSPITAL | Age: 75
DRG: 220 | End: 2025-01-10
Payer: COMMERCIAL

## 2025-01-10 LAB
ANION GAP SERPL CALCULATED.3IONS-SCNC: 13.6 MMOL/L (ref 5–15)
BUN SERPL-MCNC: 27 MG/DL (ref 8–23)
BUN/CREAT SERPL: 14.5 (ref 7–25)
CALCIUM SPEC-SCNC: 8.4 MG/DL (ref 8.6–10.5)
CHLORIDE SERPL-SCNC: 112 MMOL/L (ref 98–107)
CO2 SERPL-SCNC: 22.4 MMOL/L (ref 22–29)
CREAT SERPL-MCNC: 1.86 MG/DL (ref 0.76–1.27)
DEPRECATED RDW RBC AUTO: 44.8 FL (ref 37–54)
EGFRCR SERPLBLD CKD-EPI 2021: 38 ML/MIN/1.73
ERYTHROCYTE [DISTWIDTH] IN BLOOD BY AUTOMATED COUNT: 13.8 % (ref 12.3–15.4)
GLUCOSE BLDC GLUCOMTR-MCNC: 133 MG/DL (ref 70–130)
GLUCOSE BLDC GLUCOMTR-MCNC: 147 MG/DL (ref 70–130)
GLUCOSE BLDC GLUCOMTR-MCNC: 163 MG/DL (ref 70–130)
GLUCOSE BLDC GLUCOMTR-MCNC: 171 MG/DL (ref 70–130)
GLUCOSE BLDC GLUCOMTR-MCNC: 186 MG/DL (ref 70–130)
GLUCOSE SERPL-MCNC: 130 MG/DL (ref 65–99)
HCT VFR BLD AUTO: 28.5 % (ref 37.5–51)
HGB BLD-MCNC: 8.9 G/DL (ref 13–17.7)
MAGNESIUM SERPL-MCNC: 2.9 MG/DL (ref 1.6–2.4)
MCH RBC QN AUTO: 27.8 PG (ref 26.6–33)
MCHC RBC AUTO-ENTMCNC: 31.2 G/DL (ref 31.5–35.7)
MCV RBC AUTO: 89.1 FL (ref 79–97)
PLATELET # BLD AUTO: 143 10*3/MM3 (ref 140–450)
PMV BLD AUTO: 11.4 FL (ref 6–12)
POTASSIUM SERPL-SCNC: 4 MMOL/L (ref 3.5–5.2)
QT INTERVAL: 478 MS
QT INTERVAL: 486 MS
QTC INTERVAL: 510 MS
QTC INTERVAL: 531 MS
RBC # BLD AUTO: 3.2 10*6/MM3 (ref 4.14–5.8)
SODIUM SERPL-SCNC: 148 MMOL/L (ref 136–145)
WBC NRBC COR # BLD AUTO: 13.49 10*3/MM3 (ref 3.4–10.8)

## 2025-01-10 PROCEDURE — 25010000002 ENOXAPARIN PER 10 MG: Performed by: NURSE PRACTITIONER

## 2025-01-10 PROCEDURE — 25010000002 FUROSEMIDE PER 20 MG: Performed by: INTERNAL MEDICINE

## 2025-01-10 PROCEDURE — 93010 ELECTROCARDIOGRAM REPORT: CPT | Performed by: INTERNAL MEDICINE

## 2025-01-10 PROCEDURE — 85027 COMPLETE CBC AUTOMATED: CPT | Performed by: NURSE PRACTITIONER

## 2025-01-10 PROCEDURE — 93005 ELECTROCARDIOGRAM TRACING: CPT | Performed by: THORACIC SURGERY (CARDIOTHORACIC VASCULAR SURGERY)

## 2025-01-10 PROCEDURE — 83735 ASSAY OF MAGNESIUM: CPT | Performed by: THORACIC SURGERY (CARDIOTHORACIC VASCULAR SURGERY)

## 2025-01-10 PROCEDURE — 82948 REAGENT STRIP/BLOOD GLUCOSE: CPT

## 2025-01-10 PROCEDURE — 99232 SBSQ HOSP IP/OBS MODERATE 35: CPT | Performed by: INTERNAL MEDICINE

## 2025-01-10 PROCEDURE — 93005 ELECTROCARDIOGRAM TRACING: CPT | Performed by: NURSE PRACTITIONER

## 2025-01-10 PROCEDURE — 63710000001 INSULIN GLARGINE PER 5 UNITS: Performed by: ANESTHESIOLOGY

## 2025-01-10 PROCEDURE — 25010000002 CEFAZOLIN PER 500 MG: Performed by: NURSE PRACTITIONER

## 2025-01-10 PROCEDURE — 97530 THERAPEUTIC ACTIVITIES: CPT

## 2025-01-10 PROCEDURE — 71045 X-RAY EXAM CHEST 1 VIEW: CPT

## 2025-01-10 PROCEDURE — 80048 BASIC METABOLIC PNL TOTAL CA: CPT | Performed by: NURSE PRACTITIONER

## 2025-01-10 PROCEDURE — 99024 POSTOP FOLLOW-UP VISIT: CPT | Performed by: PHYSICIAN ASSISTANT

## 2025-01-10 PROCEDURE — 63710000001 INSULIN LISPRO (HUMAN) PER 5 UNITS: Performed by: ANESTHESIOLOGY

## 2025-01-10 RX ORDER — NEBIVOLOL 5 MG/1
2.5 TABLET ORAL ONCE
Status: COMPLETED | OUTPATIENT
Start: 2025-01-10 | End: 2025-01-10

## 2025-01-10 RX ORDER — GABAPENTIN 300 MG/1
600 CAPSULE ORAL NIGHTLY
Status: DISCONTINUED | OUTPATIENT
Start: 2025-01-10 | End: 2025-01-14 | Stop reason: HOSPADM

## 2025-01-10 RX ORDER — FUROSEMIDE 10 MG/ML
80 INJECTION INTRAMUSCULAR; INTRAVENOUS ONCE
Status: COMPLETED | OUTPATIENT
Start: 2025-01-10 | End: 2025-01-10

## 2025-01-10 RX ORDER — NEBIVOLOL 5 MG/1
5 TABLET ORAL
Status: DISCONTINUED | OUTPATIENT
Start: 2025-01-11 | End: 2025-01-11

## 2025-01-10 RX ADMIN — HYDROCODONE BITARTRATE AND ACETAMINOPHEN 2 TABLET: 5; 325 TABLET ORAL at 16:29

## 2025-01-10 RX ADMIN — 0.12% CHLORHEXIDINE GLUCONATE 15 ML: 1.2 RINSE ORAL at 08:38

## 2025-01-10 RX ADMIN — ATORVASTATIN CALCIUM 40 MG: 20 TABLET, FILM COATED ORAL at 20:36

## 2025-01-10 RX ADMIN — OXYCODONE HYDROCHLORIDE 5 MG: 5 TABLET ORAL at 04:16

## 2025-01-10 RX ADMIN — AMIODARONE HYDROCHLORIDE 200 MG: 200 TABLET ORAL at 08:38

## 2025-01-10 RX ADMIN — INSULIN GLARGINE 15 UNITS: 100 INJECTION, SOLUTION SUBCUTANEOUS at 10:18

## 2025-01-10 RX ADMIN — MUPIROCIN 1 APPLICATION: 20 OINTMENT TOPICAL at 20:36

## 2025-01-10 RX ADMIN — HYDROCODONE BITARTRATE AND ACETAMINOPHEN 2 TABLET: 5; 325 TABLET ORAL at 08:37

## 2025-01-10 RX ADMIN — HYDROCODONE BITARTRATE AND ACETAMINOPHEN 2 TABLET: 5; 325 TABLET ORAL at 12:44

## 2025-01-10 RX ADMIN — ESCITALOPRAM 15 MG: 5 TABLET, FILM COATED ORAL at 18:25

## 2025-01-10 RX ADMIN — GABAPENTIN 600 MG: 300 CAPSULE ORAL at 20:36

## 2025-01-10 RX ADMIN — 0.12% CHLORHEXIDINE GLUCONATE 15 ML: 1.2 RINSE ORAL at 20:36

## 2025-01-10 RX ADMIN — TERAZOSIN HYDROCHLORIDE 5 MG: 5 CAPSULE ORAL at 20:36

## 2025-01-10 RX ADMIN — SENNOSIDES AND DOCUSATE SODIUM 2 TABLET: 50; 8.6 TABLET ORAL at 20:36

## 2025-01-10 RX ADMIN — INSULIN LISPRO 2 UNITS: 100 INJECTION, SOLUTION INTRAVENOUS; SUBCUTANEOUS at 20:36

## 2025-01-10 RX ADMIN — FUROSEMIDE 80 MG: 10 INJECTION, SOLUTION INTRAMUSCULAR; INTRAVENOUS at 08:38

## 2025-01-10 RX ADMIN — GUAIFENESIN 1200 MG: 600 TABLET, MULTILAYER, EXTENDED RELEASE ORAL at 08:38

## 2025-01-10 RX ADMIN — SODIUM CHLORIDE 2 G: 900 INJECTION INTRAVENOUS at 01:18

## 2025-01-10 RX ADMIN — GUAIFENESIN 1200 MG: 600 TABLET, MULTILAYER, EXTENDED RELEASE ORAL at 20:37

## 2025-01-10 RX ADMIN — NEBIVOLOL 2.5 MG: 5 TABLET ORAL at 10:52

## 2025-01-10 RX ADMIN — HYDROCODONE BITARTRATE AND ACETAMINOPHEN 2 TABLET: 5; 325 TABLET ORAL at 20:36

## 2025-01-10 RX ADMIN — MUPIROCIN 1 APPLICATION: 20 OINTMENT TOPICAL at 08:38

## 2025-01-10 RX ADMIN — ASPIRIN 81 MG: 81 TABLET, COATED ORAL at 08:38

## 2025-01-10 RX ADMIN — INSULIN LISPRO 2 UNITS: 100 INJECTION, SOLUTION INTRAVENOUS; SUBCUTANEOUS at 11:10

## 2025-01-10 RX ADMIN — ENOXAPARIN SODIUM 40 MG: 100 INJECTION SUBCUTANEOUS at 18:25

## 2025-01-10 RX ADMIN — PANTOPRAZOLE SODIUM 40 MG: 40 TABLET, DELAYED RELEASE ORAL at 06:01

## 2025-01-10 RX ADMIN — NEBIVOLOL 2.5 MG: 5 TABLET ORAL at 08:38

## 2025-01-10 NOTE — PLAN OF CARE
Goal Outcome Evaluation: Some runs of asymptomatic ariel, NSR 60-70's. A&Ox4. 2L NC.

## 2025-01-10 NOTE — THERAPY TREATMENT NOTE
Patient Name: Jeb Olson  : 1950    MRN: [id number]                              Today's Date: 1/10/2025       Admit Date: 2025    Visit Dx:     ICD-10-CM ICD-9-CM   1. S/P MVR (mitral valve repair)  Z98.890 V45.89   2. Mitral valve insufficiency, unspecified etiology  I34.0 424.0   3. Severe mitral regurgitation  I34.0 424.0     Patient Active Problem List   Diagnosis    Allergic rhinitis    Arthritis of knee, left    Diabetes mellitus    Essential hypertension    Hyperlipidemia    High risk medication use    Obesity    Primary osteoarthritis of knee    Mild chronic anemia    Obstructive sleep apnea    Arthritis of left knee    Sinus bradycardia    Abnormal stress test    Colon polyp    Family history of colonic polyps    Reactive depression    Arthritis of knee, right    Arthritis of knee    AF (paroxysmal atrial fibrillation)    Acute exacerbation of CHF (congestive heart failure)    Severe mitral regurgitation    Mitral valve regurgitation     Past Medical History:   Diagnosis Date    AF (paroxysmal atrial fibrillation)     Allergic Have had for several years    Eyes and nasal issues    Anemia     Anticoagulant long-term use     COUMADIN    Anxiety Since about     Since I was taking of my Dad, who also passed away 3yrs ago.    Arthritis 2002    Both knees, hips, and shoulders    Arthritis of knee, left     Cataract 2019    CHF (congestive heart failure)     Colon polyp 2017    Coronary artery disease     stent    Diabetes mellitus     Dry eyes     Essential hypertension     Heart murmur     HL (hearing loss)     no hearing aides    Hyperlipemia     Knee swelling 2020    Shortly after my left knee replacement    Macular degeneration     Mild chronic anemia     Mitral valve disorder     Obesity     Pneumonia 2024    Sleep apnea     cpap     Past Surgical History:   Procedure Laterality Date    ACHILLES TENDON REPAIR Left     CARDIAC CATHETERIZATION      CARDIAC CATHETERIZATION N/A  09/01/2022    Procedure: Coronary angiography, Left heart catheterization,;  Surgeon: Bruna Chávez MD;  Location: [facility] CATH INVASIVE LOCATION;  Service: Cardiology;  Laterality: N/A;    CARDIAC CATHETERIZATION N/A 09/01/2022    Procedure: Stent PRAVIN coronary;  Surgeon: Bruna Chávez MD;  [facility];  Service: Cardiology;  Laterality: N/A;    CARDIAC CATHETERIZATION N/A 1/2/2025    Procedure: Left Heart Cath;  Surgeon: Bruna Chávez MD;  [facility];  Service: Cardiology;  Laterality: N/A;    CARDIAC CATHETERIZATION N/A 1/2/2025    Procedure: Coronary angiography;  Surgeon: Bruna Chávez MD;  [facility];  Service: Cardiology;  Laterality: N/A;    CATARACT EXTRACTION EXTRACAPSULAR W/ INTRAOCULAR LENS IMPLANTATION Bilateral     COLONOSCOPY  2018    Dr Reyes    ENDOSCOPY      MITRAL VALVE REPAIR/REPLACEMENT N/A 1/8/2025    Procedure: STERNOTOMY, MITRAL VALVE REPAIR, MAZE PROCEDURE TRANSESOPHAGEAL ECHOCARDIOGRAM, PRP;  Surgeon: Young Kelly MD;  Location: [facility] CVOR;  Service: Cardiothoracic;  Laterality: N/A;    TONSILLECTOMY      As a child    TOTAL KNEE ARTHROPLASTY Left 03/12/2020    Procedure: TOTAL KNEE ARTHROPLASTY;  Surgeon: Chepe Alicea MD;  Location: [facility] MAIN OR;  Service: Orthopedics;  Laterality: Left;    TOTAL KNEE ARTHROPLASTY Right 03/21/2024    Procedure: TOTAL KNEE ARTHROPLASTY;  Surgeon: Chepe Alicea MD;  Location: [facility] OR [facility] [place];  Service: Orthopedics;  Laterality: Right;      General Information       Row Name 01/10/25 1121          Physical Therapy Time and Intention    Document Type therapy note (daily note)  -EJ     Mode of Treatment physical therapy  -EJ       Row Name 01/10/25 1121          General Information    Existing Precautions/Restrictions cardiac  -EJ               User Key  (r) = Recorded By, (t) = Taken By, (c) = Cosigned By      Initials Name Provider Type    ROYAL Maurice  Delfina MOLINA, PT Physical Therapist                   Mobility       Row Name 01/10/25 1121          Bed Mobility    Comment, (Bed Mobility) up in chair  -EJ       Row Name 01/10/25 1121          Sit-Stand Transfer    Sit-Stand [place] (Transfers) verbal cues;contact guard  -EJ     Assistive Device (Sit-Stand Transfers) walker, rolling platform  -EJ       Row Name 01/10/25 1121          Gait/Stairs (Locomotion)    [place] Level (Gait) verbal cues;contact guard  -EJ     Assistive Device (Gait) walker, rolling platform  chest walker  -EJ     Distance in Feet (Gait) 300  -EJ     Deviations/Abnormal Patterns (Gait) noe decreased;stride length decreased;weight shifting decreased  -EJ     Comment, (Gait/Stairs) no unsteadiness noted  -EJ               User Key  (r) = Recorded By, (t) = Taken By, (c) = Cosigned By      Initials Name Provider Type    Delfina Masters, PT Physical Therapist                   Obj/Interventions       Row Name 01/10/25 1122          Motor Skills    Therapeutic Exercise --  cardiac protocol x 5 reps  -EJ               User Key  (r) = Recorded By, (t) = Taken By, (c) = Cosigned By      Initials Name Provider Type    Delfina Masters, PT Physical Therapist                   Goals/Plan    No documentation.                  Clinical Impression       Row Name 01/10/25 1122          Pain    Pretreatment Pain Rating 8/10  -EJ     Posttreatment Pain Rating 8/10  -EJ     Pain Location chest  -EJ     Pain Side/Orientation generalized  -EJ     Pain Management Interventions exercise or physical activity utilized  -EJ       Row Name 01/10/25 1122          Plan of Care Review    Plan of Care Reviewed With patient  -EJ     Progress improving  -EJ     Outcome Evaluation Pt agreeable to PT this am. He is doing well, sititng up in chair upon entry to room. Pt progressing w activity today. He is able to stand w CGA. Pt ambulated approx 300 ft today w SBA/CGA using rolling chest walker. He demos  slow pace, but is steady. No c/o w activity. Pt returned to chair at end of session w all lines intact. Will  continue to follow and progress mobility as tolerated.  -       Row Name 01/10/25 1122          Therapy Assessment/Plan (PT)    Therapy Frequency (PT) 6 times/wk  -EJ       Row Name 01/10/25 1122          Positioning and Restraints    Pre-Treatment Position sitting in chair/recliner  -EJ     Post Treatment Position chair  -EJ     In Chair notified nsg;reclined;call light within reach;encouraged to call for assist  -EJ               User Key  (r) = Recorded By, (t) = Taken By, (c) = Cosigned By      Initials Name Provider Type    Delfina Masters, PT Physical Therapist                   Outcome Measures       Row Name 01/10/25 1125          How much help from another person do you currently need...    Turning from your back to your side while in flat bed without using bedrails? 3  -EJ     Moving from lying on back to sitting on the side of a flat bed without bedrails? 3  -EJ     Moving to and from a bed to a chair (including a wheelchair)? 3  -EJ     Standing up from a chair using your arms (e.g., wheelchair, bedside chair)? 3  -EJ     Climbing 3-5 steps with a railing? 3  -EJ     To walk in hospital room? 3  -EJ     AM-PAC 6 Clicks Score (PT) 18  -EJ               User Key  (r) = Recorded By, (t) = Taken By, (c) = Cosigned By      Initials Name Provider Type    Delfina Masters, PT Physical Therapist                                 Physical Therapy Education        No education to display                  PT Recommendation and Plan  Planned Therapy Interventions (PT): balance training, bed mobility training, gait training, home exercise program, transfer training, stretching, strengthening, stair training, ROM (range of motion), patient/family education  Progress: improving  Outcome Evaluation: Pt agreeable to PT this am. He is doing well, sititng up in chair upon entry to room. Pt progressing w  activity today. He is able to stand w CGA. Pt ambulated approx 300 ft today w SBA/CGA using rolling chest walker. He demos slow pace, but is steady. No c/o w activity. Pt returned to chair at end of session w all lines intact. Will  continue to follow and progress mobility as tolerated.     Time Calculation:         PT Charges       Row Name 01/10/25 1125             Time Calculation    Start Time 0920  -EJ      Stop Time 0945  -EJ      Time Calculation (min) 25 min  -EJ      PT Received On 01/10/25  -EJ      PT - Next Appointment 01/11/25  -EJ         Timed Charges    03999 - PT Therapeutic Activity Minutes 25  -EJ         Total Minutes    Timed Charges Total Minutes 25  -EJ       Total Minutes 25  -EJ                User Key  (r) = Recorded By, (t) = Taken By, (c) = Cosigned By      Initials Name Provider Type    Delfina Masters, PT Physical Therapist                  Therapy Charges for Today       Code Description Service Date Service Provider Modifiers Qty    20317094861 HC PT EVAL MOD COMPLEXITY 3 1/9/2025 Delfina Maurice, PT GP 1    42786237769 HC PT THERAPEUTIC ACT EA 15 MIN 1/9/2025 Delfina Maurice, PT GP 1    34819058121 HC PT THER SUPP EA 15 MIN 1/9/2025 Delfina Maurice, PT GP 1    53358953691 HC PT THERAPEUTIC ACT EA 15 MIN 1/10/2025 Delfina Maurice, PT GP 2            PT G-Codes  AM-PAC 6 Clicks Score (PT): 18  PT Discharge Summary  Anticipated Discharge Disposition (PT): home with assist, home with home health    Delfina Maurice PT  1/10/2025

## 2025-01-10 NOTE — PROGRESS NOTES
"    Patient Name: Jeb Olson  :1950  74 y.o.      Patient Care Team:  Tera Salvador MD as PCP - General (Internal Medicine)  Micah Reyes MD as Consulting Physician (Gastroenterology)  Bruna Chávez MD as Referring Physician (Cardiology)    Chief Complaint: post op MVR MAZE SELMA clip    Interval History:   Feels better, more energetic, walked the nurses station, diuresed well    Objective   Vital Signs  Temp:  [97.6 °F (36.4 °C)-99.5 °F (37.5 °C)] 99.5 °F (37.5 °C)  Heart Rate:  [60-79] 67  Resp:  [12-17] 12  BP: (110-158)/(68-86) 139/85    Intake/Output Summary (Last 24 hours) at 1/10/2025 1021  Last data filed at 1/10/2025 0600  Gross per 24 hour   Intake 1220 ml   Output 2360 ml   Net -1140 ml     Flowsheet Rows      Flowsheet Row First Filed Value   Admission Height 188 cm (74\") Documented at 2025 1415   Admission Weight 112 kg (246 lb 3.2 oz) Documented at 2025 1415            Physical Exam:   General Appearance:    Alert, cooperative, in no acute distress   Lungs:     Bibasilar rales- improved    Heart:    Regular rhythm and normal rate, normal S1 and S2, no murmurs, gallops or rubs.     Chest Wall:    No abnormalities observed   Abdomen:     Soft, nontender, positive bowel sounds.     Extremities:   no cyanosis, clubbing or edema.  No marked joint deformities.  Adequate musculoskeletal strength.       Results Review:    Results from last 7 days   Lab Units 01/10/25  0152   SODIUM mmol/L 148*   POTASSIUM mmol/L 4.0   CHLORIDE mmol/L 112*   CO2 mmol/L 22.4   BUN mg/dL 27*   CREATININE mg/dL 1.86*   GLUCOSE mg/dL 130*   CALCIUM mg/dL 8.4*         Results from last 7 days   Lab Units 01/10/25  0152   WBC 10*3/mm3 13.49*   HEMOGLOBIN g/dL 8.9*   HEMATOCRIT % 28.5*   PLATELETS 10*3/mm3 143     Results from last 7 days   Lab Units 25  0307 25  1042 019 253 25  1716 25  1104   INR  1.33* 1.43*  --   --   --  1.23*   APTT seconds  --  " 28.4 89.6* 63.5*   < > 29.5    < > = values in this interval not displayed.     Results from last 7 days   Lab Units 01/10/25  0152   MAGNESIUM mg/dL 2.9*                     Medication Review:   amiodarone, 200 mg, Oral, Q12H  aspirin, 81 mg, Oral, Daily  atorvastatin, 40 mg, Oral, Nightly  chlorhexidine, 15 mL, Mouth/Throat, Q12H  enoxaparin, 40 mg, Subcutaneous, Daily  escitalopram, 15 mg, Oral, Q PM  gabapentin, 600 mg, Oral, Nightly  guaiFENesin, 1,200 mg, Oral, Q12H  insulin glargine, 15 Units, Subcutaneous, Daily  insulin lispro, 2-7 Units, Subcutaneous, 4x Daily AC & at Bedtime  mupirocin, 1 Application, Each Nare, BID  nebivolol, 2.5 mg, Oral, Once  [START ON 1/11/2025] nebivolol, 5 mg, Oral, Q24H  pantoprazole, 40 mg, Oral, QAM  senna-docusate sodium, 2 tablet, Oral, Nightly  terazosin, 5 mg, Oral, Nightly         niCARdipine, 5-15 mg/hr  norepinephrine, 0.02-0.2 mcg/kg/min, Last Rate: Stopped (01/09/25 0130)  phenylephrine, 0.2-2 mcg/kg/min        Assessment & Plan   Severe MR, s/p repair, MAZE, SELMA clip  AF  Nonischemic CM  CAD remote LAD PCI  TY/CKD    Another lasix 80 IV today  DC milrinone  To floor  Restart AC when appropriate surgically    Bruna Chávez MD  [facility]  01/10/25  10:21 EST

## 2025-01-10 NOTE — CONSULTS
Met with patient to discuss the benefits of cardiac rehab. Provided phase II information along with the contact information for cardiac rehab here at [facility].

## 2025-01-10 NOTE — PLAN OF CARE
Goal Outcome Evaluation:  Plan of Care Reviewed With: patient        Progress: improving  Outcome Evaluation: Pt agreeable to PT this am. He is doing well, sititng up in chair upon entry to room. Pt progressing w activity today. He is able to stand w CGA. Pt ambulated approx 300 ft today w SBA/CGA using rolling chest walker. He demos slow pace, but is steady. No c/o w activity. Pt returned to chair at end of session w all lines intact. Will  continue to follow and progress mobility as tolerated.    Anticipated Discharge Disposition (PT): home with assist, home with home health

## 2025-01-10 NOTE — PROGRESS NOTES
" LOS: 5 days   Patient Care Team:  Tera Salvador MD as PCP - General (Internal Medicine)  Micah Reyes MD as Consulting Physician (Gastroenterology)  Bruna Chávez MD as Referring Physician (Cardiology)    Chief Complaint:   Post-op follow-up, s/p MVr/MAZE    Subjective  Sitting up in chair. Feels much better today.     Vital Signs  Temp:  [97.6 °F (36.4 °C)-99.5 °F (37.5 °C)] 99.5 °F (37.5 °C)  Heart Rate:  [60-79] 67  Resp:  [12-25] 12  BP: (110-158)/(68-94) 139/85      01/06/25  0556 01/08/25  0522 01/10/25  0600   Weight: 110 kg (243 lb 6.4 oz) 111 kg (244 lb) 110 kg (242 lb 4.6 oz)     Body mass index is 31.11 kg/m².    Intake/Output Summary (Last 24 hours) at 1/10/2025 0908  Last data filed at 1/10/2025 0600  Gross per 24 hour   Intake 1220 ml   Output 2490 ml   Net -1270 ml     No intake/output data recorded.    Chest tube drainage last 8 hours: 90    Objective:  Vital signs: (most recent): Blood pressure 116/80, pulse 62, temperature 99.1 °F (37.3 °C), temperature source Oral, resp. rate 14, height 188 cm (74\"), weight 110 kg (242 lb 4.6 oz), SpO2 96%.                Physical Exam:   General Appearance: awake and alert, no acute distress   Lungs: respirations regular, respirations unlabored, and coarse throughout   Heart: regular rhythm & normal rate and normal S1, S2   Abdomen: soft or nontender, + bowel sounds    Skin: sternal incision clean, dry, intact   Neuro: alert and oriented, no focal deficits.     Results Review:      WBC WBC   Date Value Ref Range Status   01/10/2025 13.49 (H) 3.40 - 10.80 10*3/mm3 Final   01/09/2025 15.09 (H) 3.40 - 10.80 10*3/mm3 Final   01/09/2025 16.57 (H) 3.40 - 10.80 10*3/mm3 Final   01/08/2025 17.20 (H) 3.40 - 10.80 10*3/mm3 Final   01/08/2025 4.96 3.40 - 10.80 10*3/mm3 Final   01/08/2025 5.48 3.40 - 10.80 10*3/mm3 Final      HGB Hemoglobin   Date Value Ref Range Status   01/10/2025 8.9 (L) 13.0 - 17.7 g/dL Final   01/09/2025 9.5 (L) 13.0 - 17.7 g/dL Final "   01/09/2025 8.8 (L) 13.0 - 17.7 g/dL Final   01/08/2025 9.6 (L) 13.0 - 17.7 g/dL Final   01/08/2025 10.1 (L) 13.0 - 17.7 g/dL Final   01/08/2025 9.2 (L) 12.0 - 17.0 g/dL Final   01/08/2025 9.9 (L) 12.0 - 17.0 g/dL Final   01/08/2025 9.9 (L) 12.0 - 17.0 g/dL Final   01/08/2025 9.5 (L) 12.0 - 17.0 g/dL Final   01/08/2025 8.8 (L) 12.0 - 17.0 g/dL Final   01/08/2025 11.6 (L) 12.0 - 17.0 g/dL Final   01/08/2025 11.6 (L) 13.0 - 17.7 g/dL Final      HCT Hematocrit   Date Value Ref Range Status   01/10/2025 28.5 (L) 37.5 - 51.0 % Final   01/09/2025 28.6 (L) 37.5 - 51.0 % Final   01/09/2025 27.6 (L) 37.5 - 51.0 % Final   01/08/2025 32.2 (L) 37.5 - 51.0 % Final   01/08/2025 31.5 (L) 37.5 - 51.0 % Final   01/08/2025 27 (L) 38 - 51 % Final   01/08/2025 29 (L) 38 - 51 % Final   01/08/2025 29 (L) 38 - 51 % Final   01/08/2025 28 (L) 38 - 51 % Final   01/08/2025 26 (L) 38 - 51 % Final   01/08/2025 34 (L) 38 - 51 % Final   01/08/2025 35.7 (L) 37.5 - 51.0 % Final      Platelets Platelets   Date Value Ref Range Status   01/10/2025 143 140 - 450 10*3/mm3 Final   01/09/2025 144 140 - 450 10*3/mm3 Final   01/09/2025 142 140 - 450 10*3/mm3 Final   01/08/2025 174 140 - 450 10*3/mm3 Final   01/08/2025 168 140 - 450 10*3/mm3 Final   01/08/2025 166 140 - 450 10*3/mm3 Final   01/08/2025 243 140 - 450 10*3/mm3 Final        PT/INR:    Protime   Date Value Ref Range Status   01/09/2025 16.8 (H) 11.7 - 14.2 Seconds Final   01/08/2025 17.7 (H) 11.7 - 14.2 Seconds Final   /  INR   Date Value Ref Range Status   01/09/2025 1.33 (H) 0.90 - 1.10 Final   01/08/2025 1.43 (H) 0.90 - 1.10 Final       Sodium Sodium   Date Value Ref Range Status   01/10/2025 148 (H) 136 - 145 mmol/L Final   01/09/2025 141 136 - 145 mmol/L Final   01/09/2025 140 136 - 145 mmol/L Final   01/08/2025 141 136 - 145 mmol/L Final   01/08/2025 139 136 - 145 mmol/L Final   01/08/2025 144 136 - 145 mmol/L Final      Potassium Potassium   Date Value Ref Range Status   01/10/2025 4.0  3.5 - 5.2 mmol/L Final   01/09/2025 4.4 3.5 - 5.2 mmol/L Final   01/09/2025 3.9 3.5 - 5.2 mmol/L Final   01/09/2025 4.8 3.5 - 5.2 mmol/L Final   01/08/2025 3.7 3.5 - 5.2 mmol/L Final   01/08/2025 3.7 3.5 - 5.2 mmol/L Final   01/08/2025 3.7 3.5 - 5.2 mmol/L Final     Comment:     Slight hemolysis detected by analyzer. Result may be falsely elevated.   01/08/2025 3.5 3.5 - 5.2 mmol/L Final      Chloride Chloride   Date Value Ref Range Status   01/10/2025 112 (H) 98 - 107 mmol/L Final   01/09/2025 106 98 - 107 mmol/L Final   01/09/2025 109 (H) 98 - 107 mmol/L Final   01/08/2025 107 98 - 107 mmol/L Final   01/08/2025 108 (H) 98 - 107 mmol/L Final   01/08/2025 107 98 - 107 mmol/L Final      Bicarbonate CO2   Date Value Ref Range Status   01/10/2025 22.4 22.0 - 29.0 mmol/L Final   01/09/2025 19.6 (L) 22.0 - 29.0 mmol/L Final   01/09/2025 20.0 (L) 22.0 - 29.0 mmol/L Final   01/08/2025 19.0 (L) 22.0 - 29.0 mmol/L Final   01/08/2025 19.9 (L) 22.0 - 29.0 mmol/L Final   01/08/2025 25.8 22.0 - 29.0 mmol/L Final      BUN BUN   Date Value Ref Range Status   01/10/2025 27 (H) 8 - 23 mg/dL Final   01/09/2025 26 (H) 8 - 23 mg/dL Final   01/09/2025 23 8 - 23 mg/dL Final   01/08/2025 21 8 - 23 mg/dL Final   01/08/2025 18 8 - 23 mg/dL Final   01/08/2025 18 8 - 23 mg/dL Final      Creatinine Creatinine   Date Value Ref Range Status   01/10/2025 1.86 (H) 0.76 - 1.27 mg/dL Final   01/09/2025 2.06 (H) 0.76 - 1.27 mg/dL Final   01/09/2025 1.98 (H) 0.76 - 1.27 mg/dL Final   01/08/2025 1.73 (H) 0.76 - 1.27 mg/dL Final   01/08/2025 1.42 (H) 0.76 - 1.27 mg/dL Final   01/08/2025 1.27 0.76 - 1.27 mg/dL Final      Calcium Calcium   Date Value Ref Range Status   01/10/2025 8.4 (L) 8.6 - 10.5 mg/dL Final   01/09/2025 8.4 (L) 8.6 - 10.5 mg/dL Final   01/09/2025 8.4 (L) 8.6 - 10.5 mg/dL Final   01/08/2025 8.4 (L) 8.6 - 10.5 mg/dL Final   01/08/2025 8.7 8.6 - 10.5 mg/dL Final   01/08/2025 8.7 8.6 - 10.5 mg/dL Final      Magnesium Magnesium   Date Value  Ref Range Status   01/10/2025 2.9 (H) 1.6 - 2.4 mg/dL Final   01/09/2025 3.1 (H) 1.6 - 2.4 mg/dL Final   01/09/2025 2.8 (H) 1.6 - 2.4 mg/dL Final   01/08/2025 2.4 1.6 - 2.4 mg/dL Final   01/08/2025 2.6 (H) 1.6 - 2.4 mg/dL Final   01/08/2025 2.2 1.6 - 2.4 mg/dL Final        amiodarone, 200 mg, Oral, Q12H  aspirin, 81 mg, Oral, Daily  atorvastatin, 40 mg, Oral, Nightly  chlorhexidine, 15 mL, Mouth/Throat, Q12H  enoxaparin, 40 mg, Subcutaneous, Daily  escitalopram, 15 mg, Oral, Q PM  gabapentin, 300 mg, Oral, Nightly  guaiFENesin, 1,200 mg, Oral, Q12H  insulin lispro, 2-7 Units, Subcutaneous, 4x Daily AC & at Bedtime  [Held by provider] metoprolol tartrate, 12.5 mg, Oral, Q12H  mupirocin, 1 Application, Each Nare, BID  nebivolol, 2.5 mg, Oral, Q24H  pantoprazole, 40 mg, Oral, QAM  senna-docusate sodium, 2 tablet, Oral, Nightly  terazosin, 5 mg, Oral, Nightly      insulin, 0-100 Units/hr, Last Rate: Stopped (01/09/25 1320)  milrinone, 0.25-0.375 mcg/kg/min, Last Rate: 0.125 mcg/kg/min (01/09/25 1729)  niCARdipine, 5-15 mg/hr  norepinephrine, 0.02-0.2 mcg/kg/min, Last Rate: Stopped (01/09/25 0130)  phenylephrine, 0.2-2 mcg/kg/min          Mitral valve regurgitation    Severe mitral regurgitation      Assessment & Plan    - Severe mitral valve regurgitation - s/p MV repair, MAZE, SELMA closure- [facility] 1/8/2025  - CAD with previous stent to LAD (2022)  - NICM, EF 40% intra-op YAZMIN  - Atrial fibrillation-- s/p MAZE  - DM II--- lantus and SSI  - HTN-- BB  - HLD-- statin  - HOLLI with CPAP  - renal insuffiencey  - obesity   - post op anemia- expected acute blood loss  - BPH-- hytrin      POD#2  Doing well.   Cr down to 1.86 from 2.06. Appears baseline 1.3-1.5.  Given 80mg IV lasix this AM. On 80mg PO at home. Keep choe for now.  D/c primacor.  Increase bystolic to home dose.   D/c chest tubes.  Mobilize and encourage pulm toilet.   Discussed with Dr. Case  Continue routine care.  Transfer to stepdown unit.     Tushar Chapa,  REMY  01/10/25  09:08 EST      D/w Dr. Kelly. Will remove chest tubes.

## 2025-01-10 NOTE — OP NOTE
Operative Note    Date of Dictation: 01/10/25    Date of Procedure: 1/08/25    Referring Physician: Bruna Chávez MD    Preoperative diagnosis:   1.  Severe mitral regurgitation  2.  Degenerative mitral valve disease with P2 prolapse  3.  Persistent atrial fibrillation, status post cardioversion  4.  Dyspnea exertion  5.  Obesity with BMI of 31.1 kg/m2  6.  Congestive heart failure, systolic, chronic, class II    Postoperative diagnosis:   Same    Procedure:   1.  Elective mitral valve repair with a 36 mm [employer] flexible band annuloplasty and triangular resection of P2  2.  Right and left cryo-maze procedure with full set of lesions and left atrial appendage endocardial closure    Surgeon: Young Kelly MD     Assistants: Assistant: Marika Mccullough CSA was responsible for performing the following activities: Retraction, Suction, Irrigation, Suturing, Closing, Placing Dressing, and all aspects of the complex cardiac case  and their skilled assistance was necessary for the success of this case.      Anesthesia: General endotracheal anesthesia and YAZMIN    Findings:  Localized prolapse of the P2 segment    Estimated Blood Loss: Approximately 400 cc, most of it recovered with a Cell Saver device and cardiotomy suckers and was retransfused to the patient    STS Data:    The patient was explained the risks (STS risk score calculated), benefits and alternatives of surgery and agreed to proceed. The antibiotics and b blockers were given in the STS required window.  Counseling was given about diet, alcohol and tobacco use as needed          Description of the procedure:     The patient was placed supine on the operative table. Anesthesia was given and lines placed. The patient was prepped and draped using the usual sterile technique. A median sternotomy was performed with a scalpel and the layers carried down to the sternum using the electrocautery. The sternum was split in the midline using a vertical oscillating  saw. Hemostasis was achieved. A Ange retractor was placed and the anterior mediastinum was exposed. The pericardium was opened and edges tacked to the wound.  300 units of IV heparin was given to maintain the ACT over 400.  4-0 Prolene cannulation sutures were placed in the ascending aorta and both superior and inferior vena cava. Small tip cannulas were placed and aorto right atrial cardiopulmonary bypass was started. Cardioplegia cannulas were placed and then the ascending aorta was clamped. One liter of cold blood cardioplegia was given in an antegrade fashion to achieve diastolic arrest and further doses every 15 minutes thereafter also using retrograde infusion.  The left atrium was entered in the right interatrial groove and incision extended below each cava.  Self retracting blades were placed with good exposure of the cavity.  The valve was explored and there was P2 prolapse with a localized segment and in my opinion resection was appropriate.    MAZE:  The MAZE procedure was performed using a cryoprobe at -140 degrees centigrade for 2 minutes in the box lesion encompassing the posterior wall around the 4 pulmonary veins take off and for 1 minute the posterior wall lesion to the mitral valve annulus, MV annular lesion between P2-P3, box lesion to left atrial appendage and right pulmonary veins isolation. Then, the left atrial appendage was ligated using a double layer of 4.0 prolene continuous suture. and MV REPAIR:  The mitral valve was systematically evaluated to identify the etiology using valve hooks. The problem was P2 prolapse on the lateral aspects therefore I perform a triangular resection and close the gap with a double layer of 5-0 Prolene continuous suture.  I closed the cleft between P1 and P2 with a 5-0 Prolene suture.. Then, 2.0 Ethibond suture were placed in a plicating fashion from trigone to trigone posteriorly around the annulus. A ring was selected by measuring the inter-trigonal  distance and the AP distance of the anterior leaflet. The sutures were passed through the 34 mm Medtronic flexible band and the knots secured. The valve was tested by injecting saline and no residual leak was seen. Then the atrium was closed using a 3.0 prolene running sutures, the chamber de-aired and suture tied down.  Of note, I used the core knot device to secure the knots in the back.  Following I completed 1 minute lesion with a cryoprobe in the coronary sinus and then the entire cava lesion and a free wall lesion on the right atrium to complete the maze procedure.  The patient was systemically rewarmed, I gave the 1 dose of cardioplegia and a completion I remove the aortic cross-clamp with steep suction on the root vent. The left pleural space was suctioned and the lungs ventilated. The heart was paced till regular atrial rhythm resumed. I allowed the heart to eject  and I de-aired the left heart chambers under YAZMIN guidance and once hemodynamics were acceptable, then the CPB was discontinued and the venous and cardioplegia cannulas removed. The matching dose of protamine was given and the aortic cannula removed as well. AV temporary wires and pleural and mediastinal chest tubes were placed and the wound sprayed with platelet rich plasma. The perioperative YAZMIN showed the valve well seated without significant perivalvular leaks. The sternum was closed with single and double wires and soft tissue in layers of reabsorbable material. The wounds were covered with sterile dressings.       Specimen removed: Triangular resection of the posterior leaflet    CPB time: 86 minutes    Aortic clamp time: 70 minutes    Complications:  none           Disposition: Cardiovascular recovery room           Condition: Critical but stable.

## 2025-01-10 NOTE — CASE MANAGEMENT/SOCIAL WORK
Continued Stay Note  [facility]     Patient Name: Jeb Olson  MRN: [id number]  Today's Date: 1/10/2025    Admit Date: 1/5/2025    Plan: Home w/ [facility]   Discharge Plan       Row Name 01/10/25 1312       Plan    Plan Home w/ [facility]    Plan Comments Clinical chart reviewed; plan remains home with [facility] to follow. PT continues to recommend HH. CCP will continue to follow for any dicharge planning needs pending clinical course. JOCELIN, MARCIAL                   Discharge Codes    No documentation.                 Expected Discharge Date and Time       Expected Discharge Date Expected Discharge Time    Jan 14, 2025               MARCIAL Gimenez

## 2025-01-11 ENCOUNTER — APPOINTMENT (OUTPATIENT)
Dept: GENERAL RADIOLOGY | Facility: HOSPITAL | Age: 75
DRG: 220 | End: 2025-01-11
Payer: COMMERCIAL

## 2025-01-11 LAB
ANION GAP SERPL CALCULATED.3IONS-SCNC: 12 MMOL/L (ref 5–15)
BUN SERPL-MCNC: 33 MG/DL (ref 8–23)
BUN/CREAT SERPL: 23.7 (ref 7–25)
CALCIUM SPEC-SCNC: 8.4 MG/DL (ref 8.6–10.5)
CHLORIDE SERPL-SCNC: 104 MMOL/L (ref 98–107)
CO2 SERPL-SCNC: 22 MMOL/L (ref 22–29)
CREAT SERPL-MCNC: 1.39 MG/DL (ref 0.76–1.27)
DEPRECATED RDW RBC AUTO: 46.6 FL (ref 37–54)
EGFRCR SERPLBLD CKD-EPI 2021: 53.9 ML/MIN/1.73
ERYTHROCYTE [DISTWIDTH] IN BLOOD BY AUTOMATED COUNT: 13.8 % (ref 12.3–15.4)
GLUCOSE BLDC GLUCOMTR-MCNC: 150 MG/DL (ref 70–130)
GLUCOSE BLDC GLUCOMTR-MCNC: 153 MG/DL (ref 70–130)
GLUCOSE BLDC GLUCOMTR-MCNC: 163 MG/DL (ref 70–130)
GLUCOSE BLDC GLUCOMTR-MCNC: 200 MG/DL (ref 70–130)
GLUCOSE SERPL-MCNC: 151 MG/DL (ref 65–99)
HCT VFR BLD AUTO: 30.4 % (ref 37.5–51)
HGB BLD-MCNC: 9.3 G/DL (ref 13–17.7)
MCH RBC QN AUTO: 27.9 PG (ref 26.6–33)
MCHC RBC AUTO-ENTMCNC: 30.6 G/DL (ref 31.5–35.7)
MCV RBC AUTO: 91.3 FL (ref 79–97)
PLATELET # BLD AUTO: 134 10*3/MM3 (ref 140–450)
PMV BLD AUTO: 12 FL (ref 6–12)
POTASSIUM SERPL-SCNC: 4.2 MMOL/L (ref 3.5–5.2)
RBC # BLD AUTO: 3.33 10*6/MM3 (ref 4.14–5.8)
SODIUM SERPL-SCNC: 138 MMOL/L (ref 136–145)
WBC NRBC COR # BLD AUTO: 10.06 10*3/MM3 (ref 3.4–10.8)

## 2025-01-11 PROCEDURE — 63710000001 INSULIN LISPRO (HUMAN) PER 5 UNITS: Performed by: PHYSICIAN ASSISTANT

## 2025-01-11 PROCEDURE — 63710000001 INSULIN GLARGINE PER 5 UNITS: Performed by: PHYSICIAN ASSISTANT

## 2025-01-11 PROCEDURE — 25010000002 ENOXAPARIN PER 10 MG: Performed by: THORACIC SURGERY (CARDIOTHORACIC VASCULAR SURGERY)

## 2025-01-11 PROCEDURE — 80048 BASIC METABOLIC PNL TOTAL CA: CPT | Performed by: NURSE PRACTITIONER

## 2025-01-11 PROCEDURE — 82948 REAGENT STRIP/BLOOD GLUCOSE: CPT

## 2025-01-11 PROCEDURE — 25010000002 BUMETANIDE PER 0.5 MG: Performed by: THORACIC SURGERY (CARDIOTHORACIC VASCULAR SURGERY)

## 2025-01-11 PROCEDURE — 93005 ELECTROCARDIOGRAM TRACING: CPT | Performed by: PHYSICIAN ASSISTANT

## 2025-01-11 PROCEDURE — 71046 X-RAY EXAM CHEST 2 VIEWS: CPT

## 2025-01-11 PROCEDURE — 93010 ELECTROCARDIOGRAM REPORT: CPT | Performed by: INTERNAL MEDICINE

## 2025-01-11 PROCEDURE — 85027 COMPLETE CBC AUTOMATED: CPT | Performed by: NURSE PRACTITIONER

## 2025-01-11 PROCEDURE — 99232 SBSQ HOSP IP/OBS MODERATE 35: CPT | Performed by: INTERNAL MEDICINE

## 2025-01-11 PROCEDURE — 25010000002 BUMETANIDE PER 0.5 MG: Performed by: INTERNAL MEDICINE

## 2025-01-11 RX ORDER — ENOXAPARIN SODIUM 100 MG/ML
30 INJECTION SUBCUTANEOUS DAILY
Status: DISCONTINUED | OUTPATIENT
Start: 2025-01-11 | End: 2025-01-14 | Stop reason: HOSPADM

## 2025-01-11 RX ORDER — BUMETANIDE 0.25 MG/ML
2 INJECTION, SOLUTION INTRAMUSCULAR; INTRAVENOUS EVERY 12 HOURS
Status: COMPLETED | OUTPATIENT
Start: 2025-01-11 | End: 2025-01-11

## 2025-01-11 RX ORDER — POTASSIUM CHLORIDE 750 MG/1
20 TABLET, EXTENDED RELEASE ORAL 2 TIMES DAILY WITH MEALS
Status: DISCONTINUED | OUTPATIENT
Start: 2025-01-11 | End: 2025-01-14 | Stop reason: HOSPADM

## 2025-01-11 RX ORDER — BUMETANIDE 0.25 MG/ML
2 INJECTION, SOLUTION INTRAMUSCULAR; INTRAVENOUS EVERY 12 HOURS
Status: DISCONTINUED | OUTPATIENT
Start: 2025-01-11 | End: 2025-01-11

## 2025-01-11 RX ADMIN — MUPIROCIN 1 APPLICATION: 20 OINTMENT TOPICAL at 20:51

## 2025-01-11 RX ADMIN — INSULIN LISPRO 2 UNITS: 100 INJECTION, SOLUTION INTRAVENOUS; SUBCUTANEOUS at 16:30

## 2025-01-11 RX ADMIN — ESCITALOPRAM 15 MG: 5 TABLET, FILM COATED ORAL at 16:30

## 2025-01-11 RX ADMIN — INSULIN GLARGINE 15 UNITS: 100 INJECTION, SOLUTION SUBCUTANEOUS at 09:12

## 2025-01-11 RX ADMIN — ATORVASTATIN CALCIUM 40 MG: 20 TABLET, FILM COATED ORAL at 20:51

## 2025-01-11 RX ADMIN — PANTOPRAZOLE SODIUM 40 MG: 40 TABLET, DELAYED RELEASE ORAL at 06:20

## 2025-01-11 RX ADMIN — HYDROCODONE BITARTRATE AND ACETAMINOPHEN 2 TABLET: 5; 325 TABLET ORAL at 03:32

## 2025-01-11 RX ADMIN — HYDROCODONE BITARTRATE AND ACETAMINOPHEN 2 TABLET: 5; 325 TABLET ORAL at 20:51

## 2025-01-11 RX ADMIN — INSULIN LISPRO 3 UNITS: 100 INJECTION, SOLUTION INTRAVENOUS; SUBCUTANEOUS at 12:02

## 2025-01-11 RX ADMIN — MUPIROCIN 1 APPLICATION: 20 OINTMENT TOPICAL at 09:12

## 2025-01-11 RX ADMIN — GABAPENTIN 600 MG: 300 CAPSULE ORAL at 20:51

## 2025-01-11 RX ADMIN — 0.12% CHLORHEXIDINE GLUCONATE 15 ML: 1.2 RINSE ORAL at 20:51

## 2025-01-11 RX ADMIN — ASPIRIN 81 MG: 81 TABLET, COATED ORAL at 09:12

## 2025-01-11 RX ADMIN — ENOXAPARIN SODIUM 30 MG: 100 INJECTION SUBCUTANEOUS at 18:50

## 2025-01-11 RX ADMIN — BUMETANIDE 2 MG: 0.25 INJECTION INTRAMUSCULAR; INTRAVENOUS at 18:51

## 2025-01-11 RX ADMIN — GUAIFENESIN 1200 MG: 600 TABLET, MULTILAYER, EXTENDED RELEASE ORAL at 20:51

## 2025-01-11 RX ADMIN — BUMETANIDE 2 MG: 0.25 INJECTION INTRAMUSCULAR; INTRAVENOUS at 09:12

## 2025-01-11 RX ADMIN — POTASSIUM CHLORIDE 20 MEQ: 750 TABLET, EXTENDED RELEASE ORAL at 18:50

## 2025-01-11 RX ADMIN — 0.12% CHLORHEXIDINE GLUCONATE 15 ML: 1.2 RINSE ORAL at 09:12

## 2025-01-11 RX ADMIN — INSULIN LISPRO 2 UNITS: 100 INJECTION, SOLUTION INTRAVENOUS; SUBCUTANEOUS at 20:51

## 2025-01-11 RX ADMIN — HYDROCODONE BITARTRATE AND ACETAMINOPHEN 2 TABLET: 5; 325 TABLET ORAL at 07:39

## 2025-01-11 RX ADMIN — BISACODYL 10 MG: 5 TABLET, COATED ORAL at 14:06

## 2025-01-11 RX ADMIN — GUAIFENESIN 1200 MG: 600 TABLET, MULTILAYER, EXTENDED RELEASE ORAL at 09:19

## 2025-01-11 RX ADMIN — SENNOSIDES AND DOCUSATE SODIUM 2 TABLET: 50; 8.6 TABLET ORAL at 20:51

## 2025-01-11 RX ADMIN — TERAZOSIN HYDROCHLORIDE 5 MG: 5 CAPSULE ORAL at 20:51

## 2025-01-11 RX ADMIN — HYDROCODONE BITARTRATE AND ACETAMINOPHEN 2 TABLET: 5; 325 TABLET ORAL at 14:24

## 2025-01-11 RX ADMIN — POTASSIUM CHLORIDE 20 MEQ: 750 TABLET, EXTENDED RELEASE ORAL at 09:23

## 2025-01-11 NOTE — PROGRESS NOTES
He feels pretty well.  I am not sure what his rhythm is underlying.  It may be junctional with PVCs.  I do think his atrial wires are working.  He is V paced at 72.  The MA is it at 2  any higher pacing  his abdomen.  Will see how the rhythm goes.  Creatinine is 1.39.  His chest x-ray looks fine.

## 2025-01-11 NOTE — PROGRESS NOTES
LOS: 6 days   Patient Care Team:  Tera Salvador MD as PCP - General (Internal Medicine)  Micah Reyes MD as Consulting Physician (Gastroenterology)  Bruna Chávez MD as Referring Physician (Cardiology)    Chief Complaint: Follow-up mitral valve repair.    Interval History: Feels well.  No significant shortness of breath this morning.  No chest pain.  He was in sinus rhythm with PVCs underlying the pacer.    Vital Signs:  Temp:  [97.4 °F (36.3 °C)-98 °F (36.7 °C)] 98 °F (36.7 °C)  Heart Rate:  [74] 74  Resp:  [16-19] 19  BP: (110-122)/(76-84) 115/84    Intake/Output Summary (Last 24 hours) at 1/11/2025 1545  Last data filed at 1/11/2025 1425  Gross per 24 hour   Intake 720 ml   Output 2300 ml   Net -1580 ml       Physical Exam:   General Appearance:    No acute distress, alert and oriented x4   Lungs:     Clear to auscultation bilaterally     Heart:    Regular rhythm and normal rate.  No murmurs, gallops, or rubs.   Abdomen:     Soft, nontender, nondistended.    Extremities:   No clubbing, cyanosis, or edema.     Results Review:    Results from last 7 days   Lab Units 01/11/25  0541   SODIUM mmol/L 138   POTASSIUM mmol/L 4.2   CHLORIDE mmol/L 104   CO2 mmol/L 22.0   BUN mg/dL 33*   CREATININE mg/dL 1.39*   GLUCOSE mg/dL 151*   CALCIUM mg/dL 8.4*         Results from last 7 days   Lab Units 01/11/25  0541   WBC 10*3/mm3 10.06   HEMOGLOBIN g/dL 9.3*   HEMATOCRIT % 30.4*   PLATELETS 10*3/mm3 134*     Results from last 7 days   Lab Units 01/09/25  0307 01/08/25  1042 01/08/25  0319 01/07/25  2033 01/06/25  1716 01/06/25  1104   INR  1.33* 1.43*  --   --   --  1.23*   APTT seconds  --  28.4 89.6* 63.5*   < > 29.5    < > = values in this interval not displayed.         Results from last 7 days   Lab Units 01/10/25  0152   MAGNESIUM mg/dL 2.9*           I reviewed the patient's new clinical results.        Assessment:  1.  Severe mitral regurgitation  2.  Status post mitral valve repair with 36 mm flexible  band annuloplasty and triangular resection of P2 by Dr. Kelly on 1/8/2025.  Also status post right and left cryo maze procedure and left atrial appendage endocardial closure at that time.  3.  Paroxysmal atrial fibrillation  4.  Coronary artery disease, status post remote PCI to the distal LAD.  Nonobstructive disease by preoperative cath on 1/2/2025.  5.  Frequent PVCs  6.  Acute kidney injury with chronic kidney disease  7.  Obstructive sleep apnea, on CPAP  8.  Expected postoperative anemia  9.  Mild thrombocytopenia    Plan:  -Renal function improving.  Continue Bumex 2 mg IV twice daily through today.  Potentially switch to oral diuretics tomorrow.    -When I pause the pacer earlier, it appeared to be sinus with PVCs.  There was concern that he may be having some junctional rhythm too.  Continue to hold beta-blocker and amiodarone, and watch this.    -Continue aspirin and Lipitor for coronary artery disease.    -Resume systemic anticoagulation when okay from a surgical perspective.    Zay Cadena MD  01/11/25  15:45 EST

## 2025-01-11 NOTE — PLAN OF CARE
Problem: Adult Inpatient Plan of Care  Goal: Plan of Care Review  Outcome: Progressing  Flowsheets (Taken 1/11/2025 0437)  Progress: improving  Plan of Care Reviewed With:   patient   spouse  Goal: Patient-Specific Goal (Individualized)  Outcome: Progressing  Goal: Absence of Hospital-Acquired Illness or Injury  Outcome: Progressing  Intervention: Identify and Manage Fall Risk  Recent Flowsheet Documentation  Taken 1/11/2025 0400 by Noe Gordon RN  Safety Promotion/Fall Prevention:   activity supervised   clutter free environment maintained   fall prevention program maintained   lighting adjusted   nonskid shoes/slippers when out of bed   room organization consistent   safety round/check completed  Taken 1/11/2025 0200 by Noe Gordon RN  Safety Promotion/Fall Prevention:   activity supervised   clutter free environment maintained   fall prevention program maintained   lighting adjusted   nonskid shoes/slippers when out of bed   room organization consistent   safety round/check completed  Taken 1/11/2025 0000 by Noe Gordon RN  Safety Promotion/Fall Prevention:   activity supervised   clutter free environment maintained   fall prevention program maintained   lighting adjusted   nonskid shoes/slippers when out of bed   room organization consistent   safety round/check completed  Taken 1/10/2025 2200 by Noe Gordon RN  Safety Promotion/Fall Prevention:   activity supervised   clutter free environment maintained   fall prevention program maintained   lighting adjusted   nonskid shoes/slippers when out of bed   room organization consistent   safety round/check completed  Taken 1/10/2025 2000 by Noe Gordon RN  Safety Promotion/Fall Prevention:   activity supervised   clutter free environment maintained   fall prevention program maintained   lighting adjusted   nonskid shoes/slippers when out of bed   room organization consistent   safety round/check completed  Goal: Optimal Comfort and Wellbeing  Outcome:  Progressing  Intervention: Provide Person-Centered Care  Recent Flowsheet Documentation  Taken 1/10/2025 2000 by Noe Gordon RN  Trust Relationship/Rapport:   care explained   emotional support provided   choices provided   empathic listening provided   questions answered   questions encouraged   thoughts/feelings acknowledged   reassurance provided  Goal: Readiness for Transition of Care  Outcome: Progressing     Problem: Comorbidity Management  Goal: Blood Glucose Level Within Target Range  Outcome: Progressing  Goal: Maintenance of Heart Failure Symptom Control  Outcome: Progressing  Goal: Blood Pressure in Desired Range  Outcome: Progressing     Problem: Fall Injury Risk  Goal: Absence of Fall and Fall-Related Injury  Outcome: Progressing  Intervention: Promote Injury-Free Environment  Recent Flowsheet Documentation  Taken 1/11/2025 0400 by Noe Gordon, LUIZ  Safety Promotion/Fall Prevention:   activity supervised   clutter free environment maintained   fall prevention program maintained   lighting adjusted   nonskid shoes/slippers when out of bed   room organization consistent   safety round/check completed  Taken 1/11/2025 0200 by Noe Gordon, LUIZ  Safety Promotion/Fall Prevention:   activity supervised   clutter free environment maintained   fall prevention program maintained   lighting adjusted   nonskid shoes/slippers when out of bed   room organization consistent   safety round/check completed  Taken 1/11/2025 0000 by Noe Gordon, RN  Safety Promotion/Fall Prevention:   activity supervised   clutter free environment maintained   fall prevention program maintained   lighting adjusted   nonskid shoes/slippers when out of bed   room organization consistent   safety round/check completed  Taken 1/10/2025 2200 by Noe Gordon, RN  Safety Promotion/Fall Prevention:   activity supervised   clutter free environment maintained   fall prevention program maintained   lighting adjusted   nonskid shoes/slippers when  out of bed   room organization consistent   safety round/check completed  Taken 1/10/2025 2000 by Noe Gordon RN  Safety Promotion/Fall Prevention:   activity supervised   clutter free environment maintained   fall prevention program maintained   lighting adjusted   nonskid shoes/slippers when out of bed   room organization consistent   safety round/check completed     Problem: Skin Injury Risk Increased  Goal: Skin Health and Integrity  Outcome: Progressing  Intervention: Optimize Skin Protection  Recent Flowsheet Documentation  Taken 1/11/2025 0400 by Noe Gordon RN  Activity Management: activity encouraged  Taken 1/11/2025 0200 by Noe Gordon RN  Activity Management: activity encouraged  Taken 1/11/2025 0000 by Noe Gordon RN  Activity Management: activity encouraged  Taken 1/10/2025 2200 by Noe Gordon RN  Activity Management: activity encouraged  Taken 1/10/2025 2000 by Noe Gordon RN  Activity Management: activity encouraged     Problem: Noninvasive Ventilation Acute  Goal: Effective Unassisted Ventilation and Oxygenation  Outcome: Progressing   Goal Outcome Evaluation:  Plan of Care Reviewed With: patient, spouse        Progress: improving        No acute events of note overnight. Pt up to chair Ax1.

## 2025-01-11 NOTE — SIGNIFICANT NOTE
01/11/25 1239   OTHER   Discipline physical therapist   Rehab Time/Intention   Session Not Performed patient unavailable for treatment  (Pt off floor for xray in am; f/u 1/12)   Recommendation   PT - Next Appointment 01/12/25

## 2025-01-12 LAB
ANION GAP SERPL CALCULATED.3IONS-SCNC: 11 MMOL/L (ref 5–15)
BUN SERPL-MCNC: 36 MG/DL (ref 8–23)
BUN/CREAT SERPL: 24.3 (ref 7–25)
CALCIUM SPEC-SCNC: 8.5 MG/DL (ref 8.6–10.5)
CHLORIDE SERPL-SCNC: 101 MMOL/L (ref 98–107)
CO2 SERPL-SCNC: 26 MMOL/L (ref 22–29)
CREAT SERPL-MCNC: 1.48 MG/DL (ref 0.76–1.27)
DEPRECATED RDW RBC AUTO: 42.2 FL (ref 37–54)
EGFRCR SERPLBLD CKD-EPI 2021: 50 ML/MIN/1.73
ERYTHROCYTE [DISTWIDTH] IN BLOOD BY AUTOMATED COUNT: 13.3 % (ref 12.3–15.4)
GLUCOSE BLDC GLUCOMTR-MCNC: 112 MG/DL (ref 70–130)
GLUCOSE BLDC GLUCOMTR-MCNC: 181 MG/DL (ref 70–130)
GLUCOSE BLDC GLUCOMTR-MCNC: 267 MG/DL (ref 70–130)
GLUCOSE BLDC GLUCOMTR-MCNC: 84 MG/DL (ref 70–130)
GLUCOSE SERPL-MCNC: 91 MG/DL (ref 65–99)
HCT VFR BLD AUTO: 30.2 % (ref 37.5–51)
HGB BLD-MCNC: 9.9 G/DL (ref 13–17.7)
MCH RBC QN AUTO: 28.7 PG (ref 26.6–33)
MCHC RBC AUTO-ENTMCNC: 32.8 G/DL (ref 31.5–35.7)
MCV RBC AUTO: 87.5 FL (ref 79–97)
PLATELET # BLD AUTO: 148 10*3/MM3 (ref 140–450)
PMV BLD AUTO: 12.2 FL (ref 6–12)
POTASSIUM SERPL-SCNC: 3.8 MMOL/L (ref 3.5–5.2)
POTASSIUM SERPL-SCNC: 4.4 MMOL/L (ref 3.5–5.2)
QT INTERVAL: 466 MS
QTC INTERVAL: 503 MS
RBC # BLD AUTO: 3.45 10*6/MM3 (ref 4.14–5.8)
SODIUM SERPL-SCNC: 138 MMOL/L (ref 136–145)
WBC NRBC COR # BLD AUTO: 7.43 10*3/MM3 (ref 3.4–10.8)

## 2025-01-12 PROCEDURE — 63710000001 INSULIN LISPRO (HUMAN) PER 5 UNITS: Performed by: PHYSICIAN ASSISTANT

## 2025-01-12 PROCEDURE — 84132 ASSAY OF SERUM POTASSIUM: CPT | Performed by: THORACIC SURGERY (CARDIOTHORACIC VASCULAR SURGERY)

## 2025-01-12 PROCEDURE — 82948 REAGENT STRIP/BLOOD GLUCOSE: CPT

## 2025-01-12 PROCEDURE — 93010 ELECTROCARDIOGRAM REPORT: CPT | Performed by: INTERNAL MEDICINE

## 2025-01-12 PROCEDURE — 25010000002 ENOXAPARIN PER 10 MG: Performed by: THORACIC SURGERY (CARDIOTHORACIC VASCULAR SURGERY)

## 2025-01-12 PROCEDURE — 99232 SBSQ HOSP IP/OBS MODERATE 35: CPT

## 2025-01-12 PROCEDURE — 85027 COMPLETE CBC AUTOMATED: CPT | Performed by: PHYSICIAN ASSISTANT

## 2025-01-12 PROCEDURE — 93005 ELECTROCARDIOGRAM TRACING: CPT | Performed by: INTERNAL MEDICINE

## 2025-01-12 PROCEDURE — 97110 THERAPEUTIC EXERCISES: CPT

## 2025-01-12 PROCEDURE — 93005 ELECTROCARDIOGRAM TRACING: CPT | Performed by: THORACIC SURGERY (CARDIOTHORACIC VASCULAR SURGERY)

## 2025-01-12 PROCEDURE — 63710000001 INSULIN GLARGINE PER 5 UNITS: Performed by: PHYSICIAN ASSISTANT

## 2025-01-12 PROCEDURE — 80048 BASIC METABOLIC PNL TOTAL CA: CPT | Performed by: PHYSICIAN ASSISTANT

## 2025-01-12 RX ORDER — POTASSIUM CHLORIDE 750 MG/1
20 TABLET, FILM COATED, EXTENDED RELEASE ORAL ONCE
Status: COMPLETED | OUTPATIENT
Start: 2025-01-12 | End: 2025-01-12

## 2025-01-12 RX ADMIN — HYDROCODONE BITARTRATE AND ACETAMINOPHEN 2 TABLET: 5; 325 TABLET ORAL at 06:41

## 2025-01-12 RX ADMIN — MUPIROCIN 1 APPLICATION: 20 OINTMENT TOPICAL at 20:37

## 2025-01-12 RX ADMIN — INSULIN LISPRO 2 UNITS: 100 INJECTION, SOLUTION INTRAVENOUS; SUBCUTANEOUS at 20:37

## 2025-01-12 RX ADMIN — GUAIFENESIN 1200 MG: 600 TABLET, MULTILAYER, EXTENDED RELEASE ORAL at 20:37

## 2025-01-12 RX ADMIN — 0.12% CHLORHEXIDINE GLUCONATE 15 ML: 1.2 RINSE ORAL at 20:37

## 2025-01-12 RX ADMIN — ESCITALOPRAM 15 MG: 5 TABLET, FILM COATED ORAL at 17:08

## 2025-01-12 RX ADMIN — ASPIRIN 81 MG: 81 TABLET, COATED ORAL at 08:35

## 2025-01-12 RX ADMIN — POTASSIUM CHLORIDE 20 MEQ: 750 TABLET, EXTENDED RELEASE ORAL at 08:34

## 2025-01-12 RX ADMIN — PANTOPRAZOLE SODIUM 40 MG: 40 TABLET, DELAYED RELEASE ORAL at 06:41

## 2025-01-12 RX ADMIN — TERAZOSIN HYDROCHLORIDE 5 MG: 5 CAPSULE ORAL at 20:37

## 2025-01-12 RX ADMIN — 0.12% CHLORHEXIDINE GLUCONATE 15 ML: 1.2 RINSE ORAL at 08:35

## 2025-01-12 RX ADMIN — POTASSIUM CHLORIDE 20 MEQ: 750 TABLET, EXTENDED RELEASE ORAL at 08:35

## 2025-01-12 RX ADMIN — POTASSIUM CHLORIDE 20 MEQ: 750 TABLET, EXTENDED RELEASE ORAL at 17:08

## 2025-01-12 RX ADMIN — GABAPENTIN 600 MG: 300 CAPSULE ORAL at 20:37

## 2025-01-12 RX ADMIN — HYDROCODONE BITARTRATE AND ACETAMINOPHEN 2 TABLET: 5; 325 TABLET ORAL at 17:05

## 2025-01-12 RX ADMIN — ATORVASTATIN CALCIUM 40 MG: 20 TABLET, FILM COATED ORAL at 20:37

## 2025-01-12 RX ADMIN — GUAIFENESIN 1200 MG: 600 TABLET, MULTILAYER, EXTENDED RELEASE ORAL at 08:35

## 2025-01-12 RX ADMIN — POLYETHYLENE GLYCOL 3350 17 G: 17 POWDER, FOR SOLUTION ORAL at 08:46

## 2025-01-12 RX ADMIN — CYCLOBENZAPRINE HYDROCHLORIDE 10 MG: 10 TABLET, FILM COATED ORAL at 20:37

## 2025-01-12 RX ADMIN — SENNOSIDES AND DOCUSATE SODIUM 2 TABLET: 50; 8.6 TABLET ORAL at 20:37

## 2025-01-12 RX ADMIN — HYDROCODONE BITARTRATE AND ACETAMINOPHEN 2 TABLET: 5; 325 TABLET ORAL at 11:28

## 2025-01-12 RX ADMIN — INSULIN GLARGINE 15 UNITS: 100 INJECTION, SOLUTION SUBCUTANEOUS at 08:36

## 2025-01-12 RX ADMIN — ENOXAPARIN SODIUM 30 MG: 100 INJECTION SUBCUTANEOUS at 17:08

## 2025-01-12 RX ADMIN — MUPIROCIN 1 APPLICATION: 20 OINTMENT TOPICAL at 08:35

## 2025-01-12 RX ADMIN — INSULIN LISPRO 4 UNITS: 100 INJECTION, SOLUTION INTRAVENOUS; SUBCUTANEOUS at 11:52

## 2025-01-12 NOTE — PLAN OF CARE
Problem: Adult Inpatient Plan of Care  Goal: Plan of Care Review  Outcome: Progressing  Flowsheets (Taken 1/11/2025 2255)  Progress: improving  Plan of Care Reviewed With: patient  Goal: Patient-Specific Goal (Individualized)  Outcome: Progressing  Goal: Absence of Hospital-Acquired Illness or Injury  Outcome: Progressing  Intervention: Identify and Manage Fall Risk  Recent Flowsheet Documentation  Taken 1/11/2025 2200 by Noe Gordon RN  Safety Promotion/Fall Prevention:   activity supervised   clutter free environment maintained   fall prevention program maintained   lighting adjusted   nonskid shoes/slippers when out of bed   room organization consistent   safety round/check completed  Taken 1/11/2025 2000 by Noe Gordon RN  Safety Promotion/Fall Prevention:   activity supervised   clutter free environment maintained   fall prevention program maintained   lighting adjusted   nonskid shoes/slippers when out of bed   room organization consistent   safety round/check completed  Goal: Optimal Comfort and Wellbeing  Outcome: Progressing  Goal: Readiness for Transition of Care  Outcome: Progressing     Problem: Comorbidity Management  Goal: Blood Glucose Level Within Target Range  Outcome: Progressing  Goal: Maintenance of Heart Failure Symptom Control  Outcome: Progressing  Goal: Blood Pressure in Desired Range  Outcome: Progressing     Problem: Fall Injury Risk  Goal: Absence of Fall and Fall-Related Injury  Outcome: Progressing  Intervention: Promote Injury-Free Environment  Recent Flowsheet Documentation  Taken 1/11/2025 2200 by Noe Gordon RN  Safety Promotion/Fall Prevention:   activity supervised   clutter free environment maintained   fall prevention program maintained   lighting adjusted   nonskid shoes/slippers when out of bed   room organization consistent   safety round/check completed  Taken 1/11/2025 2000 by Noe Gordon RN  Safety Promotion/Fall Prevention:   activity supervised   clutter free  environment maintained   fall prevention program maintained   lighting adjusted   nonskid shoes/slippers when out of bed   room organization consistent   safety round/check completed     Problem: Skin Injury Risk Increased  Goal: Skin Health and Integrity  Outcome: Progressing  Intervention: Optimize Skin Protection  Recent Flowsheet Documentation  Taken 1/11/2025 2200 by Noe Gordon, RN  Activity Management: activity encouraged  Taken 1/11/2025 2000 by Noe Gordon, RN  Activity Management: activity encouraged     Problem: Noninvasive Ventilation Acute  Goal: Effective Unassisted Ventilation and Oxygenation  Outcome: Progressing   Goal Outcome Evaluation:  Plan of Care Reviewed With: patient        Progress: improving        No acute events of note overnight. Pt in chair.

## 2025-01-12 NOTE — PROGRESS NOTES
Up walking to the bathroom.  EKG looks like sinus rhythm but on the rhythm strip looks more junctional.  It is regular.  I think we can discontinue his AV wires and his Cordis.  Creatinine is up to 1.48.

## 2025-01-12 NOTE — THERAPY TREATMENT NOTE
Patient Name: Jeb Olson  : 1950    MRN: [id number]                              Today's Date: 2025       Admit Date: 2025    Visit Dx:     ICD-10-CM ICD-9-CM   1. S/P MVR (mitral valve repair)  Z98.890 V45.89   2. Mitral valve insufficiency, unspecified etiology  I34.0 424.0   3. Severe mitral regurgitation  I34.0 424.0     Patient Active Problem List   Diagnosis    Allergic rhinitis    Arthritis of knee, left    Diabetes mellitus    Essential hypertension    Hyperlipidemia    High risk medication use    Obesity    Primary osteoarthritis of knee    Mild chronic anemia    Obstructive sleep apnea    Arthritis of left knee    Sinus bradycardia    Abnormal stress test    Colon polyp    Family history of colonic polyps    Reactive depression    Arthritis of knee, right    Arthritis of knee    AF (paroxysmal atrial fibrillation)    Acute exacerbation of CHF (congestive heart failure)    Severe mitral regurgitation    Mitral valve regurgitation     Past Medical History:   Diagnosis Date    AF (paroxysmal atrial fibrillation)     Allergic Have had for several years    Eyes and nasal issues    Anemia     Anticoagulant long-term use     COUMADIN    Anxiety Since about     Since I was taking of my Dad, who also passed away 3yrs ago.    Arthritis 2002    Both knees, hips, and shoulders    Arthritis of knee, left     Cataract 2019    CHF (congestive heart failure)     Colon polyp 2017    Coronary artery disease     stent    Diabetes mellitus     Dry eyes     Essential hypertension     Heart murmur     HL (hearing loss)     no hearing aides    Hyperlipemia     Knee swelling 2020    Shortly after my left knee replacement    Macular degeneration     Mild chronic anemia     Mitral valve disorder     Obesity     Pneumonia 2024    Sleep apnea     cpap     Past Surgical History:   Procedure Laterality Date    ACHILLES TENDON REPAIR Left     CARDIAC CATHETERIZATION      CARDIAC CATHETERIZATION N/A  09/01/2022    Procedure: Coronary angiography, Left heart catheterization,;  Surgeon: Bruna Chávez MD;  [facility]AS[facility] LOC[facility];  Service: Cardiology;  Laterality: N/A;    CARDIAC CATHETERIZATION N/A 09/01/2022    Procedure: Stent PRAVIN coronary;  Surgeon: Bruna Chávez MD;  Location:  [facility] CATH INVASIVE LOCATION;  Service: Cardiology;  Laterality: N/A;    CARDIAC CATHETERIZATION N/A 1/2/2025    Procedure: Left Heart Cath;  Surgeon: Bruna Chávez MD;  [facility];  Service: Cardiology;  Laterality: N/A;    CARDIAC CATHETERIZATION N/A 1/2/2025    Procedure: Coronary angiography;  Surgeon: Bruna Chávez MD;  [facility] LOC[facility];  Service: Cardiology;  Laterality: N/A;    CATARACT EXTRACTION EXTRACAPSULAR W/ INTRAOCULAR LENS IMPLANTATION Bilateral     COLONOSCOPY  2018    Dr Reyes    ENDOSCOPY      MITRAL VALVE REPAIR/REPLACEMENT N/A 1/8/2025    Procedure: STERNOTOMY, MITRAL VALVE REPAIR, MAZE PROCEDURE TRANSESOPHAGEAL ECHOCARDIOGRAM, PRP;  Surgeon: Young Kelly MD;  Location: [facility] CVOR;  Service: Cardiothoracic;  Laterality: N/A;    TONSILLECTOMY      As a child    TOTAL KNEE ARTHROPLASTY Left 03/12/2020    Procedure: TOTAL KNEE ARTHROPLASTY;  Surgeon: Chepe Alicea MD;  Location: [facility] MAIN OR;  Service: Orthopedics;  Laterality: Left;    TOTAL KNEE ARTHROPLASTY Right 03/21/2024    Procedure: TOTAL KNEE ARTHROPLASTY;  Surgeon: Chepe Alicea MD;  Location: [facility] OR [facility];  Service: Orthopedics;  Laterality: Right;      General Information       Row Name 01/12/25 1453          Physical Therapy Time and Intention    Document Type therapy note (daily note)  -AR     Mode of Treatment physical therapy  -AR       Row Name 01/12/25 1453          General Information    Patient Profile Reviewed yes  -AR     Existing Precautions/Restrictions cardiac  -AR       Row Name 01/12/25 1453          Cognition    Orientation Status (Cognition) oriented x  4  -AR               User Key  (r) = Recorded By, (t) = Taken By, (c) = Cosigned By      Initials Name Provider Type    AR Irena Gamez, PT Physical Therapist                   Mobility       Row Name 01/12/25 1453          Bed Mobility    Supine-Sit [place] (Bed Mobility) not tested  -AR       Row Name 01/12/25 1453          Sit-Stand Transfer    Sit-Stand [place] (Transfers) standby assist  -AR       Row Name 01/12/25 1453          Gait/Stairs (Locomotion)    [place] Level (Gait) contact guard;standby assist  -AR     Assistive Device (Gait) walker, front-wheeled  -AR     Distance in Feet (Gait) 450  -AR     Deviations/Abnormal Patterns (Gait) noe decreased;stride length decreased;weight shifting decreased  -AR     Bilateral Gait Deviations heel strike decreased  -AR               User Key  (r) = Recorded By, (t) = Taken By, (c) = Cosigned By      Initials Name Provider Type    Irena Malhotra, PT Physical Therapist                   Obj/Interventions       Row Name 01/12/25 1454          Balance    Dynamic Standing Balance standby assist  -AR     Position/Device Used, Standing Balance walker, rolling  -AR               User Key  (r) = Recorded By, (t) = Taken By, (c) = Cosigned By      Initials Name Provider Type    Irena Malhotra, PT Physical Therapist                   Goals/Plan    No documentation.                  Clinical Impression       Row Name 01/12/25 1454          Pain    Pretreatment Pain Rating 4/10  -AR     Posttreatment Pain Rating 4/10  -AR     Pain Location chest  -AR     Pain Side/Orientation generalized  -AR     Response to Pain Interventions activity participation with tolerable pain  -AR       Row Name 01/12/25 1456          Plan of Care Review    Outcome Evaluation Improveda ctivity tolerance during PT today. Has been ambulating with nursing staff using chest tube walker.   Able to ambulate 450' w/ RW with CGA imrproving to SBA, no loss of balance or safety  concerns.  Declined attempting to ambulate w/o any assistive device today.  Recommend ambulating in  turpin at least 3x/day.  Dc to home with assist as needed, pt owns RW if need at time of DC.  -AR       Row Name 01/12/25 1454          Therapy Assessment/Plan (PT)    Rehab Potential (PT) good  -AR     Criteria for Skilled Interventions Met (PT) yes  -AR     Therapy Frequency (PT) 3 times/wk  -AR       Row Name 01/12/25 1454          Vital Signs    O2 Delivery Pre Treatment room air  -AR       Row Name 01/12/25 1454          Positioning and Restraints    Pre-Treatment Position sitting in chair/recliner  -AR     Post Treatment Position chair  -AR     In Chair notified nsg;reclined;sitting;call light within reach;encouraged to call for assist  no alarm discussed w/ RN, but she still wants him to call out for assist  -AR               User Key  (r) = Recorded By, (t) = Taken By, (c) = Cosigned By      Initials Name Provider Type    AR Irena Gamez, PT Physical Therapist                   Outcome Measures       Row Name 01/12/25 1456 01/12/25 0835       How much help from another person do you currently need...    Turning from your back to your side while in flat bed without using bedrails? 4  -AR 4  -CG    Moving from lying on back to sitting on the side of a flat bed without bedrails? 3  -AR 3  -CG    Moving to and from a bed to a chair (including a wheelchair)? 4  -AR 3  -CG    Standing up from a chair using your arms (e.g., wheelchair, bedside chair)? 4  -AR 4  -CG    Climbing 3-5 steps with a railing? 3  -AR 3  -CG    To walk in hospital room? 4  -AR 3  -CG    AM-PAC 6 Clicks Score (PT) 22  -AR 20  -CG    Highest Level of Mobility Goal 7 --> Walk 25 feet or more  -AR 6 --> Walk 10 steps or more  -CG      Row Name 01/12/25 1456          Functional Assessment    Outcome Measure Options AM-PAC 6 Clicks Basic Mobility (PT)  -AR               User Key  (r) = Recorded By, (t) = Taken By, (c) = Cosigned By      Initials  Name Provider Type    AR Irena Gamez PT Physical Therapist    Mainor Jurado, RN Registered Nurse                                 Physical Therapy Education       Title: PT OT SLP Therapies (In Progress)       Topic: Physical Therapy (In Progress)       Point: Mobility training (In Progress)       Learning Progress Summary            Patient Acceptance, E, NR by AR at 1/12/2025 1456                      Point: Home exercise program (In Progress)       Learning Progress Summary            Patient Acceptance, E, NR by AR at 1/12/2025 1456                      Point: Body mechanics (In Progress)       Learning Progress Summary            Patient Acceptance, E, NR by AR at 1/12/2025 1456                      Point: Precautions (In Progress)       Learning Progress Summary            Patient Acceptance, E, NR by AR at 1/12/2025 1456                                      User Key       Initials Effective Dates Name Provider Type [facility] 06/16/21 -  Irena Gamez PT Physical Therapist PT                  PT Recommendation and Plan     Outcome Evaluation: Improveda ctivity tolerance during PT today. Has been ambulating with nursing staff using chest tube walker.   Able to ambulate 450' w/ RW with CGA imrproving to SBA, no loss of balance or safety concerns.  Declined attempting to ambulate w/o any assistive device today.  Recommend ambulating in  turpin at least 3x/day.  Dc to home with assist as needed, pt owns RW if need at time of DC.     Time Calculation:         PT Charges       Row Name 01/12/25 5514             Time Calculation    Start Time 1402  -AR      Stop Time 1420  -AR      Time Calculation (min) 18 min  -AR      PT Received On 01/12/25  -AR      PT - Next Appointment 01/14/25  -AR                User Key  (r) = Recorded By, (t) = Taken By, (c) = Cosigned By      Initials Name Provider Type    AR Irena Gamez PT Physical Therapist                  Therapy Charges for Today        Code Description Service Date Service Provider Modifiers Qty    92163159149 HC PT THER PROC EA 15 MIN 1/12/2025 Irena Gamez, PT GP 1            PT G-Codes  Outcome Measure Options: AM-PAC 6 Clicks Basic Mobility (PT)  AM-PAC 6 Clicks Score (PT): 22  PT Discharge Summary  Anticipated Discharge Disposition (PT): home with assist    Irena Gamez PT  1/12/2025

## 2025-01-12 NOTE — PLAN OF CARE
Goal Outcome Evaluation:VSS throughout shift. Pt rates pain 8/10, but responds well to PO pain medication. Pt has no c/o nausea. Pt tolerating PO intake well, appetite is good. Pt ambulated well with PT, now up ad albert with walker. Pacer wires removed. Call light in reach, room by nursing station.

## 2025-01-12 NOTE — PROGRESS NOTES
LOS: 7 days   Patient Care Team:  Tera Salvador MD as PCP - General (Internal Medicine)  Micah Reyes MD as Consulting Physician (Gastroenterology)  Bruna Chávez MD as Referring Physician (Cardiology)    Chief Complaint: follow up mitral valve repair     Interval History: He was up in the chair on my exam. He has no complaints.     Vital Signs:  Temp:  [97.6 °F (36.4 °C)-99.3 °F (37.4 °C)] 98.3 °F (36.8 °C)  Heart Rate:  [64-74] 64  Resp:  [16-20] 18  BP: (129-149)/(82-88) 149/88    Intake/Output Summary (Last 24 hours) at 1/12/2025 1153  Last data filed at 1/12/2025 0820  Gross per 24 hour   Intake 960 ml   Output 1050 ml   Net -90 ml        Physical Exam  Vitals reviewed.   Constitutional:       General: He is not in acute distress.  HENT:      Head: Normocephalic.      Nose: Nose normal.   Eyes:      Extraocular Movements: Extraocular movements intact.      Pupils: Pupils are equal, round, and reactive to light.   Cardiovascular:      Rate and Rhythm: Normal rate and regular rhythm.      Pulses: Normal pulses.      Heart sounds: Normal heart sounds. Heart sounds not distant. No murmur heard.     No friction rub. No gallop. No S3 or S4 sounds.   Pulmonary:      Effort: Pulmonary effort is normal.      Breath sounds: Normal breath sounds.   Abdominal:      General: Abdomen is flat. Bowel sounds are normal.      Palpations: Abdomen is soft.      Tenderness: There is no abdominal tenderness.   Skin:     General: Skin is warm and dry.   Neurological:      General: No focal deficit present.      Mental Status: He is alert and oriented to person, place, and time. Mental status is at baseline.   Psychiatric:         Mood and Affect: Mood normal.         Behavior: Behavior normal.         Results Review:    Results from last 7 days   Lab Units 01/12/25  0419   SODIUM mmol/L 138   POTASSIUM mmol/L 3.8   CHLORIDE mmol/L 101   CO2 mmol/L 26.0   BUN mg/dL 36*   CREATININE mg/dL 1.48*   GLUCOSE mg/dL 91    CALCIUM mg/dL 8.5*         Results from last 7 days   Lab Units 01/12/25  0419   WBC 10*3/mm3 7.43   HEMOGLOBIN g/dL 9.9*   HEMATOCRIT % 30.2*   PLATELETS 10*3/mm3 148     Results from last 7 days   Lab Units 01/09/25  0307 01/08/25  1042 01/08/25  0319 01/07/25  2033 01/06/25  1716 01/06/25  1104   INR  1.33* 1.43*  --   --   --  1.23*   APTT seconds  --  28.4 89.6* 63.5*   < > 29.5    < > = values in this interval not displayed.         Results from last 7 days   Lab Units 01/10/25  0152   MAGNESIUM mg/dL 2.9*           I reviewed the patient's new clinical results.        Assessment & Plan:  Severe mitral valve regurgitation  Status post mitral valve repair with 36 mm flexible band annuloplasty and triangular resection of P2 by Dr. Kelly on 1/8/25  Status post right and left cryo maze procedure and left atrial appendage endocardial closure at that time  Paroxysmal atrial fibrillation  Resume systemic anticoagulation when okay from a surgical perspective   Coronary artery disease  Status post PCI to the distal LAD  Nonobstructive disease by preoperative cath on 1/2/25  Continue aspirin and atorvastatin   Frequent PVCs  Acute kidney injury with chronic kidney disease  Obstructive sleep apnea  On CPAP  Expected postoperative anemia  Mild thrombocytopenia     Reviewed am EKG with Dr. Cadena, he is in sinus rhythm with a first degree AV block.     Shaye Chen, APRN  01/12/25  11:53 EST

## 2025-01-12 NOTE — PLAN OF CARE
Goal Outcome Evaluation:              Outcome Evaluation: Improveda ctivity tolerance during PT today. Has been ambulating with nursing staff using chest tube walker.   Able to ambulate 450' w/ RW with CGA imrproving to SBA, no loss of balance or safety concerns.  Declined attempting to ambulate w/o any assistive device today.  Recommend ambulating in  turpin at least 3x/day.  Dc to home with assist as needed, pt owns RW if need at time of DC.    Anticipated Discharge Disposition (PT): home with assist

## 2025-01-13 LAB
ANION GAP SERPL CALCULATED.3IONS-SCNC: 9 MMOL/L (ref 5–15)
BH BB BLOOD EXPIRATION DATE: NORMAL
BH BB BLOOD EXPIRATION DATE: NORMAL
BH BB BLOOD TYPE BARCODE: 6200
BH BB BLOOD TYPE BARCODE: 6200
BH BB DISPENSE STATUS: NORMAL
BH BB DISPENSE STATUS: NORMAL
BH BB PRODUCT CODE: NORMAL
BH BB PRODUCT CODE: NORMAL
BH BB UNIT NUMBER: NORMAL
BH BB UNIT NUMBER: NORMAL
BUN SERPL-MCNC: 25 MG/DL (ref 8–23)
BUN/CREAT SERPL: 22.7 (ref 7–25)
CALCIUM SPEC-SCNC: 8.7 MG/DL (ref 8.6–10.5)
CHLORIDE SERPL-SCNC: 104 MMOL/L (ref 98–107)
CO2 SERPL-SCNC: 27 MMOL/L (ref 22–29)
CREAT SERPL-MCNC: 1.1 MG/DL (ref 0.76–1.27)
CROSSMATCH INTERPRETATION: NORMAL
CROSSMATCH INTERPRETATION: NORMAL
EGFRCR SERPLBLD CKD-EPI 2021: 71.3 ML/MIN/1.73
GLUCOSE BLDC GLUCOMTR-MCNC: 152 MG/DL (ref 70–130)
GLUCOSE BLDC GLUCOMTR-MCNC: 172 MG/DL (ref 70–130)
GLUCOSE BLDC GLUCOMTR-MCNC: 176 MG/DL (ref 70–130)
GLUCOSE SERPL-MCNC: 83 MG/DL (ref 65–99)
POTASSIUM SERPL-SCNC: 4.3 MMOL/L (ref 3.5–5.2)
QT INTERVAL: 437 MS
QT INTERVAL: 445 MS
QTC INTERVAL: 494 MS
QTC INTERVAL: 498 MS
SODIUM SERPL-SCNC: 140 MMOL/L (ref 136–145)
UNIT  ABO: NORMAL
UNIT  ABO: NORMAL
UNIT  RH: NORMAL
UNIT  RH: NORMAL
WHOLE BLOOD HOLD SPECIMEN: NORMAL

## 2025-01-13 PROCEDURE — 63710000001 INSULIN LISPRO (HUMAN) PER 5 UNITS: Performed by: PHYSICIAN ASSISTANT

## 2025-01-13 PROCEDURE — 82948 REAGENT STRIP/BLOOD GLUCOSE: CPT

## 2025-01-13 PROCEDURE — 80048 BASIC METABOLIC PNL TOTAL CA: CPT | Performed by: PHYSICIAN ASSISTANT

## 2025-01-13 PROCEDURE — 25010000002 HYDRALAZINE PER 20 MG: Performed by: PHYSICIAN ASSISTANT

## 2025-01-13 PROCEDURE — 99232 SBSQ HOSP IP/OBS MODERATE 35: CPT | Performed by: INTERNAL MEDICINE

## 2025-01-13 PROCEDURE — 99024 POSTOP FOLLOW-UP VISIT: CPT

## 2025-01-13 PROCEDURE — 63710000001 INSULIN GLARGINE PER 5 UNITS: Performed by: PHYSICIAN ASSISTANT

## 2025-01-13 PROCEDURE — 25010000002 ENOXAPARIN PER 10 MG: Performed by: THORACIC SURGERY (CARDIOTHORACIC VASCULAR SURGERY)

## 2025-01-13 RX ORDER — BUMETANIDE 1 MG/1
2 TABLET ORAL DAILY
Status: DISCONTINUED | OUTPATIENT
Start: 2025-01-13 | End: 2025-01-14 | Stop reason: HOSPADM

## 2025-01-13 RX ORDER — AMLODIPINE BESYLATE 5 MG/1
5 TABLET ORAL
Status: DISCONTINUED | OUTPATIENT
Start: 2025-01-13 | End: 2025-01-14

## 2025-01-13 RX ADMIN — HYDROCODONE BITARTRATE AND ACETAMINOPHEN 2 TABLET: 5; 325 TABLET ORAL at 03:24

## 2025-01-13 RX ADMIN — INSULIN LISPRO 2 UNITS: 100 INJECTION, SOLUTION INTRAVENOUS; SUBCUTANEOUS at 17:11

## 2025-01-13 RX ADMIN — GABAPENTIN 600 MG: 300 CAPSULE ORAL at 20:38

## 2025-01-13 RX ADMIN — ACETAMINOPHEN 650 MG: 325 TABLET ORAL at 12:06

## 2025-01-13 RX ADMIN — AMLODIPINE BESYLATE 5 MG: 5 TABLET ORAL at 10:52

## 2025-01-13 RX ADMIN — GUAIFENESIN 1200 MG: 600 TABLET, MULTILAYER, EXTENDED RELEASE ORAL at 08:05

## 2025-01-13 RX ADMIN — 0.12% CHLORHEXIDINE GLUCONATE 15 ML: 1.2 RINSE ORAL at 20:38

## 2025-01-13 RX ADMIN — BUMETANIDE 2 MG: 1 TABLET ORAL at 10:52

## 2025-01-13 RX ADMIN — OXYCODONE HYDROCHLORIDE 5 MG: 5 TABLET ORAL at 20:38

## 2025-01-13 RX ADMIN — HYDRALAZINE HYDROCHLORIDE 20 MG: 20 INJECTION INTRAMUSCULAR; INTRAVENOUS at 03:32

## 2025-01-13 RX ADMIN — ENOXAPARIN SODIUM 30 MG: 100 INJECTION SUBCUTANEOUS at 17:11

## 2025-01-13 RX ADMIN — GUAIFENESIN 1200 MG: 600 TABLET, MULTILAYER, EXTENDED RELEASE ORAL at 20:38

## 2025-01-13 RX ADMIN — HYDROCODONE BITARTRATE AND ACETAMINOPHEN 2 TABLET: 5; 325 TABLET ORAL at 08:04

## 2025-01-13 RX ADMIN — ASPIRIN 81 MG: 81 TABLET, COATED ORAL at 08:05

## 2025-01-13 RX ADMIN — PANTOPRAZOLE SODIUM 40 MG: 40 TABLET, DELAYED RELEASE ORAL at 08:05

## 2025-01-13 RX ADMIN — POTASSIUM CHLORIDE 20 MEQ: 750 TABLET, EXTENDED RELEASE ORAL at 17:12

## 2025-01-13 RX ADMIN — SACUBITRIL AND VALSARTAN 1 TABLET: 24; 26 TABLET, FILM COATED ORAL at 20:38

## 2025-01-13 RX ADMIN — POTASSIUM CHLORIDE 20 MEQ: 750 TABLET, EXTENDED RELEASE ORAL at 08:05

## 2025-01-13 RX ADMIN — INSULIN LISPRO 2 UNITS: 100 INJECTION, SOLUTION INTRAVENOUS; SUBCUTANEOUS at 20:38

## 2025-01-13 RX ADMIN — ATORVASTATIN CALCIUM 40 MG: 20 TABLET, FILM COATED ORAL at 20:38

## 2025-01-13 RX ADMIN — INSULIN LISPRO 2 UNITS: 100 INJECTION, SOLUTION INTRAVENOUS; SUBCUTANEOUS at 11:13

## 2025-01-13 RX ADMIN — SACUBITRIL AND VALSARTAN 1 TABLET: 24; 26 TABLET, FILM COATED ORAL at 10:52

## 2025-01-13 RX ADMIN — INSULIN GLARGINE 15 UNITS: 100 INJECTION, SOLUTION SUBCUTANEOUS at 08:05

## 2025-01-13 RX ADMIN — ESCITALOPRAM 15 MG: 5 TABLET, FILM COATED ORAL at 17:11

## 2025-01-13 RX ADMIN — HYDROCODONE BITARTRATE AND ACETAMINOPHEN 2 TABLET: 5; 325 TABLET ORAL at 17:10

## 2025-01-13 RX ADMIN — TERAZOSIN HYDROCHLORIDE 5 MG: 5 CAPSULE ORAL at 20:38

## 2025-01-13 NOTE — PROGRESS NOTES
" LOS: 8 days   Patient Care Team:  Tera Salvador MD as PCP - General (Internal Medicine)  Micah Reyes MD as Consulting Physician (Gastroenterology)  Bruna Chávez MD as Referring Physician (Cardiology)    Chief Complaint: Post-op follow-up, s/p MVR/Maze/SELMA    Subjective: Doing good. Feels much better and is ready to go home soon.     Vital Signs  Temp:  [97.4 °F (36.3 °C)-98.3 °F (36.8 °C)] 97.4 °F (36.3 °C)  Heart Rate:  [64-77] 77  Resp:  [18] 18  BP: (124-179)/(79-97) 139/97      01/11/25  0518 01/12/25  0618 01/13/25  0600   Weight: 91.9 kg (202 lb 8 oz) 115 kg (253 lb 11.2 oz) 115 kg (254 lb 1.6 oz)     Body mass index is 32.62 kg/m².    Intake/Output Summary (Last 24 hours) at 1/13/2025 0916  Last data filed at 1/13/2025 0800  Gross per 24 hour   Intake 720 ml   Output 875 ml   Net -155 ml     I/O this shift:  In: 240 [P.O.:240]  Out: -         Objective:  Vital signs: (most recent): Blood pressure 139/97, pulse 77, temperature 97.4 °F (36.3 °C), temperature source Oral, resp. rate 18, height 188 cm (74\"), weight 115 kg (254 lb 1.6 oz), SpO2 93%.            Physical Examination:   General appearance - alert, well appearing, and in no distress and overweight  Mental status - normal mood, behavior, speech, dress, motor activity, and thought processes  Chest - clear to auscultation, no wheezes, rales or rhonchi, symmetric air entry, decreased air entry noted bilateral bases  Heart - normal rate, regular rhythm, normal S1, S2, no murmurs, rubs, clicks or gallops  Abdomen - soft, nontender, nondistended, no masses or organomegaly  bowel sounds normal  Neurological - alert, oriented, normal speech, no focal findings or movement disorder noted, motor and sensory grossly normal bilaterally  Extremities - peripheral pulses normal, no pedal edema, no clubbing or cyanosis, feet normal, good pulses, normal color, temperature and sensation  Skin - normal coloration and turgor, no rashes, no suspicious skin " "lesions noted  Midsternal incision and chest tube exit sites healing well, without redness or drainage.        Results Review:      WBC WBC   Date Value Ref Range Status   01/12/2025 7.43 3.40 - 10.80 10*3/mm3 Final   01/11/2025 10.06 3.40 - 10.80 10*3/mm3 Final      HGB Hemoglobin   Date Value Ref Range Status   01/12/2025 9.9 (L) 13.0 - 17.7 g/dL Final   01/11/2025 9.3 (L) 13.0 - 17.7 g/dL Final      HCT Hematocrit   Date Value Ref Range Status   01/12/2025 30.2 (L) 37.5 - 51.0 % Final   01/11/2025 30.4 (L) 37.5 - 51.0 % Final      Platelets Platelets   Date Value Ref Range Status   01/12/2025 148 140 - 450 10*3/mm3 Final   01/11/2025 134 (L) 140 - 450 10*3/mm3 Final        PT/INR:  No results found for: \"PROTIME\"/No results found for: \"INR\"    Sodium Sodium   Date Value Ref Range Status   01/13/2025 140 136 - 145 mmol/L Final   01/12/2025 138 136 - 145 mmol/L Final   01/11/2025 138 136 - 145 mmol/L Final      Potassium Potassium   Date Value Ref Range Status   01/13/2025 4.3 3.5 - 5.2 mmol/L Final   01/12/2025 4.4 3.5 - 5.2 mmol/L Final   01/12/2025 3.8 3.5 - 5.2 mmol/L Final   01/11/2025 4.2 3.5 - 5.2 mmol/L Final      Chloride Chloride   Date Value Ref Range Status   01/13/2025 104 98 - 107 mmol/L Final   01/12/2025 101 98 - 107 mmol/L Final   01/11/2025 104 98 - 107 mmol/L Final      Bicarbonate CO2   Date Value Ref Range Status   01/13/2025 27.0 22.0 - 29.0 mmol/L Final   01/12/2025 26.0 22.0 - 29.0 mmol/L Final   01/11/2025 22.0 22.0 - 29.0 mmol/L Final      BUN BUN   Date Value Ref Range Status   01/13/2025 25 (H) 8 - 23 mg/dL Final   01/12/2025 36 (H) 8 - 23 mg/dL Final   01/11/2025 33 (H) 8 - 23 mg/dL Final      Creatinine Creatinine   Date Value Ref Range Status   01/13/2025 1.10 0.76 - 1.27 mg/dL Final   01/12/2025 1.48 (H) 0.76 - 1.27 mg/dL Final   01/11/2025 1.39 (H) 0.76 - 1.27 mg/dL Final      Calcium Calcium   Date Value Ref Range Status   01/13/2025 8.7 8.6 - 10.5 mg/dL Final   01/12/2025 8.5 (L) " "8.6 - 10.5 mg/dL Final   01/11/2025 8.4 (L) 8.6 - 10.5 mg/dL Final      Magnesium No results found for: \"MG\"       amLODIPine, 5 mg, Oral, Q24H  aspirin, 81 mg, Oral, Daily  atorvastatin, 40 mg, Oral, Nightly  chlorhexidine, 15 mL, Mouth/Throat, Q12H  enoxaparin, 30 mg, Subcutaneous, Daily  escitalopram, 15 mg, Oral, Q PM  gabapentin, 600 mg, Oral, Nightly  guaiFENesin, 1,200 mg, Oral, Q12H  insulin glargine, 15 Units, Subcutaneous, Daily  insulin lispro, 2-7 Units, Subcutaneous, 4x Daily AC & at Bedtime  pantoprazole, 40 mg, Oral, QAM  potassium chloride, 20 mEq, Oral, BID With Meals  senna-docusate sodium, 2 tablet, Oral, Nightly  terazosin, 5 mg, Oral, Nightly             Mitral valve regurgitation    Severe mitral regurgitation      Assessment & Plan    - Severe mitral valve regurgitation - s/p MV repair, MAZE, SELMA closure- Pagni 1/8/2025  - CAD with previous stent to LAD (2022)  - NICM, EF 40% intra-op YAZMIN  - Atrial fibrillation-- s/p MAZE  - DM II--- lantus and SSI  - HTN-- BB  - HLD-- statin  - HOLLI with CPAP  - renal insuffiencey  - obesity   - post op anemia- expected acute blood loss  - BPH-- hytrin      POD # 5  - Doing really well. Pain is well-controlled. Hopeful to go home soon.  - Creatinine WNL. Baseline appears to be 1.3-1.5  - Rhythm appears to be NSR 70's with a long 1st degree and PVC's  - BP elevated- will start Norvasc 5 mg and see how he tolerates   - PO diuretics and Entresto resumed per cardiology  - Encourage ambulation and pulmonary toilet  - Family would feel more comfortable with discharge tomorrow. Patient agreeable to one more day. Home with family and [place]  tomorrow.   - Will need to resume coumadin at discharge  - Continue routine and supportive care.        Gloria Garcia, APRN  01/13/25  09:16 EST    "

## 2025-01-13 NOTE — PLAN OF CARE
Goal Outcome Evaluation:      POD 5 MVR repair; pt up ad albert BRP; walking in hallway (see documentation); all wound sites healing well/ cleaned with betadine; pt still needing pain med Q 4 hr; states tylenol did not help at all.    A/Ox4 VSS afebrile; anticipate d/c Tuesday 1/14/25

## 2025-01-13 NOTE — PLAN OF CARE
Problem: Adult Inpatient Plan of Care  Goal: Plan of Care Review  Outcome: Progressing  Flowsheets (Taken 1/12/2025 2216)  Progress: improving  Plan of Care Reviewed With: patient  Goal: Patient-Specific Goal (Individualized)  Outcome: Progressing  Goal: Absence of Hospital-Acquired Illness or Injury  Outcome: Progressing  Intervention: Identify and Manage Fall Risk  Recent Flowsheet Documentation  Taken 1/12/2025 2000 by Noe Gordon RN  Safety Promotion/Fall Prevention:   clutter free environment maintained   lighting adjusted   nonskid shoes/slippers when out of bed   room organization consistent   safety round/check completed  Goal: Optimal Comfort and Wellbeing  Outcome: Progressing  Goal: Readiness for Transition of Care  Outcome: Progressing     Problem: Comorbidity Management  Goal: Blood Glucose Level Within Target Range  Outcome: Progressing  Goal: Maintenance of Heart Failure Symptom Control  Outcome: Progressing  Goal: Blood Pressure in Desired Range  Outcome: Progressing     Problem: Fall Injury Risk  Goal: Absence of Fall and Fall-Related Injury  Outcome: Progressing  Intervention: Promote Injury-Free Environment  Recent Flowsheet Documentation  Taken 1/12/2025 2000 by Noe Gordon RN  Safety Promotion/Fall Prevention:   clutter free environment maintained   lighting adjusted   nonskid shoes/slippers when out of bed   room organization consistent   safety round/check completed     Problem: Skin Injury Risk Increased  Goal: Skin Health and Integrity  Outcome: Progressing  Intervention: Optimize Skin Protection  Recent Flowsheet Documentation  Taken 1/12/2025 2000 by Noe Gordon RN  Activity Management: up ad albert     Problem: Noninvasive Ventilation Acute  Goal: Effective Unassisted Ventilation and Oxygenation  Outcome: Progressing   Goal Outcome Evaluation:  Plan of Care Reviewed With: patient        Progress: improving        No acute events of note overnight other than-  High 0400 BP requiring  hydralazine. Post hydralazine BP back within in parameteres.  Pt was able to do 1.5 laps around the unit at 2030 without the assistance of a walker. intermediate through lap two he started wobbling and required the walker to steady himself.  Pt refused getting into the chair in the morning.

## 2025-01-13 NOTE — PROGRESS NOTES
"    Patient Name: Jeb Olson  :1950  74 y.o.      Patient Care Team:  Tera Salvador MD as PCP - General (Internal Medicine)  Micah Reyes MD as Consulting Physician (Gastroenterology)  Bruna Chávez MD as Referring Physician (Cardiology)    Chief Complaint: post op MVR MAZE SELMA clip    Interval History:   Walked a total of 12 laps yesterday without dyspnea    Objective   Vital Signs  Temp:  [97.4 °F (36.3 °C)-98.3 °F (36.8 °C)] 97.4 °F (36.3 °C)  Heart Rate:  [64-77] 77  Resp:  [18] 18  BP: (124-179)/(79-97) 139/97    Intake/Output Summary (Last 24 hours) at 2025 1027  Last data filed at 2025 1000  Gross per 24 hour   Intake 720 ml   Output 1475 ml   Net -755 ml     Flowsheet Rows      Flowsheet Row First Filed Value   Admission Height 188 cm (74\") Documented at 2025 1415   Admission Weight 112 kg (246 lb 3.2 oz) Documented at 2025 1415            Physical Exam:   General Appearance:    Alert, cooperative, in no acute distress   Lungs:     Bibasilar rales- improved    Heart:    Regular rhythm and normal rate, normal S1 and S2, no murmurs, gallops or rubs.     Chest Wall:    No abnormalities observed   Abdomen:     Soft, nontender, positive bowel sounds.     Extremities:   no cyanosis, clubbing or edema.  No marked joint deformities.  Adequate musculoskeletal strength.       Results Review:    Results from last 7 days   Lab Units 25  0331   SODIUM mmol/L 140   POTASSIUM mmol/L 4.3   CHLORIDE mmol/L 104   CO2 mmol/L 27.0   BUN mg/dL 25*   CREATININE mg/dL 1.10   GLUCOSE mg/dL 83   CALCIUM mg/dL 8.7         Results from last 7 days   Lab Units 25  0419   WBC 10*3/mm3 7.43   HEMOGLOBIN g/dL 9.9*   HEMATOCRIT % 30.2*   PLATELETS 10*3/mm3 148     Results from last 7 days   Lab Units 25  0307 25  1042 25  0319 25  2033 25  1716 25  1104   INR  1.33* 1.43*  --   --   --  1.23*   APTT seconds  --  28.4 89.6* 63.5*   < > 29.5    < > " = values in this interval not displayed.     Results from last 7 days   Lab Units 01/10/25  0152   MAGNESIUM mg/dL 2.9*                     Medication Review:   amLODIPine, 5 mg, Oral, Q24H  aspirin, 81 mg, Oral, Daily  atorvastatin, 40 mg, Oral, Nightly  chlorhexidine, 15 mL, Mouth/Throat, Q12H  enoxaparin, 30 mg, Subcutaneous, Daily  escitalopram, 15 mg, Oral, Q PM  gabapentin, 600 mg, Oral, Nightly  guaiFENesin, 1,200 mg, Oral, Q12H  insulin glargine, 15 Units, Subcutaneous, Daily  insulin lispro, 2-7 Units, Subcutaneous, 4x Daily AC & at Bedtime  pantoprazole, 40 mg, Oral, QAM  potassium chloride, 20 mEq, Oral, BID With Meals  senna-docusate sodium, 2 tablet, Oral, Nightly  terazosin, 5 mg, Oral, Nightly                Assessment & Plan   Severe MR, s/p repair, MAZE, SELMA clip  AF  Nonischemic CM  CAD remote LAD PCI  TY/CKD    Euvolemic, start oral diuretic bumex 2 daily  Long 1st deg AV block on tele. Hold BB for now.   Restart entresto 24/26 BID uptitrate as outpatient  Likely home tomorrow  Needs to restart warfarin    Bruna Chávez MD  [facility]  01/13/25  10:27 EST

## 2025-01-14 ENCOUNTER — HOME HEALTH ADMISSION (OUTPATIENT)
Dept: HOME HEALTH SERVICES | Facility: HOME HEALTHCARE | Age: 75
End: 2025-01-14
Payer: COMMERCIAL

## 2025-01-14 ENCOUNTER — DOCUMENTATION (OUTPATIENT)
Dept: HOME HEALTH SERVICES | Facility: HOME HEALTHCARE | Age: 75
End: 2025-01-14
Payer: MEDICARE

## 2025-01-14 ENCOUNTER — READMISSION MANAGEMENT (OUTPATIENT)
Dept: CALL CENTER | Facility: HOSPITAL | Age: 75
End: 2025-01-14
Payer: MEDICARE

## 2025-01-14 VITALS
SYSTOLIC BLOOD PRESSURE: 150 MMHG | WEIGHT: 251.8 LBS | TEMPERATURE: 98.1 F | BODY MASS INDEX: 32.31 KG/M2 | HEART RATE: 88 BPM | DIASTOLIC BLOOD PRESSURE: 90 MMHG | RESPIRATION RATE: 16 BRPM | HEIGHT: 74 IN | OXYGEN SATURATION: 88 %

## 2025-01-14 LAB
ANION GAP SERPL CALCULATED.3IONS-SCNC: 7.3 MMOL/L (ref 5–15)
BUN SERPL-MCNC: 18 MG/DL (ref 8–23)
BUN/CREAT SERPL: 15.9 (ref 7–25)
CALCIUM SPEC-SCNC: 8.5 MG/DL (ref 8.6–10.5)
CHLORIDE SERPL-SCNC: 103 MMOL/L (ref 98–107)
CO2 SERPL-SCNC: 28.7 MMOL/L (ref 22–29)
CREAT SERPL-MCNC: 1.13 MG/DL (ref 0.76–1.27)
EGFRCR SERPLBLD CKD-EPI 2021: 69.1 ML/MIN/1.73
GLUCOSE BLDC GLUCOMTR-MCNC: 116 MG/DL (ref 70–130)
GLUCOSE BLDC GLUCOMTR-MCNC: 165 MG/DL (ref 70–130)
GLUCOSE SERPL-MCNC: 94 MG/DL (ref 65–99)
POTASSIUM SERPL-SCNC: 3.8 MMOL/L (ref 3.5–5.2)
SODIUM SERPL-SCNC: 139 MMOL/L (ref 136–145)

## 2025-01-14 PROCEDURE — 99024 POSTOP FOLLOW-UP VISIT: CPT

## 2025-01-14 PROCEDURE — 97530 THERAPEUTIC ACTIVITIES: CPT

## 2025-01-14 PROCEDURE — 80048 BASIC METABOLIC PNL TOTAL CA: CPT | Performed by: PHYSICIAN ASSISTANT

## 2025-01-14 PROCEDURE — 99232 SBSQ HOSP IP/OBS MODERATE 35: CPT | Performed by: INTERNAL MEDICINE

## 2025-01-14 PROCEDURE — 82948 REAGENT STRIP/BLOOD GLUCOSE: CPT

## 2025-01-14 PROCEDURE — 63710000001 INSULIN GLARGINE PER 5 UNITS: Performed by: PHYSICIAN ASSISTANT

## 2025-01-14 RX ORDER — BUMETANIDE 2 MG/1
2 TABLET ORAL DAILY
Qty: 30 TABLET | Refills: 0 | Status: SHIPPED | OUTPATIENT
Start: 2025-01-15 | End: 2025-02-14

## 2025-01-14 RX ORDER — HYDROCODONE BITARTRATE AND ACETAMINOPHEN 5; 325 MG/1; MG/1
1 TABLET ORAL EVERY 4 HOURS PRN
Qty: 40 TABLET | Refills: 0 | Status: SHIPPED | OUTPATIENT
Start: 2025-01-14 | End: 2025-01-16 | Stop reason: SDUPTHER

## 2025-01-14 RX ORDER — POTASSIUM CHLORIDE 750 MG/1
20 TABLET, FILM COATED, EXTENDED RELEASE ORAL ONCE
Status: COMPLETED | OUTPATIENT
Start: 2025-01-14 | End: 2025-01-14

## 2025-01-14 RX ORDER — POTASSIUM CHLORIDE 1500 MG/1
40 TABLET, EXTENDED RELEASE ORAL DAILY
Qty: 60 TABLET | Refills: 0 | Status: SHIPPED | OUTPATIENT
Start: 2025-01-14 | End: 2025-02-13

## 2025-01-14 RX ADMIN — ASPIRIN 81 MG: 81 TABLET, COATED ORAL at 09:13

## 2025-01-14 RX ADMIN — INSULIN GLARGINE 15 UNITS: 100 INJECTION, SOLUTION SUBCUTANEOUS at 09:14

## 2025-01-14 RX ADMIN — HYDROCODONE BITARTRATE AND ACETAMINOPHEN 2 TABLET: 5; 325 TABLET ORAL at 11:10

## 2025-01-14 RX ADMIN — PANTOPRAZOLE SODIUM 40 MG: 40 TABLET, DELAYED RELEASE ORAL at 06:20

## 2025-01-14 RX ADMIN — SACUBITRIL AND VALSARTAN 1 TABLET: 24; 26 TABLET, FILM COATED ORAL at 09:14

## 2025-01-14 RX ADMIN — POTASSIUM CHLORIDE 20 MEQ: 750 TABLET, EXTENDED RELEASE ORAL at 09:13

## 2025-01-14 RX ADMIN — GUAIFENESIN 1200 MG: 600 TABLET, MULTILAYER, EXTENDED RELEASE ORAL at 09:14

## 2025-01-14 RX ADMIN — HYDROCODONE BITARTRATE AND ACETAMINOPHEN 2 TABLET: 5; 325 TABLET ORAL at 06:20

## 2025-01-14 RX ADMIN — BUMETANIDE 2 MG: 1 TABLET ORAL at 09:14

## 2025-01-14 NOTE — DISCHARGE SUMMARY
Date of Admission: 1/5/2025  Date of Discharge:  1/14/2025    Discharge Diagnosis:   - Severe mitral valve regurgitation   - CAD with previous stent to LAD (2022)  - NICM, EF 40% intra-op YAZMIN  - Atrial fibrillation  - DM II  - HTN  - HLD  - HOLLI with CPAP  - renal insuffiencey  - obesity   - BPH-- hytrin       Presenting Problem/History of Present Illness  Mitral valve regurgitation [I34.0]     Hospital Course  Patient is a 74 y.o. male presented on 1/5/25 for heparin bridge in preparation for surgery.  Surgery was rescheduled due to inclement weather.  On 1/8/2025 he underwent mitral valve repair, left and right cryo maze, and left atrial appendage closure with Dr. Kelly.  Surgery was successful and he went to recovery in good condition.  Postoperative course was routine and without complication.  He was weaned from ventilator and vasoactive drips on day of surgery without issue. BB started on POD 1. Chest tubes were discontinued and he was transferred to step-down on POD 2. He was diuresed for volume overload. Kidney function was closely monitored due to CKD and increased creatinine post-operatively. On POD 3, BB was held for 1st degree block. Central line and AV wires discontinued on POD 4. Creatinine returned to baseline. Today, POD 6, he was deemed appropriate for discharge. Post-operative restrictions and incision care teaching done. He verbalized understanding. He is to call our office with any questions or concerns and follow-up as noted below. Coumadin was resumed with home health to draw PT/INR on Friday 1/17. Results to cardiology office.       Procedures Performed:  1.  Elective mitral valve repair with a 36 mm [employer] flexible band annuloplasty and triangular resection of P2  2.  Right and left cryo-maze procedure with full set of lesions and   3. Left atrial appendage endocardial closure       Consults:   Consults       Date and Time Order Name Status Description    12/19/2024  1:25 PM Inpatient  Pulmonology Consult Completed     12/19/2024 12:41 PM Inpatient Cardiology Consult Completed             Pertinent Test Results:    Lab Results   Component Value Date    WBC 7.43 01/12/2025    HGB 9.9 (L) 01/12/2025    HCT 30.2 (L) 01/12/2025    MCV 87.5 01/12/2025     01/12/2025      Lab Results   Component Value Date    GLUCOSE 94 01/14/2025    CALCIUM 8.5 (L) 01/14/2025     01/14/2025    K 3.8 01/14/2025    CO2 28.7 01/14/2025     01/14/2025    BUN 18 01/14/2025    CREATININE 1.13 01/14/2025    EGFRIFAFRI 67 08/26/2021    EGFRIFNONA 55 (L) 08/26/2021    BCR 15.9 01/14/2025    ANIONGAP 7.3 01/14/2025     Lab Results   Component Value Date    INR 1.33 (H) 01/09/2025    PROTIME 16.8 (H) 01/09/2025         Condition on Discharge:  Stable     Vital Signs  Temp:  [98.1 °F (36.7 °C)-98.3 °F (36.8 °C)] 98.1 °F (36.7 °C)  Heart Rate:  [68-88] 88  Resp:  [16] 16  BP: (139-158)/(85-90) 150/90      Discharge Disposition: Home-Health Care [facility]    Discharge Medications     Discharge Medications        New Medications        Instructions Start Date   bumetanide 2 MG tablet  Commonly known as: BUMEX   2 mg, Oral, Daily   Start Date: January 15, 2025     Entresto 24-26 MG tablet  Generic drug: sacubitril-valsartan  Replaces: Entresto  MG tablet   1 tablet, Oral, Every 12 Hours Scheduled      HYDROcodone-acetaminophen 5-325 MG per tablet  Commonly known as: NORCO   1 tablet, Oral, Every 4 Hours PRN      potassium chloride 20 MEQ CR tablet  Commonly known as: KLOR-CON M20   40 mEq, Oral, Daily             Changes to Medications        Instructions Start Date   escitalopram 10 MG tablet  Commonly known as: LEXAPRO  What changed: when to take this   15 mg, Oral, Every Evening      ezetimibe 10 MG tablet  Commonly known as: ZETIA  What changed: when to take this   10 mg, Oral, Daily      glimepiride 4 MG tablet  Commonly known as: AMARYL  What changed: when to take this   4 mg, Oral, 2 Times Daily       nebivolol 5 MG tablet  Commonly known as: BYSTOLIC  What changed: when to take this   5 mg, Oral, Daily             Continue These Medications        Instructions Start Date   acetaminophen 325 MG tablet  Commonly known as: TYLENOL   650 mg, Oral, 2 Times Daily PRN      aspirin 81 MG EC tablet   81 mg, Oral, Daily      B COMPLEX PLUS PO   1 tablet, Every Other Day      doxazosin 4 MG tablet  Commonly known as: CARDURA   4 mg, Oral, Nightly      fenofibrate 160 MG tablet   TAKE 1 TABLET BY MOUTH AT NIGHT      fluticasone 50 MCG/ACT nasal spray  Commonly known as: FLONASE   2 sprays, Every Morning      gabapentin 600 MG tablet  Commonly known as: NEURONTIN   600 mg, Every Evening      glucose blood test strip  Commonly known as: Accu-Chek Anabela Plus   TEST TWICE DAILY Use as instructed      Janumet XR  MG tablet  Generic drug: SITagliptin-metFORMIN HCl ER   Take 1 tablet by mouth twice daily      PATADAY OP   1 drop, 2 Times Daily PRN      polyethyl glycol-propyl glycol 0.4-0.3 % solution ophthalmic solution (artificial tears)  Commonly known as: SYSTANE   1 drop, As Needed      PRESERVISION AREDS 2 PO   1 tablet, 2 Times Daily      traZODone 50 MG tablet  Commonly known as: DESYREL   50 mg, Oral, Nightly      vitamin D3 125 MCG (5000 UT) capsule capsule   5,000 Units, Daily      warfarin 5 MG tablet  Commonly known as: COUMADIN   5 mg, Oral, Every Morning             Stop These Medications      Enoxaparin Sodium 120 MG/0.8ML solution prefilled syringe syringe  Commonly known as: LOVENOX     Entresto  MG tablet  Generic drug: sacubitril-valsartan  Replaced by: Entresto 24-26 MG tablet     furosemide 80 MG tablet  Commonly known as: LASIX     NIFEdipine XL 60 MG 24 hr tablet  Commonly known as: PROCARDIA XL              Discharge Diet: Heart Healthy  Diet Instructions    Please follow a strict diabetic diet           Activity at Discharge:   Activity Instructions    Activity as tolerated         1. No  driving for 2 weeks and off narcotic pain medications.  2. Shower daily. Clean incisions with warm water and antibacterial soap only. Do not put any lotion or ointments on incisions.  3. Ambulate for 10 minutes at least 3 times a day.  4. No heavy lifting > 10lbs until seen in office.   5. Take all medications as prescribed.      Follow-up Appointments  Future Appointments   Date Time Provider Department Center   1/15/2025 To Be Determined Farrah Sharma, RN  [facility] None   1/22/2025  2:45 PM [facility] CT 3 [facility]   1/27/2025 11:15 AM Bruna Chávez MD [facility]   2/11/2025 11:00 AM Bruna Chávez MD [facility] L[facility]40 None   2/11/2025  1:00 PM Nerissa Miranda APRN [facility]   2/24/2025 11:15 AM [facility] CT 1 BH [facility] CT BR None   3/17/2025 10:00 AM Chepe Alicea MD [facility]     Additional Instructions for the Follow-ups that You Need to Schedule       Ambulatory Referral to Cardiac Rehab   As directed      Ambulatory Referral to Home Health   As directed      Face to Face Visit Date: 1/14/2025   Follow-up provider for Plan of Care?: I treated the patient in an acute care facility and will not continue treatment after discharge.   Follow-up provider: NERISSA MIRANDA [[id number]]   Reason/Clinical Findings: post open heart   Describe mobility limitations that make leaving home difficult: weakness   Nursing/Therapeutic Services Requested: Skilled Nursing   Skilled nursing orders: Post CABG care   Frequency: 1 Week 1        Call MD With Problems / Concerns   As directed      Instructions:  Call office at [phone] for any drainage, increased redness, or fever over 100.5    Order Comments: Instructions:  Call office at [phone] for any drainage, increased redness, or fever over 100.5         Discharge Follow-up with PCP   As directed       Currently Documented PCP:    Tera Salvador MD    PCP Phone Number:    [phone]     Follow Up Details: in 1 week        Discharge  Follow-up with Specialty: Cardiologist; 1 Month   As directed      Specialty: Cardiologist   Follow Up: 1 Month   Follow Up Details: appt made        Discharge Follow-up with Specified Provider: Cardiologist; 2 Weeks   As directed      To: Cardiologist   Follow Up: 2 Weeks   Follow Up Details: appt made        Discharge Follow-up with Specified Provider: TIFFANIE Arredondo; 1 Month   As directed      To: TIFFANIE Arredondo   Follow Up: 1 Month   Follow Up Details: appt made. 2/11/25 @ 1pm        Protime-INR    Jan 17, 2025 (Approximate)      Home health to draw. Please send results to Dr. Colbert office.    Order Comments: Home health to draw. Please send results to Dr. Colbert office.    Is Patient on anti-coag: Yes   Release to patient: Routine Release                Test Results Pending at Discharge: PT/INR to be drawn on 1/17 by home health. Results sent to cardiology office.        TIFFANIE Franco  01/14/25  17:06 EST

## 2025-01-14 NOTE — PROGRESS NOTES
"    Patient Name: Jeb Olson  :1950  74 y.o.      Patient Care Team:  Tera Salvador MD as PCP - General (Internal Medicine)  Micah Reyes MD as Consulting Physician (Gastroenterology)  Bruna Chávez MD as Referring Physician (Cardiology)    Chief Complaint: post op MVR MAZE SELMA clip    Interval History:   Plan dc today    Objective   Vital Signs  Temp:  [98 °F (36.7 °C)-98.3 °F (36.8 °C)] 98.1 °F (36.7 °C)  Heart Rate:  [70-87] 70  Resp:  [14-16] 16  BP: (132-158)/(77-87) 139/86    Intake/Output Summary (Last 24 hours) at 2025 0812  Last data filed at 2025 0627  Gross per 24 hour   Intake 600 ml   Output 4025 ml   Net -3425 ml     Flowsheet Rows      Flowsheet Row First Filed Value   Admission Height 188 cm (74\") Documented at 2025 1415   Admission Weight 112 kg (246 lb 3.2 oz) Documented at 2025 1415            Physical Exam:   General Appearance:    Alert, cooperative, in no acute distress   Lungs:     Bibasilar rales- improved    Heart:    Regular rhythm and normal rate, normal S1 and S2, no murmurs, gallops or rubs.     Chest Wall:    No abnormalities observed   Abdomen:     Soft, nontender, positive bowel sounds.     Extremities:   no cyanosis, clubbing or edema.  No marked joint deformities.  Adequate musculoskeletal strength.       Results Review:    Results from last 7 days   Lab Units 25  0435   SODIUM mmol/L 139   POTASSIUM mmol/L 3.8   CHLORIDE mmol/L 103   CO2 mmol/L 28.7   BUN mg/dL 18   CREATININE mg/dL 1.13   GLUCOSE mg/dL 94   CALCIUM mg/dL 8.5*         Results from last 7 days   Lab Units 25  0419   WBC 10*3/mm3 7.43   HEMOGLOBIN g/dL 9.9*   HEMATOCRIT % 30.2*   PLATELETS 10*3/mm3 148     Results from last 7 days   Lab Units 25  0307 25  1042 25  0319 253   INR  1.33* 1.43*  --   --    APTT seconds  --  28.4 89.6* 63.5*     Results from last 7 days   Lab Units 01/10/25  0152   MAGNESIUM mg/dL 2.9*             "         Medication Review:   aspirin, 81 mg, Oral, Daily  atorvastatin, 40 mg, Oral, Nightly  bumetanide, 2 mg, Oral, Daily  chlorhexidine, 15 mL, Mouth/Throat, Q12H  enoxaparin, 30 mg, Subcutaneous, Daily  escitalopram, 15 mg, Oral, Q PM  gabapentin, 600 mg, Oral, Nightly  guaiFENesin, 1,200 mg, Oral, Q12H  insulin glargine, 15 Units, Subcutaneous, Daily  insulin lispro, 2-7 Units, Subcutaneous, 4x Daily AC & at Bedtime  pantoprazole, 40 mg, Oral, QAM  potassium chloride ER, 20 mEq, Oral, Once  potassium chloride, 20 mEq, Oral, BID With Meals  sacubitril-valsartan, 1 tablet, Oral, Q12H  senna-docusate sodium, 2 tablet, Oral, Nightly  terazosin, 5 mg, Oral, Nightly                Assessment & Plan   Severe MR, s/p repair, MAZE, SELMA clip  AF  Nonischemic CM  CAD remote LAD PCI  TY/CKD    Home today on bumex, entresto, bystolic 5, aspirin, warfarin  No amlodipine  Will see in 2 weeksin the office    Bruna Chávez MD  [facility]  01/14/25  08:12 EST

## 2025-01-14 NOTE — THERAPY TREATMENT NOTE
Patient Name: Jeb Olson  : 1950    MRN: [id number]                              Today's Date: 2025       Admit Date: 2025    Visit Dx:     ICD-10-CM ICD-9-CM   1. S/P MVR (mitral valve repair)  Z98.890 V45.89   2. Mitral valve insufficiency, unspecified etiology  I34.0 424.0   3. Severe mitral regurgitation  I34.0 424.0   4. AF (paroxysmal atrial fibrillation)  I48.0 427.31     Patient Active Problem List   Diagnosis    Allergic rhinitis    Arthritis of knee, left    Diabetes mellitus    Essential hypertension    Hyperlipidemia    High risk medication use    Obesity    Primary osteoarthritis of knee    Mild chronic anemia    Obstructive sleep apnea    Arthritis of left knee    Sinus bradycardia    Abnormal stress test    Colon polyp    Family history of colonic polyps    Reactive depression    Arthritis of knee, right    Arthritis of knee    AF (paroxysmal atrial fibrillation)    Acute exacerbation of CHF (congestive heart failure)    Severe mitral regurgitation    Mitral valve regurgitation     Past Medical History:   Diagnosis Date    AF (paroxysmal atrial fibrillation)     Allergic Have had for several years    Eyes and nasal issues    Anemia     Anticoagulant long-term use     COUMADIN    Anxiety Since about     Since I was taking of my Dad, who also passed away 3yrs ago.    Arthritis     Both knees, hips, and shoulders    Arthritis of knee, left     Cataract 2019    CHF (congestive heart failure)     Colon polyp     Coronary artery disease     stent    Diabetes mellitus     Dry eyes     Essential hypertension     Heart murmur     HL (hearing loss)     no hearing aides    Hyperlipemia     Knee swelling 2020    Shortly after my left knee replacement    Macular degeneration     Mild chronic anemia     Mitral valve disorder     Obesity     Pneumonia 2024    Sleep apnea     cpap     Past Surgical History:   Procedure Laterality Date    ACHILLES TENDON REPAIR Left      CARDIAC CATHETERIZATION      CARDIAC CATHETERIZATION N/A 09/01/2022    Procedure: Coronary angiography, Left heart catheterization,;  Surgeon: Bruna Chávez MD;  Location: [facility] CATH INVASIVE LOCATION;  Service: Cardiology;  Laterality: N/A;    CARDIAC CATHETERIZATION N/A 09/01/2022    Procedure: Stent PRAVIN coronary;  Surgeon: Bruna Chávez MD;  Location: [facility] CATH INVASIVE LOCATION;  Service: Cardiology;  Laterality: N/A;    CARDIAC CATHETERIZATION N/A 1/2/2025    Procedure: Left Heart Cath;  Surgeon: Bruna Chávez MD;  Location: [facility] CATH INVASIVE LOCATION;  Service: Cardiology;  Laterality: N/A;    CARDIAC CATHETERIZATION N/A 1/2/2025    Procedure: Coronary angiography;  Surgeon: Bruna Chávez MD;  Location: [facility] CATH INVASIVE LOCATION;  Service: Cardiology;  Laterality: N/A;    CATARACT EXTRACTION EXTRACAPSULAR W/ INTRAOCULAR LENS IMPLANTATION Bilateral     COLONOSCOPY  2018    Dr Reyes    ENDOSCOPY      MITRAL VALVE REPAIR/REPLACEMENT N/A 1/8/2025    Procedure: STERNOTOMY, MITRAL VALVE REPAIR, MAZE PROCEDURE TRANSESOPHAGEAL ECHOCARDIOGRAM, PRP;  Surgeon: Young Kelly MD;  [facility];  Service: Cardiothoracic;  Laterality: N/A;    TONSILLECTOMY      As a child    TOTAL KNEE ARTHROPLASTY Left 03/12/2020    Procedure: TOTAL KNEE ARTHROPLASTY;  Surgeon: Chepe Alicea MD;  Location: [facility] MAIN OR;  Service: Orthopedics;  Laterality: Left;    TOTAL KNEE ARTHROPLASTY Right 03/21/2024    Procedure: TOTAL KNEE ARTHROPLASTY;  Surgeon: Chepe Alicea MD;  Location: [facility] OR [facility];  Service: Orthopedics;  Laterality: Right;      General Information       Row Name 01/14/25 1117          Physical Therapy Time and Intention    Document Type therapy note (daily note);discharge treatment  -EJ     Mode of Treatment physical therapy  -EJ               User Key  (r) = Recorded By, (t) = Taken By, (c) = Cosigned By      Initials Name Provider Type    Delfina Masters, PT Physical Therapist                    Mobility       Row Name 01/14/25 1117          Bed Mobility    Comment, (Bed Mobility) up in chair  -EJ       Row Name 01/14/25 1117          Sit-Stand Transfer    Sit-Stand [place] (Transfers) standby assist  -EJ       Row Name 01/14/25 1117          Gait/Stairs (Locomotion)    [place] Level (Gait) standby assist  -EJ     Distance in Feet (Gait) 300  -EJ     Deviations/Abnormal Patterns (Gait) noe decreased  -EJ     Comment, (Gait/Stairs) no unsteadiness  -EJ               User Key  (r) = Recorded By, (t) = Taken By, (c) = Cosigned By      Initials Name Provider Type    Delfina Masters, PT Physical Therapist                   Obj/Interventions    No documentation.                  Goals/Plan    No documentation.                  Clinical Impression       Row Name 01/14/25 1119          Pain    Pretreatment Pain Rating 4/10  -EJ     Posttreatment Pain Rating 5/10  -EJ     Pain Location chest  -EJ     Pain Side/Orientation generalized  -EJ     Pain Management Interventions exercise or physical activity utilized  -EJ       Row Name 01/14/25 1119          Plan of Care Review    Plan of Care Reviewed With patient  -EJ     Outcome Evaluation Pt seen for PT this am. He is doing well, but does c/o pain. Pt up in chair upon entry to room. He is able to stand w SBA and then ambulate approx 300 ft w SBA without use of AD. No unsteadiness noted. Pt is slightly limited by fatigue. Pt plans home today w HHPT. All questions answered at this time. Pt w no further concerns. He is safe to DC home from PT standpoint.  -EJ       Row Name 01/14/25 1119          Positioning and Restraints    Pre-Treatment Position sitting in chair/recliner  -EJ     Post Treatment Position chair  -EJ     In Chair notified nsg;reclined;call light within reach;encouraged to call for assist  -EJ               User Key  (r) = Recorded By, (t) = Taken By, (c) = Cosigned By      Initials Name Provider Type    ROYAL Maurice  Delfina MOLINA, PT Physical Therapist                   Outcome Measures       Row Name 01/14/25 1121          How much help from another person do you currently need...    Turning from your back to your side while in flat bed without using bedrails? 4  -EJ     Moving from lying on back to sitting on the side of a flat bed without bedrails? 4  -EJ     Moving to and from a bed to a chair (including a wheelchair)? 4  -EJ     Standing up from a chair using your arms (e.g., wheelchair, bedside chair)? 4  -EJ     Climbing 3-5 steps with a railing? 4  -EJ     To walk in hospital room? 4  -EJ     AM-PAC 6 Clicks Score (PT) 24  -EJ     Highest Level of Mobility Goal 8 --> Walked 250 feet or more  -EJ               User Key  (r) = Recorded By, (t) = Taken By, (c) = Cosigned By      Initials Name Provider Type    EJ Delfina Maurice, PT Physical Therapist                                 Physical Therapy Education       Title: PT OT SLP Therapies (In Progress)       Topic: Physical Therapy (In Progress)       Point: Mobility training (In Progress)       Learning Progress Summary            Patient Acceptance, E, NR by AR at 1/12/2025 1456                      Point: Home exercise program (In Progress)       Learning Progress Summary            Patient Acceptance, E, NR by AR at 1/12/2025 1456                      Point: Body mechanics (In Progress)       Learning Progress Summary            Patient Acceptance, E, NR by AR at 1/12/2025 1456                      Point: Precautions (In Progress)       Learning Progress Summary            Patient Acceptance, E, NR by AR at 1/12/2025 1456                                      User Key       Initials Effective Dates Name Provider Type Discipline    AR 06/16/21 -  Irena Gamez PT Physical Therapist PT                  PT Recommendation and Plan  Planned Therapy Interventions (PT): balance training, bed mobility training, gait training, home exercise program, transfer training,  stretching, strengthening, stair training, ROM (range of motion), patient/family education  Progress: improving  Outcome Evaluation: Pt seen for PT this am. He is doing well, but does c/o pain. Pt up in chair upon entry to room. He is able to stand w SBA and then ambulate approx 300 ft w SBA without use of AD. No unsteadiness noted. Pt is slightly limited by fatigue. Pt plans home today w HHPT. All questions answered at this time. Pt w no further concerns. He is safe to DC home from PT standpoint.     Time Calculation:         PT Charges       Row Name 01/14/25 1122             Time Calculation    Start Time 1050  -EJ      Stop Time 1105  -EJ      Time Calculation (min) 15 min  -EJ      PT Received On 01/14/25  -EJ                User Key  (r) = Recorded By, (t) = Taken By, (c) = Cosigned By      Initials Name Provider Type    Delfina Masters, PT Physical Therapist                  Therapy Charges for Today       Code Description Service Date Service Provider Modifiers Qty    [id number]  PT THERAPEUTIC ACT EA 15 MIN 1/14/2025 Delfina Maurice, PT GP 1            PT G-Codes  Outcome Measure Options: AM-PAC 6 Clicks Basic Mobility (PT)  AM-PAC 6 Clicks Score (PT): 24  PT Discharge Summary  Anticipated Discharge Disposition (PT): home with assist, home with home health    Delfina Maurice, PT  1/14/2025

## 2025-01-14 NOTE — OUTREACH NOTE
Prep Survey      Flowsheet Row Responses   [facility] patient discharged from? [place]   Is LACE score < 7 ? No   Eligibility [facility]   Date of Admission 01/05/25   Date of Discharge 01/14/25   Discharge Disposition Home-Health Care Sv   Discharge diagnosis STERNOTOMY, MITRAL VALVE REPAIR, MAZE PROCEDURE TRANSESOPHAGEAL ECHOCARDIOGRAM, PRP   Does the patient have one of the following disease processes/diagnoses(primary or secondary)? Cardiothoracic surgery   Does the patient have Home health ordered? Yes   What [facility] the Home health agency[facility]   Prep survey completed? Yes            Rosa M SUMMERS - Registered Nurse

## 2025-01-14 NOTE — PROGRESS NOTES
Patient is discharging today. Spoke to patient who is agreeable to home health and has no current home health. Face sheet information is correct and please call spouse for visit scheduling- [phone]/ Jr. Orders for home health SN in [facility] and please note INR 1/17 with results to cardiology - Dr. KARIE Chávez. Thank you !

## 2025-01-14 NOTE — PLAN OF CARE
Goal Outcome Evaluation:  Plan of Care Reviewed With: patient        Progress: improving  Outcome Evaluation: Pt seen for PT this am. He is doing well, but does c/o pain. Pt up in chair upon entry to room. He is able to stand w SBA and then ambulate approx 300 ft w SBA without use of AD. No unsteadiness noted. Pt is slightly limited by fatigue. Pt plans home today w HHPT. All questions answered at this time. Pt w no further concerns. He is safe to DC home from PT standpoint.    Anticipated Discharge Disposition (PT): home with assist, home with home health

## 2025-01-14 NOTE — PROGRESS NOTES
" LOS: 9 days   Patient Care Team:  Tera Salvador MD as PCP - General (Internal Medicine)  Micah Reyes MD as Consulting Physician (Gastroenterology)  Bruna Chávez MD as Referring Physician (Cardiology)    Chief Complaint: Post-op follow-up, s/p MVR/Maze/SELMA clip    Subjective: Doing well. Up walking around independently. Ready to go home.     Vital Signs  Temp:  [98 °F (36.7 °C)-98.3 °F (36.8 °C)] 98.1 °F (36.7 °C)  Heart Rate:  [68-87] 68  Resp:  [14-16] 16  BP: (132-158)/(77-90) 150/90      01/12/25  0618 01/13/25  0600 01/14/25  0606   Weight: 115 kg (253 lb 11.2 oz) 115 kg (254 lb 1.6 oz) 114 kg (251 lb 12.8 oz)     Body mass index is 32.33 kg/m².    Intake/Output Summary (Last 24 hours) at 1/14/2025 1051  Last data filed at 1/14/2025 0900  Gross per 24 hour   Intake 840 ml   Output 4125 ml   Net -3285 ml     I/O this shift:  In: 240 [P.O.:240]  Out: 700 [Urine:700]        Objective:  Vital signs: (most recent): Blood pressure 150/90, pulse 68, temperature 98.1 °F (36.7 °C), temperature source Oral, resp. rate 16, height 188 cm (74\"), weight 114 kg (251 lb 12.8 oz), SpO2 96%.            Physical Examination:   General appearance - alert, well appearing, and in no distress and overweight  Mental status - normal mood, behavior, speech, dress, motor activity, and thought processes  Chest - clear to auscultation, no wheezes, rales or rhonchi, symmetric air entry  Heart - irregularly irregular rhythm with rate 80's  Abdomen - soft, nontender, nondistended, no masses or organomegaly  bowel sounds normal  Neurological - alert, oriented, normal speech, no focal findings or movement disorder noted, motor and sensory grossly normal bilaterally  Extremities - peripheral pulses normal, no pedal edema, no clubbing or cyanosis, feet normal, good pulses, normal color, temperature and sensation  Skin - normal coloration and turgor, no rashes, no suspicious skin lesions noted  Midsternal incision and chest tube " "exit sites healing well, without redness or drainage.          Results Review:      WBC WBC   Date Value Ref Range Status   01/12/2025 7.43 3.40 - 10.80 10*3/mm3 Final      HGB Hemoglobin   Date Value Ref Range Status   01/12/2025 9.9 (L) 13.0 - 17.7 g/dL Final      HCT Hematocrit   Date Value Ref Range Status   01/12/2025 30.2 (L) 37.5 - 51.0 % Final      Platelets Platelets   Date Value Ref Range Status   01/12/2025 148 140 - 450 10*3/mm3 Final        PT/INR:  No results found for: \"PROTIME\"/No results found for: \"INR\"    Sodium Sodium   Date Value Ref Range Status   01/14/2025 139 136 - 145 mmol/L Final   01/13/2025 140 136 - 145 mmol/L Final   01/12/2025 138 136 - 145 mmol/L Final      Potassium Potassium   Date Value Ref Range Status   01/14/2025 3.8 3.5 - 5.2 mmol/L Final   01/13/2025 4.3 3.5 - 5.2 mmol/L Final   01/12/2025 4.4 3.5 - 5.2 mmol/L Final   01/12/2025 3.8 3.5 - 5.2 mmol/L Final      Chloride Chloride   Date Value Ref Range Status   01/14/2025 103 98 - 107 mmol/L Final   01/13/2025 104 98 - 107 mmol/L Final   01/12/2025 101 98 - 107 mmol/L Final      Bicarbonate CO2   Date Value Ref Range Status   01/14/2025 28.7 22.0 - 29.0 mmol/L Final   01/13/2025 27.0 22.0 - 29.0 mmol/L Final   01/12/2025 26.0 22.0 - 29.0 mmol/L Final      BUN BUN   Date Value Ref Range Status   01/14/2025 18 8 - 23 mg/dL Final   01/13/2025 25 (H) 8 - 23 mg/dL Final   01/12/2025 36 (H) 8 - 23 mg/dL Final      Creatinine Creatinine   Date Value Ref Range Status   01/14/2025 1.13 0.76 - 1.27 mg/dL Final   01/13/2025 1.10 0.76 - 1.27 mg/dL Final   01/12/2025 1.48 (H) 0.76 - 1.27 mg/dL Final      Calcium Calcium   Date Value Ref Range Status   01/14/2025 8.5 (L) 8.6 - 10.5 mg/dL Final   01/13/2025 8.7 8.6 - 10.5 mg/dL Final   01/12/2025 8.5 (L) 8.6 - 10.5 mg/dL Final      Magnesium No results found for: \"MG\"       aspirin, 81 mg, Oral, Daily  atorvastatin, 40 mg, Oral, Nightly  bumetanide, 2 mg, Oral, Daily  chlorhexidine, 15 mL, " Mouth/Throat, Q12H  enoxaparin, 30 mg, Subcutaneous, Daily  escitalopram, 15 mg, Oral, Q PM  gabapentin, 600 mg, Oral, Nightly  guaiFENesin, 1,200 mg, Oral, Q12H  insulin glargine, 15 Units, Subcutaneous, Daily  insulin lispro, 2-7 Units, Subcutaneous, 4x Daily AC & at Bedtime  pantoprazole, 40 mg, Oral, QAM  potassium chloride, 20 mEq, Oral, BID With Meals  sacubitril-valsartan, 1 tablet, Oral, Q12H  senna-docusate sodium, 2 tablet, Oral, Nightly  terazosin, 5 mg, Oral, Nightly             Mitral valve regurgitation    Severe mitral regurgitation      Assessment & Plan    - Severe mitral valve regurgitation - s/p MV repair, MAZE, SELMA closure- Robin 1/8/2025  - CAD with previous stent to LAD (2022)  - NICM, EF 40% intra-op YAZMIN  - Atrial fibrillation-- s/p MAZE  - DM II--- lantus and SSI  - HTN-- BB  - HLD-- statin  - HOLLI with CPAP  - renal insuffiencey  - obesity   - post op anemia- expected acute blood loss  - BPH-- hytrin      POD # 6  - Doing really well. Pain is well-controlled. Ready to go home.   - Creatinine WNL. Baseline appears to be 1.3-1.5  - AF in the 70's today- will resume home dose of coumadin at d/c  - Home on home dose of bystolic per cardiology. Other medication recommendations by cardiology noted.   - Midsternal incision and chest tube exit sites healing well, no redness or drainage.   - Appropriate for discharge. Home with [facility] by [facility].   - Home on coumadin for PAF. Home health to draw PT/INR on Friday 1/17. Results to cardiology office.   - Post-operative restrictions and incision care discussed. Patient verbalized understanding.   - F/U in our office on 2/11/25 @ 1.        TIFFANIE Franco  01/14/25  10:51 EST

## 2025-01-14 NOTE — PROGRESS NOTES
Case Management Discharge Note      Final Note: DC home with [facility]    Provided Post Acute Provider Quality & Resource List?: N/A    Selected Continued Care - Discharged on 1/14/2025 Admission date: 1/5/2025 - Discharge disposition: Home-Health Care Svc      Destination    No services have been selected for the patient.                Durable Medical Equipment    No services have been selected for the patient.                Dialysis/Infusion    No services have been selected for the patient.                Home Medical Care Coordination complete.      Service Provider Services Address Phone Fax Patient Preferred    [facility] [address] [place] [id number] [phone] --              Therapy    No services have been selected for the patient.                Community Resources    No services have been selected for the patient.                Community & DME    No services have been selected for the patient.                         Final Discharge Disposition Code: 06 - home with home health care

## 2025-01-14 NOTE — PLAN OF CARE
Problem: Adult Inpatient Plan of Care  Goal: Plan of Care Review  Outcome: Progressing  Flowsheets (Taken 1/13/2025 2336)  Progress: improving  Plan of Care Reviewed With: patient  Goal: Patient-Specific Goal (Individualized)  Outcome: Progressing  Goal: Absence of Hospital-Acquired Illness or Injury  Outcome: Progressing  Intervention: Identify and Manage Fall Risk  Recent Flowsheet Documentation  Taken 1/13/2025 2000 by Noe Gordon RN  Safety Promotion/Fall Prevention:   clutter free environment maintained   lighting adjusted   nonskid shoes/slippers when out of bed   room organization consistent   safety round/check completed  Goal: Optimal Comfort and Wellbeing  Outcome: Progressing  Goal: Readiness for Transition of Care  Outcome: Progressing     Problem: Comorbidity Management  Goal: Blood Glucose Level Within Target Range  Outcome: Progressing  Goal: Maintenance of Heart Failure Symptom Control  Outcome: Progressing  Goal: Blood Pressure in Desired Range  Outcome: Progressing     Problem: Fall Injury Risk  Goal: Absence of Fall and Fall-Related Injury  Outcome: Progressing  Intervention: Promote Injury-Free Environment  Recent Flowsheet Documentation  Taken 1/13/2025 2000 by Noe Gordon RN  Safety Promotion/Fall Prevention:   clutter free environment maintained   lighting adjusted   nonskid shoes/slippers when out of bed   room organization consistent   safety round/check completed     Problem: Skin Injury Risk Increased  Goal: Skin Health and Integrity  Outcome: Progressing  Intervention: Optimize Skin Protection  Recent Flowsheet Documentation  Taken 1/13/2025 2000 by Noe Gordon RN  Activity Management: up ad albert     Problem: Noninvasive Ventilation Acute  Goal: Effective Unassisted Ventilation and Oxygenation  Outcome: Progressing   Goal Outcome Evaluation:  Plan of Care Reviewed With: patient        Progress: improving        No overnight events of note.  4 laps, 2 laps without a walker and 2  with. Minor complaints of pain but great night over all, may Fortune lead this man home today.

## 2025-01-15 ENCOUNTER — TRANSITIONAL CARE MANAGEMENT TELEPHONE ENCOUNTER (OUTPATIENT)
Dept: CALL CENTER | Facility: HOSPITAL | Age: 75
End: 2025-01-15
Payer: MEDICARE

## 2025-01-15 ENCOUNTER — HOME CARE VISIT (OUTPATIENT)
Dept: HOME HEALTH SERVICES | Facility: HOME HEALTHCARE | Age: 75
End: 2025-01-15
Payer: COMMERCIAL

## 2025-01-15 ENCOUNTER — TELEPHONE (OUTPATIENT)
Dept: CARDIAC SURGERY | Facility: CLINIC | Age: 75
End: 2025-01-15
Payer: MEDICARE

## 2025-01-15 ENCOUNTER — TELEPHONE (OUTPATIENT)
Dept: PHARMACY | Facility: HOSPITAL | Age: 75
End: 2025-01-15
Payer: MEDICARE

## 2025-01-15 VITALS
RESPIRATION RATE: 18 BRPM | TEMPERATURE: 97.8 F | BODY MASS INDEX: 32.21 KG/M2 | WEIGHT: 251 LBS | DIASTOLIC BLOOD PRESSURE: 82 MMHG | HEIGHT: 74 IN | HEART RATE: 65 BPM | OXYGEN SATURATION: 95 % | SYSTOLIC BLOOD PRESSURE: 142 MMHG

## 2025-01-15 PROCEDURE — G0299 HHS/HOSPICE OF RN EA 15 MIN: HCPCS

## 2025-01-15 RX ORDER — CYCLOBENZAPRINE HCL 10 MG
10 TABLET ORAL 3 TIMES DAILY PRN
Qty: 20 TABLET | Refills: 0 | Status: SHIPPED | OUTPATIENT
Start: 2025-01-15

## 2025-01-15 RX ORDER — DOXYCYCLINE 100 MG/1
100 TABLET ORAL 2 TIMES DAILY
Qty: 20 TABLET | Refills: 0 | Status: SHIPPED | OUTPATIENT
Start: 2025-01-15

## 2025-01-15 NOTE — TELEPHONE ENCOUNTER
Received a call from patient's home health nurse, Farrah, stating patient is having uncontrolled pain, constipation, and incision concerns. Patient reports 9/10 pain after taking norco 5-325 mg. Discussed with TIFFANIE Castro, who recommends patient increase to taking 2 norco tablets and flexeril as needed. Doxycycline sent for raised area on midsternal incision per Dr. Kelly's request. Discussed with Farrah that patient make take over the counter stool softeners for constipation. Farrah verbalized understanding and will notify patient of recommendations.

## 2025-01-15 NOTE — OUTREACH NOTE
Call Center TCM Note      Flowsheet Row Responses   [facility] patient discharged from? [place]   Does the patient have one of the following disease processes/diagnoses(primary or secondary)? Cardiothoracic surgery   TCM attempt successful? Yes   Call start time 1320   Call end time 1323   Discharge diagnosis STERNOTOMY, MITRAL VALVE REPAIR, MAZE PROCEDURE TRANSESOPHAGEAL ECHOCARDIOGRAM, PRP   Meds reviewed with patient/caregiver? Yes   Is the patient having any side effects they believe may be caused by any medication additions or changes? No   Does the patient have all medications related to this admission filled (includes all antibiotics, pain medications, cardiac medications, etc.) Yes   Prescription comments New rx's Bumetanide, Postasium chloride, Sacubitril-valsartan, Hydrocodone-acet 5/325 in place.   Is the patient taking all medications as directed (includes completed medication regime)? Yes   Comments TCM APPT WITH PCP DR BRENNAN IS 01/20/2025   Does the patient have an appointment with their PCP within 7-14 days of discharge? Yes   What is the Home health agency?  [facility]   Home health comments [facility] saw pt today   Psychosocial issues? No   Did the patient receive a copy of their discharge instructions? Yes   Nursing interventions Reviewed instructions with patient   What is the patient's perception of their health status since discharge? Improving   Nursing interventions Nurse provided patient education   Is the patient/caregiver able to teach back normal signs of recovery? Nausea and lack of appetite, Constipation, Depression or irritability, Pain or discomfort at incisional site   Nursing interventions Reassured on normal signs of recovery   Is the patient /caregiver able to teach back basic post-op care? Continue use of incentive spirometry at least 1 week post discharge, Hold pillow to support chest when coughing, Shower daily, Use a clean wash cloth and antibacterial bar or liquid  soap to clean incisions, Lifting as instructed by MD in discharge instructions, If the steri-strips are falling off, it is okay to remove them. (If applicable), No tub bath, swimming, or hot tub until instructed by MD, Drive as instructed by MD in discharge instructions, Practice cough and deep breath every 4 hours while awake   Is the patient/caregiver able to teach back signs and symptoms of incisional infection? Increased redness, swelling or pain at the incisonal site, Pus or odor from incision, Fever, Increased drainage or bleeding, Incisional warmth   Is the patient/caregiver able to teach back steps to recovery at home? Set small, achievable goals for return to baseline health, Eat a well-balance diet, Rest and rebuild strength, gradually increase activity, Weigh daily   Is the patient /caregiver able to teach back the importance of cardiac rehab? Yes   Nursing interventions Provided education on importance of cardiac rehab   If the patient is a current smoker, are they able to teach back resources for cessation? Not a smoker   Is the patient/caregiver able to teach back the hierarchy of who to call/visit for symptoms/problems? PCP, Specialist, Home health nurse, Urgent Care, ED, 911 Yes   TCM call completed? Yes   Wrap up additional comments D/C DX: MVR w/Maze procedure - Pt doing well. Surgical site good. No questions at this time. TCM APPT with PCP Dr Salvador is 01/20/2025.   Call end time 1323            Norma Robledo MA    1/15/2025, 13:27 EST

## 2025-01-15 NOTE — TELEPHONE ENCOUNTER
----- Message from Lucretia TIWARI sent at 1/15/2025 11:26 AM EST -----  Regarding: INR / [facility] will do INR on 1/17/25, Margarita currie.

## 2025-01-16 DIAGNOSIS — Z98.890 S/P MVR (MITRAL VALVE REPAIR): Primary | ICD-10-CM

## 2025-01-16 RX ORDER — HYDROCODONE BITARTRATE AND ACETAMINOPHEN 5; 325 MG/1; MG/1
2 TABLET ORAL EVERY 4 HOURS PRN
Qty: 40 TABLET | Refills: 0 | Status: CANCELLED | OUTPATIENT
Start: 2025-01-16 | End: 2025-01-23

## 2025-01-16 RX ORDER — HYDROCODONE BITARTRATE AND ACETAMINOPHEN 5; 325 MG/1; MG/1
2 TABLET ORAL EVERY 4 HOURS PRN
Qty: 40 TABLET | Refills: 0 | Status: SHIPPED | OUTPATIENT
Start: 2025-01-16 | End: 2025-01-16

## 2025-01-16 NOTE — HOME HEALTH
"SOC Note: Patient is A&Ox4, Vitals are WNL. Patient is monitoring home bp and weights. Heart is irregular. Lungs are clear and diminished in the left base. Reports coughing up green to white mucus. Patient is using spirometer and flutter valve. Teaching on walking program. Pain is reported at a level of 10 at times. Notified surgeon office. SIERRA Jones who approved 2 norco as needed instead of 1. They have also ordered flexeril. Patient is reporting at chest incision and in bilat arms. Last BM on Saturday. Patient is passing gas and has bowel sounds. Discussed different interventions and dulcolax. patient is diabetic and typically checks glucose TID. Has not checked level since coming home. teaching on wound ss of infection to monitor for. teaching on sternal precautions and lifting restrictions.     PT/INR due on Friday 1/17 with results to [facility] (Dr. Chávez)     Patient has scheduled all follow up appts     Home Health ordered for: disciplines SN 2w2, 1w1     Reason for Hosp/Primary Dx/Co-morbidities: [facility] 1/5-1/14 for mitral valve repair, maze procedure by Dr. Kelly     Focus of Care: SN needed for teaching and assessments for mitral valve repair     Patient's goal(s): \"to get better\"     Current Functional status/mobility/DME: No assistive device. steps up into home     HB status/Living Arrangements: lives with spouse     Skin Integrity/wound status: chest incision, raised area in middle of chest incision. no redness. MD ordered doxycycline for prophylaxis     Code Status: CPR     Fall Risk/Safety concerns: moderate falls risk     Educated on Emergency Plan, steps to take prior to going to the ER and when to Call [facility].     Medication issues/Concerns: New medication bumex, entresto decreased, norco, KCL   Stopped medication of lovenox, lasix and procardia     Additional Problems/Concerns: NA     SDOH Barriers (i.e. caregiver concerns, social isolation, transportation, food " insecurity, environment, income etc.)/Need for MSW: NA

## 2025-01-17 ENCOUNTER — HOME CARE VISIT (OUTPATIENT)
Dept: HOME HEALTH SERVICES | Facility: HOME HEALTHCARE | Age: 75
End: 2025-01-17
Payer: COMMERCIAL

## 2025-01-17 LAB
HH POC INTERNATIONAL NORMALIZATION RATIO: 1.2
HH POC PROTIME: 14.3 SECONDS
INR PPP: 1.6

## 2025-01-17 PROCEDURE — G0300 HHS/HOSPICE OF LPN EA 15 MIN: HCPCS

## 2025-01-19 VITALS
OXYGEN SATURATION: 91 % | RESPIRATION RATE: 18 BRPM | TEMPERATURE: 97.9 F | HEART RATE: 83 BPM | SYSTOLIC BLOOD PRESSURE: 118 MMHG | DIASTOLIC BLOOD PRESSURE: 60 MMHG

## 2025-01-20 ENCOUNTER — OFFICE VISIT (OUTPATIENT)
Dept: FAMILY MEDICINE CLINIC | Facility: CLINIC | Age: 75
End: 2025-01-20
Payer: MEDICARE

## 2025-01-20 ENCOUNTER — ANTICOAGULATION VISIT (OUTPATIENT)
Dept: PHARMACY | Facility: HOSPITAL | Age: 75
End: 2025-01-20
Payer: MEDICARE

## 2025-01-20 VITALS
HEART RATE: 65 BPM | DIASTOLIC BLOOD PRESSURE: 66 MMHG | RESPIRATION RATE: 18 BRPM | OXYGEN SATURATION: 94 % | BODY MASS INDEX: 33.24 KG/M2 | WEIGHT: 259 LBS | SYSTOLIC BLOOD PRESSURE: 110 MMHG | HEIGHT: 74 IN

## 2025-01-20 DIAGNOSIS — Z98.890 S/P MITRAL VALVE REPAIR: Primary | ICD-10-CM

## 2025-01-20 DIAGNOSIS — I48.0 AF (PAROXYSMAL ATRIAL FIBRILLATION): ICD-10-CM

## 2025-01-20 DIAGNOSIS — Z98.890 S/P MVR (MITRAL VALVE REPAIR): Primary | ICD-10-CM

## 2025-01-20 DIAGNOSIS — N17.9 ACUTE KIDNEY INJURY: ICD-10-CM

## 2025-01-20 DIAGNOSIS — D64.9 POSTOPERATIVE ANEMIA: ICD-10-CM

## 2025-01-20 DIAGNOSIS — I48.0 AF (PAROXYSMAL ATRIAL FIBRILLATION): Primary | ICD-10-CM

## 2025-01-20 PROCEDURE — 1111F DSCHRG MED/CURRENT MED MERGE: CPT | Performed by: INTERNAL MEDICINE

## 2025-01-20 PROCEDURE — 99495 TRANSJ CARE MGMT MOD F2F 14D: CPT | Performed by: INTERNAL MEDICINE

## 2025-01-20 PROCEDURE — 1125F AMNT PAIN NOTED PAIN PRSNT: CPT | Performed by: INTERNAL MEDICINE

## 2025-01-20 PROCEDURE — 3044F HG A1C LEVEL LT 7.0%: CPT | Performed by: INTERNAL MEDICINE

## 2025-01-20 PROCEDURE — 3074F SYST BP LT 130 MM HG: CPT | Performed by: INTERNAL MEDICINE

## 2025-01-20 PROCEDURE — 3078F DIAST BP <80 MM HG: CPT | Performed by: INTERNAL MEDICINE

## 2025-01-20 RX ORDER — HYDROCODONE BITARTRATE AND ACETAMINOPHEN 5; 325 MG/1; MG/1
1 TABLET ORAL EVERY 4 HOURS PRN
Qty: 40 TABLET | Refills: 0 | Status: SHIPPED | OUTPATIENT
Start: 2025-01-20 | End: 2025-01-29

## 2025-01-20 NOTE — PROGRESS NOTES
"Transitional Care Follow Up Visit  Subjective     Jeb Olson is a 74 y.o. male who presents for a transitional care management visit.    Within 48 business hours after discharge our office contacted him via telephone to coordinate his care and needs.      I reviewed and discussed the details of that call along with the discharge summary, hospital problems, inpatient lab results, inpatient diagnostic studies, and consultation reports with Jeb.     Current outpatient and discharge medications have been reconciled for the patient.  Reviewed by: Tera Salvador MD          1/14/2025     5:14 PM   Date of TCM Phone Call   [facility]   Date of Admission 1/5/2025   Date of Discharge 1/14/2025   Discharge Disposition Home-Health Care [facility]     Risk for Readmission (LACE) Score: 12 (1/14/2025  6:00 AM)      History of Present Illness   Course During Hospital Stay: Rosas is here today for hospital follow-up.  He was admitted to [facility] on 5 January.  Initially he underwent some bridging in terms of his anticoagulation.  He had a mitral valve repair along with closureof the left atrial appendage.  He progressed fairly well postoperatively.  His discharge hemoglobin was 9.9.  His discharge creatinine was normal after rising to approximately 1.48 consistent with acute kidney injury.  He is still experiencing some pain.  This is making a little bit hard for him to sleep at night.  They did just renew his pain medicines today.     The following portions of the patient's history were reviewed and updated as appropriate: allergies, current medications, and problem list.    Review of Systems    Objective   /66   Pulse 65   Resp 18   Ht 188 cm (74.02\")   Wt 117 kg (259 lb)   SpO2 94%   BMI 33.24 kg/m²   Physical Exam  Vitals and nursing note reviewed.   Constitutional:       Appearance: Normal appearance.   Neck:      Vascular: No carotid bruit.   Cardiovascular:      Rate and Rhythm: " Normal rate.   Pulmonary:      Effort: Pulmonary effort is normal.      Breath sounds: No wheezing, rhonchi or rales.   Skin:     Comments: Sternotomy incision appears to be well-healed.  There is no significant erythema or fluctuance.   Neurological:      Mental Status: He is alert.     Assessment & Plan   Diagnoses and all orders for this visit:    1. S/P mitral valve repair (Primary)    2. AF (paroxysmal atrial fibrillation)    3. Postoperative anemia    4. Acute kidney injury    Rosas is here today for follow-up after hospital admission.  Postoperatively seems to be doing well.  He is a little bit anemic but that should rebound nicely.  I did advise him that he will likely take approximately 30 days or so before he is really starting to feel back to normal.

## 2025-01-20 NOTE — TELEPHONE ENCOUNTER
Patient called to report that he is out of norco and flexeril and is still having significant pain. Discussed with TIFFANIE Castro, who will send in norco 5-325 mg every 4 hours PRN. Advised patient to decrease to 1 tablet and supplement with tylenol as needed. Patient verbalized understanding and agreed to plan of care.

## 2025-01-20 NOTE — PROGRESS NOTES
Anticoagulation Clinic Progress Note    Anticoagulation Summary  As of 2025      INR goal:  2.0-3.0   TTR:  63.1% (2.6 mo)   INR used for dosin.60 (2025)   Warfarin maintenance plan:  7.5 mg every Wed, Sat; 5 mg all other days   Weekly warfarin total:  40 mg   No change documented:  Heather Post, PharmD   Plan last modified:  Gurjit Velásquez, PharmD (10/30/2024)   Next INR check:  2025   Target end date:  --    Indications    AF (paroxysmal atrial fibrillation) [I48.0]                 Anticoagulation Episode Summary       INR check location:  --    Preferred lab:  --    Send INR reminders to:  [facility]    Comments:  --          Anticoagulation Care Providers       Provider Role Specialty Phone number    Bruna Chávez MD Referring Cardiology [phone]            INR History:      1/3/2025     1:04 PM 2025     2:27 PM 2025    11:04 AM 2025    10:42 AM 2025     3:07 AM 2025    12:00 AM 2025     9:30 AM   Anticoagulation Monitoring   INR 1.25      1.60   INR Date 1/3/2025      2025   INR Goal 2.0-3.0      2.0-3.0   Trend Same      Same   Last Week Total 22.5 mg      40 mg   Next Week Total 22.5 mg      40 mg   Sun Hold ()      -   Mon -      5 mg   Tue -      5 mg   Wed -      -   Thu -      -   Fri Hold (1/3)      -   Sat Hold ()      -   Historical INR  1.15  1.23  1.43  1.33  1.60            This result is from an external source.       Plan:  1. INR is Subtherapeutic today- see above in Anticoagulation Summary.   Provided instructions to Anne Franks with [facility] to Continue their warfarin regimen- see above in Anticoagulation Summary.   Patient took regular dose of warfarin over the weekend. Continue same as INR will be checked on Wednesday. If still subtherapeutic, likely needs an overall dose increase.   2. Follow up in 3 days.    Heather Post, Lali

## 2025-01-21 ENCOUNTER — TELEPHONE (OUTPATIENT)
Age: 75
End: 2025-01-21
Payer: MEDICARE

## 2025-01-21 NOTE — TELEPHONE ENCOUNTER
Dr. Chávez,    I received a voicemail today, that I am sure was forwarded from another line, because it wasn't on the voicemail y-day, and I check at least 2-3 times a day.    Yokasta Tan with [facility] had called to report pt's INR on 1/17/25. [phone]    INR  1.2, with pt taking 5mg coumadin qd.    I wanted to send this information to you, and I did see that the pt's coumadin dose was 1.60 y-day (1/20/25) with a recheck being done on 1/22/25 (Wednesday).    Thanks,    Nisha

## 2025-01-22 ENCOUNTER — HOME CARE VISIT (OUTPATIENT)
Dept: HOME HEALTH SERVICES | Facility: HOME HEALTHCARE | Age: 75
End: 2025-01-22
Payer: COMMERCIAL

## 2025-01-22 VITALS
DIASTOLIC BLOOD PRESSURE: 84 MMHG | SYSTOLIC BLOOD PRESSURE: 150 MMHG | HEART RATE: 68 BPM | OXYGEN SATURATION: 94 % | RESPIRATION RATE: 18 BRPM | TEMPERATURE: 97.9 F

## 2025-01-22 LAB
HH POC INTERNATIONAL NORMALIZATION RATIO: 1.4
HH POC PROTIME: 16.7 SECONDS
INR PPP: 1.4

## 2025-01-22 PROCEDURE — G0300 HHS/HOSPICE OF LPN EA 15 MIN: HCPCS

## 2025-01-22 NOTE — TELEPHONE ENCOUNTER
I do not see any record of an INR of 1.2 on 1/17/25. However, on 1/20/25, we addressed an INR of 1.6 dated 1/17/25. It appears the INR must have been submitted to our clinic after closing on Fri, 1/17/25.     We will await INR from [facility] today and address accordingly. Thank you!

## 2025-01-23 ENCOUNTER — ANTICOAGULATION VISIT (OUTPATIENT)
Dept: PHARMACY | Facility: HOSPITAL | Age: 75
End: 2025-01-23
Payer: MEDICARE

## 2025-01-23 ENCOUNTER — HOSPITAL ENCOUNTER (INPATIENT)
Facility: HOSPITAL | Age: 75
LOS: 2 days | Discharge: HOME | DRG: 862 | End: 2025-01-25
Attending: STUDENT IN AN ORGANIZED HEALTH CARE EDUCATION/TRAINING PROGRAM | Admitting: HOSPITALIST
Payer: MEDICARE

## 2025-01-23 ENCOUNTER — READMISSION MANAGEMENT (OUTPATIENT)
Dept: CALL CENTER | Facility: HOSPITAL | Age: 75
End: 2025-01-23
Payer: MEDICARE

## 2025-01-23 ENCOUNTER — APPOINTMENT (OUTPATIENT)
Dept: CT IMAGING | Facility: HOSPITAL | Age: 75
DRG: 862 | End: 2025-01-23
Payer: MEDICARE

## 2025-01-23 DIAGNOSIS — R07.9 CHEST PAIN, UNSPECIFIED TYPE: Primary | ICD-10-CM

## 2025-01-23 DIAGNOSIS — I50.9 ACUTE CONGESTIVE HEART FAILURE, UNSPECIFIED HEART FAILURE TYPE: ICD-10-CM

## 2025-01-23 DIAGNOSIS — L02.213 ABSCESS OF STERNAL REGION: ICD-10-CM

## 2025-01-23 DIAGNOSIS — I48.0 AF (PAROXYSMAL ATRIAL FIBRILLATION): Primary | ICD-10-CM

## 2025-01-23 DIAGNOSIS — Z98.890 HISTORY OF OPEN HEART SURGERY: ICD-10-CM

## 2025-01-23 DIAGNOSIS — R79.1 SUBTHERAPEUTIC INTERNATIONAL NORMALIZED RATIO (INR): ICD-10-CM

## 2025-01-23 LAB
ALBUMIN SERPL-MCNC: 3.7 G/DL (ref 3.5–5.2)
ALBUMIN/GLOB SERPL: 1.3 G/DL
ALP SERPL-CCNC: 50 U/L (ref 39–117)
ALT SERPL W P-5'-P-CCNC: 19 U/L (ref 1–41)
ANION GAP SERPL CALCULATED.3IONS-SCNC: 13 MMOL/L (ref 5–15)
APTT PPP: 33.9 SECONDS (ref 22.7–35.4)
AST SERPL-CCNC: 18 U/L (ref 1–40)
BASOPHILS # BLD AUTO: 0.05 10*3/MM3 (ref 0–0.2)
BASOPHILS NFR BLD AUTO: 0.7 % (ref 0–1.5)
BILIRUB SERPL-MCNC: 0.2 MG/DL (ref 0–1.2)
BUN SERPL-MCNC: 19 MG/DL (ref 8–23)
BUN/CREAT SERPL: 17.1 (ref 7–25)
CALCIUM SPEC-SCNC: 8.7 MG/DL (ref 8.6–10.5)
CHLORIDE SERPL-SCNC: 104 MMOL/L (ref 98–107)
CO2 SERPL-SCNC: 26 MMOL/L (ref 22–29)
CREAT SERPL-MCNC: 1.11 MG/DL (ref 0.76–1.27)
D-LACTATE SERPL-SCNC: 1.6 MMOL/L (ref 0.5–2)
DEPRECATED RDW RBC AUTO: 40.6 FL (ref 37–54)
EGFRCR SERPLBLD CKD-EPI 2021: 70.6 ML/MIN/1.73
EOSINOPHIL # BLD AUTO: 0.19 10*3/MM3 (ref 0–0.4)
EOSINOPHIL NFR BLD AUTO: 2.5 % (ref 0.3–6.2)
ERYTHROCYTE [DISTWIDTH] IN BLOOD BY AUTOMATED COUNT: 13.2 % (ref 12.3–15.4)
GLOBULIN UR ELPH-MCNC: 2.8 GM/DL
GLUCOSE BLDC GLUCOMTR-MCNC: 146 MG/DL (ref 70–130)
GLUCOSE SERPL-MCNC: 98 MG/DL (ref 65–99)
HCT VFR BLD AUTO: 30.3 % (ref 37.5–51)
HGB BLD-MCNC: 9.9 G/DL (ref 13–17.7)
IMM GRANULOCYTES # BLD AUTO: 0.03 10*3/MM3 (ref 0–0.05)
IMM GRANULOCYTES NFR BLD AUTO: 0.4 % (ref 0–0.5)
INR PPP: 1.4 (ref 0.9–1.1)
LYMPHOCYTES # BLD AUTO: 0.99 10*3/MM3 (ref 0.7–3.1)
LYMPHOCYTES NFR BLD AUTO: 13.2 % (ref 19.6–45.3)
MCH RBC QN AUTO: 28.2 PG (ref 26.6–33)
MCHC RBC AUTO-ENTMCNC: 32.7 G/DL (ref 31.5–35.7)
MCV RBC AUTO: 86.3 FL (ref 79–97)
MONOCYTES # BLD AUTO: 0.36 10*3/MM3 (ref 0.1–0.9)
MONOCYTES NFR BLD AUTO: 4.8 % (ref 5–12)
NEUTROPHILS NFR BLD AUTO: 5.9 10*3/MM3 (ref 1.7–7)
NEUTROPHILS NFR BLD AUTO: 78.4 % (ref 42.7–76)
NRBC BLD AUTO-RTO: 0 /100 WBC (ref 0–0.2)
NT-PROBNP SERPL-MCNC: 5182 PG/ML (ref 0–900)
PLATELET # BLD AUTO: 244 10*3/MM3 (ref 140–450)
PMV BLD AUTO: 9.7 FL (ref 6–12)
POTASSIUM SERPL-SCNC: 3.6 MMOL/L (ref 3.5–5.2)
PROT SERPL-MCNC: 6.5 G/DL (ref 6–8.5)
PROTHROMBIN TIME: 17.2 SECONDS (ref 11.7–14.2)
RBC # BLD AUTO: 3.51 10*6/MM3 (ref 4.14–5.8)
SODIUM SERPL-SCNC: 143 MMOL/L (ref 136–145)
TROPONIN T SERPL HS-MCNC: 88 NG/L
WBC NRBC COR # BLD AUTO: 7.52 10*3/MM3 (ref 3.4–10.8)

## 2025-01-23 PROCEDURE — 25010000002 BUMETANIDE PER 0.5 MG: Performed by: STUDENT IN AN ORGANIZED HEALTH CARE EDUCATION/TRAINING PROGRAM

## 2025-01-23 PROCEDURE — 83605 ASSAY OF LACTIC ACID: CPT | Performed by: STUDENT IN AN ORGANIZED HEALTH CARE EDUCATION/TRAINING PROGRAM

## 2025-01-23 PROCEDURE — 87070 CULTURE OTHR SPECIMN AEROBIC: CPT | Performed by: STUDENT IN AN ORGANIZED HEALTH CARE EDUCATION/TRAINING PROGRAM

## 2025-01-23 PROCEDURE — 84484 ASSAY OF TROPONIN QUANT: CPT | Performed by: STUDENT IN AN ORGANIZED HEALTH CARE EDUCATION/TRAINING PROGRAM

## 2025-01-23 PROCEDURE — 85730 THROMBOPLASTIN TIME PARTIAL: CPT | Performed by: STUDENT IN AN ORGANIZED HEALTH CARE EDUCATION/TRAINING PROGRAM

## 2025-01-23 PROCEDURE — 93005 ELECTROCARDIOGRAM TRACING: CPT | Performed by: STUDENT IN AN ORGANIZED HEALTH CARE EDUCATION/TRAINING PROGRAM

## 2025-01-23 PROCEDURE — 85610 PROTHROMBIN TIME: CPT | Performed by: STUDENT IN AN ORGANIZED HEALTH CARE EDUCATION/TRAINING PROGRAM

## 2025-01-23 PROCEDURE — 80053 COMPREHEN METABOLIC PANEL: CPT | Performed by: STUDENT IN AN ORGANIZED HEALTH CARE EDUCATION/TRAINING PROGRAM

## 2025-01-23 PROCEDURE — 82948 REAGENT STRIP/BLOOD GLUCOSE: CPT

## 2025-01-23 PROCEDURE — 36415 COLL VENOUS BLD VENIPUNCTURE: CPT | Performed by: STUDENT IN AN ORGANIZED HEALTH CARE EDUCATION/TRAINING PROGRAM

## 2025-01-23 PROCEDURE — 87040 BLOOD CULTURE FOR BACTERIA: CPT | Performed by: STUDENT IN AN ORGANIZED HEALTH CARE EDUCATION/TRAINING PROGRAM

## 2025-01-23 PROCEDURE — 93010 ELECTROCARDIOGRAM REPORT: CPT | Performed by: INTERNAL MEDICINE

## 2025-01-23 PROCEDURE — 83880 ASSAY OF NATRIURETIC PEPTIDE: CPT | Performed by: STUDENT IN AN ORGANIZED HEALTH CARE EDUCATION/TRAINING PROGRAM

## 2025-01-23 PROCEDURE — 87205 SMEAR GRAM STAIN: CPT | Performed by: STUDENT IN AN ORGANIZED HEALTH CARE EDUCATION/TRAINING PROGRAM

## 2025-01-23 PROCEDURE — 25810000003 SODIUM CHLORIDE 0.9 % SOLUTION: Performed by: STUDENT IN AN ORGANIZED HEALTH CARE EDUCATION/TRAINING PROGRAM

## 2025-01-23 PROCEDURE — 85025 COMPLETE CBC W/AUTO DIFF WBC: CPT | Performed by: STUDENT IN AN ORGANIZED HEALTH CARE EDUCATION/TRAINING PROGRAM

## 2025-01-23 PROCEDURE — 25010000002 PIPERACILLIN SOD-TAZOBACTAM PER 1 G: Performed by: STUDENT IN AN ORGANIZED HEALTH CARE EDUCATION/TRAINING PROGRAM

## 2025-01-23 PROCEDURE — 99285 EMERGENCY DEPT VISIT HI MDM: CPT

## 2025-01-23 PROCEDURE — 25510000001 IOPAMIDOL PER 1 ML: Performed by: STUDENT IN AN ORGANIZED HEALTH CARE EDUCATION/TRAINING PROGRAM

## 2025-01-23 PROCEDURE — 25010000002 VANCOMYCIN 10 G RECONSTITUTED SOLUTION: Performed by: STUDENT IN AN ORGANIZED HEALTH CARE EDUCATION/TRAINING PROGRAM

## 2025-01-23 PROCEDURE — 71260 CT THORAX DX C+: CPT

## 2025-01-23 RX ORDER — ACETAMINOPHEN 325 MG/1
650 TABLET ORAL EVERY 4 HOURS PRN
Status: DISCONTINUED | OUTPATIENT
Start: 2025-01-23 | End: 2025-01-25 | Stop reason: HOSPADM

## 2025-01-23 RX ORDER — IOPAMIDOL 755 MG/ML
100 INJECTION, SOLUTION INTRAVASCULAR
Status: COMPLETED | OUTPATIENT
Start: 2025-01-23 | End: 2025-01-23

## 2025-01-23 RX ORDER — DEXTROSE MONOHYDRATE 25 G/50ML
25 INJECTION, SOLUTION INTRAVENOUS
Status: DISCONTINUED | OUTPATIENT
Start: 2025-01-23 | End: 2025-01-25 | Stop reason: HOSPADM

## 2025-01-23 RX ORDER — BISACODYL 10 MG
10 SUPPOSITORY, RECTAL RECTAL DAILY PRN
Status: DISCONTINUED | OUTPATIENT
Start: 2025-01-23 | End: 2025-01-25 | Stop reason: HOSPADM

## 2025-01-23 RX ORDER — ALUMINA, MAGNESIA, AND SIMETHICONE 2400; 2400; 240 MG/30ML; MG/30ML; MG/30ML
15 SUSPENSION ORAL EVERY 6 HOURS PRN
Status: DISCONTINUED | OUTPATIENT
Start: 2025-01-23 | End: 2025-01-25 | Stop reason: HOSPADM

## 2025-01-23 RX ORDER — ONDANSETRON 2 MG/ML
4 INJECTION INTRAMUSCULAR; INTRAVENOUS EVERY 6 HOURS PRN
Status: DISCONTINUED | OUTPATIENT
Start: 2025-01-23 | End: 2025-01-25 | Stop reason: HOSPADM

## 2025-01-23 RX ORDER — HYDROCODONE BITARTRATE AND ACETAMINOPHEN 5; 325 MG/1; MG/1
1 TABLET ORAL EVERY 4 HOURS PRN
Status: DISCONTINUED | OUTPATIENT
Start: 2025-01-23 | End: 2025-01-24

## 2025-01-23 RX ORDER — ONDANSETRON 4 MG/1
4 TABLET, ORALLY DISINTEGRATING ORAL EVERY 6 HOURS PRN
Status: DISCONTINUED | OUTPATIENT
Start: 2025-01-23 | End: 2025-01-25 | Stop reason: HOSPADM

## 2025-01-23 RX ORDER — SODIUM CHLORIDE 0.9 % (FLUSH) 0.9 %
10 SYRINGE (ML) INJECTION AS NEEDED
Status: DISCONTINUED | OUTPATIENT
Start: 2025-01-23 | End: 2025-01-25 | Stop reason: HOSPADM

## 2025-01-23 RX ORDER — AMOXICILLIN 250 MG
2 CAPSULE ORAL 2 TIMES DAILY PRN
Status: DISCONTINUED | OUTPATIENT
Start: 2025-01-23 | End: 2025-01-25 | Stop reason: HOSPADM

## 2025-01-23 RX ORDER — IBUPROFEN 600 MG/1
1 TABLET ORAL
Status: DISCONTINUED | OUTPATIENT
Start: 2025-01-23 | End: 2025-01-25 | Stop reason: HOSPADM

## 2025-01-23 RX ORDER — NICOTINE POLACRILEX 4 MG
15 LOZENGE BUCCAL
Status: DISCONTINUED | OUTPATIENT
Start: 2025-01-23 | End: 2025-01-25 | Stop reason: HOSPADM

## 2025-01-23 RX ORDER — POLYETHYLENE GLYCOL 3350 17 G/17G
17 POWDER, FOR SOLUTION ORAL DAILY PRN
Status: DISCONTINUED | OUTPATIENT
Start: 2025-01-23 | End: 2025-01-25 | Stop reason: HOSPADM

## 2025-01-23 RX ORDER — BUMETANIDE 0.25 MG/ML
2 INJECTION, SOLUTION INTRAMUSCULAR; INTRAVENOUS ONCE
Status: COMPLETED | OUTPATIENT
Start: 2025-01-23 | End: 2025-01-23

## 2025-01-23 RX ORDER — BISACODYL 5 MG/1
5 TABLET, DELAYED RELEASE ORAL DAILY PRN
Status: DISCONTINUED | OUTPATIENT
Start: 2025-01-23 | End: 2025-01-25 | Stop reason: HOSPADM

## 2025-01-23 RX ORDER — ACETAMINOPHEN 650 MG/1
650 SUPPOSITORY RECTAL EVERY 4 HOURS PRN
Status: DISCONTINUED | OUTPATIENT
Start: 2025-01-23 | End: 2025-01-25 | Stop reason: HOSPADM

## 2025-01-23 RX ORDER — ACETAMINOPHEN 160 MG/5ML
650 SOLUTION ORAL EVERY 4 HOURS PRN
Status: DISCONTINUED | OUTPATIENT
Start: 2025-01-23 | End: 2025-01-25 | Stop reason: HOSPADM

## 2025-01-23 RX ORDER — NITROGLYCERIN 0.4 MG/1
0.4 TABLET SUBLINGUAL
Status: DISCONTINUED | OUTPATIENT
Start: 2025-01-23 | End: 2025-01-25 | Stop reason: HOSPADM

## 2025-01-23 RX ORDER — INSULIN LISPRO 100 [IU]/ML
2-9 INJECTION, SOLUTION INTRAVENOUS; SUBCUTANEOUS
Status: DISCONTINUED | OUTPATIENT
Start: 2025-01-24 | End: 2025-01-25 | Stop reason: HOSPADM

## 2025-01-23 RX ADMIN — PIPERACILLIN AND TAZOBACTAM 3.38 G: 3; .375 INJECTION, POWDER, FOR SOLUTION INTRAVENOUS at 19:37

## 2025-01-23 RX ADMIN — VANCOMYCIN HYDROCHLORIDE 2250 MG: 10 INJECTION, POWDER, LYOPHILIZED, FOR SOLUTION INTRAVENOUS at 20:02

## 2025-01-23 RX ADMIN — SACUBITRIL AND VALSARTAN 1 TABLET: 24; 26 TABLET, FILM COATED ORAL at 23:54

## 2025-01-23 RX ADMIN — HYDROCODONE BITARTRATE AND ACETAMINOPHEN 1 TABLET: 5; 325 TABLET ORAL at 23:54

## 2025-01-23 RX ADMIN — BUMETANIDE 2 MG: 0.25 INJECTION INTRAMUSCULAR; INTRAVENOUS at 19:13

## 2025-01-23 RX ADMIN — IOPAMIDOL 75 ML: 755 INJECTION, SOLUTION INTRAVENOUS at 18:57

## 2025-01-23 NOTE — ED NOTES
Patient to ed via ems from home for hypertension after open heart surgery recently-patient unsure of exact date. Patient took 4 baby aspirin before ems arrival to his house.

## 2025-01-23 NOTE — ED PROVIDER NOTES
EMERGENCY DEPARTMENT ENCOUNTER  Room Number:  32/32  PCP: Tera Salvador MD  Independent Historians: Patient      HPI:  Chief Complaint: had concerns including Chest Pain and Hypertension.     A complete HPI/ROS/PMH/PSH/SH/FH are unobtainable due to: None    Chronic or social conditions impacting patient care (Social Determinants of Health): None      Context: Jeb Olson is a 74 y.o. male with a medical history of severe MR, CAD with PCI to LAD, NICM EF 40%, A-fib on Coumadin, hypertension, hyperlipidemia, type 2 diabetes, renal sufficiency, who presents to the ED c/o acute chest pain.  He notes for the past 5 days he has had intermittent chest achiness that goes across his chest and down the back of his left arm.  It is worse with ambulation, not worsened by movement.  He states it is not improved with his Norco 5.  He denies any fever chills cough congestion difficulty breathing.  He notes that he also had a cyst rupture on his chest.  It had a small amount of leakage yesterday but today it had a large amount of drainage onto his shirt.  States he is taking an antibiotic right now but not sure what, states it is to prevent infection.  No other complaints at this time.      Review of prior external notes (non-ED) -and- Review of prior external test results outside of this encounter:  I reviewed discharge summary 1/14/2025, 9 days ago.  On 1/8/2025, approximately 2 weeks ago, patient underwent mitral valve repair, left and right cryo maze, SELMA closure with Dr. Kelly.  Chest tubes discontinued on POD #2.  Diuresed for volume overload.  Increased creatinine postoperative.  Creatinine returned to baseline.  POD #5 discharge.    Patient appears to start doxycycline on 1/15/2025, 100 mg twice daily. Home health noted raised area middle chest without redness, prophylactic doxycycline ordered.     Patient with internal medicine follow-up appointment on 1/20/2025.  Sternotomy incision appears to be  well-healed without erythema or fluctuance.    Prescription drug monitoring program review:         PAST MEDICAL HISTORY  Active Ambulatory Problems     Diagnosis Date Noted    Allergic rhinitis 11/11/2014    Arthritis of knee, left 10/09/2012    Diabetes mellitus 02/18/2013    Essential hypertension 03/07/2014    Hyperlipidemia 02/18/2013    High risk medication use 02/18/2013    Obesity 10/09/2012    Primary osteoarthritis of knee 06/30/2015    Mild chronic anemia 08/08/2019    Obstructive sleep apnea 08/08/2019    Arthritis of left knee 02/04/2020    Sinus bradycardia 10/18/2021    Abnormal stress test 08/16/2022    Colon polyp 09/07/2022    Family history of colonic polyps 09/07/2022    Reactive depression 10/18/2023    Arthritis of knee, right 12/13/2023    Arthritis of knee 12/13/2023    AF (paroxysmal atrial fibrillation) 09/23/2024    Acute exacerbation of CHF (congestive heart failure) 12/19/2024    Severe mitral regurgitation 12/24/2024    Mitral valve regurgitation 01/05/2025     Resolved Ambulatory Problems     Diagnosis Date Noted    No Resolved Ambulatory Problems     Past Medical History:   Diagnosis Date    Allergic Have had for several years    Anemia     Anticoagulant long-term use     Anxiety Since about 2014    Arthritis 2002    Cataract 05/2019    CHF (congestive heart failure)     Coronary artery disease     Dry eyes     Heart murmur     HL (hearing loss) 1980    Hyperlipemia     Knee swelling 7/7/2020    Macular degeneration     Mitral valve disorder     Pneumonia 11/2024    Sleep apnea          PAST SURGICAL HISTORY  Past Surgical History:   Procedure Laterality Date    ACHILLES TENDON REPAIR Left     CARDIAC CATHETERIZATION      CARDIAC CATHETERIZATION N/A 09/01/2022    Procedure: Coronary angiography, Left heart catheterization,;  Surgeon: Bruna Chávez MD;  Location: [facility] INVASIVE LOCATION;  Service: Cardiology;  Laterality: N/A;    CARDIAC CATHETERIZATION N/A 09/01/2022     Procedure: Stent PRAVIN coronary;  Surgeon: Bruna Chávez MD;  Location[facility] C[facility]ASIVE LOC[facility];  Service: Cardiology;  Laterality: N/A;    CARDIAC CATHETERIZATION N/A 1/2/2025    Procedure: Left Heart Cath;  Surgeon: Bruna Chávez MD;  [facility];  Service: Cardiology;  Laterality: N/A;    CARDIAC CATHETERIZATION N/A 1/2/2025    Procedure: Coronary angiography;  Surgeon: Bruna Chávez MD;  [facility];  Service: Cardiology;  Laterality: N/A;    CATARACT EXTRACTION EXTRACAPSULAR W/ INTRAOCULAR LENS IMPLANTATION Bilateral     COLONOSCOPY  2018    Dr Reyes    ENDOSCOPY      MITRAL VALVE REPAIR/REPLACEMENT N/A 1/8/2025    Procedure: STERNOTOMY, MITRAL VALVE REPAIR, MAZE PROCEDURE TRANSESOPHAGEAL ECHOCARDIOGRAM, PRP;  Surgeon: Young Kelly MD;  Location: [facility] CVOR;  Service: Cardiothoracic;  Laterality: N/A;    TONSILLECTOMY      As a child    TOTAL KNEE ARTHROPLASTY Left 03/12/2020    Procedure: TOTAL KNEE ARTHROPLASTY;  Surgeon: Chepe Alicea MD;  Location: [facility] MAIN OR;  Service: Orthopedics;  Laterality: Left;    TOTAL KNEE ARTHROPLASTY Right 03/21/2024    Procedure: TOTAL KNEE ARTHROPLASTY;  Surgeon: Chepe Alicea MD;  Location: [facility] OR OSC;  Service: Orthopedics;  Laterality: Right;         FAMILY HISTORY  Family History   Problem Relation Age of Onset    Alcohol abuse Maternal Uncle         Passed away 2013    Alcohol abuse Father         Passed away in 2016    Hyperlipidemia Father         Started about 10 years before his death    Arthritis Mother         Passed away 2006, from Alzheimers    Diabetes Mother     Hyperlipidemia Mother         Started probably at middle age    Osteoporosis Mother     Malig Hyperthermia Neg Hx          SOCIAL HISTORY  Social History     Socioeconomic History    Marital status:    Tobacco Use    Smoking status: Never     Passive exposure: Never    Smokeless tobacco: Never    Tobacco comments:      Naila only every been around people who smoked   Vaping Use    Vaping status: Never Used   Substance and Sexual Activity    Alcohol use: Not Currently     Alcohol/week: 21.0 standard drinks of alcohol     Types: 21 Shots of liquor per week     Comment: since thanksgiving    Drug use: Never    Sexual activity: Not Currently     Partners: Female     Birth control/protection: None         ALLERGIES  Patient has no known allergies.      REVIEW OF SYSTEMS  Review of Systems  Included in HPI  All systems reviewed and negative except for those discussed in HPI.      PHYSICAL EXAM    I have reviewed the triage vital signs and nursing notes.    ED Triage Vitals [01/23/25 1716]   Temp Heart Rate Resp BP SpO2   98.9 °F (37.2 °C) 59 16 150/80 96 %      Temp src Heart Rate Source Patient Position BP Location FiO2 (%)   Tympanic Monitor -- -- --       Physical Exam  GENERAL: alert, no acute distress  SKIN: Warm, dry, sternotomy incision with area of erythema, fluctuance, purulent drainage with palpation to middle aspect of incision  HENT: Normocephalic, atraumatic  EYES: no scleral icterus  CV:  regular irregular rhythm, regular rate  RESPIRATORY: normal effort, lungs clear  ABDOMEN: soft, nontender, nondistended  MUSCULOSKELETAL: no deformity  NEURO: alert, moves all extremities, follows commands            LAB RESULTS  Recent Results (from the past 24 hours)   Comprehensive Metabolic Panel    Collection Time: 01/23/25  5:43 PM    Specimen: Blood   Result Value Ref Range    Glucose 98 65 - 99 mg/dL    BUN 19 8 - 23 mg/dL    Creatinine 1.11 0.76 - 1.27 mg/dL    Sodium 143 136 - 145 mmol/L    Potassium 3.6 3.5 - 5.2 mmol/L    Chloride 104 98 - 107 mmol/L    CO2 26.0 22.0 - 29.0 mmol/L    Calcium 8.7 8.6 - 10.5 mg/dL    Total Protein 6.5 6.0 - 8.5 g/dL    Albumin 3.7 3.5 - 5.2 g/dL    ALT (SGPT) 19 1 - 41 U/L    AST (SGOT) 18 1 - 40 U/L    Alkaline Phosphatase 50 39 - 117 U/L    Total Bilirubin 0.2 0.0 - 1.2 mg/dL    Globulin 2.8  gm/dL    A/G Ratio 1.3 g/dL    BUN/Creatinine Ratio 17.1 7.0 - 25.0    Anion Gap 13.0 5.0 - 15.0 mmol/L    eGFR 70.6 >60.0 mL/min/1.73   Protime-INR    Collection Time: 01/23/25  5:43 PM    Specimen: Blood   Result Value Ref Range    Protime 17.2 (H) 11.7 - 14.2 Seconds    INR 1.40 (H) 0.90 - 1.10   aPTT    Collection Time: 01/23/25  5:43 PM    Specimen: Blood   Result Value Ref Range    PTT 33.9 22.7 - 35.4 seconds   BNP    Collection Time: 01/23/25  5:43 PM    Specimen: Blood   Result Value Ref Range    proBNP 5,182.0 (H) 0.0 - 900.0 pg/mL   High Sensitivity Troponin T    Collection Time: 01/23/25  5:43 PM    Specimen: Blood   Result Value Ref Range    HS Troponin T 88 (C) <22 ng/L   CBC Auto Differential    Collection Time: 01/23/25  5:43 PM    Specimen: Blood   Result Value Ref Range    WBC 7.52 3.40 - 10.80 10*3/mm3    RBC 3.51 (L) 4.14 - 5.80 10*6/mm3    Hemoglobin 9.9 (L) 13.0 - 17.7 g/dL    Hematocrit 30.3 (L) 37.5 - 51.0 %    MCV 86.3 79.0 - 97.0 fL    MCH 28.2 26.6 - 33.0 pg    MCHC 32.7 31.5 - 35.7 g/dL    RDW 13.2 12.3 - 15.4 %    RDW-SD 40.6 37.0 - 54.0 fl    MPV 9.7 6.0 - 12.0 fL    Platelets 244 140 - 450 10*3/mm3    Neutrophil % 78.4 (H) 42.7 - 76.0 %    Lymphocyte % 13.2 (L) 19.6 - 45.3 %    Monocyte % 4.8 (L) 5.0 - 12.0 %    Eosinophil % 2.5 0.3 - 6.2 %    Basophil % 0.7 0.0 - 1.5 %    Immature Grans % 0.4 0.0 - 0.5 %    Neutrophils, Absolute 5.90 1.70 - 7.00 10*3/mm3    Lymphocytes, Absolute 0.99 0.70 - 3.10 10*3/mm3    Monocytes, Absolute 0.36 0.10 - 0.90 10*3/mm3    Eosinophils, Absolute 0.19 0.00 - 0.40 10*3/mm3    Basophils, Absolute 0.05 0.00 - 0.20 10*3/mm3    Immature Grans, Absolute 0.03 0.00 - 0.05 10*3/mm3    nRBC 0.0 0.0 - 0.2 /100 WBC   ECG 12 Lead Chest Pain    Collection Time: 01/23/25  5:49 PM   Result Value Ref Range    QT Interval 549 ms    QTC Interval 580 ms   Lactic Acid, Plasma    Collection Time: 01/23/25  5:54 PM    Specimen: Blood   Result Value Ref Range    Lactate 1.6 0.5  - 2.0 mmol/L         RADIOLOGY  CT Chest With Contrast Diagnostic    Result Date: 1/23/2025  CT CHEST WITH IV CONTRAST  HISTORY: Chest pain, recent cardiac surgery  TECHNIQUE: Radiation dose reduction techniques were utilized, including automated exposure control and exposure modulation based on body size. Axial images were obtained through the chest after the administration of IV contrast. Coronal and sagittal reformatted images obtained.  COMPARISON: 12/19/2024  FINDINGS: Interval sternotomy. Some minimal fluid and stranding in the anterior mediastinum. Mildly prominent lymph nodes likely reactive. Very small bilateral pleural effusions, decreased from previous study. Minimal pericardial effusion. There is some atelectasis in both lower lobes. Limited imaging of the upper abdomen demonstrates bilateral nephrolithiasis.      Interval sternotomy. Small bilateral pleural effusions, decreased in size from previous study. Improved aeration of the lower lobes with some residual atelectasis    Radiation dose reduction techniques were utilized, including automated exposure control and exposure modulation based on body size.   This report was finalized on 1/23/2025 7:17 PM by Dr. Good Patel M.D on Workstation: [id number]         MEDICATIONS GIVEN IN ER  Medications   vancomycin 2250 mg/500 mL 0.9% NS IVPB ([facility]) (2,250 mg Intravenous New Bag 1/23/25 2002)   piperacillin-tazobactam (ZOSYN) 3.375 g IVPB in 100 mL NS MBP ([facility]) (3.375 g Intravenous New Bag 1/23/25 1937)   bumetanide (BUMEX) injection 2 mg (2 mg Intravenous Given 1/23/25 1913)   iopamidol (ISOVUE-370) 76 % injection 100 mL (75 mL Intravenous Given by Other 1/23/25 1857)         ORDERS PLACED DURING THIS VISIT:  Orders Placed This Encounter   Procedures    Wound Culture - Surgical Site, Chest    Blood Culture - Blood,    Blood Culture - Blood,    CT Chest With Contrast Diagnostic    Comprehensive Metabolic Panel    Protime-INR    aPTT    BNP    High  Sensitivity Troponin T    CBC Auto Differential    Lactic Acid, Plasma    High Sensitivity Troponin T 1Hr    Inpatient Cardiothoracic Surgery Consult    [facility] (on-call MD unless specified) Details    ECG 12 Lead Chest Pain    Inpatient Admission    CBC & Differential         OUTPATIENT MEDICATION MANAGEMENT:  Current Facility-Administered Medications Ordered in Epic   Medication Dose Route Frequency Provider Last Rate Last Admin    Chlorhexidine Gluconate 2 % pads 3 each  3 pad  Apply externally BID Pricila Barnes L, APRN        vancomycin 2250 mg/500 mL 0.9% NS IVPB ([facility])  20 mg/kg Intravenous Once Reynold Cardona MD   2,250 mg at 01/23/25 2002     Current Outpatient Medications Ordered in Epic   Medication Sig Dispense Refill    acetaminophen (TYLENOL) 325 MG tablet Take 2 tablets by mouth 2 (Two) Times a Day As Needed for Mild Pain. 60 tablet 0    aspirin 81 MG EC tablet Take 1 tablet by mouth Daily.      B Complex-Folic Acid (B COMPLEX PLUS PO) Take 1 tablet by mouth Every Other Day. Indications: nutritional supplement      bumetanide (BUMEX) 2 MG tablet Take 1 tablet by mouth Daily for 30 days. 30 tablet 0    cyclobenzaprine (FLEXERIL) 10 MG tablet Take 1 tablet by mouth 3 (Three) Times a Day As Needed for Muscle Spasms. Indications: Muscle Spasm 20 tablet 0    doxazosin (CARDURA) 4 MG tablet TAKE 1 TABLET BY MOUTH ONCE DAILY AT NIGHT 90 tablet 1    doxycycline (ADOXA) 100 MG tablet Take 1 tablet by mouth 2 (Two) Times a Day. Indications: Infection of the Skin and/or Soft Tissue 20 tablet 0    escitalopram (LEXAPRO) 10 MG tablet Take 1.5 tablets by mouth Every Evening. (Patient taking differently: Take 1.5 tablets by mouth Every Morning.) 135 tablet 1    ezetimibe (ZETIA) 10 MG tablet Take 1 tablet by mouth once daily (Patient taking differently: Take 1 tablet by mouth Every Night.) 90 tablet 0    fenofibrate 160 MG tablet TAKE 1 TABLET BY MOUTH AT NIGHT 90 tablet 0    fluticasone (FLONASE) 50 MCG/ACT nasal  spray Administer 2 sprays into the nostril(s) as directed by provider Every Morning. 2 sprays in each nostril  Indications: Stuffy Nose      gabapentin (NEURONTIN) 600 MG tablet Take 1 tablet by mouth Every Evening. Indications: Diabetes with Nerve Disease      glimepiride (AMARYL) 4 MG tablet Take 1 tablet by mouth 2 (Two) Times a Day. Indications: Type 2 Diabetes 180 tablet 1    glucose blood (Accu-Chek Anabela Plus) test strip TEST TWICE DAILY Use as instructed 100 each 3    HYDROcodone-acetaminophen (Norco) 5-325 MG per tablet Take 1 tablet by mouth Every 4 (Four) Hours As Needed for Severe Pain. 40 tablet 0    Janumet XR  MG tablet Take 1 tablet by mouth twice daily 180 tablet 0    Multiple Vitamins-Minerals (PRESERVISION AREDS 2 PO) Take 1 tablet by mouth 2 (Two) Times a Day.      nebivolol (BYSTOLIC) 5 MG tablet Take 1 tablet by mouth Daily. 90 tablet 1    Olopatadine HCl (PATADAY OP) Apply 1 drop to eye(s) as directed by provider 2 (Two) Times a Day As Needed (allergies).      polyethyl glycol-propyl glycol (SYSTANE) 0.4-0.3 % solution ophthalmic solution (artificial tears) Administer 1 drop to both eyes As Needed (dry eyes). Indications: Excessive Cornea and Conjunctiva Dryness      potassium chloride (KLOR-CON M20) 20 MEQ CR tablet Take 2 tablets by mouth Daily for 30 days. 60 tablet 0    sacubitril-valsartan (ENTRESTO) 24-26 MG tablet Take 1 tablet by mouth Every 12 (Twelve) Hours for 30 days. 60 tablet 0    traZODone (DESYREL) 50 MG tablet TAKE 1 TABLET BY MOUTH ONCE DAILY AT NIGHT 90 tablet 0    vitamin D3 125 MCG (5000 UT) capsule capsule Take 1 capsule by mouth Daily.      warfarin (COUMADIN) 5 MG tablet Take 1 tablet by mouth Every Morning. Indications: Atrial Fibrillation           PROCEDURES  Procedures            PROGRESS, DATA ANALYSIS, CONSULTS, AND MEDICAL DECISION MAKING  All labs have been independently interpreted by me.  All radiology studies have been reviewed by me. All EKG's have  been independently viewed and interpreted by me.  Discussion below represents my analysis of pertinent findings related to patient's condition, differential diagnosis, treatment plan and final disposition.    Differential diagnosis includes but is not limited to cellulitis, abscess, ACS, STEMI, NSTEMI, bacteremia, pericardial effusion, anemia, electrolyte abnormality.    Clinical Scores:         HEART Score: 7                               ED Course as of 01/23/25 2020   Thu Jan 23, 2025   1722 Patient presents to the ED for evaluation of chest pain.  He notes that he has had achiness that goes across his chest and in the back of his left arm, worse with ambulating rather than movement.  Not significantly improved with Norco 5.  It has not been worsening, but is not improving.  He notes that he had a small amount of drainage from a cyst on his chest yesterday, and it burst with a large amount of drainage onto his shirt today.  He is on some antibiotic right now but does not know what it is.  He denies any fevers or chills.  No chest pain at this time.  No other complaints.  Recent open heart surgery    On exam patient is well-appearing, nontoxic, afebrile.  To his sternotomy incision he has an area of erythema with purulent drainage and fluctuance, concerning for abscess    Will obtain cardiac evaluation, basic labs, wound culture and stain, lactic and blood cultures, CT chest to evaluate for sternal abscess.  Anticipate admission, patient is agreeable with plan [DN]   1743 SpO2: 96 %  RA [DN]   1743 BP: 150/80 [DN]   1743 Heart Rate: 59 [DN]   1753 ECG 12 Lead Chest Pain  EKG reviewed, A-fib, PVCs, rate 67, LAD with LAFB, no significant ST changes, lateral T wave inversions, no STEMI, interpreted by self [DN]   1801 WBC: 7.52 [DN]   1801 Hemoglobin(!): 9.9  Stable from 11 days ago [DN]   1801 Skin was prepped with ChloraPrep, wound C&S obtained from sternal abscess [DN]   1801 Patient without evidence of sepsis,  but will give dose of broad-spectrum antibiotics due to sternal wound with recent sternotomy and heart surgery [DN]   1820 Wound Culture - Surgical Site, Chest  pending [DN]   1829 HS Troponin T(!!): 88 [DN]   1830 proBNP(!): 5,182.0  Increased from 500 on blood work 2 weeks ago [DN]   1830 Creatinine: 1.11 [DN]   1830 Lactate: 1.6 [DN]   1830 INR(!): 1.40 [DN]   1841 IV Bumex ordered for acute heart failure [DN]   1902 CT Chest With Contrast Diagnostic  I reviewed CT images, small bilateral pleural effusions, skin abscess overlying sternum, interpreted by self [DN]   1932 I discussed patient with on-call cardiothoracic surgeon, request medicine admission, agreeable to consult [DN]   2019 I discussed patient with on-call FRAN for [facility], agreeable with plan to admit [DN]      ED Course User Index  [DN] Reynold Cardona MD             AS OF 20:20 EST VITALS:    BP - 142/67  HR - 63  TEMP - 98.9 °F (37.2 °C) (Tympanic)  O2 SATS - 93%    COMPLEXITY OF CARE  The patient requires admission.      DIAGNOSIS  Final diagnoses:   Chest pain, unspecified type   Abscess of sternal region   History of open heart surgery, [facility], 1/8/25   Subtherapeutic international normalized ratio (INR)   Acute congestive heart failure, unspecified heart failure type         DISPOSITION  ED Disposition       ED Disposition   Decision to Admit    Condition   --    Comment   Level of Care: Telemetry [5]   Diagnosis: Acute CHF [468634]   Admitting Physician: FLOR SAVAGE [[id number]]   Attending Physician: FLOR SAVAGE [[id number]]   Certification: I Certify That Inpatient Hospital Services Are Medically Necessary For Greater Than 2 Midnights                  Please note that portions of this document were completed with a voice recognition program.    Note Disclaimer: At [facility], we believe that sharing information builds trust and better relationships. You are receiving this note because you recently visited [facility]. It is  possible you will see health information before a provider has talked with you about it. This kind of information can be easy to misunderstand. To help you fully understand what it means for your health, we urge you to discuss this note with your provider.         Reynold Cardona MD  01/23/25 1819       Reynold Cardona MD  01/23/25 2020

## 2025-01-23 NOTE — PROGRESS NOTES
Anticoagulation Clinic Progress Note    Anticoagulation Summary  As of 2025      INR goal:  2.0-3.0   TTR:  59.3% (2.7 mo)   INR used for dosin.40 (2025)   Warfarin maintenance plan:  7.5 mg every Wed, Sat; 5 mg all other days   Weekly warfarin total:  40 mg   Plan last modified:  Gurjit Velásquez, PharmD (10/30/2024)   Next INR check:  2025   Target end date:  --    Indications    AF (paroxysmal atrial fibrillation) [I48.0]                 Anticoagulation Episode Summary       INR check location:  --    Preferred lab:  --    [facility]    Comments:  --          Anticoagulation Care Providers       Provider Role Specialty Phone number    Bruna Chávez MD Referring Cardiology [phone]            INR History:      2025    11:04 AM 2025    10:42 AM 2025     3:07 AM 2025    12:00 AM 2025     9:30 AM 2025    12:00 AM 2025    10:13 AM   Anticoagulation Monitoring   INR     1.60  1.40   INR Date     2025   INR Goal     2.0-3.0  2.0-3.0   Trend     Same  Same   Last Week Total     35 mg  35 mg   Next Week Total     40 mg  45 mg   Sun     -  -   Mon     5 mg  -   Tue     5 mg  -   Wed     -  -   Thu     -  10 mg ()   Fri     -  -   Sat     -  -   Historical INR 1.23  1.43  1.33  1.60      1.40        Visit Report     Report         This result is from an external source.       Plan:  1. INR is Subtherapeutic today- see above in Anticoagulation Summary.   Provided instructions to Michelle with [facility] to Change their warfarin regimen- see above in Anticoagulation Summary.  Patient has been taking 5 mg daily since he was admitted in [place]. We were under the impression he was taking 7.5 mg twice weekly as we have spoken to the patient about dosing for procedures since returning from [place].  Boost to 10 mg today and rck tomorrow. Likely need at least one day of 7.5 mg. Rck tomorrow (spoke with  patient and Michelle/MADDY)   2. Follow up in 1 days      Dorota Mckeon RPH

## 2025-01-24 ENCOUNTER — HOME CARE VISIT (OUTPATIENT)
Dept: HOME HEALTH SERVICES | Facility: HOME HEALTHCARE | Age: 75
End: 2025-01-24
Payer: COMMERCIAL

## 2025-01-24 ENCOUNTER — DOCUMENTATION (OUTPATIENT)
Dept: HOME HEALTH SERVICES | Facility: HOME HEALTHCARE | Age: 75
End: 2025-01-24
Payer: COMMERCIAL

## 2025-01-24 PROBLEM — I50.33 DIASTOLIC CHF, ACUTE ON CHRONIC: Status: ACTIVE | Noted: 2025-01-24

## 2025-01-24 PROBLEM — R07.89 CHEST PAIN, ATYPICAL: Status: ACTIVE | Noted: 2025-01-24

## 2025-01-24 LAB
ANION GAP SERPL CALCULATED.3IONS-SCNC: 12 MMOL/L (ref 5–15)
BUN SERPL-MCNC: 18 MG/DL (ref 8–23)
BUN/CREAT SERPL: 16.4 (ref 7–25)
CALCIUM SPEC-SCNC: 8.7 MG/DL (ref 8.6–10.5)
CHLORIDE SERPL-SCNC: 101 MMOL/L (ref 98–107)
CO2 SERPL-SCNC: 28 MMOL/L (ref 22–29)
CREAT SERPL-MCNC: 1.1 MG/DL (ref 0.76–1.27)
DEPRECATED RDW RBC AUTO: 44.2 FL (ref 37–54)
EGFRCR SERPLBLD CKD-EPI 2021: 71.3 ML/MIN/1.73
ERYTHROCYTE [DISTWIDTH] IN BLOOD BY AUTOMATED COUNT: 13.5 % (ref 12.3–15.4)
GEN 5 1HR TROPONIN T REFLEX: 84 NG/L
GLUCOSE BLDC GLUCOMTR-MCNC: 107 MG/DL (ref 70–130)
GLUCOSE BLDC GLUCOMTR-MCNC: 119 MG/DL (ref 70–130)
GLUCOSE BLDC GLUCOMTR-MCNC: 121 MG/DL (ref 70–130)
GLUCOSE SERPL-MCNC: 73 MG/DL (ref 65–99)
HCT VFR BLD AUTO: 30.8 % (ref 37.5–51)
HGB BLD-MCNC: 9.8 G/DL (ref 13–17.7)
INR PPP: 1.46 (ref 0.9–1.1)
MCH RBC QN AUTO: 28.2 PG (ref 26.6–33)
MCHC RBC AUTO-ENTMCNC: 31.8 G/DL (ref 31.5–35.7)
MCV RBC AUTO: 88.5 FL (ref 79–97)
PLATELET # BLD AUTO: 251 10*3/MM3 (ref 140–450)
PMV BLD AUTO: 9.7 FL (ref 6–12)
POTASSIUM SERPL-SCNC: 3.5 MMOL/L (ref 3.5–5.2)
PROTHROMBIN TIME: 17.7 SECONDS (ref 11.7–14.2)
RBC # BLD AUTO: 3.48 10*6/MM3 (ref 4.14–5.8)
SODIUM SERPL-SCNC: 141 MMOL/L (ref 136–145)
TROPONIN T % DELTA: -5
TROPONIN T NUMERIC DELTA: -4 NG/L
WBC NRBC COR # BLD AUTO: 7.32 10*3/MM3 (ref 3.4–10.8)

## 2025-01-24 PROCEDURE — 99223 1ST HOSP IP/OBS HIGH 75: CPT | Performed by: STUDENT IN AN ORGANIZED HEALTH CARE EDUCATION/TRAINING PROGRAM

## 2025-01-24 PROCEDURE — 82948 REAGENT STRIP/BLOOD GLUCOSE: CPT

## 2025-01-24 PROCEDURE — 85610 PROTHROMBIN TIME: CPT | Performed by: HOSPITALIST

## 2025-01-24 PROCEDURE — 25010000002 VANCOMYCIN 1 G RECONSTITUTED SOLUTION 1 EACH VIAL: Performed by: NURSE PRACTITIONER

## 2025-01-24 PROCEDURE — 85027 COMPLETE CBC AUTOMATED: CPT | Performed by: NURSE PRACTITIONER

## 2025-01-24 PROCEDURE — 25810000003 SODIUM CHLORIDE 0.9 % SOLUTION 250 ML FLEX CONT: Performed by: HOSPITALIST

## 2025-01-24 PROCEDURE — 25010000002 VANCOMYCIN 1 G RECONSTITUTED SOLUTION 1 EACH VIAL: Performed by: HOSPITALIST

## 2025-01-24 PROCEDURE — 25810000003 SODIUM CHLORIDE 0.9 % SOLUTION 250 ML FLEX CONT: Performed by: NURSE PRACTITIONER

## 2025-01-24 PROCEDURE — 80048 BASIC METABOLIC PNL TOTAL CA: CPT | Performed by: NURSE PRACTITIONER

## 2025-01-24 RX ORDER — DOXYCYCLINE 100 MG/1
100 CAPSULE ORAL EVERY 12 HOURS SCHEDULED
Status: DISCONTINUED | OUTPATIENT
Start: 2025-01-24 | End: 2025-01-24

## 2025-01-24 RX ORDER — ESCITALOPRAM OXALATE 10 MG/1
15 TABLET ORAL EVERY MORNING
Status: DISCONTINUED | OUTPATIENT
Start: 2025-01-24 | End: 2025-01-25 | Stop reason: HOSPADM

## 2025-01-24 RX ORDER — POTASSIUM CHLORIDE 750 MG/1
40 TABLET, FILM COATED, EXTENDED RELEASE ORAL EVERY 4 HOURS
Status: COMPLETED | OUTPATIENT
Start: 2025-01-24 | End: 2025-01-24

## 2025-01-24 RX ORDER — CYCLOBENZAPRINE HCL 10 MG
10 TABLET ORAL 3 TIMES DAILY PRN
Status: DISCONTINUED | OUTPATIENT
Start: 2025-01-24 | End: 2025-01-25 | Stop reason: HOSPADM

## 2025-01-24 RX ORDER — GABAPENTIN 300 MG/1
600 CAPSULE ORAL NIGHTLY
Status: DISCONTINUED | OUTPATIENT
Start: 2025-01-24 | End: 2025-01-25 | Stop reason: HOSPADM

## 2025-01-24 RX ORDER — FLUTICASONE PROPIONATE 50 MCG
2 SPRAY, SUSPENSION (ML) NASAL EVERY MORNING
Status: DISCONTINUED | OUTPATIENT
Start: 2025-01-24 | End: 2025-01-25 | Stop reason: HOSPADM

## 2025-01-24 RX ORDER — ASPIRIN 81 MG/1
81 TABLET ORAL DAILY
Status: DISCONTINUED | OUTPATIENT
Start: 2025-01-24 | End: 2025-01-25 | Stop reason: HOSPADM

## 2025-01-24 RX ORDER — HYDROCODONE BITARTRATE AND ACETAMINOPHEN 5; 325 MG/1; MG/1
1 TABLET ORAL EVERY 4 HOURS PRN
Status: DISCONTINUED | OUTPATIENT
Start: 2025-01-24 | End: 2025-01-25 | Stop reason: HOSPADM

## 2025-01-24 RX ORDER — WARFARIN SODIUM 5 MG/1
5 TABLET ORAL
Status: DISCONTINUED | OUTPATIENT
Start: 2025-01-26 | End: 2025-01-25 | Stop reason: HOSPADM

## 2025-01-24 RX ORDER — NEBIVOLOL 5 MG/1
5 TABLET ORAL DAILY
Status: DISCONTINUED | OUTPATIENT
Start: 2025-01-24 | End: 2025-01-25 | Stop reason: HOSPADM

## 2025-01-24 RX ORDER — WARFARIN SODIUM 5 MG/1
5 TABLET ORAL
Status: DISCONTINUED | OUTPATIENT
Start: 2025-01-24 | End: 2025-01-24

## 2025-01-24 RX ORDER — BUMETANIDE 1 MG/1
2 TABLET ORAL DAILY
Status: DISCONTINUED | OUTPATIENT
Start: 2025-01-24 | End: 2025-01-25 | Stop reason: HOSPADM

## 2025-01-24 RX ORDER — TRAZODONE HYDROCHLORIDE 50 MG/1
50 TABLET, FILM COATED ORAL NIGHTLY
Status: DISCONTINUED | OUTPATIENT
Start: 2025-01-24 | End: 2025-01-25 | Stop reason: HOSPADM

## 2025-01-24 RX ORDER — TERAZOSIN 5 MG/1
5 CAPSULE ORAL NIGHTLY
Status: DISCONTINUED | OUTPATIENT
Start: 2025-01-24 | End: 2025-01-25 | Stop reason: HOSPADM

## 2025-01-24 RX ORDER — WARFARIN SODIUM 10 MG/1
10 TABLET ORAL
Status: COMPLETED | OUTPATIENT
Start: 2025-01-24 | End: 2025-01-24

## 2025-01-24 RX ORDER — WARFARIN SODIUM 7.5 MG/1
7.5 TABLET ORAL
Status: DISCONTINUED | OUTPATIENT
Start: 2025-01-25 | End: 2025-01-25 | Stop reason: HOSPADM

## 2025-01-24 RX ADMIN — TRAZODONE HYDROCHLORIDE 50 MG: 50 TABLET ORAL at 20:27

## 2025-01-24 RX ADMIN — BISACODYL 5 MG: 5 TABLET, COATED ORAL at 18:56

## 2025-01-24 RX ADMIN — HYDROCODONE BITARTRATE AND ACETAMINOPHEN 1 TABLET: 5; 325 TABLET ORAL at 09:08

## 2025-01-24 RX ADMIN — SACUBITRIL AND VALSARTAN 1 TABLET: 24; 26 TABLET, FILM COATED ORAL at 09:30

## 2025-01-24 RX ADMIN — VANCOMYCIN HYDROCHLORIDE 1000 MG: 1 INJECTION, POWDER, LYOPHILIZED, FOR SOLUTION INTRAVENOUS at 20:26

## 2025-01-24 RX ADMIN — WARFARIN SODIUM 10 MG: 5 TABLET ORAL at 18:56

## 2025-01-24 RX ADMIN — TERAZOSIN HYDROCHLORIDE 5 MG: 5 CAPSULE ORAL at 20:26

## 2025-01-24 RX ADMIN — ESCITALOPRAM 15 MG: 10 TABLET, FILM COATED ORAL at 09:07

## 2025-01-24 RX ADMIN — BUMETANIDE 2 MG: 1 TABLET ORAL at 09:07

## 2025-01-24 RX ADMIN — NEBIVOLOL 5 MG: 5 TABLET ORAL at 09:11

## 2025-01-24 RX ADMIN — GABAPENTIN 600 MG: 300 CAPSULE ORAL at 09:13

## 2025-01-24 RX ADMIN — VANCOMYCIN HYDROCHLORIDE 1000 MG: 1 INJECTION, POWDER, LYOPHILIZED, FOR SOLUTION INTRAVENOUS at 09:23

## 2025-01-24 RX ADMIN — TERAZOSIN HYDROCHLORIDE 5 MG: 5 CAPSULE ORAL at 09:09

## 2025-01-24 RX ADMIN — SACUBITRIL AND VALSARTAN 1 TABLET: 24; 26 TABLET, FILM COATED ORAL at 20:27

## 2025-01-24 RX ADMIN — GABAPENTIN 600 MG: 300 CAPSULE ORAL at 20:27

## 2025-01-24 RX ADMIN — ASPIRIN 81 MG: 81 TABLET, DELAYED RELEASE ORAL at 09:09

## 2025-01-24 RX ADMIN — FLUTICASONE PROPIONATE 2 SPRAY: 50 SPRAY, METERED NASAL at 09:10

## 2025-01-24 RX ADMIN — POTASSIUM CHLORIDE 40 MEQ: 750 TABLET, EXTENDED RELEASE ORAL at 09:09

## 2025-01-24 RX ADMIN — POTASSIUM CHLORIDE 40 MEQ: 750 TABLET, EXTENDED RELEASE ORAL at 12:22

## 2025-01-24 NOTE — CONSULTS
"Referring Provider: No ref. provider found  Reason for Consultation:     wound abscess     Chief Complaint   Patient presents with    Chest Pain    Hypertension         Subjective   History of present illness: Patient is a 74-year-old male who underwent mitral valve repair with maze procedure with left atrial appendage closure by Dr. Kelly on 1/8/2025 who presents with chest pain.  ID consulted for \"wound abscess\".    Patient reports that he noticed yesterday a large area of drainage from his sternal wound.  States that the day before he had noticed a small amount but did not think much of it.  It then increased yesterday and was foul-smelling.  States he has been on oral doxycycline at home.  Denies any fevers or chills.  Does report chest pain.    On presentation patient is afebrile with no leukocytosis.  Lactate is normal and wound cultures been obtained.  Started on vancomycin and Zosyn in the ER.  Blood cultures are pending.    Past Medical History:   Diagnosis Date    AF (paroxysmal atrial fibrillation)     Allergic Have had for several years    Eyes and nasal issues    Anemia     Anticoagulant long-term use     COUMADIN    Anxiety Since about 2014    Since I was taking of my Dad, who also passed away 3yrs ago.    Arthritis 2002    Both knees, hips, and shoulders    Arthritis of knee, left     Cataract 05/2019    CHF (congestive heart failure)     Colon polyp 2017    Coronary artery disease     stent    Diabetes mellitus     Dry eyes     Essential hypertension     Heart murmur     HL (hearing loss) 1980    no hearing aides    Hyperlipemia     Knee swelling 7/7/2020    Shortly after my left knee replacement    Macular degeneration     Mild chronic anemia     Mitral valve disorder     Obesity     Pneumonia 11/2024    Sleep apnea     cpap       Past Surgical History:   Procedure Laterality Date    ACHILLES TENDON REPAIR Left     CARDIAC CATHETERIZATION      CARDIAC CATHETERIZATION N/A 09/01/2022    Procedure: " Coronary angiography, Left heart catheterization,;  Surgeon: Bruna Chávez MD;  Location: [facility] CATH INVASIVE LOCATION;  Service: Cardiology;  Laterality: N/A;    CARDIAC CATHETERIZATION N/A 09/01/2022    Procedure: Stent PRAVIN coronary;  Surgeon: Bruna Chávez MD;  Location: [facility] CATH INVASIVE LOCATION;  Service: Cardiology;  Laterality: N/A;    CARDIAC CATHETERIZATION N/A 1/2/2025    Procedure: Left Heart Cath;  Surgeon: Bruna Chávez MD;  Location: [facility] CATH INVASIVE LOCATION;  Service: Cardiology;  Laterality: N/A;    CARDIAC CATHETERIZATION N/A 1/2/2025    Procedure: Coronary angiography;  Surgeon: Bruna Chávez MD;  Location: [facility] CATH INVASIVE LOCATION;  Service: Cardiology;  Laterality: N/A;    CATARACT EXTRACTION EXTRACAPSULAR W/ INTRAOCULAR LENS IMPLANTATION Bilateral     COLONOSCOPY  2018    Dr Reyes    ENDOSCOPY      MITRAL VALVE REPAIR/REPLACEMENT N/A 1/8/2025    Procedure: STERNOTOMY, MITRAL VALVE REPAIR, MAZE PROCEDURE TRANSESOPHAGEAL ECHOCARDIOGRAM, PRP;  Surgeon: Young Kelly MD;  Location: [facility] CVOR;  Service: Cardiothoracic;  Laterality: N/A;    TONSILLECTOMY      As a child    TOTAL KNEE ARTHROPLASTY Left 03/12/2020    Procedure: TOTAL KNEE ARTHROPLASTY;  Surgeon: Chepe Alicea MD;  Location: [facility] MAIN OR;  Service: Orthopedics;  Laterality: Left;    TOTAL KNEE ARTHROPLASTY Right 03/21/2024    Procedure: TOTAL KNEE ARTHROPLASTY;  Surgeon: Chepe Alicea MD;  Location: [facility];  Service: Orthopedics;  Laterality: Right;       family history includes Alcohol abuse in his father and maternal uncle; Arthritis in his mother; Diabetes in his mother; Hyperlipidemia in his father and mother; Osteoporosis in his mother.     reports that he has never smoked. He has never been exposed to tobacco smoke. He has never used smokeless tobacco. He reports that he does not currently use alcohol after a past usage of about 21.0 standard drinks of alcohol per week. He reports that he  does not use drugs.     No Known Allergies    Medication:  Antibiotics:  Anti-Infectives (From admission, onward)      Ordered     Dose/Rate Route Frequency Start Stop    01/24/25 0034  vancomycin (VANCOCIN) 1,000 mg in sodium chloride 0.9 % 250 mL IVPB-VTB        Ordering Provider: Asuncion Stover APRN    1,000 mg  250 mL/hr over 60 Minutes Intravenous Every 12 Hours 01/24/25 0800 01/29/25 0759    01/23/25 2343  Pharmacy to dose vancomycin        Ordering Provider: Asuncion Stover APRN     Not Applicable Continuous PRN 01/23/25 2342 01/28/25 2341    01/23/25 1755  piperacillin-tazobactam (ZOSYN) 3.375 g IVPB in 100 mL NS MBP (CD)        Ordering Provider: Reynold Cardona MD    3.375 g  over 30 Minutes Intravenous Once 01/23/25 1811 01/23/25 2052    01/23/25 1754  vancomycin 2250 mg/500 mL 0.9% NS IVPB ([facility])        Ordering Provider: Reynold Cardona MD    20 mg/kg × 117 kg  over 135 Minutes Intravenous Once 01/23/25 1810 01/23/25 2217    01/23/25 1754  cefTRIAXone (ROCEPHIN) 2,000 mg in sodium chloride 0.9 % 100 mL MBP  Status:  Discontinued        Ordering Provider: Reynold Cardona MD    2,000 mg  200 mL/hr over 30 Minutes Intravenous Once 01/23/25 1810 01/23/25 1755              Objective     Physical Exam:   Vital Signs   Temp:  [98.1 °F (36.7 °C)-98.9 °F (37.2 °C)] 98.4 °F (36.9 °C)  Heart Rate:  [59-85] 71  Resp:  [16-18] 18  BP: (142-174)/(67-99) 174/97    GENERAL: Awake and alert, in no acute distress.   HEENT: Oropharynx is clear. Hearing is grossly normal.   EYES: PERRL. No conjunctival injection. No lid lag.   LUNGS: Normal work of breathing.  SKIN: Small area of fluctuance and erythema over the anterior chest wall at the site of incision.  No acute drainage.  PSYCHIATRIC: Appropriate mood, affect, insight, and judgment.     Results Review:   I reviewed the patient's new clinical results.  I reviewed the patient's new imaging results and agree with the interpretation.  I reviewed the  "patient's other test results and agree with the interpretation    Lab Results   Component Value Date    WBC 7.32 01/24/2025    HGB 9.8 (L) 01/24/2025    HCT 30.8 (L) 01/24/2025    MCV 88.5 01/24/2025     01/24/2025       No results found for: \"VANCOPEAK\", \"VANCOTROUGH\", \"VANCORANDOM\"    Lab Results   Component Value Date    GLUCOSE 73 01/24/2025    BUN 18 01/24/2025    CREATININE 1.10 01/24/2025    EGFRIFNONA 55 (L) 08/26/2021    EGFRIFAFRI 67 08/26/2021    BCR 16.4 01/24/2025    CO2 28.0 01/24/2025    CALCIUM 8.7 01/24/2025    PROTENTOTREF 6.8 11/11/2024    ALBUMIN 3.7 01/23/2025    LABIL2 1.8 11/11/2024    AST 18 01/23/2025    ALT 19 01/23/2025         Estimated Creatinine Clearance: 82.2 mL/min (by C-G formula based on SCr of 1.1 mg/dL).    Isolation:   No active isolations      Microbiology:  1/23 wound culture in process  1/23 blood cultures in process    Radiology:  1/23 CT chest report reviewed with small bilateral pleural effusions.  Minimal fluid and stranding in the anterior mediastinum.    Assessment     #Soft tissue abscess of the chest wall  #Status post mitral valve repair with maze procedure with left atrial appendage closure on 1/8/2025   #Status post sternotomy    Continue vancomycin goal -600.  Appreciate pharmacy's help with dosing.  Follow vancomycin levels for therapeutic drug monitoring.    CT of the chest without any deep-seated abscess.  De-escalate based on culture results.  Will follow-up cardiothoracic surgery's evaluation for possible drainage.      Thank you for this consult.  We will continue to follow along and tailor antibiotics as the patient's clinical course evolves.    "

## 2025-01-24 NOTE — PROGRESS NOTES
" LOS: 1 day   Patient Care Team:  Tera Salvador MD as PCP - General (Internal Medicine)  Micah Reyes MD as Consulting Physician (Gastroenterology)  Bruna Chávez MD as Referring Physician (Cardiology)    Chief Complaint: Hypertension and chest pain     Subjective     Subjective    Patient is a 74 year old male who recently underwent mitral valve repair, MAZE, SELMA ligation on 1/8/25 with Dr. Kelly. Patient had a normal post-op course and was discharged on POD6 with home health. Since discharge, patient has had issues with pain for which pain regimen has been altered. Home health was concerned about incision and patient was placed on course of doxycycline.    Patient presented to the ED last night with complaints of chest pain and hypertension. Patient also reported drainage from incision. Patient was admitted for further evaluation of chest pain and sternal incision. We were consulted along with infectious disease.     Objective     Vital Signs  Temp:  [98.1 °F (36.7 °C)-98.9 °F (37.2 °C)] 98.4 °F (36.9 °C)  Heart Rate:  [59-85] 71  Resp:  [16-18] 18  BP: (142-174)/(67-99) 174/97  Body mass index is 32.73 kg/m².    Intake/Output Summary (Last 24 hours) at 1/24/2025 0805  Last data filed at 1/23/2025 2354  Gross per 24 hour   Intake 360 ml   Output --   Net 360 ml     No intake/output data recorded.      Wt Readings from Last 3 Encounters:   01/23/25 116 kg (255 lb)   01/20/25 117 kg (259 lb)   01/15/25 114 kg (251 lb)       Flowsheet Rows      Flowsheet Row First Filed Value   Admission Height 188 cm (74.02\") Documented at 01/23/2025 2123   Admission Weight 116 kg (255 lb) Documented at 01/23/2025 2123            Objective:  General Appearance:  Comfortable and in no acute distress.    Vital signs: (most recent): Blood pressure 174/97, pulse 71, temperature 98.4 °F (36.9 °C), temperature source Oral, resp. rate 18, height 188 cm (74.02\"), weight 116 kg (255 lb), SpO2 97%.  Vital signs are normal.  No " fever.    Lungs:  Normal effort and normal respiratory rate.  Breath sounds clear to auscultation.    Heart: Normal rate.  Irregular rhythm.    Neurological: Patient is alert and oriented to person, place and time.    Skin:  Warm and dry.  (Sternal incision with scabbing along incision, in the middle of incision there is redness and edema. There is blood tinged purulence expressed. )              Results Review:        Results from last 7 days   Lab Units 01/24/25  0434 01/23/25  1743   WBC 10*3/mm3 7.32 7.52   HEMOGLOBIN g/dL 9.8* 9.9*   HEMATOCRIT % 30.8* 30.3*   PLATELETS 10*3/mm3 251 244         PT/INR:    Protime   Date Value Ref Range Status   01/23/2025 17.2 (H) 11.7 - 14.2 Seconds Final   /  INR   Date Value Ref Range Status   01/23/2025 1.40 (H) 0.90 - 1.10 Final   01/22/2025 1.40  Final       Results from last 7 days   Lab Units 01/24/25  0434 01/23/25  1743   SODIUM mmol/L 141 143   POTASSIUM mmol/L 3.5 3.6   CHLORIDE mmol/L 101 104   CO2 mmol/L 28.0 26.0   BUN mg/dL 18 19   CREATININE mg/dL 1.10 1.11   GLUCOSE mg/dL 73 98   CALCIUM mg/dL 8.7 8.7         Scheduled Meds:  aspirin, 81 mg, Oral, Daily  bumetanide, 2 mg, Oral, Daily  escitalopram, 15 mg, Oral, QAM  fluticasone, 2 spray, Nasal, QAM  gabapentin, 600 mg, Oral, Nightly  insulin lispro, 2-9 Units, Subcutaneous, 4x Daily AC & at Bedtime  nebivolol, 5 mg, Oral, Daily  potassium chloride, 40 mEq, Oral, Q4H  sacubitril-valsartan, 1 tablet, Oral, Q12H  terazosin, 5 mg, Oral, Nightly  traZODone, 50 mg, Oral, Nightly  vancomycin, 1,000 mg, Intravenous, Q12H  warfarin, 5 mg, Oral, Daily        Infusions:  Pharmacy to dose vancomycin,   Pharmacy to dose warfarin,           Assessment & Plan         Chest pain, atypical    Diabetes mellitus    Essential hypertension    Hyperlipidemia    High risk medication use    AF (paroxysmal atrial fibrillation)    History of open heart surgery, MVR, 1/8/25    Diastolic CHF, acute on chronic      Assessment & Plan    -  Severe mitral valve regurgitation - s/p MV repair, MAZE, SELMA closure- [facility] 1/8/2025  - CAD with previous stent to LAD (2022)  - NICM, EF 40% intra-op YAZMIN  - Atrial fibrillation-- s/p MAZE  - DM II--- lantus and SSI  - HTN-- BB  - HLD-- statin  - HOLLI with CPAP  - Renal insuffiencey  - Obesity   - Post op anemia- expected acute blood loss  - BPH-- hytrin     ID consulted for concern for sternal abscess -- cultures obtained (wound culture currently negative) and patient on IV vancomycin   CT chest completed, will have Dr. Crawford review -- per report there is minimal fld and stranding in anterior mediastinum   Further recommendations to come    Melvi Grant PA-C  01/24/25  08:05 EST

## 2025-01-24 NOTE — OUTREACH NOTE
CT Surgery Week 2 Survey      Flowsheet Row Responses   [facility] patient discharged from? [facility]   Does the patient have one of the following disease processes/diagnoses(primary or secondary)? Cardiothoracic surgery   Week 2 attempt successful? No   Unsuccessful attempts Attempt 1   Revoke Readmitted            Anny NICHOLS - Registered Nurse

## 2025-01-24 NOTE — H&P
HISTORY AND PHYSICAL   [facility]        Date of Admission: 2025  Patient Identification:  Name: Jeb Olson  Age: 74 y.o.  Sex: male  :  1950  MRN: [id number]                     Primary Care Physician: Tera Salvador MD    Chief Complaint: Chest pain    History of Present Illness:     Mr. Bhanu Munoz is a 74-year-old male who is sitting up in the bedside chair about his comfortable as 1 can be.  RN currently present at bedside and no family present.  He comes here because of complaints of chest pain.  He states the pain was all along the anterior of his chest under this breast and radiated to his left arm.  He states that the sharp stabbing pain.  He also claims that the pain is worse with movement.  He denies any fever chills or night sweats.  He admits to being on doxycycline for a cyst with drainage on his anterior chest wall.  He has no problems tolerating doxycycline and denies any nausea vomiting or diarrhea.  He denies any worsening of weakness and states that he was living at home with his wife and he states that his wife does not want to bring him home until she says he is ready.  Patient had recent mitral valve repair with cryo maze and left atrial appendage closure with Dr. Kelly.  Patient tolerated all well and initiated on proper medications including beta-blocker and diuretics though he did develop some first-degree block postoperatively in which beta-blocker was held.     Past Medical History:  Past Medical History:   Diagnosis Date    AF (paroxysmal atrial fibrillation)     Allergic Have had for several years    Eyes and nasal issues    Anemia     Anticoagulant long-term use     COUMADIN    Anxiety Since about     Since I was taking of my Dad, who also passed away 3yrs ago.    Arthritis 2002    Both knees, hips, and shoulders    Arthritis of knee, left     Cataract 2019    CHF (congestive heart failure)     Colon polyp 2017    Coronary artery disease      stent    Diabetes mellitus     Dry eyes     Essential hypertension     Heart murmur     HL (hearing loss) 1980    no hearing aides    Hyperlipemia     Knee swelling 7/7/2020    Shortly after my left knee replacement    Macular degeneration     Mild chronic anemia     Mitral valve disorder     Obesity     Pneumonia 11/2024    Sleep apnea     cpap     Past Surgical History:  Past Surgical History:   Procedure Laterality Date    ACHILLES TENDON REPAIR Left     CARDIAC CATHETERIZATION      CARDIAC CATHETERIZATION N/A 09/01/2022    Procedure: Coronary angiography, Left heart catheterization,;  Surgeon: Bruna Chávez MD;  Location[facility] C[facility] INVASIVE LOC[facility];  Service: Cardiology;  Laterality: N/A;    CARDIAC CATHETERIZATION N/A 09/01/2022    Procedure: Stent PRAVIN coronary;  Surgeon: Bruna Chávez MD;  [facility];  Service: Cardiology;  Laterality: N/A;    CARDIAC CATHETERIZATION N/A 1/2/2025    Procedure: Left Heart Cath;  Surgeon: Bruna Chávez MD;  [facility];  Service: Cardiology;  Laterality: N/A;    CARDIAC CATHETERIZATION N/A 1/2/2025    Procedure: Coronary angiography;  Surgeon: Bruna Chávez MD;  [facility];  Service: Cardiology;  Laterality: N/A;    CATARACT EXTRACTION EXTRACAPSULAR W/ INTRAOCULAR LENS IMPLANTATION Bilateral     COLONOSCOPY  2018    Dr Reyes    ENDOSCOPY      MITRAL VALVE REPAIR/REPLACEMENT N/A 1/8/2025    Procedure: STERNOTOMY, MITRAL VALVE REPAIR, MAZE PROCEDURE TRANSESOPHAGEAL ECHOCARDIOGRAM, PRP;  Surgeon: Young Kelly MD;  [facility];  Service: Cardiothoracic;  Laterality: N/A;    TONSILLECTOMY      As a child    TOTAL KNEE ARTHROPLASTY Left 03/12/2020    Procedure: TOTAL KNEE ARTHROPLASTY;  Surgeon: Chepe Alicea MD;  Location: [facility] OR;  Service: Orthopedics;  Laterality: Left;    TOTAL KNEE ARTHROPLASTY Right 03/21/2024    Procedure: TOTAL KNEE ARTHROPLASTY;  Surgeon:  Chepe Alicea MD;  Location: [facility] OR [facility];  Service: Orthopedics;  Laterality: Right;      Home Meds:  Medications Prior to Admission   Medication Sig Dispense Refill Last Dose/Taking    acetaminophen (TYLENOL) 325 MG tablet Take 2 tablets by mouth 2 (Two) Times a Day As Needed for Mild Pain. 60 tablet 0 Taking As Needed    aspirin 81 MG EC tablet Take 1 tablet by mouth Daily.   Taking    B Complex-Folic Acid (B COMPLEX PLUS PO) Take 1 tablet by mouth Every Other Day. Indications: nutritional supplement   Taking    bumetanide (BUMEX) 2 MG tablet Take 1 tablet by mouth Daily for 30 days. 30 tablet 0 Taking    doxazosin (CARDURA) 4 MG tablet TAKE 1 TABLET BY MOUTH ONCE DAILY AT NIGHT 90 tablet 1 Taking    doxycycline (ADOXA) 100 MG tablet Take 1 tablet by mouth 2 (Two) Times a Day. Indications: Infection of the Skin and/or Soft Tissue 20 tablet 0 Taking    escitalopram (LEXAPRO) 10 MG tablet Take 1.5 tablets by mouth Every Evening. (Patient taking differently: Take 1.5 tablets by mouth Every Morning.) 135 tablet 1 Taking Differently    ezetimibe (ZETIA) 10 MG tablet Take 1 tablet by mouth once daily (Patient taking differently: Take 1 tablet by mouth Every Night.) 90 tablet 0 Taking Differently    fenofibrate 160 MG tablet TAKE 1 TABLET BY MOUTH AT NIGHT 90 tablet 0 Taking    fluticasone (FLONASE) 50 MCG/ACT nasal spray Administer 2 sprays into the nostril(s) as directed by provider Every Morning. 2 sprays in each nostril  Indications: Stuffy Nose   Taking    gabapentin (NEURONTIN) 600 MG tablet Take 1 tablet by mouth Every Evening. Indications: Diabetes with Nerve Disease   Taking    glimepiride (AMARYL) 4 MG tablet Take 1 tablet by mouth 2 (Two) Times a Day. Indications: Type 2 Diabetes 180 tablet 1 Taking    HYDROcodone-acetaminophen (Norco) 5-325 MG per tablet Take 1 tablet by mouth Every 4 (Four) Hours As Needed for Severe Pain. 40 tablet 0 Taking As Needed    Janumet XR  MG tablet Take 1 tablet by  mouth twice daily 180 tablet 0 Taking    Multiple Vitamins-Minerals (PRESERVISION AREDS 2 PO) Take 1 tablet by mouth 2 (Two) Times a Day.   Taking    nebivolol (BYSTOLIC) 5 MG tablet Take 1 tablet by mouth Daily. 90 tablet 1 Taking    Olopatadine HCl (PATADAY OP) Apply 1 drop to eye(s) as directed by provider 2 (Two) Times a Day As Needed (allergies).   Taking As Needed    polyethyl glycol-propyl glycol (SYSTANE) 0.4-0.3 % solution ophthalmic solution (artificial tears) Administer 1 drop to both eyes As Needed (dry eyes). Indications: Excessive Cornea and Conjunctiva Dryness   Taking As Needed    potassium chloride (KLOR-CON M20) 20 MEQ CR tablet Take 2 tablets by mouth Daily for 30 days. 60 tablet 0 Taking    sacubitril-valsartan (ENTRESTO) 24-26 MG tablet Take 1 tablet by mouth Every 12 (Twelve) Hours for 30 days. 60 tablet 0 Taking    traZODone (DESYREL) 50 MG tablet TAKE 1 TABLET BY MOUTH ONCE DAILY AT NIGHT 90 tablet 0 Taking    vitamin D3 125 MCG (5000 UT) capsule capsule Take 1 capsule by mouth Daily.   Taking    warfarin (COUMADIN) 5 MG tablet Take 1 tablet by mouth Every Morning. Indications: Atrial Fibrillation   Taking    cyclobenzaprine (FLEXERIL) 10 MG tablet Take 1 tablet by mouth 3 (Three) Times a Day As Needed for Muscle Spasms. Indications: Muscle Spasm 20 tablet 0     glucose blood (Accu-Chek Anabela Plus) test strip TEST TWICE DAILY Use as instructed 100 each 3        Allergies:  No Known Allergies  Immunizations:  Immunization History   Administered Date(s) Administered    COVID-19 (MODERNA) 12YRS+ (SPIKEVAX) 12/01/2023    COVID-19 (PFIZER) 12YRS+ (COMIRNATY) 11/22/2024    COVID-19 (PFIZER) BIVALENT 12+YRS 10/20/2022    COVID-19 (PFIZER) Purple Cap Monovalent 03/11/2021, 04/01/2021, 10/21/2021    Covid-19 (Pfizer) Gray Cap Monovalent 04/30/2022    Fluzone High-Dose 65+YRS 10/08/2014, 11/21/2019, 11/22/2024    Fluzone High-Dose 65+yrs 01/14/2022, 11/03/2022, 12/01/2023    Influenza, Unspecified  2019, 2020    Pneumococcal Conjugate 13-Valent (PCV13) 2019    Pneumococcal Polysaccharide (PPSV23) 2014, 2020    Td, Not Adsorbed 2014    Tdap 2022     Social History:   Social History     Social History Narrative    Not on file     Social History     Socioeconomic History    Marital status:    Tobacco Use    Smoking status: Never     Passive exposure: Never    Smokeless tobacco: Never    Tobacco comments:     Naila only every been around people who smoked   Vaping Use    Vaping status: Never Used   Substance and Sexual Activity    Alcohol use: Not Currently     Alcohol/week: 21.0 standard drinks of alcohol     Types: 21 Shots of liquor per week     Comment: since     Drug use: Never    Sexual activity: Not Currently     Partners: Female     Birth control/protection: None       Family History:  Family History   Problem Relation Age of Onset    Alcohol abuse Maternal Uncle         Passed away     Alcohol abuse Father         Passed away in     Hyperlipidemia Father         Started about 10 years before his death    Arthritis Mother         Passed away , from Alzheimers    Diabetes Mother     Hyperlipidemia Mother         Started probably at middle age    Osteoporosis Mother     Malig Hyperthermia Neg Hx         Review of Systems  See history of present illness and past medical history.  Patient denies fever chills night sweats.  Denies any nausea vomiting abdominal pain or diarrhea.  Admits to chest pain that is worse with movement.  States has had drainage from his cyst on his central chest wound.  Denies any loss of consciousness or focal loss of function and claims to be ambulating independently.  Remainder of ROS is negative.    Objective:  T Max 24 hrs: Temp (24hrs), Av.5 °F (36.9 °C), Min:98.1 °F (36.7 °C), Max:98.9 °F (37.2 °C)    Vitals Ranges:   Temp:  [98.1 °F (36.7 °C)-98.9 °F (37.2 °C)] 98.4 °F (36.9 °C)  Heart Rate:  [59-85]  "71  Resp:  [16-18] 18  BP: (142-161)/(67-99) 160/94      Exam:  /94 (BP Location: Left arm, Patient Position: Sitting)   Pulse 71   Temp 98.4 °F (36.9 °C) (Oral)   Resp 18   Ht 188 cm (74.02\")   Wt 116 kg (255 lb)   SpO2 97%   BMI 32.73 kg/m²     General Appearance:    Alert, cooperative, no distress, alert and oriented x 3, sitting in bedside chair, certainly nontoxic in appearance, no family present though RN at bedside and case discussed   Head:    Normocephalic, without obvious abnormality, atraumatic   Eyes:    PERRLA/EOM's intact, both eyes   Ears:    Normal external ear canals, both ears   Nose:   Nares normal, septum midline, mucosa normal, no drainage    or sinus tenderness   Throat:   Lips, mucosa, and tongue normal   Neck:   Supple, no JVD   Back:     Symmetric, no curvature, ROM normal, no CVA tenderness   Lungs:     Clear to auscultation bilaterally, respirations unlabored   Chest Wall:  Reproducible with movement.  Small area of possible incisional abscess from recent surgical midline scar.  Serosanguineous fluid with mild purulence expressed and there is no surrounding cellulitis    Heart:    Regular rate and rhythm, S1 and S2 normal     Abdomen:     Soft, nontender, bowel sounds active all four quadrants,   No rebound no guarding   Extremities: Moving all with baseline strength, no cyanosis or pitting edema   Pulses:   2+ and symmetric all extremities           Neurologic:   CNII-XII intact, no FD      .    Data Review:  Labs in chart were reviewed.             Imaging Results (All)       Procedure Component Value Units Date/Time    CT Chest With Contrast Diagnostic [[id number]] Collected: 01/23/25 1913     Updated: 01/23/25 1920    Narrative:      CT CHEST WITH IV CONTRAST     HISTORY: Chest pain, recent cardiac surgery     TECHNIQUE: Radiation dose reduction techniques were utilized, including  automated exposure control and exposure modulation based on body size.  Axial images were " obtained through the chest after the administration of  IV contrast. Coronal and sagittal reformatted images obtained.     COMPARISON: 12/19/2024     FINDINGS: Interval sternotomy. Some minimal fluid and stranding in the  anterior mediastinum. Mildly prominent lymph nodes likely reactive. Very  small bilateral pleural effusions, decreased from previous study.  Minimal pericardial effusion. There is some atelectasis in both lower  lobes. Limited imaging of the upper abdomen demonstrates bilateral  nephrolithiasis.       Impression:      Interval sternotomy. Small bilateral pleural effusions, decreased in  size from previous study. Improved aeration of the lower lobes with some  residual atelectasis           Radiation dose reduction techniques were utilized, including automated  exposure control and exposure modulation based on body size.        This report was finalized on 1/23/2025 7:17 PM by Dr. Good Patel M.D on Workstation: [id number]                 Assessment:  Active Hospital Problems    Diagnosis  POA    **Chest pain, atypical [R07.89]  Unknown    Diastolic CHF, acute on chronic [I50.33]  Unknown    History of open heart surgery, MVR, 1/8/25 [Z98.890]  Not Applicable    AF (paroxysmal atrial fibrillation) [I48.0]  Yes    Essential hypertension [I10]  Yes    Diabetes mellitus [E11.9]  Yes    High risk medication use [Z79.899]  Not Applicable    Hyperlipidemia [E78.5]  Yes      Resolved Hospital Problems   No resolved problems to display.       Plan:    Recent cardiac surgery per Dr. Kelly presenting with chest pain   -Chest pain sounds all musculoskeletal in etiology from my perspective   -A-fib with no RVR   -Labs consistent with CHF likely diastolic as previous EF was normal and clinically I cannot appreciate any active volume overload at this time.  Patient received IV Bumex 2 mg x 1 in ER.  Transition back to oral Lasix at 2 mg daily as I do not feel anything acute is present.  CT of the chest  "only shows small pleural effusions with aspects of atelectasis with spirometry ordered   -Labs otherwise overall unremarkable.  Mild decrease in hemoglobin secondary to recent blood loss but Hgb stabilizing at 9.8 and no need to trend daily   -Blood cultures pending/wound care after pending-patient admits to antibiotics prior to admission for recent wound leakage.  Patient started on vancomycin and Zosyn now discontinued and will restart p.o. doxycycline as nothing seems impressive on exam with a small incisional abscess but no surrounding cellulitis.  Will see what the cultures reveal as well as get surgical input to determine any further necessities.  ID consult has already been placed and will appreciate their input as well   -On Entresto/Bystolic/terazosin   -Coumadin with pharmacy to dose.  Also on aspirin 81 mg daily   -Give additional K for level of 3.5         DM2 with circulatory disorder   -Blood sugar currently soft in the 70s and will hold sulfonylurea and continue with SSI    SCDs for additional prophylaxis until INR therapeutic    Depression/anxiety on Lexapro/trazodone    Home MAR provided reviewed and reconciled    Further recommendations to follow as clinical course unfolds        Disposition -if no objection from cardiothoracic then anticipate a quick turnaround and discharge today or tomorrow.  I am not so sure inpatient services are warranted.  Patient makes the comment that his spouse states \"I will not take him home until she says he is ready.\"  I indicated to the patient that is not how this works in the hospital and medical need will be determined by MD.  ID consult previously placed though I have transition patient back to p.o. doxycycline off of IV antibiotics until they can evaluate but nothing very impressive on his chest wall concerning for abscess or cellulitis        Addendum: Pharmacy notified me that Dr. Pino had continued with IV vancomycin for now so antibiotics adjusted per his " recommendations.    Donnie Taylor MD  1/24/2025  07:24 EST

## 2025-01-24 NOTE — PROGRESS NOTES
Patient is current with [facility]. Will follow for dc plans/home health needs.

## 2025-01-24 NOTE — ED NOTES
Nursing report ED to floor  Jeb Olson  74 y.o.  male    HPI :  HPI  Stated Reason for Visit: hypertension and chest pain after open heart surgery recently  History Obtained From: patient, EMS    Chief Complaint  Chief Complaint   Patient presents with    Chest Pain    Hypertension       Admitting doctor:   Peterson Rosenberg MD    Admitting diagnosis:   The primary encounter diagnosis was Chest pain, unspecified type. Diagnoses of Abscess of sternal region, History of open heart surgery, MVR, 1/8/25, Subtherapeutic international normalized ratio (INR), and Acute congestive heart failure, unspecified heart failure type were also pertinent to this visit.    Code status:   Current Code Status       Date Active Code Status Order ID Comments User Context       Prior            Allergies:   Patient has no known allergies.    Isolation:   No active isolations    Intake and Output  No intake or output data in the 24 hours ending 01/23/25 2029    Weight:   There were no vitals filed for this visit.    Most recent vitals:   Vitals:    01/23/25 1716 01/23/25 1733 01/23/25 1735   BP: 150/80 142/67    Pulse: 59  63   Resp: 16     Temp: 98.9 °F (37.2 °C)     TempSrc: Tympanic     SpO2: 96%  93%       Active LDAs/IV Access:   Lines, Drains & Airways       Active LDAs       Name Placement date Placement time Site Days    Peripheral IV 01/23/25 1744 Left Antecubital 01/23/25 1744  Antecubital  less than 1                    Labs (abnormal labs have a star):   Labs Reviewed   PROTIME-INR - Abnormal; Notable for the following components:       Result Value    Protime 17.2 (*)     INR 1.40 (*)     All other components within normal limits   BNP (IN-HOUSE) - Abnormal; Notable for the following components:    proBNP 5,182.0 (*)     All other components within normal limits    Narrative:     This assay is used as an aid in the diagnosis of individuals suspected of having heart failure. It can be used as an aid in the diagnosis of  acute decompensated heart failure (ADHF) in patients presenting with signs and symptoms of ADHF to the emergency department (ED). In addition, NT-proBNP of <300 pg/mL indicates ADHF is not likely.    Age Range Result Interpretation  NT-proBNP Concentration (pg/mL:      <50             Positive            >450                   Gray                 300-450                    Negative             <300    50-75           Positive            >900                  Gray                300-900                  Negative            <300      >75             Positive            >1800                  Gray                300-1800                  Negative            <300   TROPONIN - Abnormal; Notable for the following components:    HS Troponin T 88 (*)     All other components within normal limits    Narrative:     High Sensitive Troponin T Reference Range:  <14.0 ng/L- Negative Female for AMI  <22.0 ng/L- Negative Male for AMI  >=14 - Abnormal Female indicating possible myocardial injury.  >=22 - Abnormal Male indicating possible myocardial injury.   Clinicians would have to utilize clinical acumen, EKG, Troponin, and serial changes to determine if it is an Acute Myocardial Infarction or myocardial injury due to an underlying chronic condition.        CBC WITH AUTO DIFFERENTIAL - Abnormal; Notable for the following components:    RBC 3.51 (*)     Hemoglobin 9.9 (*)     Hematocrit 30.3 (*)     Neutrophil % 78.4 (*)     Lymphocyte % 13.2 (*)     Monocyte % 4.8 (*)     All other components within normal limits   APTT - Normal   LACTIC ACID, PLASMA - Normal   WOUND CULTURE   BLOOD CULTURE   BLOOD CULTURE   COMPREHENSIVE METABOLIC PANEL    Narrative:     GFR Categories in Chronic Kidney Disease (CKD)      GFR Category          GFR (mL/min/1.73)    Interpretation  G1                     90 or greater         Normal or high (1)  G2                      60-89                Mild decrease (1)  G3a                   45-59                 Mild to moderate decrease  G3b                   30-44                Moderate to severe decrease  G4                    15-29                Severe decrease  G5                    14 or less           Kidney failure          (1)In the absence of evidence of kidney disease, neither GFR category G1 or G2 fulfill the criteria for CKD.    eGFR calculation 2021 CKD-EPI creatinine equation, which does not include race as a factor   HIGH SENSITIVITIY TROPONIN T 1HR   CBC AND DIFFERENTIAL    Narrative:     The following orders were created for panel order CBC & Differential.  Procedure                               Abnormality         Status                     ---------                               -----------         ------                     CBC Auto Differential[[id number]]        Abnormal            Final result                 Please view results for these tests on the individual orders.       EKG:   ECG 12 Lead Chest Pain   Preliminary Result   HEART RATE=67  bpm   RR Usmvugjb=010  ms   LA Interval=  ms   P Horizontal Axis=  deg   P Front Axis=  deg   QRSD Vpszhofn=329  ms   QT Nuzelrdy=413  ms   JBqB=601  ms   QRS Axis=-55  deg   T Wave Axis=102  deg   - ABNORMAL ECG -   Atrial fibrillation   Ventricular bigeminy   Nonspecific IVCD with LAD   Left ventricular hypertrophy   Anterior Q waves, possibly due to LVH   Date and Time of Study:2025-01-23 17:49:21          Meds given in ED:   Medications   vancomycin 2250 mg/500 mL 0.9% NS IVPB ([facility]) (2,250 mg Intravenous New Bag 1/23/25 2002)   piperacillin-tazobactam (ZOSYN) 3.375 g IVPB in 100 mL NS MBP (CD) (3.375 g Intravenous New Bag 1/23/25 1937)   bumetanide (BUMEX) injection 2 mg (2 mg Intravenous Given 1/23/25 1913)   iopamidol (ISOVUE-370) 76 % injection 100 mL (75 mL Intravenous Given by Other 1/23/25 2037)       Imaging results:  CT Chest With Contrast Diagnostic    Result Date: 1/23/2025  Interval sternotomy. Small bilateral pleural effusions, decreased in  size from previous study. Improved aeration of the lower lobes with some residual atelectasis    Radiation dose reduction techniques were utilized, including automated exposure control and exposure modulation based on body size.   This report was finalized on 1/23/2025 7:17 PM by Dr. Good Patel M.D on Workstation: [id number]       Ambulatory status:   - ad albert    Social issues:   Social History     Socioeconomic History    Marital status:    Tobacco Use    Smoking status: Never     Passive exposure: Never    Smokeless tobacco: Never    Tobacco comments:     Naila only every been around people who smoked   Vaping Use    Vaping status: Never Used   Substance and Sexual Activity    Alcohol use: Not Currently     Alcohol/week: 21.0 standard drinks of alcohol     Types: 21 Shots of liquor per week     Comment: since thanksgiving    Drug use: Never    Sexual activity: Not Currently     Partners: Female     Birth control/protection: None       Peripheral Neurovascular  Peripheral Neurovascular (Adult)  Peripheral Neurovascular WDL: WDL    Neuro Cognitive  Neuro Cognitive (Adult)  Cognitive/Neuro/Behavioral WDL: WDL    Learning  Learning Assessment  Learning Readiness and Ability: no barriers identified    Respiratory  Respiratory  Airway WDL: WDL  Respiratory WDL  Respiratory WDL: WDL    Abdominal Pain       Pain Assessments  Pain (Adult)  (0-10) Pain Rating: Rest: 8  (0-10) Pain Rating: Activity: 8  Pain Location: chest    NIH Stroke Scale       Juan Mcdowell RN  01/23/25 20:29 EST

## 2025-01-24 NOTE — PROGRESS NOTES
[facility] Clinical Pharmacy Services: Warfarin Dosing/Monitoring Consult    Jeb Olson is a 74 y.o. male, estimated creatinine clearance is 82.2 mL/min (by C-G formula based on SCr of 1.1 mg/dL). weighing 116 kg (255 lb).    Results from last 7 days   Lab Units 01/24/25  0826 01/24/25  0434 01/23/25  1743 01/22/25  0000   INR  1.46*  --  1.40* 1.40   APTT seconds  --   --  33.9  --    HEMOGLOBIN g/dL  --  9.8* 9.9*  --    HEMATOCRIT %  --  30.8* 30.3*  --    PLATELETS 10*3/mm3  --  251 244  --      Prior to admission anticoagulation: warfarin 5 mg daily did take dose 1/23 day of admission   (I did personally s/w pt, he had note received the instructions from HANNAH DEE/Dorota which were given on the day of admission as follows   Provided instructions to Michelle with [facility] to Change their warfarin regimen- see above in Anticoagulation Summary.  Patient has been taking 5 mg daily since he was admitted in [place]. We were under the impression he was taking 7.5 mg twice weekly as we have spoken to the patient about dosing for procedures since returning from [place].  Boost to 10 mg today and rck tomorrow. Likely need at least one day of 7.5 mg. Rck tomorrow (spoke with patient and Michelle/MADDY)   2. Follow up in 1 days    Hospital Anticoagulation:  Consulting provider: Claudia  Start date: 1/24  Indication: A Fib - requiring full anticoagulation  Target INR: 2 - 3  Expected duration: life   Bridge Therapy: No      Potential food or drug interactions: abx    Education complete?/Date: No; plan for follow up TBD    Assessment/Plan:    Warfarin 10 mg once today 1/24 then 7.5 mg Wed/Sat and 5 mg all other days   Upon discharge- regimen needs to be adjusted per Aj instruction above to 7.5 mg Wednesday and Saturday and 5 mg all other days of the week  Monitor for any signs or symptoms of bleeding  Follow up daily INRs and dose adjustments    Pharmacy will continue to follow until discharge or  discontinuation of warfarin.     Danelle Collado RP  Clinical Pharmacist

## 2025-01-24 NOTE — PROGRESS NOTES
"[facility] Clinical Pharmacy Services: Vancomycin Pharmacokinetic Initial Consult Note    Jeb Olson is a 74 y.o. male who is on day 1 of pharmacy to dose vancomycin.    Indication: Skin and Soft Tissue  Consulting Provider: Asuncion Stover  Planned Duration of Therapy: 5 days  Loading Dose Ordered or Given: 2250 mg on 1/23 at 2002  MRSA PCR performed: N/A  Culture/Source: Bcx in Process  Target: -600 mg/L.hr   Pertinent Vanc Dosing History:   Other Antimicrobials: received Zosyn x1    Vitals/Labs  Ht: 188 cm (74.02\"); Wt: 116 kg (255 lb)  Temp Readings from Last 1 Encounters:   01/23/25 98.4 °F (36.9 °C) (Oral)    Estimated Creatinine Clearance: 81.4 mL/min (by C-G formula based on SCr of 1.11 mg/dL).        Results from last 7 days   Lab Units 01/23/25  1743   CREATININE mg/dL 1.11   WBC 10*3/mm3 7.52     Assessment/Plan:    Vancomycin Dose:   1000 mg IV every  12  hours  Predictive AUC level for the dose ordered is 513 mg/L.hr, which is within the target of 400-600 mg/L.hr  Vanc Trough has been ordered for 1/25 at 0730     Pharmacy will follow patient's kidney function and will adjust doses and obtain levels as necessary. Thank you for involving pharmacy in this patient's care. Please contact pharmacy with any questions or concerns.                           Navin Calabrese, [facility]  Clinical Pharmacist   "

## 2025-01-24 NOTE — PROGRESS NOTES
Clinical Pharmacy Services: Medication History    Jeb Olson is a 74 y.o. male presenting to [facility] for Acute CHF [I50.9]  Subtherapeutic international normalized ratio (INR) [R79.1]  Abscess of sternal region [L02.213]  History of open heart surgery [Z98.890]  Chest pain, unspecified type [R07.9]  Acute congestive heart failure, unspecified heart failure type [I50.9]    He  has a past medical history of AF (paroxysmal atrial fibrillation), Allergic (Have had for several years), Anemia, Anticoagulant long-term use, Anxiety (Since about 2014), Arthritis (2002), Arthritis of knee, left, Cataract (05/2019), CHF (congestive heart failure), Colon polyp (2017), Coronary artery disease, Diabetes mellitus, Dry eyes, Essential hypertension, Heart murmur, HL (hearing loss) (1980), Hyperlipemia, Knee swelling (7/7/2020), Macular degeneration, Mild chronic anemia, Mitral valve disorder, Obesity, Pneumonia (11/2024), and Sleep apnea.    Allergies as of 01/23/2025    (No Known Allergies)       Medication information was obtained from: patient   Pharmacy and Phone Number:     Prior to Admission Medications       Prescriptions Last Dose Informant Patient Reported? Taking?    acetaminophen (TYLENOL) 325 MG tablet   No Yes    Take 2 tablets by mouth 2 (Two) Times a Day As Needed for Mild Pain.    aspirin 81 MG EC tablet 1/23/2025  No Yes    Take 1 tablet by mouth Daily.    B Complex-Folic Acid (B COMPLEX PLUS PO)   Yes Yes    Take 1 tablet by mouth Every Other Day. Indications: nutritional supplement    bumetanide (BUMEX) 2 MG tablet 1/23/2025  No Yes    Take 1 tablet by mouth Daily for 30 days.    cyclobenzaprine (FLEXERIL) 10 MG tablet   No Yes    Take 1 tablet by mouth 3 (Three) Times a Day As Needed for Muscle Spasms. Indications: Muscle Spasm    doxazosin (CARDURA) 4 MG tablet 1/22/2025  No Yes    TAKE 1 TABLET BY MOUTH ONCE DAILY AT NIGHT    doxycycline (ADOXA) 100 MG tablet   No Yes    Take 1 tablet by  mouth 2 (Two) Times a Day. Indications: Infection of the Skin and/or Soft Tissue    escitalopram (LEXAPRO) 10 MG tablet 1/23/2025  No Yes    Take 1.5 tablets by mouth Every Evening.    Patient taking differently:  Take 1.5 tablets by mouth Every Morning.    ezetimibe (ZETIA) 10 MG tablet 1/23/2025  No Yes    Take 1 tablet by mouth once daily    Patient taking differently:  Take 1 tablet by mouth Every Night.    fenofibrate 160 MG tablet   No Yes    TAKE 1 TABLET BY MOUTH AT NIGHT    fluticasone (FLONASE) 50 MCG/ACT nasal spray   Yes Yes    Administer 2 sprays into the nostril(s) as directed by provider Every Morning. 2 sprays in each nostril  Indications: Stuffy Nose    gabapentin (NEURONTIN) 600 MG tablet   Yes Yes    Take 1 tablet by mouth Every Evening. Indications: Diabetes with Nerve Disease    glimepiride (AMARYL) 4 MG tablet   No Yes    Take 1 tablet by mouth 2 (Two) Times a Day. Indications: Type 2 Diabetes    HYDROcodone-acetaminophen (Norco) 5-325 MG per tablet   No Yes    Take 1 tablet by mouth Every 4 (Four) Hours As Needed for Severe Pain.    Janumet XR  MG tablet   No Yes    Take 1 tablet by mouth twice daily    Multiple Vitamins-Minerals (PRESERVISION AREDS 2 PO) 1/23/2025 Self Yes Yes    Take 1 tablet by mouth 2 (Two) Times a Day.    nebivolol (BYSTOLIC) 5 MG tablet 1/22/2025  No Yes    Take 1 tablet by mouth Daily.    Patient taking differently:  Take 1 tablet by mouth every night at bedtime. Indications: High Blood Pressure    Olopatadine HCl (PATADAY OP)   Yes Yes    Apply 1 drop to eye(s) as directed by provider 2 (Two) Times a Day As Needed (allergies).    polyethyl glycol-propyl glycol (SYSTANE) 0.4-0.3 % solution ophthalmic solution (artificial tears)   Yes Yes    Administer 1 drop to both eyes As Needed (dry eyes). Indications: Excessive Cornea and Conjunctiva Dryness    potassium chloride (KLOR-CON M20) 20 MEQ CR tablet 1/23/2025  No Yes    Take 2 tablets by mouth Daily for 30 days.     sacubitril-valsartan (ENTRESTO) 24-26 MG tablet 1/23/2025  No Yes    Take 1 tablet by mouth Every 12 (Twelve) Hours for 30 days.    traZODone (DESYREL) 50 MG tablet 1/22/2025  No Yes    TAKE 1 TABLET BY MOUTH ONCE DAILY AT NIGHT    vitamin D3 125 MCG (5000 UT) capsule capsule 1/23/2025  Yes Yes    Take 1 capsule by mouth Daily.    warfarin (COUMADIN) 5 MG tablet 1/23/2025  Yes Yes    Take 1 tablet by mouth Every Morning. Indications: Atrial Fibrillation    glucose blood (Accu-Chek Anabela Plus) test strip   No No    TEST TWICE DAILY Use as instructed              Medication notes:   Took warfarin 5 mg on 1/22 and 1/23 (day of admission)     This medication list is complete to the best of my knowledge as of 1/24/2025    Please call if questions.    Danelle Collado, PharmD, BCPS  1/24/2025 08:34 EST

## 2025-01-24 NOTE — CASE MANAGEMENT/SOCIAL WORK
Discharge Planning Assessment  [facility]     Patient Name: Jeb Olson  MRN: [id number]  Today's Date: 1/24/2025    Admit Date: 1/23/2025    Plan: Home with [facility] , family to transport.   Discharge Needs Assessment       Row Name 01/24/25 1529       Living Environment    People in Home spouse    Current Living Arrangements home    Family Caregiver if Needed spouse    Quality of Family Relationships helpful;involved;supportive    Able to Return to Prior Arrangements yes       Transition Planning    Patient/Family Anticipates Transition to home with family    Patient/Family Anticipated Services at Transition     Transportation Anticipated car, drives self;family or friend will provide       Discharge Needs Assessment    Readmission Within the Last 30 Days current reason for admission unrelated to previous admission    Current Outpatient/Agency/Support Group homecare agency    Equipment Currently Used at Home cpap;bp cuff;grab bar;shower chair;walker, rolling;glucometer;cane, straight    Concerns to be Addressed discharge planning    Equipment Needed After Discharge none    Outpatient/Agency/Support Group Needs homecare agency    Discharge Facility/Level of Care Needs home with home health                   Discharge Plan       Row Name 01/24/25 5238       Plan    Plan Home with [facility] , family to transport.    Patient/Family in Agreement with Plan yes    Plan Comments CCP met with pt and his brother Lang at the bedside, introduced self and role of CCP. Pt gave CCP permission to speak in front of Lang. Face sheet information and pharmacy verified. Pt lives in a single-story home with 5STE with his wife. Pt still drives, IADL's and has a CPAP ([employer]), pulse ox, cane, rolling walker, glucometer, grab bars, shower chair, BP cuff and scale at home. Pt denies having a living will. Pt is enrolled in meds to beds and denies trouble affording or managing his medications. Pt is current with [facility] and denies SNF history. DC plan is to return home, family to transport. [facility] notified and confirmed pt is current. Nader RN/CCP                  Continued Care and Services - Admitted Since 1/23/2025    No active coordination exists for this encounter.       Selected Continued Care - Prior Encounters Includes continued care and service providers with selected services from prior encounters from 10/25/2024 to 1/24/2025      Discharged on 1/14/2025 Admission date: 1/5/2025 - Discharge disposition: Home-Health Care c      Home Medical Care       Service Provider Services Address Phone Fax Patient Preferred    [facility] [address] [place] [id number] [phone] --                          Expected Discharge Date and Time       Expected Discharge Date Expected Discharge Time    Jan 25, 2025            Demographic Summary       Row Name 01/24/25 1529       General Information    Admission Type inpatient    Arrived From home    Required Notices Provided Important Message from Medicare    Referral Source case finding;admission list    Reason for Consult discharge planning    Preferred Language English       Contact Information    Permission Granted to Share Info With                    Functional Status       Row Name 01/24/25 1529       Functional Status    Usual Activity Tolerance excellent    Current Activity Tolerance good       Assessment of Health Literacy    How often do you have someone help you read hospital materials? Never    How often do you have problems learning about your medical condition because of difficulty understanding written information? Never    How often do you have a problem understanding what is told to you about your medical condition? Never    How confident are you filling out medical forms by yourself? Extremely    Health Literacy Excellent       Functional Status, IADL    Medications independent     Meal Preparation independent    Housekeeping independent    Laundry independent    Shopping independent                               Laurence Fernandez RN

## 2025-01-25 ENCOUNTER — READMISSION MANAGEMENT (OUTPATIENT)
Dept: CALL CENTER | Facility: HOSPITAL | Age: 75
End: 2025-01-25
Payer: MEDICARE

## 2025-01-25 VITALS
WEIGHT: 251.5 LBS | OXYGEN SATURATION: 96 % | TEMPERATURE: 98.1 F | HEART RATE: 70 BPM | HEIGHT: 74 IN | DIASTOLIC BLOOD PRESSURE: 103 MMHG | RESPIRATION RATE: 18 BRPM | BODY MASS INDEX: 32.28 KG/M2 | SYSTOLIC BLOOD PRESSURE: 162 MMHG

## 2025-01-25 PROBLEM — T81.49XA INCISIONAL ABSCESS: Status: ACTIVE | Noted: 2025-01-25

## 2025-01-25 LAB
ALBUMIN SERPL-MCNC: 3.8 G/DL (ref 3.5–5.2)
ANION GAP SERPL CALCULATED.3IONS-SCNC: 12.5 MMOL/L (ref 5–15)
BUN SERPL-MCNC: 23 MG/DL (ref 8–23)
BUN/CREAT SERPL: 19.5 (ref 7–25)
CALCIUM SPEC-SCNC: 9.2 MG/DL (ref 8.6–10.5)
CHLORIDE SERPL-SCNC: 103 MMOL/L (ref 98–107)
CO2 SERPL-SCNC: 27.5 MMOL/L (ref 22–29)
CREAT SERPL-MCNC: 1.18 MG/DL (ref 0.76–1.27)
EGFRCR SERPLBLD CKD-EPI 2021: 65.6 ML/MIN/1.73
GLUCOSE BLDC GLUCOMTR-MCNC: 105 MG/DL (ref 70–130)
GLUCOSE BLDC GLUCOMTR-MCNC: 133 MG/DL (ref 70–130)
GLUCOSE SERPL-MCNC: 154 MG/DL (ref 65–99)
INR PPP: 1.44 (ref 0.9–1.1)
PHOSPHATE SERPL-MCNC: 4.5 MG/DL (ref 2.5–4.5)
POTASSIUM SERPL-SCNC: 3.7 MMOL/L (ref 3.5–5.2)
PROTHROMBIN TIME: 17.6 SECONDS (ref 11.7–14.2)
QT INTERVAL: 549 MS
QTC INTERVAL: 580 MS
SODIUM SERPL-SCNC: 143 MMOL/L (ref 136–145)
URATE SERPL-MCNC: 4.7 MG/DL (ref 3.4–7)
VANCOMYCIN TROUGH SERPL-MCNC: 11.4 MCG/ML (ref 5–20)

## 2025-01-25 PROCEDURE — 25010000002 VANCOMYCIN 1 G RECONSTITUTED SOLUTION 1 EACH VIAL: Performed by: HOSPITALIST

## 2025-01-25 PROCEDURE — 82948 REAGENT STRIP/BLOOD GLUCOSE: CPT

## 2025-01-25 PROCEDURE — 99232 SBSQ HOSP IP/OBS MODERATE 35: CPT | Performed by: INTERNAL MEDICINE

## 2025-01-25 PROCEDURE — 0J960ZZ DRAINAGE OF CHEST SUBCUTANEOUS TISSUE AND FASCIA, OPEN APPROACH: ICD-10-PCS | Performed by: THORACIC SURGERY (CARDIOTHORACIC VASCULAR SURGERY)

## 2025-01-25 PROCEDURE — 84550 ASSAY OF BLOOD/URIC ACID: CPT | Performed by: HOSPITALIST

## 2025-01-25 PROCEDURE — 80202 ASSAY OF VANCOMYCIN: CPT | Performed by: NURSE PRACTITIONER

## 2025-01-25 PROCEDURE — 25810000003 SODIUM CHLORIDE 0.9 % SOLUTION 250 ML FLEX CONT: Performed by: HOSPITALIST

## 2025-01-25 PROCEDURE — 85610 PROTHROMBIN TIME: CPT | Performed by: HOSPITALIST

## 2025-01-25 PROCEDURE — 80069 RENAL FUNCTION PANEL: CPT | Performed by: HOSPITALIST

## 2025-01-25 RX ORDER — SODIUM HYPOCHLORITE 1.25 MG/ML
SOLUTION TOPICAL ONCE
Status: DISCONTINUED | OUTPATIENT
Start: 2025-01-25 | End: 2025-01-25

## 2025-01-25 RX ORDER — DOXYCYCLINE 100 MG/1
100 CAPSULE ORAL 2 TIMES DAILY
Qty: 14 CAPSULE | Refills: 0 | Status: SHIPPED | OUTPATIENT
Start: 2025-01-25

## 2025-01-25 RX ADMIN — FLUTICASONE PROPIONATE 2 SPRAY: 50 SPRAY, METERED NASAL at 08:38

## 2025-01-25 RX ADMIN — NEBIVOLOL 5 MG: 5 TABLET ORAL at 11:54

## 2025-01-25 RX ADMIN — ACETAMINOPHEN 650 MG: 325 TABLET, FILM COATED ORAL at 11:58

## 2025-01-25 RX ADMIN — ASPIRIN 81 MG: 81 TABLET, DELAYED RELEASE ORAL at 08:35

## 2025-01-25 RX ADMIN — DAKIN'S SOLUTION 0.125% (QUARTER STRENGTH) 946 ML: 0.12 SOLUTION at 14:15

## 2025-01-25 RX ADMIN — HYDROCODONE BITARTRATE AND ACETAMINOPHEN 1 TABLET: 5; 325 TABLET ORAL at 08:38

## 2025-01-25 RX ADMIN — ESCITALOPRAM 15 MG: 10 TABLET, FILM COATED ORAL at 08:35

## 2025-01-25 RX ADMIN — BUMETANIDE 2 MG: 1 TABLET ORAL at 08:35

## 2025-01-25 RX ADMIN — SACUBITRIL AND VALSARTAN 1 TABLET: 24; 26 TABLET, FILM COATED ORAL at 08:35

## 2025-01-25 RX ADMIN — VANCOMYCIN HYDROCHLORIDE 1000 MG: 1 INJECTION, POWDER, LYOPHILIZED, FOR SOLUTION INTRAVENOUS at 08:43

## 2025-01-25 NOTE — PROGRESS NOTES
"[facility] Clinical Pharmacy Services: Vancomycin Monitoring Note    Jeb Olson is a 74 y.o. male who is on day 3/5 of pharmacy to dose vancomycin for SSTI.    Previous Vancomycin Dose:   1000 mg IV Q12  Updated Cultures and Sensitivities:   1/23 bcx ngtd   1/23 wound cx ngtd    Results from last 7 days   Lab Units 01/25/25  0808   VANCOMYCIN TR mcg/mL 11.40     Vitals/Labs  Ht: 188 cm (74.02\"); Wt: 114 kg (251 lb 8 oz)   Temp Readings from Last 1 Encounters:   01/25/25 98.1 °F (36.7 °C) (Oral)     Estimated Creatinine Clearance: 76 mL/min (by C-G formula based on SCr of 1.18 mg/dL).       Results from last 7 days   Lab Units 01/25/25  0808 01/24/25  0434 01/23/25  1743   CREATININE mg/dL 1.18 1.10 1.11   WBC 10*3/mm3  --  7.32 7.52     Assessment/Plan  Patient at high risk for accumulation due to obesity. Will transition to Q24H dosing to reduce risk of toxicity.    Current Vancomycin Dose: 2250 mg IV every 24 hours; provides a predicted  mg/L.hr   Next Level Date and Time: Vanc Trough on 1/27 at 1600  We will continue to monitor patient changes and renal function     Thank you for involving pharmacy in this patient's care. Please contact pharmacy with any questions or concerns.       Heather Post, PharmD  Clinical Pharmacist  "

## 2025-01-25 NOTE — PROGRESS NOTES
Patient seen and examined    Was able to express from incisional abscess a little more purulent drainage.  Case discussed with Dr. Crawford at bedside and he is gonna open that small incisional abscess which will help it drain.    ID dosing vancomycin.  Cultures unremarkable and will await their recommendations regarding further antibiotics    Pending the above, likely DC home this afternoon after we get ID input    Okay for patient to shower

## 2025-01-25 NOTE — OUTREACH NOTE
Prep Survey      Flowsheet Row Responses   [facility] [place] patient discharged from? [facility]   Is LACE score < 7 ? No   Eligibility [facility]   Date of Admission 01/23/25   Date of Discharge 01/25/25   Discharge Disposition Home-Health Care Sv   Discharge diagnosis Incisional abscess   Does the patient have one of the following disease processes/diagnoses(primary or secondary)? CHF   Does the patient have Home health ordered? Yes   What is the Home health agency?  [facility] (current)   Is there a DME ordered? No   Medication alerts for this patient see AVS   Prep survey completed? Yes            Carmen SMART - Registered Nurse

## 2025-01-25 NOTE — PROGRESS NOTES
LOS: 2 days     Chief Complaint: Concern for surgical site infection    Interval History:  Afebrile, reports increased LE edema and persistent chest pain. Tolerating abx without N/V/D or rash     Vital Signs  Temp:  [97.9 °F (36.6 °C)-98.8 °F (37.1 °C)] 98.1 °F (36.7 °C)  Heart Rate:  [51-71] 51  Resp:  [18] 18  BP: (123-156)/(77-97) 123/77    Physical Exam:  General: In no acute distress  Cardiovascular: RRR, no LE edema   Respiratory: Breathing comfortably on room air  GI: Soft, NT/ND,   Skin: No rashes  Small area of fluctuance and erythema over the anterior chest wall at the site of incision     Antibiotics:  Vancomycin dosing per pharmacy     Results Review:    Lab Results   Component Value Date    WBC 7.32 01/24/2025    HGB 9.8 (L) 01/24/2025    HCT 30.8 (L) 01/24/2025    MCV 88.5 01/24/2025     01/24/2025     Lab Results   Component Value Date    GLUCOSE 73 01/24/2025    BUN 18 01/24/2025    CREATININE 1.10 01/24/2025    EGFRIFNONA 55 (L) 08/26/2021    EGFRIFAFRI 67 08/26/2021    BCR 16.4 01/24/2025    CO2 28.0 01/24/2025    CALCIUM 8.7 01/24/2025    PROTENTOTREF 6.8 11/11/2024    ALBUMIN 3.7 01/23/2025    LABIL2 1.8 11/11/2024    AST 18 01/23/2025    ALT 19 01/23/2025       Microbiology:  1/23 wound culture NGTD  1/23 blood cultures NGTD x 2    Assessment & Plan   #Soft tissue abscess of the chest wall  #Status post mitral valve repair with maze procedure with left atrial appendage closure on 1/8/2025   #Status post sternotomy    Patient remains afebrile.  Blood and wound cultures remain negative to date.  Continue empiric vancomycin dosing per pharmacy.  Antibiotic choice and duration pending culture data as well as cardiothoracic evaluation.

## 2025-01-25 NOTE — PROGRESS NOTES
[facility] Clinical Pharmacy Services: Warfarin Dosing/Monitoring Consult    Jeb Olson is a 74 y.o. male, estimated creatinine clearance is 76 mL/min (by C-G formula based on SCr of 1.18 mg/dL). weighing 114 kg (251 lb 8 oz).    Results from last 7 days   Lab Units 01/25/25  0808 01/24/25  0826 01/24/25  0434 01/23/25  1743 01/22/25  0000   INR  1.44* 1.46*  --  1.40* 1.40   APTT seconds  --   --   --  33.9  --    HEMOGLOBIN g/dL  --   --  9.8* 9.9*  --    HEMATOCRIT %  --   --  30.8* 30.3*  --    PLATELETS 10*3/mm3  --   --  251 244  --      Prior to admission anticoagulation: warfarin 5 mg daily did take dose 1/23 day of admission   Instructions from HANNAH DEE/Dorota(PharmD) which were given on the day of admission as follows   Provided instructions to Michelle with [facility] to Change their warfarin regimen- see above in Anticoagulation Summary.  Patient has been taking 5 mg daily since he was admitted in [place]. We were under the impression he was taking 7.5 mg twice weekly as we have spoken to the patient about dosing for procedures since returning from [place].  Boost to 10 mg today and rck tomorrow. Likely need at least one day of 7.5 mg. Rck tomorrow (spoke with patient and Michelle/MADDY)   2. Follow up in 1 days    Hospital Anticoagulation:  Consulting provider: Claudia  Start date: 1/24  Indication: A Fib - requiring full anticoagulation  Target INR: 2 - 3  Expected duration: life   Bridge Therapy: No      Potential food or drug interactions:   - Bumex: as patient diureses anticipate INR to rise    Education complete?/Date: No; plan for follow up TBD    Assessment/Plan:    Continue warfarin 7.5 mg Wed/Sat and 5 mg all other days   Upon discharge- regimen needs to be adjusted per Aj instruction above to 7.5 mg Wednesday and Saturday and 5 mg all other days of the week  RN to monitor for any signs or symptoms of bleeding  Follow up daily INRs and dose adjustments    Pharmacy will continue to  follow until discharge or discontinuation of warfarin.     Heather Post, PharmD  Clinical Pharmacist

## 2025-01-25 NOTE — DISCHARGE SUMMARY
Date of Discharge:  1/25/2025    PCP: Tera Salvador MD    Discharge Diagnosis:   Active Hospital Problems    Diagnosis  POA    **Incisional abscess [T81.49XA]  Unknown    Chest pain, atypical [R07.89]  Unknown    Diastolic CHF, acute on chronic [I50.33]  Unknown    History of open heart surgery, [facility], 1/8/25 [Z98.890]  Not Applicable    AF (paroxysmal atrial fibrillation) [I48.0]  Yes    Essential hypertension [I10]  Yes    Diabetes mellitus [E11.9]  Yes    High risk medication use [Z79.899]  Not Applicable    Hyperlipidemia [E78.5]  Yes      Resolved Hospital Problems   No resolved problems to display.          Consults       Date and Time Order Name Status Description    1/23/2025 11:42 PM Inpatient Infectious Diseases Consult Completed     1/23/2025  7:33 PM [facility] (on-call MD unless specified) Details      1/23/2025  7:10 PM Inpatient Cardiothoracic Surgery Consult            Hospital Course  Patient is a 74 y.o. male who ultimately had recent cardiac intervention per Dr. Kelly and patient has midline scar.  He developed a little bit of redness in this area and some swelling.  He states he had a cyst in that area that was there prior to the intervention.  What you could appreciate on exam is a very small and mild incisional abscess.  You were able to express some mild purulence but there is no expansion of cellulitis to the chest wall.  This little lesion is somehow causing this gentleman to have some discomfort in that area.  I discussed the case with both infectious disease and cardiothoracic and Dr. Crawford was at bedside at the time of my exam on 1/25/2025.  He did little bedside I&D and was able to express more purulence.  Patient's blood cultures and wound cultures are ultimately unremarkable and there is no known past history of MRSA.  ID Patient on vancomycin while here though he is more than stable disposition to go back to doxycycline so a week supply was endorsed at the time of discharge.   Vital signs are stable as well as afebrile there is no concern for systemic involvement/sepsis.  No signs of fever.  Vitals are overall well-controlled.  The 1 outlined blood pressure that is elevated today was due to underlying anxiety at the time of our exams and intervention.  Additional home health was ordered per cardiothoracic surgery.    Troponin with negative delta and no acute ACS EKG changes with known PAF.    Temp:  [97.9 °F (36.6 °C)-98.8 °F (37.1 °C)] 98.1 °F (36.7 °C)  Heart Rate:  [51-71] 70  Resp:  [18] 18  BP: (123-162)/() 162/103  Body mass index is 32.28 kg/m².    Physical Exam  Constitutional:       Comments: Nontoxic.  No family present   HENT:      Head: Normocephalic.      Nose: Nose normal.      Mouth/Throat:      Mouth: Mucous membranes are moist.      Pharynx: Oropharynx is clear.   Cardiovascular:      Rate and Rhythm: Normal rate and regular rhythm.      Comments: Very small nickel sized incisional abscess on anterior chest wall with very mild serosanguineous/purulent drainage that is nearly resolved.  No expansion of cellulitis to chest wall  Pulmonary:      Effort: Pulmonary effort is normal. No respiratory distress.      Breath sounds: Normal breath sounds. No wheezing or rales.   Abdominal:      General: Bowel sounds are normal. There is no distension.      Palpations: Abdomen is soft.      Tenderness: There is no abdominal tenderness.   Musculoskeletal:         General: No swelling.   Skin:     General: Skin is warm and dry.   Neurological:      Mental Status: He is alert and oriented to person, place, and time. Mental status is at baseline.      Cranial Nerves: No cranial nerve deficit.      Motor: No weakness.      Gait: Gait normal.       Disposition: Home or Self Care       Discharge Medications        New Medications        Instructions Start Date   doxycycline 100 MG capsule  Commonly known as: VIBRAMYCIN   100 mg, Oral, 2 Times Daily             Changes to Medications         Instructions Start Date   escitalopram 10 MG tablet  Commonly known as: LEXAPRO  What changed: when to take this   15 mg, Oral, Every Evening      ezetimibe 10 MG tablet  Commonly known as: ZETIA  What changed: when to take this   10 mg, Oral, Daily      nebivolol 5 MG tablet  Commonly known as: BYSTOLIC  What changed: when to take this   5 mg, Oral, Daily             Continue These Medications        Instructions Start Date   acetaminophen 325 MG tablet  Commonly known as: TYLENOL   650 mg, Oral, 2 Times Daily PRN      aspirin 81 MG EC tablet   81 mg, Oral, Daily      B COMPLEX PLUS PO   1 tablet, Every Other Day      bumetanide 2 MG tablet  Commonly known as: BUMEX   2 mg, Oral, Daily      cyclobenzaprine 10 MG tablet  Commonly known as: FLEXERIL   10 mg, Oral, 3 Times Daily PRN      doxazosin 4 MG tablet  Commonly known as: CARDURA   4 mg, Oral, Nightly      Entresto 24-26 MG tablet  Generic drug: sacubitril-valsartan   1 tablet, Oral, Every 12 Hours Scheduled      fenofibrate 160 MG tablet   TAKE 1 TABLET BY MOUTH AT NIGHT      fluticasone 50 MCG/ACT nasal spray  Commonly known as: FLONASE   2 sprays, Every Morning      gabapentin 600 MG tablet  Commonly known as: NEURONTIN   1 tablet, Every Evening      glimepiride 4 MG tablet  Commonly known as: AMARYL   4 mg, Oral, 2 Times Daily      glucose blood test strip  Commonly known as: Accu-Chek Anabela Plus   TEST TWICE DAILY Use as instructed      HYDROcodone-acetaminophen 5-325 MG per tablet  Commonly known as: Norco   Take 1 tablet by mouth Every 4 (Four) Hours As Needed for Severe Pain.      Janumet XR  MG tablet  Generic drug: SITagliptin-metFORMIN HCl ER   Take 1 tablet by mouth twice daily      PATADAY OP   1 drop, 2 Times Daily PRN      polyethyl glycol-propyl glycol 0.4-0.3 % solution ophthalmic solution (artificial tears)  Commonly known as: SYSTANE   1 drop, As Needed      potassium chloride 20 MEQ CR tablet  Commonly known as: KLOR-CON M20    40 mEq, Oral, Daily      PRESERVISION AREDS 2 PO   1 tablet, 2 Times Daily      traZODone 50 MG tablet  Commonly known as: DESYREL   50 mg, Oral, Nightly      vitamin D3 125 MCG (5000 UT) capsule capsule   5,000 Units, Daily      warfarin 5 MG tablet  Commonly known as: COUMADIN   1 tablet, Every Morning             Stop These Medications      doxycycline 100 MG tablet  Commonly known as: ADOXA               Additional Instructions for the Follow-ups that You Need to Schedule       Ambulatory Referral to Home Health (Hospital)   As directed      Face to Face Visit Date: 1/25/2025   Follow-up provider for Plan of Care?: I will be treating the patient on an ongoing basis.  Please send me the Plan of Care for signature.   Follow-up provider: JERRELL MAIN [[id number]]   Reason/Clinical Findings: s/p cardiac surgery, sternal wound   Describe mobility limitations that make leaving home difficult: sternal precautions   Nursing/Therapeutic Services Requested: Skilled Nursing   Skilled nursing orders: Post CABG care Wound care dressing/changes Cardiopulmonary assessments   Instructions: Please pack sternal wound with Dakins soaked gauze bid.   Frequency: 1 Week 1               Follow-up Information       Tera Salvador MD .    Specialty: Internal Medicine  Contact information:  [facility] [facility] [address]  [phone]                            Future Appointments   Date Time Provider Department Center   1/27/2025 11:15 AM Bruna Chávez MD [facility]   2/11/2025 11:00 AM Bruna Chávez MD [id number] None   2/11/2025  1:00 PM Nerissa Miranda APRN [facility]   2/24/2025 11:15 AM [facility] CT 1 [facility] CT BR None   3/17/2025 10:00 AM Chepe Alicea MD [facility]   7/21/2025  9:30 AM Tera Salvador MD [facility]     Pending Labs       Order Current Status    Blood Culture - Blood, Arm, Right Preliminary result    Blood Culture - Blood, Arm, Right Preliminary result    Wound  Culture - Surgical Site, Chest Preliminary result           Donnie Taylor MD  [facility]  01/25/25 12:01 EST    Discharge time spent greater than 30 minutes.

## 2025-01-25 NOTE — PROGRESS NOTES
I opened the pocket to let it drain little bit.  He said he to had a cyst there before.  This is probably an old sebaceous cyst area.  Cultures are still negative.  I suspect this is staph.  Will have home health packet and probably go home on doxycycline if okay with all.   0

## 2025-01-25 NOTE — NURSING NOTE
Patient discharged today accompanied by family member, transport via private vehicle. Peripheral IV removed. Sternum wound dressing changed and educated patient how to do dressing change and dakin provided.  Meds to bed delivered. AVS and instruction for follow up given.

## 2025-01-26 ENCOUNTER — HOME CARE VISIT (OUTPATIENT)
Dept: HOME HEALTH SERVICES | Facility: HOME HEALTHCARE | Age: 75
End: 2025-01-26
Payer: COMMERCIAL

## 2025-01-26 VITALS
SYSTOLIC BLOOD PRESSURE: 130 MMHG | RESPIRATION RATE: 17 BRPM | TEMPERATURE: 98.4 F | DIASTOLIC BLOOD PRESSURE: 74 MMHG | OXYGEN SATURATION: 94 % | HEART RATE: 54 BPM

## 2025-01-26 LAB
BACTERIA SPEC AEROBE CULT: NORMAL
GRAM STN SPEC: NORMAL
GRAM STN SPEC: NORMAL

## 2025-01-26 PROCEDURE — G0299 HHS/HOSPICE OF RN EA 15 MIN: HCPCS

## 2025-01-26 NOTE — CASE MANAGEMENT/SOCIAL WORK
Case Management Discharge Note      Final Note: dc home with [facility] (pt is current)         Selected Continued Care - Discharged on 1/25/2025 Admission date: 1/23/2025 - Discharge disposition: Home or Self Care      Destination    No services have been selected for the patient.                Durable Medical Equipment    No services have been selected for the patient.                Dialysis/Infusion    No services have been selected for the patient.                Home Medical Care    No services have been selected for the patient.                Therapy    No services have been selected for the patient.                Community Resources    No services have been selected for the patient.                Community & DME    No services have been selected for the patient.                    Selected Continued Care - Prior Encounters Includes continued care and service providers with selected services from prior encounters from 10/25/2024 to 1/25/2025      Discharged on 1/14/2025 Admission date: 1/5/2025 - Discharge disposition: Home-Health Care Svc      Home Medical Care       Service Provider Services Address Phone Fax Patient Preferred    [facility] [address] [place] [id number] [phone] --                               Final Discharge Disposition Code: 06 - home with home health care

## 2025-01-26 NOTE — HOME HEALTH
Resumption of Care Note: Patient admitted full admission to [facility] on Thursday, patient seen by cardiology with found to have abscess in midsternal incisional line, MD completed I&D and  sent home with wounds care orders with Dakins.   PT WILL FOLLOW UP WITH CARDIOLOGY ON 1/27 AND WILL ASK ABOUT INR.     Reason for hospitalization/new problems: incisional abscess, Atypical Chest pain     New/changed medications: DAKINS, NORCO     New/changed orders: Dakins twice a day to sternal incision.     Educated on Emergency Plan, steps to take prior to going to the ER and when to Call Home Health [facility]:  YES     Plan/Focus of Care and Skilled need: wound care

## 2025-01-26 NOTE — Clinical Note
Resumption of Care Note: Patient admitted full admission to [facility] on Thursday, patient seen by cardiology with found to have abscess in midsternal incisional line, MD completed I&D and  sent home with wounds care orders with Dakins.     Reason for hospitalization/new problems: incisional absess, Atypical Chest pain     New/changed medications: DAKINS, NORCO     New/changed orders: Dakins twice a day to midsternal incision.     Educated on Emergency Plan, steps to take prior to going to the ER and when to Call [facility] Health [facility]:  YES     Plan/Focus of Care and Skilled need: wound care

## 2025-01-26 NOTE — Clinical Note
Hello, I am verifying that you will be signing off on Home Health skilled nursing orders.   Thank you   Mary DEE   [phone]

## 2025-01-27 ENCOUNTER — ANTICOAGULATION VISIT (OUTPATIENT)
Dept: PHARMACY | Facility: HOSPITAL | Age: 75
End: 2025-01-27
Payer: MEDICARE

## 2025-01-27 ENCOUNTER — OFFICE VISIT (OUTPATIENT)
Dept: CARDIOLOGY | Facility: CLINIC | Age: 75
End: 2025-01-27
Payer: MEDICARE

## 2025-01-27 ENCOUNTER — TRANSITIONAL CARE MANAGEMENT TELEPHONE ENCOUNTER (OUTPATIENT)
Dept: CALL CENTER | Facility: HOSPITAL | Age: 75
End: 2025-01-27
Payer: MEDICARE

## 2025-01-27 VITALS
WEIGHT: 258.8 LBS | SYSTOLIC BLOOD PRESSURE: 130 MMHG | HEART RATE: 62 BPM | BODY MASS INDEX: 33.21 KG/M2 | DIASTOLIC BLOOD PRESSURE: 72 MMHG | HEIGHT: 74 IN

## 2025-01-27 DIAGNOSIS — I34.0 MITRAL VALVE INSUFFICIENCY, UNSPECIFIED ETIOLOGY: Primary | ICD-10-CM

## 2025-01-27 DIAGNOSIS — I48.0 AF (PAROXYSMAL ATRIAL FIBRILLATION): Primary | ICD-10-CM

## 2025-01-27 LAB
INR PPP: 1.3 (ref 0.91–1.09)
PROTHROMBIN TIME: 15.4 SECONDS (ref 10–13.8)

## 2025-01-27 PROCEDURE — 85610 PROTHROMBIN TIME: CPT

## 2025-01-27 PROCEDURE — 3078F DIAST BP <80 MM HG: CPT | Performed by: INTERNAL MEDICINE

## 2025-01-27 PROCEDURE — G2211 COMPLEX E/M VISIT ADD ON: HCPCS | Performed by: INTERNAL MEDICINE

## 2025-01-27 PROCEDURE — 99213 OFFICE O/P EST LOW 20 MIN: CPT | Performed by: INTERNAL MEDICINE

## 2025-01-27 PROCEDURE — G0463 HOSPITAL OUTPT CLINIC VISIT: HCPCS

## 2025-01-27 PROCEDURE — 3075F SYST BP GE 130 - 139MM HG: CPT | Performed by: INTERNAL MEDICINE

## 2025-01-27 NOTE — PROGRESS NOTES
Anticoagulation Clinic Progress Note    Anticoagulation Summary  As of 2025      INR goal:  2.0-3.0   TTR:  58.1% (2.8 mo)   INR used for dosin.3 (2025)   Warfarin maintenance plan:  7.5 mg every Wed, Sat; 5 mg all other days   Weekly warfarin total:  40 mg   Plan last modified:  Gurjit Velásquez, PharmD (10/30/2024)   Next INR check:  2025   Target end date:  --    Indications    AF (paroxysmal atrial fibrillation) [I48.0]                 Anticoagulation Episode Summary       INR check location:  --    Preferred lab:  --    Send INR reminders to:  [facility]    Comments:  --          Anticoagulation Care Providers       Provider Role Specialty Phone number    Bruna Chávez MD Referring Cardiology [phone]            Clinic Interview:  Patient Findings     Positives:  Change in medications, Hospital admission    Negatives:  Signs/symptoms of thrombosis, Signs/symptoms of bleeding,   Laboratory test error suspected, Change in health, Change in alcohol use,   Change in activity, Upcoming invasive procedure, Emergency department   visit, Upcoming dental procedure, Missed doses, Extra doses, Change in   diet/appetite, Bruising, Other complaints      Clinical Outcomes     Negatives:  Major bleeding event, Thromboembolic event,   Anticoagulation-related hospital admission, Anticoagulation-related ED   visit, Anticoagulation-related fatality        INR History:      Latest Ref Rng & Units 2025     9:30 AM 2025    12:00 AM 2025    10:13 AM 2025     5:43 PM 2025     8:26 AM 2025     8:08 AM 2025     1:45 PM   Anticoagulation Monitoring   INR  1.60  1.40    1.3   INR Date  2025   INR Goal  2.0-3.0  2.0-3.0    2.0-3.0   Trend  Same  Same    Same   Last Week Total  35 mg  35 mg    45 mg   Next Week Total  40 mg  45 mg    45 mg   Sun  -  -    -   Mon  5 mg  -    10 mg ()   Tue  5 mg  -    5 mg   Wed  -  -    7.5 mg   Thu   -  10 mg (1/23)    -   Fri  -  -    -   Sat  -  -    -   Historical INR 0.90 - 1.10  1.40      1.40  1.46  1.44     Visit Report  Report      Report       This result is from an external source.       Plan:  1. INR is Subtherapeutic today- see above in Anticoagulation Summary.  Will instruct Jeb Olson to Increase their warfarin regimen- see above in Anticoagulation Summary.    Admitted 1/23- 1/25 for chest pain. Discharged on 7 days of doxycyline but pt reports he has been taking the abx long term. He reports he received a call on Saturday telling him to take an additional 5 mg tablet when he returned home and the hospital was going to give him 5 mg for a total of 10 mg. However, patient did not receive warfarin before he left, therefore he only took 5 mg. He also reports he was instructed to take 10 mg on Sunday and Monday by the person on the phone. Unclear who called the patient with dosing as our clinic is closed over the weekend and there is no documentation of a telephone note. Patient already took 10 mg today. Increase dose back to 7.5 mg wed and sat and 5 mg waqar CANAS on Thursday with home health.     2. Follow up in 3 days  3. Patient declines warfarin refills.  4. Verbal and written information provided. Patient expresses understanding and has no further questions at this time.    Dorota Mckeon [facility]

## 2025-01-27 NOTE — PROGRESS NOTES
Subjective:     Encounter Date: 01/27/25      Patient ID: Jeb Olson is a 74 y.o. male.    Chief Complaint: Abnormal stress test  HPI:   This is a 74 year-old man with a history of CAD, PVC, AF, CM,  severe MR.      In 2022 he an eye surgery and was noted to have frequent PVCs.  This prompted further testing by his primary care doctor.  He had a Holter monitor which showed frequent PVCs, PVC burden was 22% in couplets, triplets bigeminy and trigeminy.  Thus, he therefore had a transthoracic echocardiogram which showed normal systolic ejection fraction with grade 1 diastolic dysfunction appropriate for age and mild MR.   Subsequent to that he had a walking nuclear stress test with a small, mild apical ischemic defect.  Cardiac catheterization September 22 by myself showed a distal LAD lesion, status post PCI with 1 drug-eluting stent.      Fall 2024 he was in the office and found to have asymptomatic atrial fibrillation on EKG.  His exam revealed a prominent MR murmur.  Holter showed 100% A-fib burden with average heart rate of 60 on beta-blockade.  Follow-up echocardiogram Sept 2024.  There has been enlargement of both ventricles and atria, thickening of the LV, progression of his mitral disease from mild MR to moderate to severe MR.  He also has mild AS, pulmonary hypertension. His SPEP/UPEP was abnormal. PYP was normal Dec 2024. He was hospitalized in [place] in Nov 2024. His EF was newly reduced 35% without WMA. He was hospitalized in Dec 2024 underwent YAZMIN confirming severe MR. His repeat catheterization does not show new coronary disease.   January 2025 underwent mitral valve replacement, maze and left atrial appendage clipping.  He was diuresed with medication adjustments.  Had to come back for aspiration of a cyst in the sternal incision however overall postoperative course has been quite stable.  He returns today for follow-up.  No real complaints.  Weight is stable at 244.  Tolerating lower doses of  Entresto and Bumex    The following portions of the patient's history were reviewed and updated as appropriate: allergies, current medications, past family history, past medical history, past social history, past surgical history and problem list.     REVIEW OF SYSTEMS:   All systems reviewed.  Pertinent positives identified in HPI.  All other systems are negative.    Past Medical History:   Diagnosis Date    AF (paroxysmal atrial fibrillation)     Allergic Have had for several years    Eyes and nasal issues    Anemia     Anticoagulant long-term use     COUMADIN    Anxiety Since about 2014    Since I was taking of my Dad, who also passed away 3yrs ago.    Arthritis 2002    Both knees, hips, and shoulders    Arthritis of knee, left     Cataract 05/2019    CHF (congestive heart failure)     Colon polyp 2017    Coronary artery disease     stent    Diabetes mellitus     Dry eyes     Essential hypertension     Heart murmur     HL (hearing loss) 1980    no hearing aides    Hyperlipemia     Knee swelling 7/7/2020    Shortly after my left knee replacement    Macular degeneration     Mild chronic anemia     Mitral valve disorder     Obesity     Pneumonia 11/2024    Sleep apnea     cpap       Family History   Problem Relation Age of Onset    Alcohol abuse Maternal Uncle         Passed away 2013    Alcohol abuse Father         Passed away in 2016    Hyperlipidemia Father         Started about 10 years before his death    Arthritis Mother         Passed away 2006, from Alzheimers    Diabetes Mother     Hyperlipidemia Mother         Started probably at middle age    Osteoporosis Mother     Malig Hyperthermia Neg Hx        Social History     Socioeconomic History    Marital status:    Tobacco Use    Smoking status: Never     Passive exposure: Never    Smokeless tobacco: Never    Tobacco comments:     Naila only every been around people who smoked   Vaping Use    Vaping status: Never Used   Substance and Sexual Activity     Alcohol use: Not Currently     Alcohol/week: 21.0 standard drinks of alcohol     Types: 21 Shots of liquor per week     Comment: since thanksgiving    Drug use: Never    Sexual activity: Not Currently     Partners: Female     Birth control/protection: None       No Known Allergies    Past Surgical History:   Procedure Laterality Date    ACHILLES TENDON REPAIR Left     CARDIAC CATHETERIZATION      CARDIAC CATHETERIZATION N/A 09/01/2022    Procedure: Coronary angiography, Left heart catheterization,;  Surgeon: Bruna Chávez MD;  Location: [facility] CATH INVASIVE LOCATION;  Service: Cardiology;  Laterality: N/A;    CARDIAC CATHETERIZATION N/A 09/01/2022    Procedure: Stent PRAVIN coronary;  Surgeon: Bruna Chávez MD;  Location: [facility] CATH INVASIVE LOCATION;  Service: Cardiology;  Laterality: N/A;    CARDIAC CATHETERIZATION N/A 1/2/2025    Procedure: Left Heart Cath;  Surgeon: Bruna Chávez MD;  Location: [facility] CATH INVASIVE LOCATION;  Service: Cardiology;  Laterality: N/A;    CARDIAC CATHETERIZATION N/A 1/2/2025    Procedure: Coronary angiography;  Surgeon: Bruna Chávez MD;  Location: [facility] CATH INVASIVE LOCATION;  Service: Cardiology;  Laterality: N/A;    CATARACT EXTRACTION EXTRACAPSULAR W/ INTRAOCULAR LENS IMPLANTATION Bilateral     COLONOSCOPY  2018    Dr Reyes    ENDOSCOPY      MITRAL VALVE REPAIR/REPLACEMENT N/A 1/8/2025    Procedure: STERNOTOMY, MITRAL VALVE REPAIR, MAZE PROCEDURE TRANSESOPHAGEAL ECHOCARDIOGRAM, PRP;  Surgeon: Young Kelly MD;  Location: [facility];  Service: Cardiothoracic;  Laterality: N/A;    TONSILLECTOMY      As a child    TOTAL KNEE ARTHROPLASTY Left 03/12/2020    Procedure: TOTAL KNEE ARTHROPLASTY;  Surgeon: Chepe Alicea MD;  Location: [facility] OR;  Service: Orthopedics;  Laterality: Left;    TOTAL KNEE ARTHROPLASTY Right 03/21/2024    Procedure: TOTAL KNEE ARTHROPLASTY;  Surgeon: Chepe Alicea MD;  Location: [facility];  Service: Orthopedics;  Laterality: Right;        Procedures       Objective:         PHYSICAL EXAM:  GEN: VSS, no distress,   Eyes: normal sclera, normal lids and lashes  HENT: moist mucus membranes,   Respiratory: CTAB, no rales or wheezes  CV: irregRR, blowing 3 out of 6 apical murmur, +2 DP and 2+ carotid pulses b/l  GI: NABS, soft,  Nontender, nondistended  MSK: no edema, no scoliosis or kyphosis  Skin: no rash, warm, dry  Heme/Lymph: no bruising or bleeding  Psych: organized thought, normal behavior and affect  Neuro: Cranial nerves grossly intact, Alert and Oriented x 3.         Assessment:          Diagnosis Plan   1. Mitral valve insufficiency, unspecified etiology             Plan:       1.  CAD: Status post distal LAD PCI September 22.    CCS class I symptoms.   aspirin 81 daily.  2.  Mitral regurgitation: Severe MR status post repair with maze and left atrial appendage clip  3.  AF: Warfarin.  Continue Bystolic 5.  4.  Cardiomyopathy, Systolic, valvular, continue Bumex 5, Entresto 24/26, Bystolic    Dr. Salvador, Thank you very much for referring this kind patient to me. Please call me with any questions or concerns. I will see the patient again in the office in 1 month.      Bruna Chávez MD  01/27/25  [facility]    Outpatient Encounter Medications as of 1/27/2025   Medication Sig Dispense Refill    acetaminophen (TYLENOL) 325 MG tablet Take 2 tablets by mouth 2 (Two) Times a Day As Needed for Mild Pain. 60 tablet 0    aspirin 81 MG EC tablet Take 1 tablet by mouth Daily.      B Complex-Folic Acid (B COMPLEX PLUS PO) Take 1 tablet by mouth Every Other Day. Indications: nutritional supplement      bumetanide (BUMEX) 2 MG tablet Take 1 tablet by mouth Daily for 30 days. 30 tablet 0    cyclobenzaprine (FLEXERIL) 10 MG tablet Take 1 tablet by mouth 3 (Three) Times a Day As Needed for Muscle Spasms. Indications: Muscle Spasm 20 tablet 0    doxazosin (CARDURA) 4 MG tablet TAKE 1 TABLET BY MOUTH ONCE DAILY AT NIGHT 90 tablet 1     doxycycline (VIBRAMYCIN) 100 MG capsule Take 1 capsule by mouth 2 (Two) Times a Day. 14 capsule 0    escitalopram (LEXAPRO) 10 MG tablet Take 1.5 tablets by mouth Every Evening. (Patient taking differently: Take 1.5 tablets by mouth Every Morning.) 135 tablet 1    ezetimibe (ZETIA) 10 MG tablet Take 1 tablet by mouth once daily (Patient taking differently: Take 1 tablet by mouth Every Night.) 90 tablet 0    fenofibrate 160 MG tablet TAKE 1 TABLET BY MOUTH AT NIGHT 90 tablet 0    fluticasone (FLONASE) 50 MCG/ACT nasal spray Administer 2 sprays into the nostril(s) as directed by provider Every Morning. 2 sprays in each nostril  Indications: Stuffy Nose      gabapentin (NEURONTIN) 600 MG tablet Take 1 tablet by mouth Every Evening. Indications: Diabetes with Nerve Disease      glimepiride (AMARYL) 4 MG tablet Take 1 tablet by mouth 2 (Two) Times a Day. Indications: Type 2 Diabetes 180 tablet 1    glucose blood (Accu-Chek Anabela Plus) test strip TEST TWICE DAILY Use as instructed 100 each 3    HYDROcodone-acetaminophen (Norco) 5-325 MG per tablet Take 1 tablet by mouth Every 4 (Four) Hours As Needed for Severe Pain. 40 tablet 0    Janumet XR  MG tablet Take 1 tablet by mouth twice daily 180 tablet 0    Multiple Vitamins-Minerals (PRESERVISION AREDS 2 PO) Take 1 tablet by mouth 2 (Two) Times a Day. Indications: supplement       nebivolol (BYSTOLIC) 5 MG tablet Take 1 tablet by mouth Daily. (Patient taking differently: Take 1 tablet by mouth every night at bedtime. Indications: High Blood Pressure) 90 tablet 1    Olopatadine HCl (PATADAY OP) Apply 1 drop to eye(s) as directed by provider 2 (Two) Times a Day As Needed (allergies). Indications: eye drops       polyethyl glycol-propyl glycol (SYSTANE) 0.4-0.3 % solution ophthalmic solution (artificial tears) Administer 1 drop to both eyes As Needed (dry eyes). Indications: Excessive Cornea and Conjunctiva Dryness      potassium chloride (KLOR-CON M20) 20 MEQ CR tablet  Take 2 tablets by mouth Daily for 30 days. 60 tablet 0    sacubitril-valsartan (ENTRESTO) 24-26 MG tablet Take 1 tablet by mouth Every 12 (Twelve) Hours for 30 days. 60 tablet 0    sodium hypochlorite in sterile water irrigation topical solution 0.0625% Apply 946 mL topically to the appropriate area as directed Every 12 (Twelve) Hours. Pack sternal wound with Dakins soaked gauze twice daily.  Indications: Wound Care 946 mL 1    traZODone (DESYREL) 50 MG tablet TAKE 1 TABLET BY MOUTH ONCE DAILY AT NIGHT 90 tablet 0    vitamin D3 125 MCG (5000 UT) capsule capsule Take 1 capsule by mouth Daily. Indications: Vitamin D Deficiency      warfarin (COUMADIN) 5 MG tablet Take 1 tablet by mouth Every Morning. Indications: Atrial Fibrillation       Facility-Administered Encounter Medications as of 1/27/2025   Medication Dose Route Frequency Provider Last Rate Last Admin    Chlorhexidine Gluconate 2 % pads 3 each  3 pad  Apply externally BID Pricila Barnes APRN

## 2025-01-27 NOTE — OUTREACH NOTE
Call Center TCM Note      Flowsheet Row Responses   [facility] patient discharged from? [place]   Does the patient have one of the following disease processes/diagnoses(primary or secondary)? CHF   TCM attempt successful? No   Unsuccessful attempts Attempt 1             Norma Robledo MA    1/27/2025, 11:28 EST

## 2025-01-27 NOTE — OUTREACH NOTE
Call Center TCM Note      Flowsheet Row Responses   [facility] patient discharged from? [place]   Does the patient have one of the following disease processes/diagnoses(primary or secondary)? CHF   TCM attempt successful? Yes   Call start time 1558   Call end time 1602   Discharge diagnosis Incisional abscess   Meds reviewed with patient/caregiver? Yes   Is the patient having any side effects they believe may be caused by any medication additions or changes? No   Does the patient have all medications ordered at discharge? Yes   Prescription comments New rx Doxycycline in place   Is the patient taking all medications as directed (includes completed medication regime)? Yes   Does the patient have an appointment with their PCP within 7-14 days of discharge? Yes   What is the Home health agency?  [facility] (current)   Has home health visited the patient within 72 hours of discharge? Yes   Home health comments [facility] RN saw pt yesterday   Pulse Ox monitoring None   Psychosocial issues? No   Did the patient receive a copy of their discharge instructions? Yes   Nursing interventions Reviewed instructions with patient   What is the patient's perception of their health status since discharge? Improving   If the patient is a current smoker, are they able to teach back resources for cessation? Not a smoker   Is the patient/caregiver able to teach back the hierarchy of who to call/visit for symptoms/problems? PCP, Specialist, Home health nurse, Urgent Care, ED, 911 Yes   TCM call completed? Yes   Wrap up additional comments D/C DX: Incisional abscess I&D s/p 01/08/2025 C/T sx - Pt doing well, no questions. TCM APPT with PCP Dr Salvador is 01/30/2025   Call end time 1602             Norma Robledo MA    1/27/2025, 16:04 EST

## 2025-01-28 ENCOUNTER — TELEPHONE (OUTPATIENT)
Dept: CARDIAC REHAB | Facility: HOSPITAL | Age: 75
End: 2025-01-28
Payer: MEDICARE

## 2025-01-28 ENCOUNTER — HOME CARE VISIT (OUTPATIENT)
Dept: HOME HEALTH SERVICES | Facility: HOME HEALTHCARE | Age: 75
End: 2025-01-28
Payer: COMMERCIAL

## 2025-01-28 LAB
BACTERIA SPEC AEROBE CULT: NORMAL
BACTERIA SPEC AEROBE CULT: NORMAL

## 2025-01-28 PROCEDURE — G0300 HHS/HOSPICE OF LPN EA 15 MIN: HCPCS

## 2025-01-28 NOTE — TELEPHONE ENCOUNTER
I have spoken with your patient re: recent referral to cardiac rehab.  He is familiar with our program having attended in the past.  He plans to come back again once he is released by .  He will call us when ready to schedule.    Thank you for always encouraging you patients to participate in cardiac rehab!

## 2025-01-29 ENCOUNTER — READMISSION MANAGEMENT (OUTPATIENT)
Dept: CALL CENTER | Facility: HOSPITAL | Age: 75
End: 2025-01-29
Payer: MEDICARE

## 2025-01-29 ENCOUNTER — APPOINTMENT (OUTPATIENT)
Dept: CT IMAGING | Facility: HOSPITAL | Age: 75
End: 2025-01-29
Payer: MEDICARE

## 2025-01-29 ENCOUNTER — HOSPITAL ENCOUNTER (OUTPATIENT)
Facility: HOSPITAL | Age: 75
Setting detail: OBSERVATION
Discharge: HOME | End: 2025-01-31
Attending: EMERGENCY MEDICINE | Admitting: EMERGENCY MEDICINE
Payer: MEDICARE

## 2025-01-29 VITALS
DIASTOLIC BLOOD PRESSURE: 72 MMHG | SYSTOLIC BLOOD PRESSURE: 134 MMHG | TEMPERATURE: 98.7 F | HEART RATE: 77 BPM | OXYGEN SATURATION: 97 % | RESPIRATION RATE: 18 BRPM

## 2025-01-29 DIAGNOSIS — R79.89 ELEVATED TROPONIN: ICD-10-CM

## 2025-01-29 DIAGNOSIS — E11.649 HYPOGLYCEMIA ASSOCIATED WITH TYPE 2 DIABETES MELLITUS: ICD-10-CM

## 2025-01-29 DIAGNOSIS — R55 SYNCOPE AND COLLAPSE: Primary | ICD-10-CM

## 2025-01-29 LAB
ALBUMIN SERPL-MCNC: 3.8 G/DL (ref 3.5–5.2)
ALBUMIN/GLOB SERPL: 1.3 G/DL
ALP SERPL-CCNC: 47 U/L (ref 39–117)
ALT SERPL W P-5'-P-CCNC: 13 U/L (ref 1–41)
ANION GAP SERPL CALCULATED.3IONS-SCNC: 10.5 MMOL/L (ref 5–15)
APTT PPP: 34.9 SECONDS (ref 22.7–35.4)
AST SERPL-CCNC: 20 U/L (ref 1–40)
BASOPHILS # BLD AUTO: 0.02 10*3/MM3 (ref 0–0.2)
BASOPHILS NFR BLD AUTO: 0.3 % (ref 0–1.5)
BILIRUB SERPL-MCNC: 0.2 MG/DL (ref 0–1.2)
BUN SERPL-MCNC: 18 MG/DL (ref 8–23)
BUN/CREAT SERPL: 16.4 (ref 7–25)
CALCIUM SPEC-SCNC: 8.9 MG/DL (ref 8.6–10.5)
CHLORIDE SERPL-SCNC: 103 MMOL/L (ref 98–107)
CO2 SERPL-SCNC: 27.5 MMOL/L (ref 22–29)
CREAT SERPL-MCNC: 1.1 MG/DL (ref 0.76–1.27)
D-LACTATE SERPL-SCNC: 1.1 MMOL/L (ref 0.5–2)
DEPRECATED RDW RBC AUTO: 43.5 FL (ref 37–54)
EGFRCR SERPLBLD CKD-EPI 2021: 71.3 ML/MIN/1.73
EOSINOPHIL # BLD AUTO: 0.05 10*3/MM3 (ref 0–0.4)
EOSINOPHIL NFR BLD AUTO: 0.7 % (ref 0.3–6.2)
ERYTHROCYTE [DISTWIDTH] IN BLOOD BY AUTOMATED COUNT: 13.2 % (ref 12.3–15.4)
GEN 5 1HR TROPONIN T REFLEX: 43 NG/L
GLOBULIN UR ELPH-MCNC: 2.9 GM/DL
GLUCOSE BLDC GLUCOMTR-MCNC: 114 MG/DL (ref 70–130)
GLUCOSE BLDC GLUCOMTR-MCNC: 119 MG/DL (ref 70–130)
GLUCOSE BLDC GLUCOMTR-MCNC: 134 MG/DL (ref 70–130)
GLUCOSE BLDC GLUCOMTR-MCNC: 60 MG/DL (ref 70–130)
GLUCOSE BLDC GLUCOMTR-MCNC: 84 MG/DL (ref 70–130)
GLUCOSE SERPL-MCNC: 86 MG/DL (ref 65–99)
HCT VFR BLD AUTO: 32.9 % (ref 37.5–51)
HGB BLD-MCNC: 9.9 G/DL (ref 13–17.7)
IMM GRANULOCYTES # BLD AUTO: 0.02 10*3/MM3 (ref 0–0.05)
IMM GRANULOCYTES NFR BLD AUTO: 0.3 % (ref 0–0.5)
INR PPP: 1.54 (ref 0.9–1.1)
INR PPP: 1.58 (ref 0.9–1.1)
LYMPHOCYTES # BLD AUTO: 0.66 10*3/MM3 (ref 0.7–3.1)
LYMPHOCYTES NFR BLD AUTO: 9.5 % (ref 19.6–45.3)
MCH RBC QN AUTO: 27 PG (ref 26.6–33)
MCHC RBC AUTO-ENTMCNC: 30.1 G/DL (ref 31.5–35.7)
MCV RBC AUTO: 89.9 FL (ref 79–97)
MONOCYTES # BLD AUTO: 0.26 10*3/MM3 (ref 0.1–0.9)
MONOCYTES NFR BLD AUTO: 3.7 % (ref 5–12)
NEUTROPHILS NFR BLD AUTO: 5.96 10*3/MM3 (ref 1.7–7)
NEUTROPHILS NFR BLD AUTO: 85.5 % (ref 42.7–76)
NRBC BLD AUTO-RTO: 0 /100 WBC (ref 0–0.2)
PLATELET # BLD AUTO: 224 10*3/MM3 (ref 140–450)
PMV BLD AUTO: 10.3 FL (ref 6–12)
POTASSIUM SERPL-SCNC: 3.5 MMOL/L (ref 3.5–5.2)
PROT SERPL-MCNC: 6.7 G/DL (ref 6–8.5)
PROTHROMBIN TIME: 18.7 SECONDS (ref 11.7–14.2)
PROTHROMBIN TIME: 18.9 SECONDS (ref 11.7–14.2)
QT INTERVAL: 450 MS
QTC INTERVAL: 468 MS
RBC # BLD AUTO: 3.66 10*6/MM3 (ref 4.14–5.8)
SODIUM SERPL-SCNC: 141 MMOL/L (ref 136–145)
TROPONIN T % DELTA: 2
TROPONIN T NUMERIC DELTA: 1 NG/L
TROPONIN T SERPL HS-MCNC: 42 NG/L
WBC NRBC COR # BLD AUTO: 6.97 10*3/MM3 (ref 3.4–10.8)

## 2025-01-29 PROCEDURE — G0378 HOSPITAL OBSERVATION PER HR: HCPCS

## 2025-01-29 PROCEDURE — 93010 ELECTROCARDIOGRAM REPORT: CPT | Performed by: INTERNAL MEDICINE

## 2025-01-29 PROCEDURE — 83605 ASSAY OF LACTIC ACID: CPT | Performed by: EMERGENCY MEDICINE

## 2025-01-29 PROCEDURE — 82948 REAGENT STRIP/BLOOD GLUCOSE: CPT

## 2025-01-29 PROCEDURE — 85025 COMPLETE CBC W/AUTO DIFF WBC: CPT | Performed by: EMERGENCY MEDICINE

## 2025-01-29 PROCEDURE — 84484 ASSAY OF TROPONIN QUANT: CPT | Performed by: EMERGENCY MEDICINE

## 2025-01-29 PROCEDURE — 85730 THROMBOPLASTIN TIME PARTIAL: CPT | Performed by: EMERGENCY MEDICINE

## 2025-01-29 PROCEDURE — 93005 ELECTROCARDIOGRAM TRACING: CPT | Performed by: EMERGENCY MEDICINE

## 2025-01-29 PROCEDURE — 36415 COLL VENOUS BLD VENIPUNCTURE: CPT

## 2025-01-29 PROCEDURE — 85610 PROTHROMBIN TIME: CPT | Performed by: EMERGENCY MEDICINE

## 2025-01-29 PROCEDURE — 70450 CT HEAD/BRAIN W/O DYE: CPT

## 2025-01-29 PROCEDURE — 80053 COMPREHEN METABOLIC PANEL: CPT | Performed by: EMERGENCY MEDICINE

## 2025-01-29 PROCEDURE — 99285 EMERGENCY DEPT VISIT HI MDM: CPT

## 2025-01-29 RX ORDER — ASPIRIN 81 MG/1
81 TABLET ORAL DAILY
Status: DISCONTINUED | OUTPATIENT
Start: 2025-01-30 | End: 2025-01-31 | Stop reason: HOSPADM

## 2025-01-29 RX ORDER — AMOXICILLIN 250 MG
2 CAPSULE ORAL 2 TIMES DAILY PRN
Status: DISCONTINUED | OUTPATIENT
Start: 2025-01-29 | End: 2025-01-31 | Stop reason: HOSPADM

## 2025-01-29 RX ORDER — HYDROCODONE BITARTRATE AND ACETAMINOPHEN 5; 325 MG/1; MG/1
1 TABLET ORAL EVERY 4 HOURS PRN
Status: ACTIVE | OUTPATIENT
Start: 2025-01-29 | End: 2025-01-30

## 2025-01-29 RX ORDER — POLYETHYLENE GLYCOL 3350 17 G/17G
17 POWDER, FOR SOLUTION ORAL DAILY PRN
Status: DISCONTINUED | OUTPATIENT
Start: 2025-01-29 | End: 2025-01-31 | Stop reason: HOSPADM

## 2025-01-29 RX ORDER — BISACODYL 5 MG/1
5 TABLET, DELAYED RELEASE ORAL DAILY PRN
Status: DISCONTINUED | OUTPATIENT
Start: 2025-01-29 | End: 2025-01-31 | Stop reason: HOSPADM

## 2025-01-29 RX ORDER — ESCITALOPRAM OXALATE 20 MG/1
20 TABLET ORAL EVERY MORNING
Status: DISCONTINUED | OUTPATIENT
Start: 2025-01-30 | End: 2025-01-31 | Stop reason: HOSPADM

## 2025-01-29 RX ORDER — TRAZODONE HYDROCHLORIDE 50 MG/1
50 TABLET, FILM COATED ORAL NIGHTLY
Status: DISCONTINUED | OUTPATIENT
Start: 2025-01-29 | End: 2025-01-31 | Stop reason: HOSPADM

## 2025-01-29 RX ORDER — SODIUM CHLORIDE 0.9 % (FLUSH) 0.9 %
10 SYRINGE (ML) INJECTION AS NEEDED
Status: DISCONTINUED | OUTPATIENT
Start: 2025-01-29 | End: 2025-01-31 | Stop reason: HOSPADM

## 2025-01-29 RX ORDER — GABAPENTIN 300 MG/1
600 CAPSULE ORAL NIGHTLY
Status: DISCONTINUED | OUTPATIENT
Start: 2025-01-29 | End: 2025-01-31 | Stop reason: HOSPADM

## 2025-01-29 RX ORDER — FLUTICASONE PROPIONATE 50 MCG
2 SPRAY, SUSPENSION (ML) NASAL EVERY MORNING
Status: DISCONTINUED | OUTPATIENT
Start: 2025-01-30 | End: 2025-01-31 | Stop reason: HOSPADM

## 2025-01-29 RX ORDER — NITROGLYCERIN 0.4 MG/1
0.4 TABLET SUBLINGUAL
Status: DISCONTINUED | OUTPATIENT
Start: 2025-01-29 | End: 2025-01-31 | Stop reason: HOSPADM

## 2025-01-29 RX ORDER — CYCLOBENZAPRINE HCL 10 MG
10 TABLET ORAL 3 TIMES DAILY PRN
Status: DISCONTINUED | OUTPATIENT
Start: 2025-01-29 | End: 2025-01-31 | Stop reason: HOSPADM

## 2025-01-29 RX ORDER — SODIUM CHLORIDE 9 MG/ML
40 INJECTION, SOLUTION INTRAVENOUS AS NEEDED
Status: DISCONTINUED | OUTPATIENT
Start: 2025-01-29 | End: 2025-01-31 | Stop reason: HOSPADM

## 2025-01-29 RX ORDER — WARFARIN SODIUM 7.5 MG/1
7.5 TABLET ORAL
Status: DISCONTINUED | OUTPATIENT
Start: 2025-01-29 | End: 2025-01-29

## 2025-01-29 RX ORDER — SODIUM CHLORIDE 0.9 % (FLUSH) 0.9 %
10 SYRINGE (ML) INJECTION EVERY 12 HOURS SCHEDULED
Status: DISCONTINUED | OUTPATIENT
Start: 2025-01-29 | End: 2025-01-31 | Stop reason: HOSPADM

## 2025-01-29 RX ORDER — POTASSIUM CHLORIDE 750 MG/1
40 TABLET, EXTENDED RELEASE ORAL DAILY
Status: DISCONTINUED | OUTPATIENT
Start: 2025-01-29 | End: 2025-01-31 | Stop reason: HOSPADM

## 2025-01-29 RX ORDER — DEXTROSE MONOHYDRATE 25 G/50ML
50 INJECTION, SOLUTION INTRAVENOUS
Status: DISCONTINUED | OUTPATIENT
Start: 2025-01-29 | End: 2025-01-29

## 2025-01-29 RX ORDER — BISACODYL 10 MG
10 SUPPOSITORY, RECTAL RECTAL DAILY PRN
Status: DISCONTINUED | OUTPATIENT
Start: 2025-01-29 | End: 2025-01-31 | Stop reason: HOSPADM

## 2025-01-29 RX ORDER — INSULIN LISPRO 100 [IU]/ML
2-7 INJECTION, SOLUTION INTRAVENOUS; SUBCUTANEOUS
Status: DISCONTINUED | OUTPATIENT
Start: 2025-01-29 | End: 2025-01-31 | Stop reason: HOSPADM

## 2025-01-29 RX ORDER — OLOPATADINE HYDROCHLORIDE 1 MG/ML
1 SOLUTION/ DROPS OPHTHALMIC 2 TIMES DAILY PRN
Status: DISCONTINUED | OUTPATIENT
Start: 2025-01-29 | End: 2025-01-31 | Stop reason: HOSPADM

## 2025-01-29 RX ORDER — DOXYCYCLINE 100 MG/1
100 CAPSULE ORAL EVERY 12 HOURS SCHEDULED
Status: DISCONTINUED | OUTPATIENT
Start: 2025-01-29 | End: 2025-01-31 | Stop reason: HOSPADM

## 2025-01-29 RX ORDER — CLONIDINE HYDROCHLORIDE 0.1 MG/1
0.1 TABLET ORAL ONCE
Status: COMPLETED | OUTPATIENT
Start: 2025-01-29 | End: 2025-01-29

## 2025-01-29 RX ORDER — FENOFIBRATE 145 MG/1
145 TABLET, COATED ORAL DAILY
Status: DISCONTINUED | OUTPATIENT
Start: 2025-01-30 | End: 2025-01-31 | Stop reason: HOSPADM

## 2025-01-29 RX ORDER — IBUPROFEN 600 MG/1
1 TABLET ORAL
Status: DISCONTINUED | OUTPATIENT
Start: 2025-01-29 | End: 2025-01-31 | Stop reason: HOSPADM

## 2025-01-29 RX ORDER — DEXTROSE MONOHYDRATE 25 G/50ML
25 INJECTION, SOLUTION INTRAVENOUS
Status: DISCONTINUED | OUTPATIENT
Start: 2025-01-29 | End: 2025-01-31 | Stop reason: HOSPADM

## 2025-01-29 RX ORDER — NICOTINE POLACRILEX 4 MG
15 LOZENGE BUCCAL
Status: DISCONTINUED | OUTPATIENT
Start: 2025-01-29 | End: 2025-01-31 | Stop reason: HOSPADM

## 2025-01-29 RX ORDER — TERAZOSIN 5 MG/1
5 CAPSULE ORAL NIGHTLY
Status: DISCONTINUED | OUTPATIENT
Start: 2025-01-29 | End: 2025-01-31 | Stop reason: HOSPADM

## 2025-01-29 RX ORDER — BUMETANIDE 2 MG/1
2 TABLET ORAL DAILY
Status: DISCONTINUED | OUTPATIENT
Start: 2025-01-29 | End: 2025-01-31 | Stop reason: HOSPADM

## 2025-01-29 RX ADMIN — GABAPENTIN 600 MG: 300 CAPSULE ORAL at 21:12

## 2025-01-29 RX ADMIN — POTASSIUM CHLORIDE 40 MEQ: 750 TABLET, EXTENDED RELEASE ORAL at 20:09

## 2025-01-29 RX ADMIN — BUMETANIDE 2 MG: 2 TABLET ORAL at 20:09

## 2025-01-29 RX ADMIN — CLONIDINE HYDROCHLORIDE 0.1 MG: 0.1 TABLET ORAL at 20:09

## 2025-01-29 RX ADMIN — TRAZODONE HYDROCHLORIDE 50 MG: 50 TABLET ORAL at 21:12

## 2025-01-29 RX ADMIN — DOXYCYCLINE 100 MG: 100 CAPSULE ORAL at 21:12

## 2025-01-29 RX ADMIN — Medication 10 ML: at 21:15

## 2025-01-29 RX ADMIN — SACUBITRIL AND VALSARTAN 1 TABLET: 24; 26 TABLET, FILM COATED ORAL at 21:12

## 2025-01-29 NOTE — ED NOTES
Pt via [facility] from home with c/o generalized weakness,episode of AMS but AAOX4 at this time; fall/roll out of bed at around 0700, HTN, low glucose (60 upon scene, gave 45g oral glucose en route)

## 2025-01-29 NOTE — ED NOTES
Nursing report ED to floor  Jeb Olson  74 y.o.  male    HPI :  HPI  Stated Reason for Visit: fall  History Obtained From: EMS    Chief Complaint  Chief Complaint   Patient presents with    Fall       Admitting doctor:   Tushar Bill MD    Admitting diagnosis:   The primary encounter diagnosis was Syncope and collapse. Diagnoses of Hypoglycemia associated with type 2 diabetes mellitus and Elevated troponin were also pertinent to this visit.    Code status:   Current Code Status       Date Active Code Status Order ID Comments User Context       1/29/2025 1352 CPR (Attempt to Resuscitate) [id number]  Franco Luna III, PA ED        Question Answer    Code Status (Patient has no pulse and is not breathing) CPR (Attempt to Resuscitate)    Medical Interventions (Patient has pulse or is breathing) Full Support    Level Of Support Discussed With Patient                    Allergies:   Patient has no known allergies.    Isolation:   No active isolations    Intake and Output  No intake or output data in the 24 hours ending 01/29/25 1355    Weight:   There were no vitals filed for this visit.    Most recent vitals:   Vitals:    01/29/25 1131 01/29/25 1201 01/29/25 1231 01/29/25 1301   BP: 161/94 162/91 (!) 168/102 179/97   Pulse: 60 65 63 60   Resp:       Temp:       TempSrc:       SpO2: 91% 92% 92% 94%       Active LDAs/IV Access:   Lines, Drains & Airways       Active LDAs       Name Placement date Placement time Site Days    Peripheral IV 01/29/25 1042 Left Antecubital 01/29/25  1042  Antecubital  less than 1                    Labs (abnormal labs have a star):   Labs Reviewed   PROTIME-INR - Abnormal; Notable for the following components:       Result Value    Protime 18.7 (*)     INR 1.54 (*)     All other components within normal limits   TROPONIN - Abnormal; Notable for the following components:    HS Troponin T 42 (*)     All other components within normal limits    Narrative:     High Sensitive  Troponin T Reference Range:  <14.0 ng/L- Negative Female for AMI  <22.0 ng/L- Negative Male for AMI  >=14 - Abnormal Female indicating possible myocardial injury.  >=22 - Abnormal Male indicating possible myocardial injury.   Clinicians would have to utilize clinical acumen, EKG, Troponin, and serial changes to determine if it is an Acute Myocardial Infarction or myocardial injury due to an underlying chronic condition.        CBC WITH AUTO DIFFERENTIAL - Abnormal; Notable for the following components:    RBC 3.66 (*)     Hemoglobin 9.9 (*)     Hematocrit 32.9 (*)     MCHC 30.1 (*)     Neutrophil % 85.5 (*)     Lymphocyte % 9.5 (*)     Monocyte % 3.7 (*)     Lymphocytes, Absolute 0.66 (*)     All other components within normal limits   HIGH SENSITIVITIY TROPONIN T 1HR - Abnormal; Notable for the following components:    HS Troponin T 43 (*)     All other components within normal limits    Narrative:     High Sensitive Troponin T Reference Range:  <14.0 ng/L- Negative Female for AMI  <22.0 ng/L- Negative Male for AMI  >=14 - Abnormal Female indicating possible myocardial injury.  >=22 - Abnormal Male indicating possible myocardial injury.   Clinicians would have to utilize clinical acumen, EKG, Troponin, and serial changes to determine if it is an Acute Myocardial Infarction or myocardial injury due to an underlying chronic condition.        APTT - Normal   LACTIC ACID, PLASMA - Normal   POCT GLUCOSE FINGERSTICK - Normal   POCT GLUCOSE FINGERSTICK - Normal   COMPREHENSIVE METABOLIC PANEL    Narrative:     GFR Categories in Chronic Kidney Disease (CKD)      GFR Category          GFR (mL/min/1.73)    Interpretation  G1                     90 or greater         Normal or high (1)  G2                      60-89                Mild decrease (1)  G3a                   45-59                Mild to moderate decrease  G3b                   30-44                Moderate to severe decrease  G4                    15-29                 Severe decrease  G5                    14 or less           Kidney failure          (1)In the absence of evidence of kidney disease, neither GFR category G1 or G2 fulfill the criteria for CKD.    eGFR calculation 2021 CKD-EPI creatinine equation, which does not include race as a factor   URINALYSIS W/ MICROSCOPIC IF INDICATED (NO CULTURE)   POCT GLUCOSE FINGERSTICK   POCT GLUCOSE FINGERSTICK   POCT GLUCOSE FINGERSTICK   POCT GLUCOSE FINGERSTICK   POCT GLUCOSE FINGERSTICK   POCT GLUCOSE FINGERSTICK   POCT GLUCOSE FINGERSTICK   POCT GLUCOSE FINGERSTICK   POCT GLUCOSE FINGERSTICK   POCT GLUCOSE FINGERSTICK   POCT GLUCOSE FINGERSTICK   CBC AND DIFFERENTIAL    Narrative:     The following orders were created for panel order CBC & Differential.  Procedure                               Abnormality         Status                     ---------                               -----------         ------                     CBC Auto Differential[[id number]]        Abnormal            Final result                 Please view results for these tests on the individual orders.       EKG:   ECG 12 Lead Syncope   Preliminary Result   HEART RATE=65  bpm   RR Ayziksip=026  ms   NE Interval=  ms   P Horizontal Axis=  deg   P Front Axis=  deg   QRSD Kkegixgv=751  ms   QT Fvwfwhpe=626  ms   LHeT=665  ms   QRS Axis=-54  deg   T Wave Axis=103  deg   - ABNORMAL ECG -   Atrial fibrillation   Ventricular premature complex   Nonspecific  IVCD with LAD   Nonspecific  T abnormalities, lateral leads   Date and Time of Study:2025-01-29 10:18:10          Meds given in ED:   Medications   sodium chloride 0.9 % flush 10 mL (has no administration in time range)   sodium chloride 0.9 % flush 10 mL (has no administration in time range)   sodium chloride 0.9 % flush 10 mL (has no administration in time range)   sodium chloride 0.9 % infusion 40 mL (has no administration in time range)   nitroglycerin (NITROSTAT) SL tablet 0.4 mg (has no administration  in time range)   sennosides-docusate (PERICOLACE) 8.6-50 MG per tablet 2 tablet (has no administration in time range)     And   polyethylene glycol (MIRALAX) packet 17 g (has no administration in time range)     And   bisacodyl (DULCOLAX) EC tablet 5 mg (has no administration in time range)     And   bisacodyl (DULCOLAX) suppository 10 mg (has no administration in time range)   dextrose (GLUTOSE) oral gel 15 g (has no administration in time range)   dextrose (D50W) (25 g/50 mL) IV injection 25 g (has no administration in time range)   glucagon (GLUCAGEN) injection 1 mg (has no administration in time range)   insulin lispro (HUMALOG/ADMELOG) injection 2-7 Units (has no administration in time range)       Imaging results:  CT Head Without Contrast    Result Date: 1/29/2025   No evidence for acute traumatic intracranial pathology.   Radiation dose reduction techniques were utilized, including automated exposure control and exposure modulation based on body size.  This report was finalized on 1/29/2025 11:54 AM by Dr. Deep Green M.D on Workstation: [id number]       Ambulatory status:   - standby    Social issues:   Social History     Socioeconomic History    Marital status:    Tobacco Use    Smoking status: Never     Passive exposure: Never    Smokeless tobacco: Never    Tobacco comments:     Naila only every been around people who smoked   Vaping Use    Vaping status: Never Used   Substance and Sexual Activity    Alcohol use: Not Currently     Alcohol/week: 21.0 standard drinks of alcohol     Types: 21 Shots of liquor per week     Comment: since thanksgiving    Drug use: Never    Sexual activity: Not Currently     Partners: Female     Birth control/protection: None       Peripheral Neurovascular  Peripheral Neurovascular (Adult)  Peripheral Neurovascular WDL: WDL    Neuro Cognitive  Neuro Cognitive (Adult)  Cognitive/Neuro/Behavioral WDL: WDL  Level of Consciousness: Alert  Orientation: oriented x  4  Additional Documentation: Arco Coma Scale (Group)  Arco Coma Scale  Best Eye Response: 4-->(E4) spontaneous  Best Motor Response: 6-->(M6) obeys commands  Best Verbal Response: 5-->(V5) oriented  Jann Coma Scale Score: 15    Learning  Learning Assessment  Learning Readiness and Ability: no barriers identified    Respiratory  Respiratory WDL  Respiratory WDL: WDL  Rhythm/Pattern, Respiratory: depth regular, pattern regular, unlabored, no shortness of breath reported    Abdominal Pain  Safety Interventions  Safety Precautions/Falls Reduction: assistive device/personal items within reach, fall reduction program maintained, nonskid shoes/slippers when out of bed, toileting offered    Pain Assessments  Pain (Adult)  (0-10) Pain Rating: Rest: 0    NIH Stroke Scale       Emy Hawkins RN  01/29/25 13:55 EST

## 2025-01-29 NOTE — H&P
[facility]   HISTORY AND PHYSICAL    Patient Name: Jeb Olson  : 1950  MRN: [id number]  Primary Care Physician:  Tera Salvador MD  Date of admission: 2025    Subjective   Subjective     Chief Complaint:   Chief Complaint   Patient presents with    Fall         HPI:    Jeb Olson is a 74 y.o. male with significant past medical history of A-fib coagulated with warfarin, diabetes, hypertension, hyperlipidemia, CHF, Aortic valve surgery 3wks ago who was admitted to the ED observation unit for further evaluation of the possible syncopal episode that occurred this morning.  He reports that sometime last night he began to feel jittery and shaky in both extremities.  At some point in time during the middle of the night he ended up on the floor.  He states he soiled himself and his wife reports she found him quite confused and EMS was called to the scene and for a short period following the event.   His blood sugar was found to be 60.  He was given oral glucose en route to the hospital.  At time of admission he had returned to baseline.  He reports his blood sugar has been falling low as of late.  He also reports a recent 30-40 lb wt loss.  He is currently on Amaryl 4 mg twice a day, Janumet XR  twice a day.  The ED physician was concerned that the patient had a syncopal event for other reasons than hypoglycemia.  They wanted patient to be evaluated by cardiology during admission.    TTE echo results from [facility] on 2024 were as follows:  1. Technically difficult echocardiogram.   2. Mild left ventricular enlargement with decreased systolic function.   Estimated ejection fraction is 35-40%.   3. Generalized left ventricular hypokinesis; more severe hypokinesis of the   apex.   4. Mild-moderate concentrically increased ventricular wall thickness.   5. Mild right ventricular enlargement with normal systolic function.   6. Estimated right ventricular systolic  pressure 38 mmHg (potentially   underestimated due to incomplete TR Doppler signal).   7. Mildly calcified mitral annulus with thickened leaflets. Probable   unsupported mitral valve posterior leaflet scallop. Eccentric anteriorly   directed mitral regurgitant jet (not fully visualized); may represent   moderate-severe or potentially severe mitral regurgitation.   8. No prior exam images available for review. Compared to outside exam from   10/25/2024, LVEF is lower.       In the ED the patient's initial glucose was 86.  His repeat was 119.  Remainder of metabolic panel unremarkable.  His INR was slightly elevated at 1.54.  PTT was normal.  Lactate was normal.  CBC revealed a hemoglobin of 9.9 which appears to be baseline for the patient.  His MCV was normal.  CT of the head without contrast showed no acute process.  ECG showed A-fib with a controlled rate.    Review of Systems   All systems were reviewed and negative except for: That listed above    Personal History     Past Medical History:   Diagnosis Date    AF (paroxysmal atrial fibrillation)     Allergic Have had for several years    Eyes and nasal issues    Anemia     Anticoagulant long-term use     COUMADIN    Anxiety Since about 2014    Since I was taking of my Dad, who also passed away 3yrs ago.    Arthritis 2002    Both knees, hips, and shoulders    Arthritis of knee, left     Cataract 05/2019    CHF (congestive heart failure)     Colon polyp 2017    Coronary artery disease     stent    Diabetes mellitus     Dry eyes     Essential hypertension     Heart murmur     HL (hearing loss) 1980    no hearing aides    Hyperlipemia     Knee swelling 7/7/2020    Shortly after my left knee replacement    Macular degeneration     Mild chronic anemia     Mitral valve disorder     Obesity     Pneumonia 11/2024    Sleep apnea     cpap       Past Surgical History:   Procedure Laterality Date    ACHILLES TENDON REPAIR Left     CARDIAC CATHETERIZATION      CARDIAC  CATHETERIZATION N/A 09/01/2022    Procedure: Coronary angiography, Left heart catheterization,;  Surgeon: Bruna Chávez MD;  Location: [facility] CATH INVASIVE LOCATION;  Service: Cardiology;  Laterality: N/A;    CARDIAC CATHETERIZATION N/A 09/01/2022    Procedure: Stent PRAVIN coronary;  Surgeon: Bruna Chávez MD;  [facility] C[facility]AS[facility] LOC[facility];  Service: Cardiology;  Laterality: N/A;    CARDIAC CATHETERIZATION N/A 1/2/2025    Procedure: Left Heart Cath;  Surgeon: Bruna Chávez MD;  [facility];  Service: Cardiology;  Laterality: N/A;    CARDIAC CATHETERIZATION N/A 1/2/2025    Procedure: Coronary angiography;  Surgeon: Bruna Chávez MD;  Location: [facility] CATH INVASIVE LOCATION;  Service: Cardiology;  Laterality: N/A;    CATARACT EXTRACTION EXTRACAPSULAR W/ INTRAOCULAR LENS IMPLANTATION Bilateral     COLONOSCOPY  2018    Dr Reyes    ENDOSCOPY      MITRAL VALVE REPAIR/REPLACEMENT N/A 1/8/2025    Procedure: STERNOTOMY, MITRAL VALVE REPAIR, MAZE PROCEDURE TRANSESOPHAGEAL ECHOCARDIOGRAM, PRP;  Surgeon: Young Kelly MD;  Location: [facility] CVOR;  Service: Cardiothoracic;  Laterality: N/A;    TONSILLECTOMY      As a child    TOTAL KNEE ARTHROPLASTY Left 03/12/2020    Procedure: TOTAL KNEE ARTHROPLASTY;  Surgeon: Chepe Alicea MD;  Location: [facility] OR;  Service: Orthopedics;  Laterality: Left;    TOTAL KNEE ARTHROPLASTY Right 03/21/2024    Procedure: TOTAL KNEE ARTHROPLASTY;  Surgeon: Chepe Alicea MD;  Location: [facility];  Service: Orthopedics;  Laterality: Right;       Family History: family history includes Alcohol abuse in his father and maternal uncle; Arthritis in his mother; Diabetes in his mother; Hyperlipidemia in his father and mother; Osteoporosis in his mother. Otherwise pertinent FHx was reviewed and not pertinent to current issue.    Social History:  reports that he has never smoked. He has never been exposed to tobacco smoke. He has never used smokeless  tobacco. He reports that he does not currently use alcohol after a past usage of about 21.0 standard drinks of alcohol per week. He reports that he does not use drugs.    Home Medications:  B Complex-Folic Acid, Multiple Vitamins-Minerals, Olopatadine HCl, SITagliptin-metFORMIN HCl ER, acetaminophen, aspirin, bumetanide, cyclobenzaprine, doxazosin, doxycycline, escitalopram, ezetimibe, fenofibrate, fluticasone, gabapentin, glimepiride, glucose blood, nebivolol, polyethyl glycol-propyl glycol, potassium chloride, sacubitril-valsartan, sodium hypochlorite in sterile water irrigation topical solution 0.0625%, traZODone, vitamin D3, and warfarin    Allergies:  No Known Allergies    Objective   Objective     Vitals:   Temp:  [97.5 °F (36.4 °C)-99.2 °F (37.3 °C)] 98.1 °F (36.7 °C)  Heart Rate:  [49-76] 75  Resp:  [18] 18  BP: (131-180)/() 160/81  Physical Exam    Constitutional: Awake, alert   Eyes: PERRLA, sclerae anicteric, no conjunctival injection   HENT: NCAT, mucous membranes moist   Neck: Supple, no thyromegaly, no lymphadenopathy, trachea midline   Respiratory: Clear to auscultation bilaterally, nonlabored respirations    Cardiovascular: RRR, no murmurs, rubs, or gallops, palpable pedal pulses bilaterally   Gastrointestinal: Positive bowel sounds, soft, nontender, nondistended   Musculoskeletal: No bilateral ankle edema, no clubbing or cyanosis to extremities   Psychiatric: Appropriate affect, cooperative   Neurologic: Oriented x 3, strength symmetric in all extremities, Cranial Nerves grossly intact to confrontation, speech clear   Skin: No rashes     Result Review    Result Review:  I have personally reviewed the results from the time of this admission to 1/30/2025 07:46 EST and agree with these findings:  [x]  Laboratory list / accordion  []  Microbiology  [x]  Radiology  [x]  EKG/Telemetry   []  Cardiology/Vascular   []  Pathology  []  Old records  []  Other:        Assessment & Plan   The 10-year ASCVD  risk score (Geremias LEA, et al., 2019) is: 56.9%    Values used to calculate the score:      Age: 74 years      Sex: Male      Is Non- : No      Diabetic: Yes      Tobacco smoker: No      Systolic Blood Pressure: 160 mmHg      Is BP treated: Yes      HDL Cholesterol: 33 mg/dL      Total Cholesterol: 161 mg/dL    Assessment / Plan     Brief Patient Summary:  Jbe Olson is a 74 y.o. male who was admitted to the ED observation unit for hypoglycemia.  Patient on sulfonylurea twice daily and Janumet XR  mg twice daily.  He had a syncopal event sometime during the middle the night and states he found himself on the floor.  Spouse reports he was quite confused.  EMS brought to the scene.  Blood glucose 60 on their arrival.  The emergency department was concerned with his blood sugar only being 60 that maybe there was another cause of the syncopal event.  He was admitted to the ED observation unit for treatment of hyperglycemia and further cardiology evaluation.    Active Hospital Problems:  Active Hospital Problems    Diagnosis     **Syncope and collapse      Plan:     Hyperglycemia  -Feed the patient  -Every 4 hours glucose checks  -Diabetic educator consult  -Diabetic diet  -D5 as needed  -Hold sulfonylurea tomorrow and at discharge  -Sliding scale insulin as needed  -Hold Janumet    Syncope  -Blood glucose was 60??  -Cardiology consultation  -Continuous cardiac monitoring  -Hold on TTE echo for now as was done 12/4/2024  -A.m. CBC, CMP, troponin  -Orthostatic vital  -Neuro consultation    Chest wall wound from prev surgery  -Wound care consult    CHF  -Continue home Bumex    Pretension  -Continue clonidine  -Continue nebivolol  -Continue nifedipine  -Continue sacubitril-valsartan    A-fib  -Pharmacy to dose warfarin    Neuropathy  -Continue home Neurontin    Hypokalemia, Bumex  -Continue potassium chloride    Insomnia  -Continue home trazodone        VTE Prophylaxis:  Pharmacologic &  mechanical VTE prophylaxis orders are present.        CODE STATUS:    Level Of Support Discussed With: Patient  Code Status (Patient has no pulse and is not breathing): CPR (Attempt to Resuscitate)  Medical Interventions (Patient has pulse or is breathing): Full Support    Admission Status:  I believe this patient meets observation status.    Electronically signed by Franco Luna III, PA, 01/29/25, 5:09 PM EST.        75 minutes has been spent by [facility] providers in the care of this patient while under observation status      I have worn appropriate PPE during this patient encounter, sanitized my hands both with entering and exiting patient's room.    I have discussed plan of care with patient including advance care plan and/or surrogate decision maker.  Patient advises that their Spouse/Jr Olson will be their primary surrogate decision maker

## 2025-01-29 NOTE — ED PROVIDER NOTES
EMERGENCY DEPARTMENT ENCOUNTER    Room Number:  10/10  PCP: Tera Salvador MD  Historian: Patient      HPI:  Chief Complaint: Fall/syncope  A complete HPI/ROS/PMH/PSH/SH/FH are unobtainable due to: None  Context: Jeb Olson is a 74 y.o. male who presents to the ED c/o sudden onset of a possible syncopal episode that occurred this morning.  He reports that at some point last night he felt jitteriness and shaking in both of his extremities.  During the night, at some point he ended up on the floor next to his bed and is unsure how that event occurred.  His spouse that he was quite confused at that time so EMS was called.  Upon their arrival, his blood glucose was found to be 60 and he was given oral glucose and route.  He reports that the jitteriness as well as mental status changes have fully resolved and he is feeling back to his baseline.  Symptoms were quite severe at the time however.  He states that his blood sugar has been falling low quite a bit as of late.  He is on oral medications only at breakfast and dinner with his last dose being dinner last night.  He denies headache, chest pain, shortness of breath, nausea/vomiting, or fever/chills.            PAST MEDICAL HISTORY  Active Ambulatory Problems     Diagnosis Date Noted    Allergic rhinitis 11/11/2014    Arthritis of knee, left 10/09/2012    Diabetes mellitus 02/18/2013    Essential hypertension 03/07/2014    Hyperlipidemia 02/18/2013    High risk medication use 02/18/2013    Obesity 10/09/2012    Primary osteoarthritis of knee 06/30/2015    Mild chronic anemia 08/08/2019    Obstructive sleep apnea 08/08/2019    Arthritis of left knee 02/04/2020    Sinus bradycardia 10/18/2021    Abnormal stress test 08/16/2022    Colon polyp 09/07/2022    Family history of colonic polyps 09/07/2022    Reactive depression 10/18/2023    Arthritis of knee, right 12/13/2023    Arthritis of knee 12/13/2023    AF (paroxysmal atrial fibrillation) 09/23/2024     Acute exacerbation of CHF (congestive heart failure) 12/19/2024    Severe mitral regurgitation 12/24/2024    Mitral valve regurgitation 01/05/2025    History of open heart surgery, [facility], 1/8/25 01/23/2025    Acute CHF 01/23/2025    Chest pain, atypical 01/24/2025    Diastolic CHF, acute on chronic 01/24/2025    Incisional abscess 01/25/2025     Resolved Ambulatory Problems     Diagnosis Date Noted    No Resolved Ambulatory Problems     Past Medical History:   Diagnosis Date    Allergic Have had for several years    Anemia     Anticoagulant long-term use     Anxiety Since about 2014    Arthritis 2002    Cataract 05/2019    CHF (congestive heart failure)     Coronary artery disease     Dry eyes     Heart murmur     HL (hearing loss) 1980    Hyperlipemia     Knee swelling 7/7/2020    Macular degeneration     Mitral valve disorder     Pneumonia 11/2024    Sleep apnea          PAST SURGICAL HISTORY  Past Surgical History:   Procedure Laterality Date    ACHILLES TENDON REPAIR Left     CARDIAC CATHETERIZATION      CARDIAC CATHETERIZATION N/A 09/01/2022    Procedure: Coronary angiography, Left heart catheterization,;  Surgeon: Bruna Chávez MD;  [facility];  Service: Cardiology;  Laterality: N/A;    CARDIAC CATHETERIZATION N/A 09/01/2022    Procedure: Stent PRAVIN coronary;  Surgeon: Bruna Chávez MD;  Location:  [facility] CATH INVASIVE LOCATION;  Service: Cardiology;  Laterality: N/A;    CARDIAC CATHETERIZATION N/A 1/2/2025    Procedure: Left Heart Cath;  Surgeon: Bruna Chávez MD;  Location:  [facility] CATH INVASIVE LOCATION;  Service: Cardiology;  Laterality: N/A;    CARDIAC CATHETERIZATION N/A 1/2/2025    Procedure: Coronary angiography;  Surgeon: Bruna Chávez MD;  Location:  [facility] CATH INVASIVE LOCATION;  Service: Cardiology;  Laterality: N/A;    CATARACT EXTRACTION EXTRACAPSULAR W/ INTRAOCULAR LENS IMPLANTATION Bilateral     COLONOSCOPY  2018    Dr Reyes    ENDOSCOPY      MITRAL VALVE  REPAIR/REPLACEMENT N/A 1/8/2025    Procedure: STERNOTOMY, MITRAL VALVE REPAIR, MAZE PROCEDURE TRANSESOPHAGEAL ECHOCARDIOGRAM, PRP;  Surgeon: Young Kelly MD;  Location: [facility];  Service: Cardiothoracic;  Laterality: N/A;    TONSILLECTOMY      As a child    TOTAL KNEE ARTHROPLASTY Left 03/12/2020    Procedure: TOTAL KNEE ARTHROPLASTY;  Surgeon: Chepe Alicea MD;  Location: [facility] MAIN OR;  Service: Orthopedics;  Laterality: Left;    TOTAL KNEE ARTHROPLASTY Right 03/21/2024    Procedure: TOTAL KNEE ARTHROPLASTY;  Surgeon: Chepe Alicea MD;  Location: [facility] OR [facility];  Service: Orthopedics;  Laterality: Right;         FAMILY HISTORY  Family History   Problem Relation Age of Onset    Alcohol abuse Maternal Uncle         Passed away 2013    Alcohol abuse Father         Passed away in 2016    Hyperlipidemia Father         Started about 10 years before his death    Arthritis Mother         Passed away 2006, from Alzheimers    Diabetes Mother     Hyperlipidemia Mother         Started probably at middle age    Osteoporosis Mother     Malig Hyperthermia Neg Hx          SOCIAL HISTORY  Social History     Socioeconomic History    Marital status:    Tobacco Use    Smoking status: Never     Passive exposure: Never    Smokeless tobacco: Never    Tobacco comments:     Naila only every been around people who smoked   Vaping Use    Vaping status: Never Used   Substance and Sexual Activity    Alcohol use: Not Currently     Alcohol/week: 21.0 standard drinks of alcohol     Types: 21 Shots of liquor per week     Comment: since thanksgiving    Drug use: Never    Sexual activity: Not Currently     Partners: Female     Birth control/protection: None         ALLERGIES  Patient has no known allergies.        REVIEW OF SYSTEMS  Review of Systems   Constitutional:  Negative for activity change, appetite change and fever.   HENT:  Negative for congestion and sore throat.    Eyes: Negative.    Respiratory:  Negative for  cough and shortness of breath.    Cardiovascular:  Negative for chest pain and leg swelling.   Gastrointestinal:  Negative for abdominal pain, diarrhea and vomiting.   Endocrine: Negative.    Genitourinary:  Negative for decreased urine volume and dysuria.   Musculoskeletal:  Negative for neck pain.   Skin:  Negative for rash and wound.   Allergic/Immunologic: Negative.    Neurological:  Positive for tremors, syncope and light-headedness. Negative for weakness, numbness and headaches.   Hematological: Negative.    Psychiatric/Behavioral: Negative.     All other systems reviewed and are negative.         PHYSICAL EXAM  ED Triage Vitals [01/29/25 1005]   Temp Heart Rate Resp BP SpO2   99.2 °F (37.3 °C) 60 18 180/92 96 %      Temp src Heart Rate Source Patient Position BP Location FiO2 (%)   Oral -- -- -- --       Physical Exam  Constitutional:       General: He is not in acute distress.     Appearance: Normal appearance. He is not ill-appearing or toxic-appearing.   HENT:      Head: Normocephalic and atraumatic.   Eyes:      Extraocular Movements: Extraocular movements intact.      Pupils: Pupils are equal, round, and reactive to light.   Cardiovascular:      Rate and Rhythm: Normal rate. Rhythm irregularly irregular.      Heart sounds: No murmur heard.     No friction rub. No gallop.   Pulmonary:      Effort: Pulmonary effort is normal.      Breath sounds: Normal breath sounds.   Abdominal:      General: Abdomen is flat. There is no distension.      Palpations: Abdomen is soft.      Tenderness: There is no abdominal tenderness.   Musculoskeletal:         General: No swelling or tenderness. Normal range of motion.      Cervical back: Normal range of motion and neck supple.   Skin:     General: Skin is warm and dry.   Neurological:      General: No focal deficit present.      Mental Status: He is alert and oriented to person, place, and time.      Sensory: No sensory deficit.      Motor: No weakness.   Psychiatric:          Mood and Affect: Mood normal.         Behavior: Behavior normal.           Vital signs and nursing notes reviewed.          LAB RESULTS  Recent Results (from the past 24 hours)   ECG 12 Lead Syncope    Collection Time: 01/29/25 10:18 AM   Result Value Ref Range    QT Interval 450 ms    QTC Interval 468 ms   Comprehensive Metabolic Panel    Collection Time: 01/29/25 10:53 AM    Specimen: Blood   Result Value Ref Range    Glucose 86 65 - 99 mg/dL    BUN 18 8 - 23 mg/dL    Creatinine 1.10 0.76 - 1.27 mg/dL    Sodium 141 136 - 145 mmol/L    Potassium 3.5 3.5 - 5.2 mmol/L    Chloride 103 98 - 107 mmol/L    CO2 27.5 22.0 - 29.0 mmol/L    Calcium 8.9 8.6 - 10.5 mg/dL    Total Protein 6.7 6.0 - 8.5 g/dL    Albumin 3.8 3.5 - 5.2 g/dL    ALT (SGPT) 13 1 - 41 U/L    AST (SGOT) 20 1 - 40 U/L    Alkaline Phosphatase 47 39 - 117 U/L    Total Bilirubin 0.2 0.0 - 1.2 mg/dL    Globulin 2.9 gm/dL    A/G Ratio 1.3 g/dL    BUN/Creatinine Ratio 16.4 7.0 - 25.0    Anion Gap 10.5 5.0 - 15.0 mmol/L    eGFR 71.3 >60.0 mL/min/1.73   Protime-INR    Collection Time: 01/29/25 10:53 AM    Specimen: Blood   Result Value Ref Range    Protime 18.7 (H) 11.7 - 14.2 Seconds    INR 1.54 (H) 0.90 - 1.10   aPTT    Collection Time: 01/29/25 10:53 AM    Specimen: Blood   Result Value Ref Range    PTT 34.9 22.7 - 35.4 seconds   High Sensitivity Troponin T    Collection Time: 01/29/25 10:53 AM    Specimen: Blood   Result Value Ref Range    HS Troponin T 42 (H) <22 ng/L   Lactic Acid, Plasma    Collection Time: 01/29/25 10:53 AM    Specimen: Blood   Result Value Ref Range    Lactate 1.1 0.5 - 2.0 mmol/L   CBC Auto Differential    Collection Time: 01/29/25 10:53 AM    Specimen: Blood   Result Value Ref Range    WBC 6.97 3.40 - 10.80 10*3/mm3    RBC 3.66 (L) 4.14 - 5.80 10*6/mm3    Hemoglobin 9.9 (L) 13.0 - 17.7 g/dL    Hematocrit 32.9 (L) 37.5 - 51.0 %    MCV 89.9 79.0 - 97.0 fL    MCH 27.0 26.6 - 33.0 pg    MCHC 30.1 (L) 31.5 - 35.7 g/dL    RDW 13.2 12.3  - 15.4 %    RDW-SD 43.5 37.0 - 54.0 fl    MPV 10.3 6.0 - 12.0 fL    Platelets 224 140 - 450 10*3/mm3    Neutrophil % 85.5 (H) 42.7 - 76.0 %    Lymphocyte % 9.5 (L) 19.6 - 45.3 %    Monocyte % 3.7 (L) 5.0 - 12.0 %    Eosinophil % 0.7 0.3 - 6.2 %    Basophil % 0.3 0.0 - 1.5 %    Immature Grans % 0.3 0.0 - 0.5 %    Neutrophils, Absolute 5.96 1.70 - 7.00 10*3/mm3    Lymphocytes, Absolute 0.66 (L) 0.70 - 3.10 10*3/mm3    Monocytes, Absolute 0.26 0.10 - 0.90 10*3/mm3    Eosinophils, Absolute 0.05 0.00 - 0.40 10*3/mm3    Basophils, Absolute 0.02 0.00 - 0.20 10*3/mm3    Immature Grans, Absolute 0.02 0.00 - 0.05 10*3/mm3    nRBC 0.0 0.0 - 0.2 /100 WBC   POC Glucose Once    Collection Time: 01/29/25 11:52 AM    Specimen: Blood   Result Value Ref Range    Glucose 119 70 - 130 mg/dL   High Sensitivity Troponin T 1Hr    Collection Time: 01/29/25 12:04 PM    Specimen: Blood   Result Value Ref Range    HS Troponin T 43 (H) <22 ng/L    Troponin T Numeric Delta 1 ng/L    Troponin T % Delta 2 Abnormal if >/= 20%   POC Glucose Once    Collection Time: 01/29/25  1:08 PM    Specimen: Blood   Result Value Ref Range    Glucose 84 70 - 130 mg/dL       Ordered the above labs and reviewed the results.        RADIOLOGY  CT Head Without Contrast    Result Date: 1/29/2025  CT HEAD WITHOUT CONTRAST  CLINICAL HISTORY: syncope/trauma  TECHNIQUE: CT scan of the head was obtained with 3 mm axial soft tissue and 2 mm axial bone algorithm algorithm images. No intravenous contrast was administered. Sagittal and coronal reconstructions were obtained.  COMPARISON: None  FINDINGS:   There is no evidence for an acute extra-axial hemorrhage or a calvarial fracture.  Mild to moderate changes of chronic small vessel ischemic phenomena are noted. There is old lacunar disease involving the right caudate and right lentiform nucleus. The ventricles, sulci, and cisterns are age-appropriate. The posterior fossa structures are within normal limits.  There is a mixed  density sclerotic and lucent lesion within the posterior aspect of the right parietal bone which measures up to approximately 1.8 x 0.9 cm in greatest axial dimensions. The precise etiology is unclear although its radiographic appearance is most suggestive of a benign entity such as a benign fibro-osseous lesion.  Incidental note is made of mucous retention cysts within the maxillary sinuses.       No evidence for acute traumatic intracranial pathology.   Radiation dose reduction techniques were utilized, including automated exposure control and exposure modulation based on body size.  This report was finalized on 1/29/2025 11:54 AM by Dr. Deep Green M.D on Workstation: [id number]       Ordered the above noted radiological studies. Reviewed by me in PACS.            PROCEDURES  Procedures    EKG independently interpreted by myself as follows:    EKG          EKG time: 1018  Rhythm/Rate: atrial fibrillation, 65  P waves and AZ: absent  QRS, axis: nml, LAD   ST and T waves: Normal ST segments, nonspecific T wave abnormalities    Interpreted Contemporaneously by me, independently viewed  unchanged compared to prior 1/23/25            MEDICATIONS GIVEN IN ER  Medications   sodium chloride 0.9 % flush 10 mL (has no administration in time range)                   MEDICAL DECISION MAKING, PROGRESS, and CONSULTS    All labs have been independently reviewed by me.  All radiology studies have been reviewed by me and I have also reviewed the radiology report.   EKG's independently viewed and interpreted by me.  Discussion below represents my analysis of pertinent findings related to patient's condition, differential diagnosis, treatment plan and final disposition.      Additional sources:  - Discussed/ obtained information from independent historians: History obtained from the patient himself at bedside.    - External (non-ED) record review: Upon medical records review, the patient was admitted to the hospital last from  1/23/2025 through 1/25/2025 for evaluation of chest discomfort with an incisional abscess to his chest wall requiring management.    - Chronic or social conditions impacting care: Type 2 diabetes mellitus, warfarin dependent history of atrial fibrillation    - Shared decision making: Patient decision based on shared conversations have between myself, the patient and family at bedside, as well as Franco Luna PA-C.      Orders placed during this visit:  Orders Placed This Encounter   Procedures    CT Head Without Contrast    Comprehensive Metabolic Panel    Protime-INR    aPTT    Urinalysis With Microscopic If Indicated (No Culture) - Urine, Clean Catch    High Sensitivity Troponin T    Lactic Acid, Plasma    CBC Auto Differential    High Sensitivity Troponin T 1Hr    POC Glucose Once    POC Glucose Once    POC Glucose Once    ECG 12 Lead Syncope    Insert Peripheral IV    CBC & Differential           Differential diagnosis includes but is not limited to:    Vasovagal syncope, cardiogenic syncope, long QT syndrome, hypoglycemia, acute coronary syndrome, cardiac rhythm disturbance, acute anemia, electrolyte disturbance, acute CVA, or TIA      Independent interpretation of labs, radiology studies, and discussions with consultants:    CT independently interpreted by myself with my interpretation showing no area of acute ischemia/infarct, hemorrhage, or mass effect.      ED Course as of 01/29/25 1339   Wed Jan 29, 2025   1325 On reevaluation, the patient is resting comfortably without any further complaints.  His blood sugar has remained stable during his ED stay.  I informed them that the workup is fairly unremarkable but I would like to admit him today to the observation unit for further evaluation of the hypoglycemia as well as syncope.  They agree with the plan and all questions answered. [BM]   1335 The patient's presentation, workup, as well as diagnosis and treatment plan was discussed at length with Franco  REMY abrams.  He agrees to admit the patient to the observation unit today with Dr. Bill. [BM]      ED Course User Index  [BM] Deng Larios MD           DIAGNOSIS  Final diagnoses:   Syncope and collapse   Hypoglycemia associated with type 2 diabetes mellitus   Elevated troponin         DISPOSITION  ADMISSION    Discussed treatment plan and reason for admission with pt/family and admitting physician.  Pt/family voiced understanding of the plan for admission for further testing/treatment as needed.               Latest Documented Vital Signs:  As of 13:39 EST  BP- 179/97 HR- 60 Temp- 99.2 °F (37.3 °C) (Oral) O2 sat- 94%              --    Please note that portions of this were completed with a voice recognition program.       Note Disclaimer: At [facility], we believe that sharing information builds trust and better relationships. You are receiving this note because you are receiving care at [facility] or recently visited. It is possible you will see health information before a provider has talked with you about it. This kind of information can be easy to misunderstand. To help you fully understand what it means for your health, we urge you to discuss this note with your provider.             Deng Larios MD  01/29/25 6669

## 2025-01-29 NOTE — PROGRESS NOTES
[facility] Clinical Pharmacy Services: Warfarin Dosing/Monitoring Consult    Jeb Olson is a 74 y.o. male, estimated creatinine clearance is 81.8 mL/min (by C-G formula based on SCr of 1.1 mg/dL). weighing 115 kg (254 lb 6.4 oz).    Results from last 7 days   Lab Units 01/29/25  1053 01/27/25  1432 01/25/25  0808 01/24/25  0826 01/24/25  0434 01/23/25  1743   INR  1.54* 1.3* 1.44* 1.46*  --  1.40*   APTT seconds 34.9  --   --   --   --  33.9   HEMOGLOBIN g/dL 9.9*  --   --   --  9.8* 9.9*   HEMATOCRIT % 32.9*  --   --   --  30.8* 30.3*   PLATELETS 10*3/mm3 224  --   --   --  251 244     Prior to admission anticoagulation: 7.5 mg every Wed, Sat; 5 mg all other days     Hospital Anticoagulation:  Consulting provider: Franco Luna III   Start date: 1/29/24  Indication: A Fib - requiring full anticoagulation  Target INR: 2 - 3  Expected duration: indefinite   Bridge Therapy: No  with      Potential food or drug interactions:   Nothing new medicatio wise, diet may change sensitivity during OBS stay    Education complete?/Date: Yes; PTA    Assessment/Plan:  Dose: 7.5mg once which was planned, will see INR trend and adjust accordingly   Monitor for any signs or symptoms of bleeding  Follow up daily INRs and dose adjustments    Pharmacy will continue to follow until discharge or discontinuation of warfarin.     Yaima Pratt, PharmD  Clinical Pharmacist

## 2025-01-29 NOTE — NURSING NOTE
Noted DM ed order. Meet with pt at bedside in observation unit. Pt reports at least 10 yr hx of DM. He reports he has been checking BG bid after he takes his DM meds. He denies being taught in the past what level BG is low BG. BG before dinner is 60. Pt is asymptomatic for low BG sxs. Advise pt to be checking BG prior to taking his DM medicine instead of AFTER so that he can omit a dose in case of low BG (<70.) Then contact PCP re dose change. In addition, pt endorses recent (since Oct) unintentional wt loss of 30#. Educate pt that provider will rx a lower dose of DM med (ie Amaryl/glimepiride) to help lower risk of hypoglycemia at home.

## 2025-01-30 ENCOUNTER — APPOINTMENT (OUTPATIENT)
Dept: NEUROLOGY | Facility: HOSPITAL | Age: 75
End: 2025-01-30
Payer: COMMERCIAL

## 2025-01-30 ENCOUNTER — HOME CARE VISIT (OUTPATIENT)
Dept: HOME HEALTH SERVICES | Facility: HOME HEALTHCARE | Age: 75
End: 2025-01-30
Payer: COMMERCIAL

## 2025-01-30 ENCOUNTER — DOCUMENTATION (OUTPATIENT)
Dept: HOME HEALTH SERVICES | Facility: HOME HEALTHCARE | Age: 75
End: 2025-01-30
Payer: COMMERCIAL

## 2025-01-30 LAB
ANION GAP SERPL CALCULATED.3IONS-SCNC: 11.7 MMOL/L (ref 5–15)
BUN SERPL-MCNC: 18 MG/DL (ref 8–23)
BUN/CREAT SERPL: 16.4 (ref 7–25)
CALCIUM SPEC-SCNC: 9 MG/DL (ref 8.6–10.5)
CHLORIDE SERPL-SCNC: 103 MMOL/L (ref 98–107)
CO2 SERPL-SCNC: 27.3 MMOL/L (ref 22–29)
CREAT SERPL-MCNC: 1.1 MG/DL (ref 0.76–1.27)
DEPRECATED RDW RBC AUTO: 43.9 FL (ref 37–54)
EGFRCR SERPLBLD CKD-EPI 2021: 71.3 ML/MIN/1.73
ERYTHROCYTE [DISTWIDTH] IN BLOOD BY AUTOMATED COUNT: 13.6 % (ref 12.3–15.4)
GLUCOSE BLDC GLUCOMTR-MCNC: 102 MG/DL (ref 70–130)
GLUCOSE BLDC GLUCOMTR-MCNC: 102 MG/DL (ref 70–130)
GLUCOSE BLDC GLUCOMTR-MCNC: 106 MG/DL (ref 70–130)
GLUCOSE BLDC GLUCOMTR-MCNC: 185 MG/DL (ref 70–130)
GLUCOSE BLDC GLUCOMTR-MCNC: 190 MG/DL (ref 70–130)
GLUCOSE SERPL-MCNC: 105 MG/DL (ref 65–99)
HBA1C MFR BLD: 5.8 % (ref 4.8–5.6)
HCT VFR BLD AUTO: 32.4 % (ref 37.5–51)
HGB BLD-MCNC: 10.2 G/DL (ref 13–17.7)
INR PPP: 1.57 (ref 0.9–1.1)
MCH RBC QN AUTO: 28.1 PG (ref 26.6–33)
MCHC RBC AUTO-ENTMCNC: 31.5 G/DL (ref 31.5–35.7)
MCV RBC AUTO: 89.3 FL (ref 79–97)
PLATELET # BLD AUTO: 242 10*3/MM3 (ref 140–450)
PMV BLD AUTO: 10.4 FL (ref 6–12)
POTASSIUM SERPL-SCNC: 3.5 MMOL/L (ref 3.5–5.2)
PROTHROMBIN TIME: 19 SECONDS (ref 11.7–14.2)
RBC # BLD AUTO: 3.63 10*6/MM3 (ref 4.14–5.8)
SODIUM SERPL-SCNC: 142 MMOL/L (ref 136–145)
WBC NRBC COR # BLD AUTO: 6.75 10*3/MM3 (ref 3.4–10.8)

## 2025-01-30 PROCEDURE — G0378 HOSPITAL OBSERVATION PER HR: HCPCS

## 2025-01-30 PROCEDURE — 99214 OFFICE O/P EST MOD 30 MIN: CPT | Performed by: INTERNAL MEDICINE

## 2025-01-30 PROCEDURE — 82948 REAGENT STRIP/BLOOD GLUCOSE: CPT

## 2025-01-30 PROCEDURE — 83036 HEMOGLOBIN GLYCOSYLATED A1C: CPT

## 2025-01-30 PROCEDURE — 95819 EEG AWAKE AND ASLEEP: CPT | Performed by: PSYCHIATRY & NEUROLOGY

## 2025-01-30 PROCEDURE — 63710000001 INSULIN LISPRO (HUMAN) PER 5 UNITS: Performed by: PHYSICIAN ASSISTANT

## 2025-01-30 PROCEDURE — 85610 PROTHROMBIN TIME: CPT | Performed by: EMERGENCY MEDICINE

## 2025-01-30 PROCEDURE — 80048 BASIC METABOLIC PNL TOTAL CA: CPT | Performed by: PHYSICIAN ASSISTANT

## 2025-01-30 PROCEDURE — 95819 EEG AWAKE AND ASLEEP: CPT

## 2025-01-30 PROCEDURE — 99214 OFFICE O/P EST MOD 30 MIN: CPT | Performed by: NURSE PRACTITIONER

## 2025-01-30 PROCEDURE — 85027 COMPLETE CBC AUTOMATED: CPT | Performed by: PHYSICIAN ASSISTANT

## 2025-01-30 RX ORDER — NEBIVOLOL 5 MG/1
5 TABLET ORAL
Status: DISCONTINUED | OUTPATIENT
Start: 2025-01-30 | End: 2025-01-31 | Stop reason: HOSPADM

## 2025-01-30 RX ORDER — WARFARIN SODIUM 7.5 MG/1
7.5 TABLET ORAL
Status: COMPLETED | OUTPATIENT
Start: 2025-01-30 | End: 2025-01-30

## 2025-01-30 RX ADMIN — Medication 10 ML: at 20:27

## 2025-01-30 RX ADMIN — ASPIRIN 81 MG: 81 TABLET, COATED ORAL at 09:01

## 2025-01-30 RX ADMIN — POTASSIUM CHLORIDE 40 MEQ: 750 TABLET, EXTENDED RELEASE ORAL at 09:01

## 2025-01-30 RX ADMIN — DOXYCYCLINE 100 MG: 100 CAPSULE ORAL at 20:23

## 2025-01-30 RX ADMIN — TERAZOSIN HYDROCHLORIDE 5 MG: 5 CAPSULE ORAL at 20:23

## 2025-01-30 RX ADMIN — GABAPENTIN 600 MG: 300 CAPSULE ORAL at 20:23

## 2025-01-30 RX ADMIN — SACUBITRIL AND VALSARTAN 1 TABLET: 24; 26 TABLET, FILM COATED ORAL at 20:32

## 2025-01-30 RX ADMIN — SACUBITRIL AND VALSARTAN 1 TABLET: 24; 26 TABLET, FILM COATED ORAL at 09:01

## 2025-01-30 RX ADMIN — NEBIVOLOL 5 MG: 5 TABLET ORAL at 09:01

## 2025-01-30 RX ADMIN — Medication 10 ML: at 09:01

## 2025-01-30 RX ADMIN — FENOFIBRATE 145 MG: 145 TABLET ORAL at 20:28

## 2025-01-30 RX ADMIN — INSULIN LISPRO 2 UNITS: 100 INJECTION, SOLUTION INTRAVENOUS; SUBCUTANEOUS at 17:51

## 2025-01-30 RX ADMIN — BUMETANIDE 2 MG: 2 TABLET ORAL at 09:01

## 2025-01-30 RX ADMIN — DOXYCYCLINE 100 MG: 100 CAPSULE ORAL at 09:01

## 2025-01-30 RX ADMIN — INSULIN LISPRO 2 UNITS: 100 INJECTION, SOLUTION INTRAVENOUS; SUBCUTANEOUS at 20:47

## 2025-01-30 RX ADMIN — ESCITALOPRAM 20 MG: 20 TABLET, FILM COATED ORAL at 09:01

## 2025-01-30 RX ADMIN — WARFARIN 7.5 MG: 7.5 TABLET ORAL at 17:51

## 2025-01-30 RX ADMIN — TRAZODONE HYDROCHLORIDE 50 MG: 50 TABLET ORAL at 20:23

## 2025-01-30 NOTE — NURSING NOTE
Reason for Visit: CWCN: We see patient at the request of the floor nurse regarding skin issue on the sternal incision wound. Upon assessment an open area of 1x0.4 cm could be seen on the incision wound from previous surgery.   According to the nurse's note requesting, the home health is being using  Iodoform packing strip, patient and family at bedside agree to continue the same wound care.  Treatment Plan/Recommendations: Iodoform packing strip ordered to open area on sternal old incision wound.  Wound Team Follow up Plan: WCN will follow up him according to his needs.

## 2025-01-30 NOTE — PROGRESS NOTES
[facility] Clinical Pharmacy Services: Warfarin Dosing/Monitoring Consult    Jeb Olson is a 74 y.o. male, estimated creatinine clearance is 81.8 mL/min (by C-G formula based on SCr of 1.1 mg/dL). weighing 115 kg (254 lb 6.4 oz).    Results from last 7 days   Lab Units 01/30/25  0316 01/29/25  1848 01/29/25  1053 01/27/25  1432 01/25/25  0808 01/24/25  0826 01/24/25  0434 01/23/25  1743   INR  1.57* 1.58* 1.54* 1.3* 1.44*   < >  --  1.40*   APTT seconds  --   --  34.9  --   --   --   --  33.9   HEMOGLOBIN g/dL 10.2*  --  9.9*  --   --   --  9.8* 9.9*   HEMATOCRIT % 32.4*  --  32.9*  --   --   --  30.8* 30.3*   PLATELETS 10*3/mm3 242  --  224  --   --   --  251 244    < > = values in this interval not displayed.     Prior to admission anticoagulation: 7.5 mg every Wed, Sat; 5 mg all other days     Hospital Anticoagulation:  Consulting provider: Franco Luna III   Start date: 1/29/24  Indication: A Fib - requiring full anticoagulation  Target INR: 2 - 3  Expected duration: indefinite   Bridge Therapy: No     Potential food or drug interactions:   Nothing new medicatio wise, diet may change sensitivity during OBS stay    Education complete?/Date: Yes; PTA    Assessment/Plan:  Warfarin dose not given yesterday as it was d/c'd following order. Confirmed with Obs provider today that it is okay to continue with warfarin dosing today.  Warfarin 7.5mg today.  Monitor for any signs or symptoms of bleeding  Follow up daily INRs and dose adjustments    Pharmacy will continue to follow until discharge or discontinuation of warfarin.     Keya Faria, PharmD, BCPS, [facility]  Clinical Pharmacy Specialist, Emergency Medicine   Phone: [phone]

## 2025-01-30 NOTE — CONSULTS
LOS: 0 days   Patient Care Team:  Tear Salvador MD as PCP - General (Internal Medicine)  Micah Reyes MD as Consulting Physician (Gastroenterology)  Bruna Chávez MD as Referring Physician (Cardiology)    Chief Complaint: Chest wound    Subjective  Patient presented to [facility] ED after syncopal episode and altered mental status. Patient states he became alert after he fell out of bed and wasn't able to get up. He did have loss of memory during part of this episode and states he had bladder incontinence. Patient was found to be hypoglycemic in ED though there is concern for seizure so neurology was consulted. Cardiac surgery was consulted for sternal wound which he was seen in the hospital for last week and discharged 01/25/25 with home health and treated with one week of doxycycline. Patient denies fever, chills, and states home health is coming by but not changing the dressing every time.     01/25/25 - Exert from recent d/c summary:  Patient is a 74 y.o. male who ultimately had recent cardiac intervention per Dr. Kelly and patient has midline scar.  He developed a little bit of redness in this area and some swelling.  He states he had a cyst in that area that was there prior to the intervention.  What you could appreciate on exam is a very small and mild incisional abscess.  You were able to express some mild purulence but there is no expansion of cellulitis to the chest wall.  This little lesion is somehow causing this gentleman to have some discomfort in that area.  I discussed the case with both infectious disease and cardiothoracic and Dr. Crawford was at bedside at the time of my exam on 1/25/2025.  He did little bedside I&D and was able to express more purulence.  Patient's blood cultures and wound cultures are ultimately unremarkable and there is no known past history of MRSA.  ID Patient on vancomycin while here though he is more than stable disposition to go back to doxycycline so a week supply  "was endorsed at the time of discharge.  Vital signs are stable as well as afebrile there is no concern for systemic involvement/sepsis.  No signs of fever.  Vitals are overall well-controlled.  The 1 outlined blood pressure that is elevated today was due to underlying anxiety at the time of our exams and intervention.  Additional home health was ordered per cardiothoracic surgery.     Troponin with negative delta and no acute ACS EKG changes with known PAF.      Vital Signs  Temp:  [97.5 °F (36.4 °C)-99.2 °F (37.3 °C)] 98.1 °F (36.7 °C)  Heart Rate:  [49-76] 75  Resp:  [18] 18  BP: (131-180)/() 160/81  Body mass index is 32.66 kg/m².  No intake or output data in the 24 hours ending 01/30/25 0731  No intake/output data recorded.            01/29/25  1521   Weight: 115 kg (254 lb 6.4 oz)         Objective:  General Appearance:  Comfortable, in no acute distress, not in pain and well-appearing.    Vital signs: (most recent): Blood pressure 160/81, pulse 75, temperature 98.1 °F (36.7 °C), temperature source Oral, resp. rate 18, height 188 cm (74\"), weight 115 kg (254 lb 6.4 oz), SpO2 95%.  Vital signs are normal.  No fever.    Lungs:  Normal effort and normal respiratory rate.  Breath sounds clear to auscultation.  He is not in respiratory distress.    Heart: Irregular rhythm.    Abdomen: Abdomen is soft.  Bowel sounds are normal.   There is no abdominal tenderness.     Pulses: Distal pulses are intact.    Neurological: Patient is alert and oriented to person, place and time.  Normal strength.    Skin:  Warm and dry.  (Medial sternal wound without purulent drainage and mild erythema. )              Results Review:        WBC WBC   Date Value Ref Range Status   01/30/2025 6.75 3.40 - 10.80 10*3/mm3 Final   01/29/2025 6.97 3.40 - 10.80 10*3/mm3 Final      HGB Hemoglobin   Date Value Ref Range Status   01/30/2025 10.2 (L) 13.0 - 17.7 g/dL Final   01/29/2025 9.9 (L) 13.0 - 17.7 g/dL Final      HCT Hematocrit   Date " "Value Ref Range Status   01/30/2025 32.4 (L) 37.5 - 51.0 % Final   01/29/2025 32.9 (L) 37.5 - 51.0 % Final      Platelets Platelets   Date Value Ref Range Status   01/30/2025 242 140 - 450 10*3/mm3 Final   01/29/2025 224 140 - 450 10*3/mm3 Final        PT/INR:    Protime   Date Value Ref Range Status   01/30/2025 19.0 (H) 11.7 - 14.2 Seconds Final   01/29/2025 18.9 (H) 11.7 - 14.2 Seconds Final   01/29/2025 18.7 (H) 11.7 - 14.2 Seconds Final   01/27/2025 15.4 (H) 10.0 - 13.8 seconds Final   /  INR   Date Value Ref Range Status   01/30/2025 1.57 (H) 0.90 - 1.10 Final   01/29/2025 1.58 (H) 0.90 - 1.10 Final   01/29/2025 1.54 (H) 0.90 - 1.10 Final   01/27/2025 1.3 (H) 0.91 - 1.09 Final       Sodium Sodium   Date Value Ref Range Status   01/30/2025 142 136 - 145 mmol/L Final   01/29/2025 141 136 - 145 mmol/L Final      Potassium Potassium   Date Value Ref Range Status   01/30/2025 3.5 3.5 - 5.2 mmol/L Final   01/29/2025 3.5 3.5 - 5.2 mmol/L Final      Chloride Chloride   Date Value Ref Range Status   01/30/2025 103 98 - 107 mmol/L Final   01/29/2025 103 98 - 107 mmol/L Final      Bicarbonate CO2   Date Value Ref Range Status   01/30/2025 27.3 22.0 - 29.0 mmol/L Final   01/29/2025 27.5 22.0 - 29.0 mmol/L Final      BUN BUN   Date Value Ref Range Status   01/30/2025 18 8 - 23 mg/dL Final   01/29/2025 18 8 - 23 mg/dL Final      Creatinine Creatinine   Date Value Ref Range Status   01/30/2025 1.10 0.76 - 1.27 mg/dL Final   01/29/2025 1.10 0.76 - 1.27 mg/dL Final      Calcium Calcium   Date Value Ref Range Status   01/30/2025 9.0 8.6 - 10.5 mg/dL Final   01/29/2025 8.9 8.6 - 10.5 mg/dL Final      Magnesium No results found for: \"MG\"       aspirin, 81 mg, Oral, Daily  bumetanide, 2 mg, Oral, Daily  doxycycline, 100 mg, Oral, Q12H  escitalopram, 20 mg, Oral, QAM  fenofibrate, 145 mg, Oral, Daily  fluticasone, 2 spray, Nasal, QAM  gabapentin, 600 mg, Oral, Nightly  insulin lispro, 2-7 Units, Subcutaneous, 4x Daily AC & at " Bedtime  potassium chloride, 40 mEq, Oral, Daily  sacubitril-valsartan, 1 tablet, Oral, Q12H  sodium chloride, 10 mL, Intravenous, Q12H  terazosin, 5 mg, Oral, Nightly  traZODone, 50 mg, Oral, Nightly      Pharmacy to dose warfarin,               Syncope and collapse      Assessment & Plan    - Syncope v seizure -- neurology consulted  - Sternal chest wound  - Severe mitral valve regurgitation - s/p MV repair, MAZE, SELMA closure - Pagni 1/8/2025  - CAD with previous stent to LAD (2022)  - NICM, EF 40% intra-op YAZMIN  - Atrial fibrillation  -- s/p MAZE, on coumadin  - DM II  - HTN  - HLD  - HOLLI with CPAP  - Renal insuffiencey -- Cr baseline 1.3-1.5  - BPH    PLAN  - Wound care consulted eval and treat. No purulence, pain, fever, or leukocytosis. Patient currently on doxycycline. No indication for culture at this time.   - Pharmacy to dose coumadin (INR 1.57)  - Cardiology following  - Neurology consulted for syncope v seizure -- EEG pending    Marianela Patel, TIFFANIE  01/30/25  07:31 EST

## 2025-01-30 NOTE — PROGRESS NOTES
SANDY MAN Attestation Note    I supervised care provided by the midlevel provider.    The FRAN and I have discussed this patient's history, physical exam, and treatment plan. I have reviewed the documentation and personally had a face to face interaction with the patient  I affirm the documentation and agree with the treatment and plan. I provided a substantive portion of the care of this patient.  I personally performed the physical exam, in its entirety.  My personal findings are documented in below:    History:  Patient with history of atrial fibrillation, diabetes, hypertension, hyperlipidemia, CHF and recent mitral valve repair is admitted to observation unit for further workup of syncopal episode and low blood sugar problem.  At this time, he has no particular complaints.  Denies chest pain while resting in the bed.  He says that he has to brace his chest with a pillow whenever he coughs, however, because of the healing wound on his chest.  He denies any abdominal pain or vomiting or diarrhea.  Denies any headache.    Physical Exam:  General: No acute distress.  HENT: NCAT, moist mucous membranes  Eyes: no scleral icterus.  Neck: Painless range of motion  CV: Pink warm and well-perfused throughout, normal pulses.  Respiratory: No distress or increased work of breathing, no stridor.  Anterior chest wound is dressed with bandage.  No surrounding erythema or induration of the soft tissues.  Abdomen: soft, no focal tenderness or rigidity  Musculoskeletal: no deformity.  Neuro: Alert, speech fluent and easily intelligible  Skin: warm, dry.    Assessment and Plan:  Hypoglycemia: Hold sulfonylurea, hold Janumet.  Every 4 hours POC glucose.  Diabetic diet.  Diabetic educator consult.  Diabetic diet.    Syncope/confusion episode: Possibly related to hypoglycemia.  However in the context of CAD and recent mitral valve repair, we are carefully monitoring on telemetry and will consult cardiology.  Troponins are 42,  43.    Chest wall wound: Wound care consult    CHF: Continue home Bumex    Hypertension: Monitor vital signs.  Continue home medications

## 2025-01-30 NOTE — CONSULTS
Patient Name: Jeb Olson  :1950  74 y.o.    Date of Admission: 2025  Date of Consultation:  25  Encounter Provider: Bruna Chávez MD  Place of Service: [facility] CARDIOLOGY  Referring Provider: Tushar Bill MD  Patient Care Team:  Tera Salvador MD as PCP - General (Internal Medicine)  Micah Reyes MD as Consulting Physician (Gastroenterology)  Bruna Chávez MD as Referring Physician (Cardiology)      Chief complaint:    History of Present Illness:    This is a 74 year-old man with a history of CAD, PVC, AF, CM,  severe MR.      In  he an eye surgery and was noted to have frequent PVCs.  This prompted further testing by his primary care doctor.  He had a Holter monitor which showed frequent PVCs, PVC burden was 22% in couplets, triplets bigeminy and trigeminy.  Thus, he therefore had a transthoracic echocardiogram which showed normal systolic ejection fraction with grade 1 diastolic dysfunction appropriate for age and mild MR.   Subsequent to that he had a walking nuclear stress test with a small, mild apical ischemic defect.  Cardiac catheterization  by myself showed a distal LAD lesion, status post PCI with 1 drug-eluting stent.      2024 he was in the office and found to have asymptomatic atrial fibrillation on EKG.  His exam revealed a prominent MR murmur.  Holter showed 100% A-fib burden with average heart rate of 60 on beta-blockade.  Follow-up echocardiogram 2024.  There has been enlargement of both ventricles and atria, thickening of the LV, progression of his mitral disease from mild MR to moderate to severe MR.  He also has mild AS, pulmonary hypertension. His SPEP/UPEP was abnormal. PYP was normal Dec 2024. He was hospitalized in [place] in 2024. His EF was newly reduced 35% without WMA. He was hospitalized in Dec 2024 underwent YAZMIN confirming severe MR. His repeat catheterization does not show new coronary  disease.   January 2025 underwent mitral valve replacement, maze and left atrial appendage clipping.  He was diuresed with medication adjustments.  Had to come back for aspiration of a cyst in the sternal incision however overall postoperative course has been quite stable.  He returns today for follow-up.  No real complaints.  Weight is stable at 244.  Tolerating lower doses of Entresto and Bumex  The office on Monday he felt fine no complaints looked well    He is admitted for lethargy and weakness due to hypoglycemia.  His blood sugars have been dropping into the 40s and 60s in the morning.  He has not been checking before every meal sugars.  With an improvement in blood sugars he has returned to normal mental status.  He has no complaints.  His vital signs are otherwise stable occasionally hypertensive.  EKG shows A-fib left axis no changes.  X-ray chest was not performed.  Head CT unremarkable.     Past Medical History:   Diagnosis Date    AF (paroxysmal atrial fibrillation)     Allergic Have had for several years    Eyes and nasal issues    Anemia     Anticoagulant long-term use     COUMADIN    Anxiety Since about 2014    Since I was taking of my Dad, who also passed away 3yrs ago.    Arthritis 2002    Both knees, hips, and shoulders    Arthritis of knee, left     Cataract 05/2019    CHF (congestive heart failure)     Colon polyp 2017    Coronary artery disease     stent    Diabetes mellitus     Dry eyes     Essential hypertension     Heart murmur     HL (hearing loss) 1980    no hearing aides    Hyperlipemia     Knee swelling 7/7/2020    Shortly after my left knee replacement    Macular degeneration     Mild chronic anemia     Mitral valve disorder     Obesity     Pneumonia 11/2024    Sleep apnea     cpap       Past Surgical History:   Procedure Laterality Date    ACHILLES TENDON REPAIR Left     CARDIAC CATHETERIZATION      CARDIAC CATHETERIZATION N/A 09/01/2022    Procedure: Coronary angiography, Left heart  catheterization,;  Surgeon: Bruna Chávez MD;  Location: [facility] CATH INVASIVE LOCATION;  Service: Cardiology;  Laterality: N/A;    CARDIAC CATHETERIZATION N/A 09/01/2022    Procedure: Stent PRAVIN coronary;  Surgeon: Bruna Chávez MD;  Location: [facility] CATH [facility]VASIVE LOC[facility];  Service: Cardiology;  Laterality: N/A;    CARDIAC CATHETERIZATION N/A 1/2/2025    Procedure: Left Heart Cath;  Surgeon: Bruna Chávez MD;  Location: [facility] CATH INVASIVE LOCATION;  Service: Cardiology;  Laterality: N/A;    CARDIAC CATHETERIZATION N/A 1/2/2025    Procedure: Coronary angiography;  Surgeon: Bruna Chávez MD;  Location: [facility] CATH INVASIVE LOCATION;  Service: Cardiology;  Laterality: N/A;    CATARACT EXTRACTION EXTRACAPSULAR W/ INTRAOCULAR LENS IMPLANTATION Bilateral     COLONOSCOPY  2018    Dr Reyes    ENDOSCOPY      MITRAL VALVE REPAIR/REPLACEMENT N/A 1/8/2025    Procedure: STERNOTOMY, MITRAL VALVE REPAIR, MAZE PROCEDURE TRANSESOPHAGEAL ECHOCARDIOGRAM, PRP;  Surgeon: Young Kelly MD;  Location: [facility] CVOR;  Service: Cardiothoracic;  Laterality: N/A;    TONSILLECTOMY      As a child    TOTAL KNEE ARTHROPLASTY Left 03/12/2020    Procedure: TOTAL KNEE ARTHROPLASTY;  Surgeon: Chepe Alicea MD;  Location: [facility] MAIN OR;  Service: Orthopedics;  Laterality: Left;    TOTAL KNEE ARTHROPLASTY Right 03/21/2024    Procedure: TOTAL KNEE ARTHROPLASTY;  Surgeon: Chepe Alicea MD;  Location: [facility] OR OSC;  Service: Orthopedics;  Laterality: Right;         Prior to Admission medications    Medication Sig Start Date End Date Taking? Authorizing Provider   acetaminophen (TYLENOL) 325 MG tablet Take 2 tablets by mouth 2 (Two) Times a Day As Needed for Mild Pain. 3/21/24  Yes Chepe Alicea MD   aspirin 81 MG EC tablet Take 1 tablet by mouth Daily. 1/2/25  Yes Bruna Chávez MD   bumetanide (BUMEX) 2 MG tablet Take 1 tablet by mouth Daily for 30 days. 1/15/25 2/14/25 Yes Gloria Garcia APRN   doxazosin (CARDURA) 4 MG tablet TAKE 1  TABLET BY MOUTH ONCE DAILY AT NIGHT 8/16/24  Yes Asiya Swartz APRN   doxycycline (VIBRAMYCIN) 100 MG capsule Take 1 capsule by mouth 2 (Two) Times a Day. 1/25/25  Yes Donnie Taylor MD   escitalopram (LEXAPRO) 10 MG tablet Take 1.5 tablets by mouth Every Evening.  Patient taking differently: Take 1.5 tablets by mouth Every Morning. 11/11/24  Yes Tera Salvador MD   ezetimibe (ZETIA) 10 MG tablet Take 1 tablet by mouth once daily  Patient taking differently: Take 1 tablet by mouth Every Night. 10/21/24  Yes Tera Salvador MD   fenofibrate 160 MG tablet TAKE 1 TABLET BY MOUTH AT NIGHT 11/21/24  Yes Tera Salvador MD   fluticasone (FLONASE) 50 MCG/ACT nasal spray Administer 2 sprays into the nostril(s) as directed by provider Every Morning. 2 sprays in each nostril  Indications: Stuffy Nose 11/8/17  Yes Luz Elena Holland MD   gabapentin (NEURONTIN) 600 MG tablet Take 1 tablet by mouth Every Evening. Indications: Diabetes with Nerve Disease 4/14/20  Yes Luz Elena Holland MD   glimepiride (AMARYL) 4 MG tablet Take 1 tablet by mouth 2 (Two) Times a Day. Indications: Type 2 Diabetes 11/11/24  Yes Tera Salvador MD   glucose blood (Accu-Chek Anabela Plus) test strip TEST TWICE DAILY Use as instructed 9/8/20  Yes Tera Salvador MD   HYDROcodone-acetaminophen (Norco) 5-325 MG per tablet Take 1 tablet by mouth Every 4 (Four) Hours As Needed for Severe Pain. 1/20/25 1/29/25 Yes Gloria Garcia APRN   Janumet XR  MG tablet Take 1 tablet by mouth twice daily 11/11/24  Yes Tera Salvador MD   Multiple Vitamins-Minerals (PRESERVISION AREDS 2 PO) Take 1 tablet by mouth 2 (Two) Times a Day. Indications: supplement    Yes Luz Elena Holland MD   nebivolol (BYSTOLIC) 5 MG tablet Take 1 tablet by mouth Daily.  Patient taking differently: Take 1 tablet by mouth every night at bedtime. Indications: High Blood Pressure 11/11/24  Yes Tera Salvador MD    Olopatadine HCl (PATADAY OP) Apply 1 drop to eye(s) as directed by provider 2 (Two) Times a Day As Needed (allergies). Indications: eye drops  1/1/24  Yes Luz Elena Holland MD   polyethyl glycol-propyl glycol (SYSTANE) 0.4-0.3 % solution ophthalmic solution (artificial tears) Administer 1 drop to both eyes As Needed (dry eyes). Indications: Excessive Cornea and Conjunctiva Dryness 1/1/24  Yes Luz Elena Holland MD   potassium chloride (KLOR-CON M20) 20 MEQ CR tablet Take 2 tablets by mouth Daily for 30 days. 1/14/25 2/13/25 Yes Gloria Garcia APRN   sacubitril-valsartan (ENTRESTO) 24-26 MG tablet Take 1 tablet by mouth Every 12 (Twelve) Hours for 30 days. 1/14/25 2/13/25 Yes Gloria Garcia APRN   sodium hypochlorite in sterile water irrigation topical solution 0.0625% Apply 946 mL topically to the appropriate area as directed Every 12 (Twelve) Hours. Pack sternal wound with Dakins soaked gauze twice daily.  Indications: Wound Care 1/25/25  Yes Melodie Bolanos PA   traZODone (DESYREL) 50 MG tablet TAKE 1 TABLET BY MOUTH ONCE DAILY AT NIGHT 10/21/24  Yes Tera Salvador MD   vitamin D3 125 MCG (5000 UT) capsule capsule Take 1 capsule by mouth Daily. Indications: Vitamin D Deficiency   Yes Luz Elena Holland MD   warfarin (COUMADIN) 5 MG tablet Take 1 tablet by mouth Every Morning. Indications: Atrial Fibrillation 9/26/24  Yes Luz Elena Holland MD   B Complex-Folic Acid (B COMPLEX PLUS PO) Take 1 tablet by mouth Every Other Day. Indications: nutritional supplement 1/2/25   Luz Elena Holland MD   cyclobenzaprine (FLEXERIL) 10 MG tablet Take 1 tablet by mouth 3 (Three) Times a Day As Needed for Muscle Spasms. Indications: Muscle Spasm 1/15/25   Gloria Garcia APRN       No Known Allergies    Social History     Socioeconomic History    Marital status:    Tobacco Use    Smoking status: Never     Passive exposure: Never    Smokeless tobacco: Never    Tobacco comments:     Naila only  every been around people who smoked   Vaping Use    Vaping status: Never Used   Substance and Sexual Activity    Alcohol use: Not Currently     Alcohol/week: 21.0 standard drinks of alcohol     Types: 21 Shots of liquor per week     Comment: since thanksgiving    Drug use: Never    Sexual activity: Not Currently     Partners: Female     Birth control/protection: None       Family History   Problem Relation Age of Onset    Alcohol abuse Maternal Uncle         Passed away 2013    Alcohol abuse Father         Passed away in 2016    Hyperlipidemia Father         Started about 10 years before his death    Arthritis Mother         Passed away 2006, from Alzheimers    Diabetes Mother     Hyperlipidemia Mother         Started probably at middle age    Osteoporosis Mother     Malig Hyperthermia Neg Hx        REVIEW OF SYSTEMS:   All systems reviewed.  Pertinent positives identified in HPI.  All other systems are negative.      Objective:     Vitals:    01/29/25 2341 01/30/25 0307 01/30/25 0724 01/30/25 0901   BP: 152/81 164/69 160/81 152/80   BP Location: Right arm Right arm Right arm    Patient Position: Lying Lying Lying    Pulse: 59 (!) 49 75 72   Resp: 18 18 18    Temp: 97.8 °F (36.6 °C) 97.6 °F (36.4 °C) 98.1 °F (36.7 °C)    TempSrc: Oral Oral Oral    SpO2: 94% 92% 95%    Weight:       Height:         Body mass index is 32.66 kg/m².    General Appearance:    Alert, cooperative, in no acute distress   Head:    Normocephalic, without obvious abnormality, atraumatic   Eyes:            Lids and lashes normal, conjunctivae and sclerae normal, no icterus, no pallor, corneas clear, PERRLA   Ears:    Ears appear intact with no abnormalities noted   Throat:   No oral lesions, no thrush, oral mucosa moist   Neck:   No adenopathy, supple, trachea midline, no thyromegaly, no carotid bruit, no JVD   Back:     No kyphosis present, no scoliosis present, no skin lesions, erythema or scars, no tenderness to percussion or palpation,  range of motion normal   Lungs:     Clear to auscultation, respirations regular, even and unlabored    Heart:    Regular rhythm and normal rate, normal S1 and S2, no murmur, no gallop, no rub, no click   Chest Wall:    No abnormalities observed   Abdomen:     Normal bowel sounds, no masses, no organomegaly, soft, nontender, nondistended, no guarding, no rebound  tenderness   Extremities:   Moves all extremities well, no edema, no cyanosis, no redness   Pulses:   Pulses palpable and equal bilaterally. Normal radial, carotid, femoral, dorsalis pedis and posterior tibial pulses bilaterally. Normal abdominal aorta   Skin:  Psychiatric:   No bleeding, bruising or rash    Alert and oriented x 3, normal mood and affect   Lab Review:     Results from last 7 days   Lab Units 01/30/25  0316 01/29/25  1053   SODIUM mmol/L 142 141   POTASSIUM mmol/L 3.5 3.5   CHLORIDE mmol/L 103 103   CO2 mmol/L 27.3 27.5   BUN mg/dL 18 18   CREATININE mg/dL 1.10 1.10   CALCIUM mg/dL 9.0 8.9   BILIRUBIN mg/dL  --  0.2   ALK PHOS U/L  --  47   ALT (SGPT) U/L  --  13   AST (SGOT) U/L  --  20   GLUCOSE mg/dL 105* 86     Results from last 7 days   Lab Units 01/29/25  1204 01/29/25  1053 01/23/25  2359   HSTROP T ng/L 43* 42* 84*     Results from last 7 days   Lab Units 01/30/25  0316   WBC 10*3/mm3 6.75   HEMOGLOBIN g/dL 10.2*   HEMATOCRIT % 32.4*   PLATELETS 10*3/mm3 242     Results from last 7 days   Lab Units 01/30/25  0316 01/29/25  1848 01/29/25  1053 01/24/25  0826 01/23/25  1743   INR  1.57* 1.58* 1.54*   < > 1.40*   APTT seconds  --   --  34.9  --  33.9    < > = values in this interval not displayed.                       I personally viewed and interpreted the patient's EKG/Telemetry data.        Assessment and Plan:       1.  Altered mental status/lethargy.  Related to hypoglycemia.  I asked him to reduce his lab ride by half, check every morning and before every meal blood sugars and get back with his primary care physician next  week.  2.  A-fib his INR is subtherapeutic.  Defer to pharmacy on warfarin change  3.  Systolic cardiomyopathy valvular disease s/p MVR, right history of CAD.  Continue all other cardiac medications at current doses  4.  Sternal wound: Recently drained.  Needs to change dressing daily    Bruna Chávez MD  01/30/25  09:59 EST

## 2025-01-30 NOTE — CONSULTS
"Neurology Consult Note  Consult Date: 1/30/2025  Referring MD: Tushar Bill MD  Reason for Consult I have been asked to see the patient in neurological consultation to render advice and opinion regarding generalized weakness and AMS.     Jeb Olson is a 74 y.o. male with past medical history of atrial fibrillation on warfarin, hypertension, hyperlipidemia, obstructive sleep apnea, type 2 diabetes, CHF, recent mitral valve repair on 1/8 who presented to the hospital on 1/29/2025 with complaints of generalized weakness and altered mental status.  His wife who is at bedside gave most of the history as the patient does not recall much of the events that night.  She states that they were eating dinner and the patient started to have jerking movements of his legs.  For the most part they were uncontrollable.  He then started to have them involve his arms.  They took him back to the back bedroom to take his evening medications and when he sat down on the bed he was continuing to have uncontrollable jerking movements of his arms and legs.  He went to sleep and she went back to check on him around 10 PM and found him coming from the bathroom.  She states he was acting little agitated and confused.  When she went back into check on him a couple of hours later he had fallen off the bed and was on the floor.  He was confused and could not get back up and therefore she called EMS.  EMS arrived and got him off the floor but the patient refused to go to the hospital.  They got him back in bed and he went back to sleep however she checked on him again and states that she came in and he was again on the ground and had stated that he was trying to go to the bathroom.  She noted he had urinated all over the floor and on himself.  She states he was acting \"giddy\" and stated that he was \"trying something new\".  She states that he told her to get out.  When she went back to check on him he was still on the floor and was still " unable to get up therefore she called EMS again.  When they arrived they noted that his blood glucose was 60.  She states they gave him medicine to help improve his blood glucose.  They also checked his blood pressure and she states they told her his top number was in the 200s.      On arrival here to the ER he was 180/92.  The patient states he remembers them getting him into the ambulance and arriving to the ER.  He denies any prior episodes like this in the past.  Denies any seizure history.  No family history of seizure or history of febrile seizures as an infant or history of meningitis/encephalitis.  No head trauma history.  He has been having low blood sugars lately.  States he is on 2 diabetic medications.  Has lost at least 30 pounds over the last 2 months unintentionally.  He denies any missed doses of his warfarin.  His INR was subtherapeutic on arrival at 1.54.  They did visit the INR clinic on 1/27 and it was 1.3.  His initial blood glucose was 86 on arrival.  He then dropped again later in the evening to 60.  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  Per review of his med rec he is currently taking gabapentin, Norco, trazodone, and Flexeril.    Past Medical/Surgical Hx:  Past Medical History:   Diagnosis Date    AF (paroxysmal atrial fibrillation)     Allergic Have had for several years    Eyes and nasal issues    Anemia     Anticoagulant long-term use     COUMADIN    Anxiety Since about 2014    Since I was taking of my Dad, who also passed away 3yrs ago.    Arthritis 2002    Both knees, hips, and shoulders    Arthritis of knee, left     Cataract 05/2019    CHF (congestive heart failure)     Colon polyp 2017    Coronary artery disease     stent    Diabetes mellitus     Dry eyes     Essential hypertension     Heart murmur     HL (hearing  loss) 1980    no hearing aides    Hyperlipemia     Knee swelling 7/7/2020    Shortly after my left knee replacement    Macular degeneration     Mild chronic anemia     Mitral valve disorder     Obesity     Pneumonia 11/2024    Sleep apnea     cpap     Past Surgical History:   Procedure Laterality Date    ACHILLES TENDON REPAIR Left     CARDIAC CATHETERIZATION      CARDIAC CATHETERIZATION N/A 09/01/2022    Procedure: Coronary angiography, Left heart catheterization,;  Surgeon: Bruna Chávez MD;  [facility];  Service: Cardiology;  Laterality: N/A;    CARDIAC CATHETERIZATION N/A 09/01/2022    Procedure: Stent PRAVIN coronary;  Surgeon: Bruna Chávez MD;  [facility];  Service: Cardiology;  Laterality: N/A;    CARDIAC CATHETERIZATION N/A 1/2/2025    Procedure: Left Heart Cath;  Surgeon: Bruna Chávez MD;  [facility];  Service: Cardiology;  Laterality: N/A;    CARDIAC CATHETERIZATION N/A 1/2/2025    Procedure: Coronary angiography;  Surgeon: Bruna Chávez MD;  Location: [facility] CATH INVASIVE LOCATION;  Service: Cardiology;  Laterality: N/A;    CATARACT EXTRACTION EXTRACAPSULAR W/ INTRAOCULAR LENS IMPLANTATION Bilateral     COLONOSCOPY  2018    Dr Reyes    ENDOSCOPY      MITRAL VALVE REPAIR/REPLACEMENT N/A 1/8/2025    Procedure: STERNOTOMY, MITRAL VALVE REPAIR, MAZE PROCEDURE TRANSESOPHAGEAL ECHOCARDIOGRAM, PRP;  Surgeon: Young Kelly MD;  Location: [facility] CVOR;  Service: Cardiothoracic;  Laterality: N/A;    TONSILLECTOMY      As a child    TOTAL KNEE ARTHROPLASTY Left 03/12/2020    Procedure: TOTAL KNEE ARTHROPLASTY;  Surgeon: Chepe Alicea MD;  Location: [facility] MAIN OR;  Service: Orthopedics;  Laterality: Left;    TOTAL KNEE ARTHROPLASTY Right 03/21/2024    Procedure: TOTAL KNEE ARTHROPLASTY;  Surgeon: Chepe Alicea MD;  Location: [facility] OR OSC;  Service: Orthopedics;  Laterality: Right;     Medications On  Admission  Medications Prior to Admission   Medication Sig Dispense Refill Last Dose/Taking    acetaminophen (TYLENOL) 325 MG tablet Take 2 tablets by mouth 2 (Two) Times a Day As Needed for Mild Pain. 60 tablet 0 2025    aspirin 81 MG EC tablet Take 1 tablet by mouth Daily.   2025 Morning    bumetanide (BUMEX) 2 MG tablet Take 1 tablet by mouth Daily for 30 days. 30 tablet 0 2025 Morning    doxazosin (CARDURA) 4 MG tablet TAKE 1 TABLET BY MOUTH ONCE DAILY AT NIGHT 90 tablet 1 2025 Bedtime    doxycycline (VIBRAMYCIN) 100 MG capsule Take 1 capsule by mouth 2 (Two) Times a Day. 14 capsule 0 2025 Morning    escitalopram (LEXAPRO) 10 MG tablet Take 1.5 tablets by mouth Every Evening. (Patient taking differently: Take 1.5 tablets by mouth Every Morning.) 135 tablet 1 2025 Morning    ezetimibe (ZETIA) 10 MG tablet Take 1 tablet by mouth once daily (Patient taking differently: Take 1 tablet by mouth Every Night.) 90 tablet 0 2025 Bedtime    fenofibrate 160 MG tablet TAKE 1 TABLET BY MOUTH AT NIGHT 90 tablet 0 2025    fluticasone (FLONASE) 50 MCG/ACT nasal spray Administer 2 sprays into the nostril(s) as directed by provider Every Morning. 2 sprays in each nostril  Indications: Stuffy Nose   2025 Morning    gabapentin (NEURONTIN) 600 MG tablet Take 1 tablet by mouth Every Evening. Indications: Diabetes with Nerve Disease   2025 Bedtime    glimepiride (AMARYL) 4 MG tablet Take 1 tablet by mouth 2 (Two) Times a Day. Indications: Type 2 Diabetes 180 tablet 1 2025 Bedtime    glucose blood (Accu-Chek Anabela Plus) test strip TEST TWICE DAILY Use as instructed 100 each 3 2025    [] HYDROcodone-acetaminophen (Norco) 5-325 MG per tablet Take 1 tablet by mouth Every 4 (Four) Hours As Needed for Severe Pain. 40 tablet 0 2025 Bedtime    Janumet XR  MG tablet Take 1 tablet by mouth twice daily 180 tablet 0 2025 Bedtime    Multiple Vitamins-Minerals  (PRESERVISION AREDS 2 PO) Take 1 tablet by mouth 2 (Two) Times a Day. Indications: supplement    1/28/2025 Morning    nebivolol (BYSTOLIC) 5 MG tablet Take 1 tablet by mouth Daily. (Patient taking differently: Take 1 tablet by mouth every night at bedtime. Indications: High Blood Pressure) 90 tablet 1 1/28/2025 Bedtime    Olopatadine HCl (PATADAY OP) Apply 1 drop to eye(s) as directed by provider 2 (Two) Times a Day As Needed (allergies). Indications: eye drops    Past Week    polyethyl glycol-propyl glycol (SYSTANE) 0.4-0.3 % solution ophthalmic solution (artificial tears) Administer 1 drop to both eyes As Needed (dry eyes). Indications: Excessive Cornea and Conjunctiva Dryness   Past Week    potassium chloride (KLOR-CON M20) 20 MEQ CR tablet Take 2 tablets by mouth Daily for 30 days. 60 tablet 0 1/28/2025 Morning    sacubitril-valsartan (ENTRESTO) 24-26 MG tablet Take 1 tablet by mouth Every 12 (Twelve) Hours for 30 days. 60 tablet 0 1/28/2025 Bedtime    sodium hypochlorite in sterile water irrigation topical solution 0.0625% Apply 946 mL topically to the appropriate area as directed Every 12 (Twelve) Hours. Pack sternal wound with Dakins soaked gauze twice daily.  Indications: Wound Care 946 mL 1 1/28/2025 Evening    traZODone (DESYREL) 50 MG tablet TAKE 1 TABLET BY MOUTH ONCE DAILY AT NIGHT 90 tablet 0 1/28/2025 Bedtime    vitamin D3 125 MCG (5000 UT) capsule capsule Take 1 capsule by mouth Daily. Indications: Vitamin D Deficiency   1/28/2025 Morning    warfarin (COUMADIN) 5 MG tablet Take 1 tablet by mouth Every Morning. Indications: Atrial Fibrillation   1/28/2025 Morning    B Complex-Folic Acid (B COMPLEX PLUS PO) Take 1 tablet by mouth Every Other Day. Indications: nutritional supplement   1/27/2025 Morning    cyclobenzaprine (FLEXERIL) 10 MG tablet Take 1 tablet by mouth 3 (Three) Times a Day As Needed for Muscle Spasms. Indications: Muscle Spasm 20 tablet 0 1/27/2025 Bedtime     Allergies:  No Known  "Allergies  Social Hx:  Social History     Socioeconomic History    Marital status:    Tobacco Use    Smoking status: Never     Passive exposure: Never    Smokeless tobacco: Never    Tobacco comments:     Naila only every been around people who smoked   Vaping Use    Vaping status: Never Used   Substance and Sexual Activity    Alcohol use: Not Currently     Alcohol/week: 21.0 standard drinks of alcohol     Types: 21 Shots of liquor per week     Comment: since thanksgiving    Drug use: Never    Sexual activity: Not Currently     Partners: Female     Birth control/protection: None     Family Hx:  Family History   Problem Relation Age of Onset    Alcohol abuse Maternal Uncle         Passed away 2013    Alcohol abuse Father         Passed away in 2016    Hyperlipidemia Father         Started about 10 years before his death    Arthritis Mother         Passed away 2006, from Alzheimers    Diabetes Mother     Hyperlipidemia Mother         Started probably at middle age    Osteoporosis Mother     Malig Hyperthermia Neg Hx      Review of Systems   Neurological:  Positive for syncope.   Psychiatric/Behavioral:  Positive for confusion.      Exam  /81 (BP Location: Right arm, Patient Position: Lying)   Pulse 75   Temp 98.1 °F (36.7 °C) (Oral)   Resp 18   Ht 188 cm (74\")   Wt 115 kg (254 lb 6.4 oz)   SpO2 95%   BMI 32.66 kg/m²     Current Facility-Administered Medications:     aspirin EC tablet 81 mg, 81 mg, Oral, Daily, Franco Luna III, PA    sennosides-docusate (PERICOLACE) 8.6-50 MG per tablet 2 tablet, 2 tablet, Oral, BID PRN **AND** polyethylene glycol (MIRALAX) packet 17 g, 17 g, Oral, Daily PRN **AND** bisacodyl (DULCOLAX) EC tablet 5 mg, 5 mg, Oral, Daily PRN **AND** bisacodyl (DULCOLAX) suppository 10 mg, 10 mg, Rectal, Daily PRN, Franco Luna III, PA    bumetanide (BUMEX) tablet 2 mg, 2 mg, Oral, Daily, Franco Luna III, PA, 2 mg at 01/29/25 2009    cyclobenzaprine " (FLEXERIL) tablet 10 mg, 10 mg, Oral, TID PRN, Franco Luna III, PA    dextrose (D50W) (25 g/50 mL) IV injection 25 g, 25 g, Intravenous, Q15 Min PRN, Franco Luna III, PA    dextrose (GLUTOSE) oral gel 15 g, 15 g, Oral, Q15 Min PRN, Franco Luna III, PA    doxycycline (MONODOX) capsule 100 mg, 100 mg, Oral, Q12H, Franco Luna III, PA, 100 mg at 01/29/25 2112    escitalopram (LEXAPRO) tablet 20 mg, 20 mg, Oral, QAM, Franco Luna III, PA    fenofibrate (TRICOR) tablet 145 mg, 145 mg, Oral, Daily, Franco Luna III, PA    fluticasone (FLONASE) 50 MCG/ACT nasal spray 2 spray, 2 spray, Nasal, QAM, Franco Luna III, PA    gabapentin (NEURONTIN) capsule 600 mg, 600 mg, Oral, Nightly, Franco Luna III, PA, 600 mg at 01/29/25 2112    glucagon (GLUCAGEN) injection 1 mg, 1 mg, Intramuscular, Q15 Min PRN, Franco Luna III, PA    HYDROcodone-acetaminophen (NORCO) 5-325 MG per tablet 1 tablet, 1 tablet, Oral, Q4H PRN, Franco Luna III, PA    insulin lispro (HUMALOG/ADMELOG) injection 2-7 Units, 2-7 Units, Subcutaneous, 4x Daily AC & at Bedtime, Franco Luna III, PA    nebivolol (BYSTOLIC) tablet 5 mg, 5 mg, Oral, Q24H, Bruna Chávez MD    nitroglycerin (NITROSTAT) SL tablet 0.4 mg, 0.4 mg, Sublingual, Q5 Min PRN, Franco Luna III, PA    olopatadine (PATANOL) 0.1 % ophthalmic solution 1 drop, 1 drop, Both Eyes, BID PRN, Franco Luna III, PA    Pharmacy to dose warfarin, , Not Applicable, Continuous PRN, Franco Luna III, PA    potassium chloride (KLOR-CON M10) CR tablet 40 mEq, 40 mEq, Oral, Daily, Franco Luna III, PA, 40 mEq at 01/29/25 2009    sacubitril-valsartan (ENTRESTO) 24-26 MG tablet 1 tablet, 1 tablet, Oral, Q12H, Franco Luna III, PA, 1 tablet at 01/29/25 2112    [COMPLETED] Insert Peripheral IV, , , Once **AND** sodium chloride 0.9 % flush 10  mL, 10 mL, Intravenous, PRN, Franco Luna III, PA    sodium chloride 0.9 % flush 10 mL, 10 mL, Intravenous, Q12H, Franco Luna III, PA, 10 mL at 01/29/25 2115    sodium chloride 0.9 % flush 10 mL, 10 mL, Intravenous, PRN, Franco Luna III, PA    sodium chloride 0.9 % infusion 40 mL, 40 mL, Intravenous, PRN, Franco Luna III, PA    terazosin (HYTRIN) capsule 5 mg, 5 mg, Oral, Nightly, Franco Luna III, PA    traZODone (DESYREL) tablet 50 mg, 50 mg, Oral, Nightly, Franco Luna III, PA, 50 mg at 01/29/25 2112    Facility-Administered Medications Ordered in Other Encounters:     Chlorhexidine Gluconate 2 % pads 3 each, 3 pad , Apply externally, BID, Hueston, Pricila L, APRN    PRN meds    senna-docusate sodium **AND** polyethylene glycol **AND** bisacodyl **AND** bisacodyl    cyclobenzaprine    dextrose    dextrose    glucagon (human recombinant)    HYDROcodone-acetaminophen    nitroglycerin    olopatadine    Pharmacy to dose warfarin    [COMPLETED] Insert Peripheral IV **AND** sodium chloride    sodium chloride    sodium chloride    Current Facility-Administered Medications on File Prior to Encounter   Medication    Chlorhexidine Gluconate 2 % pads 3 each     Current Outpatient Medications on File Prior to Encounter   Medication Sig    acetaminophen (TYLENOL) 325 MG tablet Take 2 tablets by mouth 2 (Two) Times a Day As Needed for Mild Pain.    aspirin 81 MG EC tablet Take 1 tablet by mouth Daily.    bumetanide (BUMEX) 2 MG tablet Take 1 tablet by mouth Daily for 30 days.    doxazosin (CARDURA) 4 MG tablet TAKE 1 TABLET BY MOUTH ONCE DAILY AT NIGHT    doxycycline (VIBRAMYCIN) 100 MG capsule Take 1 capsule by mouth 2 (Two) Times a Day.    escitalopram (LEXAPRO) 10 MG tablet Take 1.5 tablets by mouth Every Evening. (Patient taking differently: Take 1.5 tablets by mouth Every Morning.)    ezetimibe (ZETIA) 10 MG tablet Take 1 tablet by mouth once daily (Patient  taking differently: Take 1 tablet by mouth Every Night.)    fenofibrate 160 MG tablet TAKE 1 TABLET BY MOUTH AT NIGHT    fluticasone (FLONASE) 50 MCG/ACT nasal spray Administer 2 sprays into the nostril(s) as directed by provider Every Morning. 2 sprays in each nostril  Indications: Stuffy Nose    gabapentin (NEURONTIN) 600 MG tablet Take 1 tablet by mouth Every Evening. Indications: Diabetes with Nerve Disease    glimepiride (AMARYL) 4 MG tablet Take 1 tablet by mouth 2 (Two) Times a Day. Indications: Type 2 Diabetes    glucose blood (Accu-Chek Anabela Plus) test strip TEST TWICE DAILY Use as instructed    [] HYDROcodone-acetaminophen (Norco) 5-325 MG per tablet Take 1 tablet by mouth Every 4 (Four) Hours As Needed for Severe Pain.    Janumet XR  MG tablet Take 1 tablet by mouth twice daily    Multiple Vitamins-Minerals (PRESERVISION AREDS 2 PO) Take 1 tablet by mouth 2 (Two) Times a Day. Indications: supplement     nebivolol (BYSTOLIC) 5 MG tablet Take 1 tablet by mouth Daily. (Patient taking differently: Take 1 tablet by mouth every night at bedtime. Indications: High Blood Pressure)    Olopatadine HCl (PATADAY OP) Apply 1 drop to eye(s) as directed by provider 2 (Two) Times a Day As Needed (allergies). Indications: eye drops     polyethyl glycol-propyl glycol (SYSTANE) 0.4-0.3 % solution ophthalmic solution (artificial tears) Administer 1 drop to both eyes As Needed (dry eyes). Indications: Excessive Cornea and Conjunctiva Dryness    potassium chloride (KLOR-CON M20) 20 MEQ CR tablet Take 2 tablets by mouth Daily for 30 days.    sacubitril-valsartan (ENTRESTO) 24-26 MG tablet Take 1 tablet by mouth Every 12 (Twelve) Hours for 30 days.    sodium hypochlorite in sterile water irrigation topical solution 0.0625% Apply 946 mL topically to the appropriate area as directed Every 12 (Twelve) Hours. Pack sternal wound with Dakins soaked gauze twice daily.  Indications: Wound Care    traZODone (DESYREL) 50 MG  tablet TAKE 1 TABLET BY MOUTH ONCE DAILY AT NIGHT    vitamin D3 125 MCG (5000 UT) capsule capsule Take 1 capsule by mouth Daily. Indications: Vitamin D Deficiency    warfarin (COUMADIN) 5 MG tablet Take 1 tablet by mouth Every Morning. Indications: Atrial Fibrillation    B Complex-Folic Acid (B COMPLEX PLUS PO) Take 1 tablet by mouth Every Other Day. Indications: nutritional supplement    cyclobenzaprine (FLEXERIL) 10 MG tablet Take 1 tablet by mouth 3 (Three) Times a Day As Needed for Muscle Spasms. Indications: Muscle Spasm     General appearance: Elderly male, NAD, alert and cooperative  HEENT: Normocephalic, atraumatic    Neurological:   MS: oriented to person, place, year, month, date, normal attention/concentration, language intact, normal fund of knowledge  CN: visual fields full, EOMI, facial sensation equal, no facial droop, shoulder shrug equal, tongue midline  Motor: 5/5 in all 4 ext., normal tone  Sensory: light touch and cold sensation intact in all 4 ext.  Coordination: Normal finger to nose test  Gait and station: deferred  Rapid alternating movements: normal finger to thumb tap    Laboratory results:  Lab Results   Component Value Date    GLUCOSE 105 (H) 01/30/2025    CALCIUM 9.0 01/30/2025     01/30/2025    K 3.5 01/30/2025    CO2 27.3 01/30/2025     01/30/2025    BUN 18 01/30/2025    CREATININE 1.10 01/30/2025    EGFRIFAFRI 67 08/26/2021    EGFRIFNONA 55 (L) 08/26/2021    BCR 16.4 01/30/2025    ANIONGAP 11.7 01/30/2025     Lab Results   Component Value Date    WBC 6.75 01/30/2025    HGB 10.2 (L) 01/30/2025    HCT 32.4 (L) 01/30/2025    MCV 89.3 01/30/2025     01/30/2025     Lab Results   Component Value Date    CHOL 161 01/03/2025     Lab Results   Component Value Date    HDL 33 (L) 01/03/2025    HDL 36 (L) 05/29/2024    HDL 35 (L) 04/17/2023     Lab Results   Component Value Date     (H) 01/03/2025    LDL 85 05/29/2024     (H) 04/17/2023     Lab Results   Component  "Value Date    TRIG 133 01/03/2025    TRIG 193 (H) 05/29/2024    TRIG 127 04/17/2023     Lab Results   Component Value Date    HGBA1C 5.80 (H) 01/30/2025     Lab Results   Component Value Date    INR 1.57 (H) 01/30/2025    INR 1.58 (H) 01/29/2025    INR 1.54 (H) 01/29/2025    PROTIME 19.0 (H) 01/30/2025    PROTIME 18.9 (H) 01/29/2025    PROTIME 18.7 (H) 01/29/2025     No results found for: \"[id number]\"  Lab Results   Component Value Date    TSH 3.080 10/17/2022     Pain Management Panel  More data exists         Latest Ref Rng & Units 11/11/2024 5/29/2024   Pain Management Panel   Creatinine, Urine Not Estab. mg/dL 57.6  95.2       Details                  Brief Urine Lab Results  (Last result in the past 365 days)        Color   Clarity   Blood   Leuk Est   Nitrite   Protein   CREAT   Urine HCG        01/05/25 1735 Yellow   Clear   Negative   Negative   Negative   Negative                   Lab review: I personally reviewed all labs as documented above.    Imaging review:   CT Head Without Contrast    Result Date: 1/29/2025   No evidence for acute traumatic intracranial pathology.   Radiation dose reduction techniques were utilized, including automated exposure control and exposure modulation based on body size.  This report was finalized on 1/29/2025 11:54 AM by Dr. Deep Green M.D on Workstation: [id number]      CT Chest With Contrast Diagnostic    Result Date: 1/23/2025  Interval sternotomy. Small bilateral pleural effusions, decreased in size from previous study. Improved aeration of the lower lobes with some residual atelectasis    Radiation dose reduction techniques were utilized, including automated exposure control and exposure modulation based on body size.   This report was finalized on 1/23/2025 7:17 PM by Dr. Good Patel M.D on Workstation: [id number]       I personally reviewed CT images with Dr. Palomino, and she agrees with radiology report.    Diagnosis:  Acute encephalopathy  Hypertensive " urgency  Hypoglycemia  Chronic atrial fibrillation on anticoagulation  Subtherapeutic INR  Unintentional weight loss  Recent mitral valve repair  Polypharmacy  Right parietal bone lesion    Comment: 74-year-old male who presented with complaints of acute encephalopathy and generalized weakness.  Wife describes some myoclonic type jerking starting prior to him becoming confused. He has been found to have recurrent hypoglycemia and hypertension. He also is on several sedating medications at home including Trazodone and Flexeril. Encephalopathy likely multifactorial from hypoglycemia, hypertensive urgency, and polypharmacy. His EEG showed mild slowing but no epileptic or ictal activity. He responded well once he was treated with oral glucose. He is back at [facility] today with no focal deficits on exam. Recommend close f/u with PCP about diabetic meds with recent weight loss as well as discontinuing his trazodone and Flexeril. Can keep his Gabapentin for his neuropathy but may need to consider adjusting this in the future. Normalize BP with a goal of 130/80 or less. Have asked RN to check orthostatics.     PLAN:   Check orthostatics  Needs adjustment to diabetic meds given recent weight loss  Regularly monitor BP  D/C Trazodone and Flexeril  Cardiology/Pharmacy to manage INR, has been subtherapeutic for the last week.  F/U outpatient for right parietal bone lesion noted on CT.  Further recs to come once evaluated by Dr. Palomino.     Case discussed with patient, wife, and Dr. Palomino and she agrees with plan above.    TIFFANIE Steele

## 2025-01-30 NOTE — PROGRESS NOTES
ED OBSERVATION PROGRESS/DISCHARGE SUMMARY    Date of Admission: 1/29/2025   LOS: 0 days   PCP: Tera Salvador MD    Final Diagnosis pending      Subjective     Hospital Outcome:   74-year-old male admitted to the observation unit after having a syncopal episode at home.  Lab work done in the emergency department is unremarkable other than high-sensitivity troponin of 242, 43, delta of 1.  EKG shows atrial fibrillation, rate of 65.  CT head without contrast shows no evidence for acute traumatic intracranial pathology.  Cardiology as well as cardiothoracic surgery have been consulted to see the patient this a.m. EEG showed no epileptic activity.  There is mild diffuse background slowing which could represent mild encephalopathy versus medication effect or excessive drowsiness.    8:05 AM.  There is also some concern for potential seizure.  Will obtain EEG and consult neurology.    11:23 AM.  Cardiology has evaluated patient.  His altered mental status/lethargy is felt to be related to his hypoglycemia.  They recommend reducing the sulfonylurea by half and checking blood sugar every morning and before every meal, keeping a diary and getting back with his primary care physician next week.  INR subtherapeutic at pharmacy is working the issue.  In regards to the systolic cardiomyopathy valvular disease SP MVR, right history of CAD.  He is continue with his current medications.  Continue with sternal wound care.    6:37 PM.  Neurologist evaluated and does not think this was a seizure.  They think this was more likely encephalopathy due to low blood sugar and possibly medications.  Patient was warned to be careful when taking Flexeril with trazodone.  Patient unable to get a ride home tonight.  Will be able to be discharged tomorrow morning with ride.  Plan will be to stop Amaryl 4 mg twice daily until the patient can follow-up with his family physician and to continue with his Janumet.  If his blood sugar starts  elevated he will go to half tablet of Amaryl daily.  He is to continue with all other medications.  Patient's INR was low.  He will need this rechecked in the morning.  Pharmacy has made adjustments.  Suspect this will improve.      ROS:  General: no fevers, chills  Respiratory: no cough, dyspnea  Cardiovascular: no chest pain, palpitations  Abdomen: No abdominal pain, nausea, vomiting, or diarrhea  Neurologic: No focal weakness    Objective   Physical Exam:  I have reviewed the vital signs.  Temp:  [97.5 °F (36.4 °C)-99.1 °F (37.3 °C)] 99.1 °F (37.3 °C)  Heart Rate:  [49-75] 70  Resp:  [16-18] 16  BP: (129-174)/(69-86) 142/69  General Appearance:    Alert, cooperative, no distress  Head:    Normocephalic, atraumatic  Eyes:    Sclerae anicteric  Neck:   Supple, no mass  Lungs: Clear to auscultation bilaterally, respirations unlabored  Heart: Regular rate and rhythm, S1 and S2 normal, no murmur, rub or gallop  Abdomen:  Soft, nontender, bowel sounds active, nondistended  Extremities: No clubbing, cyanosis, or edema to lower extremities  Pulses:  2+ and symmetric in distal lower extremities  Skin: No rashes   Neurologic: Oriented x3, Normal strength to extremities    Results Review:    I have reviewed the labs, radiology results and diagnostic studies.    Results from last 7 days   Lab Units 01/30/25  0316   WBC 10*3/mm3 6.75   HEMOGLOBIN g/dL 10.2*   HEMATOCRIT % 32.4*   PLATELETS 10*3/mm3 242     Results from last 7 days   Lab Units 01/30/25  0316 01/29/25  1053 01/25/25  0808   SODIUM mmol/L 142 141 143   POTASSIUM mmol/L 3.5 3.5 3.7   CHLORIDE mmol/L 103 103 103   CO2 mmol/L 27.3 27.5 27.5   BUN mg/dL 18 18 23   CREATININE mg/dL 1.10 1.10 1.18   CALCIUM mg/dL 9.0 8.9 9.2   BILIRUBIN mg/dL  --  0.2  --    ALK PHOS U/L  --  47  --    ALT (SGPT) U/L  --  13  --    AST (SGOT) U/L  --  20  --    GLUCOSE mg/dL 105* 86 154*     Imaging Results (Last 24 Hours)       ** No results found for the last 24 hours. **             I have reviewed the medications.     Discharge Medications        ASK your doctor about these medications        Instructions Start Date   acetaminophen 325 MG tablet  Commonly known as: TYLENOL   650 mg, Oral, 2 Times Daily PRN      aspirin 81 MG EC tablet   81 mg, Oral, Daily      B COMPLEX PLUS PO   1 tablet, Every Other Day      bumetanide 2 MG tablet  Commonly known as: BUMEX   2 mg, Oral, Daily      cyclobenzaprine 10 MG tablet  Commonly known as: FLEXERIL   10 mg, Oral, 3 Times Daily PRN      doxazosin 4 MG tablet  Commonly known as: CARDURA   4 mg, Oral, Nightly      doxycycline 100 MG capsule  Commonly known as: VIBRAMYCIN   Take 1 capsule by mouth 2 (Two) Times a Day.      Entresto 24-26 MG tablet  Generic drug: sacubitril-valsartan   1 tablet, Oral, Every 12 Hours Scheduled      escitalopram 10 MG tablet  Commonly known as: LEXAPRO   15 mg, Oral, Every Evening      ezetimibe 10 MG tablet  Commonly known as: ZETIA   10 mg, Oral, Daily      fenofibrate 160 MG tablet   TAKE 1 TABLET BY MOUTH AT NIGHT      fluticasone 50 MCG/ACT nasal spray  Commonly known as: FLONASE   2 sprays, Every Morning      gabapentin 600 MG tablet  Commonly known as: NEURONTIN   1 tablet, Every Evening      glimepiride 4 MG tablet  Commonly known as: AMARYL   4 mg, Oral, 2 Times Daily      glucose blood test strip  Commonly known as: Accu-Chek Anabela Plus   TEST TWICE DAILY Use as instructed      HYDROcodone-acetaminophen 5-325 MG per tablet  Commonly known as: Norco  Ask about: Should I take this medication?   Take 1 tablet by mouth Every 4 (Four) Hours As Needed for Severe Pain.      Janumet XR  MG tablet  Generic drug: SITagliptin-metFORMIN HCl ER   Take 1 tablet by mouth twice daily      nebivolol 5 MG tablet  Commonly known as: BYSTOLIC   5 mg, Oral, Daily      PATADAY OP   1 drop, 2 Times Daily PRN      polyethyl glycol-propyl glycol 0.4-0.3 % solution ophthalmic solution (artificial tears)  Commonly known as:  SYSTANE   1 drop, As Needed      potassium chloride 20 MEQ CR tablet  Commonly known as: KLOR-CON M20   40 mEq, Oral, Daily      PRESERVISION AREDS 2 PO   1 tablet, 2 Times Daily      sodium hypochlorite in sterile water irrigation topical solution 0.0625%   946 mL, Topical, Every 12 Hours Scheduled, Pack sternal wound with Dakins soaked gauze twice daily.      traZODone 50 MG tablet  Commonly known as: DESYREL   50 mg, Oral, Nightly      vitamin D3 125 MCG (5000 UT) capsule capsule   1 capsule, Daily      warfarin 5 MG tablet  Commonly known as: COUMADIN   1 tablet, Every Morning              ---------------------------------------------------------------------------------------------  Assessment & Plan   Assessment/Problem List    Syncope and collapse      Plan:  Hyperglycemia  -Feed the patient  -Every 4 hours glucose checks  -Diabetic educator consult  -Diabetic diet  -D5 as needed  -Hold sulfonylurea tomorrow and at discharge  -Sliding scale insulin as needed  -Hold Janumet     Encephalopathy  Syncope/collapse??  Hypoglycemia  -Blood glucose was 60??  -Cardiology consultation  -Continuous cardiac monitoring  -Hold on TTE echo for now as was done 12/4/2024  -A.m. CBC, CMP, troponin  -Orthostatic vital  -EEG unremarkable for seizure activity but there is slow activity questionable for mild encephalopathy/medication effect  -Neurology and cardiology have cleared the patient with the plan above.     Chest wall wound from prev surgery  -Wound care consult     CHF  -Continue home Bumex     Hypertension  -Continue clonidine  -Continue nebivolol  -Continue nifedipine  -Continue sacubitril-valsartan     A-fib  -Pharmacy to dose warfarin     Neuropathy  -Continue home Neurontin     Hypokalemia, Bumex  -Continue potassium chloride     Insomnia  -Continue home trazodone       Disposition: Pending    Follow-up after Discharge: Pending    This note will serve as a progress note    Franco Luna III, PA 01/30/25  18:43 EST    I have worn appropriate PPE during this patient encounter, sanitized my hands both with entering and exiting patient's room.      52 minutes has been spent by [facility] providers in the care of this patient while under observation status

## 2025-01-30 NOTE — OUTREACH NOTE
CHF Week 2 Survey      Flowsheet Row Responses   [facility] patient discharged from? [facility]   Does the patient have one of the following disease processes/diagnoses(primary or secondary)? CHF   Week 2 attempt successful? No   Unsuccessful attempts Attempt 1   Revoke Readmitted            Anny NICHOLS - Registered Nurse

## 2025-01-30 NOTE — PROGRESS NOTES
MD ATTESTATION NOTE - Observation progress    The FRAN and I have discussed this patient's history, physical exam, and treatment plan.  I have reviewed the documentation and personally had a face to face interaction with the patient. I affirm the documentation and agree with the treatment and plan.  The attached note describes my personal findings.        SHARED APC FACE TO FACE: I performed a substantive part of the MDM during the patient's E/M visit. I personally evaluated and examined the patient. I personally made or approved the documented management plan and acknowledge its risk of complications.      Brief HPI: Patient presents for evaluation of syncope versus seizure.  Patient's blood glucose was slightly low.  Blood glucoses throughout the night have been fine.  Patient has had no episodes of syncope.  No chest pain pressure tightness.  Has had no dizziness.  Morning labs unremarkable            GENERAL:.  No acute distress  HENT: nares patent  EYES: no scleral icterus  CV: regular rhythm, normal rate  RESPIRATORY: normal effort  ABDOMEN: soft  MUSCULOSKELETAL: no deformity.  Continue chest wall tenderness  NEURO: alert, moves all extremities, follows commands  PSYCH:  calm, cooperative  SKIN: warm, dry    Vital signs and nursing notes reviewed.        Plan: Cardiology and neurology consult

## 2025-01-30 NOTE — DISCHARGE PLACEMENT REQUEST
"Blayne Retana \"Rosas\" (74 y.o. Male)       Date of Birth   1950    Social Security Number       Address   [address] [place] [address]    Home Phone   [phone]    MRN   [id number]       [employer]   [employer]    Marital Status                               Admission Date   1/29/25    Admission Type   Emergency    Admitting Provider   Tushar Bill MD    Attending Provider   Joseph Vickers MD    Department, Room/Bed   [facility] OBSERVATION, 115/1       Discharge Date       Discharge Disposition       Discharge Destination                                 Attending Provider: Joseph Vickers MD    Allergies: No Known Allergies    Isolation: None   Infection: None   Code Status: CPR    Ht: 188 cm (74\")   Wt: 115 kg (254 lb 6.4 oz)    Admission Cmt: None   Principal Problem: Syncope and collapse [R55]                   Active Insurance as of 1/29/2025       Primary Coverage       Payor Plan Insurance Group Employer/Plan Group    ANTHEM MEDICARE REPLACEMENT ANTHEM MED ADV PPO [id number]       Payor Plan Address Payor Plan Phone Number Payor Plan Fax Number Effective Dates    [address] [phone]  1/1/2024 - None Entered    [facility] [id number]         Subscriber Name Subscriber Birth Date Member ID       BLAYNE RETANA 1950 [id number]                     Emergency Contacts        (Rel.) Home Phone Work Phone Mobile Phone    Jr Retana (Spouse) [phone] -- [phone]    Fragoso,Lalo (Son) -- -- [phone]                "

## 2025-01-30 NOTE — PLAN OF CARE
Goal Outcome Evaluation:              Outcome Evaluation: Pt. 74 male AOX4, RA, and able to verbalized needs. Admitted for Syncope.   Pt was hypoglycemic and latest glucose 134.   Pt uses a CPAp at home. Cardiology, PT and case manangement consulted.  Pt does not have any other questions at this time.

## 2025-01-30 NOTE — SIGNIFICANT NOTE
01/30/25 1047   OTHER   Discipline physical therapist   Rehab Time/Intention   Session Not Performed other (see comments)  (Spoke with patient who reports he is up ad albert in room. Patient feels he is at his baseline from a functional mobility standpoint. No skilled acute PT needs identified. PT will sign off.)   Therapy Assessment/Plan (PT)   Criteria for Skilled Interventions Met (PT) no;no problems identified which require skilled intervention

## 2025-01-30 NOTE — PLAN OF CARE
Goal Outcome Evaluation:            Patient admitted to ED OBS for continuous monitoring of glucose and syncope. Glucose has been within normal limits, no complaints of dizziness. Patient has entered episodes of Bigeminy on the monitor with NSR. A+Ox4, RA, up ad albert, BP elevated.

## 2025-01-30 NOTE — CASE MANAGEMENT/SOCIAL WORK
Discharge Planning Assessment  [facility]     Patient Name: Jeb Olson  MRN: [id number]  Today's Date: 1/30/2025    Admit Date: 1/29/2025        Discharge Needs Assessment       Row Name 01/30/25 1109       Living Environment    People in Home spouse    Name(s) of People in Home Jr Olson- spouse    Current Living Arrangements home    Potentially Unsafe Housing Conditions none    In the past 12 months has the electric, gas, oil, or water company threatened to shut off services in your home? No    Primary Care Provided by self    Provides Primary Care For no one    Family Caregiver if Needed spouse    Family Caregiver Names spouse Jr    Quality of Family Relationships helpful;supportive    Able to Return to Prior Arrangements yes       Resource/Environmental Concerns    Resource/Environmental Concerns none    Transportation Concerns none       Transportation Needs    In the past 12 months, has lack of transportation kept you from medical appointments or from getting medications? no    In the past 12 months, has lack of transportation kept you from meetings, work, or from getting things needed for daily living? No       Food Insecurity    Within the past 12 months, you worried that your food would run out before you got the money to buy more. Never true    Within the past 12 months, the food you bought just didn't last and you didn't have money to get more. Never true       Transition Planning    Patient/Family Anticipates Transition to home with family;home with help/services    Patient/Family Anticipated Services at Transition ;home health care    Transportation Anticipated family or friend will provide       Discharge Needs Assessment    Current Outpatient/Agency/Support Group homecare agency    Equipment Currently Used at Home cpap;glucometer    Concerns to be Addressed discharge planning;decision-making    Do you want help finding or keeping work or a job? Patient declined    Do you  want help with school or training? For example, starting or completing job training or getting a high school diploma, GED or equivalent Patient declined    Anticipated Changes Related to Illness none    Equipment Needed After Discharge none    Outpatient/Agency/Support Group Needs homecare agency    Discharge Facility/Level of Care Needs home with home health    Provided Post Acute Provider List? N/A    Current Discharge Risk chronically ill                   Discharge Plan       Row Name 01/30/25 5523       Plan    Plan Comments Entered room, introduced self and explained role to patient and spouse at bedside; received permission to speak in front of spouse; verified information on facesheet; pt lives with spouse in a ground level house w/3 EL- is retired, independent w/ADL's, still drives; Pt uses a glucose  meter and CPAP machine at home; Verified PCP listed on facesheet; RX are filled at [facility] [address]- is interested in meds to beds if needed; Pt is current w/[facility] for skilled nursing- Pt plans to return home upon d/c w/family to transport; CCP following for any further concerns                  Continued Care and Services - Admitted Since 1/29/2025    No active coordination exists for this encounter.       Selected Continued Care - Prior Encounters Includes continued care and service providers with selected services from prior encounters from 10/31/2024 to 1/30/2025      Discharged on 1/14/2025 Admission date: 1/5/2025 - Discharge disposition: Home-Health Care c      Home Medical Care       Service Provider Services Address Phone Fax Patient Preferred     [facility] [address] [place] [phone] --                             Demographic Summary       Row Name 01/30/25 8523       General Information    Admission Type observation    Arrived From home    Required Notices Provided Observation Status Notice    Referral Source  physician    Reason for Consult decision-making;discharge planning    Preferred Language English       Contact Information    Permission Granted to Share Info With ;family/designee                   Functional Status       Row Name 01/30/25 1108       Functional Status    Usual Activity Tolerance good    Current Activity Tolerance good       Assessment of Health Literacy    How often do you have someone help you read hospital materials? Never    How often do you have problems learning about your medical condition because of difficulty understanding written information? Occasionally    How often do you have a problem understanding what is told to you about your medical condition? Occasionally    How confident are you filling out medical forms by yourself? Extremely    Health Literacy Good       Functional Status, IADL    Medications independent    Meal Preparation assistive person    Housekeeping independent    Laundry independent    Shopping independent    If for any reason you need help with day-to-day activities such as bathing, preparing meals, shopping, managing finances, etc., do you get the help you need? I get all the help I need       Mental Status    General Appearance WDL WDL       Mental Status Summary    Recent Changes in Mental Status/Cognitive Functioning mental status    Mental Status Comments Mental status changes/confusion prior to arrival- pt back to baseline at this time       Employment/    Employment Status retired                   Psychosocial    No documentation.                  Abuse/Neglect    No documentation.                  Legal    No documentation.                  Substance Abuse    No documentation.                  Patient Forms    No documentation.                     Norma Rojas RN

## 2025-01-31 ENCOUNTER — ANTICOAGULATION VISIT (OUTPATIENT)
Dept: PHARMACY | Facility: HOSPITAL | Age: 75
End: 2025-01-31
Payer: MEDICARE

## 2025-01-31 ENCOUNTER — READMISSION MANAGEMENT (OUTPATIENT)
Dept: CALL CENTER | Facility: HOSPITAL | Age: 75
End: 2025-01-31
Payer: MEDICARE

## 2025-01-31 VITALS
HEART RATE: 64 BPM | DIASTOLIC BLOOD PRESSURE: 118 MMHG | RESPIRATION RATE: 18 BRPM | WEIGHT: 254.4 LBS | TEMPERATURE: 98.3 F | OXYGEN SATURATION: 94 % | HEIGHT: 74 IN | SYSTOLIC BLOOD PRESSURE: 150 MMHG | BODY MASS INDEX: 32.65 KG/M2

## 2025-01-31 DIAGNOSIS — I48.0 AF (PAROXYSMAL ATRIAL FIBRILLATION): Primary | ICD-10-CM

## 2025-01-31 LAB
GLUCOSE BLDC GLUCOMTR-MCNC: 133 MG/DL (ref 70–130)
GLUCOSE BLDC GLUCOMTR-MCNC: 137 MG/DL (ref 70–130)
GLUCOSE BLDC GLUCOMTR-MCNC: 139 MG/DL (ref 70–130)
INR PPP: 1.46 (ref 0.9–1.1)
PROTHROMBIN TIME: 18 SECONDS (ref 11.7–14.2)

## 2025-01-31 PROCEDURE — G0378 HOSPITAL OBSERVATION PER HR: HCPCS

## 2025-01-31 PROCEDURE — 82948 REAGENT STRIP/BLOOD GLUCOSE: CPT

## 2025-01-31 PROCEDURE — 85610 PROTHROMBIN TIME: CPT | Performed by: EMERGENCY MEDICINE

## 2025-01-31 PROCEDURE — 99214 OFFICE O/P EST MOD 30 MIN: CPT | Performed by: NURSE PRACTITIONER

## 2025-01-31 RX ADMIN — DOXYCYCLINE 100 MG: 100 CAPSULE ORAL at 09:17

## 2025-01-31 RX ADMIN — BUMETANIDE 2 MG: 2 TABLET ORAL at 09:17

## 2025-01-31 RX ADMIN — NEBIVOLOL 5 MG: 5 TABLET ORAL at 09:17

## 2025-01-31 RX ADMIN — SACUBITRIL AND VALSARTAN 1 TABLET: 24; 26 TABLET, FILM COATED ORAL at 09:17

## 2025-01-31 RX ADMIN — ESCITALOPRAM 20 MG: 20 TABLET, FILM COATED ORAL at 05:14

## 2025-01-31 RX ADMIN — Medication 10 ML: at 09:18

## 2025-01-31 RX ADMIN — ASPIRIN 81 MG: 81 TABLET, COATED ORAL at 09:17

## 2025-01-31 RX ADMIN — POTASSIUM CHLORIDE 40 MEQ: 750 TABLET, EXTENDED RELEASE ORAL at 09:17

## 2025-01-31 NOTE — PLAN OF CARE
Goal Outcome Evaluation:    Patient admitted for syncope and collapse.  Patient resting between care.  Patient c/o of pain in chest wound (cyst); wound consulting.  Cardiology consult today.

## 2025-01-31 NOTE — PROGRESS NOTES
Hospital Follow Up    LOS:  LOS: 0 days   Patient Name: Jeb Olson  Age/Sex: 74 y.o. male  : 1950  MRN: [id number]    Day of Service: 25   Length of Stay: 0  Encounter Provider: TIFFANIE Suarez  Place of Service: [facility]  Patient Care Team:  Tera Salvador MD as PCP - General (Internal Medicine)  Micah Reyes MD as Consulting Physician (Gastroenterology)  Bruna Chávez MD as Referring Physician (Cardiology)    Subjective:     Chief Complaint: CAD    Interval History: Out of bed in chair no complaints of dizziness or lightheadedness.    Objective:     Objective:  Temp:  [97.3 °F (36.3 °C)-99.1 °F (37.3 °C)] 98.3 °F (36.8 °C)  Heart Rate:  [61-70] 64  Resp:  [16-18] 18  BP: (129-178)/() 150/118   No intake or output data in the 24 hours ending 25 1227  Body mass index is 32.66 kg/m².      25  1521   Weight: 115 kg (254 lb 6.4 oz)     Weight change:     Physical Exam:   General Appearance:    Awake alert and oriented in no acute distress.   Color:  Skin:  Neuro:  HEENT:    Lungs:     Pink  Warm and dry  No focal, motor or sensory deficits  Neck supple, pupils equal, round and reactive. No JVD, No Bruit  Clear to auscultation,respirations regular, even and                  unlabored    Heart:  Regular rate and rhythm, S1 and S2, no murmur, no gallop, no rub. No edema, DP/PT pulses are 2+   Chest Wall:    No abnormalities observed   Abdomen:     Normal bowel sounds, no masses, no organomegaly, soft        non-tender, non-distended, no guarding, no ascites noted   Extremities:   Moves all extremities well, no edema, no cyanosis, no redness       Lab Review:   Results from last 7 days   Lab Units 25  0316 25  1053   SODIUM mmol/L 142 141   POTASSIUM mmol/L 3.5 3.5   CHLORIDE mmol/L 103 103   CO2 mmol/L 27.3 27.5   BUN mg/dL 18 18   CREATININE mg/dL 1.10 1.10   GLUCOSE mg/dL 105* 86   CALCIUM mg/dL 9.0 8.9   AST  "(SGOT) U/L  --  20   ALT (SGPT) U/L  --  13     Results from last 7 days   Lab Units 01/29/25  1204 01/29/25  1053   HSTROP T ng/L 43* 42*     Results from last 7 days   Lab Units 01/30/25  0316 01/29/25  1053   WBC 10*3/mm3 6.75 6.97   HEMOGLOBIN g/dL 10.2* 9.9*   HEMATOCRIT % 32.4* 32.9*   PLATELETS 10*3/mm3 242 224     Results from last 7 days   Lab Units 01/31/25  0516 01/30/25  0316 01/29/25  1848 01/29/25  1053   INR  1.46* 1.57*   < > 1.54*   APTT seconds  --   --   --  34.9    < > = values in this interval not displayed.               Invalid input(s): \"LDLCALC\"          I reviewed the patient's new clinical results.  I personally viewed and interpreted the patient's EKG  Current Medications:   Scheduled Meds:aspirin, 81 mg, Oral, Daily  bumetanide, 2 mg, Oral, Daily  doxycycline, 100 mg, Oral, Q12H  escitalopram, 20 mg, Oral, QAM  fenofibrate, 145 mg, Oral, Daily  fluticasone, 2 spray, Nasal, QAM  gabapentin, 600 mg, Oral, Nightly  insulin lispro, 2-7 Units, Subcutaneous, 4x Daily AC & at Bedtime  nebivolol, 5 mg, Oral, Q24H  potassium chloride, 40 mEq, Oral, Daily  sacubitril-valsartan, 1 tablet, Oral, Q12H  sodium chloride, 10 mL, Intravenous, Q12H  terazosin, 5 mg, Oral, Nightly  traZODone, 50 mg, Oral, Nightly      Continuous Infusions:Pharmacy to dose warfarin,         Allergies:  No Known Allergies    Assessment/Plan:      Altered mental status related to hypoglycemia.  Labs stable today.  Glyburide will be reduced on discharge    2.  Atrial fibrillation on warfarin for anticoagulation pharmacy to manage.  INR subtherapeutic    3.  Severe mitral valve regurgitation with systolic heart failure, status post MVR.  Sternal wound with recent drain.  Getting daily dressing changes    4.  Cardiomyopathy on guideline directed medical therapy with beta-blocker and Entresto.      Okay for discharge from our standpoint.  Beatris Delgado, APRN  01/31/25  12:27 EST  Electronically signed by Beatris Delgado, " TIFFANIE, 01/31/25, 12:27 PM EST.

## 2025-01-31 NOTE — PLAN OF CARE
Goal Outcome Evaluation:              Outcome Evaluation: pt. RA, AOX4 and able to verbalized needs. VSS.   dressing change and packing. Pt  to stay over night for observation. pt does not have any other questions at this time.

## 2025-01-31 NOTE — DISCHARGE INSTRUCTIONS
Hold Sulfonylurea and Janumet  Discontinue trazodone and flexeril   Outpatient follow up with PCP for right parietal bone lesion

## 2025-01-31 NOTE — DISCHARGE SUMMARY
ED OBSERVATION PROGRESS/DISCHARGE SUMMARY    Date of Admission: 1/29/2025   LOS: 0 days   PCP: Tera Salvador MD    Final Diagnosis hypoglycemia, chest wall wound, encephalopathy       Subjective     Hospital Outcome:   74-year-old male admitted to the observation unit after having a syncopal episode at home.  Lab work done in the emergency department is unremarkable other than high-sensitivity troponin of 242, 43, delta of 1.  EKG shows atrial fibrillation, rate of 65.  CT head without contrast shows no evidence for acute traumatic intracranial pathology.  Cardiology as well as cardiothoracic surgery have been consulted to see the patient this a.m. EEG showed no epileptic activity.  There is mild diffuse background slowing which could represent mild encephalopathy versus medication effect or excessive drowsiness.     8:05 AM.  There is also some concern for potential seizure.  Will obtain EEG and consult neurology.     11:23 AM.  Cardiology has evaluated patient.  His altered mental status/lethargy is felt to be related to his hypoglycemia.  They recommend reducing the sulfonylurea by half and checking blood sugar every morning and before every meal, keeping a diary and getting back with his primary care physician next week.  INR subtherapeutic at pharmacy is working the issue.  In regards to the systolic cardiomyopathy valvular disease SP MVR, right history of CAD.  He is continue with his current medications.  Continue with sternal wound care.     6:37 PM.  Neurologist evaluated and does not think this was a seizure.  They think this was more likely encephalopathy due to low blood sugar and possibly medications.  Patient was warned to be careful when taking Flexeril with trazodone.  Patient unable to get a ride home tonight.  Will be able to be discharged tomorrow morning with ride.  Plan will be to stop Amaryl 4 mg twice daily until the patient can follow-up with his family physician and to continue  with his Janumet.  If his blood sugar starts elevated he will go to half tablet of Amaryl daily.  He is to continue with all other medications.  Patient's INR was low.  He will need this rechecked in the morning.  Pharmacy has made adjustments.  Suspect this will improve.    1/31/25:   Seen and examined, resting in bed, no distress or complaints. Overall, reports feeling better. Cardiology, CTS input noted; Neurology signed off yesterday. Vitals reviewed, blood pressure trends noted. Labs reviewed, glucose 130-190 range. Patient ready to go home.     ROS:  General: no fevers, chills  Respiratory: no cough, dyspnea  Cardiovascular: no chest pain, palpitations  Abdomen: No abdominal pain, nausea, vomiting, or diarrhea  Neurologic: No focal weakness    Objective   Physical Exam:  I have reviewed the vital signs.  Temp:  [97.6 °F (36.4 °C)-99.1 °F (37.3 °C)] 98.1 °F (36.7 °C)  Heart Rate:  [49-75] 61  Resp:  [16-18] 16  BP: (129-164)/(69-86) 130/69  General Appearance:    Alert, cooperative, no distress  Head:    Normocephalic, atraumatic  Eyes:    Sclerae anicteric  Neck:   Supple, no mass  Lungs: Clear to auscultation bilaterally, respirations unlabored  Heart: Regular rate and rhythm, S1 and S2 normal, no murmur, rub or gallop  Abdomen:  Soft, nontender, bowel sounds active, nondistended  Extremities: No clubbing, cyanosis, or edema to lower extremities  Pulses:  2+ and symmetric in distal lower extremities  Skin: No rashes   Neurologic: Oriented x3, Normal strength to extremities    Results Review:    I have reviewed the labs, radiology results and diagnostic studies.    Results from last 7 days   Lab Units 01/30/25  0316   WBC 10*3/mm3 6.75   HEMOGLOBIN g/dL 10.2*   HEMATOCRIT % 32.4*   PLATELETS 10*3/mm3 242     Results from last 7 days   Lab Units 01/30/25  0316 01/29/25  1053 01/25/25  0808   SODIUM mmol/L 142 141 143   POTASSIUM mmol/L 3.5 3.5 3.7   CHLORIDE mmol/L 103 103 103   CO2 mmol/L 27.3 27.5 27.5   BUN  mg/dL 18 18 23   CREATININE mg/dL 1.10 1.10 1.18   CALCIUM mg/dL 9.0 8.9 9.2   BILIRUBIN mg/dL  --  0.2  --    ALK PHOS U/L  --  47  --    ALT (SGPT) U/L  --  13  --    AST (SGOT) U/L  --  20  --    GLUCOSE mg/dL 105* 86 154*     Imaging Results (Last 24 Hours)       ** No results found for the last 24 hours. **            I have reviewed the medications.     Discharge Medications        ASK your doctor about these medications        Instructions Start Date   acetaminophen 325 MG tablet  Commonly known as: TYLENOL   650 mg, Oral, 2 Times Daily PRN      aspirin 81 MG EC tablet   81 mg, Oral, Daily      B COMPLEX PLUS PO   1 tablet, Every Other Day      bumetanide 2 MG tablet  Commonly known as: BUMEX   2 mg, Oral, Daily      cyclobenzaprine 10 MG tablet  Commonly known as: FLEXERIL   10 mg, Oral, 3 Times Daily PRN      doxazosin 4 MG tablet  Commonly known as: CARDURA   4 mg, Oral, Nightly      doxycycline 100 MG capsule  Commonly known as: VIBRAMYCIN   Take 1 capsule by mouth 2 (Two) Times a Day.      Entresto 24-26 MG tablet  Generic drug: sacubitril-valsartan   1 tablet, Oral, Every 12 Hours Scheduled      escitalopram 10 MG tablet  Commonly known as: LEXAPRO   15 mg, Oral, Every Evening      ezetimibe 10 MG tablet  Commonly known as: ZETIA   10 mg, Oral, Daily      fenofibrate 160 MG tablet   TAKE 1 TABLET BY MOUTH AT NIGHT      fluticasone 50 MCG/ACT nasal spray  Commonly known as: FLONASE   2 sprays, Every Morning      gabapentin 600 MG tablet  Commonly known as: NEURONTIN   1 tablet, Every Evening      glimepiride 4 MG tablet  Commonly known as: AMARYL   4 mg, Oral, 2 Times Daily      glucose blood test strip  Commonly known as: Accu-Chek Anabela Plus   TEST TWICE DAILY Use as instructed      HYDROcodone-acetaminophen 5-325 MG per tablet  Commonly known as: Norco  Ask about: Should I take this medication?   Take 1 tablet by mouth Every 4 (Four) Hours As Needed for Severe Pain.      Janumet XR  MG  tablet  Generic drug: SITagliptin-metFORMIN HCl ER   Take 1 tablet by mouth twice daily      nebivolol 5 MG tablet  Commonly known as: BYSTOLIC   5 mg, Oral, Daily      PATADAY OP   1 drop, 2 Times Daily PRN      polyethyl glycol-propyl glycol 0.4-0.3 % solution ophthalmic solution (artificial tears)  Commonly known as: SYSTANE   1 drop, As Needed      potassium chloride 20 MEQ CR tablet  Commonly known as: KLOR-CON M20   40 mEq, Oral, Daily      PRESERVISION AREDS 2 PO   1 tablet, 2 Times Daily      sodium hypochlorite in sterile water irrigation topical solution 0.0625%   946 mL, Topical, Every 12 Hours Scheduled, Pack sternal wound with Dakins soaked gauze twice daily.      traZODone 50 MG tablet  Commonly known as: DESYREL   50 mg, Oral, Nightly      vitamin D3 125 MCG (5000 UT) capsule capsule   1 capsule, Daily      warfarin 5 MG tablet  Commonly known as: COUMADIN   1 tablet, Every Morning              ---------------------------------------------------------------------------------------------  Assessment & Plan   Assessment/Problem List    Syncope and collapse      Plan:    Hyperglycemia  -Every 4 hours glucose checks  -Diabetic educator consult  -Diabetic diet  -D5 as needed  -Hold sulfonylurea tomorrow and at discharge  -Sliding scale insulin as needed  -Hold Janumet  - follow up with PCP in one week     Encephalopathy  Syncope/collapse  Hypoglycemia  -Blood glucose was 60  -Continuous cardiac monitoring  -Hold on TTE echo for now as was done 12/4/2024  -A.m. CBC, CMP, troponin  -Orthostatic vital  -EEG unremarkable for seizure activity but there is slow activity questionable for mild encephalopathy/medication effect  -Neurology and cardiology have cleared the patient with the plan above.     Chest wall wound from prev surgery  -Wound care consult     CHF  -Continue home Bumex     Hypertension  -Continue clonidine  -Continue nebivolol  -Continue nifedipine  -Continue sacubitril-valsartan      A-fib  -Pharmacy to dose warfarin     Neuropathy  -Continue home Neurontin     Hypokalemia, Bumex  -Continue potassium chloride     Insomnia  -Continue home trazodone    Abnormal Ct Head  - right parietal bone with 1.8 x 0.9 cm greatest axial dimensions. Precise etiology unclear although radiographic appearacne most suggestive of benign entity such as benign fibro-osseous lesion. Discussed with patient needs follow up       Disposition: home     Follow-up after Discharge: cardiology, neurology, CTS, wound care, pcp     This note will serve as a discharge summary    Cat Goff, APRN 01/31/25 01:37 EST    I have worn appropriate PPE during this patient encounter, sanitized my hands both with entering and exiting patient's room.      85 minutes has been spent by [facility] providers in the care of this patient while under observation status

## 2025-01-31 NOTE — PROGRESS NOTES
Anticoagulation Clinic Progress Note    Anticoagulation Summary  As of 2025      INR goal:  2.0-3.0   TTR:  55.7% (2.9 mo)   INR used for dosin.46 (2025)   Warfarin maintenance plan:  7.5 mg every Wed, Sat; 5 mg all other days   Weekly warfarin total:  40 mg   Plan last modified:  Gurjit Velásquez, PharmD (10/30/2024)   Next INR check:  2025   Target end date:  --    Indications    AF (paroxysmal atrial fibrillation) [I48.0]                 Anticoagulation Episode Summary       INR check location:  --    Preferred lab:  --    Send INR reminders to:   [facility] [place]    Comments:  --          Anticoagulation Care Providers       Provider Role Specialty Phone number    Bruna Chávez MD Referring Cardiology [phone]          Findings:  Patient Findings     Positives:  Change in health, Hospital admission, Other complaints    Negatives:  Signs/symptoms of thrombosis, Signs/symptoms of bleeding,   Laboratory test error suspected, Change in alcohol use, Change in   activity, Upcoming invasive procedure, Emergency department visit,   Upcoming dental procedure, Missed doses, Extra doses, Change in   medications, Change in diet/appetite, Bruising    Comments:  Hospital admission 25 - 25 for altered mental status   secondary to hypoglycemia. Warfarin was held on 25 while admitted.       Clinical Outcomes     Negatives:  Major bleeding event, Thromboembolic event,   Anticoagulation-related hospital admission, Anticoagulation-related ED   visit, Anticoagulation-related fatality    Comments:  Hospital admission 25 - 25 for altered mental status   secondary to hypoglycemia. Warfarin was held on 25 while admitted.      INR History:      Latest Ref Rng & Units 2025     8:08 AM 2025     1:45 PM 2025    10:53 AM 2025     6:48 PM 2025     3:16 AM 2025     5:16 AM 2025     1:10 PM   Anticoagulation Monitoring   INR   1.3     1.46   INR  Date   1/27/2025 1/31/2025   INR Goal   2.0-3.0     2.0-3.0   Trend   Same     Same   Last Week Total   45 mg     47.5 mg   Next Week Total   45 mg     45 mg   Sun   -     7.5 mg (2/2)   Mon   10 mg (1/27)     5 mg   Tue   5 mg     -   Wed   7.5 mg     -   Thu   -     -   Fri   -     7.5 mg (1/31)   Sat   -     7.5 mg   Historical INR 0.90 - 1.10 1.44   1.54  1.58  1.57  1.46     Visit Report   Report            Plan:  1. INR is Subtherapeutic today- see above in Anticoagulation Summary.   Provided instructions to Mr. Olson and Anne BARNES with [facility] to Change their warfarin regimen (7.5 mg today, tomorrow, and Sunday, then resume 7.5 mg Wed/Sat, 5 mg all other days) - see above in Anticoagulation Summary.  2. Follow up in 4 days.      Gurjit Velásquez, PharmD

## 2025-01-31 NOTE — PLAN OF CARE
Goal Outcome Evaluation:  Plan of Care Reviewed With: patient        Progress: improving  Outcome Evaluation: pt stable for discharge home. Dressing changed on chest incision. AVS given, follow up instructed. Transport by family

## 2025-02-01 NOTE — OUTREACH NOTE
Prep Survey      Flowsheet Row Responses   [facility] patient discharged from? [place]   Is LACE score < 7 ? No   Eligibility Psychiatric   Date of Admission 01/29/25   Date of Discharge 01/31/25   Discharge diagnosis Syncope and collapse   Does the patient have one of the following disease processes/diagnoses(primary or secondary)? Other   Prep survey completed? Yes            Rosa M SUMMERS - Registered Nurse

## 2025-02-03 ENCOUNTER — OFFICE VISIT (OUTPATIENT)
Dept: FAMILY MEDICINE CLINIC | Facility: CLINIC | Age: 75
End: 2025-02-03
Payer: MEDICARE

## 2025-02-03 ENCOUNTER — TRANSITIONAL CARE MANAGEMENT TELEPHONE ENCOUNTER (OUTPATIENT)
Dept: CALL CENTER | Facility: HOSPITAL | Age: 75
End: 2025-02-03
Payer: MEDICARE

## 2025-02-03 VITALS
OXYGEN SATURATION: 98 % | SYSTOLIC BLOOD PRESSURE: 130 MMHG | BODY MASS INDEX: 31.7 KG/M2 | WEIGHT: 247 LBS | HEART RATE: 50 BPM | HEIGHT: 74 IN | DIASTOLIC BLOOD PRESSURE: 78 MMHG | RESPIRATION RATE: 18 BRPM

## 2025-02-03 DIAGNOSIS — E11.649 TYPE 2 DIABETES MELLITUS WITH HYPOGLYCEMIA WITHOUT COMA, WITHOUT LONG-TERM CURRENT USE OF INSULIN: Primary | ICD-10-CM

## 2025-02-03 DIAGNOSIS — G93.41 METABOLIC ENCEPHALOPATHY: ICD-10-CM

## 2025-02-03 PROCEDURE — 99214 OFFICE O/P EST MOD 30 MIN: CPT | Performed by: INTERNAL MEDICINE

## 2025-02-03 PROCEDURE — 3044F HG A1C LEVEL LT 7.0%: CPT | Performed by: INTERNAL MEDICINE

## 2025-02-03 PROCEDURE — 1125F AMNT PAIN NOTED PAIN PRSNT: CPT | Performed by: INTERNAL MEDICINE

## 2025-02-03 PROCEDURE — 1160F RVW MEDS BY RX/DR IN RCRD: CPT | Performed by: INTERNAL MEDICINE

## 2025-02-03 PROCEDURE — 3075F SYST BP GE 130 - 139MM HG: CPT | Performed by: INTERNAL MEDICINE

## 2025-02-03 PROCEDURE — 3078F DIAST BP <80 MM HG: CPT | Performed by: INTERNAL MEDICINE

## 2025-02-03 NOTE — OUTREACH NOTE
Call Center TCM Note      Flowsheet Row Responses   [facility] patient discharged from? [place]   Does the patient have one of the following disease processes/diagnoses(primary or secondary)? Other   TCM attempt successful? Yes   Discharge diagnosis Syncope and collapse   Does the patient have an appointment with their PCP within 7-14 days of discharge? Yes   TCM call completed? Yes   Wrap up additional comments D/C DX: syncope and collapse - Pt discharged from [facility] 01/31 and today completed FACE TO FACE TCM APPT with PCP Dr Marquess Norma Robledo MA    2/3/2025, 12:45 EST

## 2025-02-03 NOTE — PROGRESS NOTES
Transitional Care Follow Up Visit  Subjective     Jeb Olson is a 74 y.o. male who presents for a transitional care management visit.    Within 48 business hours after discharge our office contacted him via telephone to coordinate his care and needs.      I reviewed and discussed the details of that call along with the discharge summary, hospital problems, inpatient lab results, inpatient diagnostic studies, and consultation reports with Jeb.     Current outpatient and discharge medications have been reconciled for the patient.  Reviewed by: Tera Salvador MD          1/31/2025     7:35 PM   Date of TCM Phone Call   [facility]   Date of Admission 1/29/2025   Date of Discharge 1/31/2025     Risk for Readmission (LACE) Score: 9 (1/31/2025  6:00 AM)      History of Present Illness   Course During Hospital Stay: Rosas is here today for follow-up after hospital admission.  He was admitted to [facility] on January 29 and discharged on the 31st.  He had a spell where he fell out of bed and was on the floor acting strange.  His CT scan of the head did not show any significant abnormalities.  His blood sugar was low and appears to have been low several times in the preceding weeks.  His diabetic medicines have been discontinued.  He has not had any further low blood sugars.  Prior to this hospitalization he was hospitalized for a wound infection of the sternum.  We do need to be careful about letting his sugars get out of control.  His A1c in the hospital was 5.8.  We did discuss him calling me or send me a message through [facility] what his sugars been doing over the next week.  We may need to add back in initially just one of his Janumet's.  I suspect his glimepiride was the cause of most of the low blood sugar reactions.  His EEG showed changes of encephalopathy but nothing that looked like a seizure.     The following portions of the patient's history were reviewed and updated  "as appropriate: allergies, current medications, and problem list.    Review of Systems    Objective   /78   Pulse 50   Resp 18   Ht 188 cm (74.02\")   Wt 112 kg (247 lb)   SpO2 98%   BMI 31.70 kg/m²   Physical Exam  Vitals and nursing note reviewed.   Constitutional:       Appearance: Normal appearance.   Cardiovascular:      Rate and Rhythm: Normal rate.   Pulmonary:      Effort: Pulmonary effort is normal.      Breath sounds: No wheezing, rhonchi or rales.   Neurological:      Mental Status: He is alert.     Assessment & Plan   Diagnoses and all orders for this visit:    1. Type 2 diabetes mellitus with hypoglycemia without coma, without long-term current use of insulin (Primary)    2. Metabolic encephalopathy      Rosas is here today for follow-up after hospital admission.  It sounds like he may have developed some degree of encephalopathy due to some low blood sugar reactions in his numerous recent illnesses.  Currently he is awake and alert.  We are going to have him contact me in about a week and let me know how sugars are doing.  We will then begin adding medicines back.  I do not think we would need to do any further glimepiride.  I suspect that may be the culprit in the low blood sugar reactions along with his recent weight loss of approximately 50 pounds.             "

## 2025-02-04 ENCOUNTER — ANTICOAGULATION VISIT (OUTPATIENT)
Dept: PHARMACY | Facility: HOSPITAL | Age: 75
End: 2025-02-04
Payer: MEDICARE

## 2025-02-04 ENCOUNTER — HOME CARE VISIT (OUTPATIENT)
Dept: HOME HEALTH SERVICES | Facility: HOME HEALTHCARE | Age: 75
End: 2025-02-04
Payer: COMMERCIAL

## 2025-02-04 DIAGNOSIS — I48.0 AF (PAROXYSMAL ATRIAL FIBRILLATION): Primary | ICD-10-CM

## 2025-02-04 LAB
HH POC INTERNATIONAL NORMALIZATION RATIO: 2.7
HH POC PROTIME: 31.8 SECONDS
INR PPP: 2.7

## 2025-02-04 PROCEDURE — G0300 HHS/HOSPICE OF LPN EA 15 MIN: HCPCS

## 2025-02-04 NOTE — PROGRESS NOTES
Anticoagulation Clinic Progress Note    Anticoagulation Summary  As of 2025      INR goal:  2.0-3.0   TTR:  55.7% (3 mo)   INR used for dosin.70 (2025)   Warfarin maintenance plan:  7.5 mg every Wed, Sat; 5 mg all other days   Weekly warfarin total:  40 mg   Plan last modified:  Gurjit Velásquez, PharmD (10/30/2024)   Next INR check:  2025   Target end date:  --    Indications    AF (paroxysmal atrial fibrillation) [I48.0]                 Anticoagulation Episode Summary       INR check location:  --    Preferred lab:  --    [facility]    Comments:  --          Anticoagulation Care Providers       Provider Role Specialty Phone number    Bruna Chávez MD Referring Cardiology [phone]          Findings:  Patient Findings     Negatives:  Signs/symptoms of thrombosis, Signs/symptoms of bleeding,   Laboratory test error suspected, Change in health, Change in alcohol use,   Change in activity, Upcoming invasive procedure, Emergency department   visit, Upcoming dental procedure, Missed doses, Extra doses, Change in   medications, Change in diet/appetite, Hospital admission, Bruising, Other   complaints    Comments:  He has taken warfarin 7.5 mg daily since 25.      Clinical Outcomes     Negatives:  Major bleeding event, Thromboembolic event,   Anticoagulation-related hospital admission, Anticoagulation-related ED   visit, Anticoagulation-related fatality    Comments:  He has taken warfarin 7.5 mg daily since 25.     INR History:      2025    10:53 AM 2025     6:48 PM 2025     3:16 AM 2025     5:16 AM 2025     1:10 PM 2025    12:00 AM 2025     3:54 PM   Anticoagulation Monitoring   INR     1.46  2.70   INR Date     2025   INR Goal     2.0-3.0  2.0-3.0   Trend     Same  Same   Last Week Total     47.5 mg  42.5 mg   Next Week Total     45 mg  37.5 mg   Sun     7.5 mg ()  5 mg   Mon     5 mg  5 mg   Tue      -  5 mg   Wed     -  5 mg (2/5)   Thu     -  5 mg   Fri     7.5 mg (1/31)  5 mg   Sat     7.5 mg  7.5 mg   Historical INR 1.54  1.58  1.57  1.46   2.70            This result is from an external source.       Plan:  1. INR is Therapeutic today- see above in Anticoagulation Summary.   Provided instructions to Mr. Olson and Yokasta with [facility] to Change their warfarin regimen (5 mg tomorrow; otherwise return to 7.5 mg Wed/Sat, 5 mg all other days) - see above in Anticoagulation Summary.  2. Follow up in 1 week in clinic, as he will already be on campus for other appointments. Will schedule subsequent INRs through [facility].       Gurjit Velásquez, PharmD

## 2025-02-05 VITALS
SYSTOLIC BLOOD PRESSURE: 148 MMHG | OXYGEN SATURATION: 98 % | HEART RATE: 68 BPM | RESPIRATION RATE: 18 BRPM | TEMPERATURE: 97.9 F | DIASTOLIC BLOOD PRESSURE: 84 MMHG

## 2025-02-06 ENCOUNTER — HOME CARE VISIT (OUTPATIENT)
Dept: HOME HEALTH SERVICES | Facility: HOME HEALTHCARE | Age: 75
End: 2025-02-06
Payer: COMMERCIAL

## 2025-02-06 PROCEDURE — G0300 HHS/HOSPICE OF LPN EA 15 MIN: HCPCS

## 2025-02-07 VITALS
OXYGEN SATURATION: 98 % | HEART RATE: 68 BPM | DIASTOLIC BLOOD PRESSURE: 82 MMHG | SYSTOLIC BLOOD PRESSURE: 142 MMHG | RESPIRATION RATE: 18 BRPM | TEMPERATURE: 98.1 F

## 2025-02-10 ENCOUNTER — HOME CARE VISIT (OUTPATIENT)
Dept: HOME HEALTH SERVICES | Facility: HOME HEALTHCARE | Age: 75
End: 2025-02-10
Payer: COMMERCIAL

## 2025-02-10 VITALS
DIASTOLIC BLOOD PRESSURE: 72 MMHG | OXYGEN SATURATION: 95 % | HEART RATE: 79 BPM | BODY MASS INDEX: 30.61 KG/M2 | RESPIRATION RATE: 18 BRPM | SYSTOLIC BLOOD PRESSURE: 156 MMHG | WEIGHT: 238.5 LBS

## 2025-02-10 PROCEDURE — G0299 HHS/HOSPICE OF RN EA 15 MIN: HCPCS

## 2025-02-10 RX ORDER — DOXAZOSIN 4 MG/1
4 TABLET ORAL NIGHTLY
Qty: 90 TABLET | Refills: 0 | Status: SHIPPED | OUTPATIENT
Start: 2025-02-10

## 2025-02-10 RX ORDER — BUMETANIDE 2 MG/1
2 TABLET ORAL DAILY
Qty: 90 TABLET | Refills: 3 | Status: SHIPPED | OUTPATIENT
Start: 2025-02-10 | End: 2026-02-05

## 2025-02-10 RX ORDER — SITAGLIPTIN AND METFORMIN HYDROCHLORIDE 1000; 50 MG/1; MG/1
TABLET, FILM COATED, EXTENDED RELEASE ORAL
Qty: 180 TABLET | Refills: 0 | OUTPATIENT
Start: 2025-02-10

## 2025-02-11 ENCOUNTER — OFFICE VISIT (OUTPATIENT)
Dept: CARDIAC SURGERY | Facility: CLINIC | Age: 75
End: 2025-02-11
Payer: MEDICARE

## 2025-02-11 ENCOUNTER — ANTICOAGULATION VISIT (OUTPATIENT)
Dept: PHARMACY | Facility: HOSPITAL | Age: 75
End: 2025-02-11
Payer: MEDICARE

## 2025-02-11 ENCOUNTER — READMISSION MANAGEMENT (OUTPATIENT)
Dept: CALL CENTER | Facility: HOSPITAL | Age: 75
End: 2025-02-11
Payer: MEDICARE

## 2025-02-11 VITALS
SYSTOLIC BLOOD PRESSURE: 168 MMHG | OXYGEN SATURATION: 95 % | DIASTOLIC BLOOD PRESSURE: 86 MMHG | BODY MASS INDEX: 30.65 KG/M2 | HEIGHT: 74 IN | RESPIRATION RATE: 18 BRPM | HEART RATE: 66 BPM | WEIGHT: 238.8 LBS

## 2025-02-11 DIAGNOSIS — I48.0 AF (PAROXYSMAL ATRIAL FIBRILLATION): Primary | ICD-10-CM

## 2025-02-11 DIAGNOSIS — I34.0 SEVERE MITRAL REGURGITATION: Primary | Chronic | ICD-10-CM

## 2025-02-11 DIAGNOSIS — T14.8XXA DRAINAGE FROM WOUND: ICD-10-CM

## 2025-02-11 DIAGNOSIS — Z98.890 HISTORY OF OPEN HEART SURGERY: ICD-10-CM

## 2025-02-11 LAB
INR PPP: 2.4 (ref 0.91–1.09)
PROTHROMBIN TIME: 29.2 SECONDS (ref 10–13.8)

## 2025-02-11 PROCEDURE — 3077F SYST BP >= 140 MM HG: CPT | Performed by: NURSE PRACTITIONER

## 2025-02-11 PROCEDURE — G0463 HOSPITAL OUTPT CLINIC VISIT: HCPCS

## 2025-02-11 PROCEDURE — 3079F DIAST BP 80-89 MM HG: CPT | Performed by: NURSE PRACTITIONER

## 2025-02-11 PROCEDURE — 99024 POSTOP FOLLOW-UP VISIT: CPT | Performed by: NURSE PRACTITIONER

## 2025-02-11 PROCEDURE — 87205 SMEAR GRAM STAIN: CPT | Performed by: NURSE PRACTITIONER

## 2025-02-11 PROCEDURE — 85610 PROTHROMBIN TIME: CPT

## 2025-02-11 PROCEDURE — 87070 CULTURE OTHR SPECIMN AEROBIC: CPT | Performed by: NURSE PRACTITIONER

## 2025-02-11 RX ORDER — SULFAMETHOXAZOLE AND TRIMETHOPRIM 800; 160 MG/1; MG/1
1 TABLET ORAL 2 TIMES DAILY
Qty: 14 TABLET | Refills: 0 | Status: SHIPPED | OUTPATIENT
Start: 2025-02-11 | End: 2025-02-18

## 2025-02-11 RX ORDER — AMLODIPINE BESYLATE 5 MG/1
5 TABLET ORAL DAILY
Qty: 30 TABLET | Refills: 1 | Status: SHIPPED | OUTPATIENT
Start: 2025-02-11

## 2025-02-11 NOTE — OUTREACH NOTE
Medical Week 2 Survey      Flowsheet Row Responses   [facility] patient discharged from? [place]   Does the patient have one of the following disease processes/diagnoses(primary or secondary)? Other   Week 2 attempt successful? Yes   Call start time 0847   Discharge diagnosis hypoglycemia, chest wall wound, encephalopathy   Call end time 0849   Person spoke with today (if not patient) and relationship Patient   Meds reviewed with patient/caregiver? Yes   Does the patient have all medications ordered at discharge? Yes   Is the patient taking all medications as directed (includes completed medication regime)? Yes   Does the patient have a primary care provider?  Yes   Comments regarding PCP Patient reports that he has seen PCP since discharge.   Has the patient kept scheduled appointments due by today? Yes   Comments Patient has CT surgeons office appt today   Psychosocial issues? No   Did the patient receive a copy of their discharge instructions? Yes   Nursing interventions Reviewed instructions with patient   What is the patient's perception of their health status since discharge? Improving   Is the patient/caregiver able to teach back signs and symptoms related to disease process for when to call PCP? Yes   Is the patient/caregiver able to teach back the hierarchy of who to call/visit for symptoms/problems? PCP, Specialist, Home health nurse, Urgent Care, ED, 911 Yes   If the patient is a current smoker, are they able to teach back resources for cessation? Not a smoker   Week 2 Call Completed? Yes   Is the patient interested in additional calls from an ambulatory ? No   Would this patient benefit from a Referral to Amb Social Work? No   Call end time 0849            LOUIS BRIAN - Registered Nurse

## 2025-02-11 NOTE — PROGRESS NOTES
"CARDIOVASCULAR SURGERY FOLLOW-UP PROGRESS NOTE  Chief Complaint: Post-op Follow Up        HPI:   Dear Dr. Salvador, Tera Russ MD and colleagues:    It was nice to see Jeb Olson in follow up today after cardiac surgery.  As you know, he is a 74 y.o. male with severe mitral valve regurgitation, CAD with prior stent to LAD in 2022, chronic HFrEF NYHA class II, recent diagnosis of atrial fibrillation, DM type 2, HTN, HLD, and treated HOLLI who underwent elective mitral valve repair with a 36 mm Medtronic flexible band annuloplasty and triangular resection of P2, right and left cryo maze procedure with full set of lesions and left atrial appendage endocardial closure at [facility] by Dr. Kelly on 1/8/2025. He was admitted for sternal erythema and swelling on 1/23/2025.  The day of discharge, Dr. Crawford did a bedside I&D.  His blood cultures and wound cultures remain negative.  He did receive vancomycin while at the hospital but was discharged home on doxycycline and home health care following. He is packing his wound with iodoform gauze.  The drainage noted on the old dressing is mucoid and bloody. The wound does not track.  He was admitted again on 1/29/2025 for a fall/ syncope that was felt related to hypoglycemia.  His postop course was uneventful while in the hospital. He is doing well now.  He checked his glucose after eating while in the office and it was 244. He comes in today complaining of nothing but has noted his BP has been elevated for the last week in the 160-170 range.  His activity level has been fair. He has not seen cardiology. His appt was cancelled today.  He has not started cardiac rehab.  Home health is still following pt. He is alone for his appt today.    Physical Exam:         /86 (BP Location: Left arm, Patient Position: Sitting, Cuff Size: Large Adult)   Pulse 66   Resp 18   Ht 188 cm (74\")   Wt 108 kg (238 lb 12.8 oz)   SpO2 95%   BMI 30.66 kg/m²   Heart:  irregularly " irregular rhythm  Lungs:  clear to auscultation bilaterally  Extremities:  no edema  Incision(s):  mid chest no significant drainage, no significant erythema, as above in HPI, sternum stable    Assessment/Plan:     S/P elective mitral valve repair with a 36 mm Medtronic flexible band annuloplasty and triangular resection of P2, right and left cryo maze procedure with full set of lesions and left atrial appendage endocardial closure. Overall, he is doing well despite to admissions since discharge from his cardiac surgery.  Dr. Kelly did evaluate his sternal wound and ordered a wound culture and added Bactrim DS for one week.  He will continue iodoform gauze dressing changes.  I reviewed his home medications before and after surgery.  He had been on full dose Entresto 97 mg/ 103 mg but is only on 24 mg/ 26 mg currently. I have added amlodipine 5 mg daily until he can see cardiology.  Orders were also placed for complete echo for post mitral valve repair per Dr. Kelly's routine orders.  Pt is aware of this as well.  He continues on warfarin for atrial fib and has an INR this afternoon.  He will let them know he is starting Bactrim DS for one week.    Postop sternal wound drainage--cont local wound care, Bactrim DS added  Hypoglycemia--close monitoring of glucose and follow-up with primary team    Keep incisions clean and dry  OK to begin cardiac rehab once home health has discharged pt  Follow-up as scheduled with cardiology  Follow-up as scheduled with PCP  Follow-up with CT surgery--one week for wound check    Continue lifting restriction of 10 lbs until 6 weeks and 50 lbs until 12 weeks from the date of surgery, no excessive jarring motions or twisting motions until 12 weeks from the date of surgery.    Thank you for allowing me to participate in the care of your patient.    Regards,  Teresita Reich, APRN

## 2025-02-11 NOTE — PROGRESS NOTES
Anticoagulation Clinic Progress Note    Anticoagulation Summary  As of 2025      INR goal:  2.0-3.0   TTR:  59.1% (3.3 mo)   INR used for dosin.4 (2025)   Warfarin maintenance plan:  7.5 mg every Wed, Sat; 5 mg all other days   Weekly warfarin total:  40 mg   Plan last modified:  Gurjit Velásquez, PharmD (10/30/2024)   Next INR check:  2025   Target end date:  --    Indications    AF (paroxysmal atrial fibrillation) [I48.0]                 Anticoagulation Episode Summary       INR check location:  --    Preferred lab:  --    Send INR reminders to:   [facility] [place]    Comments:  --          Anticoagulation Care Providers       Provider Role Specialty Phone number    Bruna Chávez MD Referring Cardiology [phone]            Clinic Interview:  Patient Findings     Positives:  Change in medications    Negatives:  Signs/symptoms of thrombosis, Signs/symptoms of bleeding,   Laboratory test error suspected, Change in health, Change in alcohol use,   Change in activity, Upcoming invasive procedure, Emergency department   visit, Upcoming dental procedure, Missed doses, Extra doses, Change in   diet/appetite, Hospital admission, Bruising, Other complaints    Comments:  Reports 7-day course of Bactrim (TMP/SMX) was prescribed today   by cardiothoracic surgery for post-op sternal wound drainage.       Clinical Outcomes     Negatives:  Major bleeding event, Thromboembolic event,   Anticoagulation-related hospital admission, Anticoagulation-related ED   visit, Anticoagulation-related fatality    Comments:  Reports 7-day course of Bactrim (TMP/SMX) was prescribed today   by cardiothoracic surgery for post-op sternal wound drainage.         INR History:      2025     6:48 PM 2025     3:16 AM 2025     5:16 AM 2025     1:10 PM 2025    12:00 AM 2025     3:54 PM 2025     2:30 PM   Anticoagulation Monitoring   INR    1.46  2.70 2.4   INR Date    2025  2/11/2025   INR Goal    2.0-3.0  2.0-3.0 2.0-3.0   Trend    Same  Same Same   Last Week Total    47.5 mg  42.5 mg 37.5 mg   Next Week Total    45 mg  37.5 mg 35 mg   Sun    7.5 mg (2/2)  5 mg -   Mon    5 mg  5 mg -   Tue    -  5 mg 5 mg   Wed    -  5 mg (2/5) 5 mg (2/12)   Thu    -  5 mg -   Fri    7.5 mg (1/31)  5 mg -   Sat    7.5 mg  7.5 mg -   Historical INR 1.58  1.57  1.46   2.70         Visit Report       Report       This result is from an external source.       Plan:  1. INR is Therapeutic today- see above in Anticoagulation Summary.  Will instruct Jeb Olson to Change their warfarin regimen to 5 mg daily until INR check in 2 days - see above in Anticoagulation Summary.  2. Follow up in 2 days during next nursing visit through [facility].   3. Patient declines warfarin refills.  4. Verbal and written information provided. Patient expresses understanding and has no further questions at this time.    Gurjit Velásquez, PharmD

## 2025-02-12 ENCOUNTER — TELEPHONE (OUTPATIENT)
Dept: CARDIAC SURGERY | Facility: CLINIC | Age: 75
End: 2025-02-12
Payer: MEDICARE

## 2025-02-12 NOTE — TELEPHONE ENCOUNTER
----- Message from Teresita Reich sent at 2/12/2025  3:41 PM EST -----  Cultures showed no growth.  Robin placed him on Bactrim.

## 2025-02-13 ENCOUNTER — ANTICOAGULATION VISIT (OUTPATIENT)
Dept: PHARMACY | Facility: HOSPITAL | Age: 75
End: 2025-02-13
Payer: MEDICARE

## 2025-02-13 ENCOUNTER — HOME CARE VISIT (OUTPATIENT)
Dept: HOME HEALTH SERVICES | Facility: HOME HEALTHCARE | Age: 75
End: 2025-02-13
Payer: COMMERCIAL

## 2025-02-13 DIAGNOSIS — I48.0 AF (PAROXYSMAL ATRIAL FIBRILLATION): Primary | ICD-10-CM

## 2025-02-13 LAB
HH POC INTERNATIONAL NORMALIZATION RATIO: 2.4
HH POC PROTIME: 28.5 SECONDS
INR PPP: 2.4

## 2025-02-13 PROCEDURE — G0300 HHS/HOSPICE OF LPN EA 15 MIN: HCPCS

## 2025-02-13 NOTE — PROGRESS NOTES
Anticoagulation Clinic Progress Note    Anticoagulation Summary  As of 2025      INR goal:  2.0-3.0   TTR:  59.7% (3.3 mo)   INR used for dosin.40 (2025)   Warfarin maintenance plan:  7.5 mg every Wed, Sat; 5 mg all other days   Weekly warfarin total:  40 mg   No change documented:  Gurjit Velásquez PharmD   Plan last modified:  Gurjit Velásquez PharmD (10/30/2024)   Next INR check:  2025   Target end date:  --    Indications    AF (paroxysmal atrial fibrillation) [I48.0]                 Anticoagulation Episode Summary       INR check location:  --    Preferred lab:  --    [facility]    Comments:  --          Anticoagulation Care Providers       Provider Role Specialty Phone number    Bruna Chávez MD Referring Cardiology [phone]          Findings:  Patient Findings     Positives:  Change in medications    Negatives:  Signs/symptoms of thrombosis, Signs/symptoms of bleeding,   Laboratory test error suspected, Change in health, Change in alcohol use,   Change in activity, Upcoming invasive procedure, Emergency department   visit, Upcoming dental procedure, Missed doses, Extra doses, Change in   diet/appetite, Hospital admission, Bruising, Other complaints    Comments:  Continues on 7-day course of Bactrim (TMP/SMX); ~5 days   remain.       Clinical Outcomes     Negatives:  Major bleeding event, Thromboembolic event,   Anticoagulation-related hospital admission, Anticoagulation-related ED   visit, Anticoagulation-related fatality    Comments:  Continues on 7-day course of Bactrim (TMP/SMX); ~5 days   remain.           INR History:      2025     5:16 AM 2025     1:10 PM 2025    12:00 AM 2025     3:54 PM 2025     2:30 PM 2025    12:00 AM 2025     3:57 PM   Anticoagulation Monitoring   INR  1.46  2.70 2.4  2.40   INR Date  2025   INR Goal  2.0-3.0  2.0-3.0 2.0-3.0  2.0-3.0   Trend  Same   Same Same  Same   Last Week Total  47.5 mg  42.5 mg 37.5 mg  37.5 mg   Next Week Total  45 mg  37.5 mg 35 mg  37.5 mg   Sun  7.5 mg (2/2)  5 mg -  5 mg   Mon  5 mg  5 mg -  5 mg   Tue  -  5 mg 5 mg  -   Wed  -  5 mg (2/5) 5 mg (2/12)  -   Thu  -  5 mg -  5 mg   Fri  7.5 mg (1/31)  5 mg -  5 mg   Sat  7.5 mg  7.5 mg -  5 mg (2/15)   Historical INR 1.46   2.70       2.40        Visit Report     Report         This result is from an external source.       Plan:  1. INR is Therapeutic today- see above in Anticoagulation Summary.   Provided instructions to Mr. Olson and [place]asta with [facility] to take warfarin at dose of 5 mg while taking Bactrim - see above in Anticoagulation Summary.  2. Follow up in 5 days in clinic while on campus for another appointment per patient request.     Gurjit Velásquez, PharmD

## 2025-02-14 VITALS
RESPIRATION RATE: 18 BRPM | OXYGEN SATURATION: 96 % | DIASTOLIC BLOOD PRESSURE: 68 MMHG | TEMPERATURE: 98.4 F | SYSTOLIC BLOOD PRESSURE: 148 MMHG | HEART RATE: 55 BPM

## 2025-02-14 LAB
BACTERIA SPEC AEROBE CULT: NORMAL
GRAM STN SPEC: NORMAL
GRAM STN SPEC: NORMAL

## 2025-02-18 ENCOUNTER — HOME CARE VISIT (OUTPATIENT)
Dept: HOME HEALTH SERVICES | Facility: HOME HEALTHCARE | Age: 75
End: 2025-02-18
Payer: COMMERCIAL

## 2025-02-18 ENCOUNTER — APPOINTMENT (OUTPATIENT)
Dept: PHARMACY | Facility: HOSPITAL | Age: 75
End: 2025-02-18
Payer: MEDICARE

## 2025-02-19 NOTE — CASE COMMUNICATION
Patient missed a SN visit from [facility] on 2/18/25.     Reason: Patient has MD appointment today and reported MD will obtain INR; therefore, no visit needed today      For your records only.   Per CMS Guidance, MD must be notified of missed/cancelled visits; therefore the prescribed frequency was not met.

## 2025-02-20 ENCOUNTER — OFFICE VISIT (OUTPATIENT)
Dept: CARDIAC SURGERY | Facility: CLINIC | Age: 75
End: 2025-02-20
Payer: MEDICARE

## 2025-02-20 ENCOUNTER — APPOINTMENT (OUTPATIENT)
Dept: PHARMACY | Facility: HOSPITAL | Age: 75
End: 2025-02-20
Payer: MEDICARE

## 2025-02-20 ENCOUNTER — READMISSION MANAGEMENT (OUTPATIENT)
Dept: CALL CENTER | Facility: HOSPITAL | Age: 75
End: 2025-02-20
Payer: MEDICARE

## 2025-02-20 VITALS
TEMPERATURE: 97.1 F | RESPIRATION RATE: 20 BRPM | OXYGEN SATURATION: 96 % | DIASTOLIC BLOOD PRESSURE: 81 MMHG | HEIGHT: 74 IN | SYSTOLIC BLOOD PRESSURE: 147 MMHG | HEART RATE: 53 BPM | WEIGHT: 239.4 LBS | BODY MASS INDEX: 30.72 KG/M2

## 2025-02-20 DIAGNOSIS — Z98.890 HISTORY OF OPEN HEART SURGERY: ICD-10-CM

## 2025-02-20 DIAGNOSIS — T14.8XXA DRAINAGE FROM WOUND: Primary | ICD-10-CM

## 2025-02-20 PROCEDURE — 3079F DIAST BP 80-89 MM HG: CPT

## 2025-02-20 PROCEDURE — 3077F SYST BP >= 140 MM HG: CPT

## 2025-02-20 PROCEDURE — 99024 POSTOP FOLLOW-UP VISIT: CPT

## 2025-02-20 NOTE — PROGRESS NOTES
CARDIOVASCULAR SURGERY FOLLOW-UP PROGRESS NOTE  Chief Complaint: Post-op follow-up appointment     HPI:   Dear Dr. Salvador and Colleagues:    It was my pleasure to see your patient Jeb Olson in the office today.  As you know, he is a very pleasant 74 y.o. male with a past medical history of hypertension, hyperlipidemia, PAF, acute on chronic diastolic CHF, type 2 diabetes, mitral regurgitation, and HOLLI w/ cpap who underwent elective MV repair/right and left cryo-maze/SELMA clip by Dr. Kelly on 1/8/25.  He was admitted on 1/23/2025 with sternal erythema and swelling.  Blood and wound cultures remain NGTD.  He received IV Vanco while in hospital and was discharged home on doxycycline with home health care following.  On 1/29/2025 he was admitted with syncope, secondary to hypoglycemia.  He has been packing his wound twice daily at home.  Wound healing has progressed slowly.  I can only visualize the depth of roughly 1/4 of an inch, and he states that it is much smaller than it was a couple of weeks ago. There is no odor or drainage.  He states his Bactrim course ended on Tuesday 2/18. Picture uploaded to media.  He comes in today for a wound check. He is doing well now. His activity level has been good.  His pain level has been mild. He has had follow-up with his PCP and has an upcoming appointment with cardiology.       Medications:    Current Outpatient Medications:     acetaminophen (TYLENOL) 325 MG tablet, Take 2 tablets by mouth 2 (Two) Times a Day As Needed for Mild Pain., Disp: 60 tablet, Rfl: 0    amLODIPine (NORVASC) 5 MG tablet, Take 1 tablet by mouth Daily. Indications: High Blood Pressure, Disp: 30 tablet, Rfl: 1    aspirin 81 MG EC tablet, Take 1 tablet by mouth Daily., Disp: , Rfl:     B Complex-Folic Acid (B COMPLEX PLUS PO), Take 1 tablet by mouth Every Other Day. Indications: nutritional supplement, Disp: , Rfl:     bumetanide (BUMEX) 2 MG tablet, Take 1 tablet by mouth Daily., Disp: 90 tablet,  Rfl: 3    doxazosin (CARDURA) 4 MG tablet, TAKE 1 TABLET BY MOUTH ONCE DAILY AT NIGHT, Disp: 90 tablet, Rfl: 0    escitalopram (LEXAPRO) 10 MG tablet, Take 1.5 tablets by mouth Every Evening. (Patient taking differently: Take 1.5 tablets by mouth Every Morning.), Disp: 135 tablet, Rfl: 1    ezetimibe (ZETIA) 10 MG tablet, Take 1 tablet by mouth once daily (Patient taking differently: Take 1 tablet by mouth Every Night.), Disp: 90 tablet, Rfl: 0    fenofibrate 160 MG tablet, TAKE 1 TABLET BY MOUTH AT NIGHT, Disp: 90 tablet, Rfl: 0    fluticasone (FLONASE) 50 MCG/ACT nasal spray, Administer 2 sprays into the nostril(s) as directed by provider Every Morning. 2 sprays in each nostril  Indications: Stuffy Nose, Disp: , Rfl:     gabapentin (NEURONTIN) 600 MG tablet, Take 1 tablet by mouth Every Evening. Indications: Diabetes with Nerve Disease, Disp: , Rfl:     glucose blood (Accu-Chek Anabela Plus) test strip, TEST TWICE DAILY Use as instructed, Disp: 100 each, Rfl: 3    Multiple Vitamins-Minerals (PRESERVISION AREDS 2 PO), Take 1 tablet by mouth 2 (Two) Times a Day. Indications: supplement , Disp: , Rfl:     nebivolol (BYSTOLIC) 5 MG tablet, Take 1 tablet by mouth Daily. (Patient taking differently: Take 1 tablet by mouth every night at bedtime. Indications: High Blood Pressure), Disp: 90 tablet, Rfl: 1    Olopatadine HCl (PATADAY OP), Apply 1 drop to eye(s) as directed by provider 2 (Two) Times a Day As Needed (allergies). Indications: eye drops , Disp: , Rfl:     polyethyl glycol-propyl glycol (SYSTANE) 0.4-0.3 % solution ophthalmic solution (artificial tears), Administer 1 drop to both eyes As Needed (dry eyes). Indications: Excessive Cornea and Conjunctiva Dryness, Disp: , Rfl:     sacubitril-valsartan (ENTRESTO) 24-26 MG tablet, Take 1 tablet by mouth 2 (Two) Times a Day. Indications: Cardiac Failure, Disp: 60 tablet, Rfl: 11    vitamin D3 125 MCG (5000 UT) capsule capsule, Take 1 capsule by mouth Daily. Indications:  "Vitamin D Deficiency, Disp: , Rfl:     warfarin (COUMADIN) 5 MG tablet, Take 1 tablet by mouth Every Morning. Indications: Atrial Fibrillation, Disp: , Rfl:   No current facility-administered medications for this visit.    Facility-Administered Medications Ordered in Other Visits:     Chlorhexidine Gluconate 2 % pads 3 each, 3 pad , Apply externally, BID, Pricila Barnes, APRN     Physical Exam:         /81 (BP Location: Left arm, Patient Position: Sitting, Cuff Size: Adult)   Pulse 53   Temp 97.1 °F (36.2 °C) (Temporal)   Resp 20   Ht 188 cm (74\")   Wt 109 kg (239 lb 6.4 oz)   SpO2 96%   BMI 30.74 kg/m²   Heart:  regularly irregular rhythm, bradycardic  Lungs:  clear to auscultation bilaterally  Extremities:  no edema, peripheral pulses 2+ and symmetric  Incision(s):  mid chest, no significant drainage, slow healing, dehiscence present 1/2 inch, packing removed to assess, 1/4 in deep, no odor; Sternum stable to palpation.    Assessment/Plan:     Severe mitral valve regurgitation - s/p MV repair, MAZE, SELMA closure- [facility] 1/8/2025  CAD with previous stent to LAD (2022)  NICM, EF 40% intra-op YAZMIN  Atrial fibrillation-- s/p MAZE  DM II--- lantus and SSI  Hypertension-- BB  Hyperlipidemia-- statin  HOLLI- compliant with CPAP  BPH-- hytrin      - Post-op wound infection- Admitted on 1/23 with wound redness/swelling. Received IV vanc and discharged on Doxycycline, which he completed.   - [facility] 2[facility]11- [facility] Started on Bactrim DS and was told to continue to pack daily.   - Office visit today for wound check- see above for description of wound, picture added to media.  There is no indication for wound culture today.  All previous wound cultures and blood cultures are negative.      No heavy lifting > 50 pounds for 6 more weeks  Avoid excessive jarring or twisting motions until 12 weeks from the date of surgery.   Keep incisions clean and dry  Follow-up as scheduled with " cardiology  Follow-up as scheduled with PCP  Return to clinic in 2 week(s) for wound check     Above recommendations and restrictions discussed at length with the patient.    Overall, he seems to be really progressing.  He stated that he is not on any oral diabetic medications since his episode of syncope secondary to hypoglycemia. I educated him on the importance of glucose management in wound healing. He states that he has an upcoming appointment to resume some of those medications. States that glucose has been in the mid 100's to 300 at the highest.     I will see him back in the office in 2 weeks to see where we are on wound healing.  I have encouraged the patient to reach out to our office with any questions or concerns.     Thank you for allowing me to participate in the care of your patient. If you have any questions or concerns feel free to contact myself or Dr. Kelly.    Regards,  TIFFANIE Franco    Answers submitted by the patient for this visit:  Post Operative Visit (Submitted on 2/19/2025)  Chief Complaint: Follow-up  Pain Control: controlled  Fever: no fever  Diet: adequate intake  Activity: returning to normal  Operative Site Issues: Yes  Drainage: bloody  Redness: none  Swelling: none  Operative site comments:: Cyst slowly going away.

## 2025-02-20 NOTE — OUTREACH NOTE
Medical Week 3 Survey      Flowsheet Row Responses   [facility] patient discharged from? [place]   Does the patient have one of the following disease processes/diagnoses(primary or secondary)? Other   Week 3 attempt successful? No   Unsuccessful attempts Attempt 1            Jami Talamantes Registered Nurse

## 2025-02-21 ENCOUNTER — ANTICOAGULATION VISIT (OUTPATIENT)
Dept: PHARMACY | Facility: HOSPITAL | Age: 75
End: 2025-02-21
Payer: MEDICARE

## 2025-02-21 ENCOUNTER — HOME CARE VISIT (OUTPATIENT)
Dept: HOME HEALTH SERVICES | Facility: HOME HEALTHCARE | Age: 75
End: 2025-02-21
Payer: COMMERCIAL

## 2025-02-21 DIAGNOSIS — I48.0 AF (PAROXYSMAL ATRIAL FIBRILLATION): Primary | ICD-10-CM

## 2025-02-21 LAB
INR PPP: 3.5 (ref 0.91–1.09)
PROTHROMBIN TIME: 41.8 SECONDS (ref 10–13.8)

## 2025-02-21 PROCEDURE — G0299 HHS/HOSPICE OF RN EA 15 MIN: HCPCS

## 2025-02-21 PROCEDURE — 85610 PROTHROMBIN TIME: CPT

## 2025-02-21 PROCEDURE — G0463 HOSPITAL OUTPT CLINIC VISIT: HCPCS

## 2025-02-21 NOTE — PROGRESS NOTES
Anticoagulation Clinic Progress Note    Anticoagulation Summary  As of 2/21/2025      INR goal:  2.0-3.0   TTR:  59.3% (3.6 mo)   INR used for dosing:  3.5 (2/21/2025)   Warfarin maintenance plan:  7.5 mg every Wed; 5 mg all other days   Weekly warfarin total:  37.5 mg   Plan last modified:  Gurjit Velásquez, PharmD (2/21/2025)   Next INR check:  3/7/2025   Target end date:  --    Indications    AF (paroxysmal atrial fibrillation) [I48.0]                 Anticoagulation Episode Summary       INR check location:  --    Preferred lab:  --    Send INR reminders to:   [facility] [place]    Comments:  --          Anticoagulation Care Providers       Provider Role Specialty Phone number    Bruna Chávez MD Referring Cardiology [phone]            Clinic Interview:  Patient Findings     Positives:  Change in medications, Other complaints    Negatives:  Signs/symptoms of thrombosis, Signs/symptoms of bleeding,   Laboratory test error suspected, Change in health, Change in alcohol use,   Change in activity, Upcoming invasive procedure, Emergency department   visit, Upcoming dental procedure, Missed doses, Extra doses, Change in   diet/appetite, Hospital admission, Bruising    Comments:  Reports he completed his Bactrim (TMP/SMX) course on morning   of 2/18/25. Reports he has been taking warfarin 5 mg daily since last   encounter. He has already taken today's dose of warfarin. [facility] has ended.       Clinical Outcomes     Negatives:  Major bleeding event, Thromboembolic event,   Anticoagulation-related hospital admission, Anticoagulation-related ED   visit, Anticoagulation-related fatality    Comments:  Reports he completed his Bactrim (TMP/SMX) course on morning   of 2/18/25. Reports he has been taking warfarin 5 mg daily since last   encounter. He has already taken today's dose of warfarin. [facility] has ended.         INR History:      1/31/2025     1:10 PM 2/4/2025    12:00 AM  2/4/2025     3:54 PM 2/11/2025     2:30 PM 2/13/2025    12:00 AM 2/13/2025     3:57 PM 2/21/2025     3:30 PM   Anticoagulation Monitoring   INR 1.46  2.70 2.4  2.40 3.5   INR Date 1/31/2025 2/4/2025 2/11/2025 2/13/2025 2/21/2025   INR Goal 2.0-3.0  2.0-3.0 2.0-3.0  2.0-3.0 2.0-3.0   Trend Same  Same Same  Same Down   Last Week Total 47.5 mg  42.5 mg 37.5 mg  37.5 mg 35 mg   Next Week Total 45 mg  37.5 mg 35 mg  37.5 mg 35 mg   Sun 7.5 mg (2/2)  5 mg -  5 mg 5 mg   Mon 5 mg  5 mg -  5 mg 5 mg   Tue -  5 mg 5 mg  - 5 mg   Wed -  5 mg (2/5) 5 mg (2/12)  - 7.5 mg   Thu -  5 mg -  5 mg 5 mg   Fri 7.5 mg (1/31)  5 mg -  5 mg 5 mg   Sat 7.5 mg  7.5 mg -  5 mg (2/15) 2.5 mg (2/22); Otherwise 5 mg   Historical INR  2.70       2.40         Visit Report    Report          This result is from an external source.       Plan:  1. INR is Supratherapeutic today- see above in Anticoagulation Summary.  Will instruct Jeb Olson to Change their warfarin regimen (2.5 mg tomorrow, then take 7.5 mg Wed, 5 mg all other days) - see above in Anticoagulation Summary.  2. Follow up in 2 weeks  3. Patient declines warfarin refills.  4. Verbal and written information provided. Patient expresses understanding and has no further questions at this time.    Gurjit Velásquez, PharmD

## 2025-02-23 VITALS
RESPIRATION RATE: 17 BRPM | TEMPERATURE: 98.4 F | DIASTOLIC BLOOD PRESSURE: 72 MMHG | HEART RATE: 78 BPM | SYSTOLIC BLOOD PRESSURE: 132 MMHG | OXYGEN SATURATION: 98 %

## 2025-02-24 ENCOUNTER — OFFICE VISIT (OUTPATIENT)
Dept: CARDIAC SURGERY | Facility: CLINIC | Age: 75
End: 2025-02-24
Payer: MEDICARE

## 2025-02-24 ENCOUNTER — OFFICE VISIT (OUTPATIENT)
Dept: CARDIOLOGY | Facility: CLINIC | Age: 75
End: 2025-02-24
Payer: MEDICARE

## 2025-02-24 VITALS
WEIGHT: 247.6 LBS | HEIGHT: 74 IN | HEART RATE: 62 BPM | DIASTOLIC BLOOD PRESSURE: 92 MMHG | OXYGEN SATURATION: 96 % | BODY MASS INDEX: 31.78 KG/M2 | SYSTOLIC BLOOD PRESSURE: 146 MMHG

## 2025-02-24 VITALS
WEIGHT: 247.9 LBS | DIASTOLIC BLOOD PRESSURE: 82 MMHG | HEIGHT: 74 IN | OXYGEN SATURATION: 98 % | RESPIRATION RATE: 18 BRPM | HEART RATE: 57 BPM | BODY MASS INDEX: 31.82 KG/M2 | SYSTOLIC BLOOD PRESSURE: 150 MMHG

## 2025-02-24 DIAGNOSIS — T14.8XXA DRAINAGE FROM WOUND: Primary | ICD-10-CM

## 2025-02-24 DIAGNOSIS — Z98.890 S/P MVR (MITRAL VALVE REPAIR): ICD-10-CM

## 2025-02-24 DIAGNOSIS — I10 PRIMARY HYPERTENSION: Primary | ICD-10-CM

## 2025-02-24 PROCEDURE — 87205 SMEAR GRAM STAIN: CPT

## 2025-02-24 PROCEDURE — 99024 POSTOP FOLLOW-UP VISIT: CPT

## 2025-02-24 PROCEDURE — 87070 CULTURE OTHR SPECIMN AEROBIC: CPT

## 2025-02-24 RX ORDER — EZETIMIBE 10 MG/1
10 TABLET ORAL DAILY
Qty: 90 TABLET | Refills: 0 | Status: SHIPPED | OUTPATIENT
Start: 2025-02-24

## 2025-02-24 RX ORDER — NEBIVOLOL 10 MG/1
10 TABLET ORAL DAILY
Qty: 90 TABLET | Refills: 3 | Status: SHIPPED | OUTPATIENT
Start: 2025-02-24

## 2025-02-24 NOTE — PROGRESS NOTES
CARDIOVASCULAR SURGERY FOLLOW-UP PROGRESS NOTE  Chief Complaint: Post-op follow-up appointment     HPI:   Dear Dr. Salvador and Colleagues:     It was my pleasure to see your patient Jeb Olson in the office today.  As you know, he is a very pleasant 74 y.o. male with a past medical history of hypertension, hyperlipidemia, PAF, acute on chronic diastolic CHF, type 2 diabetes, mitral regurgitation, and HOLLI w/ cpap who underwent elective MV repair/right and left cryo-maze/SELMA clip by Dr. Kelly on 1/8/25.  He was admitted on 1/23/2025 with sternal erythema and swelling.  Blood and wound cultures remain NGTD.  He received IV Vanco while in hospital and was discharged home on doxycycline with home health care following.  On 1/29/2025 he was admitted with syncope, secondary to hypoglycemia.  He has been packing his wound twice daily at home.  Wound healing has progressed slowly. On 2/20/2025 I saw him in office for a wound check (picture in media). There was no drainage. I instructed him to continue packing it daily and to come back in 2 weeks for follow-up. Today, he called our office and reported new yellowish drainage, without odor. Denies fever or chills. We decided to have him come in to assess and culture the wound. Today, the wound doesn't look infected (Picture in media), but it doesn't have as much pink, granulated tissue as it did on the 20th. There is no odor. There is a small amount of yellow drainage, which I have sent for culture. Will hold off on results for antibiotic coverage as he has no other signs/symptoms of infection. Overall, he still feels well. He is starting cardiac rehab on Wednesday and is continuing to be active. His pain level has been mild. He has had follow-up with his PCP and had an appointment with cardiology yesterday.       Medications:    Current Outpatient Medications:     acetaminophen (TYLENOL) 325 MG tablet, Take 2 tablets by mouth 2 (Two) Times a Day As Needed for Mild Pain.,  Disp: 60 tablet, Rfl: 0    amLODIPine (NORVASC) 5 MG tablet, Take 1 tablet by mouth Daily. Indications: High Blood Pressure, Disp: 30 tablet, Rfl: 1    aspirin 81 MG EC tablet, Take 1 tablet by mouth Daily., Disp: , Rfl:     B Complex-Folic Acid (B COMPLEX PLUS PO), Take 1 tablet by mouth Every Other Day. Indications: nutritional supplement, Disp: , Rfl:     bumetanide (BUMEX) 2 MG tablet, Take 1 tablet by mouth Daily., Disp: 90 tablet, Rfl: 3    doxazosin (CARDURA) 4 MG tablet, TAKE 1 TABLET BY MOUTH ONCE DAILY AT NIGHT, Disp: 90 tablet, Rfl: 0    escitalopram (LEXAPRO) 10 MG tablet, Take 1.5 tablets by mouth Every Evening. (Patient taking differently: Take 1.5 tablets by mouth Every Morning.), Disp: 135 tablet, Rfl: 1    ezetimibe (ZETIA) 10 MG tablet, Take 1 tablet by mouth once daily, Disp: 90 tablet, Rfl: 0    fenofibrate 160 MG tablet, TAKE 1 TABLET BY MOUTH AT NIGHT, Disp: 90 tablet, Rfl: 0    fluticasone (FLONASE) 50 MCG/ACT nasal spray, Administer 2 sprays into the nostril(s) as directed by provider Every Morning. 2 sprays in each nostril  Indications: Stuffy Nose, Disp: , Rfl:     gabapentin (NEURONTIN) 600 MG tablet, Take 1 tablet by mouth Every Evening. Indications: Diabetes with Nerve Disease, Disp: , Rfl:     glucose blood (Accu-Chek Anabela Plus) test strip, TEST TWICE DAILY Use as instructed, Disp: 100 each, Rfl: 3    Multiple Vitamins-Minerals (PRESERVISION AREDS 2 PO), Take 1 tablet by mouth 2 (Two) Times a Day. Indications: supplement , Disp: , Rfl:     nebivolol (BYSTOLIC) 10 MG tablet, Take 1 tablet by mouth Daily. Indications: High Blood Pressure, Disp: 90 tablet, Rfl: 3    Olopatadine HCl (PATADAY OP), Apply 1 drop to eye(s) as directed by provider 2 (Two) Times a Day As Needed (allergies). Indications: eye drops , Disp: , Rfl:     polyethyl glycol-propyl glycol (SYSTANE) 0.4-0.3 % solution ophthalmic solution (artificial tears), Administer 1 drop to both eyes As Needed (dry eyes). Indications:  "Excessive Cornea and Conjunctiva Dryness, Disp: , Rfl:     sacubitril-valsartan (ENTRESTO) 24-26 MG tablet, Take 1 tablet by mouth 2 (Two) Times a Day. Indications: Cardiac Failure, Disp: 60 tablet, Rfl: 11    vitamin D3 125 MCG (5000 UT) capsule capsule, Take 1 capsule by mouth Daily. Indications: Vitamin D Deficiency, Disp: , Rfl:     warfarin (COUMADIN) 5 MG tablet, Take 1 tablet by mouth Every Morning. Indications: Atrial Fibrillation, Disp: , Rfl:   No current facility-administered medications for this visit.    Facility-Administered Medications Ordered in Other Visits:     Chlorhexidine Gluconate 2 % pads 3 each, 3 pad , Apply externally, BID, Pricila Barnes, APRN     Physical Exam:         /82 (BP Location: Left arm, Patient Position: Sitting, Cuff Size: Large Adult)   Pulse 57   Resp 18   Ht 188 cm (74\")   Wt 112 kg (247 lb 14.4 oz)   SpO2 98%   BMI 31.83 kg/m²   Heart:  regularly irregular rhythm  Lungs:  clear to auscultation bilaterally  Extremities:  no edema, peripheral pulses 2+ and symmetric  Incision(s):  mid chest slow healing, mild amount of yellow drainage, dehiscence present (1/2 inch in length); Sternum stable to palpation.    Assessment/Plan:     Severe mitral valve regurgitation - s/p MV repair, MAZE[facility]  CAD with previous stent to LAD (2022)  NICM, EF 40% intra-op YAZMIN  Atrial fibrillation-- s/p MAZE  DM II--- lantus and SSI  Hypertension-- BB  Hyperlipidemia-- statin  HOLLI- compliant with CPAP  BPH-- hytrin      - Post-op wound infection- Admitted on 1/23 with wound redness/swelling. Received IV vanc and discharged on Doxycycline, which he completed.   - Office visit on 2/11- Culture was sent (NGTD) Started on Bactrim DS and was told to continue to pack daily.   - Office visit on 2/20 for wound check- see above for description of wound, picture added to media.  There is no indication for wound culture today.  All previous wound cultures and blood cultures " are negative.  - Office visit today for wound check- Image in media. Culture sent.      No heavy lifting > 50 pounds for 6 more weeks  Avoid excessive jarring or twisting motions until 12 weeks from the date of surgery.   Keep incisions clean and dry  Follow-up as scheduled with cardiology  Follow-up as scheduled with PCP  Return to clinic in 2 week(s) for wound check      Above recommendations and restrictions discussed at length with the patient.    We will see him for another follow-up on 3/6. I have encouraged the patient to reach out to our office with any questions or concerns.     Thank you for allowing me to participate in the care of your patient. If you have any questions or concerns feel free to contact myself or Dr. Kelly.    Regards,  TIFFANIE Franco    Answers submitted by the patient for this visit:  Post Operative Visit (Submitted on 2/24/2025)  Chief Complaint: Follow-up  Pain Control: well controlled  Fever: no fever  Diet: adequate intake  Activity: normal  Operative Site Issues: Yes  Drainage: purulent  Redness: none  Swelling: none  Operative site comments:: Cyst had pus in it yesterday

## 2025-02-24 NOTE — PROGRESS NOTES
Subjective:     Encounter Date: 02/24/25      Patient ID: Jeb Olson is a 74 y.o. male.    Chief Complaint: Abnormal stress test  HPI:   This is a 74 year-old man with a history of CAD, PVC, AF, CM,  severe MR.      In 2022 he an eye surgery and was noted to have frequent PVCs.  This prompted further testing by his primary care doctor.  He had a Holter monitor which showed frequent PVCs, PVC burden was 22% in couplets, triplets bigeminy and trigeminy.  Thus, he therefore had a transthoracic echocardiogram which showed normal systolic ejection fraction with grade 1 diastolic dysfunction appropriate for age and mild MR.   Subsequent to that he had a walking nuclear stress test with a small, mild apical ischemic defect.  Cardiac catheterization September 22 by myself showed a distal LAD lesion, status post PCI with 1 drug-eluting stent.      Fall 2024 he was in the office and found to have asymptomatic atrial fibrillation on EKG.  His exam revealed a prominent MR murmur.  Holter showed 100% A-fib burden with average heart rate of 60 on beta-blockade.  Follow-up echocardiogram Sept 2024.  There has been enlargement of both ventricles and atria, thickening of the LV, progression of his mitral disease from mild MR to moderate to severe MR.  He also has mild AS, pulmonary hypertension. His SPEP/UPEP was abnormal. PYP was normal Dec 2024. He was hospitalized in [place] in Nov 2024. His EF was newly reduced 35% without WMA. He was hospitalized in Dec 2024 underwent YAZMIN confirming severe MR. His repeat catheterization does not show new coronary disease.     January 2025 underwent mitral valve replacement, maze and left atrial appendage clipping.  He was diuresed with medication adjustments.  Had to come back for aspiration of a cyst in the sternal incision however overall postoperative course has been quite stable. He was then hospitalized again for AMS and falls related to hypoglycemia and polypharmacy.     He returns  today. From a cardiac standpoint he is doing well. No edema, palpitaitons, dizziness. Still has a sternal issue states there is persistent pus will see CT surgery today. Has been of diabetes meds and is hyperglycemic at home, to see PCP. BP trending higher, diastolics >80 consistently.     The following portions of the patient's history were reviewed and updated as appropriate: allergies, current medications, past family history, past medical history, past social history, past surgical history and problem list.     REVIEW OF SYSTEMS:   All systems reviewed.  Pertinent positives identified in HPI.  All other systems are negative.    Past Medical History:   Diagnosis Date    AF (paroxysmal atrial fibrillation)     Allergic Have had for several years    Eyes and nasal issues    Anemia     Anticoagulant long-term use     COUMADIN    Anxiety Since about 2014    Since I was taking of my Dad, who also passed away 3yrs ago.    Arthritis 2002    Both knees, hips, and shoulders    Arthritis of knee, left     Cataract 05/2019    CHF (congestive heart failure)     Colon polyp 2017    Coronary artery disease     stent    Diabetes mellitus     Dry eyes     Essential hypertension     Heart murmur     HL (hearing loss) 1980    no hearing aides    Hyperlipemia     Knee swelling 7/7/2020    Shortly after my left knee replacement    Macular degeneration     Mild chronic anemia     Mitral valve disorder     Obesity     Pneumonia 11/2024    Sleep apnea     cpap       Family History   Problem Relation Age of Onset    Alcohol abuse Maternal Uncle         Passed away 2013    Alcohol abuse Father         Passed away in 2016    Hyperlipidemia Father         Started about 10 years before his death    Arthritis Mother         Passed away 2006, from Alzheimers    Diabetes Mother     Hyperlipidemia Mother         Started probably at middle age    Osteoporosis Mother     Malig Hyperthermia Neg Hx        Social History     Socioeconomic History     Marital status:    Tobacco Use    Smoking status: Never     Passive exposure: Never    Smokeless tobacco: Never    Tobacco comments:     Naila only every been around people who smoked   Vaping Use    Vaping status: Never Used   Substance and Sexual Activity    Alcohol use: Not Currently     Alcohol/week: 21.0 standard drinks of alcohol     Types: 21 Shots of liquor per week     Comment: since thanksgiving    Drug use: Never    Sexual activity: Defer       No Known Allergies    Past Surgical History:   Procedure Laterality Date    ACHILLES TENDON REPAIR Left     CARDIAC CATHETERIZATION      CARDIAC CATHETERIZATION N/A 09/01/2022    Procedure: Coronary angiography, Left heart catheterization,;  Surgeon: Bruna Chávez MD;  Location: [facility] CATH INVASIVE LOCATION;  Service: Cardiology;  Laterality: N/A;    CARDIAC CATHETERIZATION N/A 09/01/2022    Procedure: Stent PRAVIN coronary;  Surgeon: Bruna Chávez MD;  Location: [facility] CATH INVASIVE LOCATION;  Service: Cardiology;  Laterality: N/A;    CARDIAC CATHETERIZATION N/A 1/2/2025    Procedure: Left Heart Cath;  Surgeon: Bruna Chávez MD;  Location: [facility] CATH INVASIVE LOCATION;  Service: Cardiology;  Laterality: N/A;    CARDIAC CATHETERIZATION N/A 1/2/2025    Procedure: Coronary angiography;  Surgeon: Bruna Chávez MD;  [facility];  Service: Cardiology;  Laterality: N/A;    CATARACT EXTRACTION EXTRACAPSULAR W/ INTRAOCULAR LENS IMPLANTATION Bilateral     COLONOSCOPY  2018    Dr Reyes    ENDOSCOPY      MITRAL VALVE REPAIR/REPLACEMENT N/A 1/8/2025    Procedure: STERNOTOMY, MITRAL VALVE REPAIR, MAZE PROCEDURE TRANSESOPHAGEAL ECHOCARDIOGRAM, PRP;  Surgeon: Young Kelly MD;  [facility];  Service: Cardiothoracic;  Laterality: N/A;    TONSILLECTOMY      As a child    TOTAL KNEE ARTHROPLASTY Left 03/12/2020    Procedure: TOTAL KNEE ARTHROPLASTY;  Surgeon: Chepe Alicea MD;  Location: [facility] OR;  Service: Orthopedics;   Laterality: Left;    TOTAL KNEE ARTHROPLASTY Right 03/21/2024    Procedure: TOTAL KNEE ARTHROPLASTY;  Surgeon: Chepe Alicea MD;  Location: [facility];  Service: Orthopedics;  Laterality: Right;       Procedures       Objective:         PHYSICAL EXAM:  GEN: VSS, no distress,   Eyes: normal sclera, normal lids and lashes  HENT: moist mucus membranes,   Respiratory: CTAB, no rales or wheezes  CV: irregRR, blowing 3 out of 6 apical murmur, +2 DP and 2+ carotid pulses b/l  GI: NABS, soft,  Nontender, nondistended  MSK: no edema, no scoliosis or kyphosis  Skin: no rash, warm, dry  Heme/Lymph: no bruising or bleeding  Psych: organized thought, normal behavior and affect  Neuro: Cranial nerves grossly intact, Alert and Oriented x 3.         Assessment:          Diagnosis Plan   1. Primary hypertension               Plan:       1.  CAD: Status post distal LAD PCI September 22.    CCS class I symptoms.   aspirin 81 daily.  2.  Mitral regurgitation: Severe MR status post repair with maze and left atrial appendage clip  3.  AF: Warfarin.  Rate controlled  4.  Cardiomyopathy, Systolic, valvular, continue Bumex 5, Entresto 24/26, Bystolic  5. HTN: Uptitrate bystolic to 10.     Dr. Salvador, Thank you very much for referring this kind patient to me. Please call me with any questions or concerns. I will see the patient again in the office in 6 weeks.      Bruna Chávez MD  02/24/25  [facility]    Outpatient Encounter Medications as of 2/24/2025   Medication Sig Dispense Refill    acetaminophen (TYLENOL) 325 MG tablet Take 2 tablets by mouth 2 (Two) Times a Day As Needed for Mild Pain. 60 tablet 0    amLODIPine (NORVASC) 5 MG tablet Take 1 tablet by mouth Daily. Indications: High Blood Pressure 30 tablet 1    aspirin 81 MG EC tablet Take 1 tablet by mouth Daily.      B Complex-Folic Acid (B COMPLEX PLUS PO) Take 1 tablet by mouth Every Other Day. Indications: nutritional supplement      bumetanide (BUMEX) 2  MG tablet Take 1 tablet by mouth Daily. 90 tablet 3    doxazosin (CARDURA) 4 MG tablet TAKE 1 TABLET BY MOUTH ONCE DAILY AT NIGHT 90 tablet 0    escitalopram (LEXAPRO) 10 MG tablet Take 1.5 tablets by mouth Every Evening. (Patient taking differently: Take 1.5 tablets by mouth Every Morning.) 135 tablet 1    ezetimibe (ZETIA) 10 MG tablet Take 1 tablet by mouth once daily 90 tablet 0    fenofibrate 160 MG tablet TAKE 1 TABLET BY MOUTH AT NIGHT 90 tablet 0    fluticasone (FLONASE) 50 MCG/ACT nasal spray Administer 2 sprays into the nostril(s) as directed by provider Every Morning. 2 sprays in each nostril  Indications: Stuffy Nose      gabapentin (NEURONTIN) 600 MG tablet Take 1 tablet by mouth Every Evening. Indications: Diabetes with Nerve Disease      glucose blood (Accu-Chek Anabela Plus) test strip TEST TWICE DAILY Use as instructed 100 each 3    Multiple Vitamins-Minerals (PRESERVISION AREDS 2 PO) Take 1 tablet by mouth 2 (Two) Times a Day. Indications: supplement       nebivolol (BYSTOLIC) 5 MG tablet Take 1 tablet by mouth Daily. (Patient taking differently: Take 1 tablet by mouth every night at bedtime. Indications: High Blood Pressure) 90 tablet 1    Olopatadine HCl (PATADAY OP) Apply 1 drop to eye(s) as directed by provider 2 (Two) Times a Day As Needed (allergies). Indications: eye drops       polyethyl glycol-propyl glycol (SYSTANE) 0.4-0.3 % solution ophthalmic solution (artificial tears) Administer 1 drop to both eyes As Needed (dry eyes). Indications: Excessive Cornea and Conjunctiva Dryness      sacubitril-valsartan (ENTRESTO) 24-26 MG tablet Take 1 tablet by mouth 2 (Two) Times a Day. Indications: Cardiac Failure 60 tablet 11    vitamin D3 125 MCG (5000 UT) capsule capsule Take 1 capsule by mouth Daily. Indications: Vitamin D Deficiency      warfarin (COUMADIN) 5 MG tablet Take 1 tablet by mouth Every Morning. Indications: Atrial Fibrillation      [DISCONTINUED] ezetimibe (ZETIA) 10 MG tablet Take 1  tablet by mouth once daily (Patient taking differently: Take 1 tablet by mouth Every Night.) 90 tablet 0     Facility-Administered Encounter Medications as of 2/24/2025   Medication Dose Route Frequency Provider Last Rate Last Admin    Chlorhexidine Gluconate 2 % pads 3 each  3 pad  Apply externally BID Pricila Barnes, APRN

## 2025-02-25 ENCOUNTER — READMISSION MANAGEMENT (OUTPATIENT)
Dept: CALL CENTER | Facility: HOSPITAL | Age: 75
End: 2025-02-25
Payer: MEDICARE

## 2025-02-25 NOTE — OUTREACH NOTE
Medical Week 3 Survey      Flowsheet Row Responses   [facility] [place] patient discharged from? [facility]   Does the patient have one of the following disease processes/diagnoses(primary or secondary)? Other   Week 3 attempt successful? Yes   Call start time 1452   Discharge diagnosis hypoglycemia, chest wall wound, encephalopathy   Meds reviewed with patient/caregiver? Yes   Is the patient having any side effects they believe may be caused by any medication additions or changes? No   Does the patient have all medications ordered at discharge? Yes   Is the patient taking all medications as directed (includes completed medication regime)? Yes   Does the patient have a primary care provider?  Yes   Does the patient have an appointment with their PCP within 7 days of discharge? Yes   Has the patient kept scheduled appointments due by today? Yes   Psychosocial issues? No   What is the patient's perception of their health status since discharge? Improving   Week 3 Call Completed? Yes   Graduated Yes   Wrap up additional comments Pt states feeling good and has seen Drs for f/u appts.            Magdalene NICOHLS - Registered Nurse

## 2025-02-26 ENCOUNTER — OFFICE VISIT (OUTPATIENT)
Dept: CARDIAC REHAB | Facility: HOSPITAL | Age: 75
End: 2025-02-26
Payer: MEDICARE

## 2025-02-26 DIAGNOSIS — Z98.890 S/P MVR (MITRAL VALVE REPAIR): Primary | ICD-10-CM

## 2025-02-26 PROCEDURE — 93798 PHYS/QHP OP CAR RHAB W/ECG: CPT

## 2025-02-27 LAB
BACTERIA SPEC AEROBE CULT: NORMAL
GRAM STN SPEC: NORMAL
GRAM STN SPEC: NORMAL

## 2025-03-03 ENCOUNTER — TREATMENT (OUTPATIENT)
Dept: CARDIAC REHAB | Facility: HOSPITAL | Age: 75
End: 2025-03-03
Payer: MEDICARE

## 2025-03-03 DIAGNOSIS — Z98.890 S/P MVR (MITRAL VALVE REPAIR): Primary | ICD-10-CM

## 2025-03-03 PROCEDURE — 93798 PHYS/QHP OP CAR RHAB W/ECG: CPT

## 2025-03-05 ENCOUNTER — APPOINTMENT (OUTPATIENT)
Dept: CARDIAC REHAB | Facility: HOSPITAL | Age: 75
End: 2025-03-05
Payer: MEDICARE

## 2025-03-06 ENCOUNTER — OFFICE VISIT (OUTPATIENT)
Dept: CARDIAC SURGERY | Facility: CLINIC | Age: 75
End: 2025-03-06
Payer: MEDICARE

## 2025-03-06 VITALS
HEART RATE: 57 BPM | OXYGEN SATURATION: 95 % | BODY MASS INDEX: 31.7 KG/M2 | HEIGHT: 74 IN | SYSTOLIC BLOOD PRESSURE: 131 MMHG | RESPIRATION RATE: 18 BRPM | WEIGHT: 247 LBS | DIASTOLIC BLOOD PRESSURE: 72 MMHG

## 2025-03-06 DIAGNOSIS — T14.8XXA DRAINAGE FROM WOUND: Primary | ICD-10-CM

## 2025-03-06 DIAGNOSIS — Z98.890 S/P MVR (MITRAL VALVE REPAIR): ICD-10-CM

## 2025-03-06 PROCEDURE — 99024 POSTOP FOLLOW-UP VISIT: CPT

## 2025-03-07 ENCOUNTER — ANTICOAGULATION VISIT (OUTPATIENT)
Dept: PHARMACY | Facility: HOSPITAL | Age: 75
End: 2025-03-07
Payer: MEDICARE

## 2025-03-07 ENCOUNTER — TREATMENT (OUTPATIENT)
Dept: CARDIAC REHAB | Facility: HOSPITAL | Age: 75
End: 2025-03-07
Payer: MEDICARE

## 2025-03-07 DIAGNOSIS — I48.0 AF (PAROXYSMAL ATRIAL FIBRILLATION): Primary | ICD-10-CM

## 2025-03-07 DIAGNOSIS — Z98.890 S/P MVR (MITRAL VALVE REPAIR): Primary | ICD-10-CM

## 2025-03-07 LAB
INR PPP: 2.9 (ref 0.91–1.09)
PROTHROMBIN TIME: 34.8 SECONDS (ref 10–13.8)

## 2025-03-07 PROCEDURE — 85610 PROTHROMBIN TIME: CPT

## 2025-03-07 PROCEDURE — 93798 PHYS/QHP OP CAR RHAB W/ECG: CPT

## 2025-03-07 PROCEDURE — G0463 HOSPITAL OUTPT CLINIC VISIT: HCPCS

## 2025-03-07 RX ORDER — SITAGLIPTIN AND METFORMIN HYDROCHLORIDE 1000; 50 MG/1; MG/1
1 TABLET, FILM COATED ORAL 2 TIMES DAILY WITH MEALS
COMMUNITY

## 2025-03-07 NOTE — PROGRESS NOTES
Anticoagulation Clinic Progress Note    Anticoagulation Summary  As of 3/7/2025      INR goal:  2.0-3.0   TTR:  54.5% (4.1 mo)   INR used for dosin.9 (3/7/2025)   Warfarin maintenance plan:  7.5 mg every Wed; 5 mg all other days   Weekly warfarin total:  37.5 mg   No change documented:  Ebony Flor RPH   Plan last modified:  Gurjit Velásquez, PharmD (2025)   Next INR check:  3/21/2025   Target end date:  --    Indications    AF (paroxysmal atrial fibrillation) [I48.0]                 Anticoagulation Episode Summary       INR check location:  --    Preferred lab:  --    Send INR reminders to:   YESSY BERRY [facility]    Comments:  --          Anticoagulation Care Providers       Provider Role Specialty Phone number    Bruna Chávez MD Referring Cardiology [phone]            Clinic Interview:  Patient Findings     Positives:  Change in medications    Comments:  Patient reports he restarted his Janumet last Monday.       Clinical Outcomes     Comments:  Patient reports he restarted his Janumet last Monday.         INR History:      2025    12:00 AM 2025     3:54 PM 2025     2:30 PM 2025    12:00 AM 2025     3:57 PM 2025     3:30 PM 3/7/2025     1:30 PM   Anticoagulation Monitoring   INR  2.70 2.4  2.40 3.5 2.9   INR Date  2025 2025  2025 2025 3/7/2025   INR Goal  2.0-3.0 2.0-3.0  2.0-3.0 2.0-3.0 2.0-3.0   Trend  Same Same  Same Down Same   Last Week Total  42.5 mg 37.5 mg  37.5 mg 35 mg 37.5 mg   Next Week Total  37.5 mg 35 mg  37.5 mg 35 mg 37.5 mg   Sun  5 mg -  5 mg 5 mg 5 mg   Mon  5 mg -  5 mg 5 mg 5 mg   Tue  5 mg 5 mg  - 5 mg 5 mg   Wed  5 mg () 5 mg ()  - 7.5 mg 7.5 mg   Thu  5 mg -  5 mg 5 mg 5 mg   Fri  5 mg -  5 mg 5 mg 5 mg   Sat  7.5 mg -  5 mg (2/15) 2.5 mg (); Otherwise 5 mg 5 mg   Historical INR 2.70       2.40          Visit Report   Report           This result is from an external source.       Plan:  1. INR is Therapeutic  today- see above in Anticoagulation Summary.  Will instruct Jeb Olson to Continue their warfarin regimen- see above in Anticoagulation Summary.  2. Follow up in 2 weeks  3. Patient declines warfarin refills.  4. Verbal and written information provided. Patient expresses understanding and has no further questions at this time.    Ebony Flor, [facility]

## 2025-03-10 ENCOUNTER — TREATMENT (OUTPATIENT)
Dept: CARDIAC REHAB | Facility: HOSPITAL | Age: 75
End: 2025-03-10
Payer: MEDICARE

## 2025-03-10 DIAGNOSIS — Z98.890 S/P MVR (MITRAL VALVE REPAIR): Primary | ICD-10-CM

## 2025-03-10 PROCEDURE — 93798 PHYS/QHP OP CAR RHAB W/ECG: CPT

## 2025-03-10 RX ORDER — FENOFIBRATE 160 MG/1
160 TABLET ORAL
Qty: 90 TABLET | Refills: 0 | Status: SHIPPED | OUTPATIENT
Start: 2025-03-10

## 2025-03-10 NOTE — PROGRESS NOTES
CARDIOVASCULAR SURGERY FOLLOW-UP PROGRESS NOTE  Chief Complaint: Post-op follow-up appointment     HPI:   Dear Dr. Salvador and Colleagues:      It was my pleasure to see your patient Jeb Olson in the office today.  As you know, he is a very pleasant 74 y.o. male with a past medical history of hypertension, hyperlipidemia, PAF, acute on chronic diastolic CHF, type 2 diabetes, mitral regurgitation, and HOLLI w/ cpap who underwent elective MV repair/right and left cryo-maze/SELMA clip by Dr. Kelly on 1/8/25.  He was admitted on 1/23/2025 with sternal erythema and swelling.  Blood and wound cultures remain NGTD.  He received IV Vanco while in hospital and was discharged home on doxycycline with home health care following.  On 1/29/2025 he was admitted with syncope, secondary to hypoglycemia.  He has been packing his wound twice daily at home.  Wound healing has progressed slowly. On 2/20/2025 I saw him in office for a wound check (picture in media). There was no drainage. I instructed him to continue packing it daily and to come back in 2 weeks for follow-up.  On 2/24 he presented to the office with a new yellowish drainage.  I cultured the wound and instructed him to continue packing it daily.  The culture finalized as no growth.  Today, 3/6, he presents for wound check.  The wound has made some progress and is much smaller.  You can only pack a small amount of gauze and it.  There is less redness.  He does report there is some drainage but it is not odorous and as we have not any cultures come back positive, I do not believe there to be any infectious process.  I instructed him to continue packing it with Dakin's.  I will see him in office again in 2 weeks.  Overall he is doing well. His activity level has been good. He denies any pain. He has had follow-up with cardiology and his PCP.     Medications:    Current Outpatient Medications:     acetaminophen (TYLENOL) 325 MG tablet, Take 2 tablets by mouth 2 (Two) Times  a Day As Needed for Mild Pain., Disp: 60 tablet, Rfl: 0    amLODIPine (NORVASC) 5 MG tablet, Take 1 tablet by mouth Daily. Indications: High Blood Pressure, Disp: 30 tablet, Rfl: 1    aspirin 81 MG EC tablet, Take 1 tablet by mouth Daily., Disp: , Rfl:     B Complex-Folic Acid (B COMPLEX PLUS PO), Take 1 tablet by mouth Every Other Day. Indications: nutritional supplement, Disp: , Rfl:     bumetanide (BUMEX) 2 MG tablet, Take 1 tablet by mouth Daily., Disp: 90 tablet, Rfl: 3    doxazosin (CARDURA) 4 MG tablet, TAKE 1 TABLET BY MOUTH ONCE DAILY AT NIGHT, Disp: 90 tablet, Rfl: 0    escitalopram (LEXAPRO) 10 MG tablet, Take 1.5 tablets by mouth Every Evening. (Patient taking differently: Take 1.5 tablets by mouth Every Morning.), Disp: 135 tablet, Rfl: 1    ezetimibe (ZETIA) 10 MG tablet, Take 1 tablet by mouth once daily, Disp: 90 tablet, Rfl: 0    fluticasone (FLONASE) 50 MCG/ACT nasal spray, Administer 2 sprays into the nostril(s) as directed by provider Every Morning. 2 sprays in each nostril  Indications: Stuffy Nose, Disp: , Rfl:     gabapentin (NEURONTIN) 600 MG tablet, Take 1 tablet by mouth Every Evening. Indications: Diabetes with Nerve Disease, Disp: , Rfl:     glucose blood (Accu-Chek Anabela Plus) test strip, TEST TWICE DAILY Use as instructed, Disp: 100 each, Rfl: 3    Multiple Vitamins-Minerals (PRESERVISION AREDS 2 PO), Take 1 tablet by mouth 2 (Two) Times a Day. Indications: supplement , Disp: , Rfl:     nebivolol (BYSTOLIC) 10 MG tablet, Take 1 tablet by mouth Daily. Indications: High Blood Pressure, Disp: 90 tablet, Rfl: 3    Olopatadine HCl (PATADAY OP), Apply 1 drop to eye(s) as directed by provider 2 (Two) Times a Day As Needed (allergies). Indications: eye drops , Disp: , Rfl:     polyethyl glycol-propyl glycol (SYSTANE) 0.4-0.3 % solution ophthalmic solution (artificial tears), Administer 1 drop to both eyes As Needed (dry eyes). Indications: Excessive Cornea and Conjunctiva Dryness, Disp: , Rfl:  "    sacubitril-valsartan (ENTRESTO) 24-26 MG tablet, Take 1 tablet by mouth 2 (Two) Times a Day. Indications: Cardiac Failure, Disp: 60 tablet, Rfl: 11    vitamin D3 125 MCG (5000 UT) capsule capsule, Take 1 capsule by mouth Daily. Indications: Vitamin D Deficiency, Disp: , Rfl:     warfarin (COUMADIN) 5 MG tablet, Take 1 tablet by mouth Every Morning. Indications: Atrial Fibrillation, Disp: , Rfl:     fenofibrate 160 MG tablet, TAKE 1 TABLET BY MOUTH AT NIGHT, Disp: 90 tablet, Rfl: 0    sitaGLIPtin-metFORMIN (Janumet)  MG per tablet, Take 1 tablet by mouth 2 (Two) Times a Day With Meals., Disp: , Rfl:   No current facility-administered medications for this visit.    Facility-Administered Medications Ordered in Other Visits:     Chlorhexidine Gluconate 2 % pads 3 each, 3 pad , Apply externally, BID, Hueston, Pricila L, APRN     Physical Exam:         /72 (BP Location: Left arm, Patient Position: Sitting, Cuff Size: Large Adult)   Pulse 57   Resp 18   Ht 188 cm (74\")   Wt 112 kg (247 lb)   SpO2 95%   BMI 31.71 kg/m²   Heart:  regular rate and rhythm, S1, S2 normal, no murmur, click, rub or gallop  Lungs:  clear to auscultation bilaterally  Extremities:  no edema, peripheral pulses 2+ and symmetric  Incision(s):  mid chest slow healing, mild amount of drainage, small opening (picture in media); Sternum stable to palpation.    Assessment/Plan:     Severe mitral valve regurgitation - s/p MV repair, MAZE, [facility]  CAD with previous stent to LAD (2022)  NICM, EF 40% intra-op YAZMIN  Atrial fibrillation-- s/p MAZE  DM II--- lantus and SSI  Hypertension-- BB  Hyperlipidemia-- statin  HOLLI- compliant with CPAP  BPH-- hytrin    - Post-op wound infection- Admitted on 1/23 with wound redness/swelling. Received IV vanc and discharged on Doxycycline, which he completed.   - Office visit on 2/11- Culture was sent (NGTD) Started on Bactrim DS and was told to continue to pack daily.   - Office visit " on 2/20 for wound check- see above for description of wound, picture added to media.  There is no indication for wound culture today.  All previous wound cultures and blood cultures are negative.  - Office visit 2/24 for wound check- Image in media. Culture sent.---- NGTD  - Post-op wound check. Wound has made progress (picture in media).     No heavy lifting > 50 pounds for 4 more weeks  Avoid excessive jarring or twisting motions until 12 weeks from the date of surgery.   Keep incisions clean and dry  Follow-up as scheduled with cardiology  Follow-up as scheduled with PCP  Return to clinic in 2 week(s) for wound check     We will see him in 2 weeks to see where we are. I have encouraged the patient to reach out to our office with any questions or concerns.     Thank you for allowing me to participate in the care of your patient. If you have any questions or concerns feel free to contact myself or Dr. Kelly.    Regards,  TIFFANIE Franco    Answers submitted by the patient for this visit:  Post Operative Visit (Submitted on 3/4/2025)  Chief Complaint: Follow-up  Pain Control: well controlled  Fever: no fever  Diet: adequate intake  Activity: returning to normal  Operative Site Issues: Yes  Drainage: colored  Redness: unchanged  Swelling: unchanged  Operative site comments:: The only problem is the cyst going away. For the most part the surgical site has healed, its just the cyst that’s causing all the problem.

## 2025-03-12 ENCOUNTER — TREATMENT (OUTPATIENT)
Dept: CARDIAC REHAB | Facility: HOSPITAL | Age: 75
End: 2025-03-12
Payer: MEDICARE

## 2025-03-12 ENCOUNTER — TRANSCRIBE ORDERS (OUTPATIENT)
Dept: ADMINISTRATIVE | Facility: HOSPITAL | Age: 75
End: 2025-03-12
Payer: MEDICARE

## 2025-03-12 DIAGNOSIS — Z98.890 S/P MVR (MITRAL VALVE REPAIR): Primary | ICD-10-CM

## 2025-03-12 DIAGNOSIS — R91.8 PULMONARY INFILTRATE: Primary | ICD-10-CM

## 2025-03-12 PROCEDURE — 93798 PHYS/QHP OP CAR RHAB W/ECG: CPT

## 2025-03-14 ENCOUNTER — TREATMENT (OUTPATIENT)
Dept: CARDIAC REHAB | Facility: HOSPITAL | Age: 75
End: 2025-03-14
Payer: MEDICARE

## 2025-03-14 DIAGNOSIS — Z98.890 S/P MVR (MITRAL VALVE REPAIR): Primary | ICD-10-CM

## 2025-03-14 PROCEDURE — 93798 PHYS/QHP OP CAR RHAB W/ECG: CPT

## 2025-03-17 ENCOUNTER — TREATMENT (OUTPATIENT)
Dept: CARDIAC REHAB | Facility: HOSPITAL | Age: 75
End: 2025-03-17
Payer: MEDICARE

## 2025-03-17 ENCOUNTER — OFFICE VISIT (OUTPATIENT)
Dept: ORTHOPEDIC SURGERY | Facility: CLINIC | Age: 75
End: 2025-03-17
Payer: MEDICARE

## 2025-03-17 VITALS — HEIGHT: 74 IN | WEIGHT: 251.8 LBS | BODY MASS INDEX: 32.31 KG/M2 | TEMPERATURE: 98 F

## 2025-03-17 DIAGNOSIS — Z98.890 S/P MVR (MITRAL VALVE REPAIR): Primary | ICD-10-CM

## 2025-03-17 DIAGNOSIS — Z09 SURGERY FOLLOW-UP: ICD-10-CM

## 2025-03-17 DIAGNOSIS — Z96.651 S/P TKR (TOTAL KNEE REPLACEMENT), RIGHT: Primary | ICD-10-CM

## 2025-03-17 PROCEDURE — 93798 PHYS/QHP OP CAR RHAB W/ECG: CPT

## 2025-03-17 PROCEDURE — 1159F MED LIST DOCD IN RCRD: CPT | Performed by: ORTHOPAEDIC SURGERY

## 2025-03-17 PROCEDURE — 73562 X-RAY EXAM OF KNEE 3: CPT | Performed by: ORTHOPAEDIC SURGERY

## 2025-03-17 PROCEDURE — 99212 OFFICE O/P EST SF 10 MIN: CPT | Performed by: ORTHOPAEDIC SURGERY

## 2025-03-17 PROCEDURE — 1160F RVW MEDS BY RX/DR IN RCRD: CPT | Performed by: ORTHOPAEDIC SURGERY

## 2025-03-17 NOTE — PROGRESS NOTES
"Jeb Olson     : 1950     MRN: [id number]    DATE: 3/17/2025    DIAGNOSIS:  Follow up right total knee arthroplasty    SUBJECTIVE:  Patient returns today for 1 year follow up of a right total knee replacement. Patient reports doing well with no unusual complaints. Denies any limitations due to the knee.  The pain he was having last time is resolved.  Since I last saw him he was diagnosed with congestive heart failure and had a valve replacement.  After he got his heart fixed, he says that he was able to get the retained fluid in his legs out.  He feels like that has made a huge difference and he really feels like the knee has turned a corner.    Temp 98 °F (36.7 °C) (Temporal)   Ht 188 cm (74\")   Wt 114 kg (251 lb 12.8 oz)   BMI 32.33 kg/m²     Family History   Problem Relation Age of Onset    Alcohol abuse Maternal Uncle         Passed away     Alcohol abuse Father         Passed away in     Hyperlipidemia Father         Started about 10 years before his death    Hypertension Father         When he was in his mid 80s    Arthritis Mother         Passed away , from Alzheimers    Diabetes Mother     Hyperlipidemia Mother         Started probably at middle age    Osteoporosis Mother     Hypertension Mother         As she got older    Alcohol abuse Maternal Uncle         Passed away     Malig Hyperthermia Neg Hx      Past Medical History:   Diagnosis Date    Abnormal ECG 2024    AF (paroxysmal atrial fibrillation)     Allergic Have had for several years    Eyes and nasal issues    Anemia     Anticoagulant long-term use     COUMADIN    Anxiety Since about     Since I was taking of my Dad, who also passed away 3yrs ago.    Arthritis 2002    Both knees, hips, and shoulders    Arthritis of knee, left     Cataract 2019    CHF (congestive heart failure)     Colon polyp 2017    Coronary artery disease     stent    Diabetes mellitus     Dry eyes     Essential hypertension     Heart " murmur     HL (hearing loss) 1980    no hearing aides    Hyperlipemia     Knee swelling 7/7/2020    Shortly after my left knee replacement    Macular degeneration     Mild chronic anemia     Mitral valve disorder     Mitral valve prolapse 11/29/24    Obesity     Pneumonia 11/2024    Sleep apnea     cpap    Urinary tract infection      Past Surgical History:   Procedure Laterality Date    ACHILLES TENDON REPAIR Left     CARDIAC CATHETERIZATION      CARDIAC CATHETERIZATION N/A 09/01/2022    Procedure: Coronary angiography, Left heart catheterization,;  Surgeon: Bruna Chávez MD;  Location: [facility] CATH INVASIVE LOCATION;  Service: Cardiology;  Laterality: N/A;    CARDIAC CATHETERIZATION N/A 09/01/2022    Procedure: Stent PRAVIN coronary;  Surgeon: Bruna Chávez MD;  Location: [facility] CATH INVASIVE LOCATION;  Service: Cardiology;  Laterality: N/A;    CARDIAC CATHETERIZATION N/A 01/02/2025    Procedure: Left Heart Cath;  Surgeon: Bruna Chávez MD;  Location: [facility] CATH INVASIVE LOCATION;  Service: Cardiology;  Laterality: N/A;    CARDIAC CATHETERIZATION N/A 01/02/2025    Procedure: Coronary angiography;  Surgeon: Bruna Chávez MD;  Location: [facility] CATH INVASIVE LOCATION;  Service: Cardiology;  Laterality: N/A;    CARPAL TUNNEL RELEASE  1996???    CATARACT EXTRACTION EXTRACAPSULAR W/ INTRAOCULAR LENS IMPLANTATION Bilateral     COLONOSCOPY  2018    Dr Reyes    ENDOSCOPY      JOINT REPLACEMENT  03/12/20    Left knee, right knee 03/21/24    MITRAL VALVE REPAIR/REPLACEMENT N/A 01/08/2025    Procedure: STERNOTOMY, MITRAL VALVE REPAIR, MAZE PROCEDURE TRANSESOPHAGEAL ECHOCARDIOGRAM, PRP;  Surgeon: Young Kelly MD;  [facility];  Service: Cardiothoracic;  Laterality: N/A;    MITRAL VALVE REPAIR/REPLACEMENT  01/08/2025    TONSILLECTOMY      As a child    TOTAL KNEE ARTHROPLASTY Left 03/12/2020    Procedure: TOTAL KNEE ARTHROPLASTY;  Surgeon: Chepe Alicea MD;  Location: [facility] OR;  Service: Orthopedics;   Laterality: Left;    TOTAL KNEE ARTHROPLASTY Right 03/21/2024    Procedure: TOTAL KNEE ARTHROPLASTY;  Surgeon: Chepe Alicea MD;  [facility] [place] [facility]  Service: Orthopedics;  Laterality: Right;     Social History     Socioeconomic History    Marital status:     Number of children: 3   Tobacco Use    Smoking status: Never     Passive exposure: Never    Smokeless tobacco: Never    Tobacco comments:     Naila only every been around people who smoked   Vaping Use    Vaping status: Never Used   Substance and Sexual Activity    Alcohol use: Not Currently     Alcohol/week: 21.0 standard drinks of alcohol     Comment: 2 beers 3/15/2025    Drug use: Never    Sexual activity: Not Currently     Partners: Female     Birth control/protection: None       Review of Systems:   A 14 point review of systems is reviewed with the patient.  Pertinent positives are listed above.  All others negative.    Exam:  The incision is well healed.  Range of motion: 0 to 115.  The calf is soft and nontender with a negative Homans sign.  Alignment is neutral.  Good quad strength. There is no evidence of varus/valgus or flexion instability. No effusion.  Intact sensation to light touch.  Palpable pedal pulses    DIAGNOSTIC STUDIES    Xrays: AP, merchant and lateral views of the right knee were ordered and reviewed for evaluation of recent knee replacement.  The x-rays demonstrate a well positioned, well aligned knee replacement without complicating factors noted.  In comparison with previous films there has been no change.    ASSESSMENT:  1 year follow up right knee replacement.    PLAN: Appropriate activity modifications and restrictions discussed.  Antibiotic prophylaxis recommendations discussed.  Follow-up annually.    Chepe Alicea MD

## 2025-03-19 ENCOUNTER — TELEPHONE (OUTPATIENT)
Dept: CARDIAC REHAB | Facility: HOSPITAL | Age: 75
End: 2025-03-19
Payer: MEDICARE

## 2025-03-19 ENCOUNTER — TREATMENT (OUTPATIENT)
Dept: CARDIAC REHAB | Facility: HOSPITAL | Age: 75
End: 2025-03-19
Payer: MEDICARE

## 2025-03-19 DIAGNOSIS — Z98.890 S/P MVR (MITRAL VALVE REPAIR): Primary | ICD-10-CM

## 2025-03-19 PROCEDURE — 93798 PHYS/QHP OP CAR RHAB W/ECG: CPT

## 2025-03-19 NOTE — TELEPHONE ENCOUNTER
I faxed the session summary for Mr. Olson's visit today. Pt had an increase in ventricular ectopy. Pt reports taking all of his medications.

## 2025-03-21 ENCOUNTER — TREATMENT (OUTPATIENT)
Dept: CARDIAC REHAB | Facility: HOSPITAL | Age: 75
End: 2025-03-21
Payer: MEDICARE

## 2025-03-21 ENCOUNTER — OFFICE VISIT (OUTPATIENT)
Dept: CARDIAC SURGERY | Facility: CLINIC | Age: 75
End: 2025-03-21
Payer: MEDICARE

## 2025-03-21 ENCOUNTER — ANTICOAGULATION VISIT (OUTPATIENT)
Dept: PHARMACY | Facility: HOSPITAL | Age: 75
End: 2025-03-21
Payer: MEDICARE

## 2025-03-21 VITALS
DIASTOLIC BLOOD PRESSURE: 71 MMHG | HEIGHT: 74 IN | OXYGEN SATURATION: 98 % | RESPIRATION RATE: 18 BRPM | BODY MASS INDEX: 32.21 KG/M2 | HEART RATE: 58 BPM | WEIGHT: 251 LBS | SYSTOLIC BLOOD PRESSURE: 148 MMHG

## 2025-03-21 DIAGNOSIS — I48.0 AF (PAROXYSMAL ATRIAL FIBRILLATION): Primary | ICD-10-CM

## 2025-03-21 DIAGNOSIS — Z98.890 S/P MVR (MITRAL VALVE REPAIR): ICD-10-CM

## 2025-03-21 DIAGNOSIS — T14.8XXA DRAINAGE FROM WOUND: Primary | ICD-10-CM

## 2025-03-21 DIAGNOSIS — Z98.890 S/P MVR (MITRAL VALVE REPAIR): Primary | ICD-10-CM

## 2025-03-21 LAB
INR PPP: 1.9 (ref 0.91–1.09)
PROTHROMBIN TIME: 22.3 SECONDS (ref 10–13.8)

## 2025-03-21 PROCEDURE — G0463 HOSPITAL OUTPT CLINIC VISIT: HCPCS

## 2025-03-21 PROCEDURE — 99024 POSTOP FOLLOW-UP VISIT: CPT

## 2025-03-21 PROCEDURE — 3078F DIAST BP <80 MM HG: CPT

## 2025-03-21 PROCEDURE — 85610 PROTHROMBIN TIME: CPT

## 2025-03-21 PROCEDURE — 3077F SYST BP >= 140 MM HG: CPT

## 2025-03-21 PROCEDURE — 93798 PHYS/QHP OP CAR RHAB W/ECG: CPT

## 2025-03-21 NOTE — PROGRESS NOTES
Anticoagulation Clinic Progress Note    Anticoagulation Summary  As of 3/21/2025      INR goal:  2.0-3.0   TTR:  58.1% (4.5 mo)   INR used for dosin.9 (3/21/2025)   Warfarin maintenance plan:  7.5 mg every Wed, Sat; 5 mg all other days   Weekly warfarin total:  40 mg   Plan last modified:  Gurjit Velásquez, PharmD (3/21/2025)   Next INR check:  2025   Target end date:  --    Indications    AF (paroxysmal atrial fibrillation) [I48.0]                 Anticoagulation Episode Summary       INR check location:  --    Preferred lab:  --    Send INR reminders to:   [facility] [place]    Comments:  --          Anticoagulation Care Providers       Provider Role Specialty Phone number    Bruna Chávez MD Referring Cardiology [phone]            Clinic Interview:  Patient Findings     Positives:  Change in activity    Negatives:  Signs/symptoms of thrombosis, Signs/symptoms of bleeding,   Laboratory test error suspected, Change in health, Change in alcohol use,   Upcoming invasive procedure, Emergency department visit, Upcoming dental   procedure, Missed doses, Extra doses, Change in medications, Change in   diet/appetite, Hospital admission, Bruising, Other complaints    Comments:  Increased activity through cardiac rehab.       Clinical Outcomes     Negatives:  Major bleeding event, Thromboembolic event,   Anticoagulation-related hospital admission, Anticoagulation-related ED   visit, Anticoagulation-related fatality    Comments:  Increased activity through cardiac rehab.         INR History:      2025     3:54 PM 2025     2:30 PM 2025    12:00 AM 2025     3:57 PM 2025     3:30 PM 3/7/2025     1:30 PM 3/21/2025    11:30 AM   Anticoagulation Monitoring   INR 2.70 2.4  2.40 3.5 2.9 1.9   INR Date 2025 2025  2025 2025 3/7/2025 3/21/2025   INR Goal 2.0-3.0 2.0-3.0  2.0-3.0 2.0-3.0 2.0-3.0 2.0-3.0   Trend Same Same  Same Down Same Up   Last Week Total 42.5 mg 37.5 mg   37.5 mg 35 mg 37.5 mg 37.5 mg   Next Week Total 37.5 mg 35 mg  37.5 mg 35 mg 37.5 mg 40 mg   Sun 5 mg -  5 mg 5 mg 5 mg 5 mg   Mon 5 mg -  5 mg 5 mg 5 mg 5 mg   Tue 5 mg 5 mg  - 5 mg 5 mg 5 mg   Wed 5 mg (2/5) 5 mg (2/12)  - 7.5 mg 7.5 mg 7.5 mg   Thu 5 mg -  5 mg 5 mg 5 mg 5 mg   Fri 5 mg -  5 mg 5 mg 5 mg 5 mg   Sat 7.5 mg -  5 mg (2/15) 2.5 mg (2/22); Otherwise 5 mg 5 mg 7.5 mg   Historical INR   2.40           Visit Report  Report            This result is from an external source.       Plan:  1. INR is Subtherapeutic today- see above in Anticoagulation Summary.  Will instruct Jeb Olson to Increase their warfarin regimen to 7.5 mg Wed/Sat, 5 mg all other days - see above in Anticoagulation Summary.  2. Follow up in 2 weeks.  3. Patient declines warfarin refills.  4. Verbal and written information provided. Patient expresses understanding and has no further questions at this time.    Gurjit Velásquez, PharmD

## 2025-03-24 ENCOUNTER — TREATMENT (OUTPATIENT)
Dept: CARDIAC REHAB | Facility: HOSPITAL | Age: 75
End: 2025-03-24
Payer: MEDICARE

## 2025-03-24 DIAGNOSIS — Z98.890 S/P MVR (MITRAL VALVE REPAIR): Primary | ICD-10-CM

## 2025-03-24 PROCEDURE — 93798 PHYS/QHP OP CAR RHAB W/ECG: CPT

## 2025-03-26 ENCOUNTER — TREATMENT (OUTPATIENT)
Dept: CARDIAC REHAB | Facility: HOSPITAL | Age: 75
End: 2025-03-26
Payer: MEDICARE

## 2025-03-26 DIAGNOSIS — Z98.890 S/P MVR (MITRAL VALVE REPAIR): Primary | ICD-10-CM

## 2025-03-26 PROCEDURE — 93798 PHYS/QHP OP CAR RHAB W/ECG: CPT

## 2025-03-26 RX ORDER — TRAZODONE HYDROCHLORIDE 50 MG/1
50 TABLET ORAL NIGHTLY
Qty: 90 TABLET | Refills: 1 | Status: SHIPPED | OUTPATIENT
Start: 2025-03-26

## 2025-03-28 ENCOUNTER — TREATMENT (OUTPATIENT)
Dept: CARDIAC REHAB | Facility: HOSPITAL | Age: 75
End: 2025-03-28
Payer: MEDICARE

## 2025-03-28 DIAGNOSIS — Z98.890 S/P MVR (MITRAL VALVE REPAIR): Primary | ICD-10-CM

## 2025-03-28 PROCEDURE — 93798 PHYS/QHP OP CAR RHAB W/ECG: CPT

## 2025-03-31 ENCOUNTER — APPOINTMENT (OUTPATIENT)
Dept: CARDIAC REHAB | Facility: HOSPITAL | Age: 75
End: 2025-03-31
Payer: MEDICARE

## 2025-03-31 NOTE — PROGRESS NOTES
CARDIOVASCULAR SURGERY FOLLOW-UP PROGRESS NOTE  Chief Complaint: Post-op follow-up appointment     HPI:   Dear Dr. Salvador and Colleagues:    It was my pleasure to see your patient Jeb Olson in the office today.  As you know, he is a very pleasant 74 y.o. male with a past medical history of hypertension, hyperlipidemia, PAF, acute on chronic diastolic CHF, type 2 diabetes, mitral regurgitation, and HOLLI w/ cpap who underwent elective MV repair/right and left cryo-maze/SELMA clip by Dr. Kelly on 1/8/25.  He was admitted on 1/23/2025 with sternal erythema and swelling.  Blood and wound cultures remain NGTD.  He received IV Vanco while in hospital and was discharged home on doxycycline with home health care following.  On 1/29/2025 he was admitted with syncope, secondary to hypoglycemia.  He has been packing his wound twice daily at home.  Wound healing has progressed slowly. On 2/20/2025 I saw him in office for a wound check (picture in media). There was no drainage. I instructed him to continue packing it daily and to come back in 2 weeks for follow-up.  On 2/24 he presented to the office with a new yellowish drainage.  I cultured the wound and instructed him to continue packing it daily.  The culture finalized as no growth. On 3/6 I saw him for a wound check and there was progression in the healing (picture in media). I instructed him to continue packing it with Dakins daily until he was unable to pack it any longer. He comes in today for a subsequent wound check.  The wound is essentially completely healed (picture in media). His activity level has been good. He denies pain. He has had follow-up with his PCP and cardiologist.       Medications:    Current Outpatient Medications:     acetaminophen (TYLENOL) 325 MG tablet, Take 2 tablets by mouth 2 (Two) Times a Day As Needed for Mild Pain., Disp: 60 tablet, Rfl: 0    amLODIPine (NORVASC) 5 MG tablet, Take 1 tablet by mouth Daily. Indications: High Blood Pressure,  Disp: 30 tablet, Rfl: 1    aspirin 81 MG EC tablet, Take 1 tablet by mouth Daily., Disp: , Rfl:     B Complex-Folic Acid (B COMPLEX PLUS PO), Take 1 tablet by mouth Every Other Day. Indications: nutritional supplement, Disp: , Rfl:     bumetanide (BUMEX) 2 MG tablet, Take 1 tablet by mouth Daily., Disp: 90 tablet, Rfl: 3    doxazosin (CARDURA) 4 MG tablet, TAKE 1 TABLET BY MOUTH ONCE DAILY AT NIGHT, Disp: 90 tablet, Rfl: 0    escitalopram (LEXAPRO) 10 MG tablet, Take 1.5 tablets by mouth Every Evening. (Patient taking differently: Take 1.5 tablets by mouth Every Morning.), Disp: 135 tablet, Rfl: 1    ezetimibe (ZETIA) 10 MG tablet, Take 1 tablet by mouth once daily, Disp: 90 tablet, Rfl: 0    fenofibrate 160 MG tablet, TAKE 1 TABLET BY MOUTH AT NIGHT, Disp: 90 tablet, Rfl: 0    fluticasone (FLONASE) 50 MCG/ACT nasal spray, Administer 2 sprays into the nostril(s) as directed by provider Every Morning. 2 sprays in each nostril  Indications: Stuffy Nose, Disp: , Rfl:     gabapentin (NEURONTIN) 600 MG tablet, Take 1 tablet by mouth Every Evening. Indications: Diabetes with Nerve Disease, Disp: , Rfl:     glucose blood (Accu-Chek Anabela Plus) test strip, TEST TWICE DAILY Use as instructed, Disp: 100 each, Rfl: 3    Multiple Vitamins-Minerals (PRESERVISION AREDS 2 PO), Take 1 tablet by mouth 2 (Two) Times a Day. Indications: supplement , Disp: , Rfl:     nebivolol (BYSTOLIC) 10 MG tablet, Take 1 tablet by mouth Daily. Indications: High Blood Pressure, Disp: 90 tablet, Rfl: 3    Olopatadine HCl (PATADAY OP), Apply 1 drop to eye(s) as directed by provider 2 (Two) Times a Day As Needed (allergies). Indications: eye drops , Disp: , Rfl:     polyethyl glycol-propyl glycol (SYSTANE) 0.4-0.3 % solution ophthalmic solution (artificial tears), Administer 1 drop to both eyes As Needed (dry eyes). Indications: Excessive Cornea and Conjunctiva Dryness, Disp: , Rfl:     sacubitril-valsartan (ENTRESTO) 24-26 MG tablet, Take 1 tablet by  "mouth 2 (Two) Times a Day. Indications: Cardiac Failure, Disp: 60 tablet, Rfl: 11    sitaGLIPtin-metFORMIN (Janumet)  MG per tablet, Take 1 tablet by mouth 2 (Two) Times a Day With Meals., Disp: , Rfl:     vitamin D3 125 MCG (5000 UT) capsule capsule, Take 1 capsule by mouth Daily. Indications: Vitamin D Deficiency, Disp: , Rfl:     warfarin (COUMADIN) 5 MG tablet, Take 1 tablet by mouth Every Morning. Indications: Atrial Fibrillation, Disp: , Rfl:     traZODone (DESYREL) 50 MG tablet, Take 1 tablet by mouth Every Night. Indications: Anxiety Disorder, Disp: 90 tablet, Rfl: 1  No current facility-administered medications for this visit.    Facility-Administered Medications Ordered in Other Visits:     Chlorhexidine Gluconate 2 % pads 3 each, 3 pad , Apply externally, BID, Pricila Barnes L, APRN     Physical Exam:         /71 (BP Location: Left arm, Patient Position: Sitting, Cuff Size: Large Adult)   Pulse 58   Resp 18   Ht 188 cm (74\")   Wt 114 kg (251 lb)   SpO2 98%   BMI 32.23 kg/m²   Heart:  regularly irregular rhythm, S1, S2 normal, no murmur, click, rub or gallop,   Lungs:  clear to auscultation bilaterally  Extremities:  no edema, peripheral pulses 2+ and symmetric  Incision(s):  mid chest healing well, no significant drainage, no dehiscence, no significant erythema; Sternum stable to palpation.    Assessment/Plan:     Severe mitral valve regurgitation - s/p MV repair, MAZE, SELMA closure- [facility] 1/8/2025  CAD with previous stent to LAD (2022)  NICM, EF 40% intra-op YAZMIN  Atrial fibrillation-- s/p MAZE  DM II--- lantus and SSI  Hypertension-- BB  Hyperlipidemia-- statin  HOLLI- compliant with CPAP  BPH-- hytrin    - Post-op wound infection- Admitted on 1/23 with wound redness/swelling. Received IV vanc and discharged on Doxycycline, which he completed.   - Office visit on 2/11- Culture was sent (NGTD) Started on Bactrim DS and was told to continue to pack daily.   - Office visit on 2/20 for wound " check- see above for description of wound, picture added to media.  There is no indication for wound culture today.  All previous wound cultures and blood cultures are negative.  - Office visit 2/24 for wound check- Image in media. Culture sent.---- NGTD  - 3/6, Post-op wound check. Wound has made progress (picture in media).   - 3/21, wound check. Wound healed (picture in media).       No heavy lifting > 50 pounds for 2 more weeks  Avoid excessive jarring or twisting motions until 12 weeks from the date of surgery.   Keep incisions clean and dry  Follow-up as scheduled with cardiology  Follow-up as scheduled with PCP      At this time I do not believe we need to see them for any further follow-up.  I have encouraged the patient to reach out to our office with any questions or concerns.     Thank you for allowing me to participate in the care of your patient. If you have any questions or concerns feel free to contact myself or Dr. Kelly.    Regards,  TIFFANIE Franco    Answers submitted by the patient for this visit:  Post Operative Visit (Submitted on 3/19/2025)  Chief Complaint: Follow-up  Pain Control: no pain  Fever: no fever  Diet: adequate intake  Activity: normal  Operative Site Issues: No  Additional information: Other then the cyst area everything else is pretty good. Arms still sting a little bit, I’m also still muscle weak in my arms and legs.

## 2025-04-02 ENCOUNTER — TREATMENT (OUTPATIENT)
Dept: CARDIAC REHAB | Facility: HOSPITAL | Age: 75
End: 2025-04-02
Payer: MEDICARE

## 2025-04-02 DIAGNOSIS — Z98.890 S/P MVR (MITRAL VALVE REPAIR): Primary | ICD-10-CM

## 2025-04-02 PROCEDURE — 93798 PHYS/QHP OP CAR RHAB W/ECG: CPT

## 2025-04-04 ENCOUNTER — ANTICOAGULATION VISIT (OUTPATIENT)
Dept: PHARMACY | Facility: HOSPITAL | Age: 75
End: 2025-04-04
Payer: MEDICARE

## 2025-04-04 ENCOUNTER — TREATMENT (OUTPATIENT)
Dept: CARDIAC REHAB | Facility: HOSPITAL | Age: 75
End: 2025-04-04
Payer: MEDICARE

## 2025-04-04 DIAGNOSIS — I48.0 AF (PAROXYSMAL ATRIAL FIBRILLATION): Primary | ICD-10-CM

## 2025-04-04 DIAGNOSIS — Z98.890 S/P MVR (MITRAL VALVE REPAIR): Primary | ICD-10-CM

## 2025-04-04 LAB
INR PPP: 2.3 (ref 0.91–1.09)
PROTHROMBIN TIME: 27.8 SECONDS (ref 10–13.8)

## 2025-04-04 PROCEDURE — 85610 PROTHROMBIN TIME: CPT

## 2025-04-04 PROCEDURE — G0463 HOSPITAL OUTPT CLINIC VISIT: HCPCS

## 2025-04-04 PROCEDURE — 93798 PHYS/QHP OP CAR RHAB W/ECG: CPT

## 2025-04-04 NOTE — PROGRESS NOTES
Anticoagulation Clinic Progress Note    Anticoagulation Summary  As of 2025      INR goal:  2.0-3.0   TTR:  59.7% (5 mo)   INR used for dosin.3 (2025)   Warfarin maintenance plan:  7.5 mg every Wed, Sat; 5 mg all other days   Weekly warfarin total:  40 mg   No change documented:  Gurjit Velásquez PharmD   Plan last modified:  Gurjit Velásquez PharmD (3/21/2025)   Next INR check:  2025   Target end date:  --    Indications    AF (paroxysmal atrial fibrillation) [I48.0]                 Anticoagulation Episode Summary       INR check location:  --    Preferred lab:  --    Send INR reminders to:   [facility]    Comments:  --          Anticoagulation Care Providers       Provider Role Specialty Phone number    Bruna Chávez MD Referring Cardiology [phone]            Clinic Interview:  Patient Findings     Negatives:  Signs/symptoms of thrombosis, Signs/symptoms of bleeding,   Laboratory test error suspected, Change in health, Change in alcohol use,   Change in activity, Upcoming invasive procedure, Emergency department   visit, Upcoming dental procedure, Missed doses, Extra doses, Change in   medications, Change in diet/appetite, Hospital admission, Bruising, Other   complaints      Clinical Outcomes     Negatives:  Major bleeding event, Thromboembolic event,   Anticoagulation-related hospital admission, Anticoagulation-related ED   visit, Anticoagulation-related fatality        INR History:      2025     2:30 PM 2025    12:00 AM 2025     3:57 PM 2025     3:30 PM 3/7/2025     1:30 PM 3/21/2025    11:30 AM 2025    11:15 AM   Anticoagulation Monitoring   INR 2.4  2.40 3.5 2.9 1.9 2.3   INR Date 2025  2025 2025 3/7/2025 3/21/2025 2025   INR Goal 2.0-3.0  2.0-3.0 2.0-3.0 2.0-3.0 2.0-3.0 2.0-3.0   Trend Same  Same Down Same Up Same   Last Week Total 37.5 mg  37.5 mg 35 mg 37.5 mg 37.5 mg 40 mg   Next Week Total 35 mg  37.5 mg 35 mg 37.5 mg 40 mg 40  mg   Sun -  5 mg 5 mg 5 mg 5 mg 5 mg   Mon -  5 mg 5 mg 5 mg 5 mg 5 mg   Tue 5 mg  - 5 mg 5 mg 5 mg 5 mg   Wed 5 mg (2/12)  - 7.5 mg 7.5 mg 7.5 mg 7.5 mg   Thu -  5 mg 5 mg 5 mg 5 mg 5 mg   Fri -  5 mg 5 mg 5 mg 5 mg 5 mg   Sat -  5 mg (2/15) 2.5 mg (2/22); Otherwise 5 mg 5 mg 7.5 mg 7.5 mg   Historical INR  2.40            Visit Report Report     Report        This result is from an external source.       Plan:  1. INR is Therapeutic today- see above in Anticoagulation Summary.  Will instruct Jeb Olson to Continue their warfarin regimen- see above in Anticoagulation Summary.  2. Follow up in 2 weeks  3. Patient declines warfarin refills.  4. Verbal and written information provided. Patient expresses understanding and has no further questions at this time.    Gurjit Velásquez, PharmD

## 2025-04-07 ENCOUNTER — TREATMENT (OUTPATIENT)
Dept: CARDIAC REHAB | Facility: HOSPITAL | Age: 75
End: 2025-04-07
Payer: MEDICARE

## 2025-04-07 DIAGNOSIS — Z98.890 S/P MVR (MITRAL VALVE REPAIR): Primary | ICD-10-CM

## 2025-04-07 PROCEDURE — 93798 PHYS/QHP OP CAR RHAB W/ECG: CPT

## 2025-04-07 RX ORDER — AMLODIPINE BESYLATE 5 MG/1
5 TABLET ORAL DAILY
Qty: 30 TABLET | Refills: 0 | OUTPATIENT
Start: 2025-04-07

## 2025-04-07 RX ORDER — AMLODIPINE BESYLATE 5 MG/1
5 TABLET ORAL DAILY
Qty: 30 TABLET | Refills: 1 | Status: CANCELLED | OUTPATIENT
Start: 2025-04-07

## 2025-04-08 RX ORDER — AMLODIPINE BESYLATE 5 MG/1
5 TABLET ORAL DAILY
Qty: 30 TABLET | Refills: 1 | Status: SHIPPED | OUTPATIENT
Start: 2025-04-08

## 2025-04-09 ENCOUNTER — TREATMENT (OUTPATIENT)
Dept: CARDIAC REHAB | Facility: HOSPITAL | Age: 75
End: 2025-04-09
Payer: MEDICARE

## 2025-04-09 DIAGNOSIS — Z98.890 S/P MVR (MITRAL VALVE REPAIR): Primary | ICD-10-CM

## 2025-04-09 PROCEDURE — 93798 PHYS/QHP OP CAR RHAB W/ECG: CPT

## 2025-04-10 ENCOUNTER — PATIENT MESSAGE (OUTPATIENT)
Dept: ORTHOPEDIC SURGERY | Facility: CLINIC | Age: 75
End: 2025-04-10
Payer: MEDICARE

## 2025-04-10 RX ORDER — CEPHALEXIN 500 MG/1
CAPSULE ORAL
Qty: 4 CAPSULE | Refills: 0 | Status: SHIPPED | OUTPATIENT
Start: 2025-04-10

## 2025-04-11 ENCOUNTER — TREATMENT (OUTPATIENT)
Dept: CARDIAC REHAB | Facility: HOSPITAL | Age: 75
End: 2025-04-11
Payer: MEDICARE

## 2025-04-11 DIAGNOSIS — Z98.890 S/P MVR (MITRAL VALVE REPAIR): Primary | ICD-10-CM

## 2025-04-11 PROCEDURE — 93798 PHYS/QHP OP CAR RHAB W/ECG: CPT

## 2025-04-14 ENCOUNTER — HOSPITAL ENCOUNTER (OUTPATIENT)
Dept: CARDIOLOGY | Facility: HOSPITAL | Age: 75
Discharge: HOME | End: 2025-04-14
Admitting: NURSE PRACTITIONER
Payer: MEDICARE

## 2025-04-14 ENCOUNTER — TREATMENT (OUTPATIENT)
Dept: CARDIAC REHAB | Facility: HOSPITAL | Age: 75
End: 2025-04-14
Payer: MEDICARE

## 2025-04-14 VITALS
BODY MASS INDEX: 31.06 KG/M2 | HEART RATE: 63 BPM | WEIGHT: 242 LBS | SYSTOLIC BLOOD PRESSURE: 142 MMHG | HEIGHT: 74 IN | OXYGEN SATURATION: 94 % | DIASTOLIC BLOOD PRESSURE: 72 MMHG

## 2025-04-14 DIAGNOSIS — Z98.890 S/P MVR (MITRAL VALVE REPAIR): Primary | ICD-10-CM

## 2025-04-14 DIAGNOSIS — I34.0 SEVERE MITRAL REGURGITATION: Chronic | ICD-10-CM

## 2025-04-14 LAB
AORTIC ARCH: 3 CM
AORTIC DIMENSIONLESS INDEX: 0.56 (DI)
ASCENDING AORTA: 3.9 CM
AV MEAN PRESS GRAD SYS DOP V1V2: 12.1 MMHG
AV VMAX SYS DOP: 221.6 CM/SEC
BH CV ECHO MEAS - ACS: 1.73 CM
BH CV ECHO MEAS - AO MAX PG: 19.7 MMHG
BH CV ECHO MEAS - AO ROOT AREA (BSA CORRECTED): 1.7 CM2
BH CV ECHO MEAS - AO ROOT DIAM: 4.1 CM
BH CV ECHO MEAS - AO V2 VTI: 46.5 CM
BH CV ECHO MEAS - AVA(I,D): 2.5 CM2
BH CV ECHO MEAS - EDV(CUBED): 145.9 ML
BH CV ECHO MEAS - EDV(MOD-SP2): 225 ML
BH CV ECHO MEAS - EDV(MOD-SP4): 279 ML
BH CV ECHO MEAS - EF(MOD-SP2): 59.6 %
BH CV ECHO MEAS - EF(MOD-SP4): 66.7 %
BH CV ECHO MEAS - ESV(CUBED): 43.8 ML
BH CV ECHO MEAS - ESV(MOD-SP2): 91 ML
BH CV ECHO MEAS - ESV(MOD-SP4): 93 ML
BH CV ECHO MEAS - FS: 33.1 %
BH CV ECHO MEAS - IVS/LVPW: 1.13 CM
BH CV ECHO MEAS - IVSD: 1.65 CM
BH CV ECHO MEAS - LAT PEAK E' VEL: 8.5 CM/SEC
BH CV ECHO MEAS - LV DIASTOLIC VOL/BSA (35-75): 118.4 CM2
BH CV ECHO MEAS - LV MASS(C)D: 370.2 GRAMS
BH CV ECHO MEAS - LV MAX PG: 4.8 MMHG
BH CV ECHO MEAS - LV MEAN PG: 3.2 MMHG
BH CV ECHO MEAS - LV SYSTOLIC VOL/BSA (12-30): 39.5 CM2
BH CV ECHO MEAS - LV V1 MAX: 109.4 CM/SEC
BH CV ECHO MEAS - LV V1 VTI: 26.1 CM
BH CV ECHO MEAS - LVIDD: 5.3 CM
BH CV ECHO MEAS - LVIDS: 3.5 CM
BH CV ECHO MEAS - LVOT AREA: 4.5 CM2
BH CV ECHO MEAS - LVOT DIAM: 2.4 CM
BH CV ECHO MEAS - LVPWD: 1.47 CM
BH CV ECHO MEAS - MED PEAK E' VEL: 7.8 CM/SEC
BH CV ECHO MEAS - MV A DUR: 0.16 SEC
BH CV ECHO MEAS - MV A MAX VEL: 51.4 CM/SEC
BH CV ECHO MEAS - MV DEC SLOPE: 561.4 CM/SEC2
BH CV ECHO MEAS - MV DEC TIME: 0.33 SEC
BH CV ECHO MEAS - MV E MAX VEL: 136.2 CM/SEC
BH CV ECHO MEAS - MV E/A: 2.6
BH CV ECHO MEAS - MV MAX PG: 10.6 MMHG
BH CV ECHO MEAS - MV MEAN PG: 4.6 MMHG
BH CV ECHO MEAS - MV P1/2T: 80.4 MSEC
BH CV ECHO MEAS - MV V2 VTI: 37.9 CM
BH CV ECHO MEAS - MVA(P1/2T): 2.7 CM2
BH CV ECHO MEAS - MVA(VTI): 3.1 CM2
BH CV ECHO MEAS - PA ACC TIME: 0.08 SEC
BH CV ECHO MEAS - PA V2 MAX: 88.2 CM/SEC
BH CV ECHO MEAS - PULM DIAS VEL: 47.6 CM/SEC
BH CV ECHO MEAS - PULM S/D: 0.5
BH CV ECHO MEAS - PULM SYS VEL: 24 CM/SEC
BH CV ECHO MEAS - QP/QS: 0.68
BH CV ECHO MEAS - RAP SYSTOLE: 8 MMHG
BH CV ECHO MEAS - RV MAX PG: 1.44 MMHG
BH CV ECHO MEAS - RV V1 MAX: 60 CM/SEC
BH CV ECHO MEAS - RV V1 VTI: 12.2 CM
BH CV ECHO MEAS - RVOT DIAM: 2.9 CM
BH CV ECHO MEAS - RVSP: 27.6 MMHG
BH CV ECHO MEAS - SUP REN AO DIAM: 2.1 CM
BH CV ECHO MEAS - SV(LVOT): 117.8 ML
BH CV ECHO MEAS - SV(MOD-SP2): 134 ML
BH CV ECHO MEAS - SV(MOD-SP4): 186 ML
BH CV ECHO MEAS - SV(RVOT): 79.9 ML
BH CV ECHO MEAS - SVI(LVOT): 50 ML/M2
BH CV ECHO MEAS - SVI(MOD-SP2): 56.9 ML/M2
BH CV ECHO MEAS - SVI(MOD-SP4): 78.9 ML/M2
BH CV ECHO MEAS - TAPSE (>1.6): 1.83 CM
BH CV ECHO MEAS - TR MAX PG: 19.6 MMHG
BH CV ECHO MEAS - TR MAX VEL: 221.3 CM/SEC
BH CV ECHO MEASUREMENTS AVERAGE E/E' RATIO: 16.71
BH CV XLRA - RV BASE: 5.8 CM
BH CV XLRA - RV LENGTH: 9.3 CM
BH CV XLRA - RV MID: 5.6 CM
BH CV XLRA - TDI S': 10.8 CM/SEC
LEFT ATRIUM VOLUME INDEX: 38.1 ML/M2
LV EF BIPLANE MOD: 63.2 %
SINUS: 3.9 CM
STJ: 3 CM

## 2025-04-14 PROCEDURE — 93798 PHYS/QHP OP CAR RHAB W/ECG: CPT

## 2025-04-14 PROCEDURE — 25510000001 PERFLUTREN 6.52 MG/ML SUSPENSION 2 ML VIAL: Performed by: NURSE PRACTITIONER

## 2025-04-14 PROCEDURE — 93306 TTE W/DOPPLER COMPLETE: CPT

## 2025-04-14 PROCEDURE — 93306 TTE W/DOPPLER COMPLETE: CPT | Performed by: INTERNAL MEDICINE

## 2025-04-14 RX ADMIN — PERFLUTREN 2 ML: 6.52 INJECTION, SUSPENSION INTRAVENOUS at 11:35

## 2025-04-16 ENCOUNTER — TREATMENT (OUTPATIENT)
Dept: CARDIAC REHAB | Facility: HOSPITAL | Age: 75
End: 2025-04-16
Payer: MEDICARE

## 2025-04-16 DIAGNOSIS — Z98.890 S/P MVR (MITRAL VALVE REPAIR): Primary | ICD-10-CM

## 2025-04-16 PROCEDURE — 93798 PHYS/QHP OP CAR RHAB W/ECG: CPT

## 2025-04-16 RX ORDER — SITAGLIPTIN AND METFORMIN HYDROCHLORIDE 1000; 50 MG/1; MG/1
1 TABLET, FILM COATED, EXTENDED RELEASE ORAL 2 TIMES DAILY
Qty: 180 TABLET | Refills: 1 | Status: SHIPPED | OUTPATIENT
Start: 2025-04-16

## 2025-04-18 ENCOUNTER — ANTICOAGULATION VISIT (OUTPATIENT)
Dept: PHARMACY | Facility: HOSPITAL | Age: 75
End: 2025-04-18
Payer: MEDICARE

## 2025-04-18 ENCOUNTER — TREATMENT (OUTPATIENT)
Dept: CARDIAC REHAB | Facility: HOSPITAL | Age: 75
End: 2025-04-18
Payer: MEDICARE

## 2025-04-18 DIAGNOSIS — Z98.890 S/P MVR (MITRAL VALVE REPAIR): Primary | ICD-10-CM

## 2025-04-18 DIAGNOSIS — I48.0 AF (PAROXYSMAL ATRIAL FIBRILLATION): Primary | ICD-10-CM

## 2025-04-18 LAB
INR PPP: 2.7 (ref 0.91–1.09)
PROTHROMBIN TIME: 32 SECONDS (ref 10–13.8)

## 2025-04-18 PROCEDURE — G0463 HOSPITAL OUTPT CLINIC VISIT: HCPCS

## 2025-04-18 PROCEDURE — 93798 PHYS/QHP OP CAR RHAB W/ECG: CPT

## 2025-04-18 PROCEDURE — 85610 PROTHROMBIN TIME: CPT

## 2025-04-21 ENCOUNTER — TREATMENT (OUTPATIENT)
Dept: CARDIAC REHAB | Facility: HOSPITAL | Age: 75
End: 2025-04-21
Payer: MEDICARE

## 2025-04-21 DIAGNOSIS — Z98.890 S/P MVR (MITRAL VALVE REPAIR): Primary | ICD-10-CM

## 2025-04-21 PROCEDURE — 93798 PHYS/QHP OP CAR RHAB W/ECG: CPT

## 2025-04-23 ENCOUNTER — TREATMENT (OUTPATIENT)
Dept: CARDIAC REHAB | Facility: HOSPITAL | Age: 75
End: 2025-04-23
Payer: MEDICARE

## 2025-04-23 DIAGNOSIS — Z98.890 S/P MVR (MITRAL VALVE REPAIR): Primary | ICD-10-CM

## 2025-04-23 PROCEDURE — 93798 PHYS/QHP OP CAR RHAB W/ECG: CPT

## 2025-04-25 ENCOUNTER — TREATMENT (OUTPATIENT)
Dept: CARDIAC REHAB | Facility: HOSPITAL | Age: 75
End: 2025-04-25
Payer: MEDICARE

## 2025-04-25 DIAGNOSIS — Z98.890 S/P MVR (MITRAL VALVE REPAIR): Primary | ICD-10-CM

## 2025-04-25 PROCEDURE — 93797 PHYS/QHP OP CAR RHAB WO ECG: CPT

## 2025-04-25 PROCEDURE — 93798 PHYS/QHP OP CAR RHAB W/ECG: CPT

## 2025-04-28 ENCOUNTER — APPOINTMENT (OUTPATIENT)
Dept: CARDIAC REHAB | Facility: HOSPITAL | Age: 75
End: 2025-04-28
Payer: MEDICARE

## 2025-04-30 ENCOUNTER — TREATMENT (OUTPATIENT)
Dept: CARDIAC REHAB | Facility: HOSPITAL | Age: 75
End: 2025-04-30
Payer: MEDICARE

## 2025-04-30 DIAGNOSIS — Z98.890 S/P MVR (MITRAL VALVE REPAIR): Primary | ICD-10-CM

## 2025-04-30 PROCEDURE — 93798 PHYS/QHP OP CAR RHAB W/ECG: CPT

## 2025-05-02 ENCOUNTER — TREATMENT (OUTPATIENT)
Dept: CARDIAC REHAB | Facility: HOSPITAL | Age: 75
End: 2025-05-02
Payer: MEDICARE

## 2025-05-02 DIAGNOSIS — Z98.890 S/P MVR (MITRAL VALVE REPAIR): Primary | ICD-10-CM

## 2025-05-02 PROCEDURE — 93798 PHYS/QHP OP CAR RHAB W/ECG: CPT

## 2025-05-05 ENCOUNTER — TREATMENT (OUTPATIENT)
Dept: CARDIAC REHAB | Facility: HOSPITAL | Age: 75
End: 2025-05-05
Payer: MEDICARE

## 2025-05-05 DIAGNOSIS — Z98.890 S/P MVR (MITRAL VALVE REPAIR): Primary | ICD-10-CM

## 2025-05-05 PROCEDURE — 93798 PHYS/QHP OP CAR RHAB W/ECG: CPT

## 2025-05-05 RX ORDER — AMLODIPINE BESYLATE 5 MG/1
5 TABLET ORAL DAILY
Qty: 30 TABLET | Refills: 0 | OUTPATIENT
Start: 2025-05-05

## 2025-05-05 RX ORDER — BUMETANIDE 2 MG/1
2 TABLET ORAL DAILY
Qty: 90 TABLET | Refills: 3 | Status: SHIPPED | OUTPATIENT
Start: 2025-05-05 | End: 2026-04-30

## 2025-05-06 ENCOUNTER — EXTERNAL PBMM DATA (OUTPATIENT)
Dept: PHARMACY | Facility: OTHER | Age: 75
End: 2025-05-06
Payer: MEDICARE

## 2025-05-07 ENCOUNTER — TREATMENT (OUTPATIENT)
Dept: CARDIAC REHAB | Facility: HOSPITAL | Age: 75
End: 2025-05-07
Payer: MEDICARE

## 2025-05-07 DIAGNOSIS — Z98.890 S/P MVR (MITRAL VALVE REPAIR): Primary | ICD-10-CM

## 2025-05-07 PROCEDURE — 93798 PHYS/QHP OP CAR RHAB W/ECG: CPT

## 2025-05-07 RX ORDER — AMLODIPINE BESYLATE 5 MG/1
5 TABLET ORAL DAILY
Qty: 90 TABLET | Refills: 1 | Status: SHIPPED | OUTPATIENT
Start: 2025-05-07

## 2025-05-08 ENCOUNTER — POP HEALTH PHARMACY (OUTPATIENT)
Dept: PHARMACY | Facility: OTHER | Age: 75
End: 2025-05-08
Payer: MEDICARE

## 2025-05-08 DIAGNOSIS — E11.41 TYPE 2 DIABETES MELLITUS WITH DIABETIC MONONEUROPATHY, WITHOUT LONG-TERM CURRENT USE OF INSULIN: Primary | ICD-10-CM

## 2025-05-08 RX ORDER — DOXAZOSIN 4 MG/1
4 TABLET ORAL NIGHTLY
Qty: 90 TABLET | Refills: 0 | Status: SHIPPED | OUTPATIENT
Start: 2025-05-08

## 2025-05-08 NOTE — PROGRESS NOTES
[facility] Outreach      Jeb Olson was called today to discuss medication adherence with SITAGLIPTIN PHOS-METFORMIN HCL , as he was identified as having care opportunities.    Program Details    [facility]  Status: Enrolled  Effective Dates: 5/8/2025 - present  Responsible Staff: Kathy Martinez LPN          Opportunities Identified    Adherence- Diabetes       Adherence and Medication Management    Adherence Questions   Patient Reported X Missed Doses in the Last 7 Days : 0  Reasons for Non-Adherence : Financial  Interested in a 90 day supply? : Yes (Rx. written for 90, but can only afford 30 day supply.  He would like to get #90, but states it is too expensive.)  Does this require clinical escalation to a pharmacist?: Y (Pt. would like a 90 day supply, but he states his co-pay is over $500, so he has to break that up and get only a 30 day supply @ a time.  He would like financial assistance with this medication or others if he could.)         General Medication Management    Type of medication management: targeted medication review  Review Completed on: 5/8/25  Referred by: pharmacist  Provider: licensed practical nurse  Visit type: initial           Medication Therapy Problems     Medication Therapy Recommendations  No medication therapy recommendations to display      Summary    Medication Management Summary    Topics discussed: cost of medications and cost implications discussed, adherence and missed doses discussed, reminder to refill or  medication discussed  Time spent: 61 - 75 min         Kathy Martinez LPN, 05/08/25, 12:20 PM EDT.

## 2025-05-09 ENCOUNTER — TREATMENT (OUTPATIENT)
Dept: CARDIAC REHAB | Facility: HOSPITAL | Age: 75
End: 2025-05-09
Payer: MEDICARE

## 2025-05-09 ENCOUNTER — REFERRAL TRIAGE (OUTPATIENT)
Age: 75
End: 2025-05-09
Payer: MEDICARE

## 2025-05-09 DIAGNOSIS — Z98.890 S/P MVR (MITRAL VALVE REPAIR): Primary | ICD-10-CM

## 2025-05-09 PROCEDURE — 93798 PHYS/QHP OP CAR RHAB W/ECG: CPT

## 2025-05-12 ENCOUNTER — POP HEALTH PHARMACY (OUTPATIENT)
Dept: PHARMACY | Facility: OTHER | Age: 75
End: 2025-05-12
Payer: MEDICARE

## 2025-05-12 ENCOUNTER — PATIENT OUTREACH (OUTPATIENT)
Age: 75
End: 2025-05-12
Payer: MEDICARE

## 2025-05-12 ENCOUNTER — TREATMENT (OUTPATIENT)
Dept: CARDIAC REHAB | Facility: HOSPITAL | Age: 75
End: 2025-05-12
Payer: MEDICARE

## 2025-05-12 DIAGNOSIS — Z98.890 S/P MVR (MITRAL VALVE REPAIR): Primary | ICD-10-CM

## 2025-05-12 PROCEDURE — 93798 PHYS/QHP OP CAR RHAB W/ECG: CPT

## 2025-05-12 NOTE — OUTREACH NOTE
MSW received referral from the pharmacy requesting MSW outreach for assistance with community resources. MSW outreach to patient by phone and left voicemail and call back number. MSW will continue to attempt to reach patient regarding community resource needs.    Desiree CELIS -   Ambulatory Case Management    5/12/2025, 14:55 EDT

## 2025-05-14 ENCOUNTER — TREATMENT (OUTPATIENT)
Dept: CARDIAC REHAB | Facility: HOSPITAL | Age: 75
End: 2025-05-14
Payer: MEDICARE

## 2025-05-14 DIAGNOSIS — Z98.890 S/P MVR (MITRAL VALVE REPAIR): Primary | ICD-10-CM

## 2025-05-14 PROCEDURE — 93798 PHYS/QHP OP CAR RHAB W/ECG: CPT

## 2025-05-15 ENCOUNTER — PATIENT OUTREACH (OUTPATIENT)
Age: 75
End: 2025-05-15
Payer: MEDICARE

## 2025-05-15 NOTE — OUTREACH NOTE
Social Work Assessment  Questions/Answers      Flowsheet Row Most Recent Value   Referral Source physician, outpatient staff, outpatient clinic   Reason for Consult community resources, financial concerns, medication concerns   Preferred Language English   Advance Care Planning Reviewed no concerns identified   People in Home spouse   Current Living Arrangements home   Potentially Unsafe Housing Conditions none   In the past 12 months has the electric, gas, oil, or water company threatened to shut off services in your home? No   Primary Care Provided by self   Quality of Family Relationships involved, helpful, supportive   Employment Status retired   Source of Income unable to assess   Financial/Environmental Concerns unable to afford medication(s)   Application for Public Assistance pending public assistance pending number   Spiritual, Cultural Beliefs, Yarsani Practices, Values that Affect Care no   Medications independent   Meal Preparation independent   Housekeeping independent   Laundry independent   Shopping independent   If for any reason you need help with day-to-day activities such as bathing, preparing meals, shopping, managing finances, etc., do you get the help you need? I don't need any help   Feels Unsafe at Home or Work/School no   Feels Threatened by Someone no   Feels Unsafe at Home or Work/School no   Feels Threatened by Someone no   Major Change/Loss/Stressor financial   Sources of Support community support   Do you want help finding or keeping work or a job? I do not need or want help   Do you want help with school or training? For example, starting or completing job training or getting a high school diploma, GED or equivalent No   Transportation Concerns none   Current Discharge Risk financial support inadequate          SDOH updated and reviewed with the patient during this program:  --     Disabilities: Not At Risk (5/15/2025)    Disabilities     Concentrating, Remembering, or Making  Decisions Difficulty: no     Doing Errands Independently Difficulty: no      --     Employment: Not At Risk (5/15/2025)    Employment     Do you want help finding or keeping work or a job?: I do not need or want help      Financial Resource Strain: Medium Risk (5/15/2025)    Overall Financial Resource Strain (CARDIA)     Difficulty of Paying Living Expenses: Somewhat hard      --     Food Insecurity: No Food Insecurity (5/15/2025)    Hunger Vital Sign     Worried About Running Out of Food in the Last Year: Never true     Ran Out of Food in the Last Year: Never true      --     Health Literacy: Not At Risk (5/15/2025)    Education     Help with school or training?: No     Preferred Language: English      --     Housing Stability: Low Risk  (5/15/2025)    Housing Stability Vital Sign     Unable to Pay for Housing in the Last Year: No     Number of Times Moved in the Last Year: 0     Homeless in the Last Year: No      --     Interpersonal Safety: Unknown (5/15/2025)    Humiliation, Afraid, Rape, and Kick questionnaire     Fear of Current or Ex-Partner: No     Emotionally Abused: No      --     Stress: No Stress Concern Present (5/15/2025)    [employer] [place] - Occupational Stress Questionnaire     Feeling of Stress : Not at all      --     Transportation Needs: No Transportation Needs (2/21/2025)    OASIS : Transportation     Lack of Transportation (Medical): No     Lack of Transportation (Non-Medical): No     Patient Unable or Declines to Respond: No      --     Utilities: Not At Risk (5/15/2025)    C Utilities     Threatened with loss of utilities: No      Continuing Care   [facility]    [id number], [place] [address]    Phone: [phone]    Request Status: Accepted    Services: Financial Assistance    Resource for: Financial Resource Strain, Utilities     Patient Outreach    MSW outreach to patient as requested  per pharmacy referral to assist with medication costs. Patient discussed his medication costs are higher at the beginning of the year until he meets his deductible. Patient states to have applied for patient assistance program for some of his medications but discussed that he is having difficulty locating a patient assistance program for his Janumet. MSW reviewed website for the  of Janumet ([employer] patient assistance program) and asked patient if he had tried to apply. Patient stated that he has not and would like MSW to send the application to him via mail. MSW discussed [place] Prescription Assistance Program ([facility]) for additional assistance with locating medication assistance in the future. MSW will send out [employer] flyer for patient to utilize as needed. Patient declines that additional community resource assistance is needed at this time and will wait for information via mail. Patient to follow-up with MSW for additional assistance as needed.     Desiree CELIS -   Ambulatory Case Management    5/15/2025, 12:39 EDT

## 2025-05-16 ENCOUNTER — EXTERNAL PBMM DATA (OUTPATIENT)
Dept: PHARMACY | Facility: OTHER | Age: 75
End: 2025-05-16
Payer: MEDICARE

## 2025-05-16 ENCOUNTER — TREATMENT (OUTPATIENT)
Dept: CARDIAC REHAB | Facility: HOSPITAL | Age: 75
End: 2025-05-16
Payer: MEDICARE

## 2025-05-16 DIAGNOSIS — Z98.890 S/P MVR (MITRAL VALVE REPAIR): Primary | ICD-10-CM

## 2025-05-16 PROCEDURE — 93798 PHYS/QHP OP CAR RHAB W/ECG: CPT

## 2025-05-19 ENCOUNTER — APPOINTMENT (OUTPATIENT)
Dept: CARDIAC REHAB | Facility: HOSPITAL | Age: 75
End: 2025-05-19
Payer: MEDICARE

## 2025-05-19 ENCOUNTER — POP HEALTH PHARMACY (OUTPATIENT)
Dept: PHARMACY | Facility: OTHER | Age: 75
End: 2025-05-19
Payer: MEDICARE

## 2025-05-19 ENCOUNTER — OFFICE VISIT (OUTPATIENT)
Dept: CARDIOLOGY | Facility: CLINIC | Age: 75
End: 2025-05-19
Payer: MEDICARE

## 2025-05-19 VITALS
SYSTOLIC BLOOD PRESSURE: 120 MMHG | DIASTOLIC BLOOD PRESSURE: 80 MMHG | HEIGHT: 74 IN | BODY MASS INDEX: 32.21 KG/M2 | HEART RATE: 56 BPM | OXYGEN SATURATION: 93 % | WEIGHT: 251 LBS

## 2025-05-19 DIAGNOSIS — I34.0 NONRHEUMATIC MITRAL VALVE REGURGITATION: Primary | ICD-10-CM

## 2025-05-19 DIAGNOSIS — I25.10 CORONARY ARTERY DISEASE INVOLVING NATIVE CORONARY ARTERY OF NATIVE HEART WITHOUT ANGINA PECTORIS: ICD-10-CM

## 2025-05-19 NOTE — PROGRESS NOTES
[facility] Pharm[facility] Outreach      Jeb Olson was called today to discuss medication adherence with SITAGLIPTIN PHOS-METFORMIN HCL , as he was identified as having care opportunities.    Program Details    [facility]  Status: Enrolled  Effective Dates: 5/8/2025 - present  Responsible Staff: Kathy Martinez LPN          Opportunities Identified    Adherence- Diabetes       Adherence and Medication Management    No data recorded     General Medication Management    Type of medication management: targeted medication review  Review Completed on: 5/19/25  Referred by: pharmacist  Provider: licensed practical nurse  Visit type: follow-up           Medication Therapy Problems     Medication Therapy Recommendations  No medication therapy recommendations to display      Summary    Pt. Outreached 5/8/2025, please see note in chart.    Medication Management Summary    Time spent: 1-15 min         Kathy Martinez LPN, 05/19/25, 12:36 PM EDT.

## 2025-05-19 NOTE — PROGRESS NOTES
Subjective:     Encounter Date: 05/19/25      Patient ID: Jeb Olson is a 74 y.o. male.    Chief Complaint: Abnormal stress test  HPI:   This is a 74 year-old man with a history of CAD, PVC, AF, CM,  severe MR.      In 2022 he an eye surgery and was noted to have frequent PVCs.  This prompted further testing by his primary care doctor.  He had a Holter monitor which showed frequent PVCs, PVC burden was 22% in couplets, triplets bigeminy and trigeminy.  Thus, he therefore had a transthoracic echocardiogram which showed normal systolic ejection fraction with grade 1 diastolic dysfunction appropriate for age and mild MR.   Subsequent to that he had a walking nuclear stress test with a small, mild apical ischemic defect.  Cardiac catheterization September 22 by myself showed a distal LAD lesion, status post PCI with 1 drug-eluting stent.      Fall 2024 he was in the office and found to have asymptomatic atrial fibrillation on EKG.  His exam revealed a prominent MR murmur.  Holter showed 100% A-fib burden with average heart rate of 60 on beta-blockade.  Follow-up echocardiogram Sept 2024.  There has been enlargement of both ventricles and atria, thickening of the LV, progression of his mitral disease from mild MR to moderate to severe MR.  He also has mild AS, pulmonary hypertension. His SPEP/UPEP was abnormal. PYP was normal Dec 2024. He was hospitalized in [place] in Nov 2024. His EF was newly reduced 35% without WMA. He was hospitalized in Dec 2024 underwent YAZMIN confirming severe MR. His repeat catheterization does not show new coronary disease.     January 2025 underwent mitral valve replacement, maze and left atrial appendage clipping.  He was diuresed with medication adjustments.  Had to come back for aspiration of a cyst in the sternal incision however overall postoperative course has been quite stable. He was then hospitalized again for AMS and falls related to hypoglycemia and polypharmacy.     He returns  today. Overall he feels well, though his energy level is not his pre-surgical level. He is able to walk about 2 miles on the treadmill without angina or dyspnea. He has no orthopnea or edema. BP is well controlled.     The following portions of the patient's history were reviewed and updated as appropriate: allergies, current medications, past family history, past medical history, past social history, past surgical history and problem list.     REVIEW OF SYSTEMS:   All systems reviewed.  Pertinent positives identified in HPI.  All other systems are negative.    Past Medical History:   Diagnosis Date    Abnormal ECG 11/2024    AF (paroxysmal atrial fibrillation)     Allergic Have had for several years    Eyes and nasal issues    Anemia     Anticoagulant long-term use     COUMADIN    Anxiety Since about 2014    Since I was taking of my Dad, who also passed away 3yrs ago.    Arthritis 2002    Both knees, hips, and shoulders    Arthritis of knee, left     Cataract 05/2019    CHF (congestive heart failure)     Colon polyp 2017    Coronary artery disease     stent    Diabetes mellitus     Dry eyes     Essential hypertension     Heart murmur     HL (hearing loss) 1980    no hearing aides    Hyperlipemia     Knee swelling 7/7/2020    Shortly after my left knee replacement    Macular degeneration     Mild chronic anemia     Mitral valve disorder     Mitral valve prolapse 11/29/24    Obesity     Pneumonia 11/2024    Sleep apnea     cpap    Urinary tract infection        Family History   Problem Relation Age of Onset    Alcohol abuse Maternal Uncle         Passed away 2013    Alcohol abuse Father         Passed away in 2016    Hyperlipidemia Father         Started about 10 years before his death    Hypertension Father         When he was in his mid 80s    Arthritis Mother         Passed away 2006, from Alzheimers    Diabetes Mother     Hyperlipidemia Mother         Started probably at middle age    Osteoporosis Mother      Hypertension Mother         As she got older    Alcohol abuse Maternal Uncle         Passed away 2013    Malig Hyperthermia Neg Hx        Social History     Socioeconomic History    Marital status:     Number of children: 3   Tobacco Use    Smoking status: Never     Passive exposure: Never    Smokeless tobacco: Never    Tobacco comments:     Naila only every been around people who smoked   Vaping Use    Vaping status: Never Used   Substance and Sexual Activity    Alcohol use: Yes     Alcohol/week: 3.0 standard drinks of alcohol     Types: 3 Cans of beer per week     Comment: 2 beers 3/15/2025, 2 beers 3/15/2025, 1 beer 3/16/2025    Drug use: Never    Sexual activity: Not Currently     Partners: Female     Birth control/protection: None       No Known Allergies    Past Surgical History:   Procedure Laterality Date    ACHILLES TENDON REPAIR Left     CARDIAC CATHETERIZATION      CARDIAC CATHETERIZATION N/A 09/01/2022    Procedure: Coronary angiography, Left heart catheterization,;  Surgeon: Bruna Chávez MD;  [facility]:  [facility];  Service: Cardiology;  Laterality: N/A;    CARDIAC CATHETERIZATION N/A 09/01/2022    Procedure: Stent PRAVIN coronary;  Surgeon: Bruna Chávez MD;  Location:  [facility];  Service: Cardiology;  Laterality: N/A;    CARDIAC CATHETERIZATION N/A 01/02/2025    Procedure: Left Heart Cath;  Surgeon: Bruna Chávez MD;  Location:  [facility];  Service: Cardiology;  Laterality: N/A;    CARDIAC CATHETERIZATION N/A 01/02/2025    Procedure: Coronary angiography;  Surgeon: Bruna Chávez MD;  Location:  [facility];  Service: Cardiology;  Laterality: N/A;    CARPAL TUNNEL RELEASE  1996???    CATARACT EXTRACTION EXTRACAPSULAR W/ INTRAOCULAR LENS IMPLANTATION Bilateral     COLONOSCOPY  2018    Dr Reyes    ENDOSCOPY      JOINT REPLACEMENT  03/12/20    Left knee, right knee 03/21/24    MITRAL VALVE REPAIR/REPLACEMENT N/A 01/08/2025     Procedure: STERNOTOMY, MITRAL VALVE REPAIR, MAZE PROCEDURE TRANSESOPHAGEAL ECHOCARDIOGRAM, PRP;  Surgeon: Young Kelly MD;  Location: [facility] CV[facility];  Service: Cardiothoracic;  Laterality: N/A;    MITRAL VALVE REPAIR/REPLACEMENT  01/08/2025    TONSILLECTOMY      As a child    TOTAL KNEE ARTHROPLASTY Left 03/12/2020    Procedure: TOTAL KNEE ARTHROPLASTY;  Surgeon: Chepe Alicea MD;  Location: [facility] MAIN OR;  Service: Orthopedics;  Laterality: Left;    TOTAL KNEE ARTHROPLASTY Right 03/21/2024    Procedure: TOTAL KNEE ARTHROPLASTY;  Surgeon: Chepe Alicea MD;  Location: [facility] OR OSC;  Service: Orthopedics;  Laterality: Right;       Procedures       Objective:         PHYSICAL EXAM:  GEN: VSS, no distress,   Eyes: normal sclera, normal lids and lashes  HENT: moist mucus membranes,   Respiratory: CTAB, no rales or wheezes  CV: irregRR, blowing 3 out of 6 apical murmur, +2 DP and 2+ carotid pulses b/l  GI: NABS, soft,  Nontender, nondistended  MSK: no edema, no scoliosis or kyphosis  Skin: no rash, warm, dry  Heme/Lymph: no bruising or bleeding  Psych: organized thought, normal behavior and affect  Neuro: Cranial nerves grossly intact, Alert and Oriented x 3.         Assessment:         No diagnosis found.         Plan:       1.  CAD: Status post distal LAD PCI September 22.    CCS class I symptoms.   aspirin 81 daily.  2.  Mitral regurgitation: Severe MR status post repair with maze and left atrial appendage clip, no CHF on exam today.   3.  AF: Warfarin.  Rate controlled  4.  Cardiomyopathy, Systolic, valvular, continue Bumex 5, Entresto 24/26, Bystolic  5. HTN: on entresto, bystolic, amlodipine. I may consider stopping amlodipine in favor of increasing entresto at the next visit    Dr. Salvador, Thank you very much for referring this kind patient to me. Please call me with any questions or concerns. I will see the patient again in the office in 6 weeks.      Bruna Chávez MD  05/19/25  [facility]    Outpatient Encounter Medications as of 5/19/2025   Medication Sig Dispense Refill    acetaminophen (TYLENOL) 325 MG tablet Take 2 tablets by mouth 2 (Two) Times a Day As Needed for Mild Pain. 60 tablet 0    amLODIPine (NORVASC) 5 MG tablet Take 1 tablet by mouth Daily. Indications: High Blood Pressure 90 tablet 1    aspirin 81 MG EC tablet Take 1 tablet by mouth Daily.      bumetanide (BUMEX) 2 MG tablet Take 1 tablet by mouth Daily. 90 tablet 3    cephalexin (KEFLEX) 500 MG capsule Take 4 capsule 1 hour prior to dental procedure 4 capsule 0    doxazosin (CARDURA) 4 MG tablet TAKE 1 TABLET BY MOUTH ONCE DAILY AT NIGHT 90 tablet 0    escitalopram (LEXAPRO) 10 MG tablet Take 1.5 tablets by mouth Every Evening. (Patient taking differently: Take 1.5 tablets by mouth Every Morning.) 135 tablet 1    ezetimibe (ZETIA) 10 MG tablet Take 1 tablet by mouth once daily 90 tablet 0    fenofibrate 160 MG tablet TAKE 1 TABLET BY MOUTH AT NIGHT 90 tablet 0    fluticasone (FLONASE) 50 MCG/ACT nasal spray Administer 2 sprays into the nostril(s) as directed by provider Every Morning. 2 sprays in each nostril  Indications: Stuffy Nose      gabapentin (NEURONTIN) 600 MG tablet Take 1 tablet by mouth Every Evening. Indications: Diabetes with Nerve Disease      glucose blood (Accu-Chek Anabela Plus) test strip TEST TWICE DAILY Use as instructed 100 each 3    Multiple Vitamins-Minerals (PRESERVISION AREDS 2 PO) Take 1 tablet by mouth 2 (Two) Times a Day. Indications: supplement       nebivolol (BYSTOLIC) 10 MG tablet Take 1 tablet by mouth Daily. Indications: High Blood Pressure 90 tablet 3    Olopatadine HCl (PATADAY OP) Apply 1 drop to eye(s) as directed by provider 2 (Two) Times a Day As Needed (allergies). Indications: eye drops       polyethyl glycol-propyl glycol (SYSTANE) 0.4-0.3 % solution ophthalmic solution (artificial tears) Administer 1 drop to both eyes As Needed (dry eyes). Indications: Excessive Cornea and Conjunctiva  Dryness      sacubitril-valsartan (ENTRESTO) 24-26 MG tablet Take 1 tablet by mouth 2 (Two) Times a Day. Indications: Cardiac Failure 60 tablet 11    SITagliptin-metFORMIN HCl ER (Janumet XR)  MG tablet Take 1 tablet by mouth 2 (Two) Times a Day. 180 tablet 1    traZODone (DESYREL) 50 MG tablet Take 1 tablet by mouth Every Night. Indications: Anxiety Disorder 90 tablet 1    warfarin (COUMADIN) 5 MG tablet Take 1 tablet by mouth Every Morning. Indications: Atrial Fibrillation      B Complex-Folic Acid (B COMPLEX PLUS PO) Take 1 tablet by mouth Every Other Day. Indications: nutritional supplement      vitamin D3 125 MCG (5000 UT) capsule capsule Take 1 capsule by mouth Daily. Indications: Vitamin D Deficiency       Facility-Administered Encounter Medications as of 5/19/2025   Medication Dose Route Frequency Provider Last Rate Last Admin    Chlorhexidine Gluconate 2 % pads 3 each  3 pad  Apply externally BID Pricila Barnes APRN

## 2025-05-21 ENCOUNTER — TREATMENT (OUTPATIENT)
Dept: CARDIAC REHAB | Facility: HOSPITAL | Age: 75
End: 2025-05-21
Payer: MEDICARE

## 2025-05-21 ENCOUNTER — ANTICOAGULATION VISIT (OUTPATIENT)
Dept: PHARMACY | Facility: HOSPITAL | Age: 75
End: 2025-05-21
Payer: MEDICARE

## 2025-05-21 DIAGNOSIS — Z98.890 S/P MVR (MITRAL VALVE REPAIR): Primary | ICD-10-CM

## 2025-05-21 DIAGNOSIS — I48.0 AF (PAROXYSMAL ATRIAL FIBRILLATION): Primary | ICD-10-CM

## 2025-05-21 LAB
INR PPP: 2.4 (ref 0.91–1.09)
PROTHROMBIN TIME: 29.1 SECONDS (ref 10–13.8)

## 2025-05-21 PROCEDURE — G0463 HOSPITAL OUTPT CLINIC VISIT: HCPCS

## 2025-05-21 PROCEDURE — 93798 PHYS/QHP OP CAR RHAB W/ECG: CPT

## 2025-05-21 PROCEDURE — 85610 PROTHROMBIN TIME: CPT

## 2025-05-21 RX ORDER — EZETIMIBE 10 MG/1
10 TABLET ORAL DAILY
Qty: 90 TABLET | Refills: 0 | Status: SHIPPED | OUTPATIENT
Start: 2025-05-21

## 2025-05-21 NOTE — PROGRESS NOTES
Anticoagulation Clinic Progress Note    Anticoagulation Summary  As of 2025      INR goal:  2.0-3.0   TTR:  69.2% (6.6 mo)   INR used for dosin.4 (2025)   Warfarin maintenance plan:  7.5 mg every Wed, Sat; 5 mg all other days   Weekly warfarin total:  40 mg   No change documented:  [facility], [facility]   Plan last modified:  Gurjit Velásquez, PharmD (3/21/2025)   Next INR check:  2025   Target end date:  --    Indications    AF (paroxysmal atrial fibrillation) [I48.0]                 Anticoagulation Episode Summary       INR check location:  --    Preferred lab:  --    [facility]   [facility]    Comments:  --          Anticoagulation Care Providers       Provider Role Specialty Phone number    Bruna Chávez MD Referring Cardiology [phone]            Clinic Interview:  Patient Findings     Negatives:  Signs/symptoms of thrombosis, Signs/symptoms of bleeding,   Laboratory test error suspected, Change in health, Change in alcohol use,   Change in activity, Upcoming invasive procedure, Emergency department   visit, Upcoming dental procedure, Missed doses, Extra doses, Change in   medications, Change in diet/appetite, Hospital admission, Bruising, Other   complaints    Comments:  Reports no changes      Clinical Outcomes     Negatives:  Major bleeding event, Thromboembolic event,   Anticoagulation-related hospital admission, Anticoagulation-related ED   visit, Anticoagulation-related fatality    Comments:  Reports no changes        INR History:      2025     3:57 PM 2025     3:30 PM 3/7/2025     1:30 PM 3/21/2025    11:30 AM 2025    11:15 AM 2025    11:15 AM 2025    11:15 AM   Anticoagulation Monitoring   INR 2.40 3.5 2.9 1.9 2.3 2.7 2.4   INR Date 2025 2025 3/7/2025 3/21/2025 2025 2025 2025   INR Goal 2.0-3.0 2.0-3.0 2.0-3.0 2.0-3.0 2.0-3.0 2.0-3.0 2.0-3.0   Trend Same Down Same Up Same Same Same   Last Week Total 37.5 mg  35 mg 37.5 mg 37.5 mg 40 mg 40 mg 40 mg   Next Week Total 37.5 mg 35 mg 37.5 mg 40 mg 40 mg 40 mg 40 mg   Sun 5 mg 5 mg 5 mg 5 mg 5 mg 5 mg 5 mg   Mon 5 mg 5 mg 5 mg 5 mg 5 mg 5 mg 5 mg   Tue - 5 mg 5 mg 5 mg 5 mg 5 mg 5 mg   Wed - 7.5 mg 7.5 mg 7.5 mg 7.5 mg 7.5 mg 7.5 mg   Thu 5 mg 5 mg 5 mg 5 mg 5 mg 5 mg 5 mg   Fri 5 mg 5 mg 5 mg 5 mg 5 mg 5 mg 5 mg   Sat 5 mg (2/15) 2.5 mg (2/22); Otherwise 5 mg 5 mg 7.5 mg 7.5 mg 7.5 mg 7.5 mg   Visit Report    Report          Plan:  1. INR is Therapeutic today- see above in Anticoagulation Summary.  Will instruct Jeb Olson to Continue their warfarin regimen- see above in Anticoagulation Summary.  2. Follow up in 4 weeks  3. Patient declines warfarin refills.  4. Verbal and written information provided. Patient expresses understanding and has no further questions at this time.    [facility]

## 2025-05-21 NOTE — PROGRESS NOTES
I have supervised and reviewed the notes, assessments, and/or procedures performed. The documented assessment and plan were developed cooperatively. I concur with the documentation of this patient encounter.    Gurjit Velásquez, PharmD

## 2025-05-23 ENCOUNTER — TREATMENT (OUTPATIENT)
Dept: CARDIAC REHAB | Facility: HOSPITAL | Age: 75
End: 2025-05-23
Payer: MEDICARE

## 2025-05-23 DIAGNOSIS — Z98.890 S/P MVR (MITRAL VALVE REPAIR): Primary | ICD-10-CM

## 2025-05-23 PROCEDURE — 93798 PHYS/QHP OP CAR RHAB W/ECG: CPT

## 2025-05-28 ENCOUNTER — POP HEALTH PHARMACY (OUTPATIENT)
Dept: PHARMACY | Facility: OTHER | Age: 75
End: 2025-05-28
Payer: MEDICARE

## 2025-05-28 ENCOUNTER — APPOINTMENT (OUTPATIENT)
Dept: CARDIAC REHAB | Facility: HOSPITAL | Age: 75
End: 2025-05-28
Payer: MEDICARE

## 2025-06-03 ENCOUNTER — EXTERNAL PBMM DATA (OUTPATIENT)
Dept: PHARMACY | Facility: OTHER | Age: 75
End: 2025-06-03
Payer: MEDICARE

## 2025-06-05 ENCOUNTER — PATIENT ROUNDING (BHMG ONLY) (OUTPATIENT)
Dept: URGENT CARE | Facility: CLINIC | Age: 75
End: 2025-06-05
Payer: MEDICARE

## 2025-06-05 NOTE — ED NOTES
Thank you for letting us care for you during your recent visit at [facility]. We would love to hear about your experience with us.         We’re always looking for ways to make our patients’ experiences even better. Do you have any recommendations on ways we may improve?         I appreciate you taking the time to respond. Please be on the lookout for a survey about your recent visit from [facility] via text or email. We would greatly appreciate if you could fill that out and turn it back in. We want your voice to be heard and we value your feedback.         Thank you for choosing [facility] for your healthcare needs.

## 2025-06-09 RX ORDER — FENOFIBRATE 160 MG/1
160 TABLET ORAL
Qty: 90 TABLET | Refills: 0 | Status: SHIPPED | OUTPATIENT
Start: 2025-06-09

## 2025-06-17 ENCOUNTER — EXTERNAL PBMM DATA (OUTPATIENT)
Dept: PHARMACY | Facility: OTHER | Age: 75
End: 2025-06-17
Payer: MEDICARE

## 2025-06-18 ENCOUNTER — ANTICOAGULATION VISIT (OUTPATIENT)
Dept: PHARMACY | Facility: HOSPITAL | Age: 75
End: 2025-06-18
Payer: MEDICARE

## 2025-06-18 DIAGNOSIS — I48.0 AF (PAROXYSMAL ATRIAL FIBRILLATION): Primary | ICD-10-CM

## 2025-06-18 LAB
INR PPP: 3.2 (ref 0.91–1.09)
PROTHROMBIN TIME: 38.9 SECONDS (ref 10–13.8)

## 2025-06-18 PROCEDURE — 85610 PROTHROMBIN TIME: CPT

## 2025-06-18 PROCEDURE — G0463 HOSPITAL OUTPT CLINIC VISIT: HCPCS

## 2025-06-18 RX ORDER — ESCITALOPRAM OXALATE 10 MG/1
TABLET ORAL
Qty: 135 TABLET | Refills: 0 | Status: SHIPPED | OUTPATIENT
Start: 2025-06-18 | End: 2025-06-18

## 2025-06-18 RX ORDER — ESCITALOPRAM OXALATE 10 MG/1
10 TABLET ORAL EVERY EVENING
Qty: 90 TABLET | Refills: 0 | Status: SHIPPED | OUTPATIENT
Start: 2025-06-18

## 2025-06-18 NOTE — PROGRESS NOTES
Anticoagulation Clinic Progress Note    Anticoagulation Summary  As of 6/18/2025      INR goal:  2.0-3.0   TTR:  70.0% (7.5 mo)   INR used for dosing:  3.2 (6/18/2025)   Warfarin maintenance plan:  7.5 mg every Wed, Sat; 5 mg all other days   Weekly warfarin total:  40 mg   Plan last modified:  Gurjit Velásquez, PharmD (3/21/2025)   Next INR check:  7/2/2025   Target end date:  --    Indications    AF (paroxysmal atrial fibrillation) [I48.0]                 Anticoagulation Episode Summary       INR check location:  --    Preferred lab:  --    Send INR reminders to:   [facility]    Comments:  --          Anticoagulation Care Providers       Provider Role Specialty Phone number    Bruna Chávez MD Referring Cardiology [phone]            Clinic Interview:  Patient Findings     Positives:  Change in health, Change in alcohol use, Change in   medications    Negatives:  Signs/symptoms of thrombosis, Signs/symptoms of bleeding,   Laboratory test error suspected, Change in activity, Upcoming invasive   procedure, Emergency department visit, Upcoming dental procedure, Missed   doses, Extra doses, Change in diet/appetite, Hospital admission, Bruising,   Other complaints    Comments:  Reports he strained a right abdominal muscle a few weeks ago.   He reports taking APAP extra strength (he believes 650 mg?) up to 8   tablets/day. Reports he consumed some alcohol surrounding his grandson's   wedding this past weekend.       Clinical Outcomes     Negatives:  Major bleeding event, Thromboembolic event,   Anticoagulation-related hospital admission, Anticoagulation-related ED   visit, Anticoagulation-related fatality    Comments:  Reports he strained a right abdominal muscle a few weeks ago.   He reports taking APAP extra strength (he believes 650 mg?) up to 8   tablets/day. Reports he consumed some alcohol surrounding his grandson's   wedding this past weekend.         INR History:      2/21/2025     3:30 PM  3/7/2025     1:30 PM 3/21/2025    11:30 AM 4/4/2025    11:15 AM 4/18/2025    11:15 AM 5/21/2025    11:15 AM 6/18/2025    10:30 AM   Anticoagulation Monitoring   INR 3.5 2.9 1.9 2.3 2.7 2.4 3.2   INR Date 2/21/2025 3/7/2025 3/21/2025 4/4/2025 4/18/2025 5/21/2025 6/18/2025   INR Goal 2.0-3.0 2.0-3.0 2.0-3.0 2.0-3.0 2.0-3.0 2.0-3.0 2.0-3.0   Trend Down Same Up Same Same Same Same   Last Week Total 35 mg 37.5 mg 37.5 mg 40 mg 40 mg 40 mg 40 mg   Next Week Total 35 mg 37.5 mg 40 mg 40 mg 40 mg 40 mg 37.5 mg   Sun 5 mg 5 mg 5 mg 5 mg 5 mg 5 mg 5 mg   Mon 5 mg 5 mg 5 mg 5 mg 5 mg 5 mg 5 mg   Tue 5 mg 5 mg 5 mg 5 mg 5 mg 5 mg 5 mg   Wed 7.5 mg 7.5 mg 7.5 mg 7.5 mg 7.5 mg 7.5 mg 5 mg (6/18); Otherwise 7.5 mg   Thu 5 mg 5 mg 5 mg 5 mg 5 mg 5 mg 5 mg   Fri 5 mg 5 mg 5 mg 5 mg 5 mg 5 mg 5 mg   Sat 2.5 mg (2/22); Otherwise 5 mg 5 mg 7.5 mg 7.5 mg 7.5 mg 7.5 mg 7.5 mg   Visit Report   Report           Plan:  1. INR is Supratherapeutic today- see above in Anticoagulation Summary.  Will instruct Jeb Olson to Change their warfarin regimen (5 mg today, then resume 7.5 mg Wed/Sat, 5 mg all other days) - see above in Anticoagulation Summary.  2. Advised to limit APAP to no more than 4000 mg/day and avoid consumption of alcohol in combination with APAP.   3. Follow up in 2 weeks.  4. Patient declines warfarin refills.  5. Verbal and written information provided. Patient expresses understanding and has no further questions at this time.    Gurjit Velásquez, PharmD

## 2025-06-23 ENCOUNTER — TELEPHONE (OUTPATIENT)
Dept: FAMILY MEDICINE CLINIC | Facility: CLINIC | Age: 75
End: 2025-06-23

## 2025-06-23 NOTE — TELEPHONE ENCOUNTER
Caller: ELLIE    Relationship: Other    Best call back number: [phone]         What was the call regarding:       CALLING TO FOLLOW UP ON A FAX THAT WAS SENT TO THE OFFICE IN REGARDS TO MEDICATION CONCERNS.

## 2025-06-25 NOTE — TELEPHONE ENCOUNTER
RELAY    Need more information on what this is pertaining to.  Unable to contact [employer] at this time.

## 2025-06-27 ENCOUNTER — EXTERNAL PBMM DATA (OUTPATIENT)
Dept: PHARMACY | Facility: OTHER | Age: 75
End: 2025-06-27
Payer: MEDICARE

## 2025-07-01 ENCOUNTER — ANTICOAGULATION VISIT (OUTPATIENT)
Dept: PHARMACY | Facility: HOSPITAL | Age: 75
End: 2025-07-01
Payer: MEDICARE

## 2025-07-01 DIAGNOSIS — I48.0 AF (PAROXYSMAL ATRIAL FIBRILLATION): Primary | ICD-10-CM

## 2025-07-01 LAB
INR PPP: 2.9 (ref 0.91–1.09)
PROTHROMBIN TIME: 34.8 SECONDS (ref 10–13.8)

## 2025-07-01 PROCEDURE — G0463 HOSPITAL OUTPT CLINIC VISIT: HCPCS

## 2025-07-01 PROCEDURE — 85610 PROTHROMBIN TIME: CPT

## 2025-07-01 NOTE — PROGRESS NOTES
Anticoagulation Clinic Progress Note    Anticoagulation Summary  As of 2025      INR goal:  2.0-3.0   TTR:  68.0% (7.9 mo)   INR used for dosin.9 (2025)   Warfarin maintenance plan:  7.5 mg every Wed, Sat; 5 mg all other days   Weekly warfarin total:  40 mg   No change documented:  Gurjit Velásquez PharmD   Plan last modified:  Gurjit Velásquez PharmD (3/21/2025)   Next INR check:  2025   Target end date:  --    Indications    AF (paroxysmal atrial fibrillation) [I48.0]                 Anticoagulation Episode Summary       INR check location:  --    Preferred lab:  --    Send INR reminders to:   [facility]    Comments:  --          Anticoagulation Care Providers       Provider Role Specialty Phone number    Bruna Chávez MD Referring Cardiology [phone]            Clinic Interview:  Patient Findings     Negatives:  Signs/symptoms of thrombosis, Signs/symptoms of bleeding,   Laboratory test error suspected, Change in health, Change in alcohol use,   Change in activity, Upcoming invasive procedure, Emergency department   visit, Upcoming dental procedure, Missed doses, Extra doses, Change in   medications, Change in diet/appetite, Hospital admission, Bruising, Other   complaints      Clinical Outcomes     Negatives:  Major bleeding event, Thromboembolic event,   Anticoagulation-related hospital admission, Anticoagulation-related ED   visit, Anticoagulation-related fatality        INR History:      3/7/2025     1:30 PM 3/21/2025    11:30 AM 2025    11:15 AM 2025    11:15 AM 2025    11:15 AM 2025    10:30 AM 2025    10:45 AM   Anticoagulation Monitoring   INR 2.9 1.9 2.3 2.7 2.4 3.2 2.9   INR Date 3/7/2025 3/21/2025 2025 2025 2025 2025 2025   INR Goal 2.0-3.0 2.0-3.0 2.0-3.0 2.0-3.0 2.0-3.0 2.0-3.0 2.0-3.0   Trend Same Up Same Same Same Same Same   Last Week Total 37.5 mg 37.5 mg 40 mg 40 mg 40 mg 40 mg 40 mg   Next Week Total 37.5 mg 40 mg  40 mg 40 mg 40 mg 37.5 mg 40 mg   Sun 5 mg 5 mg 5 mg 5 mg 5 mg 5 mg 5 mg   Mon 5 mg 5 mg 5 mg 5 mg 5 mg 5 mg 5 mg   Tue 5 mg 5 mg 5 mg 5 mg 5 mg 5 mg 5 mg   Wed 7.5 mg 7.5 mg 7.5 mg 7.5 mg 7.5 mg 5 mg (6/18); Otherwise 7.5 mg 7.5 mg   Thu 5 mg 5 mg 5 mg 5 mg 5 mg 5 mg 5 mg   Fri 5 mg 5 mg 5 mg 5 mg 5 mg 5 mg 5 mg   Sat 5 mg 7.5 mg 7.5 mg 7.5 mg 7.5 mg 7.5 mg 7.5 mg   Visit Report  Report            Plan:  1. INR is Therapeutic today- see above in Anticoagulation Summary.  Will instruct Jeb KRISTOPHER Whitney to Continue their warfarin regimen- see above in Anticoagulation Summary.  2. Follow up in 4 weeks  3. Patient declines warfarin refills.  4. Verbal and written information provided. Patient expresses understanding and has no further questions at this time.    Gurjit Velásquez, PharmD

## 2025-07-11 ENCOUNTER — EXTERNAL PBMM DATA (OUTPATIENT)
Dept: PHARMACY | Facility: OTHER | Age: 75
End: 2025-07-11
Payer: MEDICARE

## 2025-07-16 ENCOUNTER — OFFICE VISIT (OUTPATIENT)
Dept: FAMILY MEDICINE CLINIC | Facility: CLINIC | Age: 75
End: 2025-07-16
Payer: MEDICARE

## 2025-07-16 VITALS
SYSTOLIC BLOOD PRESSURE: 114 MMHG | WEIGHT: 247 LBS | OXYGEN SATURATION: 96 % | HEIGHT: 74 IN | DIASTOLIC BLOOD PRESSURE: 72 MMHG | BODY MASS INDEX: 31.7 KG/M2 | RESPIRATION RATE: 16 BRPM | HEART RATE: 60 BPM

## 2025-07-16 DIAGNOSIS — E11.649 TYPE 2 DIABETES MELLITUS WITH HYPOGLYCEMIA WITHOUT COMA, WITHOUT LONG-TERM CURRENT USE OF INSULIN: ICD-10-CM

## 2025-07-16 DIAGNOSIS — L98.9 SKIN LESION OF RIGHT LEG: ICD-10-CM

## 2025-07-16 DIAGNOSIS — Z12.11 SCREENING FOR MALIGNANT NEOPLASM OF COLON: ICD-10-CM

## 2025-07-16 DIAGNOSIS — Z00.00 ENCOUNTER FOR SUBSEQUENT ANNUAL WELLNESS VISIT (AWV) IN MEDICARE PATIENT: Primary | ICD-10-CM

## 2025-07-16 DIAGNOSIS — R07.89 CHEST WALL PAIN: ICD-10-CM

## 2025-07-16 PROBLEM — J18.9 PNEUMONIA OF BOTH LUNGS DUE TO INFECTIOUS ORGANISM: Status: RESOLVED | Noted: 2024-12-03 | Resolved: 2025-07-16

## 2025-07-16 PROBLEM — I25.10 ATHEROSCLEROTIC HEART DISEASE OF NATIVE CORONARY ARTERY WITHOUT ANGINA PECTORIS: Status: RESOLVED | Noted: 2025-01-03 | Resolved: 2025-07-16

## 2025-07-16 LAB
CHOLEST SERPL-MCNC: 143 MG/DL (ref 0–200)
HBA1C MFR BLD: 6.2 % (ref 4.8–5.6)
HDLC SERPL-MCNC: 35 MG/DL (ref 40–60)
LDLC SERPL CALC-MCNC: 86 MG/DL (ref 0–100)
TRIGL SERPL-MCNC: 123 MG/DL (ref 0–150)
VLDLC SERPL CALC-MCNC: 22 MG/DL (ref 5–40)

## 2025-07-16 PROCEDURE — 3044F HG A1C LEVEL LT 7.0%: CPT | Performed by: INTERNAL MEDICINE

## 2025-07-16 PROCEDURE — G0439 PPPS, SUBSEQ VISIT: HCPCS | Performed by: INTERNAL MEDICINE

## 2025-07-16 PROCEDURE — 1126F AMNT PAIN NOTED NONE PRSNT: CPT | Performed by: INTERNAL MEDICINE

## 2025-07-16 PROCEDURE — 99213 OFFICE O/P EST LOW 20 MIN: CPT | Performed by: INTERNAL MEDICINE

## 2025-07-16 PROCEDURE — 3074F SYST BP LT 130 MM HG: CPT | Performed by: INTERNAL MEDICINE

## 2025-07-16 PROCEDURE — 3078F DIAST BP <80 MM HG: CPT | Performed by: INTERNAL MEDICINE

## 2025-07-16 NOTE — PROGRESS NOTES
Subjective   The ABCs of the Annual Wellness Visit  Medicare Wellness Visit      Jeb Olson is a 75 y.o. patient who presents for a Medicare Wellness Visit.  Rosas is here today for his wellness visit.  We did go over some health maintenance issues.  He is up to speed on most things.  He is going to get an eye exam.  We did do a diabetic foot exam today.  He does report that he was not able to get his colonoscopy done because of the heart issues.  He is going to try to reschedule that.  I did advise that if he has any issues with that we can enter a new referral    The following portions of the patient's history were reviewed and   updated as appropriate: He  has a past medical history of Abnormal ECG (11/2024), AF (paroxysmal atrial fibrillation), Allergic (Have had for several years), Anemia, Anticoagulant long-term use, Anxiety (Since about 2014), Arthritis (2002), Arthritis of knee, left, Atherosclerotic heart disease of native coronary artery without angina pectoris (01/03/2025), Cataract (05/2019), CHF (congestive heart failure), Colon polyp (2017), Coronary artery disease, Diabetes mellitus, Dry eyes, Essential hypertension, Heart murmur, HL (hearing loss) (1980), Hyperlipemia, Knee swelling (7/7/2020), Macular degeneration, Mild chronic anemia, Mitral valve disorder, Mitral valve prolapse (11/29/24), Obesity, Pneumonia (11/2024), Pneumonia of both lungs due to infectious organism (12/03/2024), Sleep apnea, and Urinary tract infection.  He does not have any pertinent problems on file.  He  has a past surgical history that includes Colonoscopy (2018); Achilles tendon repair (Left); Tonsillectomy; Esophagogastroduodenoscopy; Cardiac catheterization; Total knee arthroplasty (Left, 03/12/2020); Cardiac catheterization (N/A, 09/01/2022); Cardiac catheterization (N/A, 09/01/2022); Cataract extraction, extracapsular w/ intraocular lens implant (Bilateral); Total knee arthroplasty (Right, 03/21/2024); Cardiac  catheterization (N/A, 01/02/2025); Cardiac catheterization (N/A, 01/02/2025); mitral valve repair/replacement (N/A, 01/08/2025); Carpal tunnel release (1996???); Joint replacement (03/12/20); and Mitral valve repair (01/08/2025).  His family history includes Alcohol abuse in his father, maternal uncle, and maternal uncle; Arthritis in his mother; Diabetes in his mother; Hyperlipidemia in his father and mother; Hypertension in his father and mother; Osteoporosis in his mother.  He  reports that he has never smoked. He has never been exposed to tobacco smoke. He has never used smokeless tobacco. He reports current alcohol use of about 3.0 standard drinks of alcohol per week. He reports that he does not use drugs.  Current Outpatient Medications   Medication Sig Dispense Refill    acetaminophen (TYLENOL) 325 MG tablet Take 2 tablets by mouth 2 (Two) Times a Day As Needed for Mild Pain. 60 tablet 0    amLODIPine (NORVASC) 5 MG tablet Take 1 tablet by mouth Daily. Indications: High Blood Pressure 90 tablet 1    aspirin 81 MG EC tablet Take 1 tablet by mouth Daily.      bumetanide (BUMEX) 2 MG tablet Take 1 tablet by mouth Daily. 90 tablet 3    cephalexin (KEFLEX) 500 MG capsule Take 4 capsule 1 hour prior to dental procedure 4 capsule 0    doxazosin (CARDURA) 4 MG tablet TAKE 1 TABLET BY MOUTH ONCE DAILY AT NIGHT 90 tablet 0    escitalopram (LEXAPRO) 10 MG tablet TAKE 1 TABLET BY MOUTH ONCE DAILY IN THE EVENING 90 tablet 0    ezetimibe (ZETIA) 10 MG tablet Take 1 tablet by mouth once daily 90 tablet 0    fenofibrate 160 MG tablet TAKE 1 TABLET BY MOUTH AT NIGHT 90 tablet 0    fluticasone (FLONASE) 50 MCG/ACT nasal spray Administer 2 sprays into the nostril(s) as directed by provider Every Morning. 2 sprays in each nostril  Indications: Stuffy Nose      gabapentin (NEURONTIN) 600 MG tablet Take 1 tablet by mouth Every Evening. Indications: Diabetes with Nerve Disease      glucose blood (Accu-Chek Anabela Plus) test strip  TEST TWICE DAILY Use as instructed 100 each 3    Multiple Vitamins-Minerals (PRESERVISION AREDS 2 PO) Take 1 tablet by mouth 2 (Two) Times a Day. Indications: supplement       nebivolol (BYSTOLIC) 10 MG tablet Take 1 tablet by mouth Daily. Indications: High Blood Pressure 90 tablet 3    Olopatadine HCl (PATADAY OP) Apply 1 drop to eye(s) as directed by provider 2 (Two) Times a Day As Needed (allergies). Indications: eye drops       polyethyl glycol-propyl glycol (SYSTANE) 0.4-0.3 % solution ophthalmic solution (artificial tears) Administer 1 drop to both eyes As Needed (dry eyes). Indications: Excessive Cornea and Conjunctiva Dryness      sacubitril-valsartan (ENTRESTO) 24-26 MG tablet Take 1 tablet by mouth 2 (Two) Times a Day. Indications: Cardiac Failure 60 tablet 11    SITagliptin-metFORMIN HCl ER (Janumet XR)  MG tablet Take 1 tablet by mouth 2 (Two) Times a Day. 180 tablet 1    traZODone (DESYREL) 50 MG tablet Take 1 tablet by mouth Every Night. Indications: Anxiety Disorder 90 tablet 1    warfarin (COUMADIN) 5 MG tablet Take 1 tablet by mouth Every Morning. Indications: Atrial Fibrillation       No current facility-administered medications for this visit.     Facility-Administered Medications Ordered in Other Visits   Medication Dose Route Frequency Provider Last Rate Last Admin    Chlorhexidine Gluconate 2 % pads 3 each  3 pad  Apply externally BID Pricila Barnes APRN         He has no known allergies..    Compared to one year ago, the patient's physical   health is the same.  Compared to one year ago, the patient's mental   health is the same.    Recent Hospitalizations:  This patient has had a [facility] admission record on file within the last 365 days.  Current Medical Providers:  Patient Care Team:  Tera Salvador MD as PCP - General (Internal Medicine)  Micah Reyes MD (Inactive) as Consulting Physician (Gastroenterology)  Bruna Chávez MD as Referring Physician  (Cardiology)    Outpatient Medications Prior to Visit   Medication Sig Dispense Refill    acetaminophen (TYLENOL) 325 MG tablet Take 2 tablets by mouth 2 (Two) Times a Day As Needed for Mild Pain. 60 tablet 0    amLODIPine (NORVASC) 5 MG tablet Take 1 tablet by mouth Daily. Indications: High Blood Pressure 90 tablet 1    aspirin 81 MG EC tablet Take 1 tablet by mouth Daily.      bumetanide (BUMEX) 2 MG tablet Take 1 tablet by mouth Daily. 90 tablet 3    cephalexin (KEFLEX) 500 MG capsule Take 4 capsule 1 hour prior to dental procedure 4 capsule 0    doxazosin (CARDURA) 4 MG tablet TAKE 1 TABLET BY MOUTH ONCE DAILY AT NIGHT 90 tablet 0    escitalopram (LEXAPRO) 10 MG tablet TAKE 1 TABLET BY MOUTH ONCE DAILY IN THE EVENING 90 tablet 0    ezetimibe (ZETIA) 10 MG tablet Take 1 tablet by mouth once daily 90 tablet 0    fenofibrate 160 MG tablet TAKE 1 TABLET BY MOUTH AT NIGHT 90 tablet 0    fluticasone (FLONASE) 50 MCG/ACT nasal spray Administer 2 sprays into the nostril(s) as directed by provider Every Morning. 2 sprays in each nostril  Indications: Stuffy Nose      gabapentin (NEURONTIN) 600 MG tablet Take 1 tablet by mouth Every Evening. Indications: Diabetes with Nerve Disease      glucose blood (Accu-Chek Anabela Plus) test strip TEST TWICE DAILY Use as instructed 100 each 3    Multiple Vitamins-Minerals (PRESERVISION AREDS 2 PO) Take 1 tablet by mouth 2 (Two) Times a Day. Indications: supplement       nebivolol (BYSTOLIC) 10 MG tablet Take 1 tablet by mouth Daily. Indications: High Blood Pressure 90 tablet 3    Olopatadine HCl (PATADAY OP) Apply 1 drop to eye(s) as directed by provider 2 (Two) Times a Day As Needed (allergies). Indications: eye drops       polyethyl glycol-propyl glycol (SYSTANE) 0.4-0.3 % solution ophthalmic solution (artificial tears) Administer 1 drop to both eyes As Needed (dry eyes). Indications: Excessive Cornea and Conjunctiva Dryness      sacubitril-valsartan (ENTRESTO) 24-26 MG tablet Take 1  tablet by mouth 2 (Two) Times a Day. Indications: Cardiac Failure 60 tablet 11    SITagliptin-metFORMIN HCl ER (Janumet XR)  MG tablet Take 1 tablet by mouth 2 (Two) Times a Day. 180 tablet 1    traZODone (DESYREL) 50 MG tablet Take 1 tablet by mouth Every Night. Indications: Anxiety Disorder 90 tablet 1    warfarin (COUMADIN) 5 MG tablet Take 1 tablet by mouth Every Morning. Indications: Atrial Fibrillation       Facility-Administered Medications Prior to Visit   Medication Dose Route Frequency Provider Last Rate Last Admin    Chlorhexidine Gluconate 2 % pads 3 each  3 pad  Apply externally BID Pricila Barnes L, APRN         No opioid medication identified on active medication list. I have reviewed chart for other potential  high risk medication/s and harmful drug interactions in the elderly.      Aspirin is on active medication list. Aspirin use is indicated based on review of current medical condition/s. Pros and cons of this therapy have been discussed today. Benefits of this medication outweigh potential harm.  Patient has been encouraged to continue taking this medication.  .      Patient Active Problem List   Diagnosis    Allergic rhinitis    Arthritis of knee, left    Diabetes mellitus    Essential hypertension    Hyperlipidemia    High risk medication use    Obesity    Primary osteoarthritis of knee    Mild chronic anemia    Obstructive sleep apnea    Arthritis of left knee    Sinus bradycardia    Abnormal stress test    Colon polyp    Family history of colonic polyps    Reactive depression    Arthritis of knee, right    Arthritis of knee    AF (paroxysmal atrial fibrillation)    Acute exacerbation of CHF (congestive heart failure)    Severe mitral regurgitation    Mitral valve regurgitation    History of open heart surgery, [facility], 1/8/25    Acute CHF    Chest pain, atypical    Diastolic CHF, acute on chronic    Incisional abscess    Syncope and collapse     Advance Care Planning Advance Directive is  "not on file.  ACP discussion was held with the patient during this visit. Patient does not have an advance directive, information provided.            Objective   Vitals:    25 0911   BP: 114/72   Pulse: 60   Resp: 16   SpO2: 96%   Weight: 112 kg (247 lb)   Height: 188 cm (74.02\")   PainSc: 0-No pain       Estimated body mass index is 31.7 kg/m² as calculated from the following:    Height as of this encounter: 188 cm (74.02\").    Weight as of this encounter: 112 kg (247 lb).    BMI is >= 30 and <35. (Class 1 Obesity). The following options were offered after discussion;: weight loss educational material (shared in after visit summary) and exercise counseling/recommendations       Gait and Balance Evaluation:  Normal     Does the patient have evidence of cognitive impairment? No                                                                                                Health  Risk Assessment    Smoking Status:  Social History     Tobacco Use   Smoking Status Never    Passive exposure: Never   Smokeless Tobacco Never   Tobacco Comments    Naila only every been around people who smoked     Alcohol Consumption:  Social History     Substance and Sexual Activity   Alcohol Use Yes    Alcohol/week: 3.0 standard drinks of alcohol    Types: 3 Cans of beer per week    Comment: 2 beers 3/15/2025, 2 beers 3/15/2025, 1 beer 3/16/2025       Fall Risk Screen  STEADI Fall Risk Assessment was completed, and patient is at LOW risk for falls.Assessment completed on:2025    Depression Screening   Little interest or pleasure in doing things? Not at all   Feeling down, depressed, or hopeless? Not at all   PHQ-2 Total Score 0      Health Habits and Functional and Cognitive Screenin/9/2025     2:21 PM   Functional & Cognitive Status   Do you have difficulty preparing food and eating? No   Do you have difficulty bathing yourself, getting dressed or grooming yourself? No   Do you have difficulty using the toilet? No   Do " you have difficulty moving around from place to place? No   Do you have trouble with steps or getting out of a bed or a chair? No   Current Diet Well Balanced Diet   Dental Exam Up to date   Eye Exam Not up to date   Exercise (times per week) Other   Current Exercises Include Cardiovascular Workout;Light Weights   Do you need help using the phone?  No   Are you deaf or do you have serious difficulty hearing?  No   Do you need help to go to places out of walking distance? No   Do you need help shopping? No   Do you need help preparing meals?  No   Do you need help with housework?  No   Do you need help with laundry? No   Do you need help taking your medications? No   Do you need help managing money? No   Do you ever drive or ride in a car without wearing a seat belt? No   Have you felt unusual fatigue (could be tiredness), stress, anger or loneliness in the last month? No   Who do you live with? Spouse   If you need help, do you have trouble finding someone available to you? No   Have you been bothered in the last four weeks by sexual problems? No   Do you have difficulty concentrating, remembering or making decisions? No           Age-appropriate Screening Schedule:  Refer to the list below for future screening recommendations based on patient's age, sex and/or medical conditions. Orders for these recommended tests are listed in the plan section. The patient has been provided with a written plan.    Health Maintenance List  Health Maintenance   Topic Date Due    ZOSTER VACCINE (1 of 2) Never done    DIABETIC FOOT EXAM  08/07/2021    DIABETIC EYE EXAM  05/11/2024    COLORECTAL CANCER SCREENING  10/28/2024    COVID-19 Vaccine (8 - 2024-25 season) 05/22/2025    ANNUAL WELLNESS VISIT  05/29/2025    HEMOGLOBIN A1C  07/30/2025    INFLUENZA VACCINE  10/01/2025    URINE MICROALBUMIN-CREATININE RATIO (uACR)  11/11/2025    LIPID PANEL  01/03/2026    TDAP/TD VACCINES (2 - Td or Tdap) 04/18/2032    HEPATITIS C SCREENING   "Completed    Pneumococcal Vaccine 50+  Completed                                                                                                                                                CMS Preventative Services Quick Reference  Risk Factors Identified During Encounter  Immunizations Discussed/Encouraged: Shingrix and COVID19    The above risks/problems have been discussed with the patient.  Pertinent information has been shared with the patient in the After Visit Summary.  An After Visit Summary and PPPS were made available to the patient.    Follow Up:   Next Medicare Wellness visit to be scheduled in 1 year.         Additional E&M Note during same encounter follows:  Patient has additional, significant, and separately identifiable condition(s)/problem(s) that require work above and beyond the Medicare Wellness Visit     Chief Complaint  Medicare Wellness-subsequent    Subjective   HPI  Rosas is also being seen today for additional medical problem/s.  Rosas is also being seen today for additional issues.  Approximately 2 months ago he was going to try to work on the toilet.  He had not yet crawled under just getting down to the floor when he felt a pain in his right side.  It was fairly significant pain.  He went to the  care center and they really did not find much.  They suggested a CAT scan but the pain did get better on its own.  He would like to see a dermatologist regarding the skin lesion on the back of his leg but also some areas on his scalp.                Objective   Vital Signs:  /72   Pulse 60   Resp 16   Ht 188 cm (74.02\")   Wt 112 kg (247 lb)   SpO2 96%   BMI 31.70 kg/m²   Physical Exam  Vitals and nursing note reviewed.   Constitutional:       Appearance: Normal appearance.   Cardiovascular:      Rate and Rhythm: Normal rate and regular rhythm.      Pulses:           Dorsalis pedis pulses are 2+ on the right side and 2+ on the left side.        Posterior tibial pulses " are 2+ on the right side and 2+ on the left side.      Comments: There is an occasional ectopic  Pulmonary:      Effort: Pulmonary effort is normal.      Breath sounds: No wheezing, rhonchi or rales.   Chest:      Chest wall: No tenderness.   Abdominal:      General: Bowel sounds are normal.      Palpations: Abdomen is soft.      Tenderness: There is no abdominal tenderness.   Musculoskeletal:      Right lower leg: No edema.      Left lower leg: No edema.   Feet:      Right foot:      Skin integrity: Dry skin present. No ulcer.      Toenail Condition: Right toenails are long.      Left foot:      Skin integrity: Dry skin present. No ulcer.      Toenail Condition: Left toenails are long.      Comments: Diabetic Foot Exam Performed      Neurological:      Mental Status: He is alert.          Assessment and Plan         Encounter for subsequent annual wellness visit (AWV) in Medicare patient    Chest wall pain    Skin lesion of right leg    Type 2 diabetes mellitus with hypoglycemia without coma, without long-term current use of insulin    Screening for malignant neoplasm of colon    Diagnoses and all orders for this visit:    1. Encounter for subsequent annual wellness visit (AWV) in Medicare patient (Primary)    2. Chest wall pain    3. Skin lesion of left leg  -     Ambulatory Referral to Dermatology    4. Type 2 diabetes mellitus with hypoglycemia without coma, without long-term current use of insulin  Assessment & Plan:      Orders:  -     Comprehensive Metabolic Panel  -     Hemoglobin A1c  -     Lipid Panel  -     Microalbumin / Creatinine Urine Ratio - Urine, Clean Catch    5. Screening for malignant neoplasm of colon  -     Ambulatory Referral to Gastroenterology    Rosas is here today for his wellness visit.  He had some chest wall pain that resolved.  He is got a nonhealing lesion on his right calf posteriorly.  He also has some actinic lesions on his forehead and scalp.  I am going to refer him to a  dermatologist.  We are going to send a new referral for his colonoscopy since his previous gastroenterologist retired.          Follow Up   No follow-ups on file.  Patient was given instructions and counseling regarding his condition or for health maintenance advice. Please see specific information pulled into the AVS if appropriate.

## 2025-07-17 LAB
ALBUMIN SERPL-MCNC: 4.6 G/DL (ref 3.5–5.2)
ALBUMIN/CREAT UR: 34 MG/G CREAT (ref 0–29)
ALBUMIN/GLOB SERPL: 1.7 G/DL
ALP SERPL-CCNC: 32 U/L (ref 39–117)
ALT SERPL-CCNC: 17 U/L (ref 1–41)
AST SERPL-CCNC: 20 U/L (ref 1–40)
BILIRUB SERPL-MCNC: 0.4 MG/DL (ref 0–1.2)
BUN SERPL-MCNC: 24 MG/DL (ref 8–23)
BUN/CREAT SERPL: 18.2 (ref 7–25)
CALCIUM SERPL-MCNC: 9.7 MG/DL (ref 8.6–10.5)
CHLORIDE SERPL-SCNC: 106 MMOL/L (ref 98–107)
CO2 SERPL-SCNC: 30 MMOL/L (ref 22–29)
CREAT SERPL-MCNC: 1.32 MG/DL (ref 0.76–1.27)
CREAT UR-MCNC: 37.4 MG/DL
EGFRCR SERPLBLD CKD-EPI 2021: 57 ML/MIN/1.73
GLOBULIN SER CALC-MCNC: 2.7 GM/DL
GLUCOSE SERPL-MCNC: 97 MG/DL (ref 65–99)
MICROALBUMIN UR-MCNC: 12.9 UG/ML
POTASSIUM SERPL-SCNC: 3.9 MMOL/L (ref 3.5–5.2)
PROT SERPL-MCNC: 7.3 G/DL (ref 6–8.5)
SODIUM SERPL-SCNC: 144 MMOL/L (ref 136–145)

## 2025-07-24 ENCOUNTER — EXTERNAL PBMM DATA (OUTPATIENT)
Dept: PHARMACY | Facility: OTHER | Age: 75
End: 2025-07-24
Payer: MEDICARE

## 2025-07-29 ENCOUNTER — ANTICOAGULATION VISIT (OUTPATIENT)
Dept: PHARMACY | Facility: HOSPITAL | Age: 75
End: 2025-07-29
Payer: MEDICARE

## 2025-07-29 DIAGNOSIS — I48.0 AF (PAROXYSMAL ATRIAL FIBRILLATION): Primary | ICD-10-CM

## 2025-07-29 LAB
INR PPP: 3.3 (ref 0.91–1.09)
PROTHROMBIN TIME: 39.3 SECONDS (ref 10–13.8)

## 2025-07-29 PROCEDURE — G0463 HOSPITAL OUTPT CLINIC VISIT: HCPCS

## 2025-07-29 PROCEDURE — 85610 PROTHROMBIN TIME: CPT

## 2025-07-29 NOTE — PROGRESS NOTES
Anticoagulation Clinic Progress Note    Anticoagulation Summary  As of 7/29/2025      INR goal:  2.0-3.0   TTR:  63.5% (8.9 mo)   INR used for dosing:  3.3 (7/29/2025)   Warfarin maintenance plan:  7.5 mg every Wed, Sat; 5 mg all other days   Weekly warfarin total:  40 mg   Plan last modified:  Gurjit Velásquez, PharmD (3/21/2025)   Next INR check:  8/12/2025   Target end date:  --    Indications    AF (paroxysmal atrial fibrillation) [I48.0]                 Anticoagulation Episode Summary       INR check location:  --    Preferred lab:  --    Send INR reminders to:   [facility] [place]    Comments:  --          Anticoagulation Care Providers       Provider Role Specialty Phone number    Bruna Chávez MD Referring Cardiology [phone]            Clinic Interview:  Patient Findings     Negatives:  Signs/symptoms of thrombosis, Signs/symptoms of bleeding,   Laboratory test error suspected, Change in health, Change in alcohol use,   Change in activity, Upcoming invasive procedure, Emergency department   visit, Upcoming dental procedure, Missed doses, Extra doses, Change in   medications, Change in diet/appetite, Hospital admission, Bruising, Other   complaints      Clinical Outcomes     Negatives:  Major bleeding event, Thromboembolic event,   Anticoagulation-related hospital admission, Anticoagulation-related ED   visit, Anticoagulation-related fatality        INR History:      3/21/2025    11:30 AM 4/4/2025    11:15 AM 4/18/2025    11:15 AM 5/21/2025    11:15 AM 6/18/2025    10:30 AM 7/1/2025    10:45 AM 7/29/2025    10:45 AM   Anticoagulation Monitoring   INR 1.9 2.3 2.7 2.4 3.2 2.9 3.3   INR Date 3/21/2025 4/4/2025 4/18/2025 5/21/2025 6/18/2025 7/1/2025 7/29/2025   INR Goal 2.0-3.0 2.0-3.0 2.0-3.0 2.0-3.0 2.0-3.0 2.0-3.0 2.0-3.0   Trend Up Same Same Same Same Same Same   Last Week Total 37.5 mg 40 mg 40 mg 40 mg 40 mg 40 mg 40 mg   Next Week Total 40 mg 40 mg 40 mg 40 mg 37.5 mg 40 mg 37.5 mg   Sun 5 mg 5  mg 5 mg 5 mg 5 mg 5 mg 5 mg   Mon 5 mg 5 mg 5 mg 5 mg 5 mg 5 mg 5 mg   Tue 5 mg 5 mg 5 mg 5 mg 5 mg 5 mg 5 mg   Wed 7.5 mg 7.5 mg 7.5 mg 7.5 mg 5 mg (6/18); Otherwise 7.5 mg 7.5 mg 5 mg (7/30); Otherwise 7.5 mg   Thu 5 mg 5 mg 5 mg 5 mg 5 mg 5 mg 5 mg   Fri 5 mg 5 mg 5 mg 5 mg 5 mg 5 mg 5 mg   Sat 7.5 mg 7.5 mg 7.5 mg 7.5 mg 7.5 mg 7.5 mg 7.5 mg   Visit Report Report             Plan:  1. INR is Supratherapeutic today- see above in Anticoagulation Summary.  Will instruct Jeb Olson to take 5 mg tomorrow, 7/30/25 (Patient already had warfarin dose this AM), and then continue their warfarin regimen- see above in Anticoagulation Summary.  2. Follow up in 2 weeks  3. Patient declines warfarin refills.  4. Verbal and written information provided. Patient expresses understanding and has no further questions at this time.    Ebony Flor [facility]

## 2025-08-04 ENCOUNTER — TELEPHONE (OUTPATIENT)
Dept: GASTROENTEROLOGY | Facility: CLINIC | Age: 75
End: 2025-08-04
Payer: MEDICARE

## 2025-08-05 RX ORDER — DOXAZOSIN 4 MG/1
4 TABLET ORAL NIGHTLY
Qty: 90 TABLET | Refills: 0 | Status: SHIPPED | OUTPATIENT
Start: 2025-08-05

## 2025-08-07 ENCOUNTER — EXTERNAL PBMM DATA (OUTPATIENT)
Dept: PHARMACY | Facility: OTHER | Age: 75
End: 2025-08-07
Payer: MEDICARE

## 2025-08-12 ENCOUNTER — ANTICOAGULATION VISIT (OUTPATIENT)
Dept: PHARMACY | Facility: HOSPITAL | Age: 75
End: 2025-08-12
Payer: MEDICARE

## 2025-08-12 DIAGNOSIS — I48.0 AF (PAROXYSMAL ATRIAL FIBRILLATION): Primary | ICD-10-CM

## 2025-08-12 LAB
INR PPP: 2.8 (ref 0.91–1.09)
PROTHROMBIN TIME: 33.7 SECONDS (ref 10–13.8)

## 2025-08-12 PROCEDURE — 85610 PROTHROMBIN TIME: CPT

## 2025-08-12 PROCEDURE — G0463 HOSPITAL OUTPT CLINIC VISIT: HCPCS

## 2025-08-18 ENCOUNTER — OFFICE VISIT (OUTPATIENT)
Dept: CARDIOLOGY | Facility: CLINIC | Age: 75
End: 2025-08-18
Payer: MEDICARE

## 2025-08-18 VITALS
SYSTOLIC BLOOD PRESSURE: 138 MMHG | WEIGHT: 249 LBS | HEIGHT: 74 IN | BODY MASS INDEX: 31.95 KG/M2 | OXYGEN SATURATION: 96 % | DIASTOLIC BLOOD PRESSURE: 78 MMHG | HEART RATE: 55 BPM

## 2025-08-18 DIAGNOSIS — I25.10 CORONARY ARTERY DISEASE INVOLVING NATIVE CORONARY ARTERY OF NATIVE HEART WITHOUT ANGINA PECTORIS: ICD-10-CM

## 2025-08-18 DIAGNOSIS — I48.0 PAROXYSMAL ATRIAL FIBRILLATION: ICD-10-CM

## 2025-08-18 DIAGNOSIS — I34.0 NONRHEUMATIC MITRAL VALVE REGURGITATION: Primary | ICD-10-CM

## 2025-08-18 RX ORDER — EZETIMIBE 10 MG/1
10 TABLET ORAL DAILY
Qty: 90 TABLET | Refills: 0 | Status: SHIPPED | OUTPATIENT
Start: 2025-08-18

## 2025-08-21 ENCOUNTER — EXTERNAL PBMM DATA (OUTPATIENT)
Dept: PHARMACY | Facility: OTHER | Age: 75
End: 2025-08-21
Payer: MEDICARE

## 2025-08-26 ENCOUNTER — ANTICOAGULATION VISIT (OUTPATIENT)
Dept: PHARMACY | Facility: HOSPITAL | Age: 75
End: 2025-08-26
Payer: MEDICARE

## 2025-08-26 DIAGNOSIS — I48.0 AF (PAROXYSMAL ATRIAL FIBRILLATION): Primary | ICD-10-CM

## 2025-08-26 LAB
INR PPP: 3 (ref 0.91–1.09)
PROTHROMBIN TIME: 35.5 SECONDS (ref 10–13.8)

## 2025-08-26 PROCEDURE — 85610 PROTHROMBIN TIME: CPT

## 2025-08-26 PROCEDURE — G0463 HOSPITAL OUTPT CLINIC VISIT: HCPCS

## (undated) DEVICE — GLIDESHEATH SLENDER STAINLESS STEEL KIT: Brand: GLIDESHEATH SLENDER

## (undated) DEVICE — CATH DIAG CARD PERFORMA JL4.0 BT 4F100CM

## (undated) DEVICE — DRP SLUSH WARMR MACH RECTG 66X44IN

## (undated) DEVICE — 28 FR RIGHT ANGLE – SOFT PVC CATHETER: Brand: PVC THORACIC CATHETERS

## (undated) DEVICE — CANN VENI DLP SGL/STAGE RT/ANGL 22F 30.5TO38.1CM

## (undated) DEVICE — SPONGE,DISSECTOR,K,XRAY,9/16"X1/4",STRL: Brand: MEDLINE

## (undated) DEVICE — APPL CHLORAPREP HI/LITE 26ML ORNG

## (undated) DEVICE — PK CATH CARD 40

## (undated) DEVICE — PREP SOL POVIDONE/IODINE BT 4OZ

## (undated) DEVICE — PK KN TOTL 40

## (undated) DEVICE — 1LYRTR 16FR10ML100%SILTMPS SNP: Brand: MEDLINE INDUSTRIES, INC.

## (undated) DEVICE — DGW .035 FC J3MM 260CM TEF: Brand: EMERALD

## (undated) DEVICE — 6F .070 JL4 100CM: Brand: CORDIS

## (undated) DEVICE — HANDPIECE SET WITH COAXIAL HIGH FLOW TIP AND SUCTION TUBE: Brand: INTERPULSE

## (undated) DEVICE — GLV SURG SENSICARE POLYISPRN W/ALOE PF LF 6.5 GRN STRL

## (undated) DEVICE — SOL NACL 0.9PCT 1000ML

## (undated) DEVICE — CATH DIAG CARD PERFORMA JL3.5 BT 4F100CM

## (undated) DEVICE — DEV INDEFLATOR P/N 580289

## (undated) DEVICE — Device

## (undated) DEVICE — RIMMED SPEED PIN 30MM STERILE

## (undated) DEVICE — NEEDLE, QUINCKE, 20GX3.5": Brand: MEDLINE

## (undated) DEVICE — ST TOURNI COMPL A/ 7IN

## (undated) DEVICE — CATH DIAG IMPULSE FL3.5 5F 100CM

## (undated) DEVICE — CATH DIAG CARD PERFORM JR4.0 BT 4F100CM

## (undated) DEVICE — ADAPT ANTEGRADE RETRGR

## (undated) DEVICE — CEMENT MIXING SYSTEM WITH FEMORAL BREAKWAY NOZZLE: Brand: REVOLUTION

## (undated) DEVICE — SOL ISO/ALC 70PCT 4OZ

## (undated) DEVICE — SOL IRR NACL 0.9PCT BO 1000ML

## (undated) DEVICE — PK ATS CUST W CARDIOTOMY RESEVOIR

## (undated) DEVICE — CANN AORT ROOT DLP VNT 14G 7F

## (undated) DEVICE — SKIN PREP TRAY W/CHG: Brand: MEDLINE INDUSTRIES, INC.

## (undated) DEVICE — HI-TORQUE SUPRA CORE .035 PERIPHERAL GUIDE WIRE .035 X 190 CM: Brand: HI-TORQUE SUPRA CORE

## (undated) DEVICE — DUAL CUT SAGITTAL BLADE

## (undated) DEVICE — SUT VIC 2/0 CT2 27IN J269H

## (undated) DEVICE — UNDERCAST PADDING: Brand: DEROYAL

## (undated) DEVICE — RADIFOCUS GLIDEWIRE: Brand: GLIDEWIRE

## (undated) DEVICE — GLV SURG BIOGEL LTX PF 6

## (undated) DEVICE — CATHETER,URETHRAL,REDRUBBER,STERILE,20FR: Brand: MEDLINE

## (undated) DEVICE — SYR LUERLOK 5CC

## (undated) DEVICE — CANN VENI DLP SGL/STAGE RT/ANGL MTL/TP 28F

## (undated) DEVICE — RUNTHROUGH NS EXTRA FLOPPY PTCA GUIDEWIRE: Brand: RUNTHROUGH

## (undated) DEVICE — GLV SURG BIOGEL LTX PF 7

## (undated) DEVICE — CLAMP INSERT: Brand: STEALTH® CLAMP INSERT

## (undated) DEVICE — GLV SURG SENSICARE PI MIC PF SZ7 LF STRL

## (undated) DEVICE — GLV SURG SIGNATURE ESSENTIAL PF LTX SZ8.5

## (undated) DEVICE — ANTIBACTERIAL UNDYED BRAIDED (POLYGLACTIN 910), SYNTHETIC ABSORBABLE SUTURE: Brand: COATED VICRYL

## (undated) DEVICE — CATH DIAG IMPULSE FR4 5F 100CM

## (undated) DEVICE — PAD,ABDOMINAL,8"X10",ST,LF: Brand: MEDLINE

## (undated) DEVICE — TR BAND RADIAL ARTERY COMPRESSION DEVICE: Brand: TR BAND

## (undated) DEVICE — DISPOSABLE TOURNIQUET CUFF SINGLE BLADDER, SINGLE PORT AND QUICK CONNECT CONNECTOR: Brand: COLOR CUFF

## (undated) DEVICE — RADIFOCUS OPTITORQUE ANGIOGRAPHIC CATHETER: Brand: OPTITORQUE

## (undated) DEVICE — SOL IRR H2O BO 1000ML STRL

## (undated) DEVICE — GLV SURG BIOGEL LTX PF 8 1/2

## (undated) DEVICE — SENSR CERBRL O2 PK/2

## (undated) DEVICE — PINNACLE INTRODUCER SHEATH: Brand: PINNACLE

## (undated) DEVICE — 28 FR STRAIGHT – SOFT PVC CATHETER: Brand: PVC THORACIC CATHETERS

## (undated) DEVICE — SUT ETHIBOND 2/0 CV V5  30IN PXX52

## (undated) DEVICE — SYS PERFUS SEP PLATLT W TIPS CUST

## (undated) DEVICE — CANN RETRGR STYLET RSCP 15F

## (undated) DEVICE — ZIP 24 SURGICAL SKIN CLOSURE DEVICE, PSA: Brand: ZIP 24 SURGICAL SKIN CLOSURE DEVICE

## (undated) DEVICE — BNDG,ELSTC,MATRIX,STRL,6"X5YD,LF,HOOK&LP: Brand: MEDLINE

## (undated) DEVICE — CONN REDUCING CANN/PUMP 1/2X3/8X3/8

## (undated) DEVICE — SOL NACL 0.9PCT 100ML SGL

## (undated) DEVICE — SYR LL TP 10ML STRL

## (undated) DEVICE — TREK CORONARY DILATATION CATHETER 2.50 MM X 12 MM / RAPID-EXCHANGE: Brand: TREK

## (undated) DEVICE — DRSNG SURESITE WNDW 4X4.5

## (undated) DEVICE — CANN ART EOPA 3D NV W/CONN 20F

## (undated) DEVICE — PROB CRYOABL CARD CARDIOBLATE/CRYOFLEX 25TO100MM 10CM 1P/U

## (undated) DEVICE — SOL ISO/ALC RUB 70PCT 4OZ

## (undated) DEVICE — SPNG GZ WOVN 4X4IN 12PLY 10/BX STRL

## (undated) DEVICE — MARKER,SKIN,WI/RULER AND LABELS: Brand: MEDLINE

## (undated) DEVICE — CATH DIAG IMPULSE FL4 5F 100CM

## (undated) DEVICE — GLV SURG BIOGEL LTX PF 7 1/2

## (undated) DEVICE — CATH F4 INF AL II 100CM: Brand: INFINITI

## (undated) DEVICE — OASIS DRAIN, SINGLE, INLINE & ATS COMPATIBLE: Brand: OASIS

## (undated) DEVICE — PK PERFUS CUST W/CARDIOPLEGIA

## (undated) DEVICE — KT MANIFLD CARDIAC

## (undated) DEVICE — PATIENT RETURN ELECTRODE, SINGLE-USE, CONTACT QUALITY MONITORING, ADULT, WITH 9FT CORD, FOR PATIENTS WEIGING OVER 33LBS. (15KG): Brand: MEGADYNE

## (undated) DEVICE — SOL IRR NACL 0.9PCT 3000ML

## (undated) DEVICE — DECANTER BAG 9": Brand: MEDLINE INDUSTRIES, INC.

## (undated) DEVICE — SYR LUERLOK 30CC

## (undated) DEVICE — INTRO SHEATH ART/FEM ENGAGE .035 6F12CM

## (undated) DEVICE — GLV SURG BIOGEL LTX PF 6 1/2

## (undated) DEVICE — NC TREK CORONARY DILATATION CATHETER 2.5 MM X 20 MM / RAPID-EXCHANGE: Brand: NC TREK

## (undated) DEVICE — 3M™ TEGADERM™ CHG DRESSING 25/CARTON 4 CARTONS/CASE 1658: Brand: TEGADERM™

## (undated) DEVICE — DRSNG WND GZ PAD BORDERED 4X8IN STRL

## (undated) DEVICE — 8 FOOT DISPOSABLE EXTENSION CABLE WITH SAFE CONNECT / ALLIGATOR CLIP

## (undated) DEVICE — TRAP FLD MINIVAC MEGADYNE 100ML

## (undated) DEVICE — ANGIO-SEAL VIP VASCULAR CLOSURE DEVICE: Brand: ANGIO-SEAL

## (undated) DEVICE — STERILE PATIENT PROTECTIVE PAD FOR IMP® KNEE POSITIONERS & COHESIVE WRAP (10 / CASE): Brand: DE MAYO KNEE POSITIONER®

## (undated) DEVICE — PK HEART OPN 40

## (undated) DEVICE — HEMOCONCENTRATOR PERFUS LPS06

## (undated) DEVICE — BNDG ELAS ELITE V/CLOSE 6IN 5YD LF STRL

## (undated) DEVICE — DRSNG TELFA PAD NONADH STR 1S 3X8IN

## (undated) DEVICE — TPE UMB 3/STRND 0.125X36IN

## (undated) DEVICE — GW EMR FIX EXCHG J STD .035 3MM 260CM

## (undated) DEVICE — ORGANIZER SUT SHELIGH 3T 213013

## (undated) DEVICE — OPTIFOAM GENTLE SA, POSTOP, 4X12: Brand: MEDLINE

## (undated) DEVICE — CATH VENT MIV RADL PIG ST TIP 4F 110CM

## (undated) DEVICE — BG TRANSF W/COUPLER SPK 600ML